# Patient Record
Sex: MALE | Race: WHITE | NOT HISPANIC OR LATINO | Employment: OTHER
[De-identification: names, ages, dates, MRNs, and addresses within clinical notes are randomized per-mention and may not be internally consistent; named-entity substitution may affect disease eponyms.]

---

## 2018-03-06 ENCOUNTER — TRANSCRIBE ORDERS (OUTPATIENT)
Dept: SCHEDULING | Age: 63
End: 2018-03-06

## 2018-03-06 DIAGNOSIS — I10 ESSENTIAL HYPERTENSION, MALIGNANT: Primary | ICD-10-CM

## 2018-03-12 ENCOUNTER — APPOINTMENT (OUTPATIENT)
Dept: RADIOLOGY | Facility: CLINIC | Age: 63
End: 2018-03-12
Attending: INTERNAL MEDICINE
Payer: MEDICARE

## 2018-03-12 DIAGNOSIS — I10 ESSENTIAL HYPERTENSION, MALIGNANT: ICD-10-CM

## 2018-03-12 PROCEDURE — 75571 CT HRT W/O DYE W/CA TEST: CPT

## 2018-10-04 ENCOUNTER — HOSPITAL ENCOUNTER (EMERGENCY)
Facility: HOSPITAL | Age: 63
Discharge: HOME | End: 2018-10-04
Attending: EMERGENCY MEDICINE
Payer: MEDICARE

## 2018-10-04 VITALS
WEIGHT: 198 LBS | SYSTOLIC BLOOD PRESSURE: 97 MMHG | OXYGEN SATURATION: 97 % | HEIGHT: 73 IN | BODY MASS INDEX: 26.24 KG/M2 | HEART RATE: 69 BPM | RESPIRATION RATE: 18 BRPM | TEMPERATURE: 97.3 F | DIASTOLIC BLOOD PRESSURE: 69 MMHG

## 2018-10-04 DIAGNOSIS — R20.2 PARESTHESIAS: ICD-10-CM

## 2018-10-04 DIAGNOSIS — F41.9 ANXIETY: Primary | ICD-10-CM

## 2018-10-04 DIAGNOSIS — R29.0 CARPOPEDAL SPASM: ICD-10-CM

## 2018-10-04 LAB
ANION GAP SERPL CALC-SCNC: 8 MEQ/L (ref 3–15)
BASOPHILS # BLD: 0.05 K/UL (ref 0.01–0.1)
BASOPHILS NFR BLD: 0.9 %
BUN SERPL-MCNC: 18 MG/DL (ref 8–20)
CALCIUM SERPL-MCNC: 9.6 MG/DL (ref 8.9–10.3)
CHLORIDE SERPL-SCNC: 100 MEQ/L (ref 98–109)
CO2 SERPL-SCNC: 32 MEQ/L (ref 22–32)
CREAT SERPL-MCNC: 0.9 MG/DL (ref 0.8–1.3)
DIFFERENTIAL METHOD BLD: ABNORMAL
EOSINOPHIL # BLD: 0.14 K/UL (ref 0.04–0.54)
EOSINOPHIL NFR BLD: 2.5 %
ERYTHROCYTE [DISTWIDTH] IN BLOOD BY AUTOMATED COUNT: 12.7 % (ref 11.6–14.4)
GFR SERPL CREATININE-BSD FRML MDRD: >60 ML/MIN/1.73M*2
GLUCOSE SERPL-MCNC: 90 MG/DL (ref 70–99)
HCT VFR BLDCO AUTO: 46.8 % (ref 40.1–51)
HGB BLD-MCNC: 15.6 G/DL (ref 13.7–17.5)
IMM GRANULOCYTES # BLD AUTO: 0.01 K/UL (ref 0–0.08)
IMM GRANULOCYTES NFR BLD AUTO: 0.2 %
LYMPHOCYTES # BLD: 1.98 K/UL (ref 1.2–3.5)
LYMPHOCYTES NFR BLD: 35.5 %
MCH RBC QN AUTO: 31.1 PG (ref 28–33.2)
MCHC RBC AUTO-ENTMCNC: 33.3 G/DL (ref 32.2–36.5)
MCV RBC AUTO: 93.2 FL (ref 83–98)
MONOCYTES # BLD: 0.65 K/UL (ref 0.3–1)
MONOCYTES NFR BLD: 11.6 %
NEUTROPHILS # BLD: 2.75 K/UL (ref 1.7–7)
NEUTS SEG NFR BLD: 49.3 %
NRBC BLD-RTO: 0.2 %
PDW BLD AUTO: 12.6 FL (ref 9.4–12.4)
PLATELET # BLD AUTO: 162 K/UL (ref 150–350)
POTASSIUM SERPL-SCNC: 3.9 MEQ/L (ref 3.6–5.1)
RBC # BLD AUTO: 5.02 M/UL (ref 4.5–5.8)
SODIUM SERPL-SCNC: 140 MEQ/L (ref 136–144)
WBC # BLD AUTO: 5.58 K/UL (ref 3.8–10.5)

## 2018-10-04 PROCEDURE — 85025 COMPLETE CBC W/AUTO DIFF WBC: CPT | Performed by: PHYSICIAN ASSISTANT

## 2018-10-04 PROCEDURE — 63700000 HC SELF-ADMINISTRABLE DRUG: Performed by: EMERGENCY MEDICINE

## 2018-10-04 PROCEDURE — 99283 EMERGENCY DEPT VISIT LOW MDM: CPT | Mod: 25

## 2018-10-04 PROCEDURE — 36415 COLL VENOUS BLD VENIPUNCTURE: CPT | Performed by: PHYSICIAN ASSISTANT

## 2018-10-04 PROCEDURE — 93005 ELECTROCARDIOGRAM TRACING: CPT | Performed by: PHYSICIAN ASSISTANT

## 2018-10-04 PROCEDURE — 80048 BASIC METABOLIC PNL TOTAL CA: CPT | Performed by: PHYSICIAN ASSISTANT

## 2018-10-04 RX ORDER — LORAZEPAM 0.5 MG/1
0.5 TABLET ORAL EVERY 6 HOURS PRN
Qty: 12 TABLET | Refills: 0 | Status: SHIPPED | OUTPATIENT
Start: 2018-10-04 | End: 2022-05-10 | Stop reason: ENTERED-IN-ERROR

## 2018-10-04 RX ORDER — TELMISARTAN 20 MG/1
10 TABLET ORAL 2 TIMES DAILY
COMMUNITY
Start: 2018-08-14 | End: 2022-05-11 | Stop reason: HOSPADM

## 2018-10-04 RX ORDER — ASPIRIN 81 MG/1
81 TABLET ORAL
COMMUNITY
Start: 2018-09-30 | End: 2020-11-09

## 2018-10-04 RX ORDER — LORAZEPAM 0.5 MG/1
0.5 TABLET ORAL ONCE
Status: COMPLETED | OUTPATIENT
Start: 2018-10-04 | End: 2018-10-04

## 2018-10-04 RX ORDER — LEVOTHYROXINE SODIUM 175 UG/1
175 TABLET ORAL
COMMUNITY
Start: 2018-09-27 | End: 2022-05-10 | Stop reason: ENTERED-IN-ERROR

## 2018-10-04 RX ADMIN — LORAZEPAM 0.5 MG: 0.5 TABLET ORAL at 10:29

## 2018-10-04 ASSESSMENT — ENCOUNTER SYMPTOMS
FEVER: 0
JOINT SWELLING: 0
NUMBNESS: 1
BACK PAIN: 0
DIZZINESS: 0
VOMITING: 0
COLOR CHANGE: 0
WOUND: 0
FATIGUE: 0
NECK PAIN: 0
PHOTOPHOBIA: 0
NAUSEA: 0
SHORTNESS OF BREATH: 0
ABDOMINAL PAIN: 0
TREMORS: 0
WEAKNESS: 0
CHILLS: 0
LIGHT-HEADEDNESS: 0

## 2018-10-04 NOTE — ED ATTESTATION NOTE
I have personally seen and examined the patient.  I reviewed and agree with physician assistant / nurse practitioner’s assessment and plan of care.     Exam: Patient is anxious tremulous and tearful on exam.  His neuro exam is intact there is no gross motor or sensory deficit in any of his extremities.  He describes a subjective numbness but nothing objective could be elicited at this time.    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  He is currently on a experimental drug prescribed to him by the oncologist at [facility].  He is imaged every 3 months and is due for new imaging shortly all his previous imaging studies have been unremarkable since the cancer was been in remission.  Although there are entities that could explain some of his symptoms I suspect the majority of this is due to anxiety in anticipation of his upcoming scan and his sister's recent diagnosis of lung cancer.  Will trial a small dose of Ativan for anxiolysis and communicate with his oncologist at [facility].           Phan Mayorga,   10/04/18 1011

## 2018-10-04 NOTE — DISCHARGE INSTRUCTIONS
Please return to the ED if you develop worsening symptoms, vomiting, confusion, change in mental status (not acting like self), visual changes (like blurry or double vision), or any other concerning symptoms.    Do not drink, drive, or operate heavy machinery while taking Ativan, as it will make you drowsy.

## 2018-10-04 NOTE — ED PROVIDER NOTES
"HPI     Chief Complaint   Patient presents with   • Paresthesia     Patient is a 62-year-old male with past medical history of salivary cancer, who is currently enrolled in a trial at [facility] and is on experimental therapy.  He reports that he gets full body imaging every 3 months.  He had last imaging performed 8 weeks ago at which time he was \"cancer free.\"  He presents to the ED today due to \"numbness\" in the tips of his fingers and toes.  He reports that this has been an ongoing issue for greater than 5 years, but over the last 1 week has been more persistent.  He spoke with the RN from clinical trial this morning and has decided to cease use of medication for the next 3 days, however he states \"but I do not think this is a reaction to the medication because it has been going on since before I started the trial.\"    When asking patient to further describe numbness he states that he has sensation and denies pins and needles, but that it just feels \"numb.\"  He states, \"I think it might be related to a vascular issue.\"  Patient has no history of peripheral vascular disease.  He denies hands or feet feeling cold with onset of symptoms.  He denies color change to fingers and toes with onset.     History provided by:  Patient  Paresthesia   Severity:  Moderate  Onset quality:  Sudden  Timing:  Intermittent  Progression:  Waxing and waning  Chronicity:  Chronic  Relieved by:  None tried  Worsened by:  Nothing  Associated symptoms: no abdominal pain, no chest pain, no fatigue, no fever, no nausea, no shortness of breath and no vomiting      Patient denies saddle anesthesia, bowel or bladder incontinence, neck or back pain.     Patient History     Past Medical History:   Diagnosis Date   • Cancer (CMS/HCC) (HCC)     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 drug thru [facility]    • Hx of head and neck radiation    • Hypertension    • Hypothyroidism        Past Surgical History:   Procedure Laterality Date   • " "CHOLECYSTECTOMY     • HERNIA REPAIR     • NECK DISSECTION     • NECK SURGERY     • THYROID SURGERY     • TUMOR REMOVAL      L neck 20yrs ago        History reviewed. No pertinent family history.    Social History   Substance Use Topics   • Smoking status: Never Smoker   • Smokeless tobacco: Never Used   • Alcohol use No       Systems Reviewed from Nursing Triage:          Review of Systems     Review of Systems   Constitutional: Negative for chills, fatigue and fever.   Eyes: Negative for photophobia and visual disturbance.   Respiratory: Negative for shortness of breath.    Cardiovascular: Negative for chest pain.   Gastrointestinal: Negative for abdominal pain, nausea and vomiting.   Musculoskeletal: Negative for back pain, gait problem, joint swelling and neck pain.   Skin: Negative for color change, pallor and wound.   Neurological: Positive for numbness. Negative for dizziness, tremors, weakness and light-headedness.   All other systems reviewed and are negative.       Physical Exam     ED Triage Vitals   Temp Heart Rate Resp BP SpO2   10/04/18 0840 10/04/18 0840 10/04/18 0840 10/04/18 0840 10/04/18 0840   36.4 °C (97.5 °F) 86 16 132/81 98 %      Temp Source Heart Rate Source Patient Position BP Location FiO2 (%) (Set)   10/04/18 0840 10/04/18 1057 10/04/18 1057 10/04/18 1057 --   Tympanic Monitor Lying Right upper arm        Pulse Ox %: 98 % (10/04/18 0915)  Pulse Ox Interpretation: Normal (10/04/18 0915)  Heart Rate: 86 (10/04/18 0915)  Rhythm Strip Interpretation: Normal Sinus Rhythm (10/04/18 0915)    Patient Vitals for the past 24 hrs:   BP Temp Temp src Pulse Resp SpO2 Height Weight   10/04/18 1225 97/69 - - 69 18 97 % - -   10/04/18 1057 107/66 36.3 °C (97.3 °F) Tympanic 69 14 99 % - -   10/04/18 0840 132/81 36.4 °C (97.5 °F) Tympanic 86 16 98 % 1.854 m (6' 1\") 89.8 kg (198 lb)           Physical Exam   Constitutional: He is oriented to person, place, and time. He appears well-developed and " well-nourished. No distress.   anxious   Eyes: Conjunctivae are normal.   Neck: Normal range of motion. Neck supple. No JVD present.   Cardiovascular: Normal rate, regular rhythm, normal heart sounds and intact distal pulses.    No murmur heard.  Pulmonary/Chest: Effort normal and breath sounds normal. No respiratory distress.   Abdominal: Soft. Bowel sounds are normal. There is no tenderness.   Musculoskeletal: Normal range of motion. He exhibits no edema.   Lymphadenopathy:     He has no cervical adenopathy.   Neurological: He is alert and oriented to person, place, and time. He has normal strength. He displays normal reflexes. No sensory deficit. He exhibits normal muscle tone. Coordination normal.   5/5 strength of upper and lower extremities, equal bilaterally.  Sensation intact and equal bilaterally.   Skin: Skin is warm and dry. Capillary refill takes less than 2 seconds. No rash noted. No pallor.   Nursing note and vitals reviewed.           ECG 12 lead  Date/Time: 10/4/2018 8:55 AM  Performed by: IRIS WHYTE  Authorized by: DECLAN BRUCE     ECG reviewed by ED Physician in the absence of a cardiologist: yes    Rate:     ECG rate:  76    ECG rate assessment: normal    Rhythm:     Rhythm: sinus rhythm    QRS:     QRS axis:  Left    QRS intervals:  Normal  Conduction:     Conduction: normal    ST segments:     ST segments:  Normal  T waves:     T waves: normal          ED Course & MDM     Labs Reviewed   CBC - Abnormal        Result Value    WBC 5.58      RBC 5.02      Hemoglobin 15.6      Hematocrit 46.8      MCV 93.2      MCH 31.1      MCHC 33.3      RDW 12.7      Platelets 162      MPV 12.6 (*)    DIFF COUNT - Abnormal     Differential Type Auto      nRBC 0.2 (*)     Immature Granulocytes 0.2      Neutrophils 49.3      Lymphocytes 35.5      Monocytes 11.6      Eosinophils 2.5      Basophils 0.9      Immature Granulocytes, Absolute 0.01      Neutrophils, Absolute 2.75      Lymphocytes, Absolute 1.98       Monocytes, Absolute 0.65      Eosinophils, Absolute 0.14      Basophils, Absolute 0.05     BASIC METABOLIC PANEL - Normal    Sodium 140      Potassium 3.9      Chloride 100      CO2 32      BUN 18      Creatinine 0.9      Glucose 90      Calcium 9.6      eGFR >60.0      Anion Gap 8     CBC AND DIFFERENTIAL    Narrative:     The following orders were created for panel order CBC and differential.  Procedure                               Abnormality         Status                     ---------                               -----------         ------                     CBC[[id number]]                           Abnormal            Final result               Diff Count[[id number]]                    Abnormal            Final result                 Please view results for these tests on the individual orders.       ECG 12 lead    (Results Pending)           [facility]         ED Course as of Oct 04 1543   Thu Oct 04, 2018   1208 Attempted to contact Dr. Lopez, patient's physician in trial at [facility]. No response.     Will d/c home to f/u with Dr. Lopez in office as scheduled for this afternoon. Will provide prescription for Ativan.  [KL]      ED Course User Index  [KL] Malina Cazares PA         Clinical Impressions as of Oct 04 1543   Anxiety   Paresthesias   Carpopedal spasm        Disposition: Discharge       Malina Cazares PA  10/04/18 2262

## 2018-10-06 LAB
ATRIAL RATE: 76
P AXIS: 69
PR INTERVAL: 164
QRS DURATION: 96
QT INTERVAL: 400
QTC CALCULATION(BAZETT): 450
R AXIS: -31
T WAVE AXIS: 35
VENTRICULAR RATE: 76

## 2019-07-08 ENCOUNTER — TRANSCRIBE ORDERS (OUTPATIENT)
Dept: SCHEDULING | Age: 64
End: 2019-07-08

## 2019-07-08 DIAGNOSIS — M25.511 PAIN IN RIGHT SHOULDER: Primary | ICD-10-CM

## 2019-07-11 ENCOUNTER — HOSPITAL ENCOUNTER (OUTPATIENT)
Dept: RADIOLOGY | Facility: CLINIC | Age: 64
Discharge: HOME | End: 2019-07-11
Attending: FAMILY MEDICINE
Payer: MEDICARE

## 2019-07-11 DIAGNOSIS — M25.511 PAIN IN RIGHT SHOULDER: ICD-10-CM

## 2019-07-30 ENCOUNTER — TRANSCRIBE ORDERS (OUTPATIENT)
Dept: SCHEDULING | Age: 64
End: 2019-07-30

## 2019-07-30 DIAGNOSIS — R94.30 ABNORMAL RESULT OF CARDIOVASCULAR FUNCTION STUDY: Primary | ICD-10-CM

## 2019-07-30 DIAGNOSIS — I10 ESSENTIAL (PRIMARY) HYPERTENSION: ICD-10-CM

## 2019-07-30 DIAGNOSIS — E78.9 DISORDER OF LIPOPROTEIN METABOLISM: ICD-10-CM

## 2019-10-17 ENCOUNTER — HOSPITAL ENCOUNTER (EMERGENCY)
Facility: HOSPITAL | Age: 64
Discharge: HOME | End: 2019-10-17
Attending: EMERGENCY MEDICINE
Payer: MEDICARE

## 2019-10-17 ENCOUNTER — APPOINTMENT (EMERGENCY)
Dept: RADIOLOGY | Facility: HOSPITAL | Age: 64
End: 2019-10-17
Attending: EMERGENCY MEDICINE
Payer: MEDICARE

## 2019-10-17 VITALS
HEIGHT: 73 IN | OXYGEN SATURATION: 96 % | HEART RATE: 87 BPM | TEMPERATURE: 97.1 F | WEIGHT: 198 LBS | SYSTOLIC BLOOD PRESSURE: 95 MMHG | BODY MASS INDEX: 26.24 KG/M2 | RESPIRATION RATE: 18 BRPM | DIASTOLIC BLOOD PRESSURE: 64 MMHG

## 2019-10-17 DIAGNOSIS — K59.00 CONSTIPATION, UNSPECIFIED CONSTIPATION TYPE: Primary | ICD-10-CM

## 2019-10-17 PROCEDURE — 63700000 HC SELF-ADMINISTRABLE DRUG: Performed by: PHYSICIAN ASSISTANT

## 2019-10-17 PROCEDURE — 99283 EMERGENCY DEPT VISIT LOW MDM: CPT | Mod: 25

## 2019-10-17 PROCEDURE — 74022 RADEX COMPL AQT ABD SERIES: CPT

## 2019-10-17 RX ORDER — LACTULOSE 10 G/15ML
10 SOLUTION ORAL ONCE
Status: COMPLETED | OUTPATIENT
Start: 2019-10-17 | End: 2019-10-17

## 2019-10-17 RX ORDER — LACTULOSE 10 G/15ML
10 SOLUTION ORAL ONCE
Status: DISCONTINUED | OUTPATIENT
Start: 2019-10-17 | End: 2019-10-17

## 2019-10-17 RX ADMIN — LACTULOSE 10 G: 10 SOLUTION ORAL at 15:16

## 2019-10-17 ASSESSMENT — ENCOUNTER SYMPTOMS
DIFFICULTY URINATING: 0
BACK PAIN: 0
FEVER: 0
DIZZINESS: 0
FREQUENCY: 0
CONSTIPATION: 1
NAUSEA: 0
ABDOMINAL PAIN: 0
CHEST TIGHTNESS: 0
DIARRHEA: 0
HEMATURIA: 0
COUGH: 0
DYSURIA: 0
LIGHT-HEADEDNESS: 0
VOMITING: 0
HEADACHES: 0
APPETITE CHANGE: 1
SHORTNESS OF BREATH: 0
CHILLS: 0

## 2019-10-17 NOTE — ED PROVIDER NOTES
HPI     Chief Complaint   Patient presents with   • Constipation       Patient is a 63-year-old male with past medical history of salivary carcinoma (s/p chemo, radiation, clinical trial in remission x 4 years), hypertension, hypothyroidism presenting with chief complaint of constipation.  The patient reports about 10 days ago he went out to eat and ate beef which he normally does not eat.  Since then he has been having trouble having a regular bowel movement.  The patient reports he typically has 1-2 regular bowel movements every day and has not had a regular formed bowel movement in about 10 days.  He admits to decreased appetite because of being afraid but denies nausea/vomiting.  The patient admits he is passing gas and having small pebble-like stools.  He has tried multiple different medications including stool softeners, laxatives, Fleet enema without success.  Patient denies prior history of bowel obstructions in the past.  He had an outpatient CT scan to rule out recurrence of cancer or metastatic disease, last study of his abdomen was done last week on 10/11 which was unremarkable.      History provided by:  Patient       Patient History     Past Medical History:   Diagnosis Date   • Cancer (CMS/HCC)     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 drug thru [facility]    • Hx of head and neck radiation    • Hypertension    • Hypothyroidism        Past Surgical History:   Procedure Laterality Date   • CHOLECYSTECTOMY     • HERNIA REPAIR     • NECK DISSECTION     • NECK SURGERY     • THYROID SURGERY     • TUMOR REMOVAL      L neck 20yrs ago        History reviewed. No pertinent family history.    Social History     Tobacco Use   • Smoking status: Never Smoker   • Smokeless tobacco: Never Used   Substance Use Topics   • Alcohol use: No   • Drug use: No       Systems Reviewed from Nursing Triage:          Review of Systems     Review of Systems   Constitutional: Positive for appetite change. Negative for chills and fever.  "  HENT: Negative for congestion.    Respiratory: Negative for cough, chest tightness and shortness of breath.    Cardiovascular: Negative for chest pain.   Gastrointestinal: Positive for constipation. Negative for abdominal pain, diarrhea, nausea and vomiting.   Genitourinary: Negative for difficulty urinating, dysuria, frequency and hematuria.   Musculoskeletal: Negative for back pain.   Skin: Negative for rash.   Neurological: Negative for dizziness, light-headedness and headaches.        Physical Exam     ED Triage Vitals [10/17/19 1352]   Temp Heart Rate Resp BP SpO2   36.2 °C (97.1 °F) 91 18 113/67 96 %      Temp src Heart Rate Source Patient Position BP Location FiO2 (%) (Set)   -- -- -- -- --       Pulse Ox %: 96 % (10/17/19 1442)  Pulse Ox Interpretation: Normal (10/17/19 1442)          Patient Vitals for the past 24 hrs:   BP Temp Pulse Resp SpO2 Height Weight   10/17/19 1636 95/64 -- 87 18 -- -- --   10/17/19 1352 113/67 36.2 °C (97.1 °F) 91 18 96 % 1.854 m (6' 1\") 89.8 kg (198 lb)                                       Physical Exam   Constitutional: He is oriented to person, place, and time. He appears well-developed and well-nourished. No distress.   HENT:   Head: Normocephalic and atraumatic.   Eyes: Pupils are equal, round, and reactive to light. Conjunctivae and EOM are normal.   Neck: Normal range of motion.   Cardiovascular: Normal rate and regular rhythm.   Pulmonary/Chest: Effort normal and breath sounds normal. No stridor. No respiratory distress. He has no wheezes.   Abdominal: Soft. He exhibits no distension. There is no tenderness.   Genitourinary: Rectal exam shows external hemorrhoid (small). Rectal exam shows no mass and no tenderness.   Genitourinary Comments: RN Vianey at bedside as chaperone. (-) stool on manual rectal exam.    Musculoskeletal:   Moving all extremities without difficulty.   Neurological: He is alert and oriented to person, place, and time.   Skin: Skin is warm and dry. "   Nursing note and vitals reviewed.           Procedures    ED Course & [facility]     Labs Reviewed - No data to display    X-RAY OBSTRUCTION SERIES (ABDOMEN 2 VIEWS WITH CHEST 1 VIEW)   Final Result   IMPRESSION: Moderate retained stool.                  [facility]         ED Course as of Oct 17 1703   Thu Oct 17, 2019   1434 Rectal exam with RN Vianey at bedside. Plan for obs series, enema      [KM]   1441 Ct scan on 11th no cancer  Will get obstruction series  Has used miralax and colace and enemas  Will try lactulose     [PRINCESS]   1501 X-ray reviewed with Dr. Grewal, (+) constipation, (-) obstruction. Will trial lactulose & soap suds    [KM]   1613 Pt tolerated enema & had a small BM. Advised continued use of stool softener, miralax. Discussed reasons to return, otherwise PCP f/u. Pt also requesting GI as needed.    [KM]      ED Course User Index  [PRINCESS] Phan Grewal, DO  [KM] Tiffany Quinonez PA C         Clinical Impressions as of Oct 17 1703   Constipation, unspecified constipation type     Dispo:  Discharge     Tiffany Quinonez PA C  10/17/19 1703

## 2019-10-17 NOTE — ED ATTESTATION NOTE
"Physician Attestation:     I personally saw and evaluated the patient, participated in the management, and agree with the findings in the above note except as where stated.  The Physician Assistant and I discussed  the case, workup, and disposition.      Chief Complaint  Chief Complaint   Patient presents with   • Constipation     63 male presents for evaluation.  Patient states constipated for 10 days.  Last bowel movement 10 days ago set off by eating beef burgundy after he has not even been in quite some time.  States he had a CAT scan about a 1 week ago last Thursday which was normal.  States he has tried MiraLAX Colace enemas and 1 or 2 other products without relief he states that occasionally can get a couple lacy out and pass gas. Hx of cholecysectomy 20 yrs ago. No abd pain just constipated. No hx of sbo    abd soft nt nd np x 4  + bowel sounds    Plan 4 xray- pt just had ct  Will try lactulose as next step for constipation if no signs of sbo (physical exam wo signs of sbo)      Vital Signs:   Visit Vitals  /67   Pulse 91   Temp 36.2 °C (97.1 °F)   Resp 18   Ht 1.854 m (6' 1\")   Wt 89.8 kg (198 lb)   SpO2 96%   BMI 26.12 kg/m²     Vital Signs reviewed       Past Medical History:  Past Medical History:   Diagnosis Date   • Cancer (CMS/HCC)     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 drug thru [facility]    • Hx of head and neck radiation    • Hypertension    • Hypothyroidism        Past Surgical History:  Past Surgical History:   Procedure Laterality Date   • CHOLECYSTECTOMY     • HERNIA REPAIR     • NECK DISSECTION     • NECK SURGERY     • THYROID SURGERY     • TUMOR REMOVAL      L neck 20yrs ago        Current Medications:  No current facility-administered medications for this encounter.      Current Outpatient Medications   Medication Sig Dispense Refill   • aspirin 81 mg enteric coated tablet Take 81 mg by mouth.     • larotrectinib (VITRAKVI) 25 mg capsule Take 25 mg by mouth 4 (four) times a day.   "   • multivitamin-Ca-iron-minerals tablet Take 1 tablet by mouth.     • LEVOXYL 175 mcg tablet Take 175 mcg by mouth daily.       • LORazepam (ATIVAN) 0.5 mg tablet Take 1 tablet (0.5 mg total) by mouth every 6 (six) hours as needed for anxiety for up to 3 days. 12 tablet 0   • telmisartan (MICARDIS) 40 mg tablet 60 mg daily.           Allergies:  No Known Allergies    Family History  History reviewed. No pertinent family history.    Medical, surgical, and family history reviewed.    Social History   Social History     Socioeconomic History   • Marital status:      Spouse name: Not on file   • Number of children: Not on file   • Years of education: Not on file   • Highest education level: Not on file   Occupational History   • Not on file   Social Needs   • Financial resource strain: Not on file   • Food insecurity:     Worry: Not on file     Inability: Not on file   • Transportation needs:     Medical: Not on file     Non-medical: Not on file   Tobacco Use   • Smoking status: Never Smoker   • Smokeless tobacco: Never Used   Substance and Sexual Activity   • Alcohol use: No   • Drug use: No   • Sexual activity: Not on file   Lifestyle   • Physical activity:     Days per week: Not on file     Minutes per session: Not on file   • Stress: Not on file   Relationships   • Social connections:     Talks on phone: Not on file     Gets together: Not on file     Attends Baptism service: Not on file     Active member of club or organization: Not on file     Attends meetings of clubs or organizations: Not on file     Relationship status: Not on file   • Intimate partner violence:     Fear of current or ex partner: Not on file     Emotionally abused: Not on file     Physically abused: Not on file     Forced sexual activity: Not on file   Other Topics Concern   • Not on file   Social History Narrative   • Not on file         Records Review:  I have reviewed any available documentation of the medications, allergies, and  past history for this patient. Vital signs reviewed.              Phan Grewal, DO  10/17/19 7731

## 2019-10-17 NOTE — DISCHARGE INSTRUCTIONS
Drink plenty of fluids to stay well hydrated.  Continue stool softener & miralax daily.  Advance diet as tolerating, incorporating fiber & eating small meals.    Follow-up with a provider within 72 hours for re-evaluation.    Return to the ER if you experience worsening of symptoms, nausea/vomiting, abdominal pain, fevers/chills, or any other new concerns.

## 2020-09-20 ENCOUNTER — HOSPITAL ENCOUNTER (EMERGENCY)
Facility: HOSPITAL | Age: 65
Discharge: HOME | End: 2020-09-20
Attending: EMERGENCY MEDICINE
Payer: MEDICARE

## 2020-09-20 VITALS
TEMPERATURE: 97.7 F | WEIGHT: 200 LBS | HEIGHT: 73 IN | RESPIRATION RATE: 16 BRPM | DIASTOLIC BLOOD PRESSURE: 84 MMHG | OXYGEN SATURATION: 99 % | HEART RATE: 78 BPM | SYSTOLIC BLOOD PRESSURE: 130 MMHG | BODY MASS INDEX: 26.51 KG/M2

## 2020-09-20 DIAGNOSIS — L03.114 CELLULITIS OF LEFT UPPER EXTREMITY: Primary | ICD-10-CM

## 2020-09-20 PROCEDURE — 99281 EMR DPT VST MAYX REQ PHY/QHP: CPT

## 2020-09-20 RX ORDER — CEPHALEXIN 250 MG/5ML
500 POWDER, FOR SUSPENSION ORAL 4 TIMES DAILY
Qty: 280 ML | Refills: 0 | Status: SHIPPED | OUTPATIENT
Start: 2020-09-20 | End: 2020-09-20 | Stop reason: SDUPTHER

## 2020-09-20 RX ORDER — CEPHALEXIN 250 MG/5ML
500 POWDER, FOR SUSPENSION ORAL 4 TIMES DAILY
Qty: 280 ML | Refills: 0 | Status: SHIPPED | OUTPATIENT
Start: 2020-09-20 | End: 2020-09-27

## 2020-09-20 ASSESSMENT — ENCOUNTER SYMPTOMS
LIGHT-HEADEDNESS: 0
COUGH: 0
SHORTNESS OF BREATH: 0
VOMITING: 0
HEADACHES: 0
DIARRHEA: 0
APPETITE CHANGE: 0
ABDOMINAL PAIN: 0
CHILLS: 0
NAUSEA: 0
FEVER: 0

## 2020-09-20 NOTE — DISCHARGE INSTRUCTIONS
Take the antibiotics as prescribed until complete.    If you have itching, you may take Benadryl orally as needed.    Follow-up with your PCP within 72 hours to ensure improvement of symptoms.    Return to the ER if you experience worsening of symptoms, difficulty moving your left arm, area of redness and warmth extends especially if it involves your elbow and wrist, develop fever/chills, or develop any other new concerns.

## 2020-09-20 NOTE — ED ATTESTATION NOTE
"I have personally seen and examined the patient.  I reviewed and agree with physician assistant / nurse practitioner’s assessment and plan of care, with the following exceptions: None  My examination, assessment, and plan of care of Celso Gramajo is as follows:     Exam: Well-appearing, no acute distress, awake alert oriented x3, left upper extremity with small area of redness and warmth to the proximal forearm, 2 small \"puncture wounds\" centrally, no fluctuation or induration, normal pulses distally    Assessment and plan: Patient with probable insect bite and now early cellulitis, no systemic symptoms, antibiotics and close follow-up given strict return and follow-up instructions which she understands     Enrike Gutierrez,   09/20/20 0855    "

## 2020-09-20 NOTE — ED PROVIDER NOTES
HPI     Chief Complaint   Patient presents with   • Rash     L forearm rash       Patient is a 64-year-old male with past medical history of oral cancer in remission, hypertension, hypothyroidism presenting with chief complaint of rash.  Patient reports he noticed what looked like 2 pimples on his left arm about 8 to 9 days ago.  Patient reports since yesterday the area has become red and warm.  Patient attempted to spray Benadryl on the area without relief and became concerned that it may be infected.  Patient denies any prior history of cellulitis.  He denies any history of fever/chills and is otherwise feeling well.      History provided by:  Patient  Rash  Associated symptoms: no abdominal pain, no diarrhea, no fever, no headaches, no nausea, no shortness of breath and not vomiting         Patient History     Past Medical History:   Diagnosis Date   • Cancer (CMS/HCC)     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 drug thru [facility]    • Hx of head and neck radiation    • Hypertension    • Hypothyroidism        Past Surgical History:   Procedure Laterality Date   • CHOLECYSTECTOMY     • HERNIA REPAIR     • NECK DISSECTION     • NECK SURGERY     • THYROID SURGERY     • TUMOR REMOVAL      L neck 20yrs ago        No family history on file.    Social History     Tobacco Use   • Smoking status: Never Smoker   • Smokeless tobacco: Never Used   Substance Use Topics   • Alcohol use: No   • Drug use: No       Systems Reviewed from Nursing Triage:          Review of Systems     Review of Systems   Constitutional: Negative for appetite change, chills and fever.   Respiratory: Negative for cough and shortness of breath.    Cardiovascular: Negative for chest pain.   Gastrointestinal: Negative for abdominal pain, diarrhea, nausea and vomiting.   Skin: Positive for rash.   Neurological: Negative for light-headedness and headaches.        Physical Exam     ED Triage Vitals   Temp Heart Rate Resp BP SpO2   09/20/20 0834 09/20/20 0834  "09/20/20 0903 09/20/20 0834 09/20/20 0834   36.5 °C (97.7 °F) 78 16 130/84 99 %      Temp Source Heart Rate Source Patient Position BP Location FiO2 (%) (Set)   09/20/20 0834 09/20/20 0834 09/20/20 0834 09/20/20 0834 --   Tympanic Monitor Lying Right upper arm        Pulse Ox %: 99 % (09/20/20 0845)  Pulse Ox Interpretation: Normal (09/20/20 0845)          Patient Vitals for the past 24 hrs:   BP Temp Temp src Pulse Resp SpO2 Height Weight   09/20/20 0903 -- -- -- -- 16 -- -- --   09/20/20 0834 130/84 36.5 °C (97.7 °F) Tympanic 78 -- 99 % 1.854 m (6' 1\") 90.7 kg (200 lb)                                          Physical Exam  Constitutional:       General: He is not in acute distress.     Appearance: Normal appearance. He is not toxic-appearing.   HENT:      Head: Normocephalic and atraumatic.      Mouth/Throat:      Mouth: Mucous membranes are moist.   Eyes:      Extraocular Movements: Extraocular movements intact.      Pupils: Pupils are equal, round, and reactive to light.   Pulmonary:      Effort: Pulmonary effort is normal.   Abdominal:      General: Abdomen is flat. There is no distension.      Tenderness: There is no abdominal tenderness.   Musculoskeletal:      Left elbow: He exhibits normal range of motion and no swelling. No tenderness found.      Left wrist: He exhibits normal range of motion, no bony tenderness and no swelling.        Arms:       Left hand: He exhibits normal range of motion and no bony tenderness. Normal sensation noted.   Skin:     General: Skin is warm and dry.   Neurological:      General: No focal deficit present.      Mental Status: He is alert and oriented to person, place, and time.              Procedures    Labs Reviewed - No data to display    No orders to display               ED Course & MDM     [facility]         ED Course as of Sep 20 1552   Sun Sep 20, 2020   0854 Pt with signs of cellulitis secondary to what appears to be secondary to a bite (possibly spider bite). Area was " outlined. There is no sign of underlying induration. Pt cannot tolerate pills due to prior oral CA history therefore liquid keflex sent to the pharmacy. Pt reports mild itching, advised to take PO benadryl as needed rather than anything topically. Pt advised of close PCP f/u within 72 hours for re-evaluation & discussed return if worsening. Pt pleasant and agreeable with plan.     [KM]      ED Course User Index  [KM] Tiffany Quinonez PA C         Clinical Impressions as of Sep 20 1552   Cellulitis of left upper extremity     Dispo  discharge     Tiffany Quinonez PA C  09/20/20 6814

## 2020-11-06 ENCOUNTER — TRANSCRIBE ORDERS (OUTPATIENT)
Dept: SCHEDULING | Age: 65
End: 2020-11-06

## 2020-11-06 DIAGNOSIS — H90.3 SENSORINEURAL HEARING LOSS, BILATERAL: Primary | ICD-10-CM

## 2020-11-12 ENCOUNTER — HOSPITAL ENCOUNTER (OUTPATIENT)
Dept: RADIOLOGY | Facility: CLINIC | Age: 65
Discharge: HOME | End: 2020-11-12
Attending: OTOLARYNGOLOGY
Payer: MEDICARE

## 2020-11-12 DIAGNOSIS — H90.3 SENSORINEURAL HEARING LOSS, BILATERAL: ICD-10-CM

## 2020-11-12 RX ORDER — GADOBUTROL 604.72 MG/ML
0.1 INJECTION INTRAVENOUS ONCE
Status: COMPLETED | OUTPATIENT
Start: 2020-11-12 | End: 2020-11-12

## 2020-11-12 RX ADMIN — GADOBUTROL 8.7 MMOL: 604.72 INJECTION INTRAVENOUS at 04:42

## 2020-11-20 ENCOUNTER — ANESTHESIA EVENT (OUTPATIENT)
Dept: SURGERY | Facility: HOSPITAL | Age: 65
Setting detail: HOSPITAL OUTPATIENT SURGERY
End: 2020-11-20
Payer: MEDICARE

## 2020-11-20 NOTE — ANESTHESIA PREPROCEDURE EVALUATION
Relevant Problems   No relevant active problems       Anesthesia ROS/MED HX    Anesthesia History - neg  Pulmonary - neg  Neuro/Psych - neg  Cardiovascular   CAD   hypertension   Covid19 Test Reviewed  Comments: Normal mouth opening and head movement despite h/o head/neck radiation 16 years ago  Hematological - neg  GI/Hepatic- neg  Musculoskeletal- neg  Renal Disease- neg  Endo/Other   Hypothyroidism  History of cancer (salivary)  ROS/MED HX Comments:    Anesthesia History: H/o head and neck XRT   GI/Hepatic/Renal: Esophageal stenosis     10/26/20 MRI neck:    1. Total thyroidectomy. No evidence of local recurrence.    2. Modified radical left neck dissection. No pathologic cervical lymph nodes by imaging criteria.    3. Findings suggesting wall thickening of the upper cervical thoracic esophagus. Correlate for radiation effects versus tumor (including radiation induced).    4. Heterogeneous bone marrow of uncertain significance. Clinically correlate. Attention on follow-up imaging.      There is no problem list on file for this patient.       Past Medical History:   Diagnosis Date   • Cancer (CMS/HCC)     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 drug thru [facility]    • Hx of head and neck radiation    • Hypertension    • Hypothyroidism        Past Surgical History:   Procedure Laterality Date   • CHOLECYSTECTOMY     • HERNIA REPAIR     • NECK DISSECTION     • NECK SURGERY     • THYROID SURGERY     • TUMOR REMOVAL      L neck 20yrs ago        No Known Allergies    No current facility-administered medications for this encounter.        Prior to Admission medications    Medication Sig Start Date End Date Taking? Authorizing Provider   larotrectinib (VITRAKVI) 25 mg capsule Take 25 mg by mouth 4 (four) times a day.    Clifford Mukherjee MD   LEVOXYL 175 mcg tablet Take 175 mcg by mouth daily.   9/27/18   Clifford Mukherjee MD   LORazepam (ATIVAN) 0.5 mg tablet Take 1 tablet (0.5 mg total) by mouth every 6 (six) hours  as needed for anxiety for up to 3 days. 10/4/18 10/7/18  Phan Mayorga,    multivitamin-Ca-iron-minerals tablet Take 1 tablet by mouth.    Provider, MD Clifford   telmisartan (MICARDIS) 40 mg tablet 60 mg daily.   8/14/18   Provider, MD Clifford       There were no vitals filed for this visit.    BP Readings from Last 3 Encounters:   09/20/20 130/84   10/17/19 95/64   10/04/18 97/69       CBC Results       10/04/18 06/25/17 03/09/16                    0903 1259 1908         WBC 5.58 5.19 5.35         RBC 5.02 4.93 4.97         HGB 15.6 15.2 15.4         HCT 46.8 45.6 45.1         MCV 93.2 92.5 90.7         MCH 31.1 30.8 31.0         MCHC 33.3 33.3 34.1          146 155                       BMP Results       10/04/18 06/25/17 03/09/16                    0903 1259 1908          140 140         K 3.9 3.8 3.9         Cl 100 105 106         CO2 32 31 28         Glucose 90 105 204         BUN 18 13 17         Creatinine 0.9 0.9 0.9         Calcium 9.6 9.0 9.4         Anion Gap 8 4 6         EGFR >60.0 -- --         Comment for K at 0903 on 10/04/18:   Results obtained on plasma. Plasma Potassium values may be up to 0.4 mEQ/L less than serum values. The differences may be greater for patients with high platelet or white cell counts.    Comment for K at 1908 on 03/09/16: RESULTS OBTAINED ON PLASMA. PLASMA POTASSIUM VALUES MAY BE UP TO 0.4 MEQ/L LESS THAN SERUM VALUES. THE DIFFERENCES MAY BE GREATER FOR PATIENTS WITH HIGH PLATELET OR WHITE CELL COUNTS.                The patient does not have an active anticoagulation episode.    No results found for: HCGPREGUR, PREGSERUM, HCG, HCGQUANT    No orders to display         Physical Exam    Airway   Mallampati: II   TM distance: >3 FB   Neck ROM: full  Cardiovascular - normal   Rhythm: regular   Rate: normalPulmonary - normal  Other Findings   Normal mouth opening and head movement despite h/o head/neck radiation 16 years ago  Dental -  normal        Anesthesia Plan    Plan: general    Technique: general endotracheal and total IV anesthesia     Lines and Monitors: PIV     Airway: video laryngoscope   ASA 3  Blood Products:   Use of Blood Products Discussed: No   Anesthetic plan and risks discussed with: patient  Induction:    intravenous   Postop Plan:   Patient Disposition: phase II then home   Pain Management: IV analgesics  Comments:    Plan: Patient advised of risks, benefits, and alternatives.  Discussed associated risks including, but not limited to, sore throat, nausea/vomiting, oral injury, unexpected drug reactions, big swings in blood pressure, pulmonary complications, heart attack, stroke, and death. All questions/concerns answered.

## 2020-11-22 NOTE — H&P
Chief complaint: Dysphonia    HPI     Patient is a 65 y.o. male who presents with Dysphonia. Of note he has a hx of LPR, esophageal squamous cell cancer, The patient had a history of a left neck 8cm mass which was thought to be thyroid cancer and had a total thyroidectomy in 2002, followed by postoperative radioactive iodine. 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 He had local recurrence five years later and a left  neck dissection was performed followed by XRT. He is scheduled to begin chemo/XRT. On his scope exam in the office he was noted to have L vocal fold paralysis and bowing, bilateral prominent vessels and ectasia. He presents for planned outpatient procedure.     Medical History:   Past Medical History:   Diagnosis Date   • Cancer (CMS/HCC)     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 drug thru [facility]    • Hx of head and neck radiation    • Hypertension    • Hypothyroidism        Surgical History:   Past Surgical History:   Procedure Laterality Date   • CHOLECYSTECTOMY     • HERNIA REPAIR     • NECK DISSECTION     • NECK SURGERY     • THYROID SURGERY     • TUMOR REMOVAL      L neck 20yrs ago        Social History:   Social History     Socioeconomic History   • Marital status:      Spouse name: Not on file   • Number of children: Not on file   • Years of education: Not on file   • Highest education level: Not on file   Occupational History   • Not on file   Social Needs   • Financial resource strain: Not on file   • Food insecurity     Worry: Not on file     Inability: Not on file   • Transportation needs     Medical: Not on file     Non-medical: Not on file   Tobacco Use   • Smoking status: Never Smoker   • Smokeless tobacco: Never Used   Substance and Sexual Activity   • Alcohol use: No   • Drug use: No   • Sexual activity: Not on file   Lifestyle   • Physical activity     Days per week: Not on file     Minutes per session: Not on file   • Stress: Not on file   Relationships   • Social  connections     Talks on phone: Not on file     Gets together: Not on file     Attends Rastafarian service: Not on file     Active member of club or organization: Not on file     Attends meetings of clubs or organizations: Not on file     Relationship status: Not on file   • Intimate partner violence     Fear of current or ex partner: Not on file     Emotionally abused: Not on file     Physically abused: Not on file     Forced sexual activity: Not on file   Other Topics Concern   • Not on file   Social History Narrative   • Not on file       Family History:   Family History   Problem Relation Age of Onset   • No Known Problems Biological Father    • Lung cancer Biological Sister        Allergies: Patient has no known allergies.       Medication List      Notice    Cannot display discharge medications because the patient has not yet been admitted.       Review of Systems  Pertinent items are noted in HPI.    Objective     Vital Signs for the last 24 hours:       Physical Exam:  GEN: NAD, no Stridor  HEAD: NC/AT  EYES: EOMI, PERRLA, Pink Conjunctiva b/l  EARS: External anatomy intact b/l, no otorrhea, EAC patent b/l, TM intact b/l  NOSE: Atraumatic, +DNS, Pink Mucosa, No excoriation, No bleeding  OP/OC: MMM, Good Dentition, Tonsils Symmetric, No Mucosal Lesions, Tongue Midline, No erythema   NECK: Soft/Supple, No LAD, Full ROM, Trachea Midline  RESP: Good inspiratory effort b/l, Equal chest rise, No accessory muscle use  Neuro: A&O x 3, CNII-XII grossly intact      Labs  No new labs.    Imaging  Not applicable        Assessment/Plan   66 y/o M with dysphonia in setting of ongoing esophageal cancer treatment  - Plan for MDL, Abdominal fat harvest, blue laser ablation of bilateral ectasia, Possible VF mass excision, L VF fat injection  - Keep NPO  - Consented in office  - Call ENT with questions  - d/w attending    Tj Hawk DO ENT PGY-3

## 2020-11-23 ENCOUNTER — ANESTHESIA (OUTPATIENT)
Dept: SURGERY | Facility: HOSPITAL | Age: 65
Setting detail: HOSPITAL OUTPATIENT SURGERY
End: 2020-11-23
Payer: MEDICARE

## 2020-11-23 ENCOUNTER — HOSPITAL ENCOUNTER (OUTPATIENT)
Facility: HOSPITAL | Age: 65
Setting detail: HOSPITAL OUTPATIENT SURGERY
Discharge: HOME | End: 2020-11-23
Attending: OTOLARYNGOLOGY | Admitting: OTOLARYNGOLOGY
Payer: MEDICARE

## 2020-11-23 VITALS
OXYGEN SATURATION: 96 % | WEIGHT: 193 LBS | TEMPERATURE: 98.2 F | DIASTOLIC BLOOD PRESSURE: 64 MMHG | RESPIRATION RATE: 16 BRPM | HEART RATE: 96 BPM | BODY MASS INDEX: 25.58 KG/M2 | HEIGHT: 73 IN | SYSTOLIC BLOOD PRESSURE: 134 MMHG

## 2020-11-23 PROCEDURE — 63600000 HC DRUGS/DETAIL CODE: Performed by: OTOLARYNGOLOGY

## 2020-11-23 PROCEDURE — 37000001 HC ANESTHESIA GENERAL: Performed by: OTOLARYNGOLOGY

## 2020-11-23 PROCEDURE — 71000012 HC PACU PHASE 2 EA ADDL MIN: Performed by: OTOLARYNGOLOGY

## 2020-11-23 PROCEDURE — 25800000 HC PHARMACY IV SOLUTIONS: Performed by: NURSE ANESTHETIST, CERTIFIED REGISTERED

## 2020-11-23 PROCEDURE — 71000011 HC PACU PHASE 1 EA ADDL MIN: Performed by: OTOLARYNGOLOGY

## 2020-11-23 PROCEDURE — 36000012 HC OR LEVEL 2 EA ADDL MIN: Performed by: OTOLARYNGOLOGY

## 2020-11-23 PROCEDURE — 63600000 HC DRUGS/DETAIL CODE: Performed by: STUDENT IN AN ORGANIZED HEALTH CARE EDUCATION/TRAINING PROGRAM

## 2020-11-23 PROCEDURE — 25000000 HC PHARMACY GENERAL: Performed by: NURSE ANESTHETIST, CERTIFIED REGISTERED

## 2020-11-23 PROCEDURE — 0CBT8ZZ EXCISION OF RIGHT VOCAL CORD, VIA NATURAL OR ARTIFICIAL OPENING ENDOSCOPIC: ICD-10-PCS | Performed by: OTOLARYNGOLOGY

## 2020-11-23 PROCEDURE — 25000000 HC PHARMACY GENERAL: Performed by: OTOLARYNGOLOGY

## 2020-11-23 PROCEDURE — 71000001 HC PACU PHASE 1 INITIAL 30MIN: Performed by: OTOLARYNGOLOGY

## 2020-11-23 PROCEDURE — 25800000 HC PHARMACY IV SOLUTIONS: Performed by: INTERNAL MEDICINE

## 2020-11-23 PROCEDURE — 0CBV8ZZ EXCISION OF LEFT VOCAL CORD, VIA NATURAL OR ARTIFICIAL OPENING ENDOSCOPIC: ICD-10-PCS | Performed by: OTOLARYNGOLOGY

## 2020-11-23 PROCEDURE — 0JD83ZZ EXTRACTION OF ABDOMEN SUBCUTANEOUS TISSUE AND FASCIA, PERCUTANEOUS APPROACH: ICD-10-PCS | Performed by: OTOLARYNGOLOGY

## 2020-11-23 PROCEDURE — 0CJS8ZZ INSPECTION OF LARYNX, VIA NATURAL OR ARTIFICIAL OPENING ENDOSCOPIC: ICD-10-PCS | Performed by: OTOLARYNGOLOGY

## 2020-11-23 PROCEDURE — 71000002 HC PACU PHASE 2 INITIAL 30MIN: Performed by: OTOLARYNGOLOGY

## 2020-11-23 PROCEDURE — 36000002 HC OR LEVEL 2 INITIAL 30MIN: Performed by: OTOLARYNGOLOGY

## 2020-11-23 PROCEDURE — 27200000 HC STERILE SUPPLY: Performed by: OTOLARYNGOLOGY

## 2020-11-23 PROCEDURE — 3E0F3GC INTRODUCTION OF OTHER THERAPEUTIC SUBSTANCE INTO RESPIRATORY TRACT, PERCUTANEOUS APPROACH: ICD-10-PCS | Performed by: OTOLARYNGOLOGY

## 2020-11-23 PROCEDURE — 63600000 HC DRUGS/DETAIL CODE: Performed by: NURSE ANESTHETIST, CERTIFIED REGISTERED

## 2020-11-23 RX ORDER — LIDOCAINE HYDROCHLORIDE 40 MG/ML
INJECTION, SOLUTION RETROBULBAR AS NEEDED
Status: DISCONTINUED | OUTPATIENT
Start: 2020-11-23 | End: 2020-11-23 | Stop reason: SURG

## 2020-11-23 RX ORDER — ONDANSETRON HYDROCHLORIDE 2 MG/ML
4 INJECTION, SOLUTION INTRAVENOUS
Status: DISCONTINUED | OUTPATIENT
Start: 2020-11-23 | End: 2020-11-23 | Stop reason: HOSPADM

## 2020-11-23 RX ORDER — MIDAZOLAM HYDROCHLORIDE 2 MG/2ML
INJECTION, SOLUTION INTRAMUSCULAR; INTRAVENOUS AS NEEDED
Status: DISCONTINUED | OUTPATIENT
Start: 2020-11-23 | End: 2020-11-23 | Stop reason: SURG

## 2020-11-23 RX ORDER — PROPOFOL 10 MG/ML
INJECTION, EMULSION INTRAVENOUS AS NEEDED
Status: DISCONTINUED | OUTPATIENT
Start: 2020-11-23 | End: 2020-11-23 | Stop reason: SURG

## 2020-11-23 RX ORDER — EPHEDRINE SULFATE 50 MG/ML
INJECTION, SOLUTION INTRAVENOUS AS NEEDED
Status: DISCONTINUED | OUTPATIENT
Start: 2020-11-23 | End: 2020-11-23 | Stop reason: SURG

## 2020-11-23 RX ORDER — ONDANSETRON HYDROCHLORIDE 2 MG/ML
INJECTION, SOLUTION INTRAVENOUS AS NEEDED
Status: DISCONTINUED | OUTPATIENT
Start: 2020-11-23 | End: 2020-11-23 | Stop reason: SURG

## 2020-11-23 RX ORDER — PHENYLEPHRINE HCL IN 0.9% NACL 1 MG/10 ML
SYRINGE (ML) INTRAVENOUS AS NEEDED
Status: DISCONTINUED | OUTPATIENT
Start: 2020-11-23 | End: 2020-11-23 | Stop reason: SURG

## 2020-11-23 RX ORDER — FENTANYL CITRATE 50 UG/ML
INJECTION, SOLUTION INTRAMUSCULAR; INTRAVENOUS AS NEEDED
Status: DISCONTINUED | OUTPATIENT
Start: 2020-11-23 | End: 2020-11-23 | Stop reason: SURG

## 2020-11-23 RX ORDER — DEXAMETHASONE SODIUM PHOSPHATE 4 MG/ML
INJECTION, SOLUTION INTRA-ARTICULAR; INTRALESIONAL; INTRAMUSCULAR; INTRAVENOUS; SOFT TISSUE AS NEEDED
Status: DISCONTINUED | OUTPATIENT
Start: 2020-11-23 | End: 2020-11-23 | Stop reason: SURG

## 2020-11-23 RX ORDER — FENTANYL CITRATE 50 UG/ML
50 INJECTION, SOLUTION INTRAMUSCULAR; INTRAVENOUS
Status: DISCONTINUED | OUTPATIENT
Start: 2020-11-23 | End: 2020-11-23 | Stop reason: HOSPADM

## 2020-11-23 RX ORDER — IBUPROFEN 200 MG
16-32 TABLET ORAL AS NEEDED
Status: DISCONTINUED | OUTPATIENT
Start: 2020-11-23 | End: 2020-11-23 | Stop reason: HOSPADM

## 2020-11-23 RX ORDER — ACETAMINOPHEN AND CODEINE PHOSPHATE 300; 30 MG/1; MG/1
1-2 TABLET ORAL EVERY 4 HOURS PRN
Status: DISCONTINUED | OUTPATIENT
Start: 2020-11-23 | End: 2020-11-23 | Stop reason: HOSPADM

## 2020-11-23 RX ORDER — GLYCOPYRROLATE 0.6MG/3ML
SYRINGE (ML) INTRAVENOUS AS NEEDED
Status: DISCONTINUED | OUTPATIENT
Start: 2020-11-23 | End: 2020-11-23 | Stop reason: SURG

## 2020-11-23 RX ORDER — CEFAZOLIN SODIUM 1 G/50ML
SOLUTION INTRAVENOUS AS NEEDED
Status: DISCONTINUED | OUTPATIENT
Start: 2020-11-23 | End: 2020-11-23 | Stop reason: SURG

## 2020-11-23 RX ORDER — DEXTROSE 50 % IN WATER (D50W) INTRAVENOUS SYRINGE
25 AS NEEDED
Status: DISCONTINUED | OUTPATIENT
Start: 2020-11-23 | End: 2020-11-23 | Stop reason: HOSPADM

## 2020-11-23 RX ORDER — ROSUVASTATIN CALCIUM 5 MG/1
5 TABLET, COATED ORAL NIGHTLY
COMMUNITY
Start: 2020-09-30 | End: 2022-11-13

## 2020-11-23 RX ORDER — MULTIVITAMIN
1 TABLET ORAL
Status: ON HOLD | COMMUNITY
End: 2020-11-23 | Stop reason: SDUPTHER

## 2020-11-23 RX ORDER — PROPOFOL 10 MG/ML
INJECTION, EMULSION INTRAVENOUS CONTINUOUS PRN
Status: DISCONTINUED | OUTPATIENT
Start: 2020-11-23 | End: 2020-11-23 | Stop reason: SURG

## 2020-11-23 RX ORDER — LIDOCAINE HYDROCHLORIDE AND EPINEPHRINE 10; 10 UG/ML; MG/ML
INJECTION, SOLUTION INFILTRATION; PERINEURAL AS NEEDED
Status: DISCONTINUED | OUTPATIENT
Start: 2020-11-23 | End: 2020-11-23 | Stop reason: HOSPADM

## 2020-11-23 RX ORDER — SODIUM CHLORIDE 9 MG/ML
INJECTION, SOLUTION INTRAVENOUS CONTINUOUS
Status: DISCONTINUED | OUTPATIENT
Start: 2020-11-23 | End: 2020-11-23 | Stop reason: HOSPADM

## 2020-11-23 RX ORDER — DEXTROSE 40 %
15-30 GEL (GRAM) ORAL AS NEEDED
Status: DISCONTINUED | OUTPATIENT
Start: 2020-11-23 | End: 2020-11-23 | Stop reason: HOSPADM

## 2020-11-23 RX ORDER — FAMOTIDINE 40 MG/1
40 TABLET, FILM COATED ORAL NIGHTLY
COMMUNITY
Start: 2020-10-29 | End: 2023-06-28 | Stop reason: ENTERED-IN-ERROR

## 2020-11-23 RX ORDER — LIDOCAINE HYDROCHLORIDE 10 MG/ML
INJECTION, SOLUTION EPIDURAL; INFILTRATION; INTRACAUDAL; PERINEURAL AS NEEDED
Status: DISCONTINUED | OUTPATIENT
Start: 2020-11-23 | End: 2020-11-23 | Stop reason: SURG

## 2020-11-23 RX ORDER — EPINEPHRINE 1 MG/ML
INJECTION, SOLUTION INTRAMUSCULAR; SUBCUTANEOUS AS NEEDED
Status: DISCONTINUED | OUTPATIENT
Start: 2020-11-23 | End: 2020-11-23 | Stop reason: HOSPADM

## 2020-11-23 RX ORDER — LANSOPRAZOLE 30 MG/1
60 CAPSULE, DELAYED RELEASE ORAL
COMMUNITY
Start: 2020-11-08 | End: 2023-06-28 | Stop reason: ENTERED-IN-ERROR

## 2020-11-23 RX ADMIN — Medication 100 MCG: at 15:24

## 2020-11-23 RX ADMIN — LIDOCAINE HYDROCHLORIDE 3 ML: 40 INJECTION, SOLUTION RETROBULBAR; TOPICAL at 14:54

## 2020-11-23 RX ADMIN — PROPOFOL INJECTABLE EMULSION 100 MG: 10 INJECTION, EMULSION INTRAVENOUS at 14:45

## 2020-11-23 RX ADMIN — SODIUM CHLORIDE: 9 INJECTION, SOLUTION INTRAVENOUS at 14:39

## 2020-11-23 RX ADMIN — Medication 100 MCG: at 14:56

## 2020-11-23 RX ADMIN — PROPOFOL INJECTABLE EMULSION 100 MG: 10 INJECTION, EMULSION INTRAVENOUS at 14:52

## 2020-11-23 RX ADMIN — DEXAMETHASONE SODIUM PHOSPHATE 10 MG: 4 INJECTION, SOLUTION INTRAMUSCULAR; INTRAVENOUS at 14:57

## 2020-11-23 RX ADMIN — MIDAZOLAM HYDROCHLORIDE 2 MG: 1 INJECTION, SOLUTION INTRAMUSCULAR; INTRAVENOUS at 14:42

## 2020-11-23 RX ADMIN — LIDOCAINE HYDROCHLORIDE 5 ML: 10 INJECTION, SOLUTION EPIDURAL; INFILTRATION; INTRACAUDAL; PERINEURAL at 14:45

## 2020-11-23 RX ADMIN — Medication 100 MCG: at 15:19

## 2020-11-23 RX ADMIN — EPHEDRINE SULFATE 5 MG: 50 INJECTION, SOLUTION INTRAVENOUS at 14:58

## 2020-11-23 RX ADMIN — FENTANYL CITRATE 50 MCG: 50 INJECTION, SOLUTION INTRAMUSCULAR; INTRAVENOUS at 14:45

## 2020-11-23 RX ADMIN — FENTANYL CITRATE 50 MCG: 50 INJECTION, SOLUTION INTRAMUSCULAR; INTRAVENOUS at 16:21

## 2020-11-23 RX ADMIN — PROPOFOL INJECTABLE EMULSION 125 MCG/KG/MIN: 10 INJECTION, EMULSION INTRAVENOUS at 14:52

## 2020-11-23 RX ADMIN — DEXAMETHASONE SODIUM PHOSPHATE 10 MG: 4 INJECTION, SOLUTION INTRAMUSCULAR; INTRAVENOUS at 14:55

## 2020-11-23 RX ADMIN — ONDANSETRON HYDROCHLORIDE 4 MG: 2 SOLUTION INTRAMUSCULAR; INTRAVENOUS at 14:57

## 2020-11-23 RX ADMIN — CEFAZOLIN SODIUM 2 G: 1 SOLUTION INTRAVENOUS at 14:52

## 2020-11-23 RX ADMIN — Medication 100 MCG: at 15:48

## 2020-11-23 RX ADMIN — EPHEDRINE SULFATE 10 MG: 50 INJECTION, SOLUTION INTRAVENOUS at 14:56

## 2020-11-23 RX ADMIN — EPHEDRINE SULFATE 10 MG: 50 INJECTION, SOLUTION INTRAVENOUS at 15:50

## 2020-11-23 RX ADMIN — Medication 0.2 MG: at 14:51

## 2020-11-23 RX ADMIN — Medication 100 MCG: at 14:57

## 2020-11-23 RX ADMIN — SODIUM CHLORIDE: 9 INJECTION, SOLUTION INTRAVENOUS at 12:04

## 2020-11-23 RX ADMIN — REMIFENTANIL HYDROCHLORIDE 2 MCG/KG/MIN: 1 INJECTION, POWDER, LYOPHILIZED, FOR SOLUTION INTRAVENOUS at 14:51

## 2020-11-23 RX ADMIN — Medication 100 MCG: at 14:55

## 2020-11-23 NOTE — OR SURGEON
Pre-Procedure patient identification:  I am the primary operating surgeon/proceduralist and I have identified the patient on 11/23/20 at 7:34 AM Jon German MD  Phone Number: [phone]

## 2020-11-23 NOTE — ANESTHESIA POSTPROCEDURE EVALUATION
Patient: Celso Gramajo    Procedure Summary     Date: 11/23/20 Room / Location: [facility]    Anesthesia Start: 1439 Anesthesia Stop: 1603    Procedure: Micro direct laryngoscopy with fat implantation, abdominal fat harvest,laser (N/A ) Diagnosis:       Vocal fold polyp      Unilateral partial vocal cord paralysis      (vocal fold mass j38.1)      (vocal fold paresis j38.01)    Surgeon: Jon German MD Responsible Provider: Brianna Horne MD    Anesthesia Type: general ASA Status: 3          Anesthesia Type: general  PACU Vitals  11/23/2020 1553 - 11/23/2020 1635      11/23/2020  1600 11/23/2020  1613          BP:  (!) 131/91  (!) 131/91      Temp:  --  37.1 °C (98.7 °F)      Pulse:  (!) 114  (!) 101      Resp:  (!) 22  17      SpO2:  94 %  100 %              Anesthesia Post Evaluation    Pain management: adequate  Patient location during evaluation: PACU  Patient participation: complete - patient participated  Level of consciousness: awake and alert  Cardiovascular status: acceptable  Airway Patency: adequate  Respiratory status: acceptable  Hydration status: acceptable  Anesthetic complications: no       Banner Transposition Flap Text: The defect edges were debeveled with a #15 scalpel blade.  Given the location of the defect and the proximity to free margins a Banner transposition flap was deemed most appropriate.  Using a sterile surgical marker, an appropriate flap drawn around the defect. The area thus outlined was incised deep to adipose tissue with a #15 scalpel blade.  The skin margins were undermined to an appropriate distance in all directions utilizing iris scissors.

## 2020-11-23 NOTE — ANESTHESIA PROCEDURE NOTES
Airway  Urgency: elective    Start Time: 11/23/2020 2:54 PM  Airway not difficult    General Information and Staff    Patient location during procedure: OR  Anesthesiologist: Brianna Horne MD  Resident/CRNA: Any Camarena CRNA  Performed: resident/CRNA     Indications and Patient Condition  Indications for airway management: anesthesia  Sedation level: general  Preoxygenated: yes  Patient position: sniffing  Mask difficulty assessment: 1 - vent by mask    Final Airway Details  Final airway type: endotracheal airway      Successful airway: laser tube (5.0)  Cuffed: yes   Successful intubation technique: video laryngoscopy  Facilitating devices/methods: intubating stylet  Endotracheal tube insertion site: oral  Blade size: #4  Cormack-Lehane Classification: grade IIa - partial view of glottis  Placement verified by: chest auscultation and capnometry   Number of attempts at approach: 1  Number of other approaches attempted: 0  Atraumatic airway insertion

## 2020-11-23 NOTE — PERIOPERATIVE NURSING NOTE
Discharge instructions given to pt's wife over phone. Pt dcd to home via wheelchair with wife without complaints. No evidence of bleeding or swelling noted. Abdominal binder and peg tube remain intact

## 2020-11-23 NOTE — ANESTHESIOLOGIST PRE-PROCEDURE ATTESTATION
Pre-Procedure Patient Identification:  I am the Primary Anesthesiologist and have identified the patient on 11/23/20 at 12:37 PM.   I have confirmed the following procedure(s) Micro direct laryngoscopy with fat implantation, abdominal fat harvest, possible excision of mass possible laser will be performed by the following surgeon/proceduralist Jon German MD.

## 2020-11-23 NOTE — DISCHARGE INSTRUCTIONS
Post-Operative Voice Rest (Phase 1)    1. Plain and simple: No talking, no mouthing, and no whispering. Pen and paper/texting/e-mail communication only. Do no mouth words or lip-synch. Be careful about sub-vocalizing (using your larynx) when you read. This is particularly likely to be a problem if you are a slow reader. Also, ask your doctor about whether it Is safe for you to practice musical instruments. Wind instruments commonly cause vocal fold contact.  2. No whistling.  3. Absolutely no throat clearing or coughing. (except for silent cough)  4. Keep well hydrated.  5. Do not take any aspirin products or N.S.A.I.D.’S (Advil, Aleve, Motrin, Anaprox, Naprosyn, Naproxen). Tylenol is OK.  6. Avoid catching a cold.  7. No heavy lifting or strenuous exercise such as running, swimming, aerobics, or spinning. No sex.  8. If you are being treated for reflux, please remember to take you prescription medication and antacids faithfully the week prior to and two weeks after surgery (at least!)  9. Relax! This voice rest period is only for a short time… try to enjoy it!  10. Get a noise-maker to carry with you at all times (a whistle, an electronic noise-maker, or a bell) to use to get someone’s attention.  11. Remember to see your speech-pathologist immediately following your pot-op visit with your surgeon for instruction on voice use after complete voice rest period is over.          Post-Operative Modified Voice Rest (Phrase 2)    1. After your period of voice rest has been completed, your surgeon will instruct you on how many minutes per hour you are allowed to speak. Generally, this will be 5-10minutes an hour of voice use. This means one minute of speaking followed by 10 minutes break. It is important that you use your voice during the minute of voice use, so if you don’t have anyone to talk to, use your therapy exercises. It is also important that you do not use the 5-10 minutes up all at once. One minute on, ten  minutes off. Your speech-language pathologist will increase your voice time with each visit if you are doing well.    2. You have been wee instructed in healthy voice production. Use this approach for voice use.    3. Continue to avoid speaking over noise or on the phone until instructed otherwise, as this may cause you to push or strain to be heard.    4. Go back to the voice therapy worksheets and CDs for exercise when you are not speaking with anyone during an hour so that you are using you voice a little bit every hour. Say “mm-hmm” as a guide to help you find an easy, comfortable pitch with good resonance (vibrations) in the front of your face and easy feeling in your throat.    5. Use plenty of airflow and voicing with voice use but do not whisper.    6. Remember to use an easy and relaxed manner of voice production. A tentative voice production can lead to an increase in tension in the tongue, jaw and throat and actually put more pressure on your vocal fold tissue, putting you at risk for surgery.    7. Never push or strain you voice.  If you feel vocally fatigued or feel that you are straining.    8. Keep your pad of paper and pen(or white board) available for communicating when you’ve used up your allotted time. Still do not mouth words or lip-synch, and still be very careful about sub-vocalizing when you read. Continue to monitor use of musical instruments.    9. Avoid throat clearing and coughing.    10. Keep well-hydrated, take your reflux medications and follow your reflux precautions.    11. Consult your surgeon before taking aspirin or ibuprofen products (N.S.A.I.D.s).    12. Consult your surgeon before resuming your exercise routine (running, swimming, etc) and sex. You should also consult your doctor regarding when you can resume practicing a musical instrument, if you are an instrumentalist.

## 2020-11-23 NOTE — OP NOTE
FAT INJECTION                                                             OPERATIVE REPORT        Patient Name:  Celso Gramajo  Primary Surgeon:  Jon German MD  Medical Record Number:  [id number]  Date of Operation:  11/23/2020       PROCEDURE DATE: 11/23/2020    PREOPERATIVE DIAGNOSES:  1.  Dysphonia.  2.  Glottic insufficiency.  3.  Vocal fold paralysis, left  4.  Bilateral varicosities and ectasias      POSTOPERATIVE DIAGNOSES:  1.  Dysphonia.  2.  Glottic insufficiency.  3.  Vocal fold Alysis, left  4.  Bilateral varicosities and ectasias    INDICATIONS FOR SURGERY:  1.  Bilateral varicosities and ectasias  2.  Dysphonia.  3.  Glottis insufficiency.  4.  Vocal fold paralysis, left      PROCEDURES:  1.  Micro-direct laryngoscopy.  2.  Left injection medialization laryngoplasty.  3.  Abdominal fat harvest.  4.  Bilateral vaporization of varicosities and ectasias    SURGEON: Jon German M.D.    ASSISTANTS:  Surgeon(s) and Role:     * Jon German MD - Primary    Sylwia Medina, MD Tj Brownlee, DO Surendra Nelson. DO      ANESTHESIA: General.            DESCRIPTION OF PROCEDURE:    The patient was taken to the operating room, prepared and draped in the usual fashion. The abdomen was prepared. Xylocaine with epinephrine was injected. Fat was harvested using the largest (8-10 mm) liposuction cannula. The fat was washed with saline and loaded into a Bruenings syringe. An 18-gauge needle was used. The abdominal incision was closed with 3-0 Vicryl and Dermabond. At the end of the operation, a dressing was placed, as well as an abdominal binder.  Micro-direct laryngoscopy was performed. A Maxi medium female laryngoscope was positioned and suspended.  Good visualization was obtained.  Before injection medialization, varicosities on both vocal folds were vaporizing using a blue laser.  A Bruenings syringe was  placed lateral to the left thyroarytenoid muscle. Autologous lipoinjection medialization laryngoplasty was performed with overcorrection of approximately 30%.  40% overcorrection could not be accomplished because of extrusion.  No fat was injected on the contralateral side. Topical anesthetic was applied. The patient was extubated atraumatically by the surgeon with the laryngoscope in position. The laryngoscope was then removed and the patient was awakened by the anesthesia department using mask ventilation.  Suspension time was 21 minutes.

## 2021-06-25 ENCOUNTER — TELEPHONE (OUTPATIENT)
Dept: THORACIC SURGERY | Facility: CLINIC | Age: 66
End: 2021-06-25

## 2021-06-25 NOTE — TELEPHONE ENCOUNTER
Please review pet scan at [place].  Patient is looking for a second opinion appt with dr. Mcmahon.  Is it okay to schedule?

## 2021-06-28 NOTE — TELEPHONE ENCOUNTER
I don't see a lot of information to go by.  There is a PET scan resulted last year in Care Everywhere that mentions esoph tumor.  We are certainly willing to try to help and see in consultation. Much more information would be needed.  thanks

## 2021-07-15 ENCOUNTER — OFFICE VISIT (OUTPATIENT)
Dept: THORACIC SURGERY | Facility: CLINIC | Age: 66
End: 2021-07-15
Payer: MEDICARE

## 2021-07-15 VITALS
HEIGHT: 73 IN | BODY MASS INDEX: 26.37 KG/M2 | HEART RATE: 105 BPM | DIASTOLIC BLOOD PRESSURE: 89 MMHG | RESPIRATION RATE: 20 BRPM | OXYGEN SATURATION: 97 % | SYSTOLIC BLOOD PRESSURE: 143 MMHG | WEIGHT: 199 LBS

## 2021-07-15 DIAGNOSIS — R13.14 PHARYNGOESOPHAGEAL DYSPHAGIA: ICD-10-CM

## 2021-07-15 DIAGNOSIS — C15.3 MALIGNANT NEOPLASM OF UPPER THIRD OF ESOPHAGUS (CMS/HCC): Primary | ICD-10-CM

## 2021-07-15 PROCEDURE — 99204 OFFICE O/P NEW MOD 45 MIN: CPT | Performed by: THORACIC SURGERY (CARDIOTHORACIC VASCULAR SURGERY)

## 2021-07-15 NOTE — LETTER
2021     Jon German MD  [address]  [place] [address]    Patient: Celso Gramajo  YOB: 1955  Date of Visit: 7/15/2021      Dear Dr. German:    Thank you for referring Celso Gramajo to me for evaluation. Below are my notes for this consultation.    If you have questions, please do not hesitate to call me. I look forward to following your patient along with you.         Sincerely,        Mark Mcmahon MD PhD        CC: MD Eva Saravia Patsy L, PA C  2021  5:49 PM  Sign when Signing Visit  Thoracic Surgery Consultation      Patient ID: Celso Gramajo                              : 1955  MRN: [id number]    Clinic:  [facility]                                            Visit Date: 7/15/2021  Encounter Provider: Mark Mcmahon  Referring Provider: Jon German MD    Subjective       Celso Gramajo is a very pleasant gentleman with an unfortunate and complex past medical history.  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  He underwent postoperative radioactive iodine treatment.  Unfortunately, he had recurrence in 2005 which was treated with left modified fied radical neck dissection with adjuvant photon [place] [facility]  In  he had another local recurrence and underwent prelaryngeal resection also at [facility].  There was a portion felt to be on resectable due to location by the carotid.  He was enrolled in trial at [facility] and treated with TRK inhibitor.  He had left vocal cord injury and underwent multiple injections/procedures.  He had chronic stricture with dysphagia and had been undergoing multiple dilatations.  On October endoscopy there was an area of irregularity.  Biopsy shows limited  squamous cell carcinoma.  This was treated with radiation therapy.  He has since had significant issues with worsening dysphagia and mucus production.  G-tube was placed and this is what he uses for almost 100% of his nutrition.    His ongoing dysphagia and hoarseness with multiple cancer diagnoses have led to anxiety and depression.  He is trying to manage things 1 day at a time.  He is  but does not want to rely too much on his wife or stress her out.  He is not suicidal.  His most recent concern is esophageal activity on PET scan.  He has throat discomfort and neck tightness/stiffness.  He has mild limitation in range of motion.  He reports irregular heartbeat with palpitations at times.  He also complains of poor circulation with swelling of the ankles at the end of the day.  His review of systems is otherwise negative         Past Medical History:  has a past medical history of Cancer (CMS/HCC), head and neck radiation, Hypertension, and Hypothyroidism.  Past Surgical History:  has a past surgical history that includes Thyroid surgery; Cholecystectomy; Hernia repair; Neck surgery; Tumor removal; and Neck dissection.  Social History:  reports that he has never smoked. He has never used smokeless tobacco. He reports that he does not drink alcohol and does not use drugs.  Family History: family history includes Lung cancer in his biological sister; No Known Problems in his biological father.  Medications:   Current Outpatient Medications:   •  famotidine (PEPCID) 40 mg tablet, Take 40 mg by mouth nightly., Disp: , Rfl:   •  lansoprazole (PREVACID) 30 mg capsule, OPEN 2 CAPSULES AND SPRINKLE ON APPLESAUCE OR YOGURT AND TAKE 30 MINUTES BEFORE BREAKFAST, Disp: , Rfl:   •  larotrectinib (VITRAKVI) 25 mg capsule, Take 25 mg by mouth 4 (four) times a day., Disp: , Rfl:   •  LEVOXYL 175 mcg tablet, Take 175 mcg by mouth daily.  , Disp: , Rfl:   •  multivitamin-Ca-iron-minerals tablet, Take 1 tablet by mouth.,  "Disp: , Rfl:   •  rosuvastatin (CRESTOR) 5 mg tablet, Take 5 mg by mouth 2 (two) times a day., Disp: , Rfl:   •  telmisartan (MICARDIS) 40 mg tablet, 60 mg daily.  , Disp: , Rfl:   •  LORazepam (ATIVAN) 0.5 mg tablet, Take 1 tablet (0.5 mg total) by mouth every 6 (six) hours as needed for anxiety for up to 3 days., Disp: 12 tablet, Rfl: 0  Allergies: No Known Allergies    E-cigarette/Vaping     Questions Responses    E-cigarette/Vaping Use Never User      E-cigarette/Vaping Devices     Questions Responses    Disposable No    Pre-filled or Refillable Cartridge No    Refillable Tank No    Pre-filled Pod No      E-cigarette/Vaping Substances     Questions Responses    Nicotine No    THC No    CBD No    Flavoring No            Objective   Physical Exam:  Visit Vitals  BP (!) 143/89 (BP Location: Right upper arm, Patient Position: Sitting)   Pulse (!) 105   Resp 20   Ht 1.854 m (6' 1\")   Wt 90.3 kg (199 lb)   SpO2 97%   BMI 26.25 kg/m²       Constitutional: No acute distress. Appears stated age.   Neurologic: Alert and Oriented with no carolyn deficit or asymmetry.  Cooperative affect  Head: NCAT  Eyes: Antiicteric, EOMI.  Neck: Trachea is midline.  He has a multiple well-healed incisions, post radiation changes including hyperpigmentation.  No adenopathy palpable  Respiratory: Clear to auscultation bilaterally without wheeze, rhonchi, or crackle noted.   Cardiovascular: Regular rate and rhythm. No murmur appreciated.    Abdomen: Thin, soft, Non-tender non-distended.  No organomegaly or mass palpable.  G-tube present in the mid left upper quadrant and within normal limits  Musculoskeletal: Ambulates without carolyn deficit. Normal and symmetric strength.  Extremities: No cyanosis, or lower extremity edema  Skin: Neck and upper chest with post radiation changes as noted above.  Otherwise, intact and without lesion, wound or rash of concern.     Performance Status:   ECO    We have had extensive review of his records " including multiple endoscopy report/images, pathology reports, PET scan images and reports, and outside clinical notes which have been scanned in via epic and available in care everywhere.    Assessment   We have had extensive discussion regarding our interpretation of his records as above.  Undergoing surgical resection does not appear to be indicated at this time and would carry an extremely high risk given his multiple interventions as noted above.    Although there has been some mild increase in activity on his PET scan, his most recent endoscopy on 7/12/2021 shows normal mucosa.  In fact, there was such a low suspicion no biopsies were taken.  The PET scan had been done to weeks after a dilation.  He has a talented multidisciplinary team who is following him very closely.  Follow-up endoscopy and PET scan are being arranged.  We will see him after these evaluations are completed.

## 2021-07-22 NOTE — PROGRESS NOTES
Thoracic Surgery Consultation      Patient ID: Celso Gramajo                              : 1955  MRN: [id number]    Clinic:  [facility]                                            Visit Date: 7/15/2021  Encounter Provider: Mark Mcmahon  Referring Provider: Jon German MD    Subjective       Celso Gramajo is a very pleasant gentleman with an unfortunate and complex past medical history.  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  He underwent postoperative radioactive iodine treatment.  Unfortunately, he had recurrence in 2005 which was treated with left modified fied radical neck dissection with adjuvant photon radiation therapy at [facility].  In  he had another local recurrence and underwent prelaryngeal resection also at [facility].  There was a portion felt to be on resectable due to location by the carotid.  He was enrolled in trial at [facility] and treated with TRK inhibitor.  He had left vocal cord injury and underwent multiple injections/procedures.  He had chronic stricture with dysphagia and had been undergoing multiple dilatations.  On October endoscopy there was an area of irregularity.  Biopsy shows limited squamous cell carcinoma.  This was treated with radiation therapy.  He has since had significant issues with worsening dysphagia and mucus production.  G-tube was placed and this is what he uses for almost 100% of his nutrition.    His ongoing dysphagia and hoarseness with multiple cancer diagnoses have led to anxiety and depression.  He is trying to manage things 1 day at a time.  He is  but does not want to rely too much on his wife or stress her out.  He is not suicidal.  His most recent concern is esophageal activity on PET scan.  He has throat  discomfort and neck tightness/stiffness.  He has mild limitation in range of motion.  He reports irregular heartbeat with palpitations at times.  He also complains of poor circulation with swelling of the ankles at the end of the day.  His review of systems is otherwise negative         Past Medical History:  has a past medical history of Cancer (CMS/HCC), head and neck radiation, Hypertension, and Hypothyroidism.  Past Surgical History:  has a past surgical history that includes Thyroid surgery; Cholecystectomy; Hernia repair; Neck surgery; Tumor removal; and Neck dissection.  Social History:  reports that he has never smoked. He has never used smokeless tobacco. He reports that he does not drink alcohol and does not use drugs.  Family History: family history includes Lung cancer in his biological sister; No Known Problems in his biological father.  Medications:   Current Outpatient Medications:   •  famotidine (PEPCID) 40 mg tablet, Take 40 mg by mouth nightly., Disp: , Rfl:   •  lansoprazole (PREVACID) 30 mg capsule, OPEN 2 CAPSULES AND SPRINKLE ON APPLESAUCE OR YOGURT AND TAKE 30 MINUTES BEFORE BREAKFAST, Disp: , Rfl:   •  larotrectinib (VITRAKVI) 25 mg capsule, Take 25 mg by mouth 4 (four) times a day., Disp: , Rfl:   •  LEVOXYL 175 mcg tablet, Take 175 mcg by mouth daily.  , Disp: , Rfl:   •  multivitamin-Ca-iron-minerals tablet, Take 1 tablet by mouth., Disp: , Rfl:   •  rosuvastatin (CRESTOR) 5 mg tablet, Take 5 mg by mouth 2 (two) times a day., Disp: , Rfl:   •  telmisartan (MICARDIS) 40 mg tablet, 60 mg daily.  , Disp: , Rfl:   •  LORazepam (ATIVAN) 0.5 mg tablet, Take 1 tablet (0.5 mg total) by mouth every 6 (six) hours as needed for anxiety for up to 3 days., Disp: 12 tablet, Rfl: 0  Allergies: No Known Allergies    E-cigarette/Vaping     Questions Responses    E-cigarette/Vaping Use Never User      E-cigarette/Vaping Devices     Questions Responses    Disposable No    Pre-filled or Refillable Cartridge  "No    Refillable Tank No    Pre-filled Pod No      E-cigarette/Vaping Substances     Questions Responses    Nicotine No    THC No    CBD No    Flavoring No            Objective   Physical Exam:  Visit Vitals  BP (!) 143/89 (BP Location: Right upper arm, Patient Position: Sitting)   Pulse (!) 105   Resp 20   Ht 1.854 m (6' 1\")   Wt 90.3 kg (199 lb)   SpO2 97%   BMI 26.25 kg/m²       Constitutional: No acute distress. Appears stated age.   Neurologic: Alert and Oriented with no carolyn deficit or asymmetry.  Cooperative affect  Head: NCAT  Eyes: Antiicteric, EOMI.  Neck: Trachea is midline.  He has a multiple well-healed incisions, post radiation changes including hyperpigmentation.  No adenopathy palpable  Respiratory: Clear to auscultation bilaterally without wheeze, rhonchi, or crackle noted.   Cardiovascular: Regular rate and rhythm. No murmur appreciated.    Abdomen: Thin, soft, Non-tender non-distended.  No organomegaly or mass palpable.  G-tube present in the mid left upper quadrant and within normal limits  Musculoskeletal: Ambulates without carolyn deficit. Normal and symmetric strength.  Extremities: No cyanosis, or lower extremity edema  Skin: Neck and upper chest with post radiation changes as noted above.  Otherwise, intact and without lesion, wound or rash of concern.     Performance Status:   ECO    We have had extensive review of his records including multiple endoscopy report/images, pathology reports, PET scan images and reports, and outside clinical notes which have been scanned in via epic and available in care everywhere.    Assessment   We have had extensive discussion regarding our interpretation of his records as above.  Undergoing surgical resection does not appear to be indicated at this time and would carry an extremely high risk given his multiple interventions as noted above.    Although there has been some mild increase in activity on his PET scan, his most recent endoscopy on 2021 " shows normal mucosa.  In fact, there was such a low suspicion no biopsies were taken.  The PET scan had been done to weeks after a dilation.  He has a talented multidisciplinary team who is following him very closely.  Follow-up endoscopy and PET scan are being arranged.  We will see him after these evaluations are completed.

## 2022-05-10 ENCOUNTER — APPOINTMENT (EMERGENCY)
Dept: RADIOLOGY | Facility: HOSPITAL | Age: 67
End: 2022-05-10
Attending: PHYSICIAN ASSISTANT
Payer: MEDICARE

## 2022-05-10 ENCOUNTER — APPOINTMENT (OUTPATIENT)
Dept: RADIOLOGY | Facility: HOSPITAL | Age: 67
Setting detail: OBSERVATION
End: 2022-05-10
Attending: FAMILY MEDICINE
Payer: MEDICARE

## 2022-05-10 ENCOUNTER — HOSPITAL ENCOUNTER (OUTPATIENT)
Facility: HOSPITAL | Age: 67
Setting detail: OBSERVATION
LOS: 1 days | Discharge: HOME | End: 2022-05-11
Attending: EMERGENCY MEDICINE | Admitting: STUDENT IN AN ORGANIZED HEALTH CARE EDUCATION/TRAINING PROGRAM
Payer: MEDICARE

## 2022-05-10 DIAGNOSIS — R42 VERTIGO: ICD-10-CM

## 2022-05-10 DIAGNOSIS — R42 DIZZINESS: Primary | ICD-10-CM

## 2022-05-10 DIAGNOSIS — I65.23 CAROTID ARTERY PLAQUE, BILATERAL: ICD-10-CM

## 2022-05-10 PROBLEM — I10 HTN (HYPERTENSION): Status: ACTIVE | Noted: 2022-05-10

## 2022-05-10 PROBLEM — K21.9 GERD (GASTROESOPHAGEAL REFLUX DISEASE): Status: ACTIVE | Noted: 2022-05-10

## 2022-05-10 PROBLEM — R09.3 INCREASED SPUTUM PRODUCTION: Status: ACTIVE | Noted: 2022-05-10

## 2022-05-10 PROBLEM — Z86.73 HISTORY OF CARDIOEMBOLIC CEREBROVASCULAR ACCIDENT (CVA): Status: ACTIVE | Noted: 2022-05-10

## 2022-05-10 PROBLEM — E78.5 HYPERLIPIDEMIA: Status: ACTIVE | Noted: 2022-05-10

## 2022-05-10 PROBLEM — Z86.73 HISTORY OF CARDIOEMBOLIC CEREBROVASCULAR ACCIDENT (CVA): Status: RESOLVED | Noted: 2022-05-10 | Resolved: 2022-05-10

## 2022-05-10 PROBLEM — E03.9 HYPOTHYROIDISM: Status: ACTIVE | Noted: 2022-05-10

## 2022-05-10 PROBLEM — E89.0 POSTOPERATIVE HYPOTHYROIDISM: Status: ACTIVE | Noted: 2022-05-10

## 2022-05-10 LAB
ANION GAP SERPL CALC-SCNC: 7 MEQ/L (ref 3–15)
BASOPHILS # BLD: 0.03 K/UL (ref 0.01–0.1)
BASOPHILS NFR BLD: 0.5 %
BNP SERPL-MCNC: 27 PG/ML
BUN SERPL-MCNC: 16 MG/DL (ref 8–20)
CALCIUM SERPL-MCNC: 9.7 MG/DL (ref 8.9–10.3)
CHLORIDE SERPL-SCNC: 99 MEQ/L (ref 98–109)
CO2 SERPL-SCNC: 31 MEQ/L (ref 22–32)
CREAT SERPL-MCNC: 0.8 MG/DL (ref 0.8–1.3)
DIFFERENTIAL METHOD BLD: ABNORMAL
EOSINOPHIL # BLD: 0.03 K/UL (ref 0.04–0.54)
EOSINOPHIL NFR BLD: 0.5 %
ERYTHROCYTE [DISTWIDTH] IN BLOOD BY AUTOMATED COUNT: 12.4 % (ref 11.6–14.4)
GFR SERPL CREATININE-BSD FRML MDRD: >60 ML/MIN/1.73M*2
GLUCOSE SERPL-MCNC: 103 MG/DL (ref 70–99)
HCT VFR BLDCO AUTO: 44.1 % (ref 40.1–51)
HGB BLD-MCNC: 14.7 G/DL (ref 13.7–17.5)
IMM GRANULOCYTES # BLD AUTO: 0.01 K/UL (ref 0–0.08)
IMM GRANULOCYTES NFR BLD AUTO: 0.2 %
LYMPHOCYTES # BLD: 1.2 K/UL (ref 1.2–3.5)
LYMPHOCYTES NFR BLD: 18.3 %
MCH RBC QN AUTO: 32 PG (ref 28–33.2)
MCHC RBC AUTO-ENTMCNC: 33.3 G/DL (ref 32.2–36.5)
MCV RBC AUTO: 96.1 FL (ref 83–98)
MONOCYTES # BLD: 0.81 K/UL (ref 0.3–1)
MONOCYTES NFR BLD: 12.3 %
NEUTROPHILS # BLD: 4.49 K/UL (ref 1.7–7)
NEUTS SEG NFR BLD: 68.2 %
NRBC BLD-RTO: 0 %
PDW BLD AUTO: 11.1 FL (ref 9.4–12.4)
PLATELET # BLD AUTO: 163 K/UL (ref 150–350)
POTASSIUM SERPL-SCNC: 4.2 MEQ/L (ref 3.6–5.1)
RBC # BLD AUTO: 4.59 M/UL (ref 4.5–5.8)
SARS-COV-2 RNA RESP QL NAA+PROBE: NEGATIVE
SODIUM SERPL-SCNC: 137 MEQ/L (ref 136–144)
TROPONIN I SERPL HS-MCNC: <2 PG/ML
WBC # BLD AUTO: 6.57 K/UL (ref 3.8–10.5)

## 2022-05-10 PROCEDURE — 83880 ASSAY OF NATRIURETIC PEPTIDE: CPT | Performed by: PHYSICIAN ASSISTANT

## 2022-05-10 PROCEDURE — 84484 ASSAY OF TROPONIN QUANT: CPT | Performed by: PHYSICIAN ASSISTANT

## 2022-05-10 PROCEDURE — 99220 PR INITIAL OBSERVATION CARE/DAY 70 MINUTES: CPT | Performed by: STUDENT IN AN ORGANIZED HEALTH CARE EDUCATION/TRAINING PROGRAM

## 2022-05-10 PROCEDURE — 70450 CT HEAD/BRAIN W/O DYE: CPT | Mod: ME

## 2022-05-10 PROCEDURE — 80048 BASIC METABOLIC PNL TOTAL CA: CPT | Performed by: PHYSICIAN ASSISTANT

## 2022-05-10 PROCEDURE — 63700000 HC SELF-ADMINISTRABLE DRUG: Performed by: PHYSICIAN ASSISTANT

## 2022-05-10 PROCEDURE — 87205 SMEAR GRAM STAIN: CPT | Performed by: FAMILY MEDICINE

## 2022-05-10 PROCEDURE — 71046 X-RAY EXAM CHEST 2 VIEWS: CPT

## 2022-05-10 PROCEDURE — 87186 SC STD MICRODIL/AGAR DIL: CPT | Performed by: FAMILY MEDICINE

## 2022-05-10 PROCEDURE — 63700000 HC SELF-ADMINISTRABLE DRUG: Performed by: FAMILY MEDICINE

## 2022-05-10 PROCEDURE — G1004 CDSM NDSC: HCPCS

## 2022-05-10 PROCEDURE — U0002 COVID-19 LAB TEST NON-CDC: HCPCS | Performed by: EMERGENCY MEDICINE

## 2022-05-10 PROCEDURE — 1123F ACP DISCUSS/DSCN MKR DOCD: CPT | Performed by: STUDENT IN AN ORGANIZED HEALTH CARE EDUCATION/TRAINING PROGRAM

## 2022-05-10 PROCEDURE — 25800000 HC PHARMACY IV SOLUTIONS: Performed by: PHYSICIAN ASSISTANT

## 2022-05-10 PROCEDURE — G0378 HOSPITAL OBSERVATION PER HR: HCPCS

## 2022-05-10 PROCEDURE — 70544 MR ANGIOGRAPHY HEAD W/O DYE: CPT | Mod: MG,59

## 2022-05-10 PROCEDURE — 36415 COLL VENOUS BLD VENIPUNCTURE: CPT | Performed by: PHYSICIAN ASSISTANT

## 2022-05-10 PROCEDURE — 99285 EMERGENCY DEPT VISIT HI MDM: CPT | Mod: 25

## 2022-05-10 PROCEDURE — 93005 ELECTROCARDIOGRAM TRACING: CPT | Performed by: PHYSICIAN ASSISTANT

## 2022-05-10 PROCEDURE — 85025 COMPLETE CBC W/AUTO DIFF WBC: CPT | Performed by: PHYSICIAN ASSISTANT

## 2022-05-10 RX ORDER — ACETAMINOPHEN 325 MG/1
650 TABLET ORAL EVERY 4 HOURS PRN
Status: DISCONTINUED | OUTPATIENT
Start: 2022-05-10 | End: 2022-05-11 | Stop reason: HOSPADM

## 2022-05-10 RX ORDER — DEXTROSE 50 % IN WATER (D50W) INTRAVENOUS SYRINGE
25 AS NEEDED
Status: DISCONTINUED | OUTPATIENT
Start: 2022-05-10 | End: 2022-05-11 | Stop reason: HOSPADM

## 2022-05-10 RX ORDER — GLYCOPYRROLATE 1 MG/1
1 TABLET ORAL 3 TIMES DAILY PRN
COMMUNITY
End: 2022-11-13

## 2022-05-10 RX ORDER — GLYCOPYRROLATE 1 MG/1
1 TABLET ORAL 3 TIMES DAILY PRN
Status: DISCONTINUED | OUTPATIENT
Start: 2022-05-10 | End: 2022-05-10

## 2022-05-10 RX ORDER — IBUPROFEN 200 MG
16-32 TABLET ORAL AS NEEDED
Status: DISCONTINUED | OUTPATIENT
Start: 2022-05-10 | End: 2022-05-11 | Stop reason: HOSPADM

## 2022-05-10 RX ORDER — ROSUVASTATIN CALCIUM 5 MG/1
5 TABLET, COATED ORAL NIGHTLY
Status: DISCONTINUED | OUTPATIENT
Start: 2022-05-10 | End: 2022-05-11 | Stop reason: HOSPADM

## 2022-05-10 RX ORDER — ASPIRIN 325 MG
325 TABLET ORAL ONCE
Status: DISCONTINUED | OUTPATIENT
Start: 2022-05-10 | End: 2022-05-10

## 2022-05-10 RX ORDER — NAPROXEN SODIUM 220 MG/1
324 TABLET, FILM COATED ORAL ONCE
Status: COMPLETED | OUTPATIENT
Start: 2022-05-10 | End: 2022-05-10

## 2022-05-10 RX ORDER — FLUTICASONE PROPIONATE 50 MCG
1 SPRAY, SUSPENSION (ML) NASAL 2 TIMES DAILY
Status: DISCONTINUED | OUTPATIENT
Start: 2022-05-10 | End: 2022-05-11 | Stop reason: HOSPADM

## 2022-05-10 RX ORDER — GLYCOPYRROLATE 1 MG/1
1 TABLET ORAL 3 TIMES DAILY
Status: DISCONTINUED | OUTPATIENT
Start: 2022-05-10 | End: 2022-05-11 | Stop reason: HOSPADM

## 2022-05-10 RX ORDER — DEXTROSE 40 %
15-30 GEL (GRAM) ORAL AS NEEDED
Status: DISCONTINUED | OUTPATIENT
Start: 2022-05-10 | End: 2022-05-11 | Stop reason: HOSPADM

## 2022-05-10 RX ORDER — FAMOTIDINE 20 MG/1
40 TABLET, FILM COATED ORAL NIGHTLY
Status: DISCONTINUED | OUTPATIENT
Start: 2022-05-10 | End: 2022-05-11 | Stop reason: HOSPADM

## 2022-05-10 RX ADMIN — ASPIRIN 81 MG CHEWABLE TABLET 324 MG: 81 TABLET CHEWABLE at 12:01

## 2022-05-10 RX ADMIN — SODIUM CHLORIDE 500 ML: 9 INJECTION, SOLUTION INTRAVENOUS at 09:46

## 2022-05-10 RX ADMIN — FAMOTIDINE 40 MG: 20 TABLET, FILM COATED ORAL at 20:24

## 2022-05-10 RX ADMIN — GLYCOPYRROLATE 1 MG: 1 TABLET ORAL at 20:24

## 2022-05-10 RX ADMIN — ROSUVASTATIN CALCIUM 5 MG: 5 TABLET, FILM COATED ORAL at 20:24

## 2022-05-10 RX ADMIN — FLUTICASONE PROPIONATE 1 SPRAY: 50 SPRAY, METERED NASAL at 20:24

## 2022-05-10 ASSESSMENT — ENCOUNTER SYMPTOMS
FEVER: 0
CHILLS: 0
NAUSEA: 0
DIARRHEA: 0
VOMITING: 0
DIZZINESS: 1
ABDOMINAL PAIN: 0
NECK PAIN: 0
SHORTNESS OF BREATH: 0
WEAKNESS: 0
COUGH: 1
HEADACHES: 0
NUMBNESS: 0

## 2022-05-10 ASSESSMENT — COGNITIVE AND FUNCTIONAL STATUS - GENERAL
WALKING IN HOSPITAL ROOM: 4 - NONE
DO YOU HAVE SERIOUS DIFFICULTY WALKING OR CLIMBING STAIRS: NO
CLIMB 3 TO 5 STEPS WITH RAILING: 4 - NONE
HELP NEEDED FOR PERSONAL GROOMING: 4 - NONE
EATING MEALS: 4 - NONE
STANDING UP FROM CHAIR USING ARMS: 4 - NONE
HELP NEEDED FOR BATHING: 4 - NONE
TOILETING: 4 - NONE
DRESSING REGULAR LOWER BODY CLOTHING: 4 - NONE
DRESSING REGULAR UPPER BODY CLOTHING: 4 - NONE
MOVING TO AND FROM BED TO CHAIR: 4 - NONE

## 2022-05-10 ASSESSMENT — PATIENT HEALTH QUESTIONNAIRE - PHQ9: SUM OF ALL RESPONSES TO PHQ9 QUESTIONS 1 & 2: 1

## 2022-05-10 NOTE — ASSESSMENT & PLAN NOTE
Stable off telmisartan 20mg. Patient discontinued medication one week ago due to low BP readings at home. Monitor BP.   Orthostatics Vitals -ve    Follow-up with PCP

## 2022-05-10 NOTE — ASSESSMENT & PLAN NOTE
Patient with hx of thyroid cancer s/p radiation and chemotherapy. Continue liquid levothyroxine 150mcg via peg tube.

## 2022-05-10 NOTE — H&P
"   Hospital Medicine Service -  History & Physical        CHIEF COMPLAINT   Dizziness     HISTORY OF PRESENT ILLNESS      Celso Gramajo is a 66 y.o. male with a past medical history of esophageal cancer and thyroid cancer s/p chemo & radiation resulting in esophageal stricture, GERD, HTN, HLD,  who presents with dizziness for the past 3 weeks. He describes it as feeling \"foggy\" and off balance. He has no difficulty ambulating. He denies focal neurologic deficits, able to move all extremities with no difficulty, no loss of sensation, no numbness anywhere, no double vision, no tinnitus, no new headaches.  He does report discontinuing HTN meds due to low BP readings. He has had normal BP readings since.    He does reports increased sputum over the past 4 weeks and has noted increased ear fullness on the right. He follows with ENT for this issues, he was told it is the result of radiation therapy. He is unable to swallow due to his esophageal stricture and currently gets nutrition thru a peg tube.     He denies recent illness, no fevers/chills, congestion, rhinorrhea, sore throat, CP, SOB, palpitations, abd pain, N/V/D, dysuria, hematuria, hematochezia.     Surgical history is remarkable for neck surgery, thyroid surgery.  Family history: hx of stroke in his fathers (70s), sister passed away from lung cancer.  Denies allergies to medications.    Social hx: denies use of tobacco, alcohol, recreational drugs. Prior to his cancer surgery he used to work at [employer].     In the ED, vitals were stable. BP elevated at 158/83. Labs of BMP and CBC were unremarkable. BNP negative. HS-troponins negative. EKG non-ischemic.  CT head was negative for acute abnormalities but did show atherosclerotic calcifications in bilateral ICAs. CXR negative for acute disease.    Discussed CODE STATUS with patient, he would like to remain a full code. Patient's emergency contact is Nathanael Camacho, phone is [phone].    PAST MEDICAL AND " SURGICAL HISTORY      Past Medical History:   Diagnosis Date   • Anemia    • Cancer (CMS/HCC)     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 drug thru [facility]    • Hx of head and neck radiation    • Hypertension    • Hypothyroidism    • Stroke (CMS/HCC)        Past Surgical History:   Procedure Laterality Date   • CHOLECYSTECTOMY     • HERNIA REPAIR     • NECK DISSECTION     • NECK SURGERY     • THYROID SURGERY     • TUMOR REMOVAL      L neck 20yrs ago        PCP: Yamila Brunson MD    MEDICATIONS      Prior to Admission medications    Medication Sig Start Date End Date Taking? Authorizing Provider   glycopyrrolate (ROBINUL) 1 mg tablet Take 1 mg by mouth 3 (three) times a day as needed (secretions).   Yes Clifford Mukherjee MD   levothyroxine 150 mcg/mL solution 150 mcg by peg tube route daily. Through peg tube   Yes Clifford Mukherjee MD   multivitamin powder in packet 1 packet by peg tube route daily. Empty and mix with filtered water through peg tube   Yes Clifford Mukherjee MD   famotidine (PEPCID) 40 mg tablet Take 40 mg by mouth nightly. 10/29/20   Clifford Mukherjee MD   lansoprazole (PREVACID) 30 mg capsule 60 mg by peg tube route daily before breakfast. OPEN 2 CAPSULES AND SPRINKLE ON APPLESAUCE OR YOGURT AND TAKE 30 MINUTES BEFORE BREAKFAST; Through a peg tube 11/8/20   Clifford Mukherjee MD   rosuvastatin (CRESTOR) 5 mg tablet Take 5 mg by mouth nightly. 9/30/20   Clifford Mukherjee MD   telmisartan (MICARDIS) 20 mg tablet Take 10 mg by mouth 2 (two) times a day. 8/14/18   Clifford Mukherjee MD   larotrectinib (VITRAKVI) 25 mg capsule Take 25 mg by mouth 4 (four) times a day.  5/10/22  Clifford Mukherjee MD   LEVOXYL 175 mcg tablet Take 175 mcg by mouth daily.   9/27/18 5/10/22  Clifford Mukherjee MD   LORazepam (ATIVAN) 0.5 mg tablet Take 1 tablet (0.5 mg total) by mouth every 6 (six) hours as needed for anxiety for up to 3 days. 10/4/18 5/10/22  Phan Mayorga, DO   multivit with  calcium,iron,min (MULTIVITAMIN-CA-IRON-MINERALS ORAL) Take 1 tablet by mouth.  5/10/22  Provider, Clifford, MD       ALLERGIES      Patient has no known allergies.    FAMILY HISTORY      Family History   Problem Relation Age of Onset   • No Known Problems Biological Father    • Lung cancer Biological Sister        SOCIAL HISTORY      Social History     Socioeconomic History   • Marital status:    Tobacco Use   • Smoking status: Never Smoker   • Smokeless tobacco: Never Used   Vaping Use   • Vaping Use: Never used   Substance and Sexual Activity   • Alcohol use: No   • Drug use: No     Social Determinants of Health     Food Insecurity: No Food Insecurity   • Worried About Running Out of Food in the Last Year: Never true   • Ran Out of Food in the Last Year: Never true     REVIEW OF SYSTEMS      All other systems reviewed and negative except as noted in HPI    PHYSICAL EXAMINATION      Temp:  [36.3 °C (97.3 °F)-36.7 °C (98 °F)] 36.7 °C (98 °F)  Heart Rate:  [69-88] 70  Resp:  [16-20] 18  BP: (117-158)/(65-83) 119/72  Body mass index is 25.36 kg/m².    Physical Exam  VS reviewed, nursing notes reviewed  General: no acute distress, appears comfortable in bed  HEENT: NC/AT, EOMI, right TM with effusion, left TM intact with no effusion  Respiratory: CTA b/l, no wheezes/rhonchi/rales, normal effort  Cardiovascular: regular rate and rhythm, no murmurs/gallops/rubs  Abdomen: soft, non-distended, non-tender, +bowel sounds  Extremities: warm, well perfused, no edema  Neuro: AAOx3, 5/5 strength in UE and LEs, sensation intact in UE and LEs bilaterally, CN II-XII intact, finger-to-nose testing normal on right, patient had difficulty on the left finger-to-nose testing, rapid alternating movements wnl, able to walk on heels/toes with no difficulty, negative romberg  Psych: tearful affect, anxious mood    LABS / IMAGING / EKG        Labs    Results from last 7 days   Lab Units 05/10/22  0922   SODIUM mEQ/L 137   POTASSIUM  mEQ/L 4.2   CHLORIDE mEQ/L 99   CO2 mEQ/L 31   BUN mg/dL 16   CREATININE mg/dL 0.8   GLUCOSE mg/dL 103*   CALCIUM mg/dL 9.7     Results from last 7 days   Lab Units 05/10/22  0922   BNP pg/mL 27     Results from last 7 days   Lab Units 05/10/22  0922   WBC K/uL 6.57   HEMOGLOBIN g/dL 14.7   HEMATOCRIT % 44.1   MCV fL 96.1   PLATELETS K/uL 163     Imaging  I have independently reviewed the pertinent imaging from the last 24 hrs.    X-RAY CHEST 2 VIEWS    Result Date: 5/10/2022  IMPRESSION: No active disease is seen in the chest.    CT HEAD WITHOUT IV CONTRAST    Result Date: 5/10/2022  IMPRESSION: 1. No acute hemorrhage. No acute infarction in a major vascular territory. No mass or mass effect. 2. Chronic changes as described in the discussion.    ECG/Telemetry  I have independently reviewed the ECG. No significant findings.    ASSESSMENT AND PLAN           * Dizziness  Assessment & Plan  Patient presenting with ongoing dizziness for 3 weeks. He denies prior hx of strokes/TIAs. He is being admitted to r/o TIA vs VBI. He received one full dose aspirin in the ED. No neurologic deficits noted on exam. CTH was negative but did show bilateral ICA atherosclerosis. There may be a component of eustachian ear tube dysfunction from increased mucus production. There may also be a component of orthostatic hypotension, patient recently discontinued home antihypertensive due to low BP.    -Brain MRI, head and neck MRA  -Bilateral carotid ultrasound  -Neuro checks  -Permissive hypertension  -NPO due to esophageal stricture  -Telemetric monitoring  -Check lipids  -Continue rosuvastatin  -SCDs for DVT ppx    Increased sputum production  Assessment & Plan  This is a chronic problem due to esophageal stricture, patient is unable to swallow his own secretions. This may be contributing to his dizziness due to eustachian ear dysfunction. Right middle ear effusion noted on exam.    -Recommend to start Flonase BID  -Continue  glycopyrrolate  -Continue to follow with ENT outpatient    HTN (hypertension)  Assessment & Plan  Stable off telmisartan 20mg. Patient discontinued medication one week ago due to low BP readings at home. Monitor BP. Check orthostatics.    GERD (gastroesophageal reflux disease)  Assessment & Plan  Continue lansoprazole 60mg and famotidine 40mg nightly via peg tube.    Hyperlipidemia  Assessment & Plan  Continue rosuvastatin 5mg. Fasting lipid panel ordered for tomorrow AM.    Acquired hypothyroidism  Assessment & Plan  Patient with hx of thyroid cancer s/p radiation and chemotherapy. Continue liquid levothyroxine 150mcg via peg tube.       VTE Assessment: Padua VTE Score: 3  VTE Prophylaxis Plan: SCDs  Code Status: Full Code  Estimated Discharge Date: 5/12/2022  Disposition Planning: admit to observation     Linda Oates DO, PGY2  #[id number]  5/10/2022

## 2022-05-10 NOTE — ASSESSMENT & PLAN NOTE
This is a chronic problem due to esophageal stricture, patient is unable to swallow his own secretions. This may be contributing to his dizziness due to eustachian ear dysfunction. Right middle ear effusion noted on exam.    -Recommend to start Flonase BID and nasal spray  -Continue glycopyrrolate  -Follow with ENT outpatient

## 2022-05-10 NOTE — ED ATTESTATION NOTE
I have personally seen, evaluated,and participated in the management of Celso Gramajo.  I reviewed and agree with the PA/NP's assessment and plan of care with the following exceptions: None    My examination, assessment, and plan of care for Celso Gramajo:  66-year-old male history of hypothyroidism hypertension CVA history of throat cancer status postradiation and chemo 18 months ago presented with 3 to 4 weeks of progressively worsening more intense and frequent dizziness and or lightheadedness especially when he turns his head and stands up.  Denies any melena bloody stools or hematuria.  Mild productive phlegm in the back of his throat  Exam:   Vitals:    05/10/22 0910   BP: (!) 158/83   Pulse: 88   Resp: 20   Temp: 36.5 °C (97.7 °F)   SpO2: 98%   Anicteric sclera with normal complexion conjunctiva.  No carotid bruit or thrill in the right.  Left soft tissue neck shows postradiation changes and cannot palpate nor hear heart sounds on the left carotid.  Regular rate and rhythm with no murmurs to cardiac to auscultation.  No respiratory distress.    Plan:  Monitorings IV fluids CT head chest x-ray EKG labs     Joe Coulter MD  05/10/22 [id number]

## 2022-05-10 NOTE — ASSESSMENT & PLAN NOTE
Patient presenting with ongoing dizziness for 3 weeks. He denies prior hx of strokes/TIAs. He is being admitted to r/o TIA vs VBI. He received one full dose aspirin in the ED. No neurologic deficits noted on exam. CTH was negative but did show bilateral ICA atherosclerosis. There may be a component of eustachian ear tube dysfunction from increased mucus production. There may also be a component of orthostatic hypotension, patient recently discontinued home antihypertensive due to low BP.    -Brain MRI, head and neck MRA  -Bilateral carotid ultrasound pending results  -Neuro checks  -Permissive hypertension  -NPO due to esophageal stricture  -Telemetric monitoring  -Check lipids  -Continue rosuvastatin  -SCDs for DVT ppx    Follow-up with PCP  Follow-up with cardiology  Patient signed medical release information form

## 2022-05-10 NOTE — ASSESSMENT & PLAN NOTE
Continue lansoprazole 60mg and famotidine 40mg nightly via peg tube.    Follow-up with GI   Statement Selected

## 2022-05-10 NOTE — ED PROVIDER NOTES
"Emergency Medicine Note  HPI   HISTORY OF PRESENT ILLNESS     65yo M w/ PMH thyroid CA 20 yrs ago in remission s/p radiation, esophageal CA in remission s/p chemo/radiation (in last 2 yrs), HTN, CVA who presents to ED with two complaints that have been going on for the last few weeks. States he has a chronic cough that is a result of throat radiation in the last year in which he is constantly coughing up \"phlegm\" but states in the last few weeks it's gotten \"more thick.\" States he also has felt like his \"equilibrium is off\" and feels \"out of it.\" States he feels off balance when walking and dizzy but denies spinning or near syncopal symptoms. Saw his ENT recently and was told everything looked good.             Patient History   PAST HISTORY     Reviewed from Nursing Triage:  [facility]         Past Medical History:   Diagnosis Date   • Anemia    • Cancer (CMS/HCC)     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 drug thru [facility]    • Hx of head and neck radiation    • Hypertension    • Hypothyroidism    • Stroke (CMS/HCC)        Past Surgical History:   Procedure Laterality Date   • CHOLECYSTECTOMY     • HERNIA REPAIR     • NECK DISSECTION     • NECK SURGERY     • THYROID SURGERY     • TUMOR REMOVAL      L neck 20yrs ago        Family History   Problem Relation Age of Onset   • No Known Problems Biological Father    • Lung cancer Biological Sister        Social History     Tobacco Use   • Smoking status: Never Smoker   • Smokeless tobacco: Never Used   Vaping Use   • Vaping Use: Never used   Substance Use Topics   • Alcohol use: No   • Drug use: No         Review of Systems   REVIEW OF SYSTEMS     Review of Systems   Constitutional: Negative for chills and fever.   Respiratory: Positive for cough. Negative for shortness of breath.    Cardiovascular: Negative for chest pain and leg swelling.   Gastrointestinal: Negative for abdominal pain, diarrhea, nausea and vomiting.   Musculoskeletal: Positive for gait problem. Negative for " neck pain.   Neurological: Positive for dizziness. Negative for weakness, numbness and headaches.         VITALS     ED Vitals    Date/Time Temp Pulse Resp BP SpO2 Who   05/10/22 1210 -- 80 16 117/73 96 % ERASTO   05/10/22 1026 -- 73 16 118/65 100 % EDWIN   05/10/22 0910 36.5 °C (97.7 °F) 88 20 158/83 98 % EDWIN        Pulse Ox %: 98 % (05/10/22 0916)  Pulse Ox Interpretation: Normal (05/10/22 0916)  Heart Rate: 79 (05/10/22 0916)  Rhythm Strip Interpretation: Normal Sinus Rhythm (05/10/22 0916)     Physical Exam   PHYSICAL EXAM     Physical Exam  Vitals and nursing note reviewed.   Constitutional:       General: He is not in acute distress.     Appearance: Normal appearance. He is normal weight. He is not ill-appearing or toxic-appearing.   HENT:      Head: Normocephalic.   Eyes:      Extraocular Movements: Extraocular movements intact.      Conjunctiva/sclera: Conjunctivae normal.   Cardiovascular:      Rate and Rhythm: Normal rate and regular rhythm.      Pulses: Normal pulses.      Heart sounds: Normal heart sounds.   Pulmonary:      Effort: Pulmonary effort is normal. No respiratory distress.      Breath sounds: Normal breath sounds.   Abdominal:      Palpations: Abdomen is soft.      Tenderness: There is no abdominal tenderness.   Musculoskeletal:         General: Normal range of motion.      Cervical back: Normal range of motion and neck supple.   Skin:     General: Skin is warm and dry.      Capillary Refill: Capillary refill takes less than 2 seconds.   Neurological:      General: No focal deficit present.      Mental Status: He is alert and oriented to person, place, and time. Mental status is at baseline.      Cranial Nerves: No cranial nerve deficit.   Psychiatric:         Mood and Affect: Mood normal.         Behavior: Behavior normal.         Thought Content: Thought content normal.         Judgment: Judgment normal.           PROCEDURES     Procedures     DATA     Results     Procedure Component Value Units  Date/Time    [place] Draw Panel [[id number]] Collected: 05/10/22 0925    Specimen: Blood, Venous Updated: 05/10/22 1803    Narrative:      The following orders were created for panel order [place] Draw Panel.  Procedure                               Abnormality         Status                     ---------                               -----------         ------                     RAINBOW LT BLUE[[id number]]                                  Final result                 Please view results for these tests on the individual orders.    RAINBOW LT BLUE [[id number]] Collected: 05/10/22 0925    Specimen: Blood, Venous Updated: 05/10/22 1803    SARS-CoV-2 (COVID-19), PCR Nasopharynx [[id number]]  (Normal) Collected: 05/10/22 1201    Specimen: Nasopharyngeal Swab from Nasopharynx Updated: 05/10/22 1238    Narrative:      The following orders were created for panel order SARS-CoV-2 (COVID-19), PCR Nasopharynx.  Procedure                               Abnormality         Status                     ---------                               -----------         ------                     SARS-CoV-2 (COVID-19), P...[[id number]]  Normal              Final result                 Please view results for these tests on the individual orders.    SARS-CoV-2 (COVID-19), PCR Nasopharynx [[id number]]  (Normal) Collected: 05/10/22 1201    Specimen: Nasopharyngeal Swab from Nasopharynx Updated: 05/10/22 1238     SARS-CoV-2 (COVID-19) Negative    Narrative:      Nursing instructions: Obtain nasopharyngeal swab ONLY.  Send swab in viral transport media. If RSV/FLU swab is also ordered, both Covid and RSV/FLU can be performed on single swab.    HS Troponin I (with 2 hour reflex) [[id number]]  (Normal) Collected: 05/10/22 0922    Specimen: Blood, Venous Updated: 05/10/22 1007     High Sens Troponin I <2.0 pg/mL     B-type natriuretic peptide [[id number]]  (Normal) Collected: 05/10/22 0922    Specimen: Blood, Venous Updated: 05/10/22 1007     BNP 27 pg/mL      Basic metabolic panel [[id number]]  (Abnormal) Collected: 05/10/22 0922    Specimen: Blood, Venous Updated: 05/10/22 1002     Sodium 137 mEQ/L      Potassium 4.2 mEQ/L      Comment: Results obtained on plasma. Plasma Potassium values may be up to 0.4 mEQ/L less than serum values. The differences may be greater for patients with high platelet or white cell counts.        Chloride 99 mEQ/L      CO2 31 mEQ/L      BUN 16 mg/dL      Creatinine 0.8 mg/dL      Glucose 103 mg/dL      Calcium 9.7 mg/dL      eGFR >60.0 mL/min/1.73m*2      Anion Gap 7 mEQ/L     CBC and differential [[id number]]  (Abnormal) Collected: 05/10/22 0922    Specimen: Blood, Venous Updated: 05/10/22 0940     WBC 6.57 K/uL      RBC 4.59 M/uL      Hemoglobin 14.7 g/dL      Hematocrit 44.1 %      MCV 96.1 fL      MCH 32.0 pg      MCHC 33.3 g/dL      RDW 12.4 %      Platelets 163 K/uL      MPV 11.1 fL      Differential Type Auto     nRBC 0.0 %      Immature Granulocytes 0.2 %      Neutrophils 68.2 %      Lymphocytes 18.3 %      Monocytes 12.3 %      Eosinophils 0.5 %      Basophils 0.5 %      Immature Granulocytes, Absolute 0.01 K/uL      Neutrophils, Absolute 4.49 K/uL      Lymphocytes, Absolute 1.20 K/uL      Monocytes, Absolute 0.81 K/uL      Eosinophils, Absolute 0.03 K/uL      Basophils, Absolute 0.03 K/uL           Imaging Results          X-RAY CHEST 2 VIEWS (Final result)  Result time 05/10/22 10:22:39    Final result                 Impression:    IMPRESSION:  No active disease is seen in the chest.             Narrative:    CLINICAL HISTORY: Chronic cough.  COMMENT: Two views of the chest are submitted for review and comparison is made  to previous exam from 10/17/2019  The heart is normal in size. The mediastinal silhouette is unremarkable. The  lung fields are clear bilaterally without evidence for infiltrates, effusion, or  pneumothorax. The osseous structures are within normal limits.                             CT HEAD WITHOUT IV CONTRAST  (Final result)  Result time 05/10/22 10:15:35    Final result                 Impression:    IMPRESSION:  1. No acute hemorrhage. No acute infarction in a major vascular territory. No  mass or mass effect.  2. Chronic changes as described in the discussion.             Narrative:    CLINICAL HISTORY: Central vertigo.  COMMENT: CT of the brain was performed using contiguous 2.5 mm transaxial  sections without intravenous contrast. Images were reconstructed in the coronal  and sagittal planes by the technologist.  CT DOSE:  One or more dose reduction techniques (e.g. automated exposure  control, adjustment of the mA and/or kV according to patient size, use of  iterative reconstruction technique) utilized for this examination.  Comparison studies: MRI brain 11/12/2020.  The ventricles and cortical sulci are normal in size and configuration.  Low  density of the periventricular and subcortical white matter represents mild  small vessel disease.  No acute hemorrhage. No acute infarction in a major  vascular territory. No mass or mass effect.  There are atherosclerotic calcifications of the cavernous segments of the  internal carotid arteries bilaterally. The calvarium and overlying soft tissues  are within normal limits. There is ethmoid mucosal thickening. The other  visualized paranasal sinuses are well-aerated. The mastoid air cells are  well-aerated.                              ECG 12 lead   ED Interpretation   79bpm. NSR. LAD.           Scoring tools                                 ED Course & MDM   MDM / ED COURSE / CLINICAL IMPRESSIONS / DISPO     [facility]    ED Course as of 05/10/22 2041   Tue May 10, 2022   1037 Due to risk factors including ho CVA, radiation to neck area and athersclerosis on CT in carotids, will observe pt to ro VBI. Reviewed results and plan with pt. Ordered ASA through pt's PEG. [facility]. [PRINCESS]   3789 Spoke to Richa GUILHERME res, who will see pt for adm. [PRINCESS]   110 CT HEAD WITHOUT IV  CONTRAST  IMPRESSION:  1. No acute hemorrhage. No acute infarction in a major vascular territory. No  mass or mass effect.  2. Chronic changes as described in the discussion.          Specimen Collected: 05/10/22 10:12         [HL]   1104 X-RAY CHEST 2 VIEWS  IMPRESSION:  No active disease is seen in the chest.          Specimen Collected: 05/10/22 10:22         [HL]      ED Course User Index  [HL] Joe Coulter MD  [PRINCESS] Kassandra Rivero PA C         Clinical Impressions as of 05/10/22 2041   Vertigo              Kassandra Rivero PA C  05/10/22 2041

## 2022-05-10 NOTE — PLAN OF CARE
Plan of Care Review  Plan of Care Reviewed With: patient  Progress: no change  Outcome Summary: Pt admitted frome home today with worsening dizziness. Neuro checks q4h. NIH q12h. MRI checklist completed.

## 2022-05-10 NOTE — H&P
Hospital Medicine Service -  History & Physical        CHIEF COMPLAINT   dizziness     HISTORY OF PRESENT ILLNESS      Celso Gramajo is a 66 y.o. male with a past medical history of thyroid CA 20 yrs ago in remission s/p radiation, esophageal CA (1yr) in remission s/p chemo/radiation (in last 2 yrs), chronic cough, PEG tube, HTN, CVA who presents with dizziness and worsening cough.      Worsening cough  X 2 weeks worse in last 3 days dizziness/off balance.     Denies fever/chills, chest pain, SOB, N/V/D, abdominal pain, sick contacts***    EtOH: ***  Smoking: ***  Recreational substances: ***    In ED:  VSS  Labs wnl  S/p ASA  CXR     Code status (confirmed with pt): ***  PAST MEDICAL AND SURGICAL HISTORY      Past Medical History:   Diagnosis Date   • Anemia    • Cancer (CMS/HCC)     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 drug thru [facility]    • Hx of head and neck radiation    • Hypertension    • Hypothyroidism    • Stroke (CMS/HCC)        Past Surgical History:   Procedure Laterality Date   • CHOLECYSTECTOMY     • HERNIA REPAIR     • NECK DISSECTION     • NECK SURGERY     • THYROID SURGERY     • TUMOR REMOVAL      L neck 20yrs ago        PCP: Yamila Brunson MD    MEDICATIONS      Prior to Admission medications    Medication Sig Start Date End Date Taking? Authorizing Provider   famotidine (PEPCID) 40 mg tablet Take 40 mg by mouth nightly. 10/29/20   Clifford Mukherjee MD   lansoprazole (PREVACID) 30 mg capsule OPEN 2 CAPSULES AND SPRINKLE ON APPLESAUCE OR YOGURT AND TAKE 30 MINUTES BEFORE BREAKFAST 11/8/20   Clifford Mukherjee MD   larotrectinib (VITRAKVI) 25 mg capsule Take 25 mg by mouth 4 (four) times a day.    Clifford Mukherjee MD   LEVOXYL 175 mcg tablet Take 175 mcg by mouth daily.   9/27/18   Clifford Mukherjee MD   LORazepam (ATIVAN) 0.5 mg tablet Take 1 tablet (0.5 mg total) by mouth every 6 (six) hours as needed for anxiety for up to 3 days. 10/4/18 10/7/18  Phan Mayorga DO    multivitamin-Ca-iron-minerals tablet Take 1 tablet by mouth.    Provider, MD Clifford   rosuvastatin (CRESTOR) 5 mg tablet Take 5 mg by mouth 2 (two) times a day. 9/30/20   Clifford Mukherjee MD   telmisartan (MICARDIS) 40 mg tablet 60 mg daily.   8/14/18   Clifford Mukherjee MD       ALLERGIES      Patient has no known allergies.    FAMILY HISTORY      Family History   Problem Relation Age of Onset   • No Known Problems Biological Father    • Lung cancer Biological Sister        SOCIAL HISTORY      Social History     Socioeconomic History   • Marital status:    Tobacco Use   • Smoking status: Never Smoker   • Smokeless tobacco: Never Used   Vaping Use   • Vaping Use: Never used   Substance and Sexual Activity   • Alcohol use: No   • Drug use: No     Social Determinants of Health     Food Insecurity: No Food Insecurity   • Worried About Running Out of Food in the Last Year: Never true   • Ran Out of Food in the Last Year: Never true       REVIEW OF SYSTEMS      All other systems reviewed and negative except as noted in HPI    PHYSICAL EXAMINATION      Temp:  [36.5 °C (97.7 °F)] 36.5 °C (97.7 °F)  Heart Rate:  [73-88] 73  Resp:  [16-20] 16  BP: (118-158)/(65-83) 118/65  Body mass index is 25.07 kg/m².    Physical Exam    LABS / IMAGING / EKG        Labs  I have reviewed the patient's pertinent labs. Pertinent labs are within normal limits.    Imaging  I have independently reviewed the pertinent imaging from the last 24 hrs.    No results found for: COVID19    ECG/Telemetry  {Findings; ecg normal:84036}    ASSESSMENT AND PLAN           Hypothyroidism  Assessment & Plan  Continue home meds    HTN (hypertension)  Assessment & Plan  Continue home medications or formulary alternative    History of cardioembolic cerebrovascular accident (CVA)  Assessment & Plan  Hx CVA  Continue ASA + statin    * Vertigo  Assessment & Plan  Worsening dizziness x 2 weeks  VSS in ED       VTE Assessment: [place]    VTE  Prophylaxis: Current anticoagulants:    •None      Code Status: Full Code      Estimated Discharge Date:   Disposition Planning: Admit to Ajit Warner DO  5/10/2022

## 2022-05-11 ENCOUNTER — APPOINTMENT (OUTPATIENT)
Dept: CARDIOLOGY | Facility: HOSPITAL | Age: 67
Setting detail: OBSERVATION
End: 2022-05-11
Attending: FAMILY MEDICINE
Payer: MEDICARE

## 2022-05-11 VITALS
OXYGEN SATURATION: 96 % | DIASTOLIC BLOOD PRESSURE: 74 MMHG | WEIGHT: 192.2 LBS | RESPIRATION RATE: 19 BRPM | HEART RATE: 83 BPM | HEIGHT: 73 IN | SYSTOLIC BLOOD PRESSURE: 139 MMHG | BODY MASS INDEX: 25.47 KG/M2 | TEMPERATURE: 98 F

## 2022-05-11 LAB
BSA FOR ECHO PROCEDURE: 2.12 M2
CHOLEST SERPL-MCNC: 119 MG/DL
GRAM STN SPEC: NORMAL
HDLC SERPL-MCNC: 33 MG/DL
HDLC SERPL: 3.6 {RATIO}
LDLC SERPL CALC-MCNC: 68 MG/DL
LEFT ICA MID DIAS: 38.6 CM/S
LEFT ICA MID SYS: 92.5 CM/S
LEFT ICA PROX DIAS: 30 CM/S
LEFT ICA PROX SYS: 77.4 CM/S
LT ECA PROX EDV: 11.6 CM/S
LT ECA PROX PSV: 96.4 CM/S
LT VERTEBRAL PROX EDV: 15.99 CM/S
LT VERTEBRAL PROX PSV: 35.37 CM/S
NONHDLC SERPL-MCNC: 86 MG/DL
RIGHT CCA DIST DIAS: 27.26 CM/S
RIGHT CCA DIST SYS: 75.52 CM/S
RIGHT CCA MID DIAS: 20.74 CM/S
RIGHT CCA MID SYS: 70.3 CM/S
RIGHT CCA PROX DIAS: 15.52 CM/S
RIGHT CCA PROX SYS: 57.26 CM/S
RIGHT ECA SYS: 78.06 CM/S
RIGHT ICA DIST DIAS: 47.85 CM/S
RIGHT ICA DIST SYS: 154.69 CM/S
RIGHT ICA MID DIAS: 33.91 CM/S
RIGHT ICA MID SYS: 101.27 CM/S
RIGHT ICA PROX DIAS: 16.44 CM/S
RIGHT ICA PROX SYS: 49.05 CM/S
RIGHT ICA/CCA SYS: 2.05
RIGHT VERTEBRAL SYS: 61.8 CM/S
RT ECA PROC EDV: 8.36 CM/S
RT VERTEBRAL PROX EDV: 17.66 CM/S
TRIGL SERPL-MCNC: 91 MG/DL (ref 30–149)

## 2022-05-11 PROCEDURE — 99217 PR OBSERVATION CARE DISCHARGE MANAGEMENT: CPT | Performed by: STUDENT IN AN ORGANIZED HEALTH CARE EDUCATION/TRAINING PROGRAM

## 2022-05-11 PROCEDURE — 63700000 HC SELF-ADMINISTRABLE DRUG: Performed by: FAMILY MEDICINE

## 2022-05-11 PROCEDURE — 36415 COLL VENOUS BLD VENIPUNCTURE: CPT | Performed by: FAMILY MEDICINE

## 2022-05-11 PROCEDURE — 80061 LIPID PANEL: CPT | Performed by: FAMILY MEDICINE

## 2022-05-11 PROCEDURE — 93880 EXTRACRANIAL BILAT STUDY: CPT

## 2022-05-11 PROCEDURE — 97162 PT EVAL MOD COMPLEX 30 MIN: CPT | Mod: GP

## 2022-05-11 PROCEDURE — G0378 HOSPITAL OBSERVATION PER HR: HCPCS

## 2022-05-11 PROCEDURE — 97166 OT EVAL MOD COMPLEX 45 MIN: CPT | Mod: GO

## 2022-05-11 RX ORDER — PROMETHAZINE HYDROCHLORIDE 6.25 MG/5ML
6.25 SYRUP ORAL 4 TIMES DAILY PRN
Qty: 120 ML | Refills: 0 | Status: SHIPPED | OUTPATIENT
Start: 2022-05-11 | End: 2022-05-18

## 2022-05-11 RX ADMIN — FLUTICASONE PROPIONATE 1 SPRAY: 50 SPRAY, METERED NASAL at 08:13

## 2022-05-11 RX ADMIN — GLYCOPYRROLATE 1 MG: 1 TABLET ORAL at 15:04

## 2022-05-11 RX ADMIN — LANSOPRAZOLE 60 MG: KIT at 08:12

## 2022-05-11 RX ADMIN — GLYCOPYRROLATE 1 MG: 1 TABLET ORAL at 08:13

## 2022-05-11 ASSESSMENT — COGNITIVE AND FUNCTIONAL STATUS - GENERAL
MOVING TO AND FROM BED TO CHAIR: 4 - NONE
STANDING UP FROM CHAIR USING ARMS: 4 - NONE
HELP NEEDED FOR BATHING: 3 - A LITTLE
EATING MEALS: 4 - NONE
AFFECT: WFL
TOILETING: 3 - A LITTLE
AFFECT: WFL
DRESSING REGULAR UPPER BODY CLOTHING: 4 - NONE
CLIMB 3 TO 5 STEPS WITH RAILING: 4 - NONE
DRESSING REGULAR LOWER BODY CLOTHING: 3 - A LITTLE
HELP NEEDED FOR PERSONAL GROOMING: 3 - A LITTLE
WALKING IN HOSPITAL ROOM: 4 - NONE

## 2022-05-11 NOTE — PLAN OF CARE
Plan of Care Review  Plan of Care Reviewed With: patient  Progress: no change  Outcome Summary: Pt AAO4, flat and can be irritable at times. NSR on the monitor. Neuro checks done and all of them are intact. Denies numbness, tingling, weakness. BP can be soft, denies headache, and lightheadedness. Clarfied with pharmacist regarding levothyroxine that pt's wife will bring his med because it's non formulary and we don't have stock that's why it's not yet verified. Labs done this am. Refused bed alarm. Callbell within reach. Kept comfortable and rested. Will continue to monitor..

## 2022-05-11 NOTE — PROGRESS NOTES
Hospital Medicine Service -  Daily Progress Note       SUBJECTIVE   Interval History:     Patient seen and examined at bedside this morning. Patient is doing well.  Tolerating tube feeding well. Plan to be discharged home today to follow-up outpatient. All questions and concerns addressed and answered.    Denies any complaints.    OBJECTIVE      Vital signs in last 24 hours:  Temp:  [36.3 °C (97.4 °F)-36.7 °C (98 °F)] 36.7 °C (98 °F)  Heart Rate:  [68-94] 83  Resp:  [18-19] 19  BP: ()/(53-97) 139/74  No intake or output data in the 24 hours ending 05/11/22 1802    PHYSICAL EXAMINATION      Physical Exam  Vitals and nursing note reviewed.   Constitutional:       General: He is not in acute distress.     Appearance: Normal appearance. He is not ill-appearing.   HENT:      Head: Normocephalic and atraumatic.      Nose: Congestion present.      Mouth/Throat:      Mouth: Mucous membranes are moist.   Eyes:      Extraocular Movements: Extraocular movements intact.   Cardiovascular:      Rate and Rhythm: Normal rate and regular rhythm.      Pulses: Normal pulses.      Heart sounds: Normal heart sounds.   Pulmonary:      Effort: Pulmonary effort is normal.      Breath sounds: Normal breath sounds.   Abdominal:      General: Abdomen is flat. Bowel sounds are normal.      Palpations: Abdomen is soft.   Skin:     General: Skin is warm and dry.   Neurological:      General: No focal deficit present.      Mental Status: He is alert and oriented to person, place, and time.      Cranial Nerves: No cranial nerve deficit.      Sensory: No sensory deficit.      Motor: No weakness.      Coordination: Coordination normal.      Deep Tendon Reflexes: Reflexes normal.   Psychiatric:         Mood and Affect: Mood normal.         Behavior: Behavior normal.         Judgment: Judgment normal.           LINES, CATHETERS, DRAINS, AIRWAYS, AND WOUNDS   Lines, Drains, Airways, Wounds:  Peripheral IV (Adult) 05/10/22  Anterior;Distal;Left;Upper Arm (Active)   Number of days: 1       Comments:   Peripheral IV in place, without evidence of surounding erythema or edema.   LABS / IMAGING / TELE      Labs  I have independently reviewed the patient's labs. Pertinent findings are as follows:      Results from last 7 days   Lab Units 05/10/22  0922   SODIUM mEQ/L 137   POTASSIUM mEQ/L 4.2   CHLORIDE mEQ/L 99   CO2 mEQ/L 31   BUN mg/dL 16   CREATININE mg/dL 0.8   GLUCOSE mg/dL 103*     05/11/22 at 6:02 PM: Estimated Creatinine Clearance: 105.4 mL/min (by C-G formula based on SCr of 0.8 mg/dL).        No lab exists for component:  LIPASE  Results from last 7 days   Lab Units 05/10/22  0922   WBC K/uL 6.57   HEMOGLOBIN g/dL 14.7   PLATELETS K/uL 163           Imaging  I have independently reviewed the patient's imaging from the last 24 hours. Pertinent findings are as follows:    Ultrasound carotid bilateral         MRI BRAIN WITHOUT CONTRAST   Final Result   IMPRESSION:   1. No acute/suspicious intracranial abnormality.   2. No intracranial aneurysm or evidence of hemodynamically significant stenosis   in the head MRA.   COMMENT:   MRI of the brain utilizing T1-weighted, T2-weighted, FLAIR, and diffusion   sequences are submitted for evaluation in axial, sagittal, and coronal planes.   There is age-appropriate configuration of the ventricles and sulci. Stable mild   chronic microangiopathy, including stable focus in the lateral right frontal   lobe. No mass-effect.  No extra-axial collection. 2 small inferior cerebellar   foci of susceptibility on the gradient echo sequence, stable on the right and   new on the left, nonspecific but likely sequela of old small hypertensive   microhemorrhages.  There is no diffusion restriction. Ventricles are midline   without evidence of hydrocephalus. The cerebellar tonsils are in normal   anatomical positioning. Partially empty sella again noted.  Flow voids are   preserved in the vasculature at the  base of the skull.  Visualized paranasal   sinuses and mastoid air cells are overall well aerated, noting trace inferior   left mastoid air cell fluid.  Bone marrow signal in the calvarium and visualized   upper cervical spine is within normal limits.   MRA of the head utilizing 3-D time-of-flight demonstrates normal antegrade flow   in the anterior and posterior circulations. There is no flow gap to suggest   hemodynamically significant stenosis. No intracranial aneurysm. No evidence of   arterial venous malformation. Fetal right PCA noted.      MRI ANGIOGRAM HEAD WITHOUT CONTRAST   Final Result   IMPRESSION:   1. No acute/suspicious intracranial abnormality.   2. No intracranial aneurysm or evidence of hemodynamically significant stenosis   in the head MRA.   COMMENT:   MRI of the brain utilizing T1-weighted, T2-weighted, FLAIR, and diffusion   sequences are submitted for evaluation in axial, sagittal, and coronal planes.   There is age-appropriate configuration of the ventricles and sulci. Stable mild   chronic microangiopathy, including stable focus in the lateral right frontal   lobe. No mass-effect.  No extra-axial collection. 2 small inferior cerebellar   foci of susceptibility on the gradient echo sequence, stable on the right and   new on the left, nonspecific but likely sequela of old small hypertensive   microhemorrhages.  There is no diffusion restriction. Ventricles are midline   without evidence of hydrocephalus. The cerebellar tonsils are in normal   anatomical positioning. Partially empty sella again noted.  Flow voids are   preserved in the vasculature at the base of the skull.  Visualized paranasal   sinuses and mastoid air cells are overall well aerated, noting trace inferior   left mastoid air cell fluid.  Bone marrow signal in the calvarium and visualized   upper cervical spine is within normal limits.   MRA of the head utilizing 3-D time-of-flight demonstrates normal antegrade flow   in the  anterior and posterior circulations. There is no flow gap to suggest   hemodynamically significant stenosis. No intracranial aneurysm. No evidence of   arterial venous malformation. Fetal right PCA noted.      X-RAY CHEST 2 VIEWS   Final Result   IMPRESSION:   No active disease is seen in the chest.      CT HEAD WITHOUT IV CONTRAST   Final Result   IMPRESSION:   1. No acute hemorrhage. No acute infarction in a major vascular territory. No   mass or mass effect.   2. Chronic changes as described in the discussion.      ECG 12 lead   ED Interpretation   79bpm. NSR. LAD.                 ECG/Telemetry  I have independently reviewed the telemetry. No events for the last 24 hours.    ASSESSMENT AND PLAN      Increased sputum production  Assessment & Plan  This is a chronic problem due to esophageal stricture, patient is unable to swallow his own secretions. This may be contributing to his dizziness due to eustachian ear dysfunction. Right middle ear effusion noted on exam.    -Recommend to start Flonase BID and nasal spray  -Continue glycopyrrolate  -Follow with ENT outpatient    GERD (gastroesophageal reflux disease)  Assessment & Plan  Continue lansoprazole 60mg and famotidine 40mg nightly via peg tube.    Follow-up with GI    Hyperlipidemia  Assessment & Plan  Continue rosuvastatin 5mg    Lipid panel    Acquired hypothyroidism  Assessment & Plan  Patient with hx of thyroid cancer s/p radiation and chemotherapy. Continue liquid levothyroxine 150mcg via peg tube.    HTN (hypertension)  Assessment & Plan  Stable off telmisartan 20mg. Patient discontinued medication one week ago due to low BP readings at home. Monitor BP.   Orthostatics Vitals -ve    * Dizziness  Assessment & Plan  Patient presenting with ongoing dizziness for 3 weeks. He denies prior hx of strokes/TIAs. He is being admitted to r/o TIA vs VBI. He received one full dose aspirin in the ED. No neurologic deficits noted on exam. CTH was negative but did show  bilateral ICA atherosclerosis. There may be a component of eustachian ear tube dysfunction from increased mucus production. There may also be a component of orthostatic hypotension, patient recently discontinued home antihypertensive due to low BP.    -Brain MRI, head and neck MRA  -Bilateral carotid ultrasound  -Neuro checks  -Permissive hypertension  -NPO due to esophageal stricture  -Telemetric monitoring  -Check lipids  -Continue rosuvastatin  -SCDs for DVT ppx    Follow-up with PCP         VTE Assessment: [place] VTE Score: 3  VTE Prophylaxis Plan:   Code Status: Full Code  Estimated Discharge Date: 5/11/2022  Disposition Planning: Pending medical management     Eitan Martínez DO PGY1, #[id number]  5/11/2022

## 2022-05-11 NOTE — PROGRESS NOTES
Patient: Celso Gramajo  Location:  [facility] 3B 0324  MRN:  [id number]  Today's date:  5/11/2022    Pt placed in recliner chair w/ LE's elevated.  Call bell and personal items w/in reach.  Mobility monitor activated.  Nsg notified as to pt's performance during therapy.     Celso HAZEL is a 66 y.o. male admitted on 5/10/2022 with Dizziness [R42]  Vertigo [R42]  Carotid artery plaque, bilateral [I65.23]. Principal problem is Dizziness.    Past Medical History  Celso HAZEL has a past medical history of Anemia, Cancer (CMS/HCC), head and neck radiation, Hypertension, Hypothyroidism, and Stroke (CMS/[facility]).    History of Present Illness   66M with esophageal cancer and thyroid cancer, s/p chemoradiation c/b esophageal stricture who is admitted with 3 week history of dizziness and difficulty with balance. Also is concerned about 4 week history of increased saliva and sputum production without cough or fevers. In ED, given ASA x 1 for c/f VBI and admitted for further work up. Currently denies any pain or SOB; mainly concerned about increased sputum production. He has not had the chance to bring this up to his ENT specialist.        PT Vitals    Date/Time Pulse BP Pt Position Who   05/11/22 1002 73 139/88 Lying SGB   05/11/22 1010 79 142/97 Sitting SGB   05/11/22 1015 85 149/96 Standing SGB      PT Pain    Date/Time Rating: Rest Rating: Activity [facility]   05/11/22 1002 0 - no pain 0 - no pain SGB          Prior Living Environment    Flowsheet Row Most Recent Value   People in Home spouse   Current Living Arrangements home   Living Environment Comment Lives w/ spouse  in 2SH w/ first floor TN & B & stall shower on 2nd floor        Prior Level of Function    Flowsheet Row Most Recent Value   Ambulation independent   Transferring independent   Toileting independent   Bathing independent   Dressing independent   Eating independent   Communication understands/communicates without difficulty   Prior Level of Function Comment PTA :  IND w/ adls /mobility w/o AD   Assistive Device Currently Used at Home none           PT Evaluation and Treatment - 05/11/22 1002        PT Time Calculation    Start Time 1002     Stop Time 1018     Time Calculation (min) 16 min        Session Details    Document Type initial evaluation     Mode of Treatment physical therapy        General Information    Patient Profile Reviewed yes     Onset of Illness/Injury or Date of Surgery 05/10/22     Referring Physician Cameron     Patient/Family/Caregiver Comments/Observations cleared by nsg for therapy     General Observations of Patient pt in bed - no distress     Existing Precautions/Restrictions fall        Cognition/Psychosocial    Affect/Mental Status (Cognition) WFL     Orientation Status (Cognition) oriented x 4     Follows Commands (Cognition) WFL     Cognitive Function WFL        Sensory    Hearing Status WFL        Vision Assessment/Intervention    Visual Impairment/Limitations corrective lenses for reading        Sensory Assessment (Somatosensory)    Left LE Sensory Assessment general sensation;intact     Right LE Sensory Assessment general sensation;intact        Range of Motion (ROM)    Left Lower Extremity (ROM) left LE ROM is WFL     Right Lower Extremity (ROM) right LE ROM is WFL        Strength (Manual Muscle Testing)    Left Lower Extremity Strength left LE strength is WFL     Right Lower Extremity Strength right LE strength is WFL        Bed Mobility    [place], Supine to Sit independent     [place], Sit to Supine independent     Comment (Bed Mobility) demo's no difficulty for  in/out of bed.  denied dizziness w/ change in position        Sit to Stand Transfer    [place], Sit to Stand Transfer modified independence     Assistive Device none     Comment stood w/o difficulty - denied dizziness.  standing balance good.        Stand to Sit Transfer    [place], Stand to Sit Transfer modified independence     Assistive Device none     Comment  demo's safe stand to sit.        Gait Training    [place], Gait independent     Assistive Device none     Distance in Feet 160 feet     Pattern (Gait) step-through     Comment (Gait/Stairs) pt demo's steady gait w/o gait dysfunction, ataxia or balance deficits.  denied dizziness.        Stairs Training    [place], Stairs modified independence     Assistive Device railing     Handrail Location (Stairs) right side (ascending)     Number of Stairs 8     Ascending Stairs Technique step-over-step     Descending Stairs Technique step-over-step        Balance    Static Sitting Balance WFL     Dynamic Sitting Balance WFL     Static Standing Balance WFL     Dynamic Standing Balance WFL        AM-PAC (TM) - Mobility (Current Function)    Turning from your back to your side while in a flat bed without using bedrails? 4 - None     Moving from lying on your back to sitting on the side of a flat bed without using bedrails? 4 - None     Moving to and from a bed to a chair? 4 - None     Standing up from a chair using your arms? 4 - None     To walk in a hospital room? 4 - None     Climbing 3-5 steps with a railing? 4 - None     AM-PAC (TM) Mobility Score 24        Assessment/Plan (PT)    Daily Outcome Statement pt currently indep w/ bed mob, txfrs, ambulation w/o dev and stairs.  OK for DC home when medically ready.     Therapy Frequency evaluation only               PT Assessment/Plan    Flowsheet Row Most Recent Value   PT Recommended Discharge Disposition home at 05/11/2022 1002   Anticipated Equipment Needs at Discharge (PT) none at 05/11/2022 1002   Patient/Family Therapy Goals Statement to go home. at 05/11/2022 1002                    Education Documentation  Treatment Plan, taught by Zac Sanderson, PT at 5/11/2022  1:23 PM.  Learner: Patient  Readiness: Acceptance  Method: Explanation  Response: Verbalizes Understanding  Comment: Explained role of therapy in pt's care, recovery and DC planning

## 2022-05-11 NOTE — PLAN OF CARE
Plan of Care Review  Plan of Care Reviewed With: patient  Progress: improving  Outcome Summary: Per resident, pt is stable for dc. Awaiting carotid ultrasound results before dc. VSS.     Discharge papers given to patient. Medications from safe returned to pt.

## 2022-05-11 NOTE — HOSPITAL COURSE
Celso HAZEL is a 66 y.o. male admitted on 5/10/2022 with Dizziness [R42]  Vertigo [R42]  Carotid artery plaque, bilateral [I65.23]. Principal problem is Dizziness.    Past Medical History  Celso HAZEL has a past medical history of Anemia, Cancer (CMS/HCC), head and neck radiation, Hypertension, Hypothyroidism, and Stroke (CMS/HCC).    History of Present Illness   66M with esophageal cancer and thyroid cancer, s/p chemoradiation c/b esophageal stricture who is admitted with 3 week history of dizziness and difficulty with balance. Also is concerned about 4 week history of increased saliva and sputum production without cough or fevers. In ED, given ASA x 1 for c/f VBI and admitted for further work up. Currently denies any pain or SOB; mainly concerned about increased sputum production. He has not had the chance to bring this up to his ENT specialist.

## 2022-05-11 NOTE — PATIENT CARE CONFERENCE
Care Progression Rounds Note  Date: 5/11/2022  Time: 10:39 AM     Patient Name: Celso Gramajo     Medical Record Number: [id number]   YOB: 1955  Sex: Male      Room/Bed: 0324    Admitting Diagnosis: Dizziness [R42]  Vertigo [R42]  Carotid artery plaque, bilateral [I65.23]   Admit Date/Time: 5/10/2022  9:02 AM    Primary Diagnosis: Dizziness  Principal Problem: Dizziness    GMLOS: pending  Anticipated Discharge Date: 5/11/2022    AM-PAC:  Mobility Score: 24    Discharge Planning:  Concerns to be Addressed: care coordination/care conferences, discharge planning  Anticipated Discharge Disposition: home with assistance, home without assistance or services    Barriers to Discharge:  None    Comments:  orthostatics negative, dc today    Participants:  advanced practice provider, physical therapy, nursing, social work/services

## 2022-05-11 NOTE — DISCHARGE SUMMARY
Hospital Medicine Service -  Inpatient Discharge Summary        BRIEF OVERVIEW   Admitting Provider: Adair Barnes MD  Attending Provider: Adair Barnes MD Attending phys phone: [phone]    PCP: Yamila Brunson [phone]    Admission Date: 5/10/2022  Discharge Date: 5/11/2022     DISCHARGE DIAGNOSES      Primary Discharge Diagnosis  Dizziness    Secondary Discharge Diagnoses  Active Hospital Problems    Diagnosis Date Noted   • Dizziness 05/10/2022     Priority: High   • HTN (hypertension) 05/10/2022   • Acquired hypothyroidism 05/10/2022   • Hyperlipidemia 05/10/2022   • GERD (gastroesophageal reflux disease) 05/10/2022   • Increased sputum production 05/10/2022      Resolved Hospital Problems   No resolved problems to display.       Problem List on Day of Discharge  * Dizziness  Assessment & Plan  Patient presenting with ongoing dizziness for 3 weeks. He denies prior hx of strokes/TIAs. He is being admitted to r/o TIA vs VBI. He received one full dose aspirin in the ED. No neurologic deficits noted on exam. CTH was negative but did show bilateral ICA atherosclerosis. There may be a component of eustachian ear tube dysfunction from increased mucus production. There may also be a component of orthostatic hypotension, patient recently discontinued home antihypertensive due to low BP.    -Brain MRI, head and neck MRA  -Bilateral carotid ultrasound pending results  -Neuro checks  -Permissive hypertension  -NPO due to esophageal stricture  -Telemetric monitoring  -Check lipids  -Continue rosuvastatin  -SCDs for DVT ppx    Follow-up with PCP  Follow-up with cardiology  Patient signed medical release information form    Increased sputum production  Assessment & Plan  This is a chronic problem due to esophageal stricture, patient is unable to swallow his own secretions. This may be contributing to his dizziness due to eustachian ear dysfunction. Right middle ear effusion noted on exam.    -Recommend to start  "Flonase BID and nasal spray  -Continue glycopyrrolate  -Follow with ENT outpatient    GERD (gastroesophageal reflux disease)  Assessment & Plan  Continue lansoprazole 60mg and famotidine 40mg nightly via peg tube.    Follow-up with GI    Hyperlipidemia  Assessment & Plan  Continue rosuvastatin 5mg    Lipid panel    Acquired hypothyroidism  Assessment & Plan  Patient with hx of thyroid cancer s/p radiation and chemotherapy. Continue liquid levothyroxine 150mcg via peg tube.    HTN (hypertension)  Assessment & Plan  Stable off telmisartan 20mg. Patient discontinued medication one week ago due to low BP readings at home. Monitor BP.   Orthostatics Vitals -ve    SUMMARY OF HOSPITALIZATION      Presenting Problem/History of Present Illness  Dizziness [R42]  Vertigo [R42]  Carotid artery plaque, bilateral [I65.23]    This is a 66 y.o. year-old male admitted on 5/10/2022 with Dizziness [R42]  Vertigo [R42]  Carotid artery plaque, bilateral [I65.23].    As per HPI:  \"Celso Gramajo is a 66 y.o. male with a past medical history of esophageal cancer and thyroid cancer s/p chemo & radiation resulting in esophageal stricture, GERD, HTN, HLD,  who presents with dizziness for the past 3 weeks. He describes it as feeling \"foggy\" and off balance. He has no difficulty ambulating. He denies focal neurologic deficits, able to move all extremities with no difficulty, no loss of sensation, no numbness anywhere, no double vision, no tinnitus, no new headaches.  He does report discontinuing HTN meds due to low BP readings. He has had normal BP readings since.     He does reports increased sputum over the past 4 weeks and has noted increased ear fullness on the right. He follows with ENT for this issues, he was told it is the result of radiation therapy. He is unable to swallow due to his esophageal stricture and currently gets nutrition thru a peg tube.      He denies recent illness, no fevers/chills, congestion, rhinorrhea, sore throat, " "CP, SOB, palpitations, abd pain, N/V/D, dysuria, hematuria, hematochezia.      Surgical history is remarkable for neck surgery, thyroid surgery.  Family history: hx of stroke in his fathers (70s), sister passed away from lung cancer.  Denies allergies to medications.     Social hx: denies use of tobacco, alcohol, recreational drugs. Prior to his cancer surgery he used to work at [employer].      In the ED, vitals were stable. BP elevated at 158/83. Labs of BMP and CBC were unremarkable. BNP negative. HS-troponins negative. EKG non-ischemic.  CT head was negative for acute abnormalities but did show atherosclerotic calcifications in bilateral ICAs. CXR negative for acute disease.     Discussed CODE STATUS with patient, he would like to remain a full code. Patient's emergency contact is Nathanael Camacho, phone is [phone]\"     Hospital Course  Patient presented to emergency room on 5/10/2022 for dizziness.  On arrival, patient was stable  Following initial evaluation and management, patient was admitted for further management.  Patient started on the TIA pathway. CT head and MRI showed no acute/suspicious intracranial abnormality. PT/OT consulted and orthostatic vitals were negative. Carotid ultrasound pending and patient to follow-up with cardiology outpatient regarding results.  Patient signed release of information to get the results to his physician. Patient was cleared to be discharged to follow-up with ENT and GI.  Dietitian consulted regarding tube feeding and adjusted accordingly. Chronic conditions were managed appropriately.   On day of discharge, patient was afebrile and hemodynamically stable. Patient was saturating well on RA without evidence of respiratory distress. Discharge plan, including outpatient follow up and medication changes updated accordingly. Plan of care were reviewed with the patient. Reasons to seek medical attention and/or to proceed to the ER were reviewed with patient. He " verbalized understanding and was aggreable to the plan.   Please refer to remainder of discharge summary for further detail.      Exam on Day of Discharge  Physical Exam  Vitals and nursing note reviewed.   Constitutional:       General: He is not in acute distress.     Appearance: Normal appearance.   HENT:      Head: Normocephalic and atraumatic.      Mouth/Throat:      Mouth: Mucous membranes are moist.   Eyes:      Extraocular Movements: Extraocular movements intact.   Cardiovascular:      Rate and Rhythm: Normal rate and regular rhythm.      Pulses: Normal pulses.      Heart sounds: Normal heart sounds.   Pulmonary:      Effort: Pulmonary effort is normal. No respiratory distress.      Breath sounds: Normal breath sounds.   Abdominal:      General: Abdomen is flat. Bowel sounds are normal.      Palpations: Abdomen is soft.      Tenderness: There is no abdominal tenderness.   Skin:     General: Skin is warm and dry.   Neurological:      General: No focal deficit present.      Mental Status: He is alert and oriented to person, place, and time.      Sensory: Sensation is intact. No sensory deficit.      Motor: No weakness or abnormal muscle tone.      Coordination: Coordination normal. Finger-Nose-Finger Test and Heel to Shin Test normal.      Gait: Gait normal.      Deep Tendon Reflexes: Reflexes normal.   Psychiatric:         Mood and Affect: Mood normal.         Behavior: Behavior normal.         Judgment: Judgment normal.        Consults During Admission  IP CONSULT TO NUTRITION SERVICES    DISCHARGE MEDICATIONS        Medication List      START taking these medications    promethazine 6.25 mg/5 mL syrup  Commonly known as: PHENERGAN  Take 5 mL (6.25 mg total) by mouth 4 (four) times a day as needed for nausea or vomiting (dizziness) for up to 7 days.  Dose: 6.25 mg     sodium chloride 0.65 % nasal spray  Commonly known as: OCEAN  Administer 1 spray into each nostril as needed for congestion.  Dose: 1 spray         CONTINUE taking these medications    famotidine 40 mg tablet  Commonly known as: PEPCID  Take 40 mg by mouth nightly.  Dose: 40 mg     glycopyrrolate 1 mg tablet  Commonly known as: ROBINUL  Take 1 mg by mouth 3 (three) times a day as needed (secretions).  Dose: 1 mg     lansoprazole 30 mg capsule  Commonly known as: PREVACID  60 mg by peg tube route daily before breakfast. OPEN 2 CAPSULES AND SPRINKLE ON APPLESAUCE OR YOGURT AND TAKE 30 MINUTES BEFORE BREAKFAST; Through a peg tube  Dose: 60 mg     levothyroxine 150 mcg/mL solution  150 mcg by peg tube route daily. Through peg tube  Dose: 150 mcg     multivitamin powder in packet  1 packet by peg tube route daily. Empty and mix with filtered water through peg tube  Dose: 1 packet     rosuvastatin 5 mg tablet  Commonly known as: CRESTOR  Take 5 mg by mouth nightly.  Dose: 5 mg        STOP taking these medications    telmisartan 20 mg tablet  Commonly known as: MICARDIS            Instructions for after discharge     Follow-up with primary physician (PCP)      Other Follow-up      Follow-up with ENT  Follow-up with GI    Tube feeding      Home regimen of iiMonde Peptide 1.5 - 325 ml bolus 6x daily with water flushes 250 ml after each bolus (provides 2925 kcal, 144 g protein, 1365 ml free water)    Tube Feeding Formula (BMR): iiMonde 1.4 standard    Administration Type: Bolus    Tube Feeding Bolus (mL): 325    Tube Feeding Bolus (Times/Day): 6    Flush type: Water    Flush frequency: With each bolus    Flush amount (mL): 250             PROCEDURES / LABS / IMAGING        Pertinent Labs    Results from last 7 days   Lab Units 05/10/22  0922   SODIUM mEQ/L 137   POTASSIUM mEQ/L 4.2   CHLORIDE mEQ/L 99   CO2 mEQ/L 31   BUN mg/dL 16   CREATININE mg/dL 0.8   GLUCOSE mg/dL 103*     05/11/22 at 6:32 PM: Estimated Creatinine Clearance: 105.4 mL/min (by C-G formula based on SCr of 0.8 mg/dL).        No lab exists for component:  LIPASE  Results from last 7 days   Lab  Units 05/10/22  0922   WBC K/uL 6.57   HEMOGLOBIN g/dL 14.7   PLATELETS K/uL 163           Microbiology Results     Procedure Component Value Units Date/Time    Sputum Gram Stain (Lab Only) Expectorated Sputum [[id number]] Collected: 05/10/22 1606    Specimen: Expectorated Sputum Updated: 05/11/22 1431     Gram Stain Result 1+ WBC      2+ Epithelial cells      4+ Gram positive cocci in pairs, chains and clusters      2+ Gram positive bacilli      --    Sputum Culture (Lab Only) Expectorated Sputum [[id number]]  (Abnormal) Collected: 05/10/22 1606    Specimen: Expectorated Sputum Updated: 05/11/22 1735     Culture **Positive Culture**      3+ Serratia marcescens    SARS-CoV-2 (COVID-19), PCR Nasopharynx [[id number]]  (Normal) Collected: 05/10/22 1201    Specimen: Nasopharyngeal Swab from Nasopharynx Updated: 05/10/22 1238    Narrative:      The following orders were created for panel order SARS-CoV-2 (COVID-19), PCR Nasopharynx.  Procedure                               Abnormality         Status                     ---------                               -----------         ------                     SARS-CoV-2 (COVID-19), P...[[id number]]  Normal              Final result                 Please view results for these tests on the individual orders.    SARS-CoV-2 (COVID-19), PCR Nasopharynx [[id number]]  (Normal) Collected: 05/10/22 1201    Specimen: Nasopharyngeal Swab from Nasopharynx Updated: 05/10/22 1238     SARS-CoV-2 (COVID-19) Negative    Narrative:      Nursing instructions: Obtain nasopharyngeal swab ONLY.  Send swab in viral transport media. If RSV/FLU swab is also ordered, both Covid and RSV/FLU can be performed on single swab.          Pertinent Imaging  X-RAY CHEST 2 VIEWS    Result Date: 5/10/2022  IMPRESSION: No active disease is seen in the chest.    CT HEAD WITHOUT IV CONTRAST    Result Date: 5/10/2022  IMPRESSION: 1. No acute hemorrhage. No acute infarction in a major vascular territory. No mass or mass  effect. 2. Chronic changes as described in the discussion.    MRI ANGIOGRAM HEAD WITHOUT CONTRAST    Result Date: 5/11/2022  IMPRESSION: 1. No acute/suspicious intracranial abnormality. 2. No intracranial aneurysm or evidence of hemodynamically significant stenosis in the head MRA. COMMENT: MRI of the brain utilizing T1-weighted, T2-weighted, FLAIR, and diffusion sequences are submitted for evaluation in axial, sagittal, and coronal planes. There is age-appropriate configuration of the ventricles and sulci. Stable mild chronic microangiopathy, including stable focus in the lateral right frontal lobe. No mass-effect.  No extra-axial collection. 2 small inferior cerebellar foci of susceptibility on the gradient echo sequence, stable on the right and new on the left, nonspecific but likely sequela of old small hypertensive microhemorrhages.  There is no diffusion restriction. Ventricles are midline without evidence of hydrocephalus. The cerebellar tonsils are in normal anatomical positioning. Partially empty sella again noted.  Flow voids are preserved in the vasculature at the base of the skull.  Visualized paranasal sinuses and mastoid air cells are overall well aerated, noting trace inferior left mastoid air cell fluid.  Bone marrow signal in the calvarium and visualized upper cervical spine is within normal limits. MRA of the head utilizing 3-D time-of-flight demonstrates normal antegrade flow in the anterior and posterior circulations. There is no flow gap to suggest hemodynamically significant stenosis. No intracranial aneurysm. No evidence of arterial venous malformation. Fetal right PCA noted.    MRI BRAIN WITHOUT CONTRAST    Result Date: 5/11/2022  IMPRESSION: 1. No acute/suspicious intracranial abnormality. 2. No intracranial aneurysm or evidence of hemodynamically significant stenosis in the head MRA. COMMENT: MRI of the brain utilizing T1-weighted, T2-weighted, FLAIR, and diffusion sequences are submitted  for evaluation in axial, sagittal, and coronal planes. There is age-appropriate configuration of the ventricles and sulci. Stable mild chronic microangiopathy, including stable focus in the lateral right frontal lobe. No mass-effect.  No extra-axial collection. 2 small inferior cerebellar foci of susceptibility on the gradient echo sequence, stable on the right and new on the left, nonspecific but likely sequela of old small hypertensive microhemorrhages.  There is no diffusion restriction. Ventricles are midline without evidence of hydrocephalus. The cerebellar tonsils are in normal anatomical positioning. Partially empty sella again noted.  Flow voids are preserved in the vasculature at the base of the skull.  Visualized paranasal sinuses and mastoid air cells are overall well aerated, noting trace inferior left mastoid air cell fluid.  Bone marrow signal in the calvarium and visualized upper cervical spine is within normal limits. MRA of the head utilizing 3-D time-of-flight demonstrates normal antegrade flow in the anterior and posterior circulations. There is no flow gap to suggest hemodynamically significant stenosis. No intracranial aneurysm. No evidence of arterial venous malformation. Fetal right PCA noted.      OUTPATIENT  FOLLOW-UP / REFERRALS / PENDING TESTS        Outpatient Follow-Up Appointments  Encounter Information    This patient does not currently have any appointments scheduled.       Referrals  No orders of the defined types were placed in this encounter.      Test Results Pending at Discharge  Unresulted Labs (From admission, onward)            None        Important Issues to Address in Follow-Up  - PCP follow up in 1-2 weeks  - Follow-up with ENT  - Follow-up with GI  - Follow-up with cardiology    DISCHARGE DISPOSITION      Disposition: Home     Code Status At Discharge: Full Code    Physician Order for Life-Sustaining Treatment Document Status      No documents found

## 2022-05-11 NOTE — PLAN OF CARE
"  Problem: Adult Inpatient Plan of Care  Goal: Plan of Care Review  Outcome: Progressing  Flowsheets (Taken 5/11/2022 0956)  Plan of Care Reviewed With: patient  Goal: Readiness for Transition of Care  Intervention: Mutually Develop Transition Plan  Flowsheets (Taken 5/11/2022 0956)  Anticipated Discharge Disposition:  • home with assistance  • home without assistance or services  Equipment Needed After Discharge: (pending care team recommendations) --  Assistive Device/Animal Currently Used at Home: (has a shower chair, doesn't use) none  Anticipated Changes Related to Illness: none  Outpatient/Agency/Support Group Needs: (pending care team recommendations) --  Readmission Within the Last 30 Days: no previous admission in last 30 days  Patient/Family Anticipated Services at Transition: none  Patient/Family Anticipates Transition to: home with family  Transportation Anticipated: (wife) family or friend will provide  Concerns to be Addressed:  • care coordination/care conferences  • discharge planning     Chart reviewed. Pt admitted for dizziness. PMH esophageal and thyroid ca s/p chemo/rad. Pt downgraded from IP to obs. SW met with pt at bedside to introduce self/care coordination role. Pt AO x4, reported he is feeling \"better\" and noted he did not feel dizzy this morning. Pt [place] wife [place], [place]. Pt independent at baseline with ambulation/ADLs. Denied AD/DME/respiratory equipment. Confirmed PCP and pharmacy. Pt making appt with PCP today for post-hospitalization because he hasn't been in several years. Denied h/o SNF/[facility]/HHC. Confirmed h/o outpatient [facility]. Wife to transport home. Pt doesn't anticipate any DC needs. Delivered MOON Letter, pt signed. Care Coordination will continue to follow.    11:05 Addendum: Plan of care discussed during care progression rounds - orthostatics negative. Anticipated DC today. No DC needs identified.     Anticipated Disposition: Home with wife, independent at " baseline  [facility]K Letter signed by pt  Wife to transport home

## 2022-05-11 NOTE — PLAN OF CARE
Problem: Adult Inpatient Plan of Care  Goal: Plan of Care Review  Outcome: Progressing  Flowsheets (Taken 5/11/2022 1003)  Progress: improving  Plan of Care Reviewed With: patient  Outcome Summary: D/c OT . Patient just about at baseline . OT recommendations provided during eval. Supportive family available. No additional acute care OT at this time.

## 2022-05-11 NOTE — PLAN OF CARE
Problem: Adult Inpatient Plan of Care  Goal: Plan of Care Review  Flowsheets (Taken 5/11/2022 1121)  Progress: no change  Plan of Care Reviewed With: patient  Outcome Summary: Consult received for TF recommends. Pt was OOB to chair pt with home formula of [employer] Peptide 1.5 and bedside. Pt just completed his bolus from gravity via pump -peg tube. Pt has been tolerating TF at home, wt has been stable 192# after he lost 12# > 1 year ago. Pt has been trying to gain wt. Current TF regimen provides 33-34 kcal/kg BW and 1.65 g/kg BW- TF is meet pt's needs. Pt his hoping to go home today, pt will continue with home regimen.    Goal:  Pt will meet 100% of needs via tube feedings    Recommendation:  1. Agree with home regimen of [employer] Peptide 1.5 - 325 ml bolus 6x daily with water flushes 250 ml after each bolus (provides 2925 kcal, 144 g protein, 1365 ml free water)  2. Will follow up with TF tolerance, wts - if pt is not d/c'ed today

## 2022-05-11 NOTE — PROGRESS NOTES
Patient:  Celso Gramajo  Location:  [facility] 3B 0324  MRN:  [id number]  Today's date:  5/11/2022     Patient returned to supine in flat then head elevated bed w/ alarm activated & call bell nearby     Celso HAZEL is a 66 y.o. male admitted on 5/10/2022 with Dizziness [R42]  Vertigo [R42]  Carotid artery plaque, bilateral [I65.23]. Principal problem is Dizziness.    Past Medical History  Celso HAZEL has a past medical history of Anemia, Cancer (CMS/HCC), head and neck radiation, Hypertension, Hypothyroidism, and Stroke (CMS/HCC).    History of Present Illness   66M with esophageal cancer and thyroid cancer, s/p chemoradiation c/b esophageal stricture who is admitted with 3 week history of dizziness and difficulty with balance. Also is concerned about 4 week history of increased saliva and sputum production without cough or fevers. In ED, given ASA x 1 for c/f VBI and admitted for further work up. Currently denies any pain or SOB; mainly concerned about increased sputum production. He has not had the chance to bring this up to his ENT specialist.        OT Vitals    Date/Time Pulse BP Pt Position [facility]   05/11/22 1003 73 139/88 Lying CP   05/11/22 1010 79 142/97 Sitting SGB   05/11/22 1011 79 142/97 Sitting CP   05/11/22 1015 85 149/96 Standing SGB   05/11/22 1016 85 149/95 Standing CP      OT Pain    Date/Time Pain Type Rating: Rest Rating: Activity [facility]   05/11/22 1003 Pain Assessment 0 - no pain 0 - no pain CP          Prior Living Environment    Flowsheet Row Most Recent Value   People in Home spouse   Current Living Arrangements home   Living Environment Comment Lives w/ spouse  in 2SH w/ first floor TX & B & stall shower on 2nd floor        Prior Level of Function    Flowsheet Row Most Recent Value   Ambulation independent   Transferring independent   Toileting independent   Bathing independent   Dressing independent   Eating independent   Communication understands/communicates without difficulty   Prior Level  of Function Comment PTA : IND w/ adls /mobility w/o AD   Assistive Device Currently Used at Home none        Occupational Profile    Flowsheet Row Most Recent Value   Reason for Services/Referral ADL /ambulation dysfx   Patient Goals to go home.           OT Evaluation and Treatment - 05/11/22 1003        OT Time Calculation    Start Time 1003     Stop Time 1021     Time Calculation (min) 18 min        Session Details    Document Type initial evaluation     Mode of Treatment occupational therapy        General Information    Patient Profile Reviewed yes     Patient/Family/Caregiver Comments/Observations RN cleared for therapy     General Observations of Patient Patient received supine in head elevated bed  in RA & TF     Existing Precautions/Restrictions fall        Living Environment    Primary Care Provided by self        Cognition/Psychosocial    Affect/Mental Status (Cognition) WFL     Orientation Status (Cognition) oriented x 4     Follows Commands (Cognition) WFL     Cognitive Function WFL        Hearing Assessment    Hearing Status WFL        Vision Assessment/Intervention    Visual Impairment/Limitations corrective lenses for reading        Range of Motion (ROM)    Range of Motion ROM is WFL;bilateral upper extremities        Strength (Manual Muscle Testing)    Strength (Manual Muscle Testing) strength is WFL;bilateral upper extremities        Bed Mobility    [place], Roll Left distant supervision     [place], Supine to Sit distant supervision     [place], Sit to Supine distant supervision     Comment (Bed Mobility) head elevated bed        Transfers    Maintains Weight-Bearing Status (Transfers) verbal cues to maintain     Comment DS w/o AD        Bed to Chair Transfer    [place], Bed to Chair distant supervision     Comment w/o AD        Chair to Bed Transfer    [place], Chair to Bed distant supervision     Comment w/o AD        Sit to Stand Transfer    [place], Sit to Stand  Transfer distant supervision     Assistive Device none        Stand to Sit Transfer    [place], Stand to Sit Transfer distant supervision     Assistive Device none        Balance    Static Sitting Balance WFL     Dynamic Sitting Balance WFL     Static Standing Balance WFL     Dynamic Standing Balance WFL        Motor Skills    Functional Endurance Fair        Lower Body Dressing    Self-Performance dons/doffs left sock;dons/doffs right sock     [place] distant supervision     Position supported sitting        AM-PAC (TM) - ADL (Current Function)    Putting on and taking off regular lower body clothing? 3 - A Little     Bathing? 3 - A Little     Toileting? 3 - A Little     Putting on/taking off regular upper body clothing? 4 - None     How much help for taking care of personal grooming? 3 - A Little     Eating meals? 4 - None     AM-PAC (TM) ADL Score 20        Assessment/Plan (OT)    Daily Outcome Statement [facility] 20 ; D/C OT .Patient just about at baseline. OT recommendations provided during eval. Supportive family available. No additional acute care OT goals at this time.     Rehab Potential --   Cleared for d/c home.    Therapy Frequency evaluation only     Planned Therapy Interventions --   Cleared for d/c home.              OT Assessment/Plan    Flowsheet Row Most Recent Value   OT Recommended Discharge Disposition home, home with assistance at 05/11/2022 1003   Anticipated Equipment Needs At Discharge (OT) --  [Cleared for d/c home.] at 05/11/2022 1003   Patient/Family Therapy Goal Statement d/c home w/ family at 05/11/2022 1003                    Education Documentation  Relaxation, taught by Francia Sears, OT at 5/11/2022  3:08 PM.  Learner: Patient  Readiness: Acceptance  Method: Explanation  Response: Verbalizes Understanding  Comment: Instructed in econ techniques to maximize IND & endurance w/ adls, bed mobility plus mobility w/o AD activities    Proper Positioning, taught by Francia Sears, OT  at 5/11/2022  3:08 PM.  Learner: Patient  Readiness: Acceptance  Method: Explanation  Response: Verbalizes Understanding  Comment: Instructed in econ techniques to maximize IND & endurance w/ adls, bed mobility plus mobility w/o AD activities    Prioritize Tasks, taught by Francia Sears OT at 5/11/2022  3:08 PM.  Learner: Patient  Readiness: Acceptance  Method: Explanation  Response: Verbalizes Understanding  Comment: Instructed in econ techniques to maximize IND & endurance w/ adls, bed mobility plus mobility w/o AD activities    Pace Activity, taught by Francia Sears OT at 5/11/2022  3:08 PM.  Learner: Patient  Readiness: Acceptance  Method: Explanation  Response: Verbalizes Understanding  Comment: Instructed in econ techniques to maximize IND & endurance w/ adls, bed mobility plus mobility w/o AD activities    Breathing, taught by Francia Sears OT at 5/11/2022  3:08 PM.  Learner: Patient  Readiness: Acceptance  Method: Explanation  Response: Verbalizes Understanding  Comment: Instructed in econ techniques to maximize IND & endurance w/ adls, bed mobility plus mobility w/o AD activities    Purpose, taught by Francia Sears OT at 5/11/2022  3:08 PM.  Learner: Patient  Readiness: Acceptance  Method: Explanation  Response: Verbalizes Understanding  Comment: Instructed in econ techniques to maximize IND & endurance w/ adls, bed mobility plus mobility w/o AD activities          OT Goals    Flowsheet Row Most Recent Value   Bed Mobility Goal 1    Activity/Assistive Device sit to supine, supine to sit at 05/11/2022 1003   [place] supervision required at 05/11/2022 1003   Time Frame by discharge at 05/11/2022 1003   Progress/Outcome goal met at 05/11/2022 1003   Transfer Goal 1    Activity/Assistive Device bed-to-chair/chair-to-bed, sit-to-stand/stand-to-sit  [w/o AD] at 05/11/2022 1003   [place] supervision required at 05/11/2022 1003   Time Frame by discharge at 05/11/2022 1003   Progress/Outcome goal met  at 05/11/2022 1003   Dressing Goal 1    Activity/Adaptive Equipment lower body dressing at 05/11/2022 1003   [place] supervision required at 05/11/2022 1003   Time Frame by discharge at 05/11/2022 1003   Progress/Outcome goal met at 05/11/2022 1003

## 2022-05-11 NOTE — DISCHARGE INSTRUCTIONS
Celso Gramajo presented to [facility] on 5/10/2022 was admitted for Dizziness [R42]  Vertigo [R42]  Carotid artery plaque, bilateral [I65.23]     You were seen by the Resident Family Medicine Team along with specialists from   Orders Placed This Encounter   Procedures    Inpatient consult to Nutrition Services        Your hospital course progressed without incident.       Please refer to your After Visit Summary (AVS) for all medication information and instructions.     NEW MEDICATIONS ON DISCHARGE   Current Discharge Medication List          Current Discharge Medication List             DISCONTINED MEDICATIONS  Current Discharge Medication List         Diet: {SELECT DIET COMBINATION:213863}    Special Instructions: Use Humidifier at house to help with congestion and use the nasal spray on daily basis      If you have any questions regarding your home medications please contact your primary care physician immediately. If you experience any alarming symptoms, please do not hesitate to call your primary care doctor or present to the emergency department for evaluation.     It was a pleasure taking care of you!     Sincerely,  [facility]    Follow-ups:   Primary Care Physician (PCP) Yamila Brunson MD in 1 week. Any time you are in a hospital you should see your primary within one week of discharge. Address: [address] [place] [address] [address] [place] [address]. Please bring this discharge paperwork to all of your follow up appointments. Call for appointment [phone]  Follow-up with ENT  Follow-up with gastroenterology

## 2022-05-12 LAB
ATRIAL RATE: 79
P AXIS: 73
PR INTERVAL: 160
QRS DURATION: 90
QT INTERVAL: 378
QTC CALCULATION(BAZETT): 433
R AXIS: -40
T WAVE AXIS: 39
VENTRICULAR RATE: 79

## 2022-05-14 LAB
MICROORGANISM SPEC CULT: ABNORMAL
MICROORGANISM SPEC CULT: ABNORMAL

## 2022-07-22 ENCOUNTER — TELEPHONE (OUTPATIENT)
Dept: PRIMARY CARE | Facility: CLINIC | Age: 67
End: 2022-07-22
Payer: MEDICARE

## 2022-07-22 PROBLEM — K59.09 CONSTIPATION, CHRONIC: Status: ACTIVE | Noted: 2017-03-28

## 2022-07-22 PROBLEM — I10 HYPERTENSION: Status: ACTIVE | Noted: 2021-10-22

## 2022-07-22 PROBLEM — N48.89 PENILE CURVATURE, ACQUIRED: Status: ACTIVE | Noted: 2017-03-14

## 2022-07-22 PROBLEM — G93.32 CHRONIC FATIGUE SYNDROME: Status: ACTIVE | Noted: 2021-10-22

## 2022-07-22 PROBLEM — K21.9 GASTROESOPHAGEAL REFLUX DISEASE: Status: ACTIVE | Noted: 2020-05-05

## 2022-07-22 PROBLEM — G47.33 OSA (OBSTRUCTIVE SLEEP APNEA): Status: ACTIVE | Noted: 2018-09-30

## 2022-07-22 PROBLEM — J30.9 ALLERGIC RHINITIS: Status: ACTIVE | Noted: 2020-05-05

## 2022-07-22 PROBLEM — C15.3: Status: ACTIVE | Noted: 2020-11-10

## 2022-07-22 PROBLEM — E89.0 HYPOTHYROIDISM, POSTOP: Status: ACTIVE | Noted: 2021-10-22

## 2022-07-22 PROBLEM — R19.4 CHANGE IN BOWEL HABITS: Status: ACTIVE | Noted: 2017-03-28

## 2022-07-22 PROBLEM — J34.2 DEVIATED NASAL SEPTUM: Status: ACTIVE | Noted: 2020-05-05

## 2022-07-22 PROBLEM — N48.6 INDURATION PENIS PLASTICA: Status: ACTIVE | Noted: 2020-01-22

## 2022-07-22 PROBLEM — J38.01 PARALYSIS OF VOCAL CORDS AND LARYNX, UNILATERAL: Status: ACTIVE | Noted: 2020-05-05

## 2022-07-22 PROBLEM — E78.2 MIXED HYPERLIPIDEMIA: Status: ACTIVE | Noted: 2018-09-30

## 2022-07-22 PROBLEM — Z86.0100 HX OF COLONIC POLYPS: Status: ACTIVE | Noted: 2017-03-28

## 2022-07-22 PROBLEM — R93.1 ELEVATED CORONARY ARTERY CALCIUM SCORE: Status: ACTIVE | Noted: 2018-09-30

## 2022-07-22 PROBLEM — J34.3 HYPERTROPHY OF NASAL TURBINATES: Status: ACTIVE | Noted: 2020-05-05

## 2022-07-22 RX ORDER — ACETAMINOPHEN AND CODEINE PHOSPHATE 120; 12 MG/5ML; MG/5ML
SOLUTION ORAL
COMMUNITY
Start: 2021-08-23 | End: 2022-11-13

## 2022-07-22 RX ORDER — SODIUM CHLORIDE 9 MG/ML
1000 INJECTION, SOLUTION INTRAMUSCULAR; INTRAVENOUS; SUBCUTANEOUS
COMMUNITY
Start: 2022-06-02 | End: 2022-11-13

## 2022-07-22 RX ORDER — LIDOCAINE HYDROCHLORIDE 20 MG/ML
SOLUTION OROPHARYNGEAL
COMMUNITY
Start: 2021-10-18 | End: 2022-11-13

## 2022-07-22 RX ORDER — FLUOXETINE HYDROCHLORIDE 20 MG/1
CAPSULE ORAL
COMMUNITY
End: 2022-11-13

## 2022-07-22 RX ORDER — SODIUM FLUORIDE 5 MG/G
GEL, DENTIFRICE DENTAL
COMMUNITY
Start: 2021-10-21 | End: 2022-11-13

## 2022-07-22 RX ORDER — FLUORIDE (SODIUM) 1.1 %
PASTE (ML) DENTAL
COMMUNITY
Start: 2022-04-25 | End: 2022-11-13

## 2022-07-22 RX ORDER — METOPROLOL TARTRATE 25 MG/1
25 TABLET, FILM COATED ORAL
COMMUNITY
End: 2022-11-13

## 2022-07-22 RX ORDER — LIDOCAINE 50 MG/G
PATCH TOPICAL
COMMUNITY
End: 2022-11-13

## 2022-07-22 NOTE — TELEPHONE ENCOUNTER
Called patient to update his information and prepping his chart for telemedicine appointment with  on Wednesday, 7/27.

## 2022-07-27 ENCOUNTER — TELEMEDICINE (OUTPATIENT)
Dept: GASTROENTEROLOGY | Facility: CLINIC | Age: 67
End: 2022-07-27
Payer: MEDICARE

## 2022-07-27 ENCOUNTER — TELEPHONE (OUTPATIENT)
Dept: PRIMARY CARE | Facility: CLINIC | Age: 67
End: 2022-07-27
Payer: MEDICARE

## 2022-07-27 VITALS — WEIGHT: 200 LBS | BODY MASS INDEX: 26.51 KG/M2 | HEIGHT: 73 IN

## 2022-07-27 DIAGNOSIS — R13.19 OTHER DYSPHAGIA: Primary | ICD-10-CM

## 2022-07-27 PROCEDURE — 99443 PR PHYS/QHP TELEPHONE EVALUATION 21-30 MIN: CPT | Mod: 95 | Performed by: INTERNAL MEDICINE

## 2022-07-27 NOTE — PROGRESS NOTES
Interventional Gastroenterology  MD Alexa Leaivtt PA-C   [facility].[address] [place] [address]  [phone]                                              Telemedicine Follow Up    Date: 2022  Patient: Celso Gramajo  : 1955  Age: 66 y.o. male  PCP:Yamila Brunson MD    The patient affirms they are currently located in the following state: [place]      Request for Consent:  Audio Only Encounter   You and I are about to have a telemedicine check-in or visit. This is allowed because you have requested it. This telemedicine visit will be billed to your health insurance or you, if you are self-insured. You understand you will be responsible for any copayments or coinsurances that apply to your telemedicine visit. Before starting our telemedicine visit, I am required to get your consent for this virtual check-in or visit by telemedicine. Do you consent?   Patient Response to Request for Consent:   Yes    Chief Complaint:   HPI   Celso Gramajo is a 66 y.o. male with a complex past medical history of radiation to the neck twice for 2 separate malignancies who presents today for further evaluation and management of dysphagia.  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  The patient completed carbo/Taxol with concurrent radiation in 2021.  The patient ultimately has had stricturing related to this in the cervical esophagus region and has had 2 endoscopies the most recent one being 2022 that demonstrates what appears to be a benign intrinsic stenosis of the esophagus that  has been dilated to 8 mm with a savory dilator but does not provide him with significant swallowing capability.  He currently has a gastrostomy tube and is fed through his tube for the majority of his nutrition.    Patient states he has virtually no saliva production and his mouth is very dry.  He has tried multiple products and none of them have really provided him with significant relief.  The dry mouth is likely related to the radiation to his parotids.  He explains that he gets a thick material that he often has to bring up or collects in the back of his throat.  I suspect that this is dried mucus that is not being washed through or diluted by saliva.  It is uncomfortable for him.  He has expectation is not so much that he is able to swallow but he is hoping that none of this material is going to be exacerbating his current trouble.  He had a barium swallow performed a few years ago, it sounds as if it was done before the completion of his last set of radiation.  He states that there was some degree of aspiration associated with that study and that it was recommended that he not rely on eating by mouth.  My suspicion is that there is some degree of swallowing dysfunction related to the radiation that he is received.  He also has a left vocal cord paralysis per previous notes.    His symptoms have been fairly steady over the last several months.  He just wants second opinion as to whether or not there is anything more that can be done to improve his symptoms.   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  It is likely that there is some element of dysfunction in the swallowing mechanism as well as the loss of parotid gland function and a thickening of the mucus that the mouth generates creating a uncomfortable feeling  in the back of his throat and in his mouth because of the xerostomia.  There may be some component of esophageal narrowing but it is most likely that he is experiencing the issue in the posterior pharynx and back of the mouth.  I do not think there is obvious reflux per se.  I think this is more of an upper esophagus/posterior pharynx/mouth issue that is giving him his discomfort.  I do not have a copy of his barium swallow that was performed at [facility].  We need to obtain a copy of the actual video swallow.  In addition I would like to have the color images of the endoscopy that was performed at the [facility].  The studies will help us to further evaluate him.  He also wishes to proceed with an upper endoscopy in September after his PET scan.  We will schedule him for that upper endoscopy having the PET scan results and the studies that I have requested.      PAST MEDICAL AND SURGICAL HISTORY      Medical History:  Past Medical History:   Diagnosis Date   • Anemia    • Cancer (CMS/HCC)     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 drug thru [facility]    • Hx of head and neck radiation    • Hypertension    • Hypothyroidism    • Stroke (CMS/HCC)        Surgical History:  Past Surgical History:   Procedure Laterality Date   • CHOLECYSTECTOMY     • HERNIA REPAIR     • NECK DISSECTION     • NECK SURGERY     • THYROID SURGERY     • TUMOR REMOVAL      L neck 20yrs ago        CURRENT MEDICATIONS        Current Outpatient Medications:   •  0.9 % sodium chloride (sodium chloride 0.9 %) solution, 1,000 mL., Disp: , Rfl:   •  acetaminophen-codeine (TYLENOL w/ CODEINE) 120-12 mg/5 mL solution, acetaminophen 120 mg-codeine 12 mg/5 mL oral solution  TAKE 10 ML BY MOUTH EVERY 6 HOURS AS NEEDED (PAIN)., Disp: , Rfl:   •  famotidine (PEPCID) 40 mg tablet, Take 40 mg by mouth nightly., Disp: , Rfl:   •  lansoprazole (PREVACID) 30 mg capsule, 60 mg by peg tube route daily before breakfast. OPEN 2 CAPSULES AND  SPRINKLE ON APPLESAUCE OR YOGURT AND TAKE 30 MINUTES BEFORE BREAKFAST; Through a peg tube, Disp: , Rfl:   •  levothyroxine 150 mcg/mL solution, 150 mcg by peg tube route daily. Through peg tube, Disp: , Rfl:   •  multivitamin powder in packet, 1 packet by peg tube route daily. Empty and mix with filtered water through peg tube, Disp: , Rfl:   •  PREVIDENT 5000 BOOSTER PLUS 1.1 % paste, BRUSH TWICE A DAY AFTER FLOSS. SPIT OUT EXCESS, DO NOT RINSE, Disp: , Rfl:   •  rosuvastatin (CRESTOR) 5 mg tablet, Take 5 mg by mouth nightly., Disp: , Rfl:   •  fluoride, sodium, 1.1 % gel, PreviDent 5000 Booster Plus 1.1 % dental paste, Disp: , Rfl:   •  FLUoxetine (PROzac) 20 mg capsule, fluoxetine 20 mg capsule  TAKE 1 CAPSULE BY MOUTH EVERY DAY, Disp: , Rfl:   •  glycopyrrolate (ROBINUL) 1 mg tablet, Take 1 mg by mouth 3 (three) times a day as needed (secretions)., Disp: , Rfl:   •  lidocaine (LIDODERM) 5 % patch, lidocaine 5 % topical patch  APPLY 1 PATCH EVERY DAY, Disp: , Rfl:   •  lidocaine HCL (XYLOCAINE) 2 % solution mucosal solution, Lidocaine Viscous 2 % mucosal solution, Disp: , Rfl:   •  metoprolol tartrate (LOPRESSOR) 25 mg tablet, Take 25 mg by mouth., Disp: , Rfl:   •  sodium chloride (OCEAN) 0.65 % nasal spray, Administer 1 spray into each nostril as needed for congestion., Disp: 15 mL, Rfl: 12    ALLERGIES      Patient has no known allergies.    FAMILY HISTORY      Family History   Problem Relation Age of Onset   • No Known Problems Biological Father    • Lung cancer Biological Sister        SOCIAL HISTORY      Social History     Socioeconomic History   • Marital status:      Spouse name: None   • Number of children: None   • Years of education: None   • Highest education level: None   Tobacco Use   • Smoking status: Never Smoker   • Smokeless tobacco: Never Used   Vaping Use   • Vaping Use: Never used   Substance and Sexual Activity   • Alcohol use: No   • Drug use: No     Social Determinants of Health      Food Insecurity: No Food Insecurity   • Worried About Running Out of Food in the Last Year: Never true   • Ran Out of Food in the Last Year: Never true       REVIEW OF SYSTEMS      Review of Systems   Constitutional: Negative for activity change, appetite change, fatigue, fever and unexpected weight change.   HENT: Negative for trouble swallowing and voice change.    Respiratory: Negative for cough and shortness of breath.    Cardiovascular: Negative for chest pain.   Gastrointestinal: Negative for abdominal distention, abdominal pain, anal bleeding, blood in stool, constipation, diarrhea, nausea and vomiting.   Neurological: Negative for dizziness, weakness and light-headedness.     PHYSICAL EXAMINATION      Because of the Covid-19 pandemic, in order to limit patient contact, I did not see and examine the patient directly.     LABS / IMAGING       Labs:   CBC Results       05/10/22 10/04/18 06/25/17     0922 0903 1259    WBC 6.57 5.58 5.19    RBC 4.59 5.02 4.93    HGB 14.7 15.6 15.2    HCT 44.1 46.8 45.6    MCV 96.1 93.2 92.5    MCH 32.0 31.1 30.8    MCHC 33.3 33.3 33.3     162 146        CMP Results       05/10/22 10/04/18 06/25/17     0922 0903 1259     140 140    K 4.2 3.9 3.8    Cl 99 100 105    CO2 31 32 31    Glucose 103 90 105    BUN 16 18 13    Creatinine 0.8 0.9 0.9    Calcium 9.7 9.6 9.0    Anion Gap 7 8 4    EGFR >60.0 >60.0 --         Comment for K at 0922 on 05/10/22: Results obtained on plasma. Plasma Potassium values may be up to 0.4 mEQ/L less than serum values. The differences may be greater for patients with high platelet or white cell counts.    Comment for K at 0903 on 10/04/18:   Results obtained on plasma. Plasma Potassium values may be up to 0.4 mEQ/L less than serum values. The differences may be greater for patients with high platelet or white cell counts.        Lab Results   Component Value Date    INR 0.9 06/25/2017      I spent 23 minutes on this date of service  performing the following activities: obtaining history, documenting, preparing for visit, obtaining / reviewing records, independently reviewing study/studies and coordinating care.    Ricardo Cabral MD  7/27/2022  9:41 AM

## 2022-07-27 NOTE — TELEPHONE ENCOUNTER
I called [facility] and they stated that I should send a fax cover sheet to the location and request for the patient's endoscopy report in color to be sent over through the mail.    Fax number [phone]    Back up fax number [phone].     [facility]

## 2022-07-27 NOTE — ASSESSMENT & PLAN NOTE
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  It is likely that there is some element of dysfunction in the swallowing mechanism as well as the loss of parotid gland function and a thickening of the mucus that the mouth generates creating a uncomfortable feeling in the back of his throat and in his mouth because of the xerostomia.  There may be some component of esophageal narrowing but it is most likely that he is experiencing the issue in the posterior pharynx and back of the mouth.  I do not think there is obvious reflux per se.  I think this is more of an upper esophagus/posterior pharynx/mouth issue that is giving him his discomfort.  I do not have a copy of his barium swallow that was performed at [facility].  We need to obtain a copy of the actual video swallow.  In addition I would like to have the color images of the endoscopy that was performed at the [facility].  The studies will help us to further evaluate him.  He also wishes to proceed with an upper endoscopy in September after his PET scan.  We will schedule him for that upper endoscopy having the PET scan results and the studies that I have requested.

## 2022-08-10 ENCOUNTER — ANESTHESIA EVENT (OUTPATIENT)
Dept: ANESTHESIOLOGY | Facility: HOSPITAL | Age: 67
End: 2022-08-10

## 2022-08-10 NOTE — ANESTHESIA PREPROCEDURE EVALUATION
Relevant Problems   CARDIOVASCULAR   (+) Hypertension      GASTROINTESTINAL   (+) Gastroesophageal reflux disease   (+) Other dysphagia      NEUROLOGY   (+) Hx of colonic polyps   (+) Vertigo      RESPIRATORY SYSTEM   (+) ENMANUEL (obstructive sleep apnea)      Other   (+) Hypothyroidism, postop       ROS/Med Hx     Past Surgical History:   Procedure Laterality Date    CHOLECYSTECTOMY      HERNIA REPAIR      NECK DISSECTION      NECK SURGERY      THYROID SURGERY      TUMOR REMOVAL      L neck 20yrs ago        Physical Exam    Anesthesia Plan    Comments:    Plan: Patient requested call from anesthesia. He states that he would like to request no intubation for his upcoming endoscopy with Dr. Cabral (October 17th, not yet booked). He was intubated in 11/2021 for an ENT procedure and has had problems since then (chronic productive cough). He has had multiple EGDs at [facility] where he did not require intubation and he would like this type of anesthesia for his upcoming procedure. We discussed that GA with ETT is always a backup but that generally EGDs can be done with sedation. The final decision will be made by the anesthesiologist on the day of the procedure.

## 2022-09-25 ENCOUNTER — HOSPITAL ENCOUNTER (EMERGENCY)
Facility: HOSPITAL | Age: 67
Discharge: HOME | End: 2022-09-25
Attending: STUDENT IN AN ORGANIZED HEALTH CARE EDUCATION/TRAINING PROGRAM
Payer: MEDICARE

## 2022-09-25 ENCOUNTER — APPOINTMENT (EMERGENCY)
Dept: RADIOLOGY | Facility: HOSPITAL | Age: 67
End: 2022-09-25
Attending: STUDENT IN AN ORGANIZED HEALTH CARE EDUCATION/TRAINING PROGRAM
Payer: MEDICARE

## 2022-09-25 VITALS
DIASTOLIC BLOOD PRESSURE: 84 MMHG | HEIGHT: 74 IN | OXYGEN SATURATION: 96 % | TEMPERATURE: 98.7 F | RESPIRATION RATE: 20 BRPM | WEIGHT: 200 LBS | HEART RATE: 100 BPM | SYSTOLIC BLOOD PRESSURE: 156 MMHG | BODY MASS INDEX: 25.67 KG/M2

## 2022-09-25 DIAGNOSIS — J18.9 PNEUMONIA OF LEFT LOWER LOBE DUE TO INFECTIOUS ORGANISM: Primary | ICD-10-CM

## 2022-09-25 LAB
ANION GAP SERPL CALC-SCNC: 10 MEQ/L (ref 3–15)
BASOPHILS # BLD: 0.04 K/UL (ref 0.01–0.1)
BASOPHILS NFR BLD: 0.3 %
BILIRUB UR QL STRIP.AUTO: NEGATIVE MG/DL
BUN SERPL-MCNC: 18 MG/DL (ref 8–20)
CALCIUM SERPL-MCNC: 8.9 MG/DL (ref 8.9–10.3)
CHLORIDE SERPL-SCNC: 98 MEQ/L (ref 98–109)
CLARITY UR REFRACT.AUTO: CLEAR
CO2 SERPL-SCNC: 27 MEQ/L (ref 22–32)
COLOR UR AUTO: YELLOW
CREAT SERPL-MCNC: 0.7 MG/DL (ref 0.8–1.3)
DIFFERENTIAL METHOD BLD: ABNORMAL
EOSINOPHIL # BLD: 0.05 K/UL (ref 0.04–0.54)
EOSINOPHIL NFR BLD: 0.4 %
ERYTHROCYTE [DISTWIDTH] IN BLOOD BY AUTOMATED COUNT: 12.9 % (ref 11.6–14.4)
FLUAV RNA SPEC QL NAA+PROBE: NEGATIVE
FLUBV RNA SPEC QL NAA+PROBE: NEGATIVE
GFR SERPL CREATININE-BSD FRML MDRD: >60 ML/MIN/1.73M*2
GLUCOSE SERPL-MCNC: 121 MG/DL (ref 70–99)
GLUCOSE UR STRIP.AUTO-MCNC: NEGATIVE MG/DL
HCT VFR BLDCO AUTO: 43.9 % (ref 40.1–51)
HGB BLD-MCNC: 14.3 G/DL (ref 13.7–17.5)
HGB UR QL STRIP.AUTO: NEGATIVE
IMM GRANULOCYTES # BLD AUTO: 0.04 K/UL (ref 0–0.08)
IMM GRANULOCYTES NFR BLD AUTO: 0.3 %
KETONES UR STRIP.AUTO-MCNC: NEGATIVE MG/DL
LEUKOCYTE ESTERASE UR QL STRIP.AUTO: NEGATIVE
LYMPHOCYTES # BLD: 0.96 K/UL (ref 1.2–3.5)
LYMPHOCYTES NFR BLD: 8.3 %
MCH RBC QN AUTO: 31.5 PG (ref 28–33.2)
MCHC RBC AUTO-ENTMCNC: 32.6 G/DL (ref 32.2–36.5)
MCV RBC AUTO: 96.7 FL (ref 83–98)
MONOCYTES # BLD: 1.35 K/UL (ref 0.3–1)
MONOCYTES NFR BLD: 11.7 %
NEUTROPHILS # BLD: 9.09 K/UL (ref 1.7–7)
NEUTS SEG NFR BLD: 79 %
NITRITE UR QL STRIP.AUTO: NEGATIVE
NRBC BLD-RTO: 0 %
PDW BLD AUTO: 11.4 FL (ref 9.4–12.4)
PH UR STRIP.AUTO: 7.5 [PH]
PLATELET # BLD AUTO: 153 K/UL (ref 150–350)
POTASSIUM SERPL-SCNC: 4.1 MEQ/L (ref 3.6–5.1)
PROT UR QL STRIP.AUTO: NEGATIVE
RBC # BLD AUTO: 4.54 M/UL (ref 4.5–5.8)
RSV RNA SPEC QL NAA+PROBE: NEGATIVE
SARS-COV-2 RNA RESP QL NAA+PROBE: NEGATIVE
SODIUM SERPL-SCNC: 135 MEQ/L (ref 136–144)
SP GR UR REFRACT.AUTO: 1.01
TROPONIN I SERPL HS-MCNC: <2 PG/ML
UROBILINOGEN UR STRIP-ACNC: 0.2 EU/DL
WBC # BLD AUTO: 11.53 K/UL (ref 3.8–10.5)

## 2022-09-25 PROCEDURE — 71046 X-RAY EXAM CHEST 2 VIEWS: CPT

## 2022-09-25 PROCEDURE — 25800000 HC PHARMACY IV SOLUTIONS: Performed by: PHYSICIAN ASSISTANT

## 2022-09-25 PROCEDURE — 81003 URINALYSIS AUTO W/O SCOPE: CPT | Performed by: STUDENT IN AN ORGANIZED HEALTH CARE EDUCATION/TRAINING PROGRAM

## 2022-09-25 PROCEDURE — 85025 COMPLETE CBC W/AUTO DIFF WBC: CPT | Performed by: STUDENT IN AN ORGANIZED HEALTH CARE EDUCATION/TRAINING PROGRAM

## 2022-09-25 PROCEDURE — 63700000 HC SELF-ADMINISTRABLE DRUG: Performed by: PHYSICIAN ASSISTANT

## 2022-09-25 PROCEDURE — 25800000 HC PHARMACY IV SOLUTIONS: Performed by: STUDENT IN AN ORGANIZED HEALTH CARE EDUCATION/TRAINING PROGRAM

## 2022-09-25 PROCEDURE — 96360 HYDRATION IV INFUSION INIT: CPT

## 2022-09-25 PROCEDURE — 3E0337Z INTRODUCTION OF ELECTROLYTIC AND WATER BALANCE SUBSTANCE INTO PERIPHERAL VEIN, PERCUTANEOUS APPROACH: ICD-10-PCS | Performed by: STUDENT IN AN ORGANIZED HEALTH CARE EDUCATION/TRAINING PROGRAM

## 2022-09-25 PROCEDURE — 36415 COLL VENOUS BLD VENIPUNCTURE: CPT | Performed by: STUDENT IN AN ORGANIZED HEALTH CARE EDUCATION/TRAINING PROGRAM

## 2022-09-25 PROCEDURE — 99284 EMERGENCY DEPT VISIT MOD MDM: CPT | Mod: 25

## 2022-09-25 PROCEDURE — 84484 ASSAY OF TROPONIN QUANT: CPT | Performed by: STUDENT IN AN ORGANIZED HEALTH CARE EDUCATION/TRAINING PROGRAM

## 2022-09-25 PROCEDURE — 87040 BLOOD CULTURE FOR BACTERIA: CPT | Performed by: STUDENT IN AN ORGANIZED HEALTH CARE EDUCATION/TRAINING PROGRAM

## 2022-09-25 PROCEDURE — 87637 SARSCOV2&INF A&B&RSV AMP PRB: CPT | Performed by: STUDENT IN AN ORGANIZED HEALTH CARE EDUCATION/TRAINING PROGRAM

## 2022-09-25 PROCEDURE — 80048 BASIC METABOLIC PNL TOTAL CA: CPT | Performed by: STUDENT IN AN ORGANIZED HEALTH CARE EDUCATION/TRAINING PROGRAM

## 2022-09-25 PROCEDURE — 93005 ELECTROCARDIOGRAM TRACING: CPT | Performed by: STUDENT IN AN ORGANIZED HEALTH CARE EDUCATION/TRAINING PROGRAM

## 2022-09-25 PROCEDURE — 96361 HYDRATE IV INFUSION ADD-ON: CPT

## 2022-09-25 RX ORDER — CEFUROXIME AXETIL 250 MG/1
500 TABLET ORAL ONCE
Status: COMPLETED | OUTPATIENT
Start: 2022-09-25 | End: 2022-09-25

## 2022-09-25 RX ORDER — CEFUROXIME AXETIL 500 MG/1
500 TABLET ORAL 2 TIMES DAILY
Qty: 14 TABLET | Refills: 0 | Status: SHIPPED | OUTPATIENT
Start: 2022-09-25 | End: 2022-10-02

## 2022-09-25 RX ORDER — GUAIFENESIN 600 MG/1
600 TABLET, EXTENDED RELEASE ORAL ONCE
Status: COMPLETED | OUTPATIENT
Start: 2022-09-25 | End: 2022-09-25

## 2022-09-25 RX ORDER — AZITHROMYCIN 200 MG/5ML
250 POWDER, FOR SUSPENSION ORAL DAILY
Qty: 25 ML | Refills: 0 | Status: SHIPPED | OUTPATIENT
Start: 2022-09-25 | End: 2022-09-29

## 2022-09-25 RX ORDER — AZITHROMYCIN 200 MG/5ML
500 POWDER, FOR SUSPENSION ORAL ONCE
Status: COMPLETED | OUTPATIENT
Start: 2022-09-25 | End: 2022-09-25

## 2022-09-25 RX ADMIN — CEFUROXIME AXETIL 500 MG: 250 TABLET, FILM COATED ORAL at 16:43

## 2022-09-25 RX ADMIN — AZITHROMYCIN 500 MG: 1200 POWDER, FOR SUSPENSION ORAL at 16:46

## 2022-09-25 RX ADMIN — GUAIFENESIN 600 MG: 600 TABLET, EXTENDED RELEASE ORAL at 14:26

## 2022-09-25 RX ADMIN — SODIUM CHLORIDE 1000 ML: 9 INJECTION, SOLUTION INTRAVENOUS at 15:20

## 2022-09-25 RX ADMIN — SODIUM CHLORIDE 1000 ML: 9 INJECTION, SOLUTION INTRAVENOUS at 13:17

## 2022-09-25 ASSESSMENT — ENCOUNTER SYMPTOMS
CHEST TIGHTNESS: 0
DIARRHEA: 0
FLU SYMPTOMS: 1
RHINORRHEA: 0
NAUSEA: 0
COUGH: 1
FATIGUE: 1
ABDOMINAL PAIN: 0
MYALGIAS: 1
VOMITING: 0
SHORTNESS OF BREATH: 0
DIZZINESS: 1
HEADACHES: 1
CHILLS: 0
COLOR CHANGE: 0
FEVER: 0

## 2022-09-25 NOTE — DISCHARGE INSTRUCTIONS
Continue Robitussin as needed for your cough.    Return to the ED for worsening of symptoms or any problems or concerns.  It is very important to follow up with your healthcare provider for re-evaluation.

## 2022-09-25 NOTE — ED PROVIDER NOTES
Emergency Medicine Note  HPI   HISTORY OF PRESENT ILLNESS     66-year-old male with history of esophageal cancer s/p chemo/radiation, thyroid/salivary cancer s/p thyroidectomy, anemia, hypertension, CVA presents emergency room for evaluation of flulike symptoms.  Patient reports a gurgling in his throat last night which she has never previously experienced, as well as increased secretions and thick mucus.  He has been feeling dizzy, fatigued with diffuse myalgias since Friday.  He has a history of esophageal cancer with radiation of his neck 2 years ago, states he has had a chronic cough with phlegm production for the past 2 years.  Unremarkable PET scan on 9/7/2022.  He had IV fluids via home infusion on Thursday, reports feeling very dehydrated.      History provided by:  Patient        Patient History   PAST HISTORY     Reviewed from Nursing Triage:         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 thru [facility]     Hx of head and neck radiation     Hypertension     Hypothyroidism     Stroke (CMS/HCC)        Past Surgical History:   Procedure Laterality Date    CHOLECYSTECTOMY      HERNIA REPAIR      NECK DISSECTION      NECK SURGERY      THYROID SURGERY      TUMOR REMOVAL      L neck 20yrs ago        Family History   Problem Relation Age of Onset    No Known Problems Biological Father     Lung cancer Biological Sister        Social History     Tobacco Use    Smoking status: Never Smoker    Smokeless tobacco: Never Used   Vaping Use    Vaping Use: Never used   Substance Use Topics    Alcohol use: No    Drug use: No         Review of Systems   REVIEW OF SYSTEMS     Review of Systems   Constitutional: Positive for fatigue. Negative for chills and fever.   HENT: Positive for postnasal drip. Negative for congestion and rhinorrhea.    Respiratory: Positive for cough. Negative for chest tightness and shortness of breath.     Cardiovascular: Negative for chest pain.   Gastrointestinal: Negative for abdominal pain, diarrhea, nausea and vomiting.   Musculoskeletal: Positive for myalgias.   Skin: Negative for color change.   Allergic/Immunologic: Negative for immunocompromised state.   Neurological: Positive for dizziness and headaches. Negative for syncope.   All other systems reviewed and are negative.        VITALS     ED Vitals    Date/Time Temp Pulse Resp BP SpO2 [facility]   09/25/22 1646 -- 100 20 156/84 96 % LT   09/25/22 1309 -- 117 20 124/72 94 % LT   09/25/22 1244 37.1 °C (98.7 °F) 122 19 155/90 95 % AMT                       Physical Exam   PHYSICAL EXAM     Physical Exam  Vitals and nursing note reviewed.   Constitutional:       Appearance: Normal appearance.   HENT:      Head: Normocephalic and atraumatic.   Cardiovascular:      Rate and Rhythm: Regular rhythm. Tachycardia present.      Pulses: Normal pulses.      Heart sounds: Normal heart sounds.   Pulmonary:      Effort: Pulmonary effort is normal.      Comments: Mild bronchospastic cough with clear/yellow sputum production    Coarse bilateral lower lobe breath sounds  Abdominal:      General: Abdomen is flat. Bowel sounds are normal. There is no distension.      Palpations: Abdomen is soft.      Tenderness: There is no abdominal tenderness.      Comments: G-tube site c/d/i   Skin:     General: Skin is warm and dry.   Neurological:      General: No focal deficit present.      Mental Status: He is alert and oriented to person, place, and time.           PROCEDURES     Procedures     DATA     Results     Procedure Component Value Units Date/Time    UA with reflex culture [[id number]]  (Normal) Collected: 09/25/22 1300    Specimen: Urine, Clean Catch Updated: 09/25/22 1500    Narrative:      The following orders were created for panel order UA with reflex culture.  Procedure                               Abnormality         Status                     ---------                                -----------         ------                     UA Reflex to Culture (Ma...[[id number]]  Normal              Final result                 Please view results for these tests on the individual orders.    UA Reflex to Culture (Macroscopic) [[id number]]  (Normal) Collected: 09/25/22 1300    Specimen: Urine, Clean Catch Updated: 09/25/22 1500     Color, Urine Yellow     Clarity, Urine Clear     Specific Gravity, Urine 1.014     pH, Urine 7.5     Leukocyte Esterase Negative     Comment: Results can be falsely negative due to high specific gravity, some antibiotics, glucose >3 g/dl, or WBC other than neutrophils.        Nitrite, Urine Negative     Protein, Urine Negative     Glucose, Urine Negative mg/dL      Ketones, Urine Negative mg/dL      Urobilinogen, Urine 0.2 EU/dL      Bilirubin, Urine Negative mg/dL      Blood, Urine Negative     Comment: The sensitivity of the occult blood test is equivalent to approximately 4 intact RBC/HPF.       SARS-CoV-2 (COVID-19), PCR Nasopharynx [[id number]]  (Normal) Collected: 09/25/22 1300    Specimen: Nasopharyngeal Swab from Nasopharynx Updated: 09/25/22 1352    Narrative:      The following orders were created for panel order SARS-CoV-2 (COVID-19), PCR Nasopharynx.  Procedure                               Abnormality         Status                     ---------                               -----------         ------                     SARS-COV-2 (COVID-19)/ F...[[id number]]  Normal              Final result                 Please view results for these tests on the individual orders.    SARS-COV-2 (COVID-19)/ FLU A/B, AND RSV, PCR Nasopharynx [[id number]]  (Normal) Collected: 09/25/22 1300    Specimen: Nasopharyngeal Swab from Nasopharynx Updated: 09/25/22 1352     SARS-CoV-2 (COVID-19) Negative     Influenza A Negative     Influenza B Negative     Respiratory Syncytial Virus Negative    Narrative:      Testing performed using real-time PCR for detection of COVID-19. EUA  approved validation studies performed on site.     HS Troponin I (with 2 hour reflex) [[id number]]  (Normal) Collected: 09/25/22 1303    Specimen: Blood, Venous Updated: 09/25/22 1350     High Sens Troponin I <2.0 pg/mL     Basic metabolic panel [[id number]]  (Abnormal) Collected: 09/25/22 1303    Specimen: Blood, Venous Updated: 09/25/22 1339     Sodium 135 mEQ/L      Potassium 4.1 mEQ/L      Comment: Results obtained on plasma. Plasma Potassium values may be up to 0.4 mEQ/L less than serum values. The differences may be greater for patients with high platelet or white cell counts.        Chloride 98 mEQ/L      CO2 27 mEQ/L      BUN 18 mg/dL      Creatinine 0.7 mg/dL      Glucose 121 mg/dL      Calcium 8.9 mg/dL      eGFR >60.0 mL/min/1.73m*2      Anion Gap 10 mEQ/L     CBC and differential [[id number]]  (Abnormal) Collected: 09/25/22 1303    Specimen: Blood, Venous Updated: 09/25/22 1316     WBC 11.53 K/uL      RBC 4.54 M/uL      Hemoglobin 14.3 g/dL      Hematocrit 43.9 %      MCV 96.7 fL      MCH 31.5 pg      MCHC 32.6 g/dL      RDW 12.9 %      Platelets 153 K/uL      MPV 11.4 fL      Differential Type Auto     nRBC 0.0 %      Immature Granulocytes 0.3 %      Neutrophils 79.0 %      Lymphocytes 8.3 %      Monocytes 11.7 %      Eosinophils 0.4 %      Basophils 0.3 %      Immature Granulocytes, Absolute 0.04 K/uL      Neutrophils, Absolute 9.09 K/uL      Lymphocytes, Absolute 0.96 K/uL      Monocytes, Absolute 1.35 K/uL      Eosinophils, Absolute 0.05 K/uL      Basophils, Absolute 0.04 K/uL     Blood Culture Blood, Venous [[id number]] Collected: 09/25/22 1303    Specimen: Blood, Venous Updated: 09/25/22 1311    Blood Culture Blood, Venous [[id number]] Collected: 09/25/22 1303    Specimen: Blood, Venous Updated: 09/25/22 1310          Imaging Results          X-RAY CHEST 2 VIEWS (Final result)  Result time 09/25/22 15:43:37    Final result                 Impression:    IMPRESSION: Patchy consolidation left  base    COMMENT: PA and lateral radiographs of the chest demonstrate patchy  consolidation/atelectasis left base. There is no large effusion or  pneumothorax.. Comparison is made to previous study dated 5/10/2022.             Narrative:    CLINICAL HISTORY: Shortness of breath and cough.                                ECG 12 lead   ED Interpretation   EKG 13:28 - st 106 bpm, LAD, poor rwp, no st/t wave changes, qt/qtc 332/441 ms.             Scoring tools                                  ED Course & MDM   MDM / ED COURSE / CLINICAL IMPRESSION / DISPO     MDM    ED Course as of 09/26/22 0009   Sun Sep 25, 2022   1328 EKG 13:28 - st 106 bpm, LAD, poor rwp, no st/t wave changes, qt/qtc 332/441 ms.  [NS]   1447 f [NS]   1506 Pt seen and evaluated. States feels unwell; reports frequently becomes dehydrated. S/P esophageal cancer; NPO with chronic tube feedings. Pt states increase in phlegm recently. No recorded fever. Exam with mild left lung field rhonchi but otherwise no acute infectious findings. No overt resp distress. Await CXR and provide additional IVF. Will re-evaluate. Q/C addressed. Agree with plan.  [NS]   1608 X-RAY CHEST 2 VIEWS  IMPRESSION: Patchy consolidation left base [EK]   1700 Patient feels significantly better after 2L IV fluids, he is no longer tachycardic.  He is not having any shortness of breath at this time and is not hypoxic.  His chest x-ray is concerning for left lower lobe pneumonia, will treat with azithromycin and cefuroxime and referred to patient's PCP for reassessment in 2 days.  Strict return precautions were provided. [EK]      ED Course User Index  [EK] Nabila Chapa PA C  [NS] Seth Lyle, DO     Clinical Impression      Pneumonia of left lower lobe due to infectious organism     Disposition  Discharge         Nabila Chapa PA C  09/25/22 2943       Nabila Chapa PA C  09/29/22 1223

## 2022-09-26 NOTE — ED ATTESTATION NOTE
"I saw and evaluated the patient, participated in the management, and agree with the findings in the above note. We discussed the case and the treatment plan.  Exam:  Patient Vitals for the past 72 hrs:   BP Temp Temp src Pulse Resp SpO2 Height Weight   09/25/22 1646 (!) 156/84 -- -- 100 20 96 % -- --   09/25/22 1309 124/72 -- -- (!) 117 20 94 % -- --   09/25/22 1244 (!) 155/90 37.1 °C (98.7 °F) Tympanic (!) 122 19 95 % 1.88 m (6' 2\") 90.7 kg (200 lb)   vs reviewed, nad, nontoxic, head at/nc, normal/baseline speech, no resp distress, no trismus or drooling, mmm, pharynx symmetric without erythema exudate or swelling, chronic changes to neck without swelling or acute changes, lungs with mild rhonchi to left lower lung field without wheeze, cardiac tachycardic without m/r/g, abd soft nt/nd with PEG in place receiving tube feeding, extremity exam unremarkable.        Seth Lyle,   09/26/22 0007    "

## 2022-09-27 LAB
ATRIAL RATE: 106
P AXIS: 57
PR INTERVAL: 144
QRS DURATION: 82
QT INTERVAL: 332
QTC CALCULATION(BAZETT): 441
R AXIS: -34
T WAVE AXIS: 35
VENTRICULAR RATE: 106

## 2022-09-28 ENCOUNTER — HOSPITAL ENCOUNTER (EMERGENCY)
Facility: HOSPITAL | Age: 67
Discharge: HOME | End: 2022-09-28
Attending: EMERGENCY MEDICINE
Payer: MEDICARE

## 2022-09-28 ENCOUNTER — APPOINTMENT (EMERGENCY)
Dept: RADIOLOGY | Facility: HOSPITAL | Age: 67
End: 2022-09-28
Attending: EMERGENCY MEDICINE
Payer: MEDICARE

## 2022-09-28 VITALS
OXYGEN SATURATION: 96 % | TEMPERATURE: 97.3 F | RESPIRATION RATE: 18 BRPM | DIASTOLIC BLOOD PRESSURE: 76 MMHG | HEIGHT: 74 IN | BODY MASS INDEX: 25.67 KG/M2 | SYSTOLIC BLOOD PRESSURE: 131 MMHG | WEIGHT: 200 LBS | HEART RATE: 97 BPM

## 2022-09-28 DIAGNOSIS — J18.9 PNEUMONIA DUE TO INFECTIOUS ORGANISM, UNSPECIFIED LATERALITY, UNSPECIFIED PART OF LUNG: Primary | ICD-10-CM

## 2022-09-28 LAB
ANION GAP SERPL CALC-SCNC: 9 MEQ/L (ref 3–15)
BASOPHILS # BLD: 0.04 K/UL (ref 0.01–0.1)
BASOPHILS NFR BLD: 0.6 %
BUN SERPL-MCNC: 15 MG/DL (ref 8–20)
CALCIUM SERPL-MCNC: 9.5 MG/DL (ref 8.9–10.3)
CHLORIDE SERPL-SCNC: 96 MEQ/L (ref 98–109)
CO2 SERPL-SCNC: 32 MEQ/L (ref 22–32)
CREAT SERPL-MCNC: 0.6 MG/DL (ref 0.8–1.3)
DIFFERENTIAL METHOD BLD: ABNORMAL
EOSINOPHIL # BLD: 0.1 K/UL (ref 0.04–0.54)
EOSINOPHIL NFR BLD: 1.4 %
ERYTHROCYTE [DISTWIDTH] IN BLOOD BY AUTOMATED COUNT: 12.9 % (ref 11.6–14.4)
GFR SERPL CREATININE-BSD FRML MDRD: >60 ML/MIN/1.73M*2
GLUCOSE SERPL-MCNC: 117 MG/DL (ref 70–99)
HCT VFR BLDCO AUTO: 45.2 % (ref 40.1–51)
HGB BLD-MCNC: 14.7 G/DL (ref 13.7–17.5)
IMM GRANULOCYTES # BLD AUTO: 0.03 K/UL (ref 0–0.08)
IMM GRANULOCYTES NFR BLD AUTO: 0.4 %
LYMPHOCYTES # BLD: 0.92 K/UL (ref 1.2–3.5)
LYMPHOCYTES NFR BLD: 12.9 %
MCH RBC QN AUTO: 31.5 PG (ref 28–33.2)
MCHC RBC AUTO-ENTMCNC: 32.5 G/DL (ref 32.2–36.5)
MCV RBC AUTO: 97 FL (ref 83–98)
MONOCYTES # BLD: 0.81 K/UL (ref 0.3–1)
MONOCYTES NFR BLD: 11.3 %
NEUTROPHILS # BLD: 5.24 K/UL (ref 1.7–7)
NEUTS SEG NFR BLD: 73.4 %
NRBC BLD-RTO: 0 %
PDW BLD AUTO: 11.3 FL (ref 9.4–12.4)
PLATELET # BLD AUTO: 189 K/UL (ref 150–350)
POTASSIUM SERPL-SCNC: 4 MEQ/L (ref 3.6–5.1)
RBC # BLD AUTO: 4.66 M/UL (ref 4.5–5.8)
SODIUM SERPL-SCNC: 137 MEQ/L (ref 136–144)
WBC # BLD AUTO: 7.14 K/UL (ref 3.8–10.5)

## 2022-09-28 PROCEDURE — 3E03329 INTRODUCTION OF OTHER ANTI-INFECTIVE INTO PERIPHERAL VEIN, PERCUTANEOUS APPROACH: ICD-10-PCS | Performed by: EMERGENCY MEDICINE

## 2022-09-28 PROCEDURE — 99284 EMERGENCY DEPT VISIT MOD MDM: CPT | Mod: 25

## 2022-09-28 PROCEDURE — 63600000 HC DRUGS/DETAIL CODE: Performed by: PHYSICIAN ASSISTANT

## 2022-09-28 PROCEDURE — 25800000 HC PHARMACY IV SOLUTIONS: Performed by: PHYSICIAN ASSISTANT

## 2022-09-28 PROCEDURE — 71046 X-RAY EXAM CHEST 2 VIEWS: CPT

## 2022-09-28 PROCEDURE — 96367 TX/PROPH/DG ADDL SEQ IV INF: CPT

## 2022-09-28 PROCEDURE — 36415 COLL VENOUS BLD VENIPUNCTURE: CPT | Performed by: PHYSICIAN ASSISTANT

## 2022-09-28 PROCEDURE — 63600000 HC DRUGS/DETAIL CODE

## 2022-09-28 PROCEDURE — 25800000 HC PHARMACY IV SOLUTIONS

## 2022-09-28 PROCEDURE — 96365 THER/PROPH/DIAG IV INF INIT: CPT

## 2022-09-28 PROCEDURE — 85025 COMPLETE CBC W/AUTO DIFF WBC: CPT | Performed by: PHYSICIAN ASSISTANT

## 2022-09-28 PROCEDURE — 80048 BASIC METABOLIC PNL TOTAL CA: CPT | Performed by: PHYSICIAN ASSISTANT

## 2022-09-28 RX ADMIN — AZITHROMYCIN MONOHYDRATE 500 MG: 500 INJECTION, POWDER, LYOPHILIZED, FOR SOLUTION INTRAVENOUS at 10:01

## 2022-09-28 RX ADMIN — SODIUM CHLORIDE 500 ML: 9 INJECTION, SOLUTION INTRAVENOUS at 08:00

## 2022-09-28 RX ADMIN — SODIUM CHLORIDE 500 ML: 9 INJECTION, SOLUTION INTRAVENOUS at 08:52

## 2022-09-28 RX ADMIN — CEFTRIAXONE SODIUM 1 G: 1 INJECTION, POWDER, FOR SOLUTION INTRAMUSCULAR; INTRAVENOUS at 09:02

## 2022-09-28 ASSESSMENT — ENCOUNTER SYMPTOMS
WEAKNESS: 1
ABDOMINAL PAIN: 0
DIARRHEA: 0
CHILLS: 1
FEVER: 0
SHORTNESS OF BREATH: 0
VOMITING: 0
COUGH: 1
DYSURIA: 0
ACTIVITY CHANGE: 1
FATIGUE: 1

## 2022-09-28 NOTE — ED PROVIDER NOTES
Emergency Medicine Note  HPI   HISTORY OF PRESENT ILLNESS     65 y/o M with PMH hypertension, hyperlipidemia, CVA, salivary gland ca (s/p chemo/radiation 2 years ago, currently has PEG tube 2/2 dysphagia) presents today for evaluation. Pt was in ED 3 days ago and diagnosed with pneumonia. Pt discharged on Ceftin and Azithromycin. Pt states since discharge has had alternating chills and hot flashes. Pt also admits to feeling generally weak. No reported fevers. No chest pain or difficulty breathing. Cough productive mostly of clear sputum.       History provided by:  Patient        Patient History   PAST HISTORY     Reviewed from Nursing Triage:         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 thru [facility]     Hx of head and neck radiation     Hypertension     Hypothyroidism     Stroke (CMS/HCC)        Past Surgical History:   Procedure Laterality Date    CHOLECYSTECTOMY      HERNIA REPAIR      NECK DISSECTION      NECK SURGERY      THYROID SURGERY      TUMOR REMOVAL      L neck 20yrs ago        Family History   Problem Relation Age of Onset    No Known Problems Biological Father     Lung cancer Biological Sister        Social History     Tobacco Use    Smoking status: Never Smoker    Smokeless tobacco: Never Used   Vaping Use    Vaping Use: Never used   Substance Use Topics    Alcohol use: No    Drug use: No         Review of Systems   REVIEW OF SYSTEMS     Review of Systems   Constitutional: Positive for activity change, chills and fatigue. Negative for fever.   Respiratory: Positive for cough. Negative for shortness of breath.    Cardiovascular: Negative for chest pain.   Gastrointestinal: Negative for abdominal pain, diarrhea and vomiting.   Genitourinary: Negative for dysuria.   Neurological: Positive for weakness. Negative for syncope.         VITALS     ED Vitals    Date/Time Temp Pulse Resp BP SpO2 [facility]   09/28/22 1126  -- 97 18 131/76 96 %    09/28/22 0854 -- 86 16 104/64 97 %    09/28/22 0735 36.3 °C (97.3 °F) 86 20 138/80 96 % SDN        Pulse Ox %: 96 % (09/28/22 0746)  Pulse Ox Interpretation: Normal (09/28/22 0746)  Heart Rate: 86 (09/28/22 0746)  Rhythm Strip Interpretation: Normal Sinus Rhythm (09/28/22 0746)     Physical Exam   PHYSICAL EXAM     Physical Exam  Vitals and nursing note reviewed.   Constitutional:       General: He is not in acute distress.     Appearance: Normal appearance.   Cardiovascular:      Rate and Rhythm: Normal rate and regular rhythm.   Pulmonary:      Effort: Pulmonary effort is normal. No respiratory distress.      Breath sounds: Rales (mostly at L base) present. No wheezing.   Abdominal:      Palpations: Abdomen is soft.      Tenderness: There is no abdominal tenderness. There is no guarding or rebound.      Comments: PEG tube in LUQ. No signs of infection   Musculoskeletal:      Cervical back: Normal range of motion and neck supple.      Right lower leg: No edema.      Left lower leg: No edema.   Skin:     General: Skin is warm and dry.   Neurological:      General: No focal deficit present.      Mental Status: He is alert and oriented to person, place, and time.           PROCEDURES     Procedures     DATA     Results     Procedure Component Value Units Date/Time    Basic metabolic panel [[id number]]  (Abnormal) Collected: 09/28/22 0753    Specimen: Blood, Venous Updated: 09/28/22 0842     Sodium 137 mEQ/L      Potassium 4.0 mEQ/L      Comment: Results obtained on plasma. Plasma Potassium values may be up to 0.4 mEQ/L less than serum values. The differences may be greater for patients with high platelet or white cell counts.        Chloride 96 mEQ/L      CO2 32 mEQ/L      BUN 15 mg/dL      Creatinine 0.6 mg/dL      Glucose 117 mg/dL      Calcium 9.5 mg/dL      eGFR >60.0 mL/min/1.73m*2      Anion Gap 9 mEQ/L     CBC and differential [[id number]]  (Abnormal) Collected: 09/28/22 0753     Specimen: Blood, Venous Updated: 09/28/22 0812     WBC 7.14 K/uL      RBC 4.66 M/uL      Hemoglobin 14.7 g/dL      Hematocrit 45.2 %      MCV 97.0 fL      MCH 31.5 pg      MCHC 32.5 g/dL      RDW 12.9 %      Platelets 189 K/uL      MPV 11.3 fL      Differential Type Auto     nRBC 0.0 %      Immature Granulocytes 0.4 %      Neutrophils 73.4 %      Lymphocytes 12.9 %      Monocytes 11.3 %      Eosinophils 1.4 %      Basophils 0.6 %      Immature Granulocytes, Absolute 0.03 K/uL      Neutrophils, Absolute 5.24 K/uL      Lymphocytes, Absolute 0.92 K/uL      Monocytes, Absolute 0.81 K/uL      Eosinophils, Absolute 0.10 K/uL      Basophils, Absolute 0.04 K/uL           Imaging Results          X-RAY CHEST 2 VIEWS (Final result)  Result time 09/28/22 08:17:54    Final result                 Impression:    IMPRESSION:  Stable chest.             Narrative:    CLINICAL HISTORY: Chest pain.    COMMENT: Frontal and lateral views of the chest were obtained and are compared  with a similar study from 9/25/2022.    Patchy consolidation is again seen at both lung bases medially, left greater  than right. The lungs are otherwise clear. There is no pleural effusion or  pneumothorax. The heart, mediastinal and skeletal structures are stable, again  noting mild multilevel thoracic spine degenerative changes.                                No orders to display       Scoring tools                                  ED Course & MDM   MDM / ED COURSE / CLINICAL IMPRESSION / DISPO     MDM    ED Course as of 09/28/22 1527   Wed Sep 28, 2022   0747 Pt in ED 3 days ago and diagnosed with pneumonia and started on ceftin/azithromycin. Pr presents today for chills and generalized weakness. Pt afebrile and well appearing. Pt ambulated back from triage without dyspnea  Plan for repeat labs, CXR [NH]   0831 WBC: 7.14 [NH]   0832 Chest x-ray is improved from prior.  White blood cell count is normal [PRINCESS]   0832 WBC improved compared to 3 days ago. CXR  no worse  Pt without any increased WOB or oxygen requirement. Plan for additional 500 cc fluid bolus as patient states weakness improving after first bolus with discharge. Pt has PCP appt tomorrow.  [NH]   0856 Spoke with wife via phone. She is requesting dose of IV antibiotics prior to discharge. Pt did not yet take his antibiotics this morning. IV Rocephin//azithromycin ordered  [NH]      ED Course User Index  [PRINCESS] Phan Mayorga DO  [NH] Era Mathis PA C     Clinical Impression      Pneumonia due to infectious organism, unspecified laterality, unspecified part of lung     Disposition  Discharge         Era Mathis PA C  09/28/22 5476

## 2022-09-28 NOTE — DISCHARGE INSTRUCTIONS
Continue antibiotics as prescribed  Follow up with your doctor as scheduled  Return for fever, difficulty breathing or any other concerning symptoms

## 2022-09-28 NOTE — ED ATTESTATION NOTE
I have personally seen and examined the patient.  I reviewed and agree with physician assistant / nurse practitioners assessment and plan of care.     Exam: Patient is uncomfortable but very stable in no acute distress.  His vital signs are normal and he is afebrile patient is not hypoxic and has a room air saturation of 96%.  Heart is regular without murmur.  Lungs are clear to auscultation bilaterally.  I did not appreciate any abnormal lung sounds in the left base where his previous diagnosis of pneumonia was found.    Plan: We will recheck labs in repeat chest x-ray for comparison to previous chest x-ray from 3 days ago           Phan Mayorga DO  09/28/22 0752

## 2022-09-30 LAB
BACTERIA BLD CULT: NORMAL
BACTERIA BLD CULT: NORMAL

## 2022-10-07 ENCOUNTER — TELEPHONE (OUTPATIENT)
Dept: GASTROENTEROLOGY | Facility: CLINIC | Age: 67
End: 2022-10-07
Payer: MEDICARE

## 2022-10-07 NOTE — TELEPHONE ENCOUNTER
Patient is calling to find out if he can still get his procedure completed w/ Dr. Cabral on 10/17 due to patient having mild pneumonia on 9/28/2022. He is wondering if that will interfere w/ his procedure. His phone number is verified into his chart.

## 2022-10-07 NOTE — TELEPHONE ENCOUNTER
Spoke with pt. He has recovered from PNA and reports that he is completely asymptomatic. Okay to proceed w procedure on 10/17.

## 2022-10-14 ENCOUNTER — APPOINTMENT (OUTPATIENT)
Dept: LAB | Age: 67
End: 2022-10-14
Attending: PHYSICIAN ASSISTANT
Payer: MEDICARE

## 2022-10-14 DIAGNOSIS — Z11.52 ENCOUNTER FOR PREPROCEDURE SCREENING LABORATORY TESTING FOR COVID-19: ICD-10-CM

## 2022-10-14 DIAGNOSIS — Z01.812 ENCOUNTER FOR PREPROCEDURE SCREENING LABORATORY TESTING FOR COVID-19: ICD-10-CM

## 2022-10-14 LAB — SARS-COV-2 RNA RESP QL NAA+PROBE: NEGATIVE

## 2022-10-14 PROCEDURE — U0003 INFECTIOUS AGENT DETECTION BY NUCLEIC ACID (DNA OR RNA); SEVERE ACUTE RESPIRATORY SYNDROME CORONAVIRUS 2 (SARS-COV-2) (CORONAVIRUS DISEASE [COVID-19]), AMPLIFIED PROBE TECHNIQUE, MAKING USE OF HIGH THROUGHPUT TECHNOLOGIES AS DESCRIBED BY CMS-2020-01-R: HCPCS

## 2022-10-14 PROCEDURE — C9803 HOPD COVID-19 SPEC COLLECT: HCPCS

## 2022-10-16 ENCOUNTER — ANESTHESIA EVENT (OUTPATIENT)
Dept: ENDOSCOPY | Facility: HOSPITAL | Age: 67
End: 2022-10-16
Payer: MEDICARE

## 2022-10-17 ENCOUNTER — HOSPITAL ENCOUNTER (OUTPATIENT)
Facility: HOSPITAL | Age: 67
Discharge: HOME | End: 2022-10-17
Attending: INTERNAL MEDICINE | Admitting: INTERNAL MEDICINE
Payer: MEDICARE

## 2022-10-17 ENCOUNTER — TELEPHONE (OUTPATIENT)
Dept: GASTROENTEROLOGY | Facility: CLINIC | Age: 67
End: 2022-10-17
Payer: MEDICARE

## 2022-10-17 ENCOUNTER — ANESTHESIA (OUTPATIENT)
Dept: ENDOSCOPY | Facility: HOSPITAL | Age: 67
End: 2022-10-17
Payer: MEDICARE

## 2022-10-17 VITALS
DIASTOLIC BLOOD PRESSURE: 82 MMHG | HEART RATE: 77 BPM | HEIGHT: 74 IN | WEIGHT: 205 LBS | OXYGEN SATURATION: 98 % | BODY MASS INDEX: 26.31 KG/M2 | SYSTOLIC BLOOD PRESSURE: 143 MMHG | RESPIRATION RATE: 23 BRPM | TEMPERATURE: 98.3 F

## 2022-10-17 PROCEDURE — 71000001 HC PACU PHASE 1 INITIAL 30MIN: Performed by: INTERNAL MEDICINE

## 2022-10-17 PROCEDURE — 63600000 HC DRUGS/DETAIL CODE

## 2022-10-17 PROCEDURE — 43248 EGD GUIDE WIRE INSERTION: CPT | Performed by: INTERNAL MEDICINE

## 2022-10-17 PROCEDURE — 0D758ZZ DILATION OF ESOPHAGUS, VIA NATURAL OR ARTIFICIAL OPENING ENDOSCOPIC: ICD-10-PCS | Performed by: INTERNAL MEDICINE

## 2022-10-17 PROCEDURE — C1769 GUIDE WIRE: HCPCS | Performed by: INTERNAL MEDICINE

## 2022-10-17 PROCEDURE — 25000000 HC PHARMACY GENERAL

## 2022-10-17 PROCEDURE — 75000048 HC EG FLEX GUIDEWIRE DILATE: Performed by: INTERNAL MEDICINE

## 2022-10-17 PROCEDURE — 200200 PR NO CHARGE: Performed by: INTERNAL MEDICINE

## 2022-10-17 PROCEDURE — 37000002 HC ANESTHESIA MAC: Performed by: INTERNAL MEDICINE

## 2022-10-17 PROCEDURE — 71000011 HC PACU PHASE 1 EA ADDL MIN: Performed by: INTERNAL MEDICINE

## 2022-10-17 PROCEDURE — 25800000 HC PHARMACY IV SOLUTIONS

## 2022-10-17 RX ORDER — SODIUM CHLORIDE 9 MG/ML
INJECTION, SOLUTION INTRAVENOUS CONTINUOUS
Status: DISCONTINUED | OUTPATIENT
Start: 2022-10-17 | End: 2022-10-17 | Stop reason: HOSPADM

## 2022-10-17 RX ORDER — LIDOCAINE HYDROCHLORIDE 10 MG/ML
INJECTION, SOLUTION EPIDURAL; INFILTRATION; INTRACAUDAL; PERINEURAL AS NEEDED
Status: DISCONTINUED | OUTPATIENT
Start: 2022-10-17 | End: 2022-10-17 | Stop reason: SURG

## 2022-10-17 RX ORDER — PHENYLEPHRINE HCL IN 0.9% NACL 1 MG/10 ML
SYRINGE (ML) INTRAVENOUS AS NEEDED
Status: DISCONTINUED | OUTPATIENT
Start: 2022-10-17 | End: 2022-10-17 | Stop reason: SURG

## 2022-10-17 RX ORDER — AMOXICILLIN AND CLAVULANATE POTASSIUM 500; 125 MG/1; MG/1
1 TABLET, FILM COATED ORAL 2 TIMES DAILY
Qty: 28 TABLET | Refills: 0 | Status: SHIPPED | OUTPATIENT
Start: 2022-10-17 | End: 2022-10-31

## 2022-10-17 RX ORDER — PROPOFOL 10 MG/ML
INJECTION, EMULSION INTRAVENOUS CONTINUOUS PRN
Status: DISCONTINUED | OUTPATIENT
Start: 2022-10-17 | End: 2022-10-17 | Stop reason: SURG

## 2022-10-17 RX ORDER — GLYCOPYRROLATE 0.6MG/3ML
SYRINGE (ML) INTRAVENOUS AS NEEDED
Status: DISCONTINUED | OUTPATIENT
Start: 2022-10-17 | End: 2022-10-17 | Stop reason: SURG

## 2022-10-17 RX ORDER — SODIUM CHLORIDE 9 MG/ML
INJECTION, SOLUTION INTRAVENOUS CONTINUOUS PRN
Status: DISCONTINUED | OUTPATIENT
Start: 2022-10-17 | End: 2022-10-17 | Stop reason: SURG

## 2022-10-17 RX ORDER — FENTANYL CITRATE 50 UG/ML
INJECTION, SOLUTION INTRAMUSCULAR; INTRAVENOUS AS NEEDED
Status: DISCONTINUED | OUTPATIENT
Start: 2022-10-17 | End: 2022-10-17 | Stop reason: SURG

## 2022-10-17 RX ORDER — LEVOTHYROXINE SODIUM 13 UG/1
150 CAPSULE ORAL DAILY
COMMUNITY
End: 2024-10-10

## 2022-10-17 RX ORDER — PROPOFOL 200MG/20ML
SYRINGE (ML) INTRAVENOUS AS NEEDED
Status: DISCONTINUED | OUTPATIENT
Start: 2022-10-17 | End: 2022-10-17 | Stop reason: SURG

## 2022-10-17 RX ADMIN — SODIUM CHLORIDE: 9 INJECTION, SOLUTION INTRAVENOUS at 10:06

## 2022-10-17 RX ADMIN — PROPOFOL 50 MG: 10 INJECTION, EMULSION INTRAVENOUS at 10:12

## 2022-10-17 RX ADMIN — FENTANYL CITRATE 50 MCG: 50 INJECTION, SOLUTION INTRAMUSCULAR; INTRAVENOUS at 10:21

## 2022-10-17 RX ADMIN — Medication 100 MCG: at 10:31

## 2022-10-17 RX ADMIN — GLYCOPYRROLATE 0.1 MG: 0.2 INJECTION, SOLUTION INTRAMUSCULAR; INTRAVITREAL at 10:10

## 2022-10-17 RX ADMIN — Medication 100 MCG: at 10:27

## 2022-10-17 RX ADMIN — PROPOFOL 30 MG: 10 INJECTION, EMULSION INTRAVENOUS at 10:16

## 2022-10-17 RX ADMIN — LIDOCAINE HYDROCHLORIDE 5 ML: 10 INJECTION, SOLUTION EPIDURAL; INFILTRATION; INTRACAUDAL; PERINEURAL at 10:12

## 2022-10-17 RX ADMIN — PROPOFOL 150 MCG/KG/MIN: 10 INJECTION, EMULSION INTRAVENOUS at 10:12

## 2022-10-17 ASSESSMENT — PAIN SCALES - GENERAL: PAIN_LEVEL: 0

## 2022-10-17 NOTE — TELEPHONE ENCOUNTER
Pt is s/p EGD w dilation of esophogeal stricture today. The recommendation is that he be scheduled for a repeat EGD w dilation in two weeks. Not on AC. Not diabetic. Please schedule accordingly. Thanks!

## 2022-10-17 NOTE — ANESTHESIA POSTPROCEDURE EVALUATION
Patient: Celso Gramajo    Procedure Summary     Date: 10/17/22 Room / Location: [facility] (E) / [facility]    Anesthesia Start: 1006 Anesthesia Stop: 1102    Procedure: SC EGD INSERT GUIDE WIRE DILATOR PASSAGE ESOPHAGUS [27778 (CPT®)] (Esophagus) Diagnosis:       Other dysphagia      (Other dysphagia [R13.19])    Providers: Ricardo Cabral MD Responsible Provider: González Patterson MD    Anesthesia Type: MAC ASA Status: 3          Anesthesia Type: MAC  PACU Vitals  10/17/2022 1056 - 10/17/2022 1156      10/17/2022  1100 10/17/2022  1110 10/17/2022  1120 10/17/2022  1126    BP: 110/64 129/78 121/78 143/82    Temp: 36.8 °C (98.3 °F) -- -- --    Pulse: 76 79 79 77    Resp: 18 15 24 23    SpO2: 100 % 98 % 100 % 98 %            Anesthesia Post Evaluation    Pain score: 0  Pain management: satisfactory to patient  Mode of pain management: IV medication  Patient location during evaluation: PACU  Patient participation: complete - patient participated  Level of consciousness: awake and alert  Cardiovascular status: acceptable  Airway Patency: adequate  Respiratory status: acceptable  Hydration status: stable  Anesthetic complications: no

## 2022-10-17 NOTE — ANESTHESIA PREPROCEDURE EVALUATION
Relevant Problems   CARDIOVASCULAR   (+) Hypertension      GASTROINTESTINAL   (+) Gastroesophageal reflux disease   (+) Other dysphagia      NEUROLOGY   (+) Vertigo      RESPIRATORY SYSTEM   (+) ENMANUEL (obstructive sleep apnea)      Other   (+) Hypothyroidism, postop     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 thru [facility]     Hx of head and neck radiation     Hypertension     Hypothyroidism     Stroke (CMS/HCC)     Thyroid disease        Anesthesia ROS/MED HX      Pulmonary    Sleep apnea  Neuro/Psych    CVA  Cardiovascular   CAD   hypertension  GI/Hepatic   GERD  Endo/Other   Hypothyroidism  History of cancer (thyoid cancer, s/p neck resection , salivary gland mass removal , now esophageal cancer)       Past Surgical History:   Procedure Laterality Date    CHOLECYSTECTOMY      HERNIA REPAIR      NECK DISSECTION      NECK SURGERY      THYROID SURGERY      TUMOR REMOVAL      L neck 20yrs ago        Physical Exam    Airway   Mallampati: III   Neck ROM: full  Cardiovascular    Rhythm: regular   Rate: normalPulmonary    Decreased breath sounds        Anesthesia Plan     Technique: MAC     Airway: natural airway / supplemental oxygen   ASA 3  Anesthetic plan and risks discussed with: patient  Induction:    intravenous   Postop Plan:   Patient Disposition: phase II then home   Pain Management: IV analgesics  Comments:    Plan: MAC with GETA backup

## 2022-10-17 NOTE — H&P
Interventional Gastroenterology  MD Alexa Leavitt, PA-C   [facility], [facility].[address] [place] [address]  [phone]                                                       Pre Procedural H&P    Date: 2022  Patient: Celso Gramajo  : 1955    Procedure: EGD    Allergies: No Known Allergies     Current Medications:   No current facility-administered medications on file prior to encounter.     Current Outpatient Medications on File Prior to Encounter   Medication Sig Dispense Refill    famotidine (PEPCID) 40 mg tablet Take 40 mg by mouth nightly.      levothyroxine sodium (TIROSINT) 150 mcg capsule Take 150 mcg by mouth daily.      liothyronine sodium (LIOTHYRONINE ORAL) Take by mouth.      0.9 % sodium chloride (sodium chloride 0.9 %) solution 1,000 mL.      acetaminophen-codeine (TYLENOL w/ CODEINE) 120-12 mg/5 mL solution acetaminophen 120 mg-codeine 12 mg/5 mL oral solution   TAKE 10 ML BY MOUTH EVERY 6 HOURS AS NEEDED (PAIN).      fluoride, sodium, 1.1 % gel PreviDent 5000 Booster Plus 1.1 % dental paste      FLUoxetine (PROzac) 20 mg capsule fluoxetine 20 mg capsule   TAKE 1 CAPSULE BY MOUTH EVERY DAY      glycopyrrolate (ROBINUL) 1 mg tablet Take 1 mg by mouth 3 (three) times a day as needed (secretions).      lansoprazole (PREVACID) 30 mg capsule 60 mg by peg tube route daily before breakfast. OPEN 2 CAPSULES AND SPRINKLE ON APPLESAUCE OR YOGURT AND TAKE 30 MINUTES BEFORE BREAKFAST; Through a peg tube      levothyroxine 150 mcg/mL solution 150 mcg by peg tube route daily. Through peg tube      lidocaine (LIDODERM) 5 % patch lidocaine 5 % topical patch   APPLY 1 PATCH EVERY DAY      lidocaine HCL (XYLOCAINE) 2 % solution mucosal solution Lidocaine Viscous 2 % mucosal solution      metoprolol tartrate (LOPRESSOR) 25 mg tablet Take 25 mg by mouth.      multivitamin powder in packet 1 packet by peg tube route daily. Empty  and mix with filtered water through peg tube      PREVIDENT 5000 BOOSTER PLUS 1.1 % paste BRUSH TWICE A DAY AFTER FLOSS. SPIT OUT EXCESS, DO NOT RINSE      rosuvastatin (CRESTOR) 5 mg tablet Take 5 mg by mouth nightly.      sodium chloride (OCEAN) 0.65 % nasal spray Administer 1 spray into each nostril as needed for congestion. 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 thru [facility]     Hx of head and neck radiation     Hypertension     Hypothyroidism     Stroke (CMS/HCC)     Thyroid disease       Past Surgical History:   Procedure Laterality Date    CHOLECYSTECTOMY      HERNIA REPAIR      NECK DISSECTION      NECK SURGERY      THYROID SURGERY      TUMOR REMOVAL      L neck 20yrs ago       Family History   Problem Relation Age of Onset    No Known Problems Biological Father     Lung cancer Biological Sister       Social History     Socioeconomic History    Marital status:      Spouse name: Not on file    Number of children: Not on file    Years of education: Not on file    Highest education level: Not on file   Occupational History    Not on file   Tobacco Use    Smoking status: Never    Smokeless tobacco: Never   Vaping Use    Vaping Use: Never used   Substance and Sexual Activity    Alcohol use: No    Drug use: No    Sexual activity: Not on file   Other Topics Concern    Not on file   Social History Narrative    Not on file     Social Determinants of Health     Financial Resource Strain: Not on file   Food Insecurity: No Food Insecurity    Worried About Running Out of Food in the Last Year: Never true    Ran Out of Food in the Last Year: Never true   Transportation Needs: Not on file   Physical Activity: Not on file   Stress: Not on file   Social Connections: Not on file   Intimate Partner Violence: Not on file   Housing Stability: Not on file      Physical Exam:   VITAL SIGNS:  "  Visit Vitals  BP (!) 182/87   Pulse 84   Temp 36.8 °C (98.2 °F) (Temporal)   Resp 18   Ht 1.88 m (6' 2\")   Wt 93 kg (205 lb)   SpO2 99%   BMI 26.32 kg/m²      CONSTITUTIONAL: well developed, nourished, no distress   PSYCH: Alert and oriented x 3, appropriate mood and affect   SKIN: Warm and dry, good turgor, no rashes   HENT: Lips without lesions, good dentition, oropharynx clear   EYES: conjunctiva normal and sclera clear, without icterus   MUSCULOSKELETAL: moves all 4 extremities with normal ROM in bed   PULMONARY: effort normal   CARDIOVASCULAR:normal rate and regular rhythm   ABDOMINAL: soft, non-distended, non-tender, no hepatosplenomegaly, no masses   RECTAL: Deferred     Impression:   Patient is an appropriate candidate to undergo above stated procedure.    Plan:   1. The risks, benefits, complications and alternatives of above stated procedure were discussed with the patient, including but not limited to risks of bleeding, infection and/or damage to surrounding anatomy and I have answered all of the patient's questions. The patient wishes to proceed.   2. Consent has been obtained.     Ricardo Cabral MD  "

## 2022-10-17 NOTE — OP NOTE
_______________________________________________________________________________  Patient Name: Celso Gramajo           Procedure Date: 10/17/2022 9:33 AM  MRN: [id number]                     Account Number: [id number]  YOB: 1955             Age: 66  Gender: Male                          Note Status: Finalized  Attending MD: TAVON CASTELLANO MD  _______________________________________________________________________________  Procedure:             Upper GI endoscopy  Indications:           Dysphagia. Proximal esophageal stricture  Providers:             TAVON CASTELLANO MD (Doctor)  Referring MD:          GARRET THIBODEAUX MD  Requesting Provider:  Medicines:             See the Anesthesia note for documentation of the  administered medications  Complications:         No immediate complications.  _______________________________________________________________________________  Procedure:             After obtaining informed consent, the endoscope was  passed under direct vision. Throughout the procedure,  the patient's blood pressure, pulse, and oxygen  saturations were monitored continuously. The endoscope  was introduced through the mouth, and advanced to the  second part of duodenum.  Findings:  The posterior pharynx and vocal cords are surveyed. Grossly there is no  significant distortion despite the previous history of radiation. The  epiglottis appears relatively normal. There are no significant  telangiectasis, exudates, ulcers, or masses that are seen. Initially a  standard upper endoscope is used to make an attempt to cross the upper  esophageal sphincter. This seems to be tight and because this is the  first time I am performing an endoscopy I felt it would be best to back  off using this instrument and use a new needle instrument. The   scope was then passed across the upper esophageal sphincter. This is  located approximately 15 cm from the incisors. The most proximal  esophagus clearly  has a narrowed lumen as does the opening to the upper  esophagus. The first 5 cm of the esophagus has a slightly whitish  colorationwith scattered telangiectasias. The mucosa is not normal. The  luminal diameter is narrowed. This is clearly a section of esophagus  that findings would be consistent with radiation-induced change.  Downstream from this area of the vascular markings become more normal  until they are completely normal at approximately 25 cm. There are  minimal circumferential rings noted between 20 and 25 cm that are  nonspecific. The luminal diameter in the mid to distal esophagus beyond  this appeared normal. The EG junction was normal at 40 cm. On  retroflexion there is no mass of the cardia. The stomach is unremarkable  with a normal PEG bumper noted. The duodenum was unremarkable. Serial  dilations over a wire performed with a savory dilator with a 5, 6, 7, 8,  9, and 10 mm dilator surveying the esophagus each time to be sure that  there is not iatrogenic full-thickness injury during the dilation. With  the last dilation there is a small amount of blood with superficial  tears noted in the esophageal mucosa. There is no evidence for a  full-thickness tear.  Impression:            Suspect that the patient has esophageal stricture  related to previous radiation. There is no evidence of  distortion in the posterior pharynx however the  swallowing mechanism is best measured with a barium  cine swallow.  Recommendation:        - Augmentin 500mg bid x 2 weeks  - Repeat dilation in two weeks.  - Will consider stent placement and Kenalog in 2 weeks.  - Ice chips as tolerated  Procedure Code(s):     --- Professional ---  99167, Esophagogastroduodenoscopy, flexible,  transoral; with transendoscopic balloon dilation of  esophagus (less than 30 mm diameter)  Diagnosis Code(s):     --- Professional ---  K22.2, Esophageal obstruction  CPT copyright 2020 [employer]. All rights reserved.  The  codes documented in this report are preliminary and upon  review may  be revised to meet current compliance requirements.  Jareth Castellano MD  ________________  TAVON CASTELLANO MD  10/17/2022 1:54:50 PM  This report has been signed electronically.  Number of Addenda: 0  Note Initiated On: 10/17/2022 9:33 AM

## 2022-10-17 NOTE — ANESTHESIOLOGIST PRE-PROCEDURE ATTESTATION
Pre-Procedure Patient Identification:  I am the Primary Anesthesiologist and have identified the patient on 10/17/22 at 10:03 AM.   I have confirmed the procedure(s) will be performed by the following surgeon/proceduralist Ricardo Cabral MD.

## 2022-10-27 ENCOUNTER — TRANSCRIBE ORDERS (OUTPATIENT)
Dept: SCHEDULING | Age: 67
End: 2022-10-27

## 2022-10-27 ENCOUNTER — APPOINTMENT (OUTPATIENT)
Dept: LAB | Age: 67
End: 2022-10-27
Attending: INTERNAL MEDICINE
Payer: MEDICARE

## 2022-10-27 ENCOUNTER — HOSPITAL ENCOUNTER (OUTPATIENT)
Facility: HOSPITAL | Age: 67
End: 2022-10-27
Attending: INTERNAL MEDICINE | Admitting: INTERNAL MEDICINE
Payer: MEDICARE

## 2022-10-27 ENCOUNTER — TELEPHONE (OUTPATIENT)
Dept: GASTROENTEROLOGY | Facility: CLINIC | Age: 67
End: 2022-10-27
Payer: MEDICARE

## 2022-10-27 DIAGNOSIS — Z11.59 ENCOUNTER FOR SCREENING FOR OTHER VIRAL DISEASES: ICD-10-CM

## 2022-10-27 DIAGNOSIS — Z11.59 ENCOUNTER FOR SCREENING FOR OTHER VIRAL DISEASES: Primary | ICD-10-CM

## 2022-10-27 PROCEDURE — U0003 INFECTIOUS AGENT DETECTION BY NUCLEIC ACID (DNA OR RNA); SEVERE ACUTE RESPIRATORY SYNDROME CORONAVIRUS 2 (SARS-COV-2) (CORONAVIRUS DISEASE [COVID-19]), AMPLIFIED PROBE TECHNIQUE, MAKING USE OF HIGH THROUGHPUT TECHNOLOGIES AS DESCRIBED BY CMS-2020-01-R: HCPCS

## 2022-10-27 PROCEDURE — C9803 HOPD COVID-19 SPEC COLLECT: HCPCS

## 2022-10-27 NOTE — TELEPHONE ENCOUNTER
Pt does not want to have an esophageal stent placed during upcoming endoscopy. He is concerned with the risk associated with stent placement. He wants to try repeat dilation.

## 2022-10-28 LAB — SARS-COV-2 RNA RESP QL NAA+PROBE: NEGATIVE

## 2022-11-03 ENCOUNTER — TELEPHONE (OUTPATIENT)
Dept: GASTROENTEROLOGY | Facility: CLINIC | Age: 67
End: 2022-11-03
Payer: MEDICARE

## 2022-11-04 ENCOUNTER — OFFICE VISIT (OUTPATIENT)
Dept: ENDOCRINOLOGY | Facility: CLINIC | Age: 67
End: 2022-11-04
Payer: MEDICARE

## 2022-11-04 VITALS
HEART RATE: 85 BPM | HEIGHT: 73 IN | DIASTOLIC BLOOD PRESSURE: 100 MMHG | SYSTOLIC BLOOD PRESSURE: 142 MMHG | BODY MASS INDEX: 27.04 KG/M2 | RESPIRATION RATE: 18 BRPM | WEIGHT: 204 LBS | OXYGEN SATURATION: 99 %

## 2022-11-04 DIAGNOSIS — E89.0 POST-SURGICAL HYPOTHYROIDISM: Primary | ICD-10-CM

## 2022-11-04 DIAGNOSIS — C15.3 MALIGNANT NEOPLASM OF UPPER THIRD OF ESOPHAGUS (CMS/HCC): ICD-10-CM

## 2022-11-04 DIAGNOSIS — C08.9: ICD-10-CM

## 2022-11-04 PROCEDURE — G8755 DIAS BP > OR = 90: HCPCS | Performed by: INTERNAL MEDICINE

## 2022-11-04 PROCEDURE — G8753 SYS BP > OR = 140: HCPCS | Performed by: INTERNAL MEDICINE

## 2022-11-04 PROCEDURE — 99205 OFFICE O/P NEW HI 60 MIN: CPT | Performed by: INTERNAL MEDICINE

## 2022-11-04 RX ORDER — LEVOTHYROXINE SODIUM 50 UG/1
150 CAPSULE ORAL DAILY
Qty: 90 ML | Refills: 3 | Status: SHIPPED | OUTPATIENT
Start: 2022-11-04 | End: 2023-03-14 | Stop reason: SDUPTHER

## 2022-11-04 NOTE — PATIENT INSTRUCTIONS
I sent the tirosint to a specialty pharmacy    You are feeling better with the addition of T3 (5mcg)    Repeat labs in the next 1-2 weeks as planned    We can adjust your thyroid dosing based upon the lab results    Continue ongoing follow-up with Dr. Lopez, Dr. Cabral and your GI physician at [place]    Send a message through the portal about 1-2 days after the labs are drawn so we can track down the results.    Follow-up TBD

## 2022-11-04 NOTE — LETTER
November 13, 2022     Fide Lopez MD  [address] [facility] [address]    Patient: Celso Gramajo  YOB: 1955  Date of Visit: 11/4/2022      Dear Dr. Lopez:    Thank you for referring Celso Gramajo to me for evaluation. Below are my notes for this consultation.    If you have questions, please do not hesitate to call me. I look forward to following your patient along with you.         Sincerely,        Lexi Borrero MD        CC: Lexi Gray MD  11/13/2022  6:29 PM  Signed                        Endocrinology  Note       PATIENTS: Celso Gramajo  YOB: 1955  DATE: November 4, 2022  VISIT TYPE: new patient visit    History of Present Illness:   Celso Gramajo  Is a 66 year old man who presents today for management of post surgical hypothyroidism    He has a remote history of  thyroid CA (2001)  treated with surgery and MASCORRO (200 mCI).  He experienced significant LN involvement/recurrence.  He has received care at [facility] and [facility].  He has a more recent diagnosis of esophageal CA (dx within past 2 years - treated with surgery and XRT) and on this basis he is currently nourished through a PEG tube.      He is currently being followed by Dr. Fide Lopez at [place] for oncologic care.  He reports that his malignancy was found to have a gene rearrangement and is thought to be of salivary gland origin. He has been in a clinical trial for the past 5 years.     His oncology notes indicate that his target TSH is about 0.5-2.5.    He has been on liquid tirosint at a dose of 150 mcg daily through his PEG.  He is cautious to wait appropriate intervals before and after tube feeding. He also uses pepcid. His TF are given in a continuous strategy.    His TSH was 5.47 in late September 2023 .  At that time he started treatment with liothyronine 5 mcg daily in an effort to improve multiple non specific symptoms.  He feels subjectively better on treatment. He  "believes that his BP has improved with this change.  He desires to increase to 10 mcg daily.  He has not experienced palpitations or recent weight loss.  He described an earlier \"racing sensation\" that improved with T3.  He has not yet had f/u TFTs to reflect this medication change.  He describes rare chills.    I reviewed chart notes indicating that he has struggled with reflux and copious secretions.  He has required both PPI therapy and H2 blockade.         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 thru [facility]     Hx of head and neck radiation     Hypertension     Hypothyroidism     Stroke (CMS/HCC)     Thyroid disease        Surgical History:   Past Surgical History:   Procedure Laterality Date    CHOLECYSTECTOMY      HERNIA REPAIR      NECK DISSECTION      NECK SURGERY      THYROID SURGERY      TUMOR REMOVAL      L neck 20yrs ago        Family History:  Family History   Problem Relation Age of Onset    No Known Problems Biological Father     Lung cancer Biological Sister        Social history:  Social History     Socioeconomic History    Marital status:      Spouse name: Not on file    Number of children: Not on file    Years of education: Not on file    Highest education level: Not on file   Occupational History    Not on file   Tobacco Use    Smoking status: Never    Smokeless tobacco: Never   Vaping Use    Vaping Use: Never used   Substance and Sexual Activity    Alcohol use: No    Drug use: No    Sexual activity: Not on file   Other Topics Concern    Not on file   Social History Narrative    Not on file     Social Determinants of Health     Financial Resource Strain: Not on file   Food Insecurity: No Food Insecurity    Worried About Running Out of Food in the Last Year: Never true    Ran Out of Food in the Last Year: Never true   Transportation Needs: Not on file   Physical Activity: Not on file "   Stress: Not on file   Social Connections: Not on file   Intimate Partner Violence: Not on file   Housing Stability: Not on file       Medication(active prior to today):  Current Outpatient Medications   Medication Sig Dispense Refill    famotidine (PEPCID) 40 mg tablet Take 40 mg by mouth nightly.      lansoprazole (PREVACID) 30 mg capsule 60 mg by peg tube route daily before breakfast. OPEN 2 CAPSULES AND SPRINKLE ON APPLESAUCE OR YOGURT AND TAKE 30 MINUTES BEFORE BREAKFAST; Through a peg tube      levothyroxine (Tirosint-SoL) 150 mcg/mL solution 150 mcg by peg tube route daily. 90 mL 3    levothyroxine sodium (TIROSINT) 150 mcg capsule Take 150 mcg by mouth daily.      0.9 % sodium chloride (sodium chloride 0.9 %) solution 1,000 mL.      acetaminophen-codeine (TYLENOL w/ CODEINE) 120-12 mg/5 mL solution acetaminophen 120 mg-codeine 12 mg/5 mL oral solution   TAKE 10 ML BY MOUTH EVERY 6 HOURS AS NEEDED (PAIN).      fluoride, sodium, 1.1 % gel PreviDent 5000 Booster Plus 1.1 % dental paste      FLUoxetine (PROzac) 20 mg capsule fluoxetine 20 mg capsule   TAKE 1 CAPSULE BY MOUTH EVERY DAY      glycopyrrolate (ROBINUL) 1 mg tablet Take 1 mg by mouth 3 (three) times a day as needed (secretions).      levothyroxine 150 mcg/mL solution 150 mcg by peg tube route daily. Through peg tube      lidocaine (LIDODERM) 5 % patch lidocaine 5 % topical patch   APPLY 1 PATCH EVERY DAY      lidocaine HCL (XYLOCAINE) 2 % solution mucosal solution Lidocaine Viscous 2 % mucosal solution      liothyronine (CYTOMEL) 5 mcg tablet Take 1 tablet (5 mcg total) by mouth 2 (two) times a day. 60 tablet 3    metoprolol tartrate (LOPRESSOR) 25 mg tablet Take 25 mg by mouth.      multivitamin powder in packet 1 packet by peg tube route daily. Empty and mix with filtered water through peg tube      PREVIDENT 5000 BOOSTER PLUS 1.1 % paste BRUSH TWICE A DAY AFTER FLOSS. SPIT OUT EXCESS, DO NOT RINSE      rosuvastatin (CRESTOR) 5 mg  "tablet Take 5 mg by mouth nightly.      sodium chloride (OCEAN) 0.65 % nasal spray Administer 1 spray into each nostril as needed for congestion. (Patient not taking: No sig reported) 15 mL 12     No current facility-administered medications for this visit.       Allergies:  Patient has no known allergies.    Review of Systems:   As detailed in HPI    Vitals Signs:  Vitals:    11/04/22 1042   BP: (!) 142/100   BP Location: Left upper arm   Patient Position: Sitting   Pulse: 85   Resp: 18   SpO2: 99%   Weight: 92.5 kg (204 lb)   Height: 1.854 m (6' 1\")     Body mass index is 26.91 kg/m².      Physical Exam:  Constitutional: Patient is oriented to person, place, and time.  Appears thin   Eyes: EOM are normal. No scleral icterus  Neck: Normal range of motion.L neck with extensive post surgical changes  Cardiovascular: Normal rate, regular rhythm and normal heart sounds.    Pulmonary/Chest: Effort normal and breath sounds normal.   Abdominal: PEG site clean  Musculoskeletal: Normal range of motion.   Neurological:  Alert and oriented to person, place, and time.   Skin: Skin is warm and dry.   Psychiatric:  Normal mood and affect. Behavior is normal. Judgment and thought content normal.     Labs:  Lab Results   Component Value Date    WBC 7.14 09/28/2022    HGB 14.7 09/28/2022    HCT 45.2 09/28/2022     09/28/2022    CHOL 119 05/11/2022    TRIG 91 05/11/2022    HDL 33 (L) 05/11/2022    ALT 33 03/09/2016    AST 22 03/09/2016     09/28/2022    K 4.0 09/28/2022    CL 96 (L) 09/28/2022    CREATININE 0.6 (L) 09/28/2022    BUN 15 09/28/2022    CO2 32 09/28/2022    TSH 0.10 (L) 03/09/2016    PSA 0.69 07/14/2015    INR 0.9 06/25/2017     Oncologic history per Dr Lopez notes:   Celso Camacho is a 64 year-old man with a history of papillary thyroid cancer dating back to 2001. He underwent a total thyroidectomy and neck dissection on December 14,2001 with Dr. Valencia at [facility]. Postoperatively " he received 200 mCi of MASCORRO at [facility].  He reports that his post-therapy scans were negative. He was followed by Dr. Carlos from 2001 until 2005. He had a recurrence of his cancer in January of 2005. His thyroglobulin was undetectable at that time. He then underwent a left modified radical neck dissection with Dr. Gannon at [facility]. This was followed by 38 external beam radiation treatments. He had another recurrence in June 2014. On June 5, 2014 he underwent a fine needle aspiration of a left neck mass which was positive for papillary thyroid carcinoma. He subsequently underwent a prelaryngeal resection with Dr. Gannon on June 18,2014. The operative report noted soft tissue densities posterolateral to the cricoid on the left which  were not resected. 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  Mr. Camacho went for a second surgical opinion with Dr. Burns at [facility] who recommended surgery. Specimens from his thyroid surgery in 12/13/2001 and left neck dissection from 01/31/05 were obtained for review. 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 By report, the current tumor (slides not seen) from 2014 has ETV6/NTRK3 fusion.         He was treated with larotrectinib first as part of [facility] [address] A Phase 1a/1b Study of the Oral TRK Inhibitor LOXO-101 in Subjects with Adult Solid Tumors. Mr Gramajo is unaccompanied.  Mr. Gramajo was on the 100mg PO BID dose schedule, but was dose reduced to the 100mg daily. He underwent a esophageal dilatation attempt on 10/19/2020. He has since been diagnosed with Stage I esophageal cancer and was treated with curative intent by Dr. Martinez at [facility]. He started chemo (carbo/taxol) sensitized re-irrdiation with proton therapy on 11/30/2020. This completes treatment on Jan 17, 2021. He required a PEG tube which has since been  removed. He stopped his larotrectinib on 11/26/2020 as was instructed.      Assessment/Plan:    1) Post surgical hypothyroidism  Goal TSH thought to be 0.5-2.5 per oncology notes  He has limited absorption of thyroid hormone in the context of chronic PEG tube requirements, and acid suppression  His doses are expected to be higher on this basis.  He feels subjectively better with low dose T3.  His preference is for an increase  He will have fu TFTs to reflect nearly 6 weeks on the current regimen and will determine further changes accordingly      2) Salivary gland CA - Clinical trial per Dr. Lopez    3) Esophageal CA - sp surgery and XRT, using PEG tube for nourishment as above.    Patient Instructions   I sent the tirosint to a specialty pharmacy    You are feeling better with the addition of T3 (5mcg)    Repeat labs in the next 1-2 weeks as planned    We can adjust your thyroid dosing based upon the lab results    Continue ongoing follow-up with Dr. Lopez, Dr. Cabral and your GI physician at [place]    Send a message through the portal about 1-2 days after the labs are drawn so we can track down the results.    Follow-up TBD          Electronically signed by: Lexi Borrero MD on 11/13/2022 5:26 PM

## 2022-11-04 NOTE — PROGRESS NOTES
"                      Endocrinology  Note       PATIENTS: Celso Gramajo  YOB: 1955  DATE: November 4, 2022  VISIT TYPE: new patient visit    History of Present Illness:   Celso Gramajo  Is a 66 year old man who presents today for management of post surgical hypothyroidism    He has a remote history of  thyroid CA (2001)  treated with surgery and MASCORRO (200 mCI).  He experienced significant LN involvement/recurrence.  He has received care at [facility] and [facility].  He has a more recent diagnosis of esophageal CA (dx within past 2 years - treated with surgery and XRT) and on this basis he is currently nourished through a PEG tube.      He is currently being followed by Dr. Fide Lopez at [facility] for oncologic care.  He reports that his malignancy was found to have a gene rearrangement and is thought to be of salivary gland origin. He has been in a clinical trial for the past 5 years.     His oncology notes indicate that his target TSH is about 0.5-2.5.    He has been on liquid tirosint at a dose of 150 mcg daily through his PEG.  He is cautious to wait appropriate intervals before and after tube feeding. He also uses pepcid. His TF are given in a continuous strategy.    His TSH was 5.47 in late September 2023 .  At that time he started treatment with liothyronine 5 mcg daily in an effort to improve multiple non specific symptoms.  He feels subjectively better on treatment. He believes that his BP has improved with this change.  He desires to increase to 10 mcg daily.  He has not experienced palpitations or recent weight loss.  He described an earlier \"racing sensation\" that improved with T3.  He has not yet had f/u TFTs to reflect this medication change.  He describes rare chills.    I reviewed chart notes indicating that he has struggled with reflux and copious secretions.  He has required both PPI therapy and H2 blockade.         Past Medical History:   Diagnosis Date    Anemia     Cancer " (CMS/HCC)     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 drug thru [facility]     Hx of head and neck radiation     Hypertension     Hypothyroidism     Stroke (CMS/HCC)     Thyroid disease        Surgical History:   Past Surgical History:   Procedure Laterality Date    CHOLECYSTECTOMY      HERNIA REPAIR      NECK DISSECTION      NECK SURGERY      THYROID SURGERY      TUMOR REMOVAL      L neck 20yrs ago        Family History:  Family History   Problem Relation Age of Onset    No Known Problems Biological Father     Lung cancer Biological Sister        Social history:  Social History     Socioeconomic History    Marital status:      Spouse name: Not on file    Number of children: Not on file    Years of education: Not on file    Highest education level: Not on file   Occupational History    Not on file   Tobacco Use    Smoking status: Never    Smokeless tobacco: Never   Vaping Use    Vaping Use: Never used   Substance and Sexual Activity    Alcohol use: No    Drug use: No    Sexual activity: Not on file   Other Topics Concern    Not on file   Social History Narrative    Not on file     Social Determinants of Health     Financial Resource Strain: Not on file   Food Insecurity: No Food Insecurity    Worried About Running Out of Food in the Last Year: Never true    Ran Out of Food in the Last Year: Never true   Transportation Needs: Not on file   Physical Activity: Not on file   Stress: Not on file   Social Connections: Not on file   Intimate Partner Violence: Not on file   Housing Stability: Not on file       Medication(active prior to today):  Current Outpatient Medications   Medication Sig Dispense Refill    famotidine (PEPCID) 40 mg tablet Take 40 mg by mouth nightly.      lansoprazole (PREVACID) 30 mg capsule 60 mg by peg tube route daily before breakfast. OPEN 2 CAPSULES AND SPRINKLE ON APPLESAUCE OR YOGURT AND TAKE 30 MINUTES BEFORE BREAKFAST; Through a peg tube      levothyroxine  (Tirosint-SoL) 150 mcg/mL solution 150 mcg by peg tube route daily. 90 mL 3    levothyroxine sodium (TIROSINT) 150 mcg capsule Take 150 mcg by mouth daily.      0.9 % sodium chloride (sodium chloride 0.9 %) solution 1,000 mL.      acetaminophen-codeine (TYLENOL w/ CODEINE) 120-12 mg/5 mL solution acetaminophen 120 mg-codeine 12 mg/5 mL oral solution   TAKE 10 ML BY MOUTH EVERY 6 HOURS AS NEEDED (PAIN).      fluoride, sodium, 1.1 % gel PreviDent 5000 Booster Plus 1.1 % dental paste      FLUoxetine (PROzac) 20 mg capsule fluoxetine 20 mg capsule   TAKE 1 CAPSULE BY MOUTH EVERY DAY      glycopyrrolate (ROBINUL) 1 mg tablet Take 1 mg by mouth 3 (three) times a day as needed (secretions).      levothyroxine 150 mcg/mL solution 150 mcg by peg tube route daily. Through peg tube      lidocaine (LIDODERM) 5 % patch lidocaine 5 % topical patch   APPLY 1 PATCH EVERY DAY      lidocaine HCL (XYLOCAINE) 2 % solution mucosal solution Lidocaine Viscous 2 % mucosal solution      liothyronine (CYTOMEL) 5 mcg tablet Take 1 tablet (5 mcg total) by mouth 2 (two) times a day. 60 tablet 3    metoprolol tartrate (LOPRESSOR) 25 mg tablet Take 25 mg by mouth.      multivitamin powder in packet 1 packet by peg tube route daily. Empty and mix with filtered water through peg tube      PREVIDENT 5000 BOOSTER PLUS 1.1 % paste BRUSH TWICE A DAY AFTER FLOSS. SPIT OUT EXCESS, DO NOT RINSE      rosuvastatin (CRESTOR) 5 mg tablet Take 5 mg by mouth nightly.      sodium chloride (OCEAN) 0.65 % nasal spray Administer 1 spray into each nostril as needed for congestion. (Patient not taking: No sig reported) 15 mL 12     No current facility-administered medications for this visit.       Allergies:  Patient has no known allergies.    Review of Systems:   As detailed in HPI    Vitals Signs:  Vitals:    11/04/22 1042   BP: (!) 142/100   BP Location: Left upper arm   Patient Position: Sitting   Pulse: 85   Resp: 18   SpO2: 99%   Weight: 92.5 kg  "(204 lb)   Height: 1.854 m (6' 1\")     Body mass index is 26.91 kg/m².      Physical Exam:  Constitutional: Patient is oriented to person, place, and time.  Appears thin   Eyes: EOM are normal. No scleral icterus  Neck: Normal range of motion.L neck with extensive post surgical changes  Cardiovascular: Normal rate, regular rhythm and normal heart sounds.    Pulmonary/Chest: Effort normal and breath sounds normal.   Abdominal: PEG site clean  Musculoskeletal: Normal range of motion.   Neurological:  Alert and oriented to person, place, and time.   Skin: Skin is warm and dry.   Psychiatric:  Normal mood and affect. Behavior is normal. Judgment and thought content normal.     Labs:  Lab Results   Component Value Date    WBC 7.14 09/28/2022    HGB 14.7 09/28/2022    HCT 45.2 09/28/2022     09/28/2022    CHOL 119 05/11/2022    TRIG 91 05/11/2022    HDL 33 (L) 05/11/2022    ALT 33 03/09/2016    AST 22 03/09/2016     09/28/2022    K 4.0 09/28/2022    CL 96 (L) 09/28/2022    CREATININE 0.6 (L) 09/28/2022    BUN 15 09/28/2022    CO2 32 09/28/2022    TSH 0.10 (L) 03/09/2016    PSA 0.69 07/14/2015    INR 0.9 06/25/2017     Oncologic history per Dr Lopez notes:   Celso Camacho is a 64 year-old man with a history of papillary thyroid cancer dating back to 2001. He underwent a total thyroidectomy and neck dissection on December 14,2001 with Dr. Valencia at [facility]. Postoperatively he received 200 mCi of MASCORRO at [facility].  He reports that his post-therapy scans were negative. He was followed by Dr. Carlos from 2001 until 2005. He had a recurrence of his cancer in January of 2005. His thyroglobulin was undetectable at that time. He then underwent a left modified radical neck dissection with Dr. Gannon at [facility]. This was followed by 38 external beam radiation treatments. He had another recurrence in June 2014. On June 5, 2014 he underwent a fine needle " aspiration of a left neck mass which was positive for papillary thyroid carcinoma. He subsequently underwent a prelaryngeal resection with Dr. Gannon on June 18,2014. The operative report noted soft tissue densities posterolateral to the cricoid on the left which  were not resected. 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  Mr. Camacho went for a second surgical opinion with Dr. Burns at [facility]3] who recommended surgery. Specimens from his thyroid surgery in 12/13/2001 and left neck dissection from 01/31/05 were obtained for review. 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 By report, the current tumor (slides not seen) from 2014 has ETV6/NTRK3 fusion.         He was treated with larotrectinib first as part of [facility] [address] A Phase 1a/1b Study of the Oral TRK Inhibitor LOXO-101 in Subjects with Adult Solid Tumors. Mr Gramajo is unaccompanied.  Mr. Gramajo was on the 100mg PO BID dose schedule, but was dose reduced to the 100mg daily. He underwent a esophageal dilatation attempt on 10/19/2020. He has since been diagnosed with Stage I esophageal cancer and was treated with curative intent by Dr. Martinez at [facility]. He started chemo (carbo/taxol) sensitized re-irrdiation with proton therapy on 11/30/2020. This completes treatment on Jan 17, 2021. He required a PEG tube which has since been removed. He stopped his larotrectinib on 11/26/2020 as was instructed.      Assessment/Plan:    1) Post surgical hypothyroidism  Goal TSH thought to be 0.5-2.5 per oncology notes  He has limited absorption of thyroid hormone in the context of chronic PEG tube requirements, and acid suppression  His doses are expected to be higher on this basis.  He feels subjectively better with low dose T3.  His preference is for an increase  He will have fu TFTs to reflect nearly 6 weeks on the current regimen and will determine further  changes accordingly      2) Salivary gland CA - Clinical trial per Dr. Lopez    3) Esophageal CA - sp surgery and XRT, using PEG tube for nourishment as above.    Patient Instructions   I sent the tirosint to a specialty pharmacy    You are feeling better with the addition of T3 (5mcg)    Repeat labs in the next 1-2 weeks as planned    We can adjust your thyroid dosing based upon the lab results    Continue ongoing follow-up with Dr. Lopez, Dr. Cabral and your GI physician at [facility]    Send a message through the portal about 1-2 days after the labs are drawn so we can track down the results.    Follow-up TBD          Electronically signed by: Lexi Borrero MD on 11/13/2022 5:26 PM

## 2022-11-09 ENCOUNTER — TELEMEDICINE (OUTPATIENT)
Dept: GASTROENTEROLOGY | Facility: CLINIC | Age: 67
End: 2022-11-09
Payer: MEDICARE

## 2022-11-09 VITALS — HEIGHT: 73 IN | WEIGHT: 202 LBS | BODY MASS INDEX: 26.77 KG/M2

## 2022-11-09 DIAGNOSIS — R13.19 OTHER DYSPHAGIA: ICD-10-CM

## 2022-11-09 PROCEDURE — 99442 PR PHYS/QHP TELEPHONE EVALUATION 11-20 MIN: CPT | Mod: 95 | Performed by: INTERNAL MEDICINE

## 2022-11-09 NOTE — PROGRESS NOTES
Interventional Gastroenterology  MD Alexa Leavitt PA-C   [facility], [address]  [address] [place] [address]  [phone]                                              Telemedicine Follow Up    Date: 2023  Patient: Celso Gramajo  : 1955  Age: 67 y.o. male  PCP:Yamila Brunson MD  Referring physician:     The patient affirms they are currently located in the following state: [place]      Request for Consent:  Audio Only Encounter   You and I are about to have a telemedicine check-in or visit. This is allowed because you have requested it. This telemedicine visit will be billed to your health insurance or you, if you are self-insured. You understand you will be responsible for any copayments or coinsurances that apply to your telemedicine visit. Before starting our telemedicine visit, I am required to get your consent for this virtual check-in or visit by telemedicine. Do you consent?   Patient Response to Request for Consent:   Yes    Chief Complaint:   HPI   Celso Gramajo is a 67 y.o. male senting for further evaluation and management of an esophageal stricture.  The patient has a history of radiation for cervical esophageal cancer as well as posterior pharyngeal cancer.  He is status post radiation x2.  He states that he is a collection of mucus in the back of his throat that becomes thick and difficult for him to clear.  It is also difficult for him to swallow anything solid.  There are occasional episodes of regurgitation.  He is able to maintain his weight although he has been losing a few pounds lately.  He is followed at the [facility] where he has had previous upper endoscopy.  An upper endoscopy was performed in 2022.  At that time a stricture was found 18 cm from the incisors.  Dilation was performed up to 12 mm at the time.  He states that dilation gave him just a few days of relief followed by a  recurrence of all of his symptoms.       ASSESSMENT AND PLAN   Other dysphagia  Likely radiation-induced stricture.  We will schedule upper endoscopy to evaluate.  Further evaluation by barium study will follow.        Procedure Request  Procedure to be scheduled:  Timing:   Diagnosis, diagnosis code:   Cardiac clearance due to anticoagulation/antiplatlet therapy?:  Does the patient have a pacemaker?  Patient's current height/weight:     PAST MEDICAL AND SURGICAL HISTORY      Medical History:  Past Medical History:   Diagnosis Date   • Anemia    • Cancer (CMS/HCC)     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 drug thru [facility]    • Hx of head and neck radiation    • Hypertension    • Hypothyroidism    • Stroke (CMS/HCC)    • Thyroid disease        Surgical History:  Past Surgical History:   Procedure Laterality Date   • CHOLECYSTECTOMY     • HERNIA REPAIR     • NECK DISSECTION     • NECK SURGERY     • THYROID SURGERY     • TUMOR REMOVAL      L neck 20yrs ago        CURRENT MEDICATIONS        Current Outpatient Medications:   •  famotidine (PEPCID) 40 mg tablet, Take 40 mg by mouth nightly., Disp: , Rfl:   •  lansoprazole (PREVACID) 30 mg capsule, 60 mg by peg tube route daily before breakfast. OPEN 2 CAPSULES AND SPRINKLE ON APPLESAUCE OR YOGURT AND TAKE 30 MINUTES BEFORE BREAKFAST; Through a peg tube, Disp: , Rfl:   •  levothyroxine sodium (TIROSINT) 150 mcg capsule, Take 150 mcg by mouth daily., Disp: , Rfl:   •  liothyronine (CYTOMEL) 5 mcg tablet, TAKE 1 TABLET BY MOUTH 2 TIMES A DAY., Disp: 180 tablet, Rfl: 1    ALLERGIES      Patient has no known allergies.    FAMILY HISTORY      Family History   Problem Relation Age of Onset   • No Known Problems Biological Father    • Lung cancer Biological Sister        SOCIAL HISTORY      Social History     Socioeconomic History   • Marital status:      Spouse name: None   • Number of children: None   • Years of education: None   • Highest education level: None   Tobacco  Use   • Smoking status: Never   • Smokeless tobacco: Never   Vaping Use   • Vaping status: Never Used   Substance and Sexual Activity   • Alcohol use: No   • Drug use: No     Social Determinants of Health     Food Insecurity: No Food Insecurity (9/28/2022)    Hunger Vital Sign    • Worried About Running Out of Food in the Last Year: Never true    • Ran Out of Food in the Last Year: Never true       REVIEW OF SYSTEMS      Review of Systems   Constitutional: Negative for activity change, appetite change, fatigue, fever and unexpected weight change.   HENT: Negative for trouble swallowing and voice change.    Respiratory: Negative for cough and shortness of breath.    Cardiovascular: Negative for chest pain.   Gastrointestinal: Negative for abdominal distention, abdominal pain, anal bleeding, blood in stool, constipation, diarrhea, nausea and vomiting.   Neurological: Negative for dizziness, weakness and light-headedness.     PHYSICAL EXAMINATION      Because of the Covid-19 pandemic, in order to limit patient contact, I did not see and examine the patient directly.     LABS / IMAGING       Labs:   CBC Results       09/28/22 09/25/22 05/10/22     0753 1303 0922    WBC 7.14 11.53 6.57    RBC 4.66 4.54 4.59    HGB 14.7 14.3 14.7    HCT 45.2 43.9 44.1    MCV 97.0 96.7 96.1    MCH 31.5 31.5 32.0    MCHC 32.5 32.6 33.3     153 163        CMP Results       09/28/22 09/25/22 05/10/22     0753 1303 0922     135 137    K 4.0 4.1 4.2    Cl 96 98 99    CO2 32 27 31    Glucose 117 121 103    BUN 15 18 16    Creatinine 0.6 0.7 0.8    Calcium 9.5 8.9 9.7    Anion Gap 9 10 7    EGFR >60.0 >60.0 >60.0         Comment for K at 0753 on 09/28/22: Results obtained on plasma. Plasma Potassium values may be up to 0.4 mEQ/L less than serum values. The differences may be greater for patients with high platelet or white cell counts.    Comment for K at 1303 on 09/25/22: Results obtained on plasma. Plasma Potassium values may be up  to 0.4 mEQ/L less than serum values. The differences may be greater for patients with high platelet or white cell counts.    Comment for K at 0922 on 05/10/22: Results obtained on plasma. Plasma Potassium values may be up to 0.4 mEQ/L less than serum values. The differences may be greater for patients with high platelet or white cell counts.        Lab Results   Component Value Date    INR 0.9 06/25/2017      I spent 19 minutes on this date of service performing the following activities: obtaining history, entering orders, documenting, communicating results and coordinating care.    Ricardo Cabral MD  4/17/2023  8:59 PM

## 2022-12-27 ENCOUNTER — TRANSCRIBE ORDERS (OUTPATIENT)
Dept: RADIOLOGY | Age: 67
End: 2022-12-27

## 2022-12-27 ENCOUNTER — HOSPITAL ENCOUNTER (OUTPATIENT)
Dept: RADIOLOGY | Age: 67
Discharge: HOME | End: 2022-12-27
Attending: CHIROPRACTOR
Payer: MEDICARE

## 2022-12-27 DIAGNOSIS — M54.12 RADICULOPATHY, CERVICAL REGION: ICD-10-CM

## 2022-12-27 DIAGNOSIS — M48.02 SPINAL STENOSIS, CERVICAL REGION: ICD-10-CM

## 2022-12-27 DIAGNOSIS — M48.02 SPINAL STENOSIS, CERVICAL REGION: Primary | ICD-10-CM

## 2022-12-27 PROCEDURE — 72040 X-RAY EXAM NECK SPINE 2-3 VW: CPT

## 2023-02-21 DIAGNOSIS — E89.0 POST-SURGICAL HYPOTHYROIDISM: Primary | ICD-10-CM

## 2023-03-01 ENCOUNTER — TELEMEDICINE (OUTPATIENT)
Dept: GASTROENTEROLOGY | Facility: CLINIC | Age: 68
End: 2023-03-01
Payer: MEDICARE

## 2023-03-01 VITALS — HEIGHT: 74 IN | WEIGHT: 200 LBS | BODY MASS INDEX: 25.67 KG/M2

## 2023-03-01 DIAGNOSIS — R13.10 DYSPHAGIA, UNSPECIFIED TYPE: Primary | ICD-10-CM

## 2023-03-01 PROCEDURE — 99443 PR PHYS/QHP TELEPHONE EVALUATION 21-30 MIN: CPT | Mod: 95 | Performed by: INTERNAL MEDICINE

## 2023-03-01 NOTE — PROGRESS NOTES
" Interventional Gastroenterology  MD Alexa Leavitt PA-C   [facility] [address], [address]  [address] [place] [address]  [phone]                                              Telemedicine Follow Up    Date: 2023  Patient: Celso Gramajo  : 1955  Age: 67 y.o. male  PCP:Yamila Brunson MD  Referring physician:     The patient affirms they are currently located in the following state: [place]      Request for Consent:  Audio Only Encounter   You and I are about to have a telemedicine check-in or visit. This is allowed because you have requested it. This telemedicine visit will be billed to your health insurance or you, if you are self-insured. You understand you will be responsible for any copayments or coinsurances that apply to your telemedicine visit. Before starting our telemedicine visit, I am required to get your consent for this virtual check-in or visit by telemedicine. Do you consent?   Patient Response to Request for Consent:   Yes    Chief Complaint:   HPI   Celso Gramajo is a 67 y.o. male presenting today in follow-up.  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  Since that time he has chosen to see another interventional endoscopist at an outside institution where he has had an additional 6 dilations.  He describes having dilations to 10 mm, 12 mm, 14 mm, 14 mm, 15 mm, and most recently 15 mm yesterday.  Each time he has his dilation he states that he has some improvement in the symptoms but they seem to quickly return.  The main symptom is a collection of phlegm in the back of his throat that requires him to clear his throat frequently or expectorate a thick material.  It makes him very uncomfortable.  His ability to swallow anything more than liquids is also compromised.  He is very frustrated.  He is able to drink smoothies but states that he \"spits up phlegm all day " "long\".    He is able to maintain his weight.  He states that he has not had a barium swallow although it was recommended to him that he do so to further evaluate and correlate his symptoms with the structure of his esophagus.  Many questions were asked and answered.       ASSESSMENT AND PLAN   Other dysphagia  This patient has dysphagia from radiation for treatment of both thyroid cancer and esophageal cancer.  It appears that he an outside gastroenterologist was able to get to 15 mm with his dilation.  However it seems as if the benefits of the dilation are short-lived.  He is very anxious about considering a stent for a more long-term type dilation experience.  He is very frustrated that dilating to 15 mm leaves him with a large amount of thick phlegm in the back of his throat only a day or so after dilation.  I am not sure that I have anything more to offer him other than dilations at this point.  I would suspect they would need to be frequent and an evaluation to determine if there is any improvement over time would need to be done either by frequent dilations or with barium swallows over time to see if there is an improvement.  He has agreed to do a barium swallow for me to determine whether or not there is a problem with transfer or this is mainly a transit issue.  I am not sure whether he will follow-up with me or follow-up with his other gastroenterologist.  We will find out after the barium swallow.          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 OPEN 2 CAPSULES AND SPRINKLE ON APPLESAUCE OR YOGURT AND TAKE 30 MINUTES BEFORE BREAKFAST; Through a peg tube, Disp: , Rfl:   •  levothyroxine (Tirosint-SoL) 150 mcg/mL solution, 150 mcg by peg tube route daily., Disp: 90 mL, Rfl: 3  •  levothyroxine sodium (TIROSINT) 150 mcg capsule, Take 150 mcg by mouth daily., Disp: , Rfl:   •  liothyronine (CYTOMEL) 5 mcg tablet, Take 1 tablet (5 mcg total) by mouth 2 (two) times a day., Disp: 60 tablet, Rfl: 3    ALLERGIES      Patient has no known allergies.    FAMILY HISTORY      Family History   Problem Relation Age of Onset   • No Known Problems Biological Father    • Lung cancer Biological Sister        SOCIAL HISTORY      Social History     Socioeconomic History   • Marital status:      Spouse name: None   • Number of children: None   • Years of education: None   • Highest education level: None   Tobacco Use   • Smoking status: Never   • Smokeless tobacco: Never   Vaping Use   • Vaping status: Never Used   Substance and Sexual Activity   • Alcohol use: No   • Drug use: No     Social Determinants of Health     Food Insecurity: No Food Insecurity (9/28/2022)    Hunger Vital Sign    • Worried About Running Out of Food in the Last Year: Never true    • Ran Out of Food in the Last Year: Never true       REVIEW OF SYSTEMS      Review of Systems   Constitutional: Negative for activity change, appetite change, fatigue, fever and unexpected weight change.   HENT: Negative for trouble swallowing and voice change.    Respiratory: Negative for cough and shortness of breath.    Cardiovascular: Negative for chest pain.   Gastrointestinal: Negative for abdominal distention, abdominal pain, anal bleeding, blood in stool,  constipation, diarrhea, nausea and vomiting.   Neurological: Negative for dizziness, weakness and light-headedness.     PHYSICAL EXAMINATION      Because of the Covid-19 pandemic, in order to limit patient contact, I did not see and examine the patient directly.     LABS / IMAGING       Labs:   CBC Results       09/28/22 09/25/22 05/10/22     0753 1303 0922    WBC 7.14 11.53 6.57    RBC 4.66 4.54 4.59    HGB 14.7 14.3 14.7    HCT 45.2 43.9 44.1    MCV 97.0 96.7 96.1    MCH 31.5 31.5 32.0    MCHC 32.5 32.6 33.3     153 163        CMP Results       09/28/22 09/25/22 05/10/22     0753 1303 0922     135 137    K 4.0 4.1 4.2    Cl 96 98 99    CO2 32 27 31    Glucose 117 121 103    BUN 15 18 16    Creatinine 0.6 0.7 0.8    Calcium 9.5 8.9 9.7    Anion Gap 9 10 7    EGFR >60.0 >60.0 >60.0         Comment for K at 0753 on 09/28/22: Results obtained on plasma. Plasma Potassium values may be up to 0.4 mEQ/L less than serum values. The differences may be greater for patients with high platelet or white cell counts.    Comment for K at 1303 on 09/25/22: Results obtained on plasma. Plasma Potassium values may be up to 0.4 mEQ/L less than serum values. The differences may be greater for patients with high platelet or white cell counts.    Comment for K at 0922 on 05/10/22: Results obtained on plasma. Plasma Potassium values may be up to 0.4 mEQ/L less than serum values. The differences may be greater for patients with high platelet or white cell counts.        Lab Results   Component Value Date    INR 0.9 06/25/2017      I spent 23 minutes on this date of service performing the following activities: obtaining history, documenting, preparing for visit, obtaining / reviewing records, independently reviewing study/studies and coordinating care.    Ricardo Cabral MD  3/5/2023  9:21 PM

## 2023-03-05 PROBLEM — R13.19 OTHER DYSPHAGIA: Status: ACTIVE | Noted: 2023-03-05

## 2023-03-06 NOTE — ASSESSMENT & PLAN NOTE
This patient has dysphagia from radiation for treatment of both thyroid cancer and esophageal cancer.  It appears that he an outside gastroenterologist was able to get to 15 mm with his dilation.  However it seems as if the benefits of the dilation are short-lived.  He is very anxious about considering a stent for a more long-term type dilation experience.  He is very frustrated that dilating to 15 mm leaves him with a large amount of thick phlegm in the back of his throat only a day or so after dilation.  I am not sure that I have anything more to offer him other than dilations at this point.  I would suspect they would need to be frequent and an evaluation to determine if there is any improvement over time would need to be done either by frequent dilations or with barium swallows over time to see if there is an improvement.  He has agreed to do a barium swallow for me to determine whether or not there is a problem with transfer or this is mainly a transit issue.  I am not sure whether he will follow-up with me or follow-up with his other gastroenterologist.  We will find out after the barium swallow.

## 2023-03-10 ENCOUNTER — TELEPHONE (OUTPATIENT)
Dept: GASTROENTEROLOGY | Facility: CLINIC | Age: 68
End: 2023-03-10
Payer: MEDICARE

## 2023-03-10 NOTE — TELEPHONE ENCOUNTER
After Visit Recommendation    Procedure/Lab/Imaging: Barrium Swallow  Diagnosis:  Dysphagia/Thyroid&Esophageal Cancer    Timeframe: n/a    Next Steps    Sending to: Yudith, to inform that pt will be getting a Barrium Swallow test done to determine if pt will follow-up with  and or the pt's primary GI provider.

## 2023-03-13 DIAGNOSIS — E89.0 POST-SURGICAL HYPOTHYROIDISM: Primary | ICD-10-CM

## 2023-03-14 ENCOUNTER — HOSPITAL ENCOUNTER (OUTPATIENT)
Dept: RADIOLOGY | Facility: HOSPITAL | Age: 68
Discharge: HOME | End: 2023-03-14
Attending: INTERNAL MEDICINE
Payer: MEDICARE

## 2023-03-14 DIAGNOSIS — R13.10 DYSPHAGIA, UNSPECIFIED TYPE: ICD-10-CM

## 2023-03-14 PROCEDURE — 74220 X-RAY XM ESOPHAGUS 1CNTRST: CPT

## 2023-03-14 RX ORDER — LIOTHYRONINE SODIUM 5 UG/1
TABLET ORAL
Qty: 180 TABLET | Refills: 1 | Status: SHIPPED | OUTPATIENT
Start: 2023-03-14 | End: 2023-08-30

## 2023-04-18 NOTE — ASSESSMENT & PLAN NOTE
Likely radiation-induced stricture.  We will schedule upper endoscopy to evaluate.  Further evaluation by barium study will follow.

## 2023-06-28 ENCOUNTER — APPOINTMENT (EMERGENCY)
Dept: RADIOLOGY | Facility: HOSPITAL | Age: 68
End: 2023-06-28
Attending: EMERGENCY MEDICINE
Payer: MEDICARE

## 2023-06-28 ENCOUNTER — HOSPITAL ENCOUNTER (OUTPATIENT)
Facility: HOSPITAL | Age: 68
Setting detail: OBSERVATION
Discharge: HOME | End: 2023-06-29
Attending: EMERGENCY MEDICINE | Admitting: HOSPITALIST
Payer: MEDICARE

## 2023-06-28 DIAGNOSIS — R53.1 GENERALIZED WEAKNESS: ICD-10-CM

## 2023-06-28 DIAGNOSIS — U07.1 COVID-19: Primary | ICD-10-CM

## 2023-06-28 LAB
ALBUMIN SERPL-MCNC: 4.3 G/DL (ref 3.5–5.7)
ALP SERPL-CCNC: 44 IU/L (ref 34–104)
ALT SERPL-CCNC: 15 IU/L (ref 7–52)
ANION GAP SERPL CALC-SCNC: 6 MEQ/L (ref 3–15)
AST SERPL-CCNC: 17 IU/L (ref 13–39)
BACTERIA URNS QL MICRO: ABNORMAL /HPF
BASOPHILS # BLD: 0.03 K/UL (ref 0.01–0.1)
BASOPHILS NFR BLD: 0.6 %
BILIRUB SERPL-MCNC: 0.6 MG/DL (ref 0.3–1)
BILIRUB UR QL STRIP.AUTO: NEGATIVE MG/DL
BUN SERPL-MCNC: 16 MG/DL (ref 7–25)
CALCIUM SERPL-MCNC: 9.3 MG/DL (ref 8.6–10.3)
CHLORIDE SERPL-SCNC: 103 MEQ/L (ref 98–107)
CLARITY UR REFRACT.AUTO: CLEAR
CO2 SERPL-SCNC: 29 MEQ/L (ref 21–31)
COLOR UR AUTO: YELLOW
CREAT SERPL-MCNC: 0.8 MG/DL (ref 0.7–1.3)
DIFFERENTIAL METHOD BLD: ABNORMAL
EOSINOPHIL # BLD: 0.01 K/UL (ref 0.04–0.54)
EOSINOPHIL NFR BLD: 0.2 %
ERYTHROCYTE [DISTWIDTH] IN BLOOD BY AUTOMATED COUNT: 12.7 % (ref 11.6–14.4)
FLUAV RNA SPEC QL NAA+PROBE: NEGATIVE
FLUBV RNA SPEC QL NAA+PROBE: NEGATIVE
GFR SERPL CREATININE-BSD FRML MDRD: >60 ML/MIN/1.73M*2
GLUCOSE BLD-MCNC: 129 MG/DL (ref 70–99)
GLUCOSE SERPL-MCNC: 112 MG/DL (ref 70–99)
GLUCOSE UR STRIP.AUTO-MCNC: NEGATIVE MG/DL
HCT VFR BLDCO AUTO: 44.3 % (ref 40.1–51)
HGB BLD-MCNC: 14.5 G/DL (ref 13.7–17.5)
HGB UR QL STRIP.AUTO: NEGATIVE
HYALINE CASTS #/AREA URNS LPF: ABNORMAL /LPF
IMM GRANULOCYTES # BLD AUTO: 0.03 K/UL (ref 0–0.08)
IMM GRANULOCYTES NFR BLD AUTO: 0.6 %
KETONES UR STRIP.AUTO-MCNC: NEGATIVE MG/DL
LACTATE SERPL-SCNC: 1.1 MMOL/L (ref 0.4–2)
LEUKOCYTE ESTERASE UR QL STRIP.AUTO: NEGATIVE
LYMPHOCYTES # BLD: 0.28 K/UL (ref 1.2–3.5)
LYMPHOCYTES NFR BLD: 5.2 %
MCH RBC QN AUTO: 31 PG (ref 28–33.2)
MCHC RBC AUTO-ENTMCNC: 32.7 G/DL (ref 32.2–36.5)
MCV RBC AUTO: 94.9 FL (ref 83–98)
MONOCYTES # BLD: 0.75 K/UL (ref 0.3–1)
MONOCYTES NFR BLD: 13.9 %
MUCOUS THREADS URNS QL MICRO: ABNORMAL /LPF
NEUTROPHILS # BLD: 4.3 K/UL (ref 1.7–7)
NEUTS SEG NFR BLD: 79.5 %
NITRITE UR QL STRIP.AUTO: NEGATIVE
NRBC BLD-RTO: 0 %
PDW BLD AUTO: 11.6 FL (ref 9.4–12.4)
PH UR STRIP.AUTO: 8 [PH]
PLATELET # BLD AUTO: 152 K/UL (ref 150–350)
POCT TEST: ABNORMAL
POTASSIUM SERPL-SCNC: 3.9 MEQ/L (ref 3.5–5.1)
PROT SERPL-MCNC: 7 G/DL (ref 6.4–8.9)
PROT UR QL STRIP.AUTO: ABNORMAL
RBC # BLD AUTO: 4.67 M/UL (ref 4.5–5.8)
RBC #/AREA URNS HPF: ABNORMAL /HPF
RSV RNA SPEC QL NAA+PROBE: NEGATIVE
SARS-COV-2 RNA RESP QL NAA+PROBE: POSITIVE
SODIUM SERPL-SCNC: 138 MEQ/L (ref 136–145)
SP GR UR REFRACT.AUTO: 1.02
SQUAMOUS URNS QL MICRO: ABNORMAL /HPF
TROPONIN I SERPL HS-MCNC: 6.9 PG/ML
TROPONIN I SERPL HS-MCNC: 9.2 PG/ML
UROBILINOGEN UR STRIP-ACNC: 2 EU/DL
WBC # BLD AUTO: 5.4 K/UL (ref 3.8–10.5)
WBC #/AREA URNS HPF: ABNORMAL /HPF

## 2023-06-28 PROCEDURE — 84484 ASSAY OF TROPONIN QUANT: CPT | Performed by: EMERGENCY MEDICINE

## 2023-06-28 PROCEDURE — 85379 FIBRIN DEGRADATION QUANT: CPT | Performed by: HOSPITALIST

## 2023-06-28 PROCEDURE — 85025 COMPLETE CBC W/AUTO DIFF WBC: CPT | Performed by: EMERGENCY MEDICINE

## 2023-06-28 PROCEDURE — 71046 X-RAY EXAM CHEST 2 VIEWS: CPT

## 2023-06-28 PROCEDURE — 81001 URINALYSIS AUTO W/SCOPE: CPT | Performed by: EMERGENCY MEDICINE

## 2023-06-28 PROCEDURE — 87637 SARSCOV2&INF A&B&RSV AMP PRB: CPT | Performed by: EMERGENCY MEDICINE

## 2023-06-28 PROCEDURE — 36415 COLL VENOUS BLD VENIPUNCTURE: CPT | Performed by: EMERGENCY MEDICINE

## 2023-06-28 PROCEDURE — 83605 ASSAY OF LACTIC ACID: CPT | Performed by: EMERGENCY MEDICINE

## 2023-06-28 PROCEDURE — 25800000 HC PHARMACY IV SOLUTIONS: Performed by: EMERGENCY MEDICINE

## 2023-06-28 PROCEDURE — 85730 THROMBOPLASTIN TIME PARTIAL: CPT | Performed by: HOSPITALIST

## 2023-06-28 PROCEDURE — 86140 C-REACTIVE PROTEIN: CPT | Performed by: HOSPITALIST

## 2023-06-28 PROCEDURE — 85610 PROTHROMBIN TIME: CPT | Performed by: HOSPITALIST

## 2023-06-28 PROCEDURE — 99223 1ST HOSP IP/OBS HIGH 75: CPT | Performed by: HOSPITALIST

## 2023-06-28 PROCEDURE — 87040 BLOOD CULTURE FOR BACTERIA: CPT | Performed by: EMERGENCY MEDICINE

## 2023-06-28 PROCEDURE — 80053 COMPREHEN METABOLIC PANEL: CPT | Performed by: EMERGENCY MEDICINE

## 2023-06-28 PROCEDURE — 84484 ASSAY OF TROPONIN QUANT: CPT | Mod: 91 | Performed by: EMERGENCY MEDICINE

## 2023-06-28 PROCEDURE — 96360 HYDRATION IV INFUSION INIT: CPT

## 2023-06-28 PROCEDURE — 63700000 HC SELF-ADMINISTRABLE DRUG

## 2023-06-28 PROCEDURE — 83615 LACTATE (LD) (LDH) ENZYME: CPT | Performed by: HOSPITALIST

## 2023-06-28 PROCEDURE — 82728 ASSAY OF FERRITIN: CPT | Performed by: HOSPITALIST

## 2023-06-28 PROCEDURE — 99285 EMERGENCY DEPT VISIT HI MDM: CPT | Mod: 25

## 2023-06-28 PROCEDURE — 93005 ELECTROCARDIOGRAM TRACING: CPT | Performed by: EMERGENCY MEDICINE

## 2023-06-28 RX ORDER — DEXTROSE 40 %
15-30 GEL (GRAM) ORAL AS NEEDED
Status: DISCONTINUED | OUTPATIENT
Start: 2023-06-28 | End: 2023-06-29 | Stop reason: HOSPADM

## 2023-06-28 RX ORDER — ACETAMINOPHEN 325 MG/1
975 TABLET ORAL ONCE
Status: COMPLETED | OUTPATIENT
Start: 2023-06-28 | End: 2023-06-28

## 2023-06-28 RX ORDER — ACETAMINOPHEN 650 MG/20.3ML
650 LIQUID ORAL EVERY 4 HOURS PRN
Status: DISCONTINUED | OUTPATIENT
Start: 2023-06-28 | End: 2023-06-29 | Stop reason: HOSPADM

## 2023-06-28 RX ORDER — IBUPROFEN 600 MG/1
600 TABLET ORAL ONCE
Status: DISCONTINUED | OUTPATIENT
Start: 2023-06-28 | End: 2023-06-28

## 2023-06-28 RX ORDER — IBUPROFEN 200 MG
16-32 TABLET ORAL AS NEEDED
Status: DISCONTINUED | OUTPATIENT
Start: 2023-06-28 | End: 2023-06-29 | Stop reason: HOSPADM

## 2023-06-28 RX ORDER — ALBUTEROL SULFATE 90 UG/1
2 INHALANT RESPIRATORY (INHALATION) EVERY 6 HOURS PRN
Status: DISCONTINUED | OUTPATIENT
Start: 2023-06-28 | End: 2023-06-29 | Stop reason: HOSPADM

## 2023-06-28 RX ORDER — LIOTHYRONINE SODIUM 5 UG/1
5 TABLET ORAL 2 TIMES DAILY
Status: DISCONTINUED | OUTPATIENT
Start: 2023-06-29 | End: 2023-06-29 | Stop reason: HOSPADM

## 2023-06-28 RX ORDER — ACETAMINOPHEN 325 MG/1
650 TABLET ORAL EVERY 4 HOURS PRN
Status: DISCONTINUED | OUTPATIENT
Start: 2023-06-28 | End: 2023-06-29 | Stop reason: HOSPADM

## 2023-06-28 RX ORDER — ENOXAPARIN SODIUM 100 MG/ML
40 INJECTION SUBCUTANEOUS
Status: DISCONTINUED | OUTPATIENT
Start: 2023-06-29 | End: 2023-06-29 | Stop reason: HOSPADM

## 2023-06-28 RX ORDER — DEXTROSE 50 % IN WATER (D50W) INTRAVENOUS SYRINGE
25 AS NEEDED
Status: DISCONTINUED | OUTPATIENT
Start: 2023-06-28 | End: 2023-06-29 | Stop reason: HOSPADM

## 2023-06-28 RX ORDER — ACETAMINOPHEN 650 MG/1
650 SUPPOSITORY RECTAL EVERY 4 HOURS PRN
Status: DISCONTINUED | OUTPATIENT
Start: 2023-06-28 | End: 2023-06-29 | Stop reason: HOSPADM

## 2023-06-28 RX ADMIN — ACETAMINOPHEN 975 MG: 325 TABLET ORAL at 21:10

## 2023-06-28 RX ADMIN — SODIUM CHLORIDE 1000 ML: 9 INJECTION, SOLUTION INTRAVENOUS at 19:24

## 2023-06-28 ASSESSMENT — ENCOUNTER SYMPTOMS: FEVER: 1

## 2023-06-28 NOTE — ED ATTESTATION NOTE
I have personally seen and examined the patient. I personally performed the key components of the encounter and provided a substantive portion of the care and medical decision making. I reviewed and agree with the physician assistant’s assessment and plan of care, except as where stated below.    My examination, assessment, and plan of care of Celso Gramajo is as follows:     Patient is a 67-year-old male with a past medical history of esophageal cancer in remission who presents the emergency department for evaluation of a fever.  Patient had an esophageal dilation about 3 weeks ago which was uneventful.  States that he has had some mucus production for years that has not really been changed since the procedure.  However, rather abruptly 2 nights ago he started to feel unwell.  Describes flulike symptoms with fever, chills, myalgias associate with generalized weakness.  No difficulty breathing, cough, congestion.  No abdominal pain, headache.  No nausea, vomiting, diarrhea.  No rashes.    On exam, patient is awake, alert, overall well-appearing.  Heart rate is tachycardic, regular.  Respirations are nonlabored, lungs are clear.  Abdomen is soft, nontender.  No focal neurologic deficits.    On arrival, patient is febrile, tachycardic, hypertensive.  Complains of flulike symptoms.  No clear localizing source.  It sounds like he coughs mucus states that ever since he got radiation for his esophageal cancer.  He is never had aspiration previously.  We will check lab work including cultures, swab for COVID/flu/RSV, check chest x-ray give IV fluids, treat his fever and reassess     Mihaela Rodríguez MD  06/28/23 1893

## 2023-06-29 VITALS
OXYGEN SATURATION: 95 % | BODY MASS INDEX: 25.67 KG/M2 | HEIGHT: 74 IN | DIASTOLIC BLOOD PRESSURE: 81 MMHG | HEART RATE: 88 BPM | WEIGHT: 200 LBS | SYSTOLIC BLOOD PRESSURE: 150 MMHG | RESPIRATION RATE: 26 BRPM | TEMPERATURE: 98.4 F

## 2023-06-29 LAB
ALBUMIN SERPL-MCNC: 3.9 G/DL (ref 3.5–5.7)
ALP SERPL-CCNC: 37 IU/L (ref 34–104)
ALT SERPL-CCNC: 13 IU/L (ref 7–52)
ANION GAP SERPL CALC-SCNC: 4 MEQ/L (ref 3–15)
APTT PPP: 27 SEC (ref 23–35)
AST SERPL-CCNC: 18 IU/L (ref 13–39)
ATRIAL RATE: 110
BASOPHILS # BLD: 0.03 K/UL (ref 0.01–0.1)
BASOPHILS NFR BLD: 1 %
BILIRUB SERPL-MCNC: 0.5 MG/DL (ref 0.3–1)
BUN SERPL-MCNC: 14 MG/DL (ref 7–25)
CALCIUM SERPL-MCNC: 8.8 MG/DL (ref 8.6–10.3)
CHLORIDE SERPL-SCNC: 106 MEQ/L (ref 98–107)
CO2 SERPL-SCNC: 28 MEQ/L (ref 21–31)
CREAT SERPL-MCNC: 0.8 MG/DL (ref 0.7–1.3)
CRP SERPL-MCNC: 47 MG/L
D DIMER PPP IA.FEU-MCNC: 0.45 UG/ML FEU (ref 0–0.5)
DIFFERENTIAL METHOD BLD: ABNORMAL
EOSINOPHIL # BLD: 0.03 K/UL (ref 0.04–0.54)
EOSINOPHIL NFR BLD: 1 %
ERYTHROCYTE [DISTWIDTH] IN BLOOD BY AUTOMATED COUNT: 13 % (ref 11.6–14.4)
FERRITIN SERPL-MCNC: 74 NG/ML (ref 24–250)
GFR SERPL CREATININE-BSD FRML MDRD: >60 ML/MIN/1.73M*2
GLUCOSE SERPL-MCNC: 97 MG/DL (ref 70–99)
HCT VFR BLDCO AUTO: 42.7 % (ref 40.1–51)
HGB BLD-MCNC: 13.7 G/DL (ref 13.7–17.5)
INR PPP: 1.2
LDH SERPL L TO P-CCNC: 150 IU/L (ref 140–271)
LYMPHOCYTES # BLD: 0.36 K/UL (ref 1.2–3.5)
LYMPHOCYTES NFR BLD: 11 %
MCH RBC QN AUTO: 30.8 PG (ref 28–33.2)
MCHC RBC AUTO-ENTMCNC: 32.1 G/DL (ref 32.2–36.5)
MCV RBC AUTO: 96 FL (ref 83–98)
MONOCYTES # BLD: 0.49 K/UL (ref 0.3–1)
MONOCYTES NFR BLD: 15 %
NEUTS BAND # BLD: 2.33 K/UL (ref 1.7–7)
NEUTS SEG NFR BLD: 72 %
P AXIS: 70
PDW BLD AUTO: 11.6 FL (ref 9.4–12.4)
PLAT MORPH BLD: NORMAL
PLATELET # BLD AUTO: 128 K/UL (ref 150–350)
PLATELET # BLD EST: ABNORMAL 10*3/UL
POTASSIUM SERPL-SCNC: 4 MEQ/L (ref 3.5–5.1)
PR INTERVAL: 162
PROT SERPL-MCNC: 6.5 G/DL (ref 6.4–8.9)
PROTHROMBIN TIME: 14.7 SEC (ref 12.2–14.5)
QRS DURATION: 82
QT INTERVAL: 326
QTC CALCULATION(BAZETT): 441
R AXIS: -42
RBC # BLD AUTO: 4.45 M/UL (ref 4.5–5.8)
RBC MORPH BLD: NORMAL
SODIUM SERPL-SCNC: 138 MEQ/L (ref 136–145)
T WAVE AXIS: 57
VENTRICULAR RATE: 110
WBC # BLD AUTO: 3.24 K/UL (ref 3.8–10.5)

## 2023-06-29 PROCEDURE — 85025 COMPLETE CBC W/AUTO DIFF WBC: CPT | Performed by: HOSPITALIST

## 2023-06-29 PROCEDURE — 36415 COLL VENOUS BLD VENIPUNCTURE: CPT | Performed by: HOSPITALIST

## 2023-06-29 PROCEDURE — 80053 COMPREHEN METABOLIC PANEL: CPT | Performed by: HOSPITALIST

## 2023-06-29 PROCEDURE — 99239 HOSP IP/OBS DSCHRG MGMT >30: CPT | Performed by: STUDENT IN AN ORGANIZED HEALTH CARE EDUCATION/TRAINING PROGRAM

## 2023-06-29 PROCEDURE — G0378 HOSPITAL OBSERVATION PER HR: HCPCS

## 2023-06-29 ASSESSMENT — ENCOUNTER SYMPTOMS
PHOTOPHOBIA: 0
FREQUENCY: 0
DIZZINESS: 0
AGITATION: 0
DIARRHEA: 0
COUGH: 0
LIGHT-HEADEDNESS: 1
PALPITATIONS: 0
WEAKNESS: 1
VOMITING: 0
HEADACHES: 0
APPETITE CHANGE: 0
DIAPHORESIS: 1
NECK PAIN: 0
FEVER: 1
BLOOD IN STOOL: 0
NAUSEA: 0
MYALGIAS: 0
COUGH: 1
DYSURIA: 0
CHILLS: 1
BACK PAIN: 0
HEMATURIA: 0
ARTHRALGIAS: 0
FATIGUE: 1
SORE THROAT: 0
RHINORRHEA: 0
ABDOMINAL PAIN: 0
SHORTNESS OF BREATH: 0

## 2023-06-29 NOTE — ASSESSMENT & PLAN NOTE
X2 status post thyroidectomy and radiation  Continue with levothyroxine okay for patient to take oral medication since needs liquid form PEG  Continue with Cytomel okay for patient to take own medication  Status post PEG placement  Okay for patient to use own feeding regimen

## 2023-06-29 NOTE — H&P
Hospital Medicine Service -  History & Physical        CHIEF COMPLAINT   Flu like sx x1 day      HISTORY OF PRESENT ILLNESS      Celso Gramajo is a 67 y.o. male with a past medical history of thyroid cancer status post tumor removal and radiation, esophageal cancer status post radiation, dysphagia status post PEG placement 12 years ago, postoperative hypothyroidism who presents with flulike symptoms x1 day.  Patient says secondary to all radiation he has been left very weak and has visiting nurse who comes in.  He states that last night he suddenly started feeling unwell with chills fevers body aches.  During the day the visiting nurse was monitoring patient and noted his vital signs were off patient was hypertensive in the 170s.  He states that he does not recall what his oxygen saturation was.  After I told him what his normal sat was (92 to 100%) he stated his oxygen sat must of been 91%.  Visiting nurse encouraged him to come to the hospital.  He denies any shortness of breath.  He has a chronic cough due to his dysphagia and inability to swallow his saliva.  He at baseline uses a PEG feeding and has no nausea vomiting or diarrhea.    In ER patient was found to be satting 92 to 93% on room air.  He was tachycardic and febrile.  They did labs and viral panel on him.  Labs returned unremarkable.  Viral panel was positive for COVID-19 infection.  Patient has gone first two doses of COVID-19 vaccine but has never received boosters due to fact that his PCP thought it was too high risk for him to chance cardiac side effects.  ER and plan to discharge patient home with prescription for Paxlovid.  Patient's wife is a oral maxillofacial surgeon was uncomfortable with this due to the fact that patient has tracheal [place] after radiation and felt that if patient suddenly got worse he could have airway compromise.  Wife had spoken to patient's PCP who had also encouraged her not to have patient discharged home due to  his significant past medical history.  ER consulted [facility].  [facility] patient tachycardic and has risk factors agreed to obstipation.    Wife was uncomfortable with patient not receiving antivirals till a.m.  She insisted upon ID being contacted since COVID antiviral medication is ID restricted in the inpatient setting.  I spoke with Dr. Mane who stated none will be prescribed tonight and no need to start steroids at this time.  ID is consulted to see patient in a.m.    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 Authorizing Provider   nirmatrelvir-ritonavir (PAXLOVID) tablets,dose pack dose package Take 3 tablets by mouth 2 (two) times a day for 5 days. Take nirmatrelvir 300 mg (TWO 150mg tablets) PO PLUS ritonavir 100 mg (ONE 100mg tablet) by mouth, with all three tablets taken together twice daily 6/28/23 7/3/23 Yes Nabila Singh PA C   levothyroxine sodium (TIROSINT) 150 mcg capsule Take 150 mcg by mouth daily.    Provider, pradeep Horton MD   liothyronine (CYTOMEL) 5 mcg tablet TAKE 1 TABLET BY MOUTH 2 TIMES A DAY. 3/14/23   Lexi Borrero MD       ALLERGIES      Patient has no known allergies.    FAMILY HISTORY      Family History   Problem Relation Age of Onset   • No Known Problems Biological Father    • Lung cancer Biological Sister        SOCIAL HISTORY      Social History     Socioeconomic History   • Marital status:    Tobacco Use   • Smoking status: Never   •  "Smokeless tobacco: Never   Vaping Use   • Vaping Use: Never used   Substance and Sexual Activity   • Alcohol use: No   • Drug use: No     Social Determinants of Health     Food Insecurity: No Food Insecurity (9/28/2022)    Hunger Vital Sign    • Worried About Running Out of Food in the Last Year: Never true    • Ran Out of Food in the Last Year: Never true       REVIEW OF SYSTEMS      Review of Systems   Constitutional: Positive for chills, fatigue and fever.   HENT: Negative for congestion and sore throat.    Eyes: Negative for photophobia and visual disturbance.   Respiratory: Negative for cough and shortness of breath.    Cardiovascular: Negative for chest pain, palpitations and leg swelling.   Gastrointestinal: Negative for abdominal pain, diarrhea, nausea and vomiting.   Genitourinary: Negative for dysuria and frequency.   Musculoskeletal: Negative for back pain and neck pain.   Skin: Negative for rash.   Neurological: Positive for weakness. Negative for dizziness and headaches.   Psychiatric/Behavioral: Negative for agitation and behavioral problems.       PHYSICAL EXAMINATION      Temp:  [38.3 °C (100.9 °F)-39.1 °C (102.4 °F)] 39.1 °C (102.4 °F)  Heart Rate:  [108-115] 110  Resp:  [23-25] 23  BP: (139-165)/(59-74) 139/59  Body mass index is 25.68 kg/m².    Visit Vitals  BP (!) 139/59   Pulse (!) 110   Temp (!) 39.1 °C (102.4 °F) (Tympanic)   Resp (!) 23   Ht 1.88 m (6' 2\")   Wt 90.7 kg (200 lb)   SpO2 93%   BMI 25.68 kg/m²       General Appearance:    Alert,  no distress, appears stated age, ill appearing   Head:    Normocephalic, without obvious abnormality, atraumatic   Eyes:    PERRL, conjunctiva/corneas clear, EOM's intact             Throat:   Lips, mucosa, and tongue normal; teeth and gums normal   Neck:   Supple, symmetrical, trachea midline, neck is equal due to patient's thyroidectomy for increased dissection of the left side   Back:     Symmetric, no curvature, ROM normal, no CVA tenderness   Lungs: "     Clear to auscultation bilaterally, respirations unlabored, of note patient does have nasal cannula on but it appears it is only in 1 nare and he keeps adjusting it.  He satting 96 to 97%.   Chest wall:    No tenderness or deformity   Heart:   Tachycardic and regular  Rhythm, S1 and S2 normal,   Abdomen:     Soft, non-tender, bowel sounds active all four quadrants,     no masses, no organomegaly, positive PEG           Extremities:   Extremities normal, atraumatic, no cyanosis or edema   Pulses:   2+ and symmetric all extremities   Skin:   Skin color, texture, turgor normal, no rashes or lesions       Neurologic:   CNII-XII intact. Normal strength, alert and oriented to person place time and situation     LABS / IMAGING / EKG        Labs  CBC Results       06/28/23 09/28/22 09/25/22 1818 0753 1303    WBC 5.40 7.14 11.53    RBC 4.67 4.66 4.54    HGB 14.5 14.7 14.3    HCT 44.3 45.2 43.9    MCV 94.9 97.0 96.7    MCH 31.0 31.5 31.5    MCHC 32.7 32.5 32.6     189 153         Comment for PLT at 1818 on 06/28/23: ALL RESULTS HAVE BEEN RECHECKED. SPECIMEN QUALITY CHECKED        CMP Results       06/28/23 09/28/22 09/25/22 1818 0753 1303     137 135    K 3.9 4.0 4.1    Cl 103 96 98    CO2 29 32 27    Glucose 112 117 121    BUN 16 15 18    Creatinine 0.8 0.6 0.7    Calcium 9.3 9.5 8.9    Anion Gap 6 9 10    AST 17 -- --    ALT 15 -- --    Albumin 4.3 -- --    EGFR >60.0 >60.0 >60.0         Comment for K at 1818 on 06/28/23: Results obtained on plasma. Plasma Potassium values may be up to 0.4 mEQ/L less than serum values. The differences may be greater for patients with high platelet or white cell counts.    Comment for K at 0753 on 09/28/22: Results obtained on plasma. Plasma Potassium values may be up to 0.4 mEQ/L less than serum values. The differences may be greater for patients with high platelet or white cell counts.    Comment for K at 1303 on 09/25/22: Results obtained on plasma. Plasma  Potassium values may be up to 0.4 mEQ/L less than serum values. The differences may be greater for patients with high platelet or white cell counts.        Troponin I Results       06/28/23 06/28/23 09/25/22 2053 1818 1303    HS Troponin I 9.2 6.9 <2.0        Microbiology Results     Procedure Component Value Units Date/Time    SARS-CoV-2 (COVID-19), PCR Nasopharynx [[id number]]  (Abnormal) Collected: 06/28/23 1822    Specimen: Nasopharyngeal Swab from Nasopharynx Updated: 06/28/23 1911    Narrative:      The following orders were created for panel order SARS-CoV-2 (COVID-19), PCR Nasopharynx.  Procedure                               Abnormality         Status                     ---------                               -----------         ------                     SARS-COV-2 (COVID-19)/ F...[[id number]]  Abnormal            Final result                 Please view results for these tests on the individual orders.    SARS-COV-2 (COVID-19)/ FLU A/B, AND RSV, PCR Nasopharynx [[id number]]  (Abnormal) Collected: 06/28/23 1822    Specimen: Nasopharyngeal Swab from Nasopharynx Updated: 06/28/23 1911     SARS-CoV-2 (COVID-19) Positive     Influenza A Negative     Influenza B Negative     Respiratory Syncytial Virus Negative    Narrative:      Testing performed using real-time PCR for detection of COVID-19. EUA approved validation studies performed on site.         UA Results       06/28/23 2055    Color Yellow    Clarity Clear    Glucose Negative    Bilirubin Negative    Ketones Negative    Sp Grav 1.020    Blood Negative    Ph 8.0    Protein Trace    Urobilinogen 2.0    Nitrite Negative    Leuk Est Negative    WBC 0 TO 3    RBC 5 TO 9    Bacteria None Seen         Comment for Blood at 2055 on 06/28/23: The sensitivity of the occult blood test is equivalent to approximately 4 intact RBC/HPF.    Comment for Protein at 2055 on 06/28/23: Trace False Positive Protein can be seen with alkaline or highly buffered urines or  urine with high specific gravity. Suggest clinical correlation.    Comment for Leuk Est at 2055 on 06/28/23: Results can be falsely negative due to high specific gravity, some antibiotics, glucose >3 g/dl, or WBC other than neutrophils.            Imaging  X-RAY CHEST 2 VIEWS    Result Date: 6/28/2023  IMPRESSION: No acute disease appreciated        ECG/Telemetry  I have independently reviewed the ECG. Significant findings include Sinus tach rate of 110 QTc of 441.    ASSESSMENT AND PLAN           COVID-19  Assessment & Plan  Pt has been saturating 92-93% RA but has been self placing NC  Wife insisting ID be called tonight for antiviral agents to be started because hx of significant neck radiation  Case d/w Dr. Mane who stated will not order antivirals tonight and no need to start decadron   Will consult ID to see in AM  obs tele   Monitor sats   Pt elevated risk if he decompensates  Only received first 2 doses covid vaccine and has not had booster due to PCP concern for cardiac side effects   Tylenol as needed for fevers     Thyroid cancer (CMS/HCC)  Assessment & Plan  X2 status post thyroidectomy and radiation  Continue with levothyroxine okay for patient to take oral medication since needs liquid form PEG  Continue with Cytomel okay for patient to take own medication  Status post PEG placement  Okay for patient to use own feeding regimen    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 Inadvertent speech recognition errors should be disregarded.  Verito Gonzalez, DO  6/29/2023

## 2023-06-29 NOTE — DISCHARGE SUMMARY
Hospital Medicine Service -  Inpatient Discharge Summary        BRIEF OVERVIEW   Admitting Provider: Verito Gonzalez DO  Attending Provider: Mihaela Rodríguez MD;Gowd* Attending phys phone: N/A    PCP: Yamila Brunson [phone]    Admission Date: 6/28/2023  Discharge Date: 6/29/2023     DISCHARGE DIAGNOSES      Primary Discharge Diagnosis  No Principal Problem: There is no principal problem currently on the Problem List. Please update the Problem List and refresh.    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       Problem List on Day of Discharge  COVID-19  Assessment & Plan  Pt has been saturating 92-93% RA but has been self placing NC  Wife insisting ID be called tonight for antiviral agents to be started because hx of significant neck radiation  Case d/w Dr. Mane who stated will not order antivirals tonight and no need to start decadron   Will consult ID to see in AM  obs tele   Monitor sats   Pt elevated risk if he decompensates  Only received first 2 doses covid vaccine and has not had booster due to PCP concern for cardiac side effects   Tylenol as needed for fevers     Thyroid cancer (CMS/HCC)  Assessment & Plan  X2 status post thyroidectomy and radiation  Continue with levothyroxine okay for patient to take oral medication since needs liquid form PEG  Continue with Cytomel okay for patient to take own medication  Status post PEG placement  Okay for patient to use own feeding regimen    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 year-old male admitted on 6/28/2023 with COVID-19 [U07.1].    Per admission H&P:  \"Celso Gramajo is a 67 y.o. male with a past medical history of thyroid cancer status post tumor removal and radiation, esophageal cancer status post radiation, dysphagia status post PEG placement 12 years ago, postoperative hypothyroidism who presents with flulike symptoms x1 day.  Patient says secondary to all radiation he has been left very weak and has visiting nurse who comes in.  He states that last night he suddenly started feeling unwell with chills fevers body aches.  During the day the visiting nurse was monitoring patient and noted his vital signs were off patient was hypertensive in the 170s.  He states that he does not recall what his oxygen saturation was.  After I told him what his normal sat was (92 to 100%) he stated his oxygen sat must of been 91%.  Visiting nurse encouraged him to come to the hospital.  He denies any shortness of breath.  He has a chronic cough due to his dysphagia and inability to swallow his saliva.  He at baseline uses a PEG feeding and has no nausea vomiting or diarrhea.     In ER patient was found to be satting 92 to 93% on room air.  He was tachycardic and febrile.  They did labs and viral panel on him.  Labs returned unremarkable.  Viral panel was positive for COVID-19 infection.  Patient has gone first two doses of COVID-19 vaccine but has never received boosters due to fact that his PCP thought it was too high risk for him to chance cardiac side effects.  ER and plan to discharge patient home with prescription for Paxlovid.  Patient's wife is a oral maxillofacial surgeon was uncomfortable with this due to the fact that patient has tracheal [place] after radiation and felt that if patient suddenly got worse he " "could have airway compromise.  Wife had spoken to patient's PCP who had also encouraged her not to have patient discharged home due to his significant past medical history.  ER consulted [facility].  Kale patient tachycardic and has risk factors agreed to obstipation.     Wife was uncomfortable with patient not receiving antivirals till a.m.  She insisted upon ID being contacted since COVID antiviral medication is ID restricted in the inpatient setting.  I spoke with Dr. Mane who stated none will be prescribed tonight and no need to start steroids at this time.  ID is consulted to see patient in a.m.\"    Hospital Course  Patient was admitted to observation overnight and remained stable, afebrile with Tylenol and continuing oxygen saturation at 94 to 95% on room air (Difficult to assess ambulatory oxygenation in setting of general deconditioning in setting of his other comorbidities.)  Given these which did not meet criteria for IV steroids or home oxygen and as COVID antiviral medication was restricted in the inpatient setting to infectious disease doctors, patient's spouse requested patient be discharged early in the morning on 6/29/2023 so that she can  the Paxlovid from the pharmacy to be administered at home. Strict precautions to return to the emergency room were provided by the ED staff as well as by [facility].  Blood cultures obtained in the emergency room will need to be followed up to negative upon discharge.  As patient's PCP is not through [facility], patient's wife, who has access to patient's MyChart, was instructed to check for blood culture results twice daily and to seek medical care if they are positive in case ordering providers did not get to results on time.  Patient and patient's wife report understanding of the plan.    Exam on Day of Discharge  Physical Exam   General: NAD, calm, chronically ill appearing  HEENT: Supple, symmetrical, trachea midline, neck is equal due to patient's thyroidectomy for " increased dissection of the left side  Cardiovascular: RRR, no murmurs; S1/S2+  Pulmonary: CTAB; no rales, rhonchi or wheezing, satting 96% on RA  Gi: Soft, nontender, nondistended, BS present, +PEG  Ext: no significant peripheral edema  Neuro: Alert and oriented x3; no sensory or motor deficits  Psych: Calm, cooperative, appropriate answers    Consults During Admission  IP CONSULT TO INFECTIOUS DISEASE    DISCHARGE MEDICATIONS        Medication List      START taking these medications    nirmatrelvir-ritonavir tablets,dose pack dose package  Commonly known as: PAXLOVID  Take 3 tablets by mouth 2 (two) times a day for 5 days. Take nirmatrelvir 300 mg (TWO 150mg tablets) PO PLUS ritonavir 100 mg (ONE 100mg tablet) by mouth, with all three tablets taken together twice daily  Dose: 3 tablet        CONTINUE taking these medications    levothyroxine sodium 150 mcg capsule  Commonly known as: TIROSINT  Take 150 mcg by mouth daily.  Dose: 150 mcg     liothyronine 5 mcg tablet  Commonly known as: CYTOMEL  TAKE 1 TABLET BY MOUTH 2 TIMES A DAY.             Instructions for after discharge     Call provider for:  difficulty breathing, headache or visual disturbances      Call provider for:  extreme fatigue      Call provider for:  persistent dizziness or light-headedness      Call provider for:  persistent nausea or vomiting      Call provider for:  redness, tenderness, or signs of infection (pain, swelling, redness, odor or green/yellow discharge around incision site)      Call provider for:  severe uncontrolled pain      Call provider for: blood sugar change      Call provider for blood sugar less than 70 or greater than 350    Follow-up with primary physician (PCP)      Follow up with your PCP and/or oncologist within 1 week to discuss hospital course and medication changes    Post-Discharge Activity: Normal activity as tolerated.      Normal activity as tolerated.             PROCEDURES / LABS / IMAGING      Operative  Procedures  None     Other Procedures  none    Pertinent Labs  Results from last 7 days   Lab Units 06/29/23  0600 06/28/23  1818   SODIUM mEQ/L 138 138   POTASSIUM mEQ/L 4.0 3.9   CHLORIDE mEQ/L 106 103   CO2 mEQ/L 28 29   BUN mg/dL 14 16   CREATININE mg/dL 0.8 0.8   GLUCOSE mg/dL 97 112*     Results from last 7 days   Lab Units 06/29/23  0600 06/28/23  1818   ALBUMIN g/dL 3.9 4.3   BILIRUBIN TOTAL mg/dL 0.5 0.6   ALK PHOS IU/L 37 44   ALT IU/L 13 15   AST IU/L 18 17     Results from last 7 days   Lab Units 06/29/23  0600 06/28/23  1818   WBC K/uL 3.24* 5.40   HEMOGLOBIN g/dL 13.7 14.5   PLATELETS K/uL 128* 152     Results from last 7 days   Lab Units 06/28/23  2355   PTT sec 27   INR  1.2     Microbiology Results     Procedure Component Value Units Date/Time    SARS-CoV-2 (COVID-19), PCR Nasopharynx [[id number]]  (Abnormal) Collected: 06/28/23 1822    Specimen: Nasopharyngeal Swab from Nasopharynx Updated: 06/28/23 1911    Narrative:      The following orders were created for panel order SARS-CoV-2 (COVID-19), PCR Nasopharynx.  Procedure                               Abnormality         Status                     ---------                               -----------         ------                     SARS-COV-2 (COVID-19)/ F...[[id number]]  Abnormal            Final result                 Please view results for these tests on the individual orders.    SARS-COV-2 (COVID-19)/ FLU A/B, AND RSV, PCR Nasopharynx [[id number]]  (Abnormal) Collected: 06/28/23 1822    Specimen: Nasopharyngeal Swab from Nasopharynx Updated: 06/28/23 1911     SARS-CoV-2 (COVID-19) Positive     Influenza A Negative     Influenza B Negative     Respiratory Syncytial Virus Negative    Narrative:      Testing performed using real-time PCR for detection of COVID-19. EUA approved validation studies performed on site.          Pertinent Imaging  X-RAY CHEST 2 VIEWS    Result Date: 6/28/2023  IMPRESSION: No acute disease appreciated      OUTPATIENT   FOLLOW-UP / REFERRALS / PENDING TESTS        Outpatient Follow-Up Appointments  Encounter Information    This patient does not currently have any appointments scheduled.         Referrals  No orders of the defined types were placed in this encounter.      Test Results Pending at Discharge  Unresulted Labs (From admission, onward)     Start     Ordered    06/28/23 1810  Blood Culture Blood, Venous  (ED LAB BLOOD CULTURE X2)  STAT        Question:  Release to patient  Answer:  Immediate    06/28/23 1810              Important Issues to Address in Follow-Up  Follow-up with your primary care doctor and/or oncologist for further monitoring of symptoms. For fevers, continue to take tylenol for supportive care but no more than 4000mg per day. You are being started on Paxlovid for management.  Monitor your oxygen saturation at home to ensure this does not drop below 90%.  Return to the ER for evaluation of shortness of breath, chest pain or tightness, nausea, vomiting, lower extremity pain or swelling, fevers uncontrolled, or any other concerns. Please follow up with your outpatient providers within 1 week to discuss the hospital course and medication changes. Please check your Mychart at least twice per day for blood culture results drawn from the Emergency room on 6/28/23. If they are positive for anything, please call your PCP and/or oncologist for further direction. If you cannot get a hold of your doctors, please come back to the emergency room.     DISCHARGE DISPOSITION      Disposition: Home     Code Status At Discharge: Full Code    Physician Order for Life-Sustaining Treatment Document Status      No documents found

## 2023-06-29 NOTE — DISCHARGE INSTRUCTIONS
Follow-up with your primary care doctor and/or oncologist for further monitoring of symptoms. For fevers, continue to take tylenol for supportive care but no more than 4000mg per day. You are being started on Paxlovid for management.  Monitor your oxygen saturation at home to ensure this does not drop below 90%.  Return to the ER for evaluation of shortness of breath, chest pain or tightness, nausea, vomiting, lower extremity pain or swelling, fevers uncontrolled, or any other concerns. Please follow up with your outpatient providers within 1 week to discuss the hospital course and medication changes. Please check your Mychart at least twice per day for blood culture results drawn from the Emergency room on 6/28/23. If they are positive for anything, please call your PCP and/or oncologist for further direction. If you cannot get a hold of your doctors, please come back to the emergency room.

## 2023-06-29 NOTE — ASSESSMENT & PLAN NOTE
Pt has been saturating 92-93% RA but has been self placing NC  Wife insisting ID be called tonight for antiviral agents to be started because hx of significant neck radiation  Case d/w Dr. Mane who stated will not order antivirals tonight and no need to start decadron   Will consult ID to see in AM  obs tele   Monitor sats   Pt elevated risk if he decompensates  Only received first 2 doses covid vaccine and has not had booster due to PCP concern for cardiac side effects   Tylenol as needed for fevers

## 2023-07-03 LAB — BACTERIA BLD CULT: NORMAL

## 2023-07-04 LAB — BACTERIA BLD CULT: NORMAL

## 2023-07-19 ENCOUNTER — APPOINTMENT (EMERGENCY)
Dept: RADIOLOGY | Facility: HOSPITAL | Age: 68
End: 2023-07-19
Payer: MEDICARE

## 2023-07-19 ENCOUNTER — HOSPITAL ENCOUNTER (EMERGENCY)
Facility: HOSPITAL | Age: 68
Discharge: HOME | End: 2023-07-19
Attending: EMERGENCY MEDICINE
Payer: MEDICARE

## 2023-07-19 VITALS
TEMPERATURE: 97.5 F | DIASTOLIC BLOOD PRESSURE: 70 MMHG | WEIGHT: 200 LBS | RESPIRATION RATE: 16 BRPM | HEART RATE: 83 BPM | OXYGEN SATURATION: 98 % | SYSTOLIC BLOOD PRESSURE: 135 MMHG | HEIGHT: 73 IN | BODY MASS INDEX: 26.51 KG/M2

## 2023-07-19 DIAGNOSIS — K59.00 CONSTIPATION, UNSPECIFIED CONSTIPATION TYPE: Primary | ICD-10-CM

## 2023-07-19 PROCEDURE — 74022 RADEX COMPL AQT ABD SERIES: CPT

## 2023-07-19 PROCEDURE — 99283 EMERGENCY DEPT VISIT LOW MDM: CPT | Mod: 25

## 2023-07-19 ASSESSMENT — ENCOUNTER SYMPTOMS
DIARRHEA: 0
FEVER: 0
MYALGIAS: 0
CONSTIPATION: 1
ARTHRALGIAS: 0
SHORTNESS OF BREATH: 0
DIFFICULTY URINATING: 0
BLOOD IN STOOL: 0
ABDOMINAL PAIN: 0
CHILLS: 0
NAUSEA: 0
APPETITE CHANGE: 0
VOMITING: 0

## 2023-07-19 NOTE — DISCHARGE INSTRUCTIONS
Follow-up with your primary care doctor and gastroenterology for further monitoring of symptoms.  Take stool softeners (Colace, senna) daily.  Take MiraLAX each day until you have a bowel movement.  Return to the ER for evaluation of abdominal pain, inability to have a bowel movement, vomiting, fevers, or any other concerns.

## 2023-07-19 NOTE — ED ATTESTATION NOTE
I have personally seen and examined Celso Gramajo. I personally performed the key components of the encounter and provided a substantive portion of the care and medical decision making for this patient.    I reviewed and agree with physician assistant’s assessment and plan of care, with any exceptions as documented below.     My focused history, examination, assessment, and plan of care of Celso Gramajo is as follows:    Brief History:   67-year-old male with history of constipation that required emergency room enema 5 years ago presented with constipation for the past 10 days.  Patient stated that it was associated with the antibiotics started by his PCP 10 days ago for his respiratory infection.  Patient stated that his respiratory infection has since resolved and without any fever abdominal pain hematuria urinary symptoms or retention.  However, has had only small pellet-like amounts of bowel movements recently in the last few days along with sensation of a rectal mass when he tries to achieve a bowel movement.  Focused Physical Exam:   Vitals:    07/19/23 0738   BP: (!) 142/70   Pulse: 91   Resp: 18   Temp: 36.4 °C (97.5 °F)   SpO2: 98%   Awake and alert good eye contact with normal complexion conjunctiva.  No respiratory distress with normal gait.  Soft nontender nondistended abdomen.  Digital rectal exam showing no impacted stool at the distal rectal vault and light brown stools Hemoccult negative      Assessment / Plan / MDM:  Clinical history and exam concerning for but not limited to:  Constipation proximal impaction    X-ray enema       Joe Coulter MD  07/19/23 0756

## 2023-07-19 NOTE — ED PROVIDER NOTES
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  Patient states he has been unable to have a bowel movement for the last 2 weeks despite the use of over-the-counter stool softeners and laxatives.  Patient states he did order an enema online however did not receive this yet.  Denies abdominal pain, nausea, vomiting, fevers, black or bloody stools, difficulty urinating.  No history of chronic constipation.  Patient states he is followed by GI doctor at [place].      History provided by:  Patient   used: No          Patient History   PAST HISTORY     Reviewed from Nursing Triage:       Past Medical History:   Diagnosis Date   • Anemia    • Cancer (CMS/HCC)     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 drug thru [place]    • Hx of head and neck radiation    • Hypertension    • Hypothyroidism    • Stroke (CMS/HCC)    • Thyroid disease        Past Surgical History:   Procedure Laterality Date   • CHOLECYSTECTOMY     • HERNIA REPAIR     • NECK DISSECTION     • NECK SURGERY     • THYROID SURGERY     • TUMOR REMOVAL      L neck 20yrs ago        Family History   Problem Relation Age of Onset   • No Known Problems Biological Father    • Lung cancer Biological Sister        Social History     Tobacco Use   • Smoking status: Never   • Smokeless tobacco: Never   Vaping Use   • Vaping Use: Never used   Substance Use Topics   • Alcohol use: No   • Drug use: No         Review of Systems   REVIEW OF SYSTEMS     Review of Systems   Constitutional: Negative for appetite change, chills and fever.   Respiratory: Negative for shortness of breath.    Cardiovascular: Negative for chest pain.   Gastrointestinal: Positive for constipation. Negative for abdominal pain, blood in stool, diarrhea, nausea and vomiting.   Genitourinary: Negative for difficulty  urinating.   Musculoskeletal: Negative for arthralgias and myalgias.   Skin: Negative for rash.         VITALS     ED Vitals    Date/Time Temp Pulse Resp BP SpO2 [employer]   07/19/23 1033 -- 83 16 135/70 98 %    07/19/23 0738 36.4 °C (97.5 °F) 91 18 142/70 98 % JLG        Pulse Ox %: 98 % (07/19/23 0744)  Pulse Ox Interpretation: Normal (07/19/23 0744)           Physical Exam   PHYSICAL EXAM     Physical Exam  Vitals and nursing note reviewed. Exam conducted with a chaperone present.   Constitutional:       General: He is not in acute distress.     Appearance: Normal appearance. He is well-developed.   HENT:      Head: Normocephalic.   Eyes:      Conjunctiva/sclera: Conjunctivae normal.   Cardiovascular:      Rate and Rhythm: Normal rate.   Pulmonary:      Effort: Pulmonary effort is normal.   Abdominal:      Palpations: Abdomen is soft.      Tenderness: There is no abdominal tenderness. There is no right CVA tenderness, left CVA tenderness, guarding or rebound.      Comments: G tube in place - CDI   Genitourinary:     Rectum: No mass, tenderness, external hemorrhoid or internal hemorrhoid.      Comments: Minimal stool within rectal vault -no stool ball for disimpaction.  Musculoskeletal:         General: Normal range of motion.      Cervical back: Neck supple.   Skin:     General: Skin is warm and dry.   Neurological:      Mental Status: He is alert and oriented to person, place, and time. Mental status is at baseline.           PROCEDURES     Procedures     DATA     Results     None          Imaging Results          X-RAY OBSTRUCTION SERIES (ABDOMEN 2 VIEWS WITH CHEST 1 VIEW) (Final result)  Result time 07/19/23 08:12:47    Final result                 Impression:    IMPRESSION: No definite obstruction.                 Narrative:    CLINICAL HISTORY: OBSTRUCTION - INTESTINAL    PRIOR STUDY: Chest x-ray dated June 28, 2023    TECHNIQUE: Obstruction series    COMMENT:  The heart size is normal. The lungs are clear. The  aorta is mildly  tortuous. There is a G-tube overlying the mid abdomen. The bowel gas pattern is  unremarkable. Clips overlying the right upper quadrant.                                No orders to display       Scoring tools                                  ED Course & MDM   MDM / ED COURSE / CLINICAL IMPRESSION / DISPO     Medical Decision Making  Constipation, unspecified constipation type: acute illness or injury  Amount and/or Complexity of Data Reviewed  Radiology: ordered. Decision-making details documented in ED Course.          ED Course as of 07/19/23 2126 Wed Jul 19, 2023   0815 X-RAY OBSTRUCTION SERIES (ABDOMEN 2 VIEWS WITH CHEST 1 VIEW)  COMMENT:  The heart size is normal. The lungs are clear. The aorta is mildly  tortuous. There is a G-tube overlying the mid abdomen. The bowel gas pattern is  unremarkable. Clips overlying the right upper quadrant.     --  IMPRESSION: No definite obstruction.              Specimen Collected: 07/19/23 08:12         [HL]   0917 Patient stated that he has a small amount of soft liquid stools following initial soapsuds enema was symptomatically same    He is hungry and he wants feedings through his G-tube at this time    We will order milk of molasses enema [HL]   1137 Patient was able to pass 2 loose bowel movements.  Will DC home with GI follow-up.  Pt agreeable with plan.  Return precautions and bowel regimen discussed.   [EG]      ED Course User Index  [EG] Nabila Singh PA C  [HL] Joe Coulter MD     Clinical Impression      Constipation, unspecified constipation type     _________________     ED Disposition   Discharge                   Nabila Singh PA C  07/19/23 2126

## 2023-07-24 ENCOUNTER — APPOINTMENT (EMERGENCY)
Dept: RADIOLOGY | Facility: HOSPITAL | Age: 68
End: 2023-07-24
Payer: MEDICARE

## 2023-07-24 ENCOUNTER — HOSPITAL ENCOUNTER (EMERGENCY)
Facility: HOSPITAL | Age: 68
Discharge: HOME | End: 2023-07-24
Attending: EMERGENCY MEDICINE
Payer: MEDICARE

## 2023-07-24 VITALS
SYSTOLIC BLOOD PRESSURE: 131 MMHG | WEIGHT: 200 LBS | HEART RATE: 81 BPM | RESPIRATION RATE: 20 BRPM | OXYGEN SATURATION: 97 % | DIASTOLIC BLOOD PRESSURE: 77 MMHG | BODY MASS INDEX: 26.51 KG/M2 | TEMPERATURE: 97 F | HEIGHT: 73 IN

## 2023-07-24 DIAGNOSIS — K59.00 CONSTIPATION, UNSPECIFIED CONSTIPATION TYPE: ICD-10-CM

## 2023-07-24 DIAGNOSIS — R14.0 ABDOMINAL BLOATING: Primary | ICD-10-CM

## 2023-07-24 LAB
ALBUMIN SERPL-MCNC: 4.8 G/DL (ref 3.5–5.7)
ALP SERPL-CCNC: 38 IU/L (ref 34–104)
ALT SERPL-CCNC: 20 IU/L (ref 7–52)
ANION GAP SERPL CALC-SCNC: 6 MEQ/L (ref 3–15)
AST SERPL-CCNC: 21 IU/L (ref 13–39)
BASOPHILS # BLD: 0.03 K/UL (ref 0.01–0.1)
BASOPHILS NFR BLD: 0.6 %
BILIRUB SERPL-MCNC: 0.6 MG/DL (ref 0.3–1)
BUN SERPL-MCNC: 15 MG/DL (ref 7–25)
CALCIUM SERPL-MCNC: 9.8 MG/DL (ref 8.6–10.3)
CHLORIDE SERPL-SCNC: 98 MEQ/L (ref 98–107)
CO2 SERPL-SCNC: 34 MEQ/L (ref 21–31)
CREAT SERPL-MCNC: 0.8 MG/DL (ref 0.7–1.3)
DIFFERENTIAL METHOD BLD: ABNORMAL
EOSINOPHIL # BLD: 0.04 K/UL (ref 0.04–0.54)
EOSINOPHIL NFR BLD: 0.8 %
ERYTHROCYTE [DISTWIDTH] IN BLOOD BY AUTOMATED COUNT: 12.8 % (ref 11.6–14.4)
GFR SERPL CREATININE-BSD FRML MDRD: >60 ML/MIN/1.73M*2
GLUCOSE SERPL-MCNC: 111 MG/DL (ref 70–99)
HCT VFR BLDCO AUTO: 45.6 % (ref 40.1–51)
HGB BLD-MCNC: 14.9 G/DL (ref 13.7–17.5)
IMM GRANULOCYTES # BLD AUTO: 0.01 K/UL (ref 0–0.08)
IMM GRANULOCYTES NFR BLD AUTO: 0.2 %
LIPASE SERPL-CCNC: 28 U/L (ref 11–82)
LYMPHOCYTES # BLD: 1.03 K/UL (ref 1.2–3.5)
LYMPHOCYTES NFR BLD: 19.8 %
MCH RBC QN AUTO: 31 PG (ref 28–33.2)
MCHC RBC AUTO-ENTMCNC: 32.7 G/DL (ref 32.2–36.5)
MCV RBC AUTO: 94.8 FL (ref 83–98)
MONOCYTES # BLD: 0.66 K/UL (ref 0.3–1)
MONOCYTES NFR BLD: 12.7 %
NEUTROPHILS # BLD: 3.44 K/UL (ref 1.7–7)
NEUTS SEG NFR BLD: 65.9 %
NRBC BLD-RTO: 0 %
PDW BLD AUTO: 11.3 FL (ref 9.4–12.4)
PLATELET # BLD AUTO: 160 K/UL (ref 150–350)
POTASSIUM SERPL-SCNC: 3.9 MEQ/L (ref 3.5–5.1)
PROT SERPL-MCNC: 8 G/DL (ref 6.4–8.9)
RBC # BLD AUTO: 4.81 M/UL (ref 4.5–5.8)
SODIUM SERPL-SCNC: 138 MEQ/L (ref 136–145)
WBC # BLD AUTO: 5.21 K/UL (ref 3.8–10.5)

## 2023-07-24 PROCEDURE — 80053 COMPREHEN METABOLIC PANEL: CPT | Performed by: EMERGENCY MEDICINE

## 2023-07-24 PROCEDURE — 96374 THER/PROPH/DIAG INJ IV PUSH: CPT | Mod: 59

## 2023-07-24 PROCEDURE — 36415 COLL VENOUS BLD VENIPUNCTURE: CPT | Performed by: EMERGENCY MEDICINE

## 2023-07-24 PROCEDURE — 96361 HYDRATE IV INFUSION ADD-ON: CPT

## 2023-07-24 PROCEDURE — 63600105 HC IODINE BASED CONTRAST: Mod: JZ

## 2023-07-24 PROCEDURE — 99284 EMERGENCY DEPT VISIT MOD MDM: CPT | Mod: 25

## 2023-07-24 PROCEDURE — 3E033GC INTRODUCTION OF OTHER THERAPEUTIC SUBSTANCE INTO PERIPHERAL VEIN, PERCUTANEOUS APPROACH: ICD-10-PCS | Performed by: EMERGENCY MEDICINE

## 2023-07-24 PROCEDURE — 3E0337Z INTRODUCTION OF ELECTROLYTIC AND WATER BALANCE SUBSTANCE INTO PERIPHERAL VEIN, PERCUTANEOUS APPROACH: ICD-10-PCS | Performed by: EMERGENCY MEDICINE

## 2023-07-24 PROCEDURE — 25800000 HC PHARMACY IV SOLUTIONS

## 2023-07-24 PROCEDURE — 25000000 HC PHARMACY GENERAL

## 2023-07-24 PROCEDURE — 74177 CT ABD & PELVIS W/CONTRAST: CPT | Mod: ME

## 2023-07-24 PROCEDURE — 85025 COMPLETE CBC W/AUTO DIFF WBC: CPT | Performed by: EMERGENCY MEDICINE

## 2023-07-24 PROCEDURE — 83690 ASSAY OF LIPASE: CPT | Performed by: EMERGENCY MEDICINE

## 2023-07-24 RX ORDER — FAMOTIDINE 40 MG/1
40 TABLET, FILM COATED ORAL NIGHTLY
Qty: 30 TABLET | Refills: 0 | Status: SHIPPED | OUTPATIENT
Start: 2023-07-24

## 2023-07-24 RX ORDER — FAMOTIDINE 10 MG/ML
20 INJECTION INTRAVENOUS ONCE
Status: COMPLETED | OUTPATIENT
Start: 2023-07-24 | End: 2023-07-24

## 2023-07-24 RX ADMIN — SODIUM CHLORIDE 1000 ML: 9 INJECTION, SOLUTION INTRAVENOUS at 09:18

## 2023-07-24 RX ADMIN — IOHEXOL 100 ML: 350 INJECTION, SOLUTION INTRAVENOUS at 10:23

## 2023-07-24 RX ADMIN — FAMOTIDINE 20 MG: 10 INJECTION, SOLUTION INTRAVENOUS at 09:09

## 2023-07-24 ASSESSMENT — ENCOUNTER SYMPTOMS
HEMATURIA: 0
FREQUENCY: 0
FEVER: 0
NAUSEA: 0
SORE THROAT: 0
BLOOD IN STOOL: 0
WEAKNESS: 0
ABDOMINAL PAIN: 0
MYALGIAS: 0
ARTHRALGIAS: 0
APPETITE CHANGE: 0
ABDOMINAL DISTENTION: 1
CONSTIPATION: 1
LIGHT-HEADEDNESS: 0
CHILLS: 0
SHORTNESS OF BREATH: 0
BACK PAIN: 0
DIARRHEA: 1
RHINORRHEA: 0
VOMITING: 0
DYSURIA: 0

## 2023-07-24 NOTE — DISCHARGE INSTRUCTIONS
Follow-up with your primary care doctor and GI doctor as scheduled.  Take Pepcid nightly.  Return to the ER for evaluation of any new, worse or concerning symptoms.

## 2023-07-24 NOTE — ED ATTESTATION NOTE
I have personally seen and examined the patient.  I reviewed and agree with physician assistant / nurse practitioner’s assessment and plan of care, with the following exceptions: None    I personally performed the key components of the encounter and provided a substantive portion of the care and medical decision making    My examination, assessment, and plan of care of Celso Gramajo is as follows:     PT recently seen for constipation. Has been having multiple episodes of diarrhea after enema 3 days ago. Last night and today, co increased gurgling and bloating. Has had some liquid stool. No abd pain, no vomiting  Exam; awake, alert. NAD. RRR, CTA, abd soft, nt.       Quincy Carmona MD  07/24/23 0984

## 2023-07-24 NOTE — ED PROVIDER NOTES
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  Patient was evaluated at this facility 5 days ago for constipation in which he had obstruction series which was negative and was subsequently given milk of molasses with relief of a bowel movement.  Patient states he has been using laxatives, stool softeners and had relief of 13 episodes of diarrhea 3 days ago.  Patient states he has not been able to have a BM since then and then noticed \"gurgling and gas\" in abdomen last night with associated bloating which prompted him to come back to the ED.  Denies fever, chills, urinary symptoms, nausea, vomiting, black or bloody stools.  Previous abdominal surgeries include cholecystectomy, hernia repair in the past.      History provided by:  Patient   used: No          Patient History   PAST HISTORY     Reviewed from Nursing Triage:       Past Medical History:   Diagnosis Date   • Anemia    • Cancer (CMS/HCC)     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 drug thru [facility]    • Hx of head and neck radiation    • Hypertension    • Hypothyroidism    • Stroke (CMS/HCC)    • Thyroid disease        Past Surgical History:   Procedure Laterality Date   • CHOLECYSTECTOMY     • HERNIA REPAIR     • NECK DISSECTION     • NECK SURGERY     • THYROID SURGERY     • TUMOR REMOVAL      L neck 20yrs ago        Family History   Problem Relation Age of Onset   • No Known Problems Biological Father    • Lung cancer Biological Sister        Social History     Tobacco Use   • Smoking status: Never   • Smokeless tobacco: Never   Vaping Use   • Vaping Use: Never used   Substance Use Topics   • Alcohol use: No   • Drug use: No         Review of Systems   REVIEW OF SYSTEMS     Review of " Systems   Constitutional: Negative for appetite change, chills and fever.   HENT: Negative for congestion, rhinorrhea and sore throat.    Respiratory: Negative for shortness of breath.    Cardiovascular: Negative for chest pain.   Gastrointestinal: Positive for abdominal distention, constipation and diarrhea. Negative for abdominal pain, blood in stool, nausea and vomiting.   Genitourinary: Negative for dysuria, frequency and hematuria.   Musculoskeletal: Negative for arthralgias, back pain and myalgias.   Skin: Negative for rash.   Neurological: Negative for syncope, weakness and light-headedness.         VITALS     ED Vitals    Date/Time Temp Pulse Resp BP SpO2 [facility]   07/24/23 1127 -- 81 20 131/77 97 % SC   07/24/23 0904 -- 87 -- 135/78 97 % SC   07/24/23 0854 36.1 °C (97 °F) 99 20 139/77 95 % JLG        Pulse Ox %: 97 % (07/24/23 0906)  Pulse Ox Interpretation: Normal (07/24/23 0906)  Heart Rate: 80 (07/24/23 0906)  Rhythm Strip Interpretation: Normal Sinus Rhythm (07/24/23 0906)     Physical Exam   PHYSICAL EXAM     Physical Exam  Vitals and nursing note reviewed.   Constitutional:       General: He is not in acute distress.     Appearance: Normal appearance. He is well-developed.   HENT:      Head: Normocephalic.   Eyes:      Conjunctiva/sclera: Conjunctivae normal.   Cardiovascular:      Rate and Rhythm: Normal rate and regular rhythm.      Heart sounds: Normal heart sounds.   Pulmonary:      Effort: Pulmonary effort is normal.      Breath sounds: Normal breath sounds.   Abdominal:      Palpations: Abdomen is soft.      Tenderness: There is no abdominal tenderness. There is no right CVA tenderness, left CVA tenderness, guarding or rebound.      Comments: PEG tube in left upper abdomen clean, dry, intact   Musculoskeletal:         General: Normal range of motion.      Cervical back: Neck supple.      Right lower leg: No edema.      Left lower leg: No edema.   Skin:     General: Skin is warm and dry.       Findings: No rash.   Neurological:      Mental Status: He is alert and oriented to person, place, and time. Mental status is at baseline.           PROCEDURES     Procedures     DATA     Results     Procedure Component Value Units Date/Time    Comprehensive metabolic panel [[id number]]  (Abnormal) Collected: 07/24/23 0901    Specimen: Blood, Venous Updated: 07/24/23 0935     Sodium 138 mEQ/L      Potassium 3.9 mEQ/L      Comment: Results obtained on plasma. Plasma Potassium values may be up to 0.4 mEQ/L less than serum values. The differences may be greater for patients with high platelet or white cell counts.        Chloride 98 mEQ/L      CO2 34 mEQ/L      BUN 15 mg/dL      Creatinine 0.8 mg/dL      Glucose 111 mg/dL      Calcium 9.8 mg/dL      AST (SGOT) 21 IU/L      ALT (SGPT) 20 IU/L      Alkaline Phosphatase 38 IU/L      Total Protein 8.0 g/dL      Comment: Test performed on plasma which typically contains approximately 0.4 g/dL more protein than serum.        Albumin 4.8 g/dL      Bilirubin, Total 0.6 mg/dL      eGFR >60.0 mL/min/1.73m*2      Anion Gap 6 mEQ/L     Lipase [[id number]]  (Normal) Collected: 07/24/23 0901    Specimen: Blood, Venous Updated: 07/24/23 0935     Lipase 28 U/L     CBC and differential [[id number]]  (Abnormal) Collected: 07/24/23 0901    Specimen: Blood, Venous Updated: 07/24/23 0912     WBC 5.21 K/uL      RBC 4.81 M/uL      Hemoglobin 14.9 g/dL      Hematocrit 45.6 %      MCV 94.8 fL      MCH 31.0 pg      MCHC 32.7 g/dL      RDW 12.8 %      Platelets 160 K/uL      MPV 11.3 fL      Differential Type Auto     nRBC 0.0 %      Immature Granulocytes 0.2 %      Neutrophils 65.9 %      Lymphocytes 19.8 %      Monocytes 12.7 %      Eosinophils 0.8 %      Basophils 0.6 %      Immature Granulocytes, Absolute 0.01 K/uL      Neutrophils, Absolute 3.44 K/uL      Lymphocytes, Absolute 1.03 K/uL      Monocytes, Absolute 0.66 K/uL      Eosinophils, Absolute 0.04 K/uL      Basophils, Absolute 0.03 K/uL            Imaging Results          CT ABDOMEN PELVIS WITH IV CONTRAST (Final result)  Result time 07/24/23 10:24:07    Final result                 Impression:    IMPRESSION:  No acute abdominopelvic abnormality. No evidence of constipation or bowel  obstruction as clinically questioned.    COMMENT:  LOWER CHEST: Lingular as well as medial segment right middle lobe subsegmental  atelectasis and/or scarring. No consolidation or pleural effusion. Coronary  artery atherosclerotic calcification.    ABDOMEN:  LIVER: normal morphology without suspicious mass.  BILE DUCTS: normal caliber.  GALLBLADDER: Surgically absent.  PANCREAS: within normal limits.  SPLEEN: within normal limits.  ADRENALS: within normal limits.  KIDNEYS: A few bilateral cysts, right greater than left, largest measuring 4 cm  appearing exophytic from the right lower pole.    PELVIS:  REPRODUCTIVE ORGANS: Mildly enlarged heterogeneous prostate.  URETERS: within normal limits.  BLADDER: within normal limits.    BOWEL/PERITONEUM: Bowel demonstrates normal caliber without evidence of  obstruction. No evidence of constipation. Normal appendix. Gastrostomy tube in  standard satisfactory position with distal tip in the gastric body. No enlarged  mesenteric lymph nodes. No ascites or free air, no fluid collection.    VESSELS: atherosclerotic changes, without aneurysmal dilation.  RETROPERITONEUM: no enlarged retroperitoneal or pelvic nodes.  ABDOMINAL WALL: Small fat-containing right inguinal hernia.  BONES: degenerative changes, no suspicious lytic or blastic lesions.                 Narrative:      CLINICAL HISTORY: Constipation, bowel obstruction suspected.    COMPARED WITH: Abdominopelvic CT dated 04/11/12.    STUDY: CT of the Abdomen and Pelvis was performed following the administration  of 100 cc Isovue-370 intravenous contrast. Oral contrast was not administered  per ordering physician's request. One or more dose reduction techniques (e.g.,  Automated  exposure control, adjustment of the mA and/or kV according to the  patient size, use of iterative reconstruction technique) utilized for this  examination.                                No orders to display       Scoring tools                                  ED Course & MDM   MDM / ED COURSE / CLINICAL IMPRESSION / DISPO     Medical Decision Making  Abdominal bloating: acute illness or injury  Constipation, unspecified constipation type: acute illness or injury  Amount and/or Complexity of Data Reviewed  Labs: ordered.  Radiology: ordered. Decision-making details documented in ED Course.      Risk  Prescription drug management.          ED Course as of 07/24/23 2048 Mon Jul 24, 2023   0917 PT insistent on getting a CT. DW wife over the phone, who also wants a CT, concern for obstruction [WS]   1044 CT ABDOMEN PELVIS WITH IV CONTRAST  --  IMPRESSION:  No acute abdominopelvic abnormality. No evidence of constipation or bowel  obstruction as clinically questioned. [EG]   1104 Patient feels better after Pepcid.  Patient states he has scheduled GI follow-up this week.  Will give prescription for Pepcid.  Agreeable with plan for discharge home with close follow up.  Strict return precautions discussed.   [EG]      ED Course User Index  [EG] Nabila Singh PA C  [WS] Quincy Carmona MD     Clinical Impression      Abdominal bloating   Constipation, unspecified constipation type - intermittent     _________________     ED Disposition   Discharge                   Nabila Singh PA C  07/24/23 2048

## 2023-08-23 ENCOUNTER — TRANSCRIBE ORDERS (OUTPATIENT)
Dept: SCHEDULING | Age: 68
End: 2023-08-23

## 2023-08-23 DIAGNOSIS — R10.2 PELVIC AND PERINEAL PAIN: Primary | ICD-10-CM

## 2023-08-26 DIAGNOSIS — E89.0 POST-SURGICAL HYPOTHYROIDISM: ICD-10-CM

## 2023-08-30 RX ORDER — LIOTHYRONINE SODIUM 5 UG/1
5 TABLET ORAL 2 TIMES DAILY
Qty: 180 TABLET | Refills: 0 | Status: SHIPPED | OUTPATIENT
Start: 2023-08-30 | End: 2023-11-25

## 2023-11-03 RX ORDER — LEVOTHYROXINE SODIUM 50 UG/1
CAPSULE ORAL
Qty: 90 ML | Refills: 1 | Status: SHIPPED | OUTPATIENT
Start: 2023-11-03 | End: 2024-03-27

## 2023-11-24 DIAGNOSIS — E89.0 POST-SURGICAL HYPOTHYROIDISM: ICD-10-CM

## 2023-11-25 RX ORDER — LIOTHYRONINE SODIUM 5 UG/1
5 TABLET ORAL 2 TIMES DAILY
Qty: 180 TABLET | Refills: 0 | Status: SHIPPED | OUTPATIENT
Start: 2023-11-25 | End: 2024-02-06 | Stop reason: SDUPTHER

## 2024-04-18 ENCOUNTER — TELEPHONE (OUTPATIENT)
Dept: ENDOCRINOLOGY | Facility: CLINIC | Age: 69
End: 2024-04-18
Payer: MEDICARE

## 2024-04-18 NOTE — TELEPHONE ENCOUNTER
Pt is calling, something urgent came up, wants to know what he should do, would like an appt with you [phone], says you referred him to a doctor

## 2024-04-19 ENCOUNTER — TELEMEDICINE (OUTPATIENT)
Dept: ENDOCRINOLOGY | Facility: CLINIC | Age: 69
End: 2024-04-19
Payer: MEDICARE

## 2024-04-19 DIAGNOSIS — C08.9: ICD-10-CM

## 2024-04-19 DIAGNOSIS — C15.3 MALIGNANT NEOPLASM OF UPPER THIRD OF ESOPHAGUS (CMS/HCC): ICD-10-CM

## 2024-04-19 DIAGNOSIS — E89.0 POST-SURGICAL HYPOTHYROIDISM: Primary | ICD-10-CM

## 2024-04-19 PROCEDURE — 99443 PR PHYS/QHP TELEPHONE EVALUATION 21-30 MIN: CPT | Performed by: INTERNAL MEDICINE

## 2024-04-19 RX ORDER — GABAPENTIN 250 MG/5ML
100 SOLUTION ORAL 3 TIMES DAILY
COMMUNITY
Start: 2024-04-11 | End: 2024-10-10

## 2024-04-19 RX ORDER — LIOTHYRONINE SODIUM 5 UG/1
5 TABLET ORAL 2 TIMES DAILY
Qty: 180 TABLET | Refills: 2 | Status: SHIPPED | OUTPATIENT
Start: 2024-04-19 | End: 2025-01-09 | Stop reason: SDUPTHER

## 2024-04-19 NOTE — LETTER
April 25, 2024     Fide Lopez MD  [id number] [facility] [id number]    Patient: Celso Gramajo  YOB: 1955  Date of Visit: 4/19/2024      Dear Dr. Lopez:    Thank you for referring Celso Gramajo to me for evaluation. Below are my notes for this consultation.    If you have questions, please do not hesitate to call me. I look forward to following your patient along with you.         Sincerely,        Lexi Borrero MD        CC: No Recipients    Lexi Borrero MD  4/25/2024  9:26 AM  Sign when Signing Visit                        Endocrinology  Note       PATIENTS: Celso Gramajo  YOB: 1955  DATE: 04/19/2024   VISIT TYPE: telemedicine follow-up visit        Verification of Patient Location:  The patient affirms they are currently located in the following state: [place]    Request for Consent:    Audio Only Encounter   You and I are about to have a telemedicine check-in or visit. This is allowed because you have requested it. This telemedicine visit will be billed to your health insurance or you, if you are self-insured. You understand you will be responsible for any copayments or coinsurances that apply to your telemedicine visit. Before starting our telemedicine visit, I am required to get your consent for this virtual check-in or visit by telemedicine. Do you consent?    Patient Response to Request for Consent:  Yes      Time Spent:  I spent 30 minutes on this date of service performing the following activities: obtaining history, entering orders, preparing for visit, providing counseling and education, and communicating results.      History of Present Illness:   Celso Gramajo had a telemedicine visit today for management of post surgical hypothyroidism    He has a remote history of  thyroid CA (2001)  treated with surgery and MASCORRO (200 mCI).  He experienced significant LN involvement/recurrence.  He has received care at [facility] and [facility].  He has a more  recent diagnosis of esophageal CA (dx within the past ~3 years - treated with surgery and XRT) and on this basis he is nourished through a PEG tube.      He is currently being followed by Dr. Fide Lopez at [facility] for oncologic care.  He reports that his malignancy was found to have a gene rearrangement and is thought to be of salivary gland origin. He has been in a clinical trial for several years.     His oncology notes indicate that his target TSH is about 0.5-2.5.  His last TG was negative in 10/2023 (neg TG abs).    Most recently he has been on a stable regimen of liquid tirosint 150 mcg daily together with liothyronine 10 mcg/day.    He had recent labs showing a rise in TSH to 11.  His dose and way of taking thyroid hormone have not changed.  A prior TSH had been 2.22 in 10/2023.    He remains on PPI therapy with lansoprozole at noon with an unchanged dose.    He is not taking biotin.      His wt has trended up  - about 6 pounds.  Reportedly his TF quantity was found to be excessive and his volume was reduced from 6 to 5 containers per day.    He feels tired and cold.      He reports that he had recent Moh's surgery for a basal cell skin lesion.  He has been using advil for about 6 weeks.    He remains cautious to separate his tirosint from TF by appropriate intervals.     He reports that he had a recent negative PET.      Past Medical History:   Diagnosis Date   • Anemia    • Cancer (CMS/HCC)     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 drug thru [facility]    • Hx of head and neck radiation    • Hypertension    • Hypothyroidism    • Stroke (CMS/HCC)    • Thyroid disease        Surgical History:   Past Surgical History:   Procedure Laterality Date   • CHOLECYSTECTOMY     • HERNIA REPAIR     • NECK DISSECTION     • NECK SURGERY     • THYROID SURGERY     • TUMOR REMOVAL      L neck 20yrs ago        Family History:  Family History   Problem Relation Age of Onset   • No Known Problems Biological Father    • Lung  cancer Biological Sister        Social history:  Social History     Socioeconomic History   • Marital status:      Spouse name: Not on file   • Number of children: Not on file   • Years of education: Not on file   • Highest education level: Not on file   Occupational History   • Not on file   Tobacco Use   • Smoking status: Never   • Smokeless tobacco: Never   Vaping Use   • Vaping Use: Never used   Substance and Sexual Activity   • Alcohol use: No   • Drug use: No   • Sexual activity: Defer   Other Topics Concern   • Not on file   Social History Narrative   • Not on file     Social Determinants of Health     Financial Resource Strain: Not on file   Food Insecurity: No Food Insecurity (7/24/2023)    Hunger Vital Sign    • Worried About Running Out of Food in the Last Year: Never true    • Ran Out of Food in the Last Year: Never true   Transportation Needs: Not on file   Physical Activity: Not on file   Stress: Not on file   Social Connections: Not on file   Intimate Partner Violence: Not on file   Housing Stability: Not on file       Medication(active prior to today):  Current Outpatient Medications   Medication Sig Dispense Refill   • famotidine (PEPCID) 40 mg tablet 1 tablet (40 mg total) by peg tube route nightly. 30 tablet 0   • gabapentin (NEURONTIN) 250 mg/5 mL solution Take 100 mg by mouth 3 times daily.     • levothyroxine sodium (TIROSINT) 150 mcg capsule Take 150 mcg by mouth daily.     • liothyronine (CYTOMEL) 5 mcg tablet Take 1 tablet (5 mcg total) by mouth 2 (two) times a day. 180 tablet 2   • Tirosint-SoL 150 mcg/mL solution TAKE CONtENTS OF ONE AMPULE BY PEG TUBE ROUTE DAILY 90 mL 0     No current facility-administered medications for this visit.       Allergies:  Patient has no known allergies.    Review of Systems:   As detailed in HPI    Vitals Signs: N/A  There were no vitals filed for this visit.    There is no height or weight on file to calculate BMI.      Physical Exam:  N/A  Constitutional: Patient is oriented to person, place, and time.  Appears thin   Eyes: EOM are normal. No scleral icterus  Neck: Normal range of motion.L neck with extensive post surgical changes  Cardiovascular: Normal rate, regular rhythm and normal heart sounds.    Pulmonary/Chest: Effort normal and breath sounds normal.   Abdominal: PEG site clean  Musculoskeletal: Normal range of motion.   Neurological:  Alert and oriented to person, place, and time.   Skin: Skin is warm and dry.   Psychiatric:  Normal mood and affect. Behavior is normal. Judgment and thought content normal.     Labs:  Lab Results   Component Value Date    WBC 5.21 07/24/2023    HGB 14.9 07/24/2023    HCT 45.6 07/24/2023     07/24/2023    CHOL 119 05/11/2022    TRIG 91 05/11/2022    HDL 33 (L) 05/11/2022    ALT 20 07/24/2023    AST 21 07/24/2023     07/24/2023    K 3.9 07/24/2023    CL 98 07/24/2023    CREATININE 0.8 07/24/2023    BUN 15 07/24/2023    CO2 34 (H) 07/24/2023    TSH 0.10 (L) 03/09/2016    PSA 0.69 07/14/2015    INR 1.2 06/28/2023     Oncologic history per Dr Lopez notes:   Celso Camacho is a 64 year-old man with a history of papillary thyroid cancer dating back to 2001. He underwent a total thyroidectomy and neck dissection on December 14,2001 with Dr. Valencia at [facility]. Postoperatively he received 200 mCi of MASCORRO at [facility].  He reports that his post-therapy scans were negative. He was followed by Dr. Carlos from 2001 until 2005. He had a recurrence of his cancer in January of 2005. His thyroglobulin was undetectable at that time. He then underwent a left modified radical neck dissection with Dr. Gannon at [facility]. This was followed by 38 external beam radiation treatments. He had another recurrence in June 2014. On June 5, 2014 he underwent a fine needle aspiration of a left neck mass which was positive for papillary thyroid carcinoma. He  subsequently underwent a prelaryngeal resection with Dr. Gannon on June 18,2014. The operative report noted soft tissue densities posterolateral to the cricoid on the left which  were not resected. 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  Mr. Camacho went for a second surgical opinion with Dr. Burns at [facility] who recommended surgery. Specimens from his thyroid surgery in 12/13/2001 and left neck dissection from 01/31/05 were obtained for review. 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 By report, the current tumor (slides not seen) from 2014 has ETV6/NTRK3 fusion.         He was treated with larotrectinib first as part of [facility] [address] A Phase 1a/1b Study of the Oral TRK Inhibitor LOXO-101 in Subjects with Adult Solid Tumors. Mr Gramajo is unaccompanied.  Mr. Gramajo was on the 100mg PO BID dose schedule, but was dose reduced to the 100mg daily. He underwent a esophageal dilatation attempt on 10/19/2020. He has since been diagnosed with Stage I esophageal cancer and was treated with curative intent by Dr. Martinez at [facility]. He started chemo (carbo/taxol) sensitized re-irrdiation with proton therapy on 11/30/2020. This completes treatment on Jan 17, 2021. He required a PEG tube which has since been removed. He stopped his larotrectinib on 11/26/2020 as was instructed.      Assessment/Plan:    1) Post surgical hypothyroidism  Goal TSH thought to be 0.5-2.5 per oncology notes  He has limited absorption of thyroid hormone in the context of chronic PEG tube requirements, and acid suppression  His doses are expected to be higher on this basis.  His most recent rise in TSH likely reflects chronic variability in absorption  He started T3 therapy at an earlier visit and found that this helped his symptoms.  I have augmented his tirosint dose and reinforced proper timing of tirosint with respect to TF.   He  will also take tirosint and liothyronine separately  Will plan for fu TFT measurements to ensure restoration of euthyroidism  See pt plan    2) Salivary gland CA - Clinical trial per Dr. Lopez    3) Esophageal CA - sp surgery and XRT, using PEG tube for nourishment as above.    Patient Instructions   You had an endocrine telemedicine visit today.    We reviewed the followin) TSH has trended up and you have had variable symptoms.    2)  Increase the tirosint by taking 1 extra dose per week ie 2 doses on  and 1 dose the other 6 days of the week    3) Separate the tirosint and liothyronine by about 30 minutes-1 hour (or more)    4) Continue to separate the tirosint properly from tube feeds.    5) Repeat labs in about 6 weeks and reach out sooner if you don't feel right. Please send a portal message when you do your labs.          Electronically signed by: Lexi Borrero MD on 2024 9:23 AM

## 2024-04-19 NOTE — PROGRESS NOTES
Endocrinology  Note       PATIENTS: Celso Gramajo  YOB: 1955  DATE: 04/19/2024   VISIT TYPE: telemedicine follow-up visit        Verification of Patient Location:  The patient affirms they are currently located in the following state: [place]    Request for Consent:    Audio Only Encounter   You and I are about to have a telemedicine check-in or visit. This is allowed because you have requested it. This telemedicine visit will be billed to your health insurance or you, if you are self-insured. You understand you will be responsible for any copayments or coinsurances that apply to your telemedicine visit. Before starting our telemedicine visit, I am required to get your consent for this virtual check-in or visit by telemedicine. Do you consent?    Patient Response to Request for Consent:  Yes      Time Spent:  I spent 30 minutes on this date of service performing the following activities: obtaining history, entering orders, preparing for visit, providing counseling and education, and communicating results.      History of Present Illness:   Celso Gramajo had a telemedicine visit today for management of post surgical hypothyroidism    He has a remote history of  thyroid CA (2001)  treated with surgery and MASCORRO (200 mCI).  He experienced significant LN involvement/recurrence.  He has received care at [facility] and [facility].  He has a more recent diagnosis of esophageal CA (dx within the past ~3 years - treated with surgery and XRT) and on this basis he is nourished through a PEG tube.      He is currently being followed by Dr. Fide Lopez at [place] for oncologic care.  He reports that his malignancy was found to have a gene rearrangement and is thought to be of salivary gland origin. He has been in a clinical trial for several years.     His oncology notes indicate that his target TSH is about 0.5-2.5.  His last TG was negative in 10/2023 (neg TG abs).    Most  recently he has been on a stable regimen of liquid tirosint 150 mcg daily together with liothyronine 10 mcg/day.    He had recent labs showing a rise in TSH to 11.  His dose and way of taking thyroid hormone have not changed.  A prior TSH had been 2.22 in 10/2023.    He remains on PPI therapy with lansoprozole at noon with an unchanged dose.    He is not taking biotin.      His wt has trended up  - about 6 pounds.  Reportedly his TF quantity was found to be excessive and his volume was reduced from 6 to 5 containers per day.    He feels tired and cold.      He reports that he had recent Moh's surgery for a basal cell skin lesion.  He has been using advil for about 6 weeks.    He remains cautious to separate his tirosint from TF by appropriate intervals.     He reports that he had a recent negative PET.      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 drug thru [facility]     Hx of head and neck radiation     Hypertension     Hypothyroidism     Stroke (CMS/HCC)     Thyroid disease        Surgical History:   Past Surgical History:   Procedure Laterality Date    CHOLECYSTECTOMY      HERNIA REPAIR      NECK DISSECTION      NECK SURGERY      THYROID SURGERY      TUMOR REMOVAL      L neck 20yrs ago        Family History:  Family History   Problem Relation Age of Onset    No Known Problems Biological Father     Lung cancer Biological Sister        Social history:  Social History     Socioeconomic History    Marital status:      Spouse name: Not on file    Number of children: Not on file    Years of education: Not on file    Highest education level: Not on file   Occupational History    Not on file   Tobacco Use    Smoking status: Never    Smokeless tobacco: Never   Vaping Use    Vaping Use: Never used   Substance and Sexual Activity    Alcohol use: No    Drug use: No    Sexual activity: Defer   Other Topics Concern    Not on file   Social History  Narrative    Not on file     Social Determinants of Health     Financial Resource Strain: Not on file   Food Insecurity: No Food Insecurity (7/24/2023)    Hunger Vital Sign     Worried About Running Out of Food in the Last Year: Never true     Ran Out of Food in the Last Year: Never true   Transportation Needs: Not on file   Physical Activity: Not on file   Stress: Not on file   Social Connections: Not on file   Intimate Partner Violence: Not on file   Housing Stability: Not on file       Medication(active prior to today):  Current Outpatient Medications   Medication Sig Dispense Refill    famotidine (PEPCID) 40 mg tablet 1 tablet (40 mg total) by peg tube route nightly. 30 tablet 0    gabapentin (NEURONTIN) 250 mg/5 mL solution Take 100 mg by mouth 3 times daily.      levothyroxine sodium (TIROSINT) 150 mcg capsule Take 150 mcg by mouth daily.      liothyronine (CYTOMEL) 5 mcg tablet Take 1 tablet (5 mcg total) by mouth 2 (two) times a day. 180 tablet 2    Tirosint-SoL 150 mcg/mL solution TAKE CONtENTS OF ONE AMPULE BY PEG TUBE ROUTE DAILY 90 mL 0     No current facility-administered medications for this visit.       Allergies:  Patient has no known allergies.    Review of Systems:   As detailed in HPI    Vitals Signs: N/A  There were no vitals filed for this visit.    There is no height or weight on file to calculate BMI.      Physical Exam: N/A  Constitutional: Patient is oriented to person, place, and time.  Appears thin   Eyes: EOM are normal. No scleral icterus  Neck: Normal range of motion.L neck with extensive post surgical changes  Cardiovascular: Normal rate, regular rhythm and normal heart sounds.    Pulmonary/Chest: Effort normal and breath sounds normal.   Abdominal: PEG site clean  Musculoskeletal: Normal range of motion.   Neurological:  Alert and oriented to person, place, and time.   Skin: Skin is warm and dry.   Psychiatric:  Normal mood and affect. Behavior is normal. Judgment and thought content  normal.     Labs:  Lab Results   Component Value Date    WBC 5.21 07/24/2023    HGB 14.9 07/24/2023    HCT 45.6 07/24/2023     07/24/2023    CHOL 119 05/11/2022    TRIG 91 05/11/2022    HDL 33 (L) 05/11/2022    ALT 20 07/24/2023    AST 21 07/24/2023     07/24/2023    K 3.9 07/24/2023    CL 98 07/24/2023    CREATININE 0.8 07/24/2023    BUN 15 07/24/2023    CO2 34 (H) 07/24/2023    TSH 0.10 (L) 03/09/2016    PSA 0.69 07/14/2015    INR 1.2 06/28/2023     Oncologic history per Dr Lopez notes:   Celso Camacho is a 64 year-old man with a history of papillary thyroid cancer dating back to 2001. He underwent a total thyroidectomy and neck dissection on December 14,2001 with Dr. Valencia at [facility]. Postoperatively he received 200 mCi of MASCORRO at [facility].  He reports that his post-therapy scans were negative. He was followed by Dr. Carlos from 2001 until 2005. He had a recurrence of his cancer in January of 2005. His thyroglobulin was undetectable at that time. He then underwent a left modified radical neck dissection with Dr. Gannon at [facility]. This was followed by 38 external beam radiation treatments. He had another recurrence in June 2014. On June 5, 2014 he underwent a fine needle aspiration of a left neck mass which was positive for papillary thyroid carcinoma. He subsequently underwent a prelaryngeal resection with Dr. Gannon on June 18,2014. The operative report noted soft tissue densities posterolateral to the cricoid on the left which  were not resected. 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  Mr. Camacho went for a second surgical opinion with Dr. Burns at [facility] who recommended surgery. Specimens from his thyroid surgery in 12/13/2001 and left neck dissection from 01/31/05 were obtained for review. 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 By  report, the current tumor (slides not seen) from  has ETV6/NTRK3 fusion.         He was treated with larotrectinib first as part of [facility] [address] A Phase 1a/1b Study of the Oral TRK Inhibitor LOXO-101 in Subjects with Adult Solid Tumors. Mr Gramajo is unaccompanied.  Mr. Gramajo was on the 100mg PO BID dose schedule, but was dose reduced to the 100mg daily. He underwent a esophageal dilatation attempt on 10/19/2020. He has since been diagnosed with Stage I esophageal cancer and was treated with curative intent by Dr. Martinez at [facility]. He started chemo (carbo/taxol) sensitized re-irrdiation with proton therapy on 2020. This completes treatment on 2021. He required a PEG tube which has since been removed. He stopped his larotrectinib on 2020 as was instructed.      Assessment/Plan:    1) Post surgical hypothyroidism  Goal TSH thought to be 0.5-2.5 per oncology notes  He has limited absorption of thyroid hormone in the context of chronic PEG tube requirements, and acid suppression  His doses are expected to be higher on this basis.  His most recent rise in TSH likely reflects chronic variability in absorption  He started T3 therapy at an earlier visit and found that this helped his symptoms.  I have augmented his tirosint dose and reinforced proper timing of tirosint with respect to TF.   He will also take tirosint and liothyronine separately  Will plan for fu TFT measurements to ensure restoration of euthyroidism  See pt plan    2) Salivary gland CA - Clinical trial per Dr. Lopez    3) Esophageal CA - sp surgery and XRT, using PEG tube for nourishment as above.    Patient Instructions   You had an endocrine telemedicine visit today.    We reviewed the followin) TSH has trended up and you have had variable symptoms.    2)  Increase the tirosint by taking 1 extra dose per week ie 2 doses on  and 1 dose the other 6 days of the week    3) Separate the tirosint  and liothyronine by about 30 minutes-1 hour (or more)    4) Continue to separate the tirosint properly from tube feeds.    5) Repeat labs in about 6 weeks and reach out sooner if you don't feel right. Please send a portal message when you do your labs.          Electronically signed by: Lexi Borrero MD on 4/25/2024 9:23 AM

## 2024-04-19 NOTE — PATIENT INSTRUCTIONS
You had an endocrine telemedicine visit today.    We reviewed the followin) TSH has trended up and you have had variable symptoms.    2)  Increase the tirosint by taking 1 extra dose per week ie 2 doses on  and 1 dose the other 6 days of the week    3) Separate the tirosint and liothyronine by about 30 minutes-1 hour (or more)    4) Continue to separate the tirosint properly from tube feeds.    5) Repeat labs in about 6 weeks and reach out sooner if you don't feel right. Please send a portal message when you do your labs.

## 2024-04-30 NOTE — ED PROVIDER NOTES
Emergency Medicine Note  HPI   HISTORY OF PRESENT ILLNESS     67-year-old male with past medical history of esophageal cancer (status post head and neck radiation, in remission), anemia, cancer, hypertension, hypothyroidism, stroke presents to the ED for evaluation of fever.  Patient states he has had intermittent fevers over the last 2 days.  Tmax 101.5F.  Patient admits to increased mucus production over the last 2 weeks after a recent EGD on 6/5 at [place].  Sputum is white and occasionally yellow.  Patient midst to also notes generalized weakness and lightheadedness.  Denies congestion, runny nose, sore throat, chest pain, shortness of breath, nausea, vomiting, diarrhea, abdominal pain, dysuria, hematuria, urinary frequency, dizziness, focal weakness.  No active cancer treatments.      History provided by:  Patient and relative   used: No          Patient History   PAST HISTORY     Reviewed from Nursing Triage:  Meds  Problems       Past Medical History:   Diagnosis Date   • Anemia    • Cancer (CMS/HCC)     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 drug thru [facility]    • Hx of head and neck radiation    • Hypertension    • Hypothyroidism    • Stroke (CMS/HCC)    • Thyroid disease        Past Surgical History:   Procedure Laterality Date   • CHOLECYSTECTOMY     • HERNIA REPAIR     • NECK DISSECTION     • NECK SURGERY     • THYROID SURGERY     • TUMOR REMOVAL      L neck 20yrs ago        Family History   Problem Relation Age of Onset   • No Known Problems Biological Father    • Lung cancer Biological Sister        Social History     Tobacco Use   • Smoking status: Never   • Smokeless tobacco: Never   Vaping Use   • Vaping Use: Never used   Substance Use Topics   • Alcohol use: No   • Drug use: No         Review of Systems   REVIEW OF SYSTEMS     Review of Systems   Constitutional: Positive for chills, diaphoresis, fatigue and fever. Negative for appetite change.   HENT: Negative for congestion,  rhinorrhea and sore throat.    Respiratory: Positive for cough. Negative for shortness of breath.    Cardiovascular: Negative for chest pain.   Gastrointestinal: Negative for abdominal pain, blood in stool, diarrhea, nausea and vomiting.   Genitourinary: Negative for dysuria, frequency and hematuria.   Musculoskeletal: Negative for arthralgias, back pain and myalgias.   Skin: Negative for rash.   Neurological: Positive for weakness and light-headedness. Negative for syncope and headaches.         VITALS     ED Vitals    Date/Time Temp Pulse Resp BP SpO2 [facility]   06/28/23 2252 -- -- -- 139/59 -- AGO   06/28/23 2204 39.1 °C (102.4 °F) 110 -- -- 93 % AGO   06/28/23 2110 38.9 °C (102.1 °F) -- -- -- -- AGO   06/28/23 2055 -- -- -- -- 98 % AGO   06/28/23 2055 -- 108 23 -- 92 % AGO   06/28/23 1807 38.3 °C (100.9 °F) 115 25 165/74 97 % SDN        Pulse Ox %: 97 % (06/28/23 1824)  Pulse Ox Interpretation: Normal (06/28/23 1824)  Heart Rate: 115 (06/28/23 1824)  Rhythm Strip Interpretation: Sinus Tachycardia (06/28/23 1824)     Physical Exam   PHYSICAL EXAM     Physical Exam  Vitals and nursing note reviewed.   Constitutional:       General: He is not in acute distress.     Appearance: Normal appearance. He is well-developed.   HENT:      Head: Normocephalic.   Eyes:      Conjunctiva/sclera: Conjunctivae normal.   Cardiovascular:      Rate and Rhythm: Regular rhythm. Tachycardia present.      Heart sounds: Normal heart sounds.   Pulmonary:      Effort: Pulmonary effort is normal. No respiratory distress.      Breath sounds: Normal breath sounds. No rhonchi.   Abdominal:      Palpations: Abdomen is soft.      Tenderness: There is no abdominal tenderness. There is no right CVA tenderness, left CVA tenderness, guarding or rebound.      Comments: PEG tube in left upper abdomen with minimal drainage.  No foul smell, erythema, warmth or tenderness.   Musculoskeletal:         General: Normal range of motion.      Cervical back: Neck  supple.      Right lower leg: No edema.      Left lower leg: No edema.   Skin:     General: Skin is warm and dry.      Findings: No rash.   Neurological:      General: No focal deficit present.      Mental Status: He is alert and oriented to person, place, and time. Mental status is at baseline.           PROCEDURES     Procedures     DATA     Results     Procedure Component Value Units Date/Time    Blood Culture Blood, Venous [[id number]] Collected: 06/28/23 2355    Specimen: Blood, Venous Updated: 06/29/23 0012    UA with Reflex Culture [[id number]]  (Abnormal) Collected: 06/28/23 2055    Specimen: Urine, Clean Catch Updated: 06/28/23 2119    Narrative:      The following orders were created for panel order UA with Reflex Culture.  Procedure                               Abnormality         Status                     ---------                               -----------         ------                     UA Reflex to Culture (Ma...[[id number]]  Abnormal            Final result               UA Microscopic[[id number]]               Abnormal            Final result                 Please view results for these tests on the individual orders.    UA Microscopic [[id number]]  (Abnormal) Collected: 06/28/23 2055    Specimen: Urine, Clean Catch Updated: 06/28/23 2119     RBC, Urine 5 TO 9 /HPF      WBC, Urine 0 TO 3 /HPF      Squamous Epithelial None Seen /hpf      Hyaline Cast None Seen /lpf      Bacteria, Urine None Seen /HPF      Mucus Rare /LPF     UA Reflex to Culture (Macroscopic) [[id number]]  (Abnormal) Collected: 06/28/23 2055    Specimen: Urine, Clean Catch Updated: 06/28/23 2112     Color, Urine Yellow     Clarity, Urine Clear     Specific Gravity, Urine 1.020     pH, Urine 8.0     Leukocyte Esterase Negative     Comment: Results can be falsely negative due to high specific gravity, some antibiotics, glucose >3 g/dl, or WBC other than neutrophils.        Nitrite, Urine Negative     Protein, Urine Trace     Comment:  I was physically present and participated in this delivery. Trace False Positive Protein can be seen with alkaline or highly buffered urines or urine with high specific gravity. Suggest clinical correlation.        Glucose, Urine Negative mg/dL      Ketones, Urine Negative mg/dL      Urobilinogen, Urine 2.0 EU/dL      Bilirubin, Urine Negative mg/dL      Blood, Urine Negative     Comment: The sensitivity of the occult blood test is equivalent to approximately 4 intact RBC/HPF.       SARS-CoV-2 (COVID-19), PCR Nasopharynx [[id number]]  (Abnormal) Collected: 06/28/23 1822    Specimen: Nasopharyngeal Swab from Nasopharynx Updated: 06/28/23 1911    Narrative:      The following orders were created for panel order SARS-CoV-2 (COVID-19), PCR Nasopharynx.  Procedure                               Abnormality         Status                     ---------                               -----------         ------                     SARS-COV-2 (COVID-19)/ F...[[id number]]  Abnormal            Final result                 Please view results for these tests on the individual orders.    SARS-COV-2 (COVID-19)/ FLU A/B, AND RSV, PCR Nasopharynx [[id number]]  (Abnormal) Collected: 06/28/23 1822    Specimen: Nasopharyngeal Swab from Nasopharynx Updated: 06/28/23 1911     SARS-CoV-2 (COVID-19) Positive     Influenza A Negative     Influenza B Negative     Respiratory Syncytial Virus Negative    Narrative:      Testing performed using real-time PCR for detection of COVID-19. EUA approved validation studies performed on site.     HS Troponin I (with 2 hour reflex) [[id number]]  (Normal) Collected: 06/28/23 1818    Specimen: Blood, Venous Updated: 06/28/23 1902     High Sens Troponin I 6.9 pg/mL     Comprehensive metabolic panel [[id number]]  (Abnormal) Collected: 06/28/23 1818    Specimen: Blood, Venous Updated: 06/28/23 1859     Sodium 138 mEQ/L      Potassium 3.9 mEQ/L      Comment: Results obtained on plasma. Plasma Potassium values may be up to 0.4 mEQ/L less than serum values. The differences may  be greater for patients with high platelet or white cell counts.        Chloride 103 mEQ/L      CO2 29 mEQ/L      BUN 16 mg/dL      Creatinine 0.8 mg/dL      Glucose 112 mg/dL      Calcium 9.3 mg/dL      AST (SGOT) 17 IU/L      ALT (SGPT) 15 IU/L      Alkaline Phosphatase 44 IU/L      Total Protein 7.0 g/dL      Comment: Test performed on plasma which typically contains approximately 0.4 g/dL more protein than serum.        Albumin 4.3 g/dL      Bilirubin, Total 0.6 mg/dL      eGFR >60.0 mL/min/1.73m*2      Anion Gap 6 mEQ/L     CBC and differential [[id number]]  (Abnormal) Collected: 06/28/23 1818    Specimen: Blood, Venous Updated: 06/28/23 1846     WBC 5.40 K/uL      RBC 4.67 M/uL      Hemoglobin 14.5 g/dL      Hematocrit 44.3 %      MCV 94.9 fL      MCH 31.0 pg      MCHC 32.7 g/dL      RDW 12.7 %      Platelets 152 K/uL      Comment: ALL RESULTS HAVE BEEN RECHECKED. SPECIMEN QUALITY CHECKED        MPV 11.6 fL      Differential Type Auto     nRBC 0.0 %      Immature Granulocytes 0.6 %      Neutrophils 79.5 %      Lymphocytes 5.2 %      Monocytes 13.9 %      Eosinophils 0.2 %      Basophils 0.6 %      Immature Granulocytes, Absolute 0.03 K/uL      Neutrophils, Absolute 4.30 K/uL      Lymphocytes, Absolute 0.28 K/uL      Monocytes, Absolute 0.75 K/uL      Eosinophils, Absolute 0.01 K/uL      Basophils, Absolute 0.03 K/uL     Lactate, w/ reflex repeat if > 2.0 [[id number]]  (Normal) Collected: 06/28/23 1818    Specimen: Blood, Venous Updated: 06/28/23 1830     Lactate 1.1 mmol/L     Blood Culture Blood, Venous [[id number]] Collected: 06/28/23 1818    Specimen: Blood, Venous Updated: 06/28/23 1826          Imaging Results          X-RAY CHEST 2 VIEWS (Final result)  Result time 06/28/23 19:48:27    Final result                 Impression:    IMPRESSION: No acute disease appreciated             Narrative:    CLINICAL HISTORY: Fever    COMMENT: Frontal and lateral views of the chest are compared to chest x-ray  of  9/28/2022.    No significant interval change. Stable cardiomediastinal silhouette demonstrate  normal size heart. Said of the lung fields are free of focal solid lesion or  infiltrative opacity.                                ECG 12 lead          Scoring tools                                  ED Course & MDM   MDM / ED COURSE / CLINICAL IMPRESSION / DISPO     Medical Decision Making  COVID-19: acute illness or injury  Generalized weakness: acute illness or injury  Amount and/or Complexity of Data Reviewed  Labs: ordered. Decision-making details documented in ED Course.  Radiology: ordered. Decision-making details documented in ED Course.  ECG/medicine tests: ordered.      Risk  OTC drugs.  Prescription drug management.  Decision regarding hospitalization.          ED Course as of 06/29/23 0055 Wed Jun 28, 2023   1844 Last dose of Tylenol at 1500 [EG]   1917 SARS-CoV-2 (COVID-19)(!): Positive [EG]   2010 X-RAY CHEST 2 VIEWS  --  IMPRESSION: No acute disease appreciated [EG]   2112 Updated on results of work up thus far.  Pt refusing Motrin.  Will give tylenol.  Second trop, UA   Amb pulse ox [EG]   2140 Requesting paxlovid - no drug interactons found based on med list.  Will send rx to pharmacy [EG]   2240 Pt and wife very concerned about being d/c'ed.  RN attempted to trial patient off of oxygen however wife keeps putting patient back on.  Patient feels too weak to try and ambulate for ambulatory pulse ox.  Will have [facility] consult [EG]   2246 Discussed with [facility] to consult [EG]   Thu Jun 29, 2023   0055 [facility] to admit. [EG]      ED Course User Index  [EG] Nabila Singh PA C     Clinical Impression      COVID-19   Generalized weakness     _________________     ED Disposition   Admit / Observation                   Nabila Singh PA C  06/29/23 0055

## 2024-05-10 ENCOUNTER — TRANSCRIBE ORDERS (OUTPATIENT)
Dept: REGISTRATION | Facility: HOSPITAL | Age: 69
End: 2024-05-10

## 2024-05-10 ENCOUNTER — HOSPITAL ENCOUNTER (OUTPATIENT)
Dept: RADIOLOGY | Facility: HOSPITAL | Age: 69
Discharge: HOME | End: 2024-05-10
Attending: FAMILY MEDICINE
Payer: MEDICARE

## 2024-05-10 DIAGNOSIS — R19.01 RIGHT UPPER QUADRANT ABDOMINAL SWELLING, MASS AND LUMP: ICD-10-CM

## 2024-05-10 DIAGNOSIS — R19.01 RIGHT UPPER QUADRANT ABDOMINAL SWELLING, MASS AND LUMP: Primary | ICD-10-CM

## 2024-05-10 PROCEDURE — 76536 US EXAM OF HEAD AND NECK: CPT

## 2024-05-13 ENCOUNTER — APPOINTMENT (OUTPATIENT)
Age: 69
Setting detail: DERMATOLOGY
End: 2024-05-14

## 2024-05-13 DIAGNOSIS — L82.1 OTHER SEBORRHEIC KERATOSIS: ICD-10-CM

## 2024-05-13 PROBLEM — C44.319 BASAL CELL CARCINOMA OF SKIN OF OTHER PARTS OF FACE: Status: ACTIVE | Noted: 2024-05-13

## 2024-05-13 PROCEDURE — 99213 OFFICE O/P EST LOW 20 MIN: CPT

## 2024-05-13 PROCEDURE — OTHER CONSULTATION FOR MOHS SURGERY: OTHER

## 2024-05-13 PROCEDURE — OTHER COUNSELING: OTHER

## 2024-05-13 ASSESSMENT — LOCATION ZONE DERM: LOCATION ZONE: TRUNK

## 2024-05-13 ASSESSMENT — LOCATION DETAILED DESCRIPTION DERM: LOCATION DETAILED: LEFT LATERAL INFERIOR CHEST

## 2024-05-13 ASSESSMENT — LOCATION SIMPLE DESCRIPTION DERM: LOCATION SIMPLE: CHEST

## 2024-05-13 NOTE — HPI: SKIN LESION (BASAL CELL CARCINOMA)
Is This A New Presentation, Or A Follow-Up?: Basal Cell Carcinoma
Additional History: Pre op mohs consult for bcc of right eyebrow.\\nPatient has had 3 BCC’s in the past couple of months.  Recently had a large bcc on scalp treated with Dr. Don.  Patient had ed&c on right lower leg for bcc.

## 2024-05-20 ENCOUNTER — APPOINTMENT (OUTPATIENT)
Age: 69
Setting detail: DERMATOLOGY
End: 2024-05-20

## 2024-05-20 PROBLEM — C44.319 BASAL CELL CARCINOMA OF SKIN OF OTHER PARTS OF FACE: Status: ACTIVE | Noted: 2024-05-20

## 2024-05-20 PROCEDURE — 12051 INTMD RPR FACE/MM 2.5 CM/<: CPT

## 2024-05-20 PROCEDURE — 17311 MOHS 1 STAGE H/N/HF/G: CPT

## 2024-05-20 PROCEDURE — OTHER MOHS SURGERY: OTHER

## 2024-05-20 NOTE — PROCEDURE: MOHS SURGERY
Closure 2 Information: This tab is for additional flaps and grafts, including complex repair and grafts and complex repair and flaps. You can also specify a different location for the additional defect, if the location is the same you do not need to select a new one. We will insert the automated text for the repair you select below just as we do for solitary flaps and grafts. Please note that at this time if you select a location with a different insurance zone you will need to override the ICD10 and CPT if appropriate.
Scc Spindle Histology Text: There were aggregates of spindle-like squamous cells.
Scc Moderately Differentiated Histology Text: There were aggregates of moderately-differentiated squamous cells.
Quadrants Reporting?: 0
Mixed Nodular And Micronodular Bcc Histology Text: There were aggregates of basaloid cells in a nodular and micro nodular pattern.
Anesthesia Volume In Cc: 0.5
Helical Rim Advancement Flap Text: Because of the tightness of the surrounding skin, the full-thickness nature of the defect with exposed cartilage, and to avoid deformity, a helical rim advancement flap was planned.  After prep and anesthesia, any protuberant cartilage or cartilage in the way of tissue movement and approximation was excised.  Then, an incision was made in the anterior portion of the ear through the skin to the posterior ear at the level of the perichondrium both superiorly and inferiorly.  Inferiorly, this extended to the lobule where a Burow???s triangle was excised anteriorly.  The flaps were then  from the ear posteriorly at the level of the perichondrium to the postauricular sulcus.  After hemostasis obtained, the flaps were carried over and closed in a layered fashion
Star Wedge Flap Text: Because of the tightness of the surrounding skin, the full-thickness nature of the defect with exposed cartilage, and to avoid deformity, a star wedge advancement flap was planned.  After prep and anesthesia, a full-thickness triangular wedge of tissue was excised, as well as two Burow's triangles on either side of this to reduce cupping deformity. The chondrocutaneous flaps were then carried over and closed in a layered fashion.
Consent Type: Consent 1 (Standard)
Asc Procedure Text (B): After obtaining clear surgical margins the patient was sent to an ASC for surgical repair.  The patient understands they will receive post-surgical care and follow-up from the ASC physician.
Ck7 - Negative Histology Text: CK staining demonstrates a normal density and pattern.
O-L Flap Text: Because of the full-thickness nature of the defect, and to preserve nearby structures and landmarks, a Burow???s advancement flap (L-plasty) was planned. After prep and anesthesia, a Burow's triangle was excised adjacent to the defect.  Perpendicular to this, a line was incised and crescentic Burow???s triangle excised.  The flap was elevated by undermining in the subcutaneous plane. Hemostasis was obtained.  The flap was carried over into the defect with care to avoid distortion and was sutured into place in a layered fashion
Show Mohs Stages In Case Summary (If You Hide Here Stages Will Be Calculated By Your Documentation In The Stages Tab)?: Yes
Complex Repair Preamble Text (Leave Blank If You Do Not Want): Extensive wide undermining was performed.
Suturegard Body: The suture ends were repeatedly re-tightened and re-clamped to achieve the desired tissue expansion.
Include Hemigard In Note?: No
Oculoplastic Surgeon Procedure Text (C): After obtaining clear surgical margins the patient was sent to oculoplastics for surgical repair.  The patient understands they will receive post-surgical care and follow-up from the referring physician's office.
Nasal Turnover Hinge Flap Text: Due to the deep nature of the defect, and to restore structure and function, and to cover and ensure survival of underlying tissue, and to provide cutaneous coverage, a cutaneous hinge flap was performed.  Three sides of the full-thickness skin adjacent to the defect were incised and flipped over like a page of the book into the defect, and secured with Vicryl stitches.
Bi-Rhombic Flap Text: Because of orientation and full-thickness nature of the defect, a rhombic transposition flap was planned. After  prep and anesthesia, two rhombic flaps were carefully incised to straddle relaxed skin tension lines and the anticipated Burow's triangle were removed. The flap was raised and the wound edges were all undermined in the subcutaneous plane. After hemostasis, the flaps were transposed/carried over into the defect and sutured in a layered fashion.
Scc Ka Subtype Histology Text: There were aggregates of glassy squamous cells consistent with keratoacanthoma.
Eyelid Full Thickness Repair - 36821: The eyelid defect was full thickness which required a wedge repair of the eyelid. Special care was taken to ensure that the eyelid margin was realligned when placing sutures.
Bcc Micronodular Histology Text: There were aggregates of basaloid cells demonstrating a micro nodular pattern.
Trilobed Flap Text: Because of the size and full-thickness nature of the defect, and to maintain form and function of the nose, and to avoid distortion of the nearby nasal tip and ala, a trilobed transposition flap was planned. After prep and local anesthesia, the trilobe was carefully incised with a Burow's triangle oriented superiorly. The flap was raised and the wound edges were undermined in the submuscular plane to the nasofacial junction. After hemostasis, the flaps were transposed/carried over into the defect and sutured in a layered fashion
Scc Histology Text: There were aggregates of squamous cells.
Was The Patient On Physician Recommended Anticoagulation Therapy?: Please Select the Appropriate Response
Bcc  Morpheaform/Sclerosing Histology Text: There were aggregates of basaloid cells demonstrating a morpheaform/sclerosing pattern.
Undermining Type: Entire Wound
Suturegard Intro: Intraoperative tissue expansion was performed, utilizing the SUTUREGARD device, in order to reduce wound tension.
Abbe Flap (Lower To Upper Lip) Text: The defect of the upper lip was assessed and measured.  Given the location and size of the defect, an Abbe flap was deemed most appropriate.  Using a sterile surgical marker, an appropriate Abbe flap was measured and drawn on the lower lip. Local anesthesia was then infiltrated. A scalpel was then used to incise the upper lip through and through the skin, vermilion, muscle and mucosa, leaving the flap pedicled on the opposite side.  The flap was then rotated and transferred to the lower lip defect.  The flap was then sutured into place with a three layer technique, closing the orbicularis oris muscle layer with subcutaneous buried sutures, followed by a mucosal layer and an epidermal layer.
Consent (Nose)/Introductory Paragraph: The rationale for Mohs was explained to the patient and consent was obtained. The risks, benefits and alternatives to therapy were discussed in detail. Specifically, the risks of nasal deformity, changes in the flow of air through the nose, infection, scarring, bleeding, prolonged wound healing, incomplete removal, allergy to anesthesia, nerve injury and recurrence were addressed. Prior to the procedure, the treatment site was clearly identified and confirmed by the patient. All components of Universal Protocol/PAUSE Rule completed.
Special Stains Stage 14 - Results: Base On Clearance Noted Above
Consent (Marginal Mandibular)/Introductory Paragraph: The rationale for Mohs was explained to the patient and consent was obtained. The risks, benefits and alternatives to therapy were discussed in detail. Specifically, the risks of damage to the marginal mandibular branch of the facial nerve, infection, scarring, bleeding, prolonged wound healing, incomplete removal, allergy to anesthesia, and recurrence were addressed. Prior to the procedure, the treatment site was clearly identified and confirmed by the patient. All components of Universal Protocol/PAUSE Rule completed.
Rhombic Flap Text: Because of the size and full-thickness nature of the defect, and in order to maintain form and function while avoiding distortion of the nearby nasal tip and ala, a rhombic transposition flap was planned. After prep and anesthesia, a Burow's triangle was excised laterally, just superior to the alar groove. The rhombic flap was carefully incised superior to the defect.  The flap was raised and the wound edges were all undermined in the subcutaneous plane. After hemostasis, the flap was transposed/carried over into the defect and sutured in a layered fashion.
Consent (Lip)/Introductory Paragraph: The rationale for Mohs was explained to the patient and consent was obtained. The risks, benefits and alternatives to therapy were discussed in detail. Specifically, the risks of lip deformity, changes in the oral aperture, infection, scarring, bleeding, prolonged wound healing, incomplete removal, allergy to anesthesia, nerve injury and recurrence were addressed. Prior to the procedure, the treatment site was clearly identified and confirmed by the patient. All components of Universal Protocol/PAUSE Rule completed.
Partial Purse String (Simple) Text: In order to control the direction of wound closure, to prevent distortion from wound contraction, and to reduce wound size to facilitate wound care and healing, an intermediate repair was planned.  After prep and anesthesia, and circumferential undermining, subcutaneous and dermal stitches were carefully placed across the wound.
Stage 15: Additional Anesthesia Type: 1% lidocaine with epinephrine
Modified Advancement Flap Text: Because of the full-thickness nature of the wound and in order to displace lines around important structures, a Burow???s advancement flap was planned. After prep and local anesthesia, a Burow???s triangle was excised adjacent to the defect.  The other Burow's triangle was displaced to hide incisions within skin relaxation lines. Two flaps were created by undermining in the deep subcutaneous plane. After hemostasis, the flaps were carried over and closed in a layered fashion.
Plastic Surgeon Procedure Text (E): After obtaining clear surgical margins the patient was sent to plastics for surgical repair.  The patient understands they will receive post-surgical care and follow-up from the referring physician's office.
Ear Wedge Repair Text: Due to exposed cartilage and to restore structure and function of the ear, a wedge excision was completed by carrying down an excision through the full thickness of the ear and cartilage with an inward facing Burow's triangle. The wound was then closed in a layered fashion.
Metatypical Bcc Histology Text: There were aggregates of basaloid cells in a metatypical pattern.
Stage 8: Additional Anesthesia Type: 1% lidocaine with 1:100,000 epinephrine and 408mcg clindamycin/ml and a 1:10 solution of 8.4% sodium bicarbonate
Repair Anesthesia Method: local infiltration
Number Of Stages: 1
Debridement Text: The wound edges were debrided prior to proceeding with the closure to facilitate wound healing.
Interpolation Flap Text: In order to maintain the form and function of the airway, and to maintain the alar groove, a two-stage interpolation axial flap and staged reconstruction was planned for closure.  A telfa template was made of the defect.  This was then used to outline the cheek interpolation flap.  The donor area for the pedicle flap was then anesthetized.  The flap was incised through the skin and subcutaneous tissue down to the layer of the underlying musculature.  The flap was carefully incised within the deep plane to maintain its blood supply. The pedicle was then rotated into position and sutured.  \\n\\nTo close the donor-site defect on the cheek, an advancement flap was created by wide undermining laterally in the subcutaneous plane. After hemostasis was obtained, this flap was carried over medially and sutured in a layered fashion.  The portion of the advancement flap near the pedicle of the interpolation flap was closed with care, so as not to constrict the vasculature of the pedicle.   \\n\\nOnce the flaps were sutured into place, adequate blood supply was confirmed with blanching and refill.  The pedicle was wrapped with xeroform gauze and dressed with telfa and gauze bandage to ensure continued blood supply and protect the attached pedicle.
Estimated Blood Loss (Cc): minimal
Full Thickness Lip Wedge Repair (Flap) Text: In order to maintain form and function of the lip, and to avoid distortion of the free margin, a lip advancement flap was planned. After prep and local anesthesia, Burow???s triangles were excised superiorly to the nasal sill and inferiorly around the lip to the labial mucosa.  Two flaps were elevated by undermining the skin laterally in both directions,  the skin and mucosa from the orbicularis muscle.  Any redundant orbicularis oris muscle was removed and complimentary edges of remaining muscle reapposed with a horizontal mattress stitch.  After hemostasis was obtained, the flaps were carried over and closed in a layered fashion.
Tissue Cultured Epidermal Autograft Text: Because of the size and depth of the defect and to facilitate healing by granulation, a tissue-cultured epidermal autograft was planned.  After prep and local anesthesia, Xenograft material was trimmed to fi the defect and secured circumferentially.
Suturegard Retention Suture: 2-0 Nylon
Stage 2: Additional Anesthesia Type: 1% lidocaine with 1:100,000 epinephrine and a 1:10 solution of 8.4% sodium bicarbonate
Inflammation Suggestive Of Cancer Camouflage Histology Text: There was a dense lymphocytic infiltrate which prevented adequate histologic evaluation of adjacent structures.
Bcc Superficial Pigmented Histology Text: There were aggregates of basaloid cells budding from the epidermis.
Epidermal Autograft Text: Because of the location and depth of the defect and to facilitate healing, an epidermal autograft was deemed most appropriate.  The epidermal-dermal pinch grafts were then harvested.  The skin grafts were then placed in the primary defect and oriented appropriately. The grafts were secured with vaseline gauze and bandage.
Unique Flap 2 Text: Because of the full-thickness nature of the defect and to reduce risk for alar rim retraction, and after discussion of options and risk for alar retraction with this repair, a free cartilage graft was planned.  An incision through the anterior antihelix was made and the skin lifted in the periochondrial plane.  A square of cartilage sized to fit defect.
Graft Cartilage Fenestration Text: The exposed cartilage was fenestrated with a 3mm punch biopsy to help facilitate wound healing, and decrease infection and chondritis.
Acral Melanoma Histology Text: Proliferation of MART-1+ cells.
Advancement Flap (Single) Text: Because of the full-thickness nature of the wound and in order to displace lines around important structures, a Burow's advancement flap was planned. After prep and local anesthesia, a Burow's triangle was excised adjacent to the defect.  The other Burow's triangle was displaced to hide incisions within skin relaxation lines. Two flaps were created by undermining in the deep subcutaneous plane. After hemostasis, the flaps were carried over and closed in a layered fashion.
Unique Flap 3 Text: Because of the through-and-through defect of the lateral ala, in order to restore the nose and maintain an open airway, a island-pedicle Spear flap was planned with cheek advancement flap.  After prep and anesthesia, a template was made of the right side of the lip and transferred to the left side to outline the normal anatomic position of the triangular portion of the upper lip.  A template was then made of the lining and outer defect of the left ala and transferred to the medial cheek adjacent to the nasolabial fold.  An island-pedicle flap was incised and elevated and carefully dissected to a muscular subcutaneous pedicle based near the piriform aperture and ascending portion of the maxilla.  This was carefully turned over and sutured to line the ala, then turned up on itself where it was sutured in a layered fashion.  \\n\\nTo close the donor site defect, a cheek flap was elevated by undermining in the subcutaneous plane lateral to the lateral canthus  After hemostasis was obtained the flap was carried over medially, attached to the piriform aperture of the axilla and ascending portion of the maxilla, and then closed in a layered fashion.  The advancement flap measured 3 x 4 cm.
Z Plasty Text: In order to reduce appearance and discomfort related to the web, a Z-plasty was designed.  Aftert prep and anesthesia, a horizontal incision was made across the web.  A double transposition flap was incised in a Z-plasty fashion.  The wound margins were widely undermined to elevate two flaps of skin.  After hemostasis was obtained, the flaps were transposed/carried over into place, and sutured in a a layered fashion.
Unique Flap 1 Text: Because of the full-thickness nature of the defect, and to maintain form and function of the nose, and the proximity to the nasal tip and ala, a bilateral advancement flap was carefully planned. After anesthesia and prep, a Burow's triangle was excised above the defect up to the nasal root, and a Burow???s triangle displaced centrally and excised below the defect down to the columella.  Two laterally-based flaps were created by undermining at the level of the periosteum and perichondrium skeletonizing the nasal tip, dorsum and sidewalls.  After hemostasis, a tension-reduction stitch was placed at the level of the distal nasal bone, and the flaps were sutured together in a layered fashion.
Scc In Situ Histology Text: There was squamous cell atypia and proliferation in a SCIS pattern.
Split-Thickness Skin Graft Text: Because of the size and thickness of the defect, and to provide coverage and facilitate healing with minimal contraction, a split-thickness skin graft was planned.  The donor site was marked and the graft harvested by scalpel, Weck blade, or dermatome.  The graft was then trimmed to fit the defect.  It was sutured into place and a bolster dressing applied.
Xenograft Text: Because of the size and depth of the defect and to facilitate healing by granulation, a tissue-cultured epidermal autograft was planned.  After prep and local anesthesia, Xenograft material was trimmed to fi the defect and secured
Crescentic Intermediate Repair Preamble Text (Leave Blank If You Do Not Want): Undermining was performed with blunt dissection.
Hemostasis: Electrodesiccation
Alar Island Pedicle Flap Text: The defect edges were debeveled with a #15 scalpel blade.  Given the location of the defect, shape of the defect and the proximity to the alar rim an island pedicle advancement flap was deemed most appropriate.  Using a sterile surgical marker, an appropriate advancement flap was drawn incorporating the defect, outlining the appropriate donor tissue and placing the expected incisions within the nasal ala running parallel to the alar rim. The area thus outlined was incised with a #15 scalpel blade.  The skin margins were undermined minimally to an appropriate distance in all directions around the primary defect and laterally outward around the island pedicle utilizing iris scissors.  There was minimal undermining beneath the pedicle flap.
Epidermal Sutures: 5-0 Prolene
Scc In Situ With Follicular Extension Histology Text: There was squamous cell atypia and proliferation in a SCIS pattern, with follicular or adnexal extension.
Mohs Rapid Report Verbiage: The area of clinically evident tumor was marked with skin marking ink and appropriately hatched.  The initial incision was made following the Mohs approach through the skin.  The specimen was taken to the lab, divided into the necessary number of pieces, chromacoded and processed according to the Mohs protocol.  This was repeated in successive stages until a tumor free defect was achieved.
Trichoepithelioma Histology Text: There were aggregates of densely blue cells.
Complex Repair And Graft Additional Text (Will Appearing After The Standard Complex Repair Text): The complex repair was not sufficient to completely close the primary defect. The remaining additional defect was repaired with the graft mentioned below.
Intermediate Repair And Graft Additional Text (Will Appearing After The Standard Complex Repair Text): The intermediate repair was not sufficient to completely close the primary defect. The remaining additional defect was repaired with the graft mentioned below.
Same Histology In Subsequent Stages Text: The pattern and morphology of the tumor is as described in the first stage.
Advancement-Rotation Flap Text: In order to maintain form and function of the airway, a spiral rotation flap was planned.  After prep and local anesthesia, an incision was made from the inferior, tangentially parallel to the alar rim, and then extended superiorly across the alar crease, vertically on the nasal sidewall, and laterally toward the cheek, or onto the cheek with a backcut.  The flap was undermined and after hemostasis was obtained, the flap was rotated down into place.  All wound edges were sutured in a layered fashion.
Bcc Infundibulocystic Histology Text: There were aggregates of basaloid cells demonstrating an infundibulocystic pattern.
Missing Epidermis Histology Text: Missing tissue was noted on frozen sections and additional slides were cut until present.  If no additional tissue containing epidermis was available, then an additional stage was excised.
Donor Site Anesthesia Type: same as repair anesthesia
Referred To Otolaryngology For Closure Text (Leave Blank If You Do Not Want): After obtaining clear surgical margins the patient was sent to otolaryngology for surgical repair.  The patient understands they will receive post-surgical care and follow-up from the repairing physician's office.
Double Z Plasty Text: The lesion was extirpated to the level of the fat with a #15 scalpel blade. Given the location of the defect, shape of the defect and the proximity to free margins a double Z-plasty was deemed most appropriate for repair. Using a sterile surgical marker, the appropriate transposition arms of the double Z-plasty were drawn incorporating the defect and placing the expected incisions within the relaxed skin tension lines where possible. The area thus outlined was incised deep to adipose tissue with a #15 scalpel blade. The skin margins were undermined to an appropriate distance in all directions utilizing iris scissors. The opposing transposition arms were then transposed/carried over and carried over into place in opposite direction and anchored with interrupted buried subcutaneous sutures.
Surgeon/Pathologist Verbiage (Will Incorporate Name Of Surgeon From Intro If Not Blank): operated in two distinct and integrated capacities as the surgeon and pathologist.
Provider Procedure Text (E): After obtaining clear surgical margins the defect was repaired by another provider.
Mid-Level Procedure Text (D): After obtaining clear surgical margins the patient was sent to a mid-level provider for surgical repair.  The patient understands they will receive post-surgical care and follow-up from the mid-level provider.
Paramedian Forehead Flap Text: Because of the size and full-thickness nature of the defect, and to maintain form and function of the nose, a forehead flap with bilateral forehead advancement flap closure of the donor site was planned.  After prep and anesthesia, excisional preparation of the recipient site was performed by outlining the cosmetic unit of the nasal tip.  Hemostasis was obtained.  A template was then made of the defect and transferred with the appropriate length to the upper forehead.  The forehead flap was incised and elevated based on the supratrochlear neurovascular bundle.  This was carefully dissected over the orbital rim to the nasion.  It was distally thinned and transferred to the nasal tip.  This was sutured in a layered fashion.  To close the donor site defect on the forehead, a large Burow???s triangle was excised superiorly and posteriorly into the scalp, and two large flaps were elevated by undermining the entire anterior scalp and forehead to the temples bilaterally, and over the supraorbital rim inferiorly.  After hemostasis was obtained, these flaps were carried over and closed in a layered fashion.
Area H Indication Text: Tumors in this location are included in Area H (eyelids, eyebrows, nose, lips, chin, ear, pre-auricular, post-auricular, temple, genitalia, hands, feet, ankles and areola).  Tissue conservation is critical in these anatomic locations.
Mixed Superficial And Nodular Bcc Histology Text: There were aggregates of basaloid cells in a superficial and nodular pattern.
V-Y Flap Text: Because of the full-thickness nature of the wound and to preserve nearby structures, a V-toY Advancement flap was planned. After prep and local anesthesia, a Burow's triangle was excised adjacent to the defect.  Opposite from this, two smaller Burow's triangles were excised.  Three flaps were created by undermining in the deep subcutaneous plane. After hemostasis, the flaps were carried over and closed in a layered fashion.
Hemigard Intro: Due to skin fragility and wound tension, it was decided to use HEMIGARD adhesive retention suture devices to permit a linear closure. The skin was cleaned and dried for a 6cm distance away from the wound. Excessive hair, if present, was removed to allow for adhesion.
Tarsorrhaphy Text: A tarsorrhaphy was performed using Frost sutures.
Rotation Flap Text: Because of the full-thickness nature of the defect and proximity to vital structures, a rotation flap was planned. After prep and local anesthesia, the flap was carefully incised along an arc.  A Burow's triangle was removed adjacent to the defect and along the outside of the arc. The flap was raised and the wound edges were undermined in the subcutaneous plane. After hemostasis, the flap was rotated into the defect. The distal tip of the flap was trimmed to fit the defect. The entirety of the flap's arcuate border was sutured in a layered fashion
Dermal Autograft Text: The defect edges were debeveled with a #15 scalpel blade.  Given the location of the defect, shape of the defect and the proximity to free margins a dermal autograft was deemed most appropriate.  Using a sterile surgical marker, the primary defect shape was transferred to the donor site. The area thus outlined was incised deep to adipose tissue with a #15 scalpel blade.  The harvested graft was then trimmed of adipose and epidermal tissue until only dermis was left.  The skin graft was then placed in the primary defect and oriented appropriately.
Alternatives Discussed Intro (Do Not Add Period): I discussed alternative treatments to Mohs surgery and specifically discussed the risks and benefits of
Length To Time In Minutes Device Was In Place: 10
Follicular Atypia Histology Text: Follicular atypia was noted and reviewed with patient, patient was advised to monitor and report any skin changes.
Epidermal Closure: simple interrupted
Information: Selecting Yes will display possible errors in your note based on the variables you have selected. This validation is only offered as a suggestion for you. PLEASE NOTE THAT THE VALIDATION TEXT WILL BE REMOVED WHEN YOU FINALIZE YOUR NOTE. IF YOU WANT TO FAX A PRELIMINARY NOTE YOU WILL NEED TO TOGGLE THIS TO 'NO' IF YOU DO NOT WANT IT IN YOUR FAXED NOTE.
Dressing (No Sutures): no dressing
Eyelid Partial Thickness Repair - 99422: The eyelid defect was partial thickness which required a wedge repair of the eyelid. Special care was taken to ensure that the eyelid margin was realligned when placing sutures.
Bcc Adenoid Histology Text: There were aggregates of basaloid cells demonstrating an adenoid or cribiform pattern.
Wound Care (No Sutures): Petrolatum
Intermediate Repair Preamble Text (Leave Blank If You Do Not Want): Undermining was performed with sharp dissection.
Orbicularis Oris Muscle Flap Text: Due to the deep nature of the defect, location near free margin, and to restore oral sphincter function, an orbicularis oris muscle flap was planned.  Using a sterile surgical marker, an appropriate flap was drawn incorporating the defect. The area thus outlined was incised and carried over into place, re-establishing function and closing the wound, and secured with layered stitches.
Staging Info: By selecting yes to the question above you will include information on AJCC 8 tumor staging in your Mohs note. Information on tumor staging will be automatically added for SCCs on the head and neck. AJCC 8 includes tumor size, tumor depth, perineural involvement and bone invasion.
Pinch Graft Text: The defect edges were debeveled with a #15 scalpel blade. Given the location of the defect, shape of the defect and the proximity to free margins a pinch graft was deemed most appropriate. Using a sterile surgical marker, the primary defect shape was transferred to the donor site. The area thus outlined was incised deep to adipose tissue with a #15 scalpel blade.  The harvested graft was then trimmed of adipose tissue until only dermis and epidermis was left. The skin margins of the secondary defect were undermined to an appropriate distance in all directions utilizing iris scissors.  The secondary defect was closed with interrupted buried subcutaneous sutures.  The skin edges were then re-apposed with running  sutures.  The skin graft was then placed in the primary defect and oriented appropriately.
Vermilion Border Text: The closure involved the vermilion border.
Unique Flap 2 Name: Free Cartilage graft
Otolaryngologist Procedure Text (E): After obtaining clear surgical margins the patient was sent to otolaryngology for surgical repair.  The patient understands they will receive post-surgical care and follow-up from the referring physician's office.
Bilobed Flap Text: Because of the size and full-thickness nature of the defect, and to maintain form and function of the nose, and to avoid distortion of the nearby nasal tip and ala, a bilobed transposition flap was planned. After prep and local anesthesia, the bilobe was carefully incised with a Burow's triangle oriented superiorly. The flap was raised and the wound edges were undermined in the submuscular plane to the nasofacial junction. After hemostasis, the flaps were transposed/carried over into the defect and sutured in a layered fashion
Retention Suture Text: Retention sutures were placed to support the closure and prevent dehiscence.
Island Pedicle Flap With Canthal Suspension Text: In order to avoid distortion of nearby structures, an island pedicle flap was planned.  After prep and local anesthesia, a triangular incision was made.  The flap was dissected to a muscular subcutaneous pedicle.  The skin surrounding the flap was undermined to allow for closure of the donor-site defect. After hemostasis obtained, the flap was carried over into place and sutured in a layered fashion.   suspension suture was placed in the canthal tendon to prevent tension and prevent ectropion.
Presence Of Scar Tissue (For Histology): absent
O-Z Flap Text: Because of the full-thickness nature of the defect and location adjacent to important structure(s), a bilateral rotation flap (O to T) was planned. After prep and anesthesia, arcuate incisions were extended from two opposite side of the wound.  Two flaps were created by undermining in the subcutaneous plane. After hemostasis was obtained, the flaps were carried over and sutured in a layered fashion.
Medical Necessity Statement: Based on my medical judgement, Mohs surgery is the most appropriate treatment for this cancer compared to other treatments.
Desmoplastic Trichoepithelioma Histology Text: There were basaloid cell proliferation in an infiltrative sclerosing pattern.
Mauc Instructions: By selecting yes to the question below the MAUC number will be added into the note.  This will be calculated automatically based on the diagnosis chosen, the size entered, the body zone selected (H,M,L) and the specific indications you chose. You will also have the option to override the Mohs AUC if you disagree with the automatically calculated number and this option is found in the Case Summary tab.
Mart-1 - Positive Histology Text: MART-1 staining demonstrates areas of higher density and clustering of melanocytes with Pagetoid spread upwards within the epidermis. The surgical margins are positive for tumor cells.
Localized Dermabrasion With Wire Brush Text: First, a scalpel was used to shave remove protuberant scar/wound edges and sebaceous hyperplasia.  Next, sterilized sandpaper was used to physically abrade to papillary dermis. This spot dermabrasion was performed to complete skin cancer reconstruction. It also will eliminate the other sun damaged precancerous cells that are known to be part of the regional effect of a lifetime's worth of sun exposure. This localized dermabrasion is therapeutic and should not be considered cosmetic in any regard.
Composite Graft Text: In order to decrease risk for distortion of the alar rim, a full-thickness skin graft with cartilage graft was planned. After prep and local anesthesia, a template was made of the defect and the graft was harvested from the conchal bowl. After the graft was harvested, a strip of exposed cartilage was also elevated from the conchal bowl.  The cartilage was either inset into pockets on either side of the defect, or minced and placed at the base of the wound.  The skin graft was then defatted and trimmed to fit the defect. It was sutured into place circumferentially and a tie-over Bolster dressing was applied.  Hemostasis was obtained at the donor site which was then bandaged to heal by second intention.
O-Z Plasty Text: Because of the full-thickness nature of the defect and location adjacent to important structure(s), a bilateral rotation flap (O to Z) was planned. After prep and anesthesia, arcuate incisions were extended from two opposite side of the wound.  Two flaps were created by undermining in the subcutaneous plane. After hemostasis was obtained, the flaps were carried over and sutured in a layered fashion.
Rhomboid Transposition Flap Text: Because of orientation and full-thickness nature of the defect, a rhombic transposition flap was planned. After prep and anesthesia, the rhombic flap was carefully incised to straddle relaxed skin tension lines and the anticipated Burow's triangle removed. The flap was raised and the wound edges were all undermined in the subcutaneous plane. After hemostasis, the flap was transposed/carried over into the defect and sutured in a layered fashion.
Incidental Squamous Cell Carcinoma In Situ Histology Text: Incidental SCIS was noted at the periphery of the Mohs section and additional stages or destruction were performed until clear.
Bcc Infiltrative Histology Text: There were aggregates of basaloid cells demonstrating an infiltrative pattern.
Referred To Oculoplastics For Closure Text (Leave Blank If You Do Not Want): After obtaining clear surgical margins the patient was sent to oculoplastics for surgical repair.  The patient understands they will receive post-surgical care and follow-up from the repairing physician's office.
H Plasty Text: Given the location of the defect, shape of the defect and the proximity to free margins a H-plasty was deemed most appropriate for repair.  Using a sterile surgical marker, the appropriate advancement arms of the H-plasty were drawn incorporating the defect and placing the expected incisions within the relaxed skin tension lines where possible. The area thus outlined was incised deep to adipose tissue with a #15 scalpel blade. The skin margins were undermined to an appropriate distance in all directions utilizing iris scissors.  The opposing advancement arms were then carried over into place in opposite direction and anchored with interrupted buried subcutaneous sutures.
Where Do You Want The Question To Include Opioid Counseling Located?: Case Summary Tab
X Size Of Lesion In Cm (Optional): 0.4
Post-Care Instructions: I reviewed with the patient in detail post-care instructions. Patient is not to engage in any heavy lifting, exercise, or swimming for the next 14 days. Should the patient develop any fevers, chills, bleeding, severe pain patient will contact the office immediately.
Scc Well Differentiated Histology Text: There were aggregates of well-differentiated squamous cells.
Sox10 - Negative Histology Text: SOX10 staining demonstrates a normal density and pattern of melanocytes along the dermal-epidermal junction. The surgical margins are negative for tumor cells.
Mohs Case Number: [id number]
Previous Accession (Optional): [id number]
Scc Poorly Differentiated Histology Text: There were aggregates of poorly-differentiated squamous cells.
Keystone Flap Text: Given the location of the defect, the surrounding tension, to facilitate healing, and the shape of the defect, a keystone flap was planned.  Using a sterile surgical marker, an appropriate keystone flap was drawn incorporating the defect, outlining the appropriate donor tissue and placing the expected incisions within the relaxed skin tension lines where possible. The area thus outlined was incised deep to adipose tissue with a #15 scalpel blade.  The skin margins were undermined to an appropriate distance in all directions around the primary defect and laterally outward around the flap utilizing iris scissors.  The wound edges were sutured in a layered fashion.
No Residual Tumor Seen Histology Text: There were no malignant cells seen in the sections examined.
Area L Indication Text: Tumors in this location are included in Area L (trunk and extremities).  Mohs surgery is indicated for larger tumors, or tumors with aggressive histologic features, in these anatomic locations.
Deep Sutures: 5-0 PGCL
Fibroepithelioma Of Pinkus Histology Text: There were aggregates of basaloid cells consistent with fibroepithelioma of pinks.
Dfsp Histology Text: There were densely arranged spindle-shaped cells.
Mixed Nodular And Infiltrative Bcc Histology Text: There were aggregates of basaloid cells in a nodular and infiltrative pattern.
Melolabial Transposition Flap Text: In order to maintain form and function of the airway, and to avoid distortion, a nasolabial transposition flap with cheek advancement flap closure at the donor site was planned. After prep and local anesthesia, a Burow's triangle was excised superiorly toward the inner canthus.  An incision was made inferiorly in the nasolabial fold to the lateral commissure of the mouth, then extended superiorly on the medial cheek where a nasolabial flap was elevated by undermining the skin in the subcutaneous plane of the cheek.  The primary defect on the nose was also undermined. The flap was distally thinned, transposed/carried over into place, amputated at the tip to fit the defect, and sutured to the nose defect in a layered fashion.  See above for size of this flap.  \\n\\nTo close the donor-site defect on the cheek, a cheek advancement flap was elevated by undermining the skin of the cheek in the subcutaneous plane laterally beyond the lateral canthus and superiorly above the orbital rim. After hemostasis was obtained, the flap was carried over medially and attached with peristeal stitches to the pyriform aperture of the maxilla and to the ascending portion of the maxilla. Remaining wound edges were closed in a layered fashion.  This cheek advancement flap measured 3 x 4 cm.
Bilateral Rotation Flap Text: The defect edges were debeveled with a #15 scalpel blade. Given the location of the defect, shape of the defect and the proximity to free margins a bilateral rotation flap was deemed most appropriate. Using a sterile surgical marker, an appropriate rotation flap was drawn incorporating the defect and placing the expected incisions within the relaxed skin tension lines where possible. The area thus outlined was incised deep to adipose tissue with a #15 scalpel blade. The skin margins were undermined to an appropriate distance in all directions utilizing iris scissors. Following this, the designed flap was carried over into the primary defect and sutured into place.
Eye Protection Verbiage: Before proceeding with the stage, a plastic scleral shield was inserted. The globe was anesthetized with a few drops of 1% lidocaine with 1:100,000 epinephrine. Then, an appropriate sized scleral shield was chosen and coated with lacrilube ointment. The shield was gently inserted and left in place for the duration of each stage. After the stage was completed, the shield was gently removed.
Retention Suture Bite Size: 3 mm
Basaloid Carcinoma Histology Text: There were aggregates of basaloid cells.
Staged Advancement Flap Text: Because of the full-thickness nature of the wound and in order to displace lines around important structures, a Burow???s advancement flap was planned. After prep and local anesthesia, a Burow???s triangle was excised adjacent to the defect.  The other Burow's triangle was displaced to hide incisions within skin relaxation lines. Two flaps were created by undermining in the deep subcutaneous plane. After hemostasis, the flaps were carried over and closed in a layered fashion. This is a staged repair and the patient will return for additional surgery.
Tumor Depth: Less than 6mm from granular layer and no invasion beyond the subcutaneous fat
Incidental Superficial Basal Cell Carcinoma Histology Text: Incidental superficial BCC was noted at the periphery of the Mohs section and additional stages were performed until clear.
Island Pedicle Flap Text: In order to avoid distortion of nearby structures, an island pedicle flap was planned.  After prep and local anesthesia, a triangular incision was made.  The flap was dissected to a muscular subcutaneous pedicle.  The skin surrounding the flap was undermined to allow for closure of the donor-site defect. After hemostasis obtained, the flap was carried over into place and sutured in a layered fashion.
Unique Flap 3 Name: Spear Flap
Secondary Intention Text (Leave Blank If You Do Not Want): Due to the superficial nature of the defect and/or patient preference, the wound was allowed to heal by secondary intention.
Which Eyelid Repair Cpt Are You Using?: 85503
Consent (Temporal Branch)/Introductory Paragraph: The rationale for Mohs was explained to the patient and consent was obtained. The risks, benefits and alternatives to therapy were discussed in detail. Specifically, the risks of damage to the temporal branch of the facial nerve, infection, scarring, bleeding, prolonged wound healing, incomplete removal, allergy to anesthesia, and recurrence were addressed. Prior to the procedure, the treatment site was clearly identified and confirmed by the patient. All components of Universal Protocol/PAUSE Rule completed.
O-T Advancement Flap Text: Because of the full-thickness nature of the defect and location adjacent to important structure(s), a bilateral advancement flap (A to T) was planned. After prep and anesthesia, a Burow's triangle was excised adjacent to the defect.  Incisions were extended from the opposite side of the wound, and smaller Burow's triangles at the end of each incision.  Two flaps were created by undermining in the subcutaneous plane. After hemostasis was obtained, the flaps were carried over and sutured in a layered fashion.
Scc Sarcomatoid Histology Text: There were aggregates of squamous cells in a sarcomatous pattern.
Estlander Flap (Lower To Upper Lip) Text: The defect of the lower lip was assessed and measured.  Given the location and size of the defect, an Estlander flap was deemed most appropriate.  Using a sterile surgical marker, an appropriate Estlander flap was measured and drawn on the upper lip. Local anesthesia was then infiltrated. A scalpel was then used to incise the lateral aspect of the flap, through skin, muscle and mucosa, leaving the flap pedicled medially.  The flap was then rotated and positioned to fill the lower lip defect.  The flap was then sutured into place with a three layer technique, closing the orbicularis oris muscle layer with subcutaneous buried sutures, followed by a mucosal layer and an epidermal layer.
Ear Star Wedge Flap Text: Because of the tightness of the surrounding skin, the full-thickness nature of the defect with exposed cartilage, and to avoid deformity, a star wedge advancement flap was planed.  After prep and anesthesia, a full-thickness triangular wedge of tissue was excised, as well as two brows triangles on either side of this to reduce cupping deformity. The chondrocutaneous flaps were then carried over and closed in a layered fashion.
Consent 1/Introductory Paragraph: The rationale for Mohs was explained to the patient and consent was obtained. The risks, benefits and alternatives to therapy were discussed in detail. Specifically, the risks of infection, scarring, bleeding, prolonged wound healing, incomplete removal, allergy to anesthesia, nerve injury and recurrence were addressed. Prior to the procedure, the treatment site was clearly identified and confirmed by the patient. All components of Universal Protocol/PAUSE Rule completed.
Closure 4 Information: This tab is for additional flaps and grafts above and beyond our usual structured repairs.  Please note if you enter information here it will not currently bill and you will need to add the billing information manually.
Chonodrocutaneous Helical Advancement Flap Text: Because of the tightness of the surrounding skin, the full-thickness nature of the defect with exposed cartilage, and to avoid deformity, a helical rim advancement flap was planed.  After prep and anesthesia, an incision was made in the anterior portion of the ear through the skin and the cartilage to the posterior perichondrium both superiorly and inferiorly within the scapha of the ear.  Inferiorly, this extended to the lobule where a Burow???s triangle was excised anteriorly.  The chondrocutaneous flaps were then  from the ear posteriorly at the level of the perichondrium to the postauricular sulcus.  A small rim of cartilage was also excised around the contour of the ear and after hemostasis obtained, the chondrocutaneous flaps were carried over and closed in a layered fashion
Postop Diagnosis: same
Abbe Flap (Upper To Lower Lip) Text: The defect of the lower lip was assessed and measured.  Given the location and size of the defect, an Abbe flap was deemed most appropriate.  Using a sterile surgical marker, an appropriate Abbe flap was measured and drawn on the upper lip. Local anesthesia was then infiltrated.  A scalpel was then used to incise the upper lip through and through the skin, vermilion, muscle and mucosa, leaving the flap pedicled on the opposite side.  The flap was then rotated and transferred to the lower lip defect.  The flap was then sutured into place with a three layer technique, closing the orbicularis oris muscle layer with subcutaneous buried sutures, followed by a mucosal layer and an epidermal layer.
Consent 2/Introductory Paragraph: Mohs surgery was explained to the patient and consent was obtained. The risks, benefits and alternatives to therapy were discussed in detail. Specifically, the risks of infection, scarring, bleeding, prolonged wound healing, incomplete removal, allergy to anesthesia, nerve injury and recurrence were addressed. Prior to the procedure, the treatment site was clearly identified and confirmed by the patient. All components of Universal Protocol/PAUSE Rule completed.
Burow's Graft Text: Because of the full-thickness nature of the wound and surrounding skin tension, a complex linear repair with Burow's graft was planned. After prep and anesthesia, one or two Burow???s triangles were excised adjacent to the defect and placed into sterile saline.  Two flaps were raised bilaterally by undermining widely in the subcutaneous plane. Hemostasis was obtained and the flaps were carried over into the defect with care to avoid distortion, and the wound edges were closed in a layered fashion, leaving a smaller defect.  A Burow???s triangle was defatted and trimmed to fit this remaining defect, and sutured into place circumferentially.
Date Of Previous Biopsy (Optional): 4/29/24
Muscle Hinge Flap Text: Due to the deep nature of the defect, and to ensure survival of the overlying flap or graft repair for cutaneous coverage, the wound was first addressed with a muscle hinge flap.  Three sides of the muscle were incised and flipped over like a page of the book into the defect, and secured with Vicryl stitches.
Surgeon Performing Repair (Optional): Dr. Junior Boogie
O-T Plasty Text: Because of the full-thickness nature of the defect and location adjacent to important structure(s), a bilateral advancement flap (O to T) was planned. After prep and anesthesia, a Burow's triangle was excised adjacent to the defect.  Incisions were extended from the opposite side of the wound, and smaller Burow???s triangles at the end of each incision.  Two flaps were created by undermining in the subcutaneous plane. After hemostasis was obtained, the flaps were carried over and sutured in a layered fashion.
Incidental Actinic Keratosis Histology Text: Incidental AK was noted at the periphery of the Mohs section destruction was performed, pt was was advised to monitor, and/or patient was advised to considered topical 5FU once fully healed.
Consent (Ear)/Introductory Paragraph: The rationale for Mohs was explained to the patient and consent was obtained. The risks, benefits and alternatives to therapy were discussed in detail. Specifically, the risks of ear deformity, infection, scarring, bleeding, prolonged wound healing, incomplete removal, allergy to anesthesia, nerve injury and recurrence were addressed. Prior to the procedure, the treatment site was clearly identified and confirmed by the patient. All components of Universal Protocol/PAUSE Rule completed.
Banner Transposition Flap Text: Because of orientation and full-thickness nature of the defect, a long rhombic transposition flap (banner flap) was planned. After prep and anesthesia, the rhombic flap was carefully incised to straddle relaxed skin tension lines and the anticipated Burow's triangle removed. The flap was raised and the wound edges were all undermined in the subcutaneous plane. After hemostasis, the flap was transposed/carried over into the defect and sutured in a layered fashion.
Nasalis Myocutaneous Flap Text: Using a #15 blade, an incision was made around the donor flap to the level of the nasalis muscle. Wide lateral undermining was then performed in both the subcutaneous plane above the nasalis muscle, and in a submuscular plane just above periosteum. This allowed the formation of a free nasalis muscle axial pedicle which was still attached to the actual cutaneous flap, increasing its mobility and vascular viability. Hemostasis was obtained with pinpoint electrocoagulation. The flap was mobilized into position and the pivotal anchor points positioned and stabilized with buried interrupted sutures. Subcutaneous and dermal tissues were closed in a multilayered fashion with sutures. Tissue redundancies were excised, and the epidermal edges were apposed without significant tension and sutured with sutures.
Brow Lift Text: A midfrontal incision was made medially to the defect to allow access to the tissues just superior to the left eyebrow. Following careful dissection inferiorly in a supraperiosteal plane to the level of the left eyebrow, several 3-0 monocryl sutures were used to resuspend the eyebrow orbicularis oculi muscular unit to the superior frontal bone periosteum. This resulted in an appropriate reapproximation of static eyebrow symmetry and correction of the left brow ptosis.
Detail Level: Detailed
Referring Physician (Optional): Marco Antonio Neff
No Repair - Repaired With Adjacent Surgical Defect Text (Leave Blank If You Do Not Want): After obtaining clear surgical margins the defect was repaired concurrently with another surgical defect which was in close approximation.
Manual Repair Warning Statement: We plan on removing the manually selected variable below in favor of our much easier automatic structured text blocks found in the previous tab. We decided to do this to help make the flow better and give you the full power of structured data. Manual selection is never going to be ideal in our platform and I would encourage you to avoid using manual selection from this point on, especially since I will be sunsetting this feature. It is important that you do one of two things with the customized text below. First, you can save all of the text in a word file so you can have it for future reference. Second, transfer the text to the appropriate area in the Library tab. Lastly, if there is a flap or graft type which we do not have you need to let us know right away so I can add it in before the variable is hidden. No need to panic, we plan to give you roughly 6 months to make the change.
Initial Size Of Lesion: 0.6
Mohs Histo Method Verbiage: Each section was then chromacoded and processed in the Mohs lab using the Mohs protocol and submitted for frozen section.
Spiral Flap Text: In order to maintain form and function of the airway, a spiral rotation flap was planned.  After prep and local anesthesia, an incision was made from the inferior wound edge, tangentially parallel to the alar rim, and then extended superiorly across the alar crease, vertically on the nasal sidewall, and laterally toward the cheek, or onto the cheek with a backcut.  The flap was undermined and after hemostasis was obtained, the flap was rotated down into place.  All wound edges were sutured in a layered fashion.
Peng Advancement Flap Text: Because of the full-thickness nature of the defect and location adjacent to free margin of alar rims, and to maintain functional airway and alar rim position, a bilateral advancement flap was planned. After prep and anesthesia, incisions were extended from the opposite side of the wound along the alar grooves, and Burow???s triangles at the end of each incision were excised.  Two flaps were created by undermining in the subcutaneous plane skeletonizing the nasal cartilages. After hemostasis was obtained, the flaps were carried over and sutured in a layered fashion.
Hemigard Postcare Instructions: The HEMIGARD strips are to remain completely dry for at least 5-7 days.
Unique Flap 1 Name: Nasal Advancement Flap
Depth Of Tumor Invasion (For Histology): tumor not visualized (deep and peripheral margins are clear of tumor)
Double O-Z Plasty Text: Because of the full-thickness nature of the defect and location adjacent to important structure(s), a bilateral rotation flap (double O to Z) was planned. After prep and anesthesia, arcuate incisions were extended from two opposite side of the wound.  Two flaps were created by undermining in the subcutaneous plane. After hemostasis was obtained, the flaps were carried over and sutured in a layered fashion.
Helical Rim Text: The closure involved the helical rim.
W Plasty Text: The lesion was extirpated to the level of the fat with a #15 scalpel blade.  Given the location of the defect, shape of the defect and the proximity to free margins a W-plasty was deemed most appropriate for repair.  Using a sterile surgical marker, the appropriate transposition arms of the W-plasty were drawn incorporating the defect and placing the expected incisions within the relaxed skin tension lines where possible.    The area thus outlined was incised deep to adipose tissue with a #15 scalpel blade.  The skin margins were undermined to an appropriate distance in all directions utilizing iris scissors.  The opposing transposition arms were then transposed/carried over into place in opposite direction and anchored with interrupted buried subcutaneous sutures.
Bcc Keratotic Histology Text: There were aggregates of basaloid cells demonstrating a pink keratotic pattern.
Bilobed Transposition Flap Text: Because of the orientation and full-thickness nature of the defect, and in order to avoid distortion of the surrounding structures, a bilobed flap was planned.  After prep and local anesthesia, the flap was then outlined, incised and elevated.  The skin was widely undermined to allow for closure of the donor site defect.  After hemostasis obtained, the flaps were transposed/carried over into place and sutured in a layered fashion.
Double Island Pedicle Flap Text: The defect edges were debeveled with a #15 scalpel blade.  Given the location of the defect, shape of the defect and the proximity to free margins a double island pedicle advancement flap was deemed most appropriate.  Using a sterile surgical marker, an appropriate advancement flap was drawn incorporating the defect, outlining the appropriate donor tissue and placing the expected incisions within the relaxed skin tension lines where possible.    The area thus outlined was incised deep to adipose tissue with a #15 scalpel blade.  The skin margins were undermined to an appropriate distance in all directions around the primary defect and laterally outward around the island pedicle utilizing iris scissors.  There was minimal undermining beneath the pedicle flap. The wound edges were sutured in a layered fashion.
Posterior Auricular Interpolation Flap Text: Due to location on free margin and exposed cartilage and to restore structure and function, a decision was made to reconstruct the defect utilizing an interpolation axial flap and a staged reconstruction.  A telfa template was made of the defect.  This telfa template was then used to outline the posterior auricular interpolation flap.  The donor area for the pedicle flap was then injected with anesthesia.  The flap was excised through the skin and subcutaneous tissue down to the layer of the underlying musculature.  The pedicle flap was carefully excised within this deep plane to maintain its blood supply.  The edges of the donor site were undermined.   The donor site was closed in a primary fashion.  The pedicle was then rotated into position and sutured.  Once the tube was sutured into place, adequate blood supply was confirmed with blanching and refill.  The pedicle was then wrapped with xeroform gauze and dressed appropriately with a telfa and gauze bandage to ensure continued blood supply and protect the attached pedicle.
Primary Defect Length In Cm (Final Defect Size - Required For Flaps/Grafts): 0.8
Complex Repair And Flap Additional Text (Will Appearing After The Standard Complex Repair Text): The complex repair was not sufficient to completely close the primary defect. The remaining additional defect was repaired with the flap mentioned below.
Scc Desmoplastic Subtype Histology Text: There were aggregates of squamous cells in a desk-plastic pattern.
Crescentic Advancement Flap Text: In order to maintain form and function of the airway, a crescentic advancement flap was planned.  After prep and local anesthesia, a Burow's triangle was excised superior to the defect.  A tangential and curvilinear incision was made onto the medial cheek.  Here, a crescentic Burow's triangle was excised.  The laterally-based flap was then raised by dissection in the deep subcutaneous plane and the remainder of the wound edges were also undermined.  After hemostasis, the flap was carried over into the defect with a periosteal suture at the base of the flap and the lateral nasal bone.  All wound edges were closed in a layered fashion.
Mucosal Advancement Flap Text: Because of the full-thickness nature of the wound and in order to restore and maintain function, a mucosal advancement flap was planned. After prep and local anesthesia, Burow???s triangles were excised adjacent to the defect.  Two flaps were created by undermining in the deep subcutaneous plane over the orbicularis oris. After hemostasis, the flaps were carried over and closed in a layered fashion, with care to maintain vermillion border and oral commissures.
Mustarde Flap Text: Because of full-thickness of the defect and to avoid distortion of the lower eyelid and canthus, a Mustarde cheek rotation flap was planned. After prep and anesthesia, a large Burow???s triangle was excised inferiorly.  An incision was made from the superior portion of the wound over the orbital rim, curving upward onto the temple, then down toward the preauricular crease where another Burow???s triangle was excised.  The full cheek flap was elevated and the wound edges were undermined in the subcutaneous plane. After hemostasis, the flap was rotated into place with care to preserve lower lid position, trimmed at the tip, suspended with a periosteal stitch from the orbital rim, and sutured in a a layered fashion.
Home Suture Removal Text: Patient was provided instructions on removing sutures and will remove their sutures at home.  If they have any questions or difficulties they will call the office.
Pain Refusal Text: I offered to prescribe pain medication but the patient refused to take this medication.
Area M Indication Text: Tumors in this location are included in Area M (cheek, forehead, scalp, neck, jawline and pretibial skin).  Mohs surgery is indicated for tumors in these anatomic locations.
Scc Acantholytic Histology Text: There were aggregates of squamous cells in an acantholytic pattern.
Subsequent Stages Histo Method Verbiage: Using a similar technique to that described above, a thin layer of tissue was removed from all areas where tumor was visible on the previous stage.  The tissue was again oriented, mapped, dyed, and processed as above.
Hatchet Flap Text: Because of the full-thickness nature of the defect and to avoid deformity of free margin of the ala, and after full discussion of the spectrum of repair options, a dorsal nasal rotation flap was planned.  After prep and anesthesia, a Burow???s triangle was excised from the lateral portion superiorly on the nasal sidewall towards the inner canthus and an incision extended from the inferior margin of the wound across the nose and sidewall, then extended superiorly along the nasofacial sulcus and medial cheek through the inner canthus to the glabella where a back cut was made to the contralateral inner canthus.  The flap was elevated by skeletonizing the nasal root, dorsum, and sidewalls.  After hemostasis obtained, the flap was rotated into place, trimmed to fit the defect, and sutured in a layered fashion.
Zygomaticofacial Flap Text: Because of full-thickness of the defect and to avoid distortion of the lower eyelid, a full cheek rotation flap was planned. After prep and anesthesia, a large Burow???s triangle was excised inferiorly.  An incision was made from the superior portion of the wound over the orbital rim, curving upward onto the temple, then downward along the preauricular crease and below the ear lobule where another Burow???s triangle was excised.  The full cheek flap was elevated and the wound edges were undermined in the subcutaneous plane. After hemostasis, the flap was rotated into place, trimmed at the tip, suspended with a periosteal stitch from the orbital rim, and sutured in a a layered fashion.
V-Y Plasty Text: Because of the full-thickness nature of the wound and to preserve nearby structures, a V-toY Advancement flap was planned. After prep and local anesthesia, a Burow???s triangle was excised adjacent to the defect.  Opposite from this, two smaller Burow???s triangles were excised.  Three flaps were created by undermining in the deep subcutaneous plane. After hemostasis, the flaps were carried over and closed in a layered fashion.
Consent (Scalp)/Introductory Paragraph: The rationale for Mohs was explained to the patient and consent was obtained. The risks, benefits and alternatives to therapy were discussed in detail. Specifically, the risks of changes in hair growth pattern secondary to repair, infection, scarring, bleeding, prolonged wound healing, incomplete removal, allergy to anesthesia, nerve injury and recurrence were addressed. Prior to the procedure, the treatment site was clearly identified and confirmed by the patient. All components of Universal Protocol/PAUSE Rule completed.
Cheiloplasty (Less Than 50%) Text: In order to maintain form and function of the lip, and to avoid distortion of the free margin, a lip advancement flap was planned. After rep and local anesthesia, Burow???s triangles were excised superiorly to the nasal sill and inferiorly around the lip to the labial mucosa.  Two flaps were elevated by undermining the skin laterally in both directions,  the skin and mucosa from the orbicularis muscle.  Any redundant orbicularis oris muscle was removed and complimentary edges of remaining muscle reapposed with a horizontal mattress stitch.  After hemostasis was obtained, the flaps were carried over and closed in a layered fashion.
Mercedes Flap Text: Because of the full-thickness nature of the defect and tightness of surrounding skin, a triple-advancement flap was planned.  After prep and local anesthesia, three Burow's triangles were excised adjacent to the defect.  The wound edges were widely undermined to raise three flaps in the deep subcutaneous plane.  Hemostasis was achieved.  The flaps were then carried over into the defect and sutured into place.
Simple / Intermediate / Complex Repair - Final Wound Length In Cm: 1.2
Consent (Near Eyelid Margin)/Introductory Paragraph: The rationale for Mohs was explained to the patient and consent was obtained. The risks, benefits and alternatives to therapy were discussed in detail. Specifically, the risks of ectropion or eyelid deformity, infection, scarring, bleeding, prolonged wound healing, incomplete removal, allergy to anesthesia, nerve injury and recurrence were addressed. Prior to the procedure, the treatment site was clearly identified and confirmed by the patient. All components of Universal Protocol/PAUSE Rule completed.
Repair Type: Intermediate Layered Repair
Skin Substitute Text: Because of the size and depth of the defect and to facilitate healing by granulation, a skin substitute graft was planned.  After prep and local anesthesia, Xenograft material was trimmed to fi the defect and secured circumferentially.
Intermediate Repair And Flap Additional Text (Will Appearing After The Standard Complex Repair Text): The intermediate repair was not sufficient to completely close the primary defect. The remaining additional defect was repaired with the flap mentioned below.
Consent 3/Introductory Paragraph: I gave the patient a chance to ask questions they had about the procedure.  Following this I explained the Mohs procedure and consent was obtained. The risks, benefits and alternatives to therapy were discussed in detail. Specifically, the risks of infection, scarring, bleeding, prolonged wound healing, incomplete removal, allergy to anesthesia, nerve injury and recurrence were addressed. Prior to the procedure, the treatment site was clearly identified and confirmed by the patient. All components of Universal Protocol/PAUSE Rule completed.
Referred To Plastics For Closure Text (Leave Blank If You Do Not Want): After obtaining clear surgical margins the patient was sent to plastics for surgical repair.  The patient understands they will receive post-surgical care and follow-up from the repairing physician's office.
Island Pedicle Flap-Requiring Vessel Identification Text: Due to the location on free margin and to restore and maintain airway, an alar IPF was planned.  Given the location of the defect, shape of the defect and the proximity to free margins an island pedicle advancement flap was deemed most appropriate.  Using a sterile surgical marker, an appropriate advancement flap was drawn, based on the axial vessel mentioned above, incorporating the defect, outlining the appropriate donor tissue and placing the expected incisions within the relaxed skin tension lines where possible.    The area thus outlined was incised deep to adipose tissue with a #15 scalpel blade.  The skin margins were undermined to an appropriate distance in all directions around the primary defect and laterally outward around the island pedicle utilizing iris scissors.  There was minimal undermining beneath the pedicle flap.
Area Suspicious For Tumor Histology Text: An area suspicious for additional tumor was noted and excised with additional stage(s).
Rectangular Flap Text: The defect edges were debeveled with a #15 scalpel blade. Given the location of the defect and the proximity to free margins a rectangular flap was deemed most appropriate. Using a sterile surgical marker, an appropriate rectangular flap was drawn incorporating the defect. The area thus outlined was incised deep to adipose tissue with a #15 scalpel blade. The skin margins were undermined to an appropriate distance in all directions utilizing iris scissors. Following this, the designed flap was carried over into the primary defect and sutured into place.
Bilateral Helical Rim Advancement Flap Text: Because of the tightness of the surrounding skin, the full-thickness nature of the defect with exposed cartilage, and to avoid deformity, a helical rim advancement flap was planed.  After prep and anesthesia, an incision was made in the anterior portion of the ear through the skin and the cartilage to the posterior perichondrium both superiorly and inferiorly within the scapha of the ear.  Inferiorly, this extended to the lobule where a Burow???s triangle was excised anteriorly.  The chondrocutaneous flaps were then  from the ear posteriorly at the level of the perichondrium to the postauricular sulcus.  A small rim of cartilage was also excised around the contour of the ear both superiorly and inferiorly, and after hemostasis obtained, the chondrocutaneous flaps were carried over and closed in a layered fashion
Ftsg Text: Because of the full-thickness nature of the defect, to protect underlying structures, and to avoid distortion, a full-thickness skin graft was planned. After prep and local anesthesia, a template was made of the defect and the graft was harvested.  The graft was defatted and trimmed to fit the defect. It was sutured into place circumferentially and a tie-over Bolster dressing was applied.  The donor site was converted to a fusiform defect and was closed in a layered fashion or was closed via a purse string closure with subcutaneous sutures unless documentation states that the donor site healed by second intention. Hemostasis was obtained prior to any repair of the donor site.
Mart-1 - Negative Histology Text: MART-1 staining demonstrates a normal density and pattern of melanocytes along the dermal-epidermal junction. The surgical margins are negative for tumor cells.
Mohs Method Verbiage: An incision following the standard Mohs approach was done and the specimen was harvested as a microscopic controlled layer.
Consent (Spinal Accessory)/Introductory Paragraph: The rationale for Mohs was explained to the patient and consent was obtained. The risks, benefits and alternatives to therapy were discussed in detail. Specifically, the risks of damage to the spinal accessory nerve, infection, scarring, bleeding, prolonged wound healing, incomplete removal, allergy to anesthesia, and recurrence were addressed. Prior to the procedure, the treatment site was clearly identified and confirmed by the patient. All components of Universal Protocol/PAUSE Rule completed.
Epidermal Closure Graft Donor Site (Optional): running
Cartilage Graft Text: Because of the laxity of the alar rim resulting from loss of fibrofatty support, and to preserve form and function of the nose, a cartilage graft was planned.  An incision was made into the conchal bowl and a cutaneous flap was elevated. The conchal bowl cartilage was excised and after hemostasis was obtained, the cutaneous flap was replaced and sutured down.  The cartilage graft was then carved to fit the defect.  Two pockets were made medially and laterally in the alar rim, and the cartilage strut was sutured into place with Vicryl suture.
Mastoid Interpolation Flap Text: Due to the full-thickness nature of the wound and to restore structure/function, a decision was made to reconstruct the defect utilizing an interpolation axial flap and a staged reconstruction.  A telfa template was made of the defect.  This telfa template was then used to outline the mastoid interpolation flap.  The donor area for the pedicle flap was then injected with anesthesia.  The flap was excised through the skin and subcutaneous tissue down to the layer of the underlying musculature.  The pedicle flap was carefully excised within this deep plane to maintain its blood supply.  The edges of the donor site were undermined.   The donor site was closed in a primary fashion.  The pedicle was then rotated into position and sutured.  Once the tube was sutured into place, adequate blood supply was confirmed with blanching and refill.  The pedicle was then wrapped with xeroform gauze and dressed appropriately with a telfa and gauze bandage to ensure continued blood supply and protect the attached pedicle.
Nostril Rim Text: The closure involved the nostril rim.
Localized Dermabrasion With 15 Blade Text: The patient was draped in routine manner.  Localized dermabrasion using a 15 blade was performed in routine manner to papillary dermis. This spot dermabrasion is being performed to complete skin cancer reconstruction. It also will eliminate the other sun damaged precancerous cells that are known to be part of the regional effect of a lifetime's worth of sun exposure. This localized dermabrasion is therapeutic and should not be considered cosmetic in any regard.
Localized Dermabrasion With Sand Papertext: The patient was draped in routine manner.  Localized dermabrasion using sterile sand paper was performed in routine manner to papillary dermis. This spot dermabrasion is being performed to complete skin cancer reconstruction. It also will eliminate the other sun damaged precancerous cells that are known to be part of the regional effect of a lifetime's worth of sun exposure. This localized dermabrasion is therapeutic and should not be considered cosmetic in any regard.
Which Instrument Did You Use For Dermabrasion?: Wire Brush
Bill 59 Modifier?: No - Continue to Bill 79 Modifier

## 2024-06-03 ENCOUNTER — APPOINTMENT (OUTPATIENT)
Age: 69
Setting detail: DERMATOLOGY
End: 2024-06-11

## 2024-06-03 DIAGNOSIS — L50.8 OTHER URTICARIA: ICD-10-CM

## 2024-06-03 DIAGNOSIS — B07.8 OTHER VIRAL WARTS: ICD-10-CM

## 2024-06-03 DIAGNOSIS — L82.1 OTHER SEBORRHEIC KERATOSIS: ICD-10-CM

## 2024-06-03 PROCEDURE — 17110 DESTRUCT B9 LESION 1-14: CPT

## 2024-06-03 PROCEDURE — OTHER COUNSELING: OTHER

## 2024-06-03 PROCEDURE — 99213 OFFICE O/P EST LOW 20 MIN: CPT | Mod: 25

## 2024-06-03 PROCEDURE — OTHER LIQUID NITROGEN: OTHER

## 2024-06-03 PROCEDURE — OTHER PRESCRIPTION: OTHER

## 2024-06-03 RX ORDER — CLOBETASOL PROPIONATE 0.5 MG/G
OINTMENT TOPICAL
Qty: 30 | Refills: 1 | Status: ERX | COMMUNITY
Start: 2024-06-03

## 2024-06-03 ASSESSMENT — LOCATION SIMPLE DESCRIPTION DERM
LOCATION SIMPLE: RIGHT FOREHEAD
LOCATION SIMPLE: RIGHT FOREARM
LOCATION SIMPLE: RIGHT UPPER ARM

## 2024-06-03 ASSESSMENT — LOCATION DETAILED DESCRIPTION DERM
LOCATION DETAILED: RIGHT FOREHEAD
LOCATION DETAILED: RIGHT ANTERIOR DISTAL UPPER ARM
LOCATION DETAILED: RIGHT LATERAL FOREHEAD
LOCATION DETAILED: RIGHT VENTRAL DISTAL FOREARM

## 2024-06-03 ASSESSMENT — LOCATION ZONE DERM
LOCATION ZONE: ARM
LOCATION ZONE: FACE

## 2024-06-17 ENCOUNTER — TELEPHONE (OUTPATIENT)
Dept: ENDOCRINOLOGY | Facility: CLINIC | Age: 69
End: 2024-06-17
Payer: MEDICARE

## 2024-07-08 RX ORDER — LEVOTHYROXINE SODIUM 50 UG/1
150 CAPSULE ORAL DAILY
Qty: 96 ML | Refills: 0 | Status: SHIPPED | OUTPATIENT
Start: 2024-07-08 | End: 2024-10-10

## 2024-07-12 DIAGNOSIS — E89.0 POST-SURGICAL HYPOTHYROIDISM: Primary | ICD-10-CM

## 2024-08-20 ENCOUNTER — TRANSCRIBE ORDERS (OUTPATIENT)
Dept: REGISTRATION | Facility: HOSPITAL | Age: 69
End: 2024-08-20

## 2024-08-20 ENCOUNTER — HOSPITAL ENCOUNTER (OUTPATIENT)
Dept: RADIOLOGY | Facility: HOSPITAL | Age: 69
Discharge: HOME | End: 2024-08-20
Attending: FAMILY MEDICINE
Payer: MEDICARE

## 2024-08-20 DIAGNOSIS — R10.30 LOWER ABDOMINAL PAIN, UNSPECIFIED: Primary | ICD-10-CM

## 2024-08-20 DIAGNOSIS — R10.30 LOWER ABDOMINAL PAIN, UNSPECIFIED: ICD-10-CM

## 2024-08-20 PROCEDURE — 76705 ECHO EXAM OF ABDOMEN: CPT

## 2024-09-05 ENCOUNTER — HOSPITAL ENCOUNTER (EMERGENCY)
Facility: HOSPITAL | Age: 69
Discharge: HOME | End: 2024-09-06
Attending: EMERGENCY MEDICINE | Admitting: EMERGENCY MEDICINE
Payer: MEDICARE

## 2024-09-05 DIAGNOSIS — R42 DIZZINESS: Primary | ICD-10-CM

## 2024-09-05 DIAGNOSIS — R42 VERTIGO: ICD-10-CM

## 2024-09-05 LAB
ALBUMIN SERPL-MCNC: 4.2 G/DL (ref 3.5–5.7)
ALP SERPL-CCNC: 43 IU/L (ref 34–125)
ALT SERPL-CCNC: 22 IU/L (ref 7–52)
ANION GAP SERPL CALC-SCNC: 5 MEQ/L (ref 3–15)
AST SERPL-CCNC: 20 IU/L (ref 13–39)
BASOPHILS # BLD: 0.05 K/UL (ref 0.01–0.1)
BASOPHILS NFR BLD: 0.9 %
BILIRUB SERPL-MCNC: 0.5 MG/DL (ref 0.3–1.2)
BUN SERPL-MCNC: 19 MG/DL (ref 7–25)
CALCIUM SERPL-MCNC: 9.3 MG/DL (ref 8.6–10.3)
CHLORIDE SERPL-SCNC: 105 MEQ/L (ref 98–107)
CO2 SERPL-SCNC: 29 MEQ/L (ref 21–31)
CREAT SERPL-MCNC: 0.7 MG/DL (ref 0.7–1.3)
DIFFERENTIAL METHOD BLD: ABNORMAL
EGFRCR SERPLBLD CKD-EPI 2021: >60 ML/MIN/1.73M*2
EOSINOPHIL # BLD: 0.2 K/UL (ref 0.04–0.54)
EOSINOPHIL NFR BLD: 3.6 %
ERYTHROCYTE [DISTWIDTH] IN BLOOD BY AUTOMATED COUNT: 12.4 % (ref 11.6–14.4)
GLUCOSE SERPL-MCNC: 116 MG/DL (ref 70–99)
HCT VFR BLD AUTO: 42.5 % (ref 40.1–51)
HGB BLD-MCNC: 14.3 G/DL (ref 13.7–17.5)
IMM GRANULOCYTES # BLD AUTO: 0.02 K/UL (ref 0–0.08)
IMM GRANULOCYTES NFR BLD AUTO: 0.4 %
LYMPHOCYTES # BLD: 1.18 K/UL (ref 1.2–3.5)
LYMPHOCYTES NFR BLD: 21.1 %
MAGNESIUM SERPL-MCNC: 2.1 MG/DL (ref 1.8–2.5)
MCH RBC QN AUTO: 31.2 PG (ref 28–33.2)
MCHC RBC AUTO-ENTMCNC: 33.6 G/DL (ref 32.2–36.5)
MCV RBC AUTO: 92.8 FL (ref 83–98)
MONOCYTES # BLD: 0.82 K/UL (ref 0.3–1)
MONOCYTES NFR BLD: 14.7 %
NEUTROPHILS # BLD: 3.31 K/UL (ref 1.7–7)
NEUTS SEG NFR BLD: 59.3 %
NRBC BLD-RTO: 0 %
PDW BLD AUTO: 11.3 FL (ref 9.4–12.4)
PLATELET # BLD AUTO: 167 K/UL (ref 150–350)
POTASSIUM SERPL-SCNC: 4.2 MEQ/L (ref 3.5–5.1)
PROT SERPL-MCNC: 7.1 G/DL (ref 6–8.2)
RBC # BLD AUTO: 4.58 M/UL (ref 4.5–5.8)
SODIUM SERPL-SCNC: 139 MEQ/L (ref 136–145)
TSH SERPL DL<=0.05 MIU/L-ACNC: 0.33 MIU/L (ref 0.34–5.6)
WBC # BLD AUTO: 5.58 K/UL (ref 3.8–10.5)

## 2024-09-05 PROCEDURE — 3E0337Z INTRODUCTION OF ELECTROLYTIC AND WATER BALANCE SUBSTANCE INTO PERIPHERAL VEIN, PERCUTANEOUS APPROACH: ICD-10-PCS | Performed by: EMERGENCY MEDICINE

## 2024-09-05 PROCEDURE — 36415 COLL VENOUS BLD VENIPUNCTURE: CPT

## 2024-09-05 PROCEDURE — 85025 COMPLETE CBC W/AUTO DIFF WBC: CPT

## 2024-09-05 PROCEDURE — 80053 COMPREHEN METABOLIC PANEL: CPT

## 2024-09-05 PROCEDURE — 85025 COMPLETE CBC W/AUTO DIFF WBC: CPT | Performed by: EMERGENCY MEDICINE

## 2024-09-05 PROCEDURE — 84439 ASSAY OF FREE THYROXINE: CPT | Performed by: EMERGENCY MEDICINE

## 2024-09-05 PROCEDURE — 96360 HYDRATION IV INFUSION INIT: CPT

## 2024-09-05 PROCEDURE — 25800000 HC PHARMACY IV SOLUTIONS: Performed by: EMERGENCY MEDICINE

## 2024-09-05 PROCEDURE — 99284 EMERGENCY DEPT VISIT MOD MDM: CPT | Mod: 25

## 2024-09-05 PROCEDURE — 93005 ELECTROCARDIOGRAM TRACING: CPT | Performed by: EMERGENCY MEDICINE

## 2024-09-05 PROCEDURE — 83735 ASSAY OF MAGNESIUM: CPT | Performed by: EMERGENCY MEDICINE

## 2024-09-05 PROCEDURE — 80053 COMPREHEN METABOLIC PANEL: CPT | Performed by: EMERGENCY MEDICINE

## 2024-09-05 PROCEDURE — 84443 ASSAY THYROID STIM HORMONE: CPT | Performed by: EMERGENCY MEDICINE

## 2024-09-05 RX ADMIN — SODIUM CHLORIDE 500 ML: 9 INJECTION, SOLUTION INTRAVENOUS at 23:01

## 2024-09-06 ENCOUNTER — APPOINTMENT (EMERGENCY)
Dept: RADIOLOGY | Facility: HOSPITAL | Age: 69
End: 2024-09-06
Attending: EMERGENCY MEDICINE
Payer: MEDICARE

## 2024-09-06 VITALS
RESPIRATION RATE: 18 BRPM | DIASTOLIC BLOOD PRESSURE: 84 MMHG | HEART RATE: 74 BPM | TEMPERATURE: 98.1 F | OXYGEN SATURATION: 97 % | HEIGHT: 73 IN | SYSTOLIC BLOOD PRESSURE: 146 MMHG | WEIGHT: 201 LBS | BODY MASS INDEX: 26.64 KG/M2

## 2024-09-06 LAB
ATRIAL RATE: 79
P AXIS: 68
PR INTERVAL: 172
QRS DURATION: 84
QT INTERVAL: 406
QTC CALCULATION(BAZETT): 465
R AXIS: -32
T WAVE AXIS: 33
T4 FREE SERPL-MCNC: 0.99 NG/DL (ref 0.58–1.64)
VENTRICULAR RATE: 79

## 2024-09-06 PROCEDURE — 70498 CT ANGIOGRAPHY NECK: CPT

## 2024-09-06 PROCEDURE — 93010 ELECTROCARDIOGRAM REPORT: CPT | Performed by: INTERNAL MEDICINE

## 2024-09-06 PROCEDURE — 63600105 HC IODINE BASED CONTRAST: Mod: JZ | Performed by: EMERGENCY MEDICINE

## 2024-09-06 PROCEDURE — 70496 CT ANGIOGRAPHY HEAD: CPT

## 2024-09-06 RX ORDER — MECLIZINE HCL 25MG 25 MG/1
25 TABLET, CHEWABLE ORAL 3 TIMES DAILY PRN
Qty: 20 TABLET | Refills: 0 | Status: SHIPPED | OUTPATIENT
Start: 2024-09-06 | End: 2024-10-10

## 2024-09-06 RX ORDER — IOPAMIDOL 755 MG/ML
100 INJECTION, SOLUTION INTRAVASCULAR
Status: COMPLETED | OUTPATIENT
Start: 2024-09-06 | End: 2024-09-06

## 2024-09-06 RX ADMIN — IOPAMIDOL 100 ML: 755 INJECTION, SOLUTION INTRAVENOUS at 01:26

## 2024-09-06 ASSESSMENT — ENCOUNTER SYMPTOMS: DIZZINESS: 1

## 2024-09-06 NOTE — ED PROVIDER NOTES
Emergency Medicine Note  HPI   HISTORY OF PRESENT ILLNESS     68-year-old male with history of thyroid CA, surgical removal of a brain mass, thyroid disorder, dysphagia with esophageal stricture and gastrostomy tube presenting with multiple complaints including pressure to the head, dizziness and unsteadiness on feet, discomfort with pins-and-needles feeling to the soles of bilateral feet.  Patient swam 2 weeks ago and afterwards started to feel tightness along the top of the scalp where his prior incision is located.  Patient had of headache described as a pulling sensation.  Denies any vision changes, nausea or vomiting.  Denies any focal weakness.  Today patient felt he was unsteady on his feet.  Symptoms are worse especially with standing.  Denies any chest pain, shortness of breath.  Patient is not on any blood thinners.  Patient also endorses pain and pressure above the right eye where his prior Mohs surgery was performed earlier in the year.      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 thru [facility]    • Hx of head and neck radiation    • Hypertension    • Hypothyroidism    • Stroke (CMS/HCC)    • Thyroid disease        Past Surgical History:   Procedure Laterality Date   • CHOLECYSTECTOMY     • HERNIA REPAIR     • NECK DISSECTION     • NECK SURGERY     • THYROID SURGERY     • TUMOR REMOVAL      L neck 20yrs ago        Family History   Problem Relation Age of Onset   • No Known Problems Biological Father    • Lung cancer Biological Sister        Social History     Tobacco Use   • Smoking status: Never   • Smokeless tobacco: Never   Vaping Use   • Vaping Use: Never used   Substance Use Topics   • Alcohol use: No   • Drug use: No         Review of Systems   REVIEW OF SYSTEMS     Review of Systems   Neurological:  Positive for dizziness.          VITALS     ED Vitals      Date/Time Temp Pulse Resp BP SpO2 [facility]   09/06/24 0148 -- 74 18 146/84 97 % CAW   09/06/24 0037 -- 78 18 152/90 98 % CAW   09/05/24 2329 -- 82 18 147/87 97 % CAW   09/05/24 2205 -- 84 20 153/91 98 % CAW   09/05/24 1859 -- -- -- 144/77 -- MMP   09/05/24 1858 36.7 °C (98.1 °F) 101 17 -- 97 % [facility]          Pulse Ox %: 98 % (09/05/24 2319)  Pulse Ox Interpretation: Normal (09/05/24 2319)           Physical Exam   PHYSICAL EXAM     Physical Exam  Constitutional:       General: He is not in acute distress.  HENT:      Head: Normocephalic and atraumatic.      Mouth/Throat:      Mouth: Mucous membranes are dry.   Eyes:      Extraocular Movements: Extraocular movements intact.      Pupils: Pupils are equal, round, and reactive to light.   Cardiovascular:      Rate and Rhythm: Normal rate and regular rhythm.   Pulmonary:      Effort: Pulmonary effort is normal. No respiratory distress.   Abdominal:      General: Abdomen is flat.      Tenderness: There is no abdominal tenderness.      Comments: +PEG   Skin:     General: Skin is warm and dry.   Neurological:      General: No focal deficit present.      Mental Status: He is alert and oriented to person, place, and time.      GCS: GCS eye subscore is 4. GCS verbal subscore is 5. GCS motor subscore is 6.      Cranial Nerves: No dysarthria or facial asymmetry.      Motor: Motor function is intact. No weakness, tremor or abnormal muscle tone.      Coordination: Finger-Nose-Finger Test and Heel to Shin Test normal.   Psychiatric:         Mood and Affect: Mood is anxious.         Speech: Speech normal.         Behavior: Behavior normal.         Cognition and Memory: Cognition normal.           PROCEDURES     Procedures     DATA     Results       Procedure Component Value Units Date/Time    TSH w reflex FT4 [[id number]]  (Abnormal) Collected: 09/05/24 2035    Specimen: Blood, Venous Updated: 09/05/24 2351     TSH 0.33 mIU/L     Magnesium [[id number]]  (Normal)  Collected: 09/05/24 2035    Specimen: Blood, Venous Updated: 09/05/24 2336     Magnesium 2.1 mg/dL     Comprehensive metabolic panel [[id number]]  (Abnormal) Collected: 09/05/24 2035    Specimen: Blood, Venous Updated: 09/05/24 2105     Sodium 139 mEQ/L      Potassium 4.2 mEQ/L      Comment: Results obtained on plasma. Plasma Potassium values may be up to 0.4 mEQ/L less than serum values. The differences may be greater for patients with high platelet or white cell counts.        Chloride 105 mEQ/L      CO2 29 mEQ/L      BUN 19 mg/dL      Creatinine 0.7 mg/dL      Glucose 116 mg/dL      Calcium 9.3 mg/dL      AST (SGOT) 20 IU/L      ALT (SGPT) 22 IU/L      Alkaline Phosphatase 43 IU/L      Total Protein 7.1 g/dL      Comment: Test performed on plasma which typically contains approximately 0.4 g/dL more protein than serum.        Albumin 4.2 g/dL      Bilirubin, Total 0.5 mg/dL      eGFR >60.0 mL/min/1.73m*2      Comment: Calculation based on the Chronic Kidney Disease Epidemiology Collaboration (CKD-EPI) equation refit without adjustment for race.        Anion Gap 5 mEQ/L     CBC and differential [[id number]]  (Abnormal) Collected: 09/05/24 2035    Specimen: Blood, Venous Updated: 09/05/24 2053     WBC 5.58 K/uL      RBC 4.58 M/uL      Hemoglobin 14.3 g/dL      Hematocrit 42.5 %      MCV 92.8 fL      MCH 31.2 pg      MCHC 33.6 g/dL      RDW 12.4 %      Platelets 167 K/uL      MPV 11.3 fL      Differential Type Auto     nRBC 0.0 %      Immature Granulocytes 0.4 %      Neutrophils 59.3 %      Lymphocytes 21.1 %      Monocytes 14.7 %      Eosinophils 3.6 %      Basophils 0.9 %      Immature Granulocytes, Absolute 0.02 K/uL      Neutrophils, Absolute 3.31 K/uL      Lymphocytes, Absolute 1.18 K/uL      Monocytes, Absolute 0.82 K/uL      Eosinophils, Absolute 0.20 K/uL      Basophils, Absolute 0.05 K/uL             Imaging Results              CT ANGIOGRAPHY HEAD/NECK WITH AND WITHOUT IV CONTRAST (In process)                      ECG 12 lead          Scoring tools                                  ED Course & MDM   MDM / ED COURSE / CLINICAL IMPRESSION / DISPO     Medical Decision Making  Patient with multiple complaints.  Pressure in head, unsteadiness on feet.  Suspect possible vertigo, tension HA, lower suspicion for vascular or cardiac etiology.  No focal symptoms or deficits on exam.  Mild nystagmus.  Will obtain CT head/neck to r/o mass, bleed.    Neuropathy to b/l soles of feet - will check lytes, thyroid studies.        Amount and/or Complexity of Data Reviewed  Labs: ordered.  Radiology: ordered.  ECG/medicine tests: ordered.    Risk  Prescription drug management.        ED Course as of 09/06/24 0220   Fri Sep 06, 2024   0219 Change of shift - care transitioned from Dr Yin - pt pending CT at time of sign out.  Report reviewed. No emergent findings noted. Plan for d/c.   [RP]      ED Course User Index  [RP] Carmen Avendano, DO     Clinical Impression      None                 Rob Yin MD  09/06/24 0252

## 2024-09-17 ENCOUNTER — TELEPHONE (OUTPATIENT)
Dept: ENDOCRINOLOGY | Facility: CLINIC | Age: 69
End: 2024-09-17

## 2024-09-17 NOTE — TELEPHONE ENCOUNTER
[facility] Appointment Request   Provider: Dr. Gross or Dr. Galindo  Appointment Type: New Patient   Reason for Visit: Thyroid Cancer- former pt of Dr. Lexi Borrero  Available Day and Time: 1 week. (First avail I saw out of both providers was 11/4/24)  Best Contact Number: [phone]    The practice will reach out to schedule your appointment within the next 2 business days.

## 2024-09-20 ENCOUNTER — TELEPHONE (OUTPATIENT)
Dept: ENDOCRINOLOGY | Facility: CLINIC | Age: 69
End: 2024-09-20

## 2024-09-20 NOTE — TELEPHONE ENCOUNTER
Patient of 's left a voicemail with concerns of racing heart beat over the last 10 days. Patient reports normal vitals. Patient would like a call back for advice.

## 2024-09-20 NOTE — TELEPHONE ENCOUNTER
Spoke to the pt to instruct to lower Cytomel / Liothyronine to 5 mcg daily instead of BID. Pt stated he forgot to tell me he lowered the Cytomel to 5 mcg daily this past Monday and is still having palpitations today and doesn't feel well. Wants to lower his Tirosint SOL dose.

## 2024-09-20 NOTE — TELEPHONE ENCOUNTER
Call patient  - lower the cytomel 5mcg, to one daily  - same dose Tirosint  - labs with Dr. Gross in endocrinology at [place], he is seeing him on 10/15

## 2024-09-20 NOTE — TELEPHONE ENCOUNTER
Dr. Borrero pt - has been having palpitations for past 10 days in the morning which improve as the day goes on but resume every morning. Pt did not take Cytomel today due to increased palpitations - takes Liothyronine 5 mcg twice a day. Is on Tirosint  mcg daily now. TSH from 9/5/24 was 0.33. Pt used to take a low dose of Metoprolol for many years but no longer takes it. /80, weight stable at 200 lbs.

## 2024-09-26 NOTE — TELEPHONE ENCOUNTER
Pt called and stated he is still feeling same symptoms as described last week after speaking with Bg. He was advised to take Tirosint 6 days a week. He is feeling very shaking anxious and scared. Please advise.

## 2024-09-27 ENCOUNTER — TELEPHONE (OUTPATIENT)
Dept: ENDOCRINOLOGY | Facility: CLINIC | Age: 69
End: 2024-09-27
Payer: MEDICARE

## 2024-09-27 NOTE — PROGRESS NOTES
Received after hour call from the patient. He reports that he has been feeling “awful and miserable” for the past week. he has been feeling shaky along with body aches and intermittent palpitations and dizziness and attributed his symptoms to thyroid medicine.    Per chart review, he is a former patient of Dr. Borrero who was following her for history of thyroid cancer, and postoperative hypothyroidism. He called the clinic last week, reporting palpitations. His recent TFTs showed mildly low TSH. He takes tirosint and cytomel. At that time, he was initially advised to reduce the dose of cytomel, however given persistent symptoms, he was advised to reduce tirosint to 6x/wk with plan to get repeat TFTs at his upcoming endocrine visit with Dr Gross.    I explained to him that although symptoms could be related to thyroid hormone excess for which the dose of medicine has already been reduced, however levothyroxine is generally longer lasting medicine and he may not notice immediate relief of symptoms, IF his symptoms are solely related to thyroid excess.    However it is also possible that he might have other reasons for his symptoms, including cardiac issues, arrhythmia or infection, and therefore he should seek further evaluation by going to nearby ER. Patient expressed his dissatisfaction with the ERs and healthcare system, and did not appear satisfied with any recommendations and explanations and demanded an urgent and thorough evaluation ASAP. I explained that further diagnostic evaluation cannot be performed over an after-hours telephone call and he needs to go to ER or urgent care center for further evaluation.

## 2024-09-27 NOTE — TELEPHONE ENCOUNTER
Telephone encounter occurred at 8 PM 9/26    Received after hour call from the patient. He reports that he has been feeling “awful and miserable” for the past week. he has been feeling shaky along with body aches and intermittent palpitations and dizziness and attributed his symptoms to thyroid medicine.     Per chart review, he is a former patient of Dr. Borrero who was following her for history of thyroid cancer, and postoperative hypothyroidism. He called the clinic last week, reporting palpitations. His recent TFTs showed mildly low TSH. He takes tirosint and cytomel. At that time, he was initially advised to reduce the dose of cytomel, however given persistent symptoms, he was advised to reduce tirosint to 6x/wk with plan to get repeat TFTs at his upcoming endocrine visit with Dr Gross.     I explained to him that although symptoms could be related to thyroid hormone excess for which the dose of medicine has already been reduced, however levothyroxine is generally longer lasting medicine and he may not notice immediate relief of symptoms, IF his symptoms are solely related to thyroid excess.     However it is also possible that he might have other reasons for his symptoms, including cardiac issues, arrhythmia or infection, and therefore he should seek further evaluation by going to nearby ER. Patient expressed his dissatisfaction with the ERs and healthcare system, and did not appear satisfied with any recommendations and explanations and demanded an urgent and thorough evaluation ASAP. I explained that further diagnostic evaluation cannot be performed over an after-hours telephone call and he needs to go to ER or urgent care center for further evaluation.

## 2024-09-27 NOTE — TELEPHONE ENCOUNTER
Pt called office and says he reduced his tirosint 150 from 7 days per week to 6 days per week and still has symptoms of being jittery and shaking. He says he can hear his heart beating.  He says he saw cardiologist. He says he had blood work this morning.  He says when he stopped tirosint, feels fine.  He says ER wont do anything for him.

## 2024-09-27 NOTE — TELEPHONE ENCOUNTER
I have followed the message string related to this patient and his concerns.  If I can be of help, please let me know.

## 2024-10-04 ENCOUNTER — TELEPHONE (OUTPATIENT)
Dept: ENDOCRINOLOGY | Facility: CLINIC | Age: 69
End: 2024-10-04
Payer: MEDICARE

## 2024-10-04 NOTE — TELEPHONE ENCOUNTER
Patient has not established care with us.  Unfortunately, I cannot provide any recommendations at this point.  Follow up with Dr. Ivey office or go to emergency room.  Thank you.

## 2024-10-04 NOTE — TELEPHONE ENCOUNTER
Request for Medical Advice (NON-URGENT)   Patient PCP: Yamila Brunson MD  New or Existing Issue: existing   Question or Concern: Pt called asking to speak with nurse or Dr Gross. He continues to have issues with jittery/shakiness throughout his body which comes and goes. Yesterday he felt fine by noontime, but overnight last night woke up around 1am with the shakiness again but all vitals were normal. Pt has 1st appt w/ Dr Gross 10/15/24, transferring from Dr Borrero. He's been to the ER for this also but nothing was found to account for his symptoms.     [phone]    Preferred Pharmacy:   [id number] - [place], [place] - [address] AT [facility]  [address]  [place] [address]  Phone: [phone] Fax: [phone]    [facility] - [address] [address]  Phone: [phone] Fax: [phone]      The practice will reach out to discuss your Medical Question or Concern within 2 business days.

## 2024-10-10 ENCOUNTER — OFFICE VISIT (OUTPATIENT)
Dept: ENDOCRINOLOGY | Facility: CLINIC | Age: 69
End: 2024-10-10
Payer: MEDICARE

## 2024-10-10 VITALS
WEIGHT: 203.2 LBS | HEART RATE: 84 BPM | DIASTOLIC BLOOD PRESSURE: 78 MMHG | OXYGEN SATURATION: 98 % | HEIGHT: 73 IN | SYSTOLIC BLOOD PRESSURE: 128 MMHG | BODY MASS INDEX: 26.93 KG/M2 | RESPIRATION RATE: 18 BRPM

## 2024-10-10 DIAGNOSIS — Z08 ENCOUNTER FOR FOLLOW-UP SURVEILLANCE OF THYROID CANCER: Primary | ICD-10-CM

## 2024-10-10 DIAGNOSIS — E89.0 POSTSURGICAL HYPOTHYROIDISM: ICD-10-CM

## 2024-10-10 DIAGNOSIS — Z85.850 ENCOUNTER FOR FOLLOW-UP SURVEILLANCE OF THYROID CANCER: Primary | ICD-10-CM

## 2024-10-10 PROCEDURE — 99215 OFFICE O/P EST HI 40 MIN: CPT | Performed by: INTERNAL MEDICINE

## 2024-10-10 PROCEDURE — G8752 SYS BP LESS 140: HCPCS | Performed by: INTERNAL MEDICINE

## 2024-10-10 PROCEDURE — G8754 DIAS BP LESS 90: HCPCS | Performed by: INTERNAL MEDICINE

## 2024-10-10 RX ORDER — LEVOTHYROXINE SODIUM 150 UG/1
150 TABLET ORAL
COMMUNITY
End: 2024-12-11 | Stop reason: DRUGHIGH

## 2024-10-10 NOTE — PATIENT INSTRUCTIONS
Thyroid cancer and postsurgical hypothyroidism: Reviewed medical records. Reviewed Care every where. Medical records are incomplete. Patient was seen by Dr. Borrero since 2022. Now establishing care with me.     Patient has a history of thyroid cancer, S/p thyroid surgery and MASCORRO (200 mCI) in 2001. His last TG was negative in 10/2023 (neg TG abs).     He had cervical lymph node enlargement in 2016. 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 After esophageal surgery, he is placed on PEG tube.     Patient was diagnosed with Chronic fatigue syndrome in 1990s.    He is taking thyroid HRT since his thyroid surgery. He was taking Levoxyl which was switched to liquid Tirosint 150 mcg daily together with liothyronine 10 mcg/day after PEG tube. He was switched to Levoxyl 150 mcg per day in 2 weeks ago in 9/2024 by Oncology.     He is now taking Levoxyl 150 mcg per day and Cytomel 10 mcg per day.  Patient reports jitteriness and shakiness throughout his body which comes and goes.   On his recent lab work in 9/2024: TSH is 4.8, Free T4 is 0.93 and Free T3 is 3.0.    Continue Levoxyl 150 mcg per day.  Continue Cytomel 10 mcg per day.    Ordering TSH, Free T4, Free T3, total T4, total T3.  Test blood work in 4 weeks from now.    We will communicate the results and plan.  We will decide further management based on the results.    Discussed correct way of taking thyroid medication.  Follow up in 3 months. Answered all questions.   The patient acknowledged understanding.     Thyroid counseling: Take your thyroid hormone medication first thing in the morning on empty stomach. No food or drink except for plain water for at least 30 minutes. After 30 minutes, you can eat or drink but do not take any products that contain iron, calcium,  magnesium, antacids, vitamins, minerals within 4 hours of taking your thyroid medication.

## 2024-10-10 NOTE — PROGRESS NOTES
HPI/SUBJECTIVE     Patient ID: Celso Gramajo 1955  Referring provider: Yamila Brunson MD   Reason for visit: Thyroid    Thyroid cancer and postsurgical hypothyroidism:     Celso Gramajo is referred to us for management of thyroid cancer and postsurgical hypothyroidism.     Reviewed medical records. Reviewed Care every where. Medical records are incomplete. Patient was seen by Dr. Borrero since 2022. Now establishing care with me.     Patient has a history of thyroid cancer, S/p thyroid surgery and MASCORRO (200 mCI) in 2001. His last TG was negative in 10/2023 (neg TG abs).     He had cervical lymph node enlargement in 2016. 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 After esophageal surgery, he is placed on PEG tube.     Patient was diagnosed with Chronic fatigue syndrome in 1990s.    He is taking thyroid HRT since his thyroid surgery. He was taking Levoxyl which was switched to liquid Tirosint 150 mcg daily together with liothyronine 10 mcg/day after PEG tube. He was switched to Levoxyl 150 mcg per day in 2 weeks ago in 9/2024 by Oncology.     He is now taking Levoxyl 150 mcg per day and Cytomel 10 mcg per day.  On his recent lab work in 9/2024: TSH is 4.8, Free T4 is 0.93 and Free T3 is 3.0.    Patient reports jitteriness and shakiness throughout his body which comes and goes.   Patient reports fatigue. Patient denies heart palpations, hand tremors.   Reviewed lab work. Review of systems as per HPI.    History  Patient Active Problem List   Diagnosis    Vertigo    Hypertension    Postsurgical hypothyroidism    Mixed hyperlipidemia    Gastroesophageal reflux disease    Increased sputum production    Allergic rhinitis    Change in bowel habits    Chronic fatigue syndrome    Constipation, chronic    Deviated nasal  septum    Elevated coronary artery calcium score    Eustachian tube dysfunction, bilateral    Examination of participant in clinical trial    Induration penis plastica    Hypertrophy of nasal turbinates    Hx of colonic polyps    Malignant neoplasm of salivary gland (CMS/HCC)    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)    Paralysis of vocal cords and larynx, unilateral    ENMANUEL (obstructive sleep apnea)    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)    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)    Penile curvature, acquired    Rhinitis sicca    Thyroid cancer (CMS/HCC)    Sensorineural hearing loss (SNHL), bilateral    Other dysphagia    Abdominal pain    Other dysphagia    COVID-19    Encounter for follow-up surveillance of thyroid cancer      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 drug thru [facility]     Hx of head and neck radiation     Hypertension     Hypothyroidism     Stroke (CMS/HCC)     Thyroid disease       Family History   Problem Relation Name Age of Onset    No Known Problems Biological Father      Lung cancer Biological Sister        Past Surgical History   Procedure Laterality Date    Cholecystectomy      Hernia repair      Micro direct laryngoscopy with fat implantation, abdominal fat harvest,laser N/A 11/23/2020    Performed by Jon German MD at [facility]    Neck dissection      Neck surgery      MT EGD INSERT GUIDE WIRE DILATOR PASSAGE ESOPHAGUS [54497 (CPT®)] N/A 10/17/2022    Performed by Ricardo Cabral MD at [facility]    Thyroid surgery      Tumor removal      L neck 20yrs ago       No Known Allergies   Current Outpatient Medications   Medication Sig Dispense Refill    famotidine (PEPCID) 40 mg tablet 1 tablet (40 mg total) by peg tube route nightly. 30 tablet 0    levothyroxine (LevoxyL) 150 mcg tablet Take 150 mcg by mouth daily.      liothyronine (CYTOMEL) 5 mcg tablet Take  "1 tablet (5 mcg total) by mouth 2 (two) times a day. 180 tablet 2     No current facility-administered medications for this visit.      Allergies, current medications, history and problem list reviewed and updated.     PHYSICAL EXAM/ OBJECTIVE      Visit Vitals  /78 (BP Location: Right upper arm, Patient Position: Sitting)   Pulse 84   Resp 18   Ht 1.854 m (6' 1\")   Wt 92.2 kg (203 lb 3.2 oz) Comment: pt informed m of weight   SpO2 98%   BMI 26.81 kg/m²     General:  Not in acute distress  ENT/ thyroid: Thyroidectomy scar  Respiratory:  Normal effort  Abdomen:  PEG tube port  Neuro:  No hand tremors   Psych: Normal mood.     Labs:  Lab Results   Component Value Date    TSH 0.33 (L) 09/05/2024    TSH 0.10 (L) 03/09/2016     No results found for: \"THYROIDAB\"   No components found for: \"THYROGLB\"   Thyroid imaging: Reviewed.    ASSESSMENT      Diagnosis Plan   1. Encounter for follow-up surveillance of thyroid cancer  Thyroglobulin and Antibody      2. Postsurgical hypothyroidism  T4, free    T3, free    T4    T3    TSH          PLAN     Thyroid cancer and postsurgical hypothyroidism: Reviewed medical records. Reviewed Care every where. Medical records are incomplete. Patient was seen by Dr. Borrero since 2022. Now establishing care with me.     Patient has a history of thyroid cancer, S/p thyroid surgery and MASCORRO (200 mCI) in 2001. His last TG was negative in 10/2023 (neg TG abs).     He had cervical lymph node enlargement in 2016. 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 After esophageal surgery, he is placed on PEG tube.     Patient was diagnosed with Chronic fatigue syndrome in 1990s.    He is taking thyroid HRT since his thyroid surgery. He was taking Levoxyl which was switched to liquid Tirosint 150 mcg daily " together with liothyronine 10 mcg/day after PEG tube. He was switched to Levoxyl 150 mcg per day 2 weeks ago in 9/2024 by Oncology.     He is now taking Levoxyl 150 mcg per day and Cytomel 10 mcg per day.  Patient reports jitteriness and shakiness throughout his body which comes and goes.   On his recent lab work in 9/2024: TSH is 4.8, Free T4 is 0.93 and Free T3 is 3.0.    Continue Levoxyl 150 mcg per day.  Continue Cytomel 10 mcg per day.    Ordering TSH, Free T4, Free T3, total T4, total T3.  Test blood work in 4 weeks from now.    We will communicate the results and plan.  We will decide further management based on the results.    Discussed correct way of taking thyroid medication.  Follow up in 3 months. Answered all questions.   The patient acknowledged understanding.     Thyroid counseling: Take your thyroid hormone medication first thing in the morning on empty stomach. No food or drink except for plain water for at least 30 minutes. After 30 minutes, you can eat or drink but do not take any products that contain iron, calcium, magnesium, antacids, vitamins, minerals within 4 hours of taking your thyroid medication. I spent  40 minutes on this date of service performing the following activities: obtaining history, documenting, obtaining / reviewing records, providing counseling and education, and independently reviewing study/studies.      Orders Placed This Encounter   Procedures    T4, free    T3, free    T4    T3    TSH    Thyroglobulin and Antibody     Risks, benefits, and alternatives of the medications and treatment plan prescribed today were discussed, and patient expressed understanding and agreement with the plan. Pt indicated understanding that if condition worsening or not improving with plan above that they should report to an Urgent Care or Emergency Room immediately.    -------------------

## 2024-10-22 ENCOUNTER — TRANSCRIBE ORDERS (OUTPATIENT)
Dept: SCHEDULING | Age: 69
End: 2024-10-22

## 2024-10-22 DIAGNOSIS — I25.10 ATHEROSCLEROTIC HEART DISEASE OF NATIVE CORONARY ARTERY WITHOUT ANGINA PECTORIS: Primary | ICD-10-CM

## 2024-10-25 ENCOUNTER — HOSPITAL ENCOUNTER (OUTPATIENT)
Dept: RADIOLOGY | Facility: CLINIC | Age: 69
Discharge: HOME | End: 2024-10-25
Attending: INTERNAL MEDICINE
Payer: MEDICARE

## 2024-10-25 DIAGNOSIS — I25.10 ATHEROSCLEROTIC HEART DISEASE OF NATIVE CORONARY ARTERY WITHOUT ANGINA PECTORIS: ICD-10-CM

## 2024-10-25 PROCEDURE — 75571 CT HRT W/O DYE W/CA TEST: CPT

## 2024-12-11 ENCOUNTER — TELEPHONE (OUTPATIENT)
Dept: ENDOCRINOLOGY | Facility: CLINIC | Age: 69
End: 2024-12-11
Payer: MEDICARE

## 2024-12-11 DIAGNOSIS — E89.0 POSTSURGICAL HYPOTHYROIDISM: Primary | ICD-10-CM

## 2024-12-11 RX ORDER — LEVOTHYROXINE SODIUM 137 UG/1
137 TABLET ORAL
COMMUNITY
End: 2025-01-09 | Stop reason: SDUPTHER

## 2024-12-11 NOTE — TELEPHONE ENCOUNTER
Reviewed outside lab work performed on 12/9/2024.    Thyroid levels are normal.  Continue Levoxyl 137 mcg per day.  Continue Cytomel 10 mcg per day.    If symptoms are not getting better further, let us know.  We will decrease Levoxyl dose further.    Repeat lab work prior to office visit.  Lab work ordered.

## 2025-01-02 ENCOUNTER — TELEPHONE (OUTPATIENT)
Dept: ENDOCRINOLOGY | Facility: CLINIC | Age: 70
End: 2025-01-02
Payer: MEDICARE

## 2025-01-09 ENCOUNTER — OFFICE VISIT (OUTPATIENT)
Dept: ENDOCRINOLOGY | Facility: CLINIC | Age: 70
End: 2025-01-09
Payer: MEDICARE

## 2025-01-09 VITALS
RESPIRATION RATE: 18 BRPM | SYSTOLIC BLOOD PRESSURE: 128 MMHG | HEART RATE: 79 BPM | DIASTOLIC BLOOD PRESSURE: 80 MMHG | HEIGHT: 73 IN | BODY MASS INDEX: 27.75 KG/M2 | OXYGEN SATURATION: 98 % | WEIGHT: 209.4 LBS

## 2025-01-09 DIAGNOSIS — C73 THYROID CANCER (CMS/HCC): Primary | ICD-10-CM

## 2025-01-09 DIAGNOSIS — R73.9 HIGH BLOOD SUGAR: ICD-10-CM

## 2025-01-09 DIAGNOSIS — C15.3 MALIGNANT NEOPLASM OF UPPER THIRD OF ESOPHAGUS (CMS/HCC): ICD-10-CM

## 2025-01-09 DIAGNOSIS — C08.9: ICD-10-CM

## 2025-01-09 DIAGNOSIS — E89.0 POSTSURGICAL HYPOTHYROIDISM: ICD-10-CM

## 2025-01-09 PROCEDURE — 99214 OFFICE O/P EST MOD 30 MIN: CPT | Performed by: INTERNAL MEDICINE

## 2025-01-09 PROCEDURE — G8752 SYS BP LESS 140: HCPCS | Performed by: INTERNAL MEDICINE

## 2025-01-09 PROCEDURE — G8754 DIAS BP LESS 90: HCPCS | Performed by: INTERNAL MEDICINE

## 2025-01-09 RX ORDER — LIOTHYRONINE SODIUM 5 UG/1
5 TABLET ORAL 2 TIMES DAILY
Qty: 180 TABLET | Refills: 2 | Status: SHIPPED | OUTPATIENT
Start: 2025-01-09

## 2025-01-09 RX ORDER — LEVOTHYROXINE SODIUM 137 UG/1
137 TABLET ORAL
Qty: 90 TABLET | Refills: 0 | Status: SHIPPED | OUTPATIENT
Start: 2025-01-09

## 2025-01-09 NOTE — PROGRESS NOTES
HPI/SUBJECTIVE     Patient ID: Celso Gramajo 1955  Referring provider: No ref. provider found   Reason for visit: Thyroid    Thyroid cancer and postsurgical hypothyroidism:     Celso Gramajo is here for follow up for thyroid cancer and postsurgical hypothyroidism. Patient was seen by Dr. Borrero since 2022. He established  care with me in 10/2024. Patient has a history of thyroid cancer, S/p thyroid surgery and MASCORRO (200 mCI) in 2001. His last TG was negative (neg TG abs).  He had cervical lymph node enlargement in 2016. 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 After esophageal surgery, he is placed on PEG tube. Patient was diagnosed with Chronic fatigue syndrome in 1990s.    He is taking thyroid HRT since his thyroid surgery. He was taking Levoxyl which was switched to liquid Tirosint with liothyronine and then to Levoxyl and liothyronine. He was taking Levoxyl 150 mcg per day earlier. Dose was decreased to Levoxyl 137 mcg per day because of heart palpitations and then to Levoxyl 125 mcg per day. He did not feel well on Levoxyl 125 mcg per day so switched back to Levoxyl 137 mcg per day.     He is currently taking Levoxyl 137 mcg per day and Cytomel 10 mcg per day.  Patient reports fatigue.   Patient denies heart palpations, hand tremors.   Recent thyroid labs are normal.  Reviewed lab work. Review of systems as per HPI.    History  Patient Active Problem List   Diagnosis    Vertigo    Hypertension    Postsurgical hypothyroidism    Mixed hyperlipidemia    Gastroesophageal reflux disease    Increased sputum production    Allergic rhinitis    Change in bowel habits    Chronic fatigue syndrome    Constipation, chronic    Deviated nasal septum    Elevated coronary artery calcium score    Eustachian tube dysfunction,  bilateral    Examination of participant in clinical trial    Induration penis plastica    Hypertrophy of nasal turbinates    Hx of colonic polyps    Malignant neoplasm of salivary gland (CMS/HCC)    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)    Paralysis of vocal cords and larynx, unilateral    ENMANUEL (obstructive sleep apnea)    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)    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)    Penile curvature, acquired    Rhinitis sicca    Thyroid cancer (CMS/HCC)    Sensorineural hearing loss (SNHL), bilateral    Other dysphagia    Abdominal pain    Other dysphagia    COVID-19    Encounter for follow-up surveillance of thyroid cancer      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 drug thru [facility]     Hx of head and neck radiation     Hypertension     Hypothyroidism     Stroke (CMS/HCC)     Thyroid disease       Family History   Problem Relation Name Age of Onset    No Known Problems Biological Father      Lung cancer Biological Sister        Past Surgical History   Procedure Laterality Date    Cholecystectomy      Hernia repair      Micro direct laryngoscopy with fat implantation, abdominal fat harvest,laser N/A 11/23/2020    Performed by Jon German MD at [facility]    Neck dissection      Neck surgery      IA EGD INSERT GUIDE WIRE DILATOR PASSAGE ESOPHAGUS [50752 (CPT®)] N/A 10/17/2022    Performed by Ricardo Cabral MD at [facility]    Thyroid surgery      Tumor removal      L neck 20yrs ago       No Known Allergies   Current Outpatient Medications   Medication Sig Dispense Refill    famotidine (PEPCID) 40 mg tablet 1 tablet (40 mg total) by peg tube route nightly. 30 tablet 0    levothyroxine (LevoxyL) 137 mcg tablet Take 1 tablet (137 mcg total) by mouth daily. 90 tablet 0    liothyronine (CYTOMEL) 5 mcg tablet Take 1 tablet (5 mcg total) by mouth 2 (two) times a day.  "180 tablet 2     No current facility-administered medications for this visit.      Allergies, current medications, history and problem list reviewed and updated.     PHYSICAL EXAM/ OBJECTIVE      Visit Vitals  /80 (BP Location: Right upper arm, Patient Position: Sitting)   Pulse 79   Resp 18   Ht 1.854 m (6' 1\")   Wt 95 kg (209 lb 6.4 oz)   SpO2 98%   BMI 27.63 kg/m²     General:  Not in acute distress  ENT/ thyroid: Thyroidectomy scar  Respiratory:  Normal effort  Abdomen:  PEG tube port  Neuro:  No hand tremors   Psych: Normal mood.     Labs:  Lab Results   Component Value Date    TSH 0.33 (L) 09/05/2024    TSH 0.10 (L) 03/09/2016     No results found for: \"THYROIDAB\"   No components found for: \"THYROGLB\"   Thyroid imaging: Reviewed.    ASSESSMENT      Diagnosis Plan   1. Thyroid cancer (CMS/HCC)  TSH    T4, free      2. Postsurgical hypothyroidism  TSH    T4, free    liothyronine (CYTOMEL) 5 mcg tablet    levothyroxine (LevoxyL) 137 mcg tablet      3. High blood sugar  Hemoglobin A1c      4. Malignant neoplasm of salivary gland (CMS/HCC)        5. 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 His last TG was negative in 10/2023 (neg TG abs).   He had cervical lymph node enlargement in 2016. 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 After esophageal surgery, he is placed on PEG tube.   Patient was diagnosed with Chronic fatigue syndrome in 1990s.    He is taking thyroid HRT since his thyroid surgery. He was taking Levoxyl which was switched to liquid Tirosint 150 mcg daily together with liothyronine 10 mcg/day after PEG tube. He had heart " palpitation. Levoxyl dose was decreased to 150 mcg per day in 9/2024 by Oncology. Dose was further decreased to Levoxyl 137 mcg per day in 10/2024. His dose was further decreased to 125 mcg per day but patient did not feel well so he was put back on 137 mcg per day.     He is currently taking Levoxyl 137 mcg per day and Cytomel 10 mcg per day.  Patient reports improvement in his symptoms.   His recent thyroid labs are in the normal range.  Thyroglobulin levels are in the undetectable range.    Continue Levoxyl 137 mcg per day.  If you still have heart palpitations, decrease Levoxyl 137 mcg dose to 6.5 tablets per week.  Continue Cytomel 5 mcg twice daily.  Ordering thyroid labs.  Test blood work in 3 months.  We will communicate the results and plan.  We will decide further management based on the results.  Discussed correct way of taking thyroid medication.    Patient reports high blood sugars.  FBS was 140 today.  Ordering Hg A1c.  Patient will discuss with nutritionist for low sugar tube feeds.    Follow up in 3 months. Answered all questions.   The patient acknowledged understanding.     Thyroid counseling: Take your thyroid hormone medication first thing in the morning on empty stomach. No food or drink except for plain water for at least 30 minutes. After 30 minutes, you can eat or drink but do not take any products that contain iron, calcium, magnesium, antacids, vitamins, minerals within 4 hours of taking your thyroid medication.       Orders Placed This Encounter   Procedures    Hemoglobin A1c    TSH    T4, free     Risks, benefits, and alternatives of the medications and treatment plan prescribed today were discussed, and patient expressed understanding and agreement with the plan. Pt indicated understanding that if condition worsening or not improving with plan above that they should report to an Urgent Care or Emergency Room immediately.    -------------------

## 2025-01-09 NOTE — PATIENT INSTRUCTIONS
Thyroid cancer and postsurgical hypothyroidism:     Patient was seen by Dr. Borrero since 2022. He established  care with me in 10/2024. Patient has a history of thyroid cancer, S/p thyroid surgery and MASCORRO (200 mCI) in 2001. His last TG was negative (neg TG abs).  He had cervical lymph node enlargement in 2016. 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 After esophageal surgery, he is placed on PEG tube. Patient was diagnosed with Chronic fatigue syndrome in 1990s.    He is taking thyroid HRT since his thyroid surgery. He was taking Levoxyl which was switched to liquid Tirosint with liothyronine and then to Levoxyl and liothyronine. He was taking Levoxyl 150 mcg per day earlier. Dose was decreased to Levoxyl 137 mcg per day because of heart palpitations and then to Levoxyl 125 mcg per day. He did not feel well on Levoxyl 125 mcg per day so switched back to Levoxyl 137 mcg per day.     He is currently taking Levoxyl 137 mcg per day and Cytomel 10 mcg per day.  Patient reports improvement in his symptoms.   His recent thyroid labs are in the normal range.  Thyroglobulin levels are in the undetectable range.    Continue Levoxyl 137 mcg per day.  If you still have heart palpitations, decrease Levoxyl 137 mcg dose to 6.5 tablets per week.  Continue Cytomel 5 mcg twice daily.  Ordering thyroid labs.  Test blood work in 3 months.  We will communicate the results and plan.  We will decide further management based on the results.  Discussed correct way of taking thyroid medication.    Patient reports high blood sugars.  FBS was 140 today.  Ordering Hg A1c.  Patient will discuss with nutritionist for low sugar tube feeds.    Follow up in 3 months. Answered all questions.   The patient acknowledged understanding.     Thyroid counseling:  Take your thyroid hormone medication first thing in the morning on empty stomach. No food or drink except for plain water for at least 30 minutes. After 30 minutes, you can eat or drink but do not take any products that contain iron, calcium, magnesium, antacids, vitamins, minerals within 4 hours of taking your thyroid medication.

## 2025-01-10 NOTE — TELEPHONE ENCOUNTER
Hg A1c of 5.9 and 6.1 (5.7 to 6.4) falls into prediabetes range.   Prediabetes is managed by low carb diet and physical activity.  No antidiabetic medication is indicated for prediabetes.  Call our  to schedule office visit with our diabetic educator.  Thank you.

## 2025-01-31 ENCOUNTER — APPOINTMENT (OUTPATIENT)
Age: 70
Setting detail: DERMATOLOGY
End: 2025-01-31

## 2025-01-31 PROCEDURE — 17000 DESTRUCT PREMALG LESION: CPT

## 2025-01-31 PROCEDURE — OTHER SUNSCREEN RECOMMENDATIONS: OTHER

## 2025-01-31 PROCEDURE — OTHER COUNSELING: OTHER

## 2025-01-31 PROCEDURE — OTHER LIQUID NITROGEN: OTHER

## 2025-01-31 PROCEDURE — 99213 OFFICE O/P EST LOW 20 MIN: CPT | Mod: 25

## 2025-01-31 PROCEDURE — 17003 DESTRUCT PREMALG LES 2-14: CPT

## 2025-02-01 DIAGNOSIS — L57.0 ACTINIC KERATOSIS: ICD-10-CM

## 2025-02-01 DIAGNOSIS — D22 MELANOCYTIC NEVI: ICD-10-CM

## 2025-02-01 DIAGNOSIS — L81.4 OTHER MELANIN HYPERPIGMENTATION: ICD-10-CM

## 2025-02-01 DIAGNOSIS — L57.8 OTHER SKIN CHANGES DUE TO CHRONIC EXPOSURE TO NONIONIZING RADIATION: ICD-10-CM

## 2025-02-01 DIAGNOSIS — D18.0 HEMANGIOMA: ICD-10-CM

## 2025-02-01 DIAGNOSIS — L82.1 OTHER SEBORRHEIC KERATOSIS: ICD-10-CM

## 2025-02-01 DIAGNOSIS — Z85.828 PERSONAL HISTORY OF OTHER MALIGNANT NEOPLASM OF SKIN: ICD-10-CM

## 2025-02-01 PROBLEM — D22.5 MELANOCYTIC NEVI OF TRUNK: Status: ACTIVE | Noted: 2025-02-01

## 2025-02-01 PROBLEM — D18.01 HEMANGIOMA OF SKIN AND SUBCUTANEOUS TISSUE: Status: ACTIVE | Noted: 2025-02-01

## 2025-02-01 ASSESSMENT — LOCATION DETAILED DESCRIPTION DERM
LOCATION DETAILED: RIGHT MID-UPPER BACK
LOCATION DETAILED: LEFT MEDIAL TRAPEZIAL NECK
LOCATION DETAILED: RIGHT CENTRAL FRONTAL SCALP
LOCATION DETAILED: XIPHOID
LOCATION DETAILED: LOWER STERNUM
LOCATION DETAILED: MIDDLE STERNUM
LOCATION DETAILED: LEFT POSTERIOR SHOULDER
LOCATION DETAILED: RIGHT INFERIOR UPPER BACK

## 2025-02-01 ASSESSMENT — LOCATION ZONE DERM
LOCATION ZONE: SCALP
LOCATION ZONE: ARM
LOCATION ZONE: TRUNK
LOCATION ZONE: NECK

## 2025-02-01 ASSESSMENT — LOCATION SIMPLE DESCRIPTION DERM
LOCATION SIMPLE: RIGHT SCALP
LOCATION SIMPLE: LEFT SHOULDER
LOCATION SIMPLE: ABDOMEN
LOCATION SIMPLE: RIGHT UPPER BACK
LOCATION SIMPLE: CHEST
LOCATION SIMPLE: POSTERIOR NECK

## 2025-02-01 NOTE — PROCEDURE: LIQUID NITROGEN
Duration Of Freeze Thaw-Cycle (Seconds): 5
Render Post-Care Instructions In Note?: no
Show Applicator Variable?: Yes
Number Of Freeze-Thaw Cycles: 3 freeze-thaw cycles
Consent: The patient's consent was obtained including but not limited to risks of crusting, scabbing, blistering, scarring, darker or lighter pigmentary change, recurrence, incomplete removal and infection.
Post-Care Instructions: I reviewed with the patient in detail post-care instructions. Patient is to wear sunprotection, and avoid picking at any of the treated lesions. Pt may apply Vaseline to crusted or scabbing areas.
Application Tool (Optional): Liquid Nitrogen Sprayer
Detail Level: Detailed

## 2025-02-05 ENCOUNTER — APPOINTMENT (OUTPATIENT)
Age: 70
Setting detail: DERMATOLOGY
End: 2025-02-05

## 2025-02-05 DIAGNOSIS — B02.9 ZOSTER WITHOUT COMPLICATIONS: ICD-10-CM

## 2025-02-05 PROCEDURE — OTHER COUNSELING: OTHER

## 2025-02-05 PROCEDURE — OTHER PRESCRIPTION: OTHER

## 2025-02-05 PROCEDURE — 99213 OFFICE O/P EST LOW 20 MIN: CPT | Mod: 24

## 2025-02-05 RX ORDER — ACYCLOVIR 200 MG/5ML
SUSPENSION ORAL
Qty: 300 | Refills: 0 | Status: ERX | COMMUNITY
Start: 2025-02-05

## 2025-02-05 RX ORDER — CLOBETASOL PROPIONATE 0.5 MG/G
CREAM TOPICAL
Qty: 60 | Refills: 0 | Status: ERX | COMMUNITY
Start: 2025-02-05

## 2025-02-05 ASSESSMENT — LOCATION DETAILED DESCRIPTION DERM: LOCATION DETAILED: S2 RIGHT POSTERIOR DERMATOME

## 2025-02-05 ASSESSMENT — LOCATION SIMPLE DESCRIPTION DERM: LOCATION SIMPLE: S2 RIGHT POSTERIOR DERMATOME

## 2025-02-05 ASSESSMENT — LOCATION ZONE DERM: LOCATION ZONE: LEG

## 2025-02-06 ENCOUNTER — RX ONLY (RX ONLY)
Age: 70
End: 2025-02-06

## 2025-02-06 RX ORDER — ACYCLOVIR 50 MG/G
CREAM TOPICAL
Qty: 5 | Refills: 1 | Status: ERX | COMMUNITY
Start: 2025-02-06

## 2025-02-12 ENCOUNTER — APPOINTMENT (OUTPATIENT)
Age: 70
Setting detail: DERMATOLOGY
End: 2025-02-14

## 2025-02-12 DIAGNOSIS — L82.1 OTHER SEBORRHEIC KERATOSIS: ICD-10-CM

## 2025-02-12 DIAGNOSIS — B02.9 ZOSTER WITHOUT COMPLICATIONS: ICD-10-CM

## 2025-02-12 PROCEDURE — 99212 OFFICE O/P EST SF 10 MIN: CPT

## 2025-02-12 PROCEDURE — OTHER COUNSELING: OTHER

## 2025-02-12 ASSESSMENT — LOCATION DETAILED DESCRIPTION DERM
LOCATION DETAILED: LEFT CHIN
LOCATION DETAILED: S2 RIGHT POSTERIOR DERMATOME
LOCATION DETAILED: LEFT CHIN

## 2025-02-12 ASSESSMENT — LOCATION ZONE DERM
LOCATION ZONE: FACE
LOCATION ZONE: LEG
LOCATION ZONE: FACE

## 2025-02-12 ASSESSMENT — LOCATION SIMPLE DESCRIPTION DERM
LOCATION SIMPLE: S2 RIGHT POSTERIOR DERMATOME
LOCATION SIMPLE: CHIN
LOCATION SIMPLE: CHIN

## 2025-03-05 ENCOUNTER — APPOINTMENT (OUTPATIENT)
Age: 70
Setting detail: DERMATOLOGY
End: 2025-03-05

## 2025-03-05 DIAGNOSIS — L82.0 INFLAMED SEBORRHEIC KERATOSIS: ICD-10-CM

## 2025-03-05 PROCEDURE — 17110 DESTRUCT B9 LESION 1-14: CPT

## 2025-03-05 PROCEDURE — OTHER LIQUID NITROGEN: OTHER

## 2025-03-05 PROCEDURE — OTHER COUNSELING: OTHER

## 2025-03-05 ASSESSMENT — LOCATION SIMPLE DESCRIPTION DERM
LOCATION SIMPLE: SCALP
LOCATION SIMPLE: LEFT SCALP

## 2025-03-05 ASSESSMENT — LOCATION ZONE DERM: LOCATION ZONE: SCALP

## 2025-03-05 ASSESSMENT — LOCATION DETAILED DESCRIPTION DERM
LOCATION DETAILED: LEFT SUPERIOR PARIETAL SCALP
LOCATION DETAILED: LEFT CENTRAL FRONTAL SCALP
LOCATION DETAILED: RIGHT CENTRAL FRONTAL SCALP

## 2025-03-11 ENCOUNTER — HOSPITAL ENCOUNTER (EMERGENCY)
Facility: HOSPITAL | Age: 70
Discharge: HOME | End: 2025-03-11
Attending: EMERGENCY MEDICINE | Admitting: EMERGENCY MEDICINE
Payer: MEDICARE

## 2025-03-11 ENCOUNTER — APPOINTMENT (EMERGENCY)
Dept: RADIOLOGY | Facility: HOSPITAL | Age: 70
End: 2025-03-11
Payer: MEDICARE

## 2025-03-11 VITALS
HEIGHT: 73 IN | BODY MASS INDEX: 26.77 KG/M2 | SYSTOLIC BLOOD PRESSURE: 136 MMHG | WEIGHT: 202 LBS | TEMPERATURE: 98.3 F | RESPIRATION RATE: 18 BRPM | DIASTOLIC BLOOD PRESSURE: 76 MMHG | HEART RATE: 106 BPM | OXYGEN SATURATION: 95 %

## 2025-03-11 DIAGNOSIS — M25.511 ACUTE PAIN OF BOTH SHOULDERS: ICD-10-CM

## 2025-03-11 DIAGNOSIS — M25.512 ACUTE PAIN OF BOTH SHOULDERS: ICD-10-CM

## 2025-03-11 DIAGNOSIS — R50.9 FEVER, UNSPECIFIED FEVER CAUSE: Primary | ICD-10-CM

## 2025-03-11 LAB
ALBUMIN SERPL-MCNC: 4.2 G/DL (ref 3.5–5.7)
ALP SERPL-CCNC: 63 IU/L (ref 34–125)
ALT SERPL-CCNC: 43 IU/L (ref 7–52)
ANION GAP SERPL CALC-SCNC: 7 MEQ/L (ref 3–15)
AST SERPL-CCNC: 55 IU/L (ref 13–39)
BACTERIA URNS QL MICRO: ABNORMAL /HPF
BASOPHILS # BLD: 0.04 K/UL (ref 0.01–0.1)
BASOPHILS NFR BLD: 0.5 %
BILIRUB SERPL-MCNC: 0.6 MG/DL (ref 0.3–1.2)
BILIRUB UR QL STRIP.AUTO: NEGATIVE MG/DL
BUN SERPL-MCNC: 17 MG/DL (ref 7–25)
CALCIUM SERPL-MCNC: 9.2 MG/DL (ref 8.6–10.3)
CHLORIDE SERPL-SCNC: 98 MEQ/L (ref 98–107)
CLARITY UR REFRACT.AUTO: CLEAR
CO2 SERPL-SCNC: 28 MEQ/L (ref 21–31)
COLOR UR AUTO: YELLOW
CREAT SERPL-MCNC: 0.7 MG/DL (ref 0.7–1.3)
DIFFERENTIAL METHOD BLD: ABNORMAL
EGFRCR SERPLBLD CKD-EPI 2021: >60 ML/MIN/1.73M*2
EOSINOPHIL # BLD: 0 K/UL (ref 0.04–0.54)
EOSINOPHIL NFR BLD: 0 %
ERYTHROCYTE [DISTWIDTH] IN BLOOD BY AUTOMATED COUNT: 12.4 % (ref 11.6–14.4)
FLUAV RNA SPEC QL NAA+PROBE: NEGATIVE
FLUBV RNA SPEC QL NAA+PROBE: NEGATIVE
GLUCOSE SERPL-MCNC: 165 MG/DL (ref 70–99)
GLUCOSE UR STRIP.AUTO-MCNC: NEGATIVE MG/DL
HCT VFR BLD AUTO: 43.8 % (ref 40.1–51)
HGB BLD-MCNC: 14.5 G/DL (ref 13.7–17.5)
HGB UR QL STRIP.AUTO: NEGATIVE
HYALINE CASTS #/AREA URNS LPF: ABNORMAL /LPF
IMM GRANULOCYTES # BLD AUTO: 0.02 K/UL (ref 0–0.08)
IMM GRANULOCYTES NFR BLD AUTO: 0.2 %
KETONES UR STRIP.AUTO-MCNC: NEGATIVE MG/DL
LACTATE SERPL-SCNC: 0.8 MMOL/L (ref 0.4–2)
LEUKOCYTE ESTERASE UR QL STRIP.AUTO: NEGATIVE
LYMPHOCYTES # BLD: 0.64 K/UL (ref 1.2–3.5)
LYMPHOCYTES NFR BLD: 7.2 %
MCH RBC QN AUTO: 31.5 PG (ref 28–33.2)
MCHC RBC AUTO-ENTMCNC: 33.1 G/DL (ref 32.2–36.5)
MCV RBC AUTO: 95 FL (ref 83–98)
MONOCYTES # BLD: 0.95 K/UL (ref 0.3–1)
MONOCYTES NFR BLD: 10.7 %
MUCOUS THREADS URNS QL MICRO: 1 /LPF
NEUTROPHILS # BLD: 7.21 K/UL (ref 1.7–7)
NEUTS SEG NFR BLD: 81.4 %
NITRITE UR QL STRIP.AUTO: NEGATIVE
NRBC BLD-RTO: 0 %
PH UR STRIP.AUTO: 7 [PH]
PLATELET # BLD AUTO: 227 K/UL (ref 150–350)
PMV BLD AUTO: 11 FL (ref 9.4–12.4)
POTASSIUM SERPL-SCNC: 4.2 MEQ/L (ref 3.5–5.1)
PROT SERPL-MCNC: 7.6 G/DL (ref 6–8.2)
PROT UR QL STRIP.AUTO: 1
RBC # BLD AUTO: 4.61 M/UL (ref 4.5–5.8)
RBC #/AREA URNS HPF: ABNORMAL /HPF
RSV RNA SPEC QL NAA+PROBE: NEGATIVE
SARS-COV-2 RNA RESP QL NAA+PROBE: NEGATIVE
SODIUM SERPL-SCNC: 133 MEQ/L (ref 136–145)
SP GR UR REFRACT.AUTO: 1.03
SQUAMOUS URNS QL MICRO: ABNORMAL /HPF
TROPONIN I SERPL HS-MCNC: 6.4 PG/ML
TROPONIN I SERPL HS-MCNC: 6.7 PG/ML
UROBILINOGEN UR STRIP-ACNC: 0.2 EU/DL
WBC # BLD AUTO: 8.86 K/UL (ref 3.8–10.5)
WBC #/AREA URNS HPF: ABNORMAL /HPF

## 2025-03-11 PROCEDURE — 87637 SARSCOV2&INF A&B&RSV AMP PRB: CPT | Performed by: EMERGENCY MEDICINE

## 2025-03-11 PROCEDURE — 82040 ASSAY OF SERUM ALBUMIN: CPT | Performed by: EMERGENCY MEDICINE

## 2025-03-11 PROCEDURE — 83605 ASSAY OF LACTIC ACID: CPT

## 2025-03-11 PROCEDURE — 85025 COMPLETE CBC W/AUTO DIFF WBC: CPT | Performed by: EMERGENCY MEDICINE

## 2025-03-11 PROCEDURE — 96361 HYDRATE IV INFUSION ADD-ON: CPT

## 2025-03-11 PROCEDURE — 84484 ASSAY OF TROPONIN QUANT: CPT | Mod: 91 | Performed by: PHYSICIAN ASSISTANT

## 2025-03-11 PROCEDURE — 84484 ASSAY OF TROPONIN QUANT: CPT | Performed by: PHYSICIAN ASSISTANT

## 2025-03-11 PROCEDURE — 96360 HYDRATION IV INFUSION INIT: CPT

## 2025-03-11 PROCEDURE — 83605 ASSAY OF LACTIC ACID: CPT | Performed by: EMERGENCY MEDICINE

## 2025-03-11 PROCEDURE — 25800000 HC PHARMACY IV SOLUTIONS: Performed by: PHYSICIAN ASSISTANT

## 2025-03-11 PROCEDURE — 93005 ELECTROCARDIOGRAM TRACING: CPT | Performed by: PHYSICIAN ASSISTANT

## 2025-03-11 PROCEDURE — 71045 X-RAY EXAM CHEST 1 VIEW: CPT

## 2025-03-11 PROCEDURE — 81001 URINALYSIS AUTO W/SCOPE: CPT | Performed by: EMERGENCY MEDICINE

## 2025-03-11 PROCEDURE — 36415 COLL VENOUS BLD VENIPUNCTURE: CPT

## 2025-03-11 PROCEDURE — 3E0337Z INTRODUCTION OF ELECTROLYTIC AND WATER BALANCE SUBSTANCE INTO PERIPHERAL VEIN, PERCUTANEOUS APPROACH: ICD-10-PCS | Performed by: EMERGENCY MEDICINE

## 2025-03-11 PROCEDURE — 85025 COMPLETE CBC W/AUTO DIFF WBC: CPT

## 2025-03-11 PROCEDURE — 99284 EMERGENCY DEPT VISIT MOD MDM: CPT | Mod: 25

## 2025-03-11 PROCEDURE — 63700000 HC SELF-ADMINISTRABLE DRUG: Performed by: PHYSICIAN ASSISTANT

## 2025-03-11 RX ORDER — ACETAMINOPHEN 325 MG/1
650 TABLET ORAL ONCE
Status: DISCONTINUED | OUTPATIENT
Start: 2025-03-11 | End: 2025-03-11

## 2025-03-11 RX ORDER — ACETAMINOPHEN 325 MG/1
650 TABLET ORAL ONCE
Status: COMPLETED | OUTPATIENT
Start: 2025-03-11 | End: 2025-03-11

## 2025-03-11 RX ORDER — CYCLOBENZAPRINE HCL 10 MG
10 TABLET ORAL 2 TIMES DAILY PRN
Qty: 14 TABLET | Refills: 0 | Status: SHIPPED | OUTPATIENT
Start: 2025-03-11 | End: 2025-04-18 | Stop reason: HOSPADM

## 2025-03-11 RX ADMIN — ACETAMINOPHEN 650 MG: 325 TABLET, FILM COATED ORAL at 17:46

## 2025-03-11 RX ADMIN — SODIUM CHLORIDE 1000 ML: 9 INJECTION, SOLUTION INTRAVENOUS at 17:34

## 2025-03-11 ASSESSMENT — ENCOUNTER SYMPTOMS
DIZZINESS: 1
SHORTNESS OF BREATH: 0
MYALGIAS: 1
ARTHRALGIAS: 1
DIAPHORESIS: 1

## 2025-03-11 NOTE — ED PROVIDER NOTES
HPI   HISTORY OF PRESENT ILLNESS     Patient is a 69-year-old female past medical history as shown below presents to the emergency department stating that he has chronic shoulder pain; however, he reports that his pain has been worsening for the past month and he reports today he was having some spasms in his shoulder.  He states that he took oxycodone that is prescribed to him which he never really takes and he reports that he then became dizzy after taking it. He reports that the pain was worse with movement; however, improved after the oxycodone. Patient found to be febrile here.       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 drug thru [facility]     Hx of head and neck radiation     Hypertension     Hypothyroidism     Stroke (CMS/HCC)     Thyroid disease        Past Surgical History   Procedure Laterality Date    Cholecystectomy      Hernia repair      Micro direct laryngoscopy with fat implantation, abdominal fat harvest,laser N/A 11/23/2020    Performed by Jon German MD at [facility]    Neck dissection      Neck surgery      IA EGD INSERT GUIDE WIRE DILATOR PASSAGE ESOPHAGUS [51419 (CPT®)] N/A 10/17/2022    Performed by Ricardo Cabral MD at [facility]    Thyroid surgery      Tumor removal      L neck 20yrs ago        Family History   Problem Relation Name Age of Onset    No Known Problems Biological Father      Lung cancer Biological Sister         Social History     Tobacco Use    Smoking status: Never    Smokeless tobacco: Never   Vaping Use    Vaping status: Never Used   Substance Use Topics    Alcohol use: No    Drug use: No         Review of Systems   REVIEW OF SYSTEMS     Review of Systems   Constitutional:  Positive for diaphoresis.   Respiratory:  Negative for shortness of breath.     Cardiovascular:  Negative for chest pain.   Musculoskeletal:  Positive for arthralgias and myalgias.   Neurological:  Positive for dizziness.   All other systems reviewed and are negative.        VITALS     ED Vitals      Date/Time Temp Pulse Resp BP SpO2 [facility]   03/11/25 2031 36.8 °C (98.3 °F) 106 18 136/76 95 % KB   03/11/25 1907 37.7 °C (99.8 °F) 107 18 148/77 98 % G   03/11/25 1735 -- 113 20 143/72 93 % [facility]   03/11/25 1731 39.1 °C (102.3 °F) -- -- -- -- [facility]   03/11/25 1707  38 °C (100.4 °F) 110 18 146/89 94 % [facility]                         Physical Exam   PHYSICAL EXAM     Physical Exam  Vitals and nursing note reviewed.   Constitutional:       General: He is not in acute distress.     Appearance: Normal appearance. He is diaphoretic. He is not ill-appearing or toxic-appearing.   HENT:      Head: Normocephalic and atraumatic.   Eyes:      General:         Right eye: No discharge.         Left eye: No discharge.      Extraocular Movements: Extraocular movements intact.      Conjunctiva/sclera: Conjunctivae normal.      Pupils: Pupils are equal, round, and reactive to light.   Cardiovascular:      Rate and Rhythm: Regular rhythm. Tachycardia present.      Pulses: Normal pulses.      Heart sounds: Normal heart sounds. No murmur heard.  Pulmonary:      Effort: Pulmonary effort is normal. No respiratory distress.      Breath sounds: Normal breath sounds. No stridor. No wheezing, rhonchi or rales.   Musculoskeletal:         General: No swelling, tenderness, deformity or signs of injury. Normal range of motion.   Neurological:      General: No focal deficit present.      Mental Status: He is alert and oriented to person, place, and time.           PROCEDURES     Procedures     DATA     Results       Procedure Component Value Units Date/Time    Urinalysis with Reflex Culture [[id number]]  (Abnormal) Collected: 03/11/25 1804    Specimen: Urine, Clean Catch Updated: 03/11/25 1849    Narrative:      The following orders were  created for panel order Urinalysis with Reflex Culture.  Procedure                               Abnormality         Status                     ---------                               -----------         ------                     UA Reflex to Culture (Ma...[[id number]]  Abnormal            Final result               UA Microscopic[[id number]]               Abnormal            Final result                 Please view results for these tests on the individual orders.    UA Microscopic [[id number]]  (Abnormal) Collected: 03/11/25 1804    Specimen: Urine, Clean Catch Updated: 03/11/25 1849     RBC, Urine 0 TO 4 /HPF      WBC, Urine 0 TO 3 /HPF      Squamous Epithelial None Seen /hpf      Hyaline Cast None Seen /lpf      Bacteria, Urine Rare /HPF      Mucus +1 /LPF     UA Reflex to Culture (Macroscopic) [[id number]]  (Abnormal) Collected: 03/11/25 1804    Specimen: Urine, Clean Catch Updated: 03/11/25 1833     Color, Urine Yellow     Clarity, Urine Clear     Specific Gravity, Urine 1.026     pH, Urine 7.0     Leukocyte Esterase Negative     Comment: Results can be falsely negative due to high specific gravity, some antibiotics, glucose >3 g/dl, or WBC other than neutrophils.        Nitrite, Urine Negative     Protein, Urine +1     Glucose, Urine Negative mg/dL      Ketones, Urine Negative mg/dL      Urobilinogen, Urine 0.2 EU/dL      Bilirubin, Urine Negative mg/dL      Blood, Urine Negative     Comment: The sensitivity of the occult blood test is equivalent to approximately 4 intact RBC/HPF.       HS Troponin (with 2 hour reflex) [[id number]]  (Normal) Collected: 03/11/25 1731    Specimen: Blood, Venous Updated: 03/11/25 1815     High Sens Troponin I 6.4 pg/mL     SARS-COV-2 (COVID-19)/ FLU A/B, AND RSV, PCR Nasopharynx [[id number]]  (Normal) Collected: 03/11/25 1714    Specimen: Nasopharyngeal Swab from Nasopharynx Updated: 03/11/25 1802     SARS-CoV-2 (COVID-19) Negative     Influenza A Negative     Influenza B Negative      Respiratory Syncytial Virus Negative    Narrative:      Testing performed using real-time PCR for detection of COVID-19. EUA approved validation studies performed on site.     Comprehensive metabolic panel [[id number]]  (Abnormal) Collected: 03/11/25 1713    Specimen: Blood, Venous Updated: 03/11/25 1750     Sodium 133 mEQ/L      Potassium 4.2 mEQ/L      Comment: Results obtained on plasma. Plasma Potassium values may be up to 0.4 mEQ/L less than serum values. The differences may be greater for patients with high platelet or white cell counts.        Chloride 98 mEQ/L      CO2 28 mEQ/L      BUN 17 mg/dL      Creatinine 0.7 mg/dL      Glucose 165 mg/dL      Calcium 9.2 mg/dL      AST (SGOT) 55 IU/L      ALT (SGPT) 43 IU/L      Alkaline Phosphatase 63 IU/L      Total Protein 7.6 g/dL      Comment: Test performed on plasma which typically contains approximately 0.4 g/dL more protein than serum.        Albumin 4.2 g/dL      Bilirubin, Total 0.6 mg/dL      eGFR >60.0 mL/min/1.73m*2      Comment: Calculation based on the Chronic Kidney Disease Epidemiology Collaboration (CKD-EPI) equation refit without adjustment for race.        Anion Gap 7 mEQ/L     CBC and differential [[id number]]  (Abnormal) Collected: 03/11/25 1713    Specimen: Blood, Venous Updated: 03/11/25 1730     WBC 8.86 K/uL      RBC 4.61 M/uL      Hemoglobin 14.5 g/dL      Hematocrit 43.8 %      MCV 95.0 fL      MCH 31.5 pg      MCHC 33.1 g/dL      RDW 12.4 %      Platelets 227 K/uL      MPV 11.0 fL      Differential Type Auto     nRBC 0.0 %      Immature Granulocytes 0.2 %      Neutrophils 81.4 %      Lymphocytes 7.2 %      Monocytes 10.7 %      Eosinophils 0.0 %      Basophils 0.5 %      Immature Granulocytes, Absolute 0.02 K/uL      Neutrophils, Absolute 7.21 K/uL      Lymphocytes, Absolute 0.64 K/uL      Monocytes, Absolute 0.95 K/uL      Eosinophils, Absolute 0.00 K/uL      Basophils, Absolute 0.04 K/uL     Lactic acid, Venous [[id number]]  (Normal)  Collected: 03/11/25 1713    Specimen: Blood, Venous Updated: 03/11/25 1722     Lactate 0.8 mmol/L             Imaging Results              X-RAY CHEST 1 VIEW (Final result)  Result time 03/11/25 20:47:18      Final result                   Impression:    IMPRESSION: Slightly diminished lung volumes with increased bibasilar lung  markings which is likely on the basis of atelectasis, mild infiltrate not  excluded.    Results were called to MICHELA Coronado in the emergency room at 8:45 PM on  the date of examination.               Narrative:    CLINICAL HISTORY: Shoulder pain, fever.    COMMENT: Single PA erect chest radiograph was obtained was compared with  multiple prior studies dating back to 2019, most recent dated 7/19/2023.    Slightly low lung volumes with increased markings at the lung bases bilaterally  which may be on the basis of atelectasis. Infiltrate not excluded. Hilar and  mediastinal contours are stable allowing for differences in inspiratory effort.  Stable calcified nodule at the left lung apex. Pulmonary vasculature is within  normal limits. No evidence of pneumothorax or pleural effusion. Regional osseous  structures appear grossly intact.                        Preliminary Interpretation    NAD. MN RW                                    ECG 12 lead          Scoring tools                                  ED Course & MDM   MDM / ED COURSE / CLINICAL IMPRESSION / DISPO     Medical Decision Making  Patient is a 69 year old male presenting to the ED with worsening B/L shoulder pain and dizziness after taking oxycodone which he is prescribed. Patient found to be febrile here in the ED and is diaphoretic. He reports that the oxycodone completely resolved his pain. My review and interpretation of his labs show no acute concerning abnormalities.  Patient notes improvement after Tylenol and fluids.  Will discharge at this time with outpatient follow-up with primary care provider.  Discussed return  precautions.  Patient agreed to and verbalized understanding to treatment plan.    Problems Addressed:  Acute pain of both shoulders: acute illness or injury  Fever, unspecified fever cause: acute illness or injury    Amount and/or Complexity of Data Reviewed  Labs: ordered. Decision-making details documented in ED Course.  Radiology: ordered and independent interpretation performed. Decision-making details documented in ED Course.  ECG/medicine tests: ordered and independent interpretation performed.    Risk  OTC drugs.  Prescription drug management.        Vital Signs Review: Vital signs have been reviewed. The oxygen saturation is SpO2: 94 % which is normal.       Clinical Impression      Fever, unspecified fever cause   Acute pain of both shoulders     _________________       ED Disposition   Discharge                     Tj Thompson PA C  03/11/25 3546

## 2025-03-12 NOTE — ED ATTESTATION NOTE
Procedures  Physical Exam  Review of Systems    3/12/07414:22 AM  I have personally seen and examined the patient.  I reviewed and agree with the PA/NP/Resident's assessment and plan of care.    Vital Signs Review: Vital signs have been reviewed. The oxygen saturation is  SpO2: 94 % which is  Normal    My examination, assessment, and plan of care of Celso Gramajo is as follows:      Patient Presents with patient has been having intermittent spasms in his shoulder has been taking an oxycodone for that states when he took the oxycodone he felt extremely dizzy and fell backwards onto his seat denies any nausea or vomiting denies any fevers or chills currently    Exam: well appearing, alert, lungs ctab with no distress, belly soft NTND no rebound or guarding, and no obvious murmurs good distal pulses all 4 extremities      Impression/Plan: Patient is coming in for longstanding shoulder pain and states that he tried to take a toxic today which was a deep medication.  He says with taking it he felt extremely dizzy and lightheaded and fell backwards into his seat workup is otherwise benign patient was noted to have a fever and some tachycardia.  Infectious workup was otherwise negative here spoke to the patient about potential viral illness and return precautions if symptoms are worsening patient is agreeable to plan      Please refer to work-up tab for further management and follow-up on laboratory and imaging evaluations         I was physically present for the key/critical portions of the procedures documented by KASIE    This document was created using dragon dictation software.  There might be some typographical errors due to this technology.       Claudy Treadwell MD  03/12/25 6558

## 2025-03-13 LAB
ATRIAL RATE: 113
P AXIS: 4
PR INTERVAL: 124
QRS DURATION: 74
QT INTERVAL: 308
QTC CALCULATION(BAZETT): 422
R AXIS: -48
T WAVE AXIS: 48
VENTRICULAR RATE: 113

## 2025-03-13 PROCEDURE — 93010 ELECTROCARDIOGRAM REPORT: CPT | Performed by: INTERNAL MEDICINE

## 2025-03-30 LAB
BUN SERPL-MCNC: 23 MG/DL
CHLORIDE SERPL-SCNC: 95 MEQ/L
CO2 SERPL-SCNC: 28 MEQ/L
CREAT SERPL-MCNC: 0.55 MG/DL
EXTERNAL HEMATOCRIT: 26.5 %
EXTERNAL HEMOGLOBIN: 12 G/DL
EXTERNAL PLATELET COUNT: 281 K/ΜL
EXTERNAL WBC: 8.9 G/DL
POTASSIUM SERPL-SCNC: 4.2 MEQ/L
SODIUM SERPL-SCNC: 133 MEQ/L

## 2025-03-31 ENCOUNTER — BMR PREADMISSION ASSESSMENT (INPATIENT)
Dept: ADMISSIONS | Facility: REHABILITATION | Age: 70
DRG: 559 | End: 2025-03-31
Payer: MEDICARE

## 2025-03-31 LAB
BUN SERPL-MCNC: 18 MG/DL
CHLORIDE SERPL-SCNC: 97 MEQ/L
CO2 SERPL-SCNC: 25 MEQ/L
CREAT SERPL-MCNC: 0.46 MG/DL
EXTERNAL HEMATOCRIT: 35.4 %
EXTERNAL HEMOGLOBIN: 11.8 G/DL
EXTERNAL PLATELET COUNT: 271 K/ΜL
EXTERNAL WBC: 9.8 G/DL
POTASSIUM SERPL-SCNC: 4.4 MEQ/L
POTASSIUM SERPL-SCNC: 4.4 MEQ/L
SODIUM SERPL-SCNC: 132 MEQ/L
SODIUM SERPL-SCNC: 132 MEQ/L

## 2025-04-01 ENCOUNTER — HOSPITAL ENCOUNTER (INPATIENT)
Facility: REHABILITATION | Age: 70
LOS: 17 days | Discharge: HOME HEALTH | DRG: 559 | End: 2025-04-18
Attending: PHYSICAL MEDICINE & REHABILITATION | Admitting: PHYSICAL MEDICINE & REHABILITATION
Payer: MEDICARE

## 2025-04-01 VITALS
DIASTOLIC BLOOD PRESSURE: 84 MMHG | SYSTOLIC BLOOD PRESSURE: 126 MMHG | BODY MASS INDEX: 28.56 KG/M2 | TEMPERATURE: 97.3 F | HEART RATE: 83 BPM | OXYGEN SATURATION: 95 % | HEIGHT: 71 IN | WEIGHT: 204 LBS

## 2025-04-01 DIAGNOSIS — G89.18 ACUTE POST-OPERATIVE PAIN: Primary | ICD-10-CM

## 2025-04-01 DIAGNOSIS — R60.9 EDEMA, UNSPECIFIED TYPE: ICD-10-CM

## 2025-04-01 DIAGNOSIS — F43.23 ADJUSTMENT DISORDER WITH MIXED ANXIETY AND DEPRESSED MOOD: ICD-10-CM

## 2025-04-01 DIAGNOSIS — F43.22 ADJUSTMENT DISORDER WITH ANXIETY: ICD-10-CM

## 2025-04-01 PROBLEM — G06.1 SPINAL EPIDURAL ABSCESS: Status: ACTIVE | Noted: 2025-04-01

## 2025-04-01 LAB
BUN SERPL-MCNC: 15 MG/DL
CHLORIDE SERPL-SCNC: 98 MEQ/L
CO2 SERPL-SCNC: 24 MEQ/L
CREAT SERPL-MCNC: 0.51 MG/DL
EXTERNAL HEMATOCRIT: 38.5 %
EXTERNAL HEMOGLOBIN: 12.9 G/DL
EXTERNAL PLATELET COUNT: 255 K/ΜL
EXTERNAL WBC: 10.3 G/DL
GLUCOSE BLD-MCNC: 227 MG/DL (ref 70–99)
POCT TEST: ABNORMAL
POTASSIUM SERPL-SCNC: 4.5 MEQ/L
SODIUM SERPL-SCNC: 133 MEQ/L

## 2025-04-01 PROCEDURE — 12800001 HC ROOM AND CARE SEMIPRIVATE REHAB-BRAIN INJ

## 2025-04-01 PROCEDURE — 63700000 HC SELF-ADMINISTRABLE DRUG: Performed by: INTERNAL MEDICINE

## 2025-04-01 PROCEDURE — 63600000 HC DRUGS/DETAIL CODE: Mod: JZ | Performed by: INTERNAL MEDICINE

## 2025-04-01 PROCEDURE — 25800000 HC PHARMACY IV SOLUTIONS: Performed by: INTERNAL MEDICINE

## 2025-04-01 RX ORDER — IBUPROFEN 200 MG
16-32 TABLET ORAL AS NEEDED
Status: DISCONTINUED | OUTPATIENT
Start: 2025-04-01 | End: 2025-04-02

## 2025-04-01 RX ORDER — GLYCOPYRROLATE 1 MG/1
1 TABLET ORAL 2 TIMES DAILY PRN
Status: DISCONTINUED | OUTPATIENT
Start: 2025-04-01 | End: 2025-04-19 | Stop reason: HOSPADM

## 2025-04-01 RX ORDER — INSULIN LISPRO 100 [IU]/ML
1-3 INJECTION, SOLUTION INTRAVENOUS; SUBCUTANEOUS EVERY 6 HOURS
Status: DISCONTINUED | OUTPATIENT
Start: 2025-04-01 | End: 2025-04-02

## 2025-04-01 RX ORDER — LEVOTHYROXINE SODIUM 150 UG/1
150 TABLET ORAL
Status: DISCONTINUED | OUTPATIENT
Start: 2025-04-02 | End: 2025-04-01

## 2025-04-01 RX ORDER — ONDANSETRON 4 MG/1
4 TABLET, ORALLY DISINTEGRATING ORAL EVERY 6 HOURS PRN
Status: DISCONTINUED | OUTPATIENT
Start: 2025-04-01 | End: 2025-04-19 | Stop reason: HOSPADM

## 2025-04-01 RX ORDER — BISACODYL 10 MG/1
10 SUPPOSITORY RECTAL DAILY PRN
Status: DISCONTINUED | OUTPATIENT
Start: 2025-04-01 | End: 2025-04-19 | Stop reason: HOSPADM

## 2025-04-01 RX ORDER — SODIUM CHLORIDE 0.9 % (FLUSH) 0.9 %
10 SYRINGE (ML) INJECTION
Status: DISCONTINUED | OUTPATIENT
Start: 2025-04-01 | End: 2025-04-19 | Stop reason: HOSPADM

## 2025-04-01 RX ORDER — SENNOSIDES 8.6 MG/1
1 TABLET ORAL 2 TIMES DAILY PRN
Status: DISCONTINUED | OUTPATIENT
Start: 2025-04-01 | End: 2025-04-19 | Stop reason: HOSPADM

## 2025-04-01 RX ORDER — EZETIMIBE 10 MG/1
10 TABLET ORAL NIGHTLY
Status: DISCONTINUED | OUTPATIENT
Start: 2025-04-01 | End: 2025-04-19 | Stop reason: HOSPADM

## 2025-04-01 RX ORDER — LEVOTHYROXINE SODIUM 137 UG/1
137 TABLET ORAL
Status: DISCONTINUED | OUTPATIENT
Start: 2025-04-02 | End: 2025-04-02

## 2025-04-01 RX ORDER — LIOTHYRONINE SODIUM 5 UG/1
10 TABLET ORAL
Status: DISCONTINUED | OUTPATIENT
Start: 2025-04-02 | End: 2025-04-19 | Stop reason: HOSPADM

## 2025-04-01 RX ORDER — FLUOXETINE 20 MG/5ML
20 SOLUTION ORAL DAILY
Status: DISCONTINUED | OUTPATIENT
Start: 2025-04-02 | End: 2025-04-19 | Stop reason: HOSPADM

## 2025-04-01 RX ORDER — FAMOTIDINE 20 MG/1
20 TABLET, FILM COATED ORAL NIGHTLY
Status: DISCONTINUED | OUTPATIENT
Start: 2025-04-01 | End: 2025-04-19 | Stop reason: HOSPADM

## 2025-04-01 RX ORDER — OXYCODONE HYDROCHLORIDE 5 MG/1
10 TABLET ORAL EVERY 4 HOURS PRN
Refills: 0 | Status: DISCONTINUED | OUTPATIENT
Start: 2025-04-01 | End: 2025-04-03

## 2025-04-01 RX ORDER — LORAZEPAM 0.5 MG/1
0.5 TABLET ORAL EVERY 8 HOURS PRN
Status: DISPENSED | OUTPATIENT
Start: 2025-04-01 | End: 2025-04-08

## 2025-04-01 RX ORDER — SODIUM CHLORIDE 1000 MG
1 TABLET, SOLUBLE MISCELLANEOUS 2 TIMES DAILY
Status: DISCONTINUED | OUTPATIENT
Start: 2025-04-01 | End: 2025-04-12

## 2025-04-01 RX ORDER — ACETAMINOPHEN 650 MG/20.3ML
650 LIQUID ORAL EVERY 6 HOURS PRN
Status: DISCONTINUED | OUTPATIENT
Start: 2025-04-01 | End: 2025-04-19 | Stop reason: HOSPADM

## 2025-04-01 RX ORDER — HEPARIN SODIUM 5000 [USP'U]/ML
5000 INJECTION, SOLUTION INTRAVENOUS; SUBCUTANEOUS EVERY 8 HOURS
Status: DISCONTINUED | OUTPATIENT
Start: 2025-04-01 | End: 2025-04-14

## 2025-04-01 RX ORDER — DEXTROSE 40 %
15-30 GEL (GRAM) ORAL AS NEEDED
Status: DISCONTINUED | OUTPATIENT
Start: 2025-04-01 | End: 2025-04-02

## 2025-04-01 RX ORDER — SODIUM CHLORIDE 0.9 % (FLUSH) 0.9 %
10 SYRINGE (ML) INJECTION AS NEEDED
Status: DISCONTINUED | OUTPATIENT
Start: 2025-04-01 | End: 2025-04-19 | Stop reason: HOSPADM

## 2025-04-01 RX ORDER — DEXTROSE 50 % IN WATER (D50W) INTRAVENOUS SYRINGE
25 AS NEEDED
Status: DISCONTINUED | OUTPATIENT
Start: 2025-04-01 | End: 2025-04-02

## 2025-04-01 RX ORDER — SENNOSIDES 8.6 MG/1
1 TABLET ORAL 2 TIMES DAILY PRN
Status: DISCONTINUED | OUTPATIENT
Start: 2025-04-01 | End: 2025-04-01

## 2025-04-01 RX ORDER — OXYCODONE HYDROCHLORIDE 5 MG/1
5 TABLET ORAL EVERY 4 HOURS PRN
Refills: 0 | Status: DISCONTINUED | OUTPATIENT
Start: 2025-04-01 | End: 2025-04-03

## 2025-04-01 RX ADMIN — FAMOTIDINE 20 MG: 20 TABLET, FILM COATED ORAL at 22:07

## 2025-04-01 RX ADMIN — Medication 10 ML: at 22:10

## 2025-04-01 RX ADMIN — INSULIN LISPRO 1 UNITS: 100 INJECTION, SOLUTION INTRAVENOUS; SUBCUTANEOUS at 22:06

## 2025-04-01 RX ADMIN — OXYCODONE HYDROCHLORIDE 10 MG: 5 TABLET ORAL at 22:07

## 2025-04-01 RX ADMIN — SODIUM CHLORIDE 1 G: 1 TABLET ORAL at 22:07

## 2025-04-01 RX ADMIN — CEFTRIAXONE SODIUM 2 G: 2 INJECTION, POWDER, FOR SOLUTION INTRAMUSCULAR; INTRAVENOUS at 22:08

## 2025-04-01 RX ADMIN — EZETIMIBE 10 MG: 10 TABLET ORAL at 22:07

## 2025-04-01 RX ADMIN — HEPARIN SODIUM 5000 UNITS: 5000 INJECTION, SOLUTION INTRAVENOUS; SUBCUTANEOUS at 21:54

## 2025-04-01 ASSESSMENT — PATIENT HEALTH QUESTIONNAIRE - PHQ9
SUM OF ALL RESPONSES TO PHQ QUESTIONS 1-9: 15
SUM OF ALL RESPONSES TO PHQ9 QUESTIONS 1 & 2: 6

## 2025-04-01 ASSESSMENT — COGNITIVE AND FUNCTIONAL STATUS - GENERAL: AFFECT: WFL

## 2025-04-01 NOTE — HOSPITAL COURSE
History of Present Illness  Celso is a 69 y.o. 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  He had a PEG placed in 2021 for dysphagia.  See chart for full history. He initially presented to [facility] on 3/11/25 with fevers and R shoulder pain. Workup was unrevealing and he was discharged home.  He presented to the [facility] ED again on 3/17 with ongoing fevers. CT Neck with no abscess, old postsurgical changes. He d/c home but BCx subsequently returned positive on 3/19 and he returned for admission. Imaging with C6-7 OM / discitis with ventral epidural abscess. He was transferred to [facility] on 3/23 for surgical evaluation.  At that time, he was afebrile. BCx negative. MRI on 3/24 MRI c/f metastatic disease at T1-T4, L4.   He is now s/p posterior C4-T1 decompression and fusion on 3/27. Purulent fluid drained from anterior spine. OR Cx Strep intermedius.  ID rec IV dapto and cef.  PICC pending.    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  Primary hypertension  HLD (hyperlipidemia)  Dysphagia   History of parotid cancer  Bacteremia  Thyroid cancer  Anxiety  History of esophageal cancer  IFG (impaired fasting glucose)  Hyponatremia     Active Inpatient Medications  cefTRIAXone, 2,000 mg, intravenous, Q12H  DAPTOmycin, 6 mg/kg/dose, intravenous, Q24H  ezetimibe, 10 mg, G tube, Nightly  famotidine, 20 mg, PEG tube, Nightly  FLUoxetine, 20 mg, G tube, Daily  heparin (porcine), 5,000 Units, subcutaneous, Q8H  insulin lispro, 1-5 Units, subcutaneous, CDI at mealtimes  insulin lispro, 2-3 Units, subcutaneous, CDI evening  levothyroxine, 137 mcg, PEG tube,   liothyronine, 10 mcg, G tube, Daily     Diagnostics  Study Date Result   MRI, spine 3/27/25 Discitis and osteomyelitis at C6-C7 with ventral epidural abscess compressing the spinal cord. Increased inferior extent of cord edema compared  to prior.  Patchy T1 hypointensity with enhancement in the upper cervical and thoracic spine, can represent metastatic lesions and/or post radiation changes, similar as compared to prior.       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 POD 5 PCDF  - Drain mgmt per Ortho Spine. [place] at all times  - S/p completion of decadron x 3 per ENT for mild laryngeal swelling visualized on preop NPL  - OR cultures with strep intermedius. Follow sensitivities  - ID on board, rec for Ceftriaxone and Daptomycin x 6 weeks. If strep is sensitive to CTX can likely d/c Dapto.   - Rec for GI eval to assess for possible perf as being source by ID and gastrografin study recommended. Patient unable to take PO. D/w GI, and low concern for perf. Notified ID.   - PT/OT rec for acute rehab. Patient/family prefer [facility]  - Pain control with MMA  - PICC line placement pending   Bacteremia  - 1/2 sets +S viridans 3/17, 3/19 Bcx NGTD  - Repeat blood cx 3/23. NGTD  - ID consulted. Recs appreciated   - Patient with hx xerostomia but no dental pain  - Recent esophageal dilation procedure 3/3  - R shoulder TTP --> MRI at OSH +bicep tendinosis  - TTE at OSH neg for vegetations  - L shoulder XR with degenerative changes and bursitis  Thyroid cancer  - In remission  - Hx of thyroidectomy/RND/adjuvant XRT/immunotherapy  - Continue home levoxyl 137mcg daily, liothyronine 5mcg BID  - Patient reports recently taking additional 1/2 dose of levoxyl lately once weekly. Will check thyroid  studies  - TSH elevated at 6.68. Free T4 0.8. May need increase in dosing and repeat TFTs in 4-6 weeks.   Dysphagia  - From esophageal strictures, recent dilation 3/3 not successful per patient, cannot tolerate regular oral secretions  - Strict NPO  - Appreciate nutrition recs  - Continue TF [facility] Peptide 1.5 (425 mg 4 times a day per patient)  - Appreciate nutrition recs  - PEG tube adaptor not available, will allow patient to infuse his own bolus TF (regimen: 2 cans TID) as well as Prosource (1 packet daily)  - Continue pepcid 20mg q12h  - Glycopyrrolate as needed per patient/spouse wishes  - Ensure adequate electrolyte repletion   Anxiety  - Continue fluoxetine 20mg daily, ativan PRN  History of parotid cancer  - S/p XRT  - Patient with NF mouthwash, OK to use  History of esophageal cancer  - Dx 10/2020, S/P chemoradiation  - Recent 3/11 PET demonstrated remission  Primary hypertension  - Not on meds  HLD (hyperlipidemia)  - Continue zetia 10mg qhs  IFG (impaired fasting glucose)  - Recently started MFM, hold  - Continue CDI low  Hyponatremia  - Mild, 133 today  - Urine studies reflective of SIADH  - At baseline patient takes 500cc FWF in AM and 300cc FWF in PM for hydration (only source)  - Added salt tabs. Can get additional fluid up to 1200cc in a day if needed/feels dehydrated  - Continue to trend BMP and adjust/eliminate salt when appropriate        3/31: ID:   Impression:  Strep intermedius bacteremia with C6-7 OM / discitis and ventral epidural abscess  3/17 BCx + at OSH  TTE negative for veg at OSH  OR 3/27 for PCDF, purulent fluid drainage from anterior spine  Suspect source likely recent esophageal dilatations  Thyroid cancer s/p resection, XRT, esophageal stricture s/p dilations, peg dependent    Recommend:  Continue daptomycin  Continue ceftriaxone  Follow up OR strep intermedius sensitivities, hope to de-escalate to ceftriaxone alone  Anticipating 6 week course of IV Abx. Will clarify with  surgical team proximity of infection to newly placed hardware  Ok for PICC, plan for rehab placement  Appreciate GI recs, recommending gastrogaffin study to assess for esophageal perf, however patient declines anything by mouth  Follow OR cultures  Will continue to follow     Follow-Up Appointments  As of 04/01/25 the patient has no known scheduled follow-up appointments within the predicted rehab length of stay.  Patient sees Dr. Martinez for surveillance of his cancer.  Per chart, team contacted her office and there are no oncological concerns at this time.

## 2025-04-01 NOTE — TELEPHONE ENCOUNTER
Dr. Álvarez from [facility] contacted the office requesting to speak with Dr. Gross about patients care. She says the patient is currently admitted in the hospital there and would like to collaborate on his care she would like a call back at #[phone].

## 2025-04-01 NOTE — BMR PREADMISSION NOTE
[place]  Preadmission Assessment    Patient Name:   Celso Gramajo  YOB: 1955    Referral Date:   03/31/25  Evaluation Date:    04/01/25  Referring Facility Admission Date: 03/23/25  Referring Facility: [facility]   Referring Provider: Glenda Álvarez MD     ASSESSMENT AND PLAN   Celso Gramajo is a 69 y.o. male who is now below functional baseline and has medical complexity due to s/p posterior C4-T1 decompression and fusion with an anticipated impairment group of orthopedic condition.    He demonstrates impairments in balance, coordination, endurance/activity tolerance, mobility, motor dysfunction, pain, range of motion (ROM), safety awareness, self-care, strength, visual/perceptual affecting functional independence.    Celso is at risk for the following complications:     Acute change in mental status due to multiple medications    Constipation due to impaired mobility    DVT/PE due to impaired mobility, post-surgical state, and cancer diagnosis    Hemorrhage/bleed due to anti-coagulation medication      Sepsis due to treatment with antibiotics in the past 90 days and known infections   Skin breakdown/pressure ulcers due to impaired mobility   Uncontrolled pain due to cancer diagnosis    Wound infection due to surgical incision and prolonged surgery (at least 2 hours)         In addition to the above noted risks, Celso requires daily rehabilitation physician oversight to prevent, monitor and manage medication side effects, abnormal vital signs, acute changes in mental status, abnormal lab values, functional decline, complications of infection, recurrent infection, exacerbation of chronic medical comorbidities, bowel and bladder dysfunction, respiratory insufficiency, new onset seizures, organ failure, abnormal muscle tone, psychosocial dysfunction, sleep disorders, development of skin breakdown/pressure injury, worsening of wounds, and pain.    Celso  requires and will benefit from a course of comprehensive inpatient rehabilitation including medical consultative services, occupational therapy, physical therapy, clinical nutrition, and psychiatry/psychology services.    Anticipated frequency and intensity of services include  Occupational Therapy 1.5 hours per day at a frequency of 5-7 times per week  Physical Therapy 1.5 hours per day at a frequency of 5-7 times per week        Celso is willing to participate in the rehab program and requires, can tolerate and will benefit from 3 hours of therapy at least 5 days per week  . These needs would not be able to be met at a lower level of care.    By discharge, Celso is expected to demonstrate functional improvement with goals including Setup or clean-up assistance for self-care, Independent for transfers, Independent for locomotion, Independent for communication, and Independent for social cognition.     The projected length of stay in inpatient rehab is 10 days and he is expected to discharge to home with home health with the following:  Type of Home Care Services: home PT, home OT    Pertinent History of Current Functional Problem:  History of Present Illness  Celso is a 69 y.o. 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  He had a PEG placed in 2021 for dysphagia.  See chart for full history. He initially presented to [place] ED on 3/11/25 with fevers and R shoulder pain. Workup was unrevealing and he was discharged home.  He presented to the [place] ED again on 3/17 with ongoing fevers. CT Neck with no abscess, old postsurgical changes. He d/c home but BCx subsequently returned positive on 3/19 and he returned for admission. Imaging with C6-7 OM / discitis with ventral epidural abscess. He was transferred to [facility] on 3/23 for surgical evaluation.  At that time, he was afebrile. BCx negative. MRI on 3/24 MRI c/f metastatic  disease at T1-T4, L4.   He is now s/p posterior C4-T1 decompression and fusion on 3/27. Purulent fluid drained from anterior spine. OR Cx Strep intermedius.  ID rec IV dapto and cef.  PICC pending.    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  Primary hypertension  HLD (hyperlipidemia)  Dysphagia   History of parotid cancer  Bacteremia  Thyroid cancer  Anxiety  History of esophageal cancer  IFG (impaired fasting glucose)  Hyponatremia     Active Inpatient Medications  cefTRIAXone, 2,000 mg, intravenous, Q12H  DAPTOmycin, 6 mg/kg/dose, intravenous, Q24H  ezetimibe, 10 mg, G tube, Nightly  famotidine, 20 mg, PEG tube, Nightly  FLUoxetine, 20 mg, G tube, Daily  heparin (porcine), 5,000 Units, subcutaneous, Q8H  insulin lispro, 1-5 Units, subcutaneous, CDI at mealtimes  insulin lispro, 2-3 Units, subcutaneous, CDI evening  levothyroxine, 137 mcg, PEG tube,   liothyronine, 10 mcg, G tube, Daily     Diagnostics  Study Date Result   MRI, spine 3/27/25 Discitis and osteomyelitis at C6-C7 with ventral epidural abscess compressing the spinal cord. Increased inferior extent of cord edema compared to prior.  Patchy T1 hypointensity with enhancement in the upper cervical and thoracic spine, can represent metastatic lesions and/or post radiation changes, similar as compared to prior.       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 POD 5 PCDF  - Drain mgmt per Ortho Spine. [place] at all times  - S/p completion of decadron x 3  per ENT for mild laryngeal swelling visualized on preop NPL  - OR cultures with strep intermedius. Follow sensitivities  - ID on board, rec for Ceftriaxone and Daptomycin x 6 weeks. If strep is sensitive to CTX can likely d/c Dapto.   - Rec for GI eval to assess for possible perf as being source by ID and gastrografin study recommended. Patient unable to take PO. D/w GI, and low concern for perf. Notified ID.   - PT/OT rec for acute rehab. Patient/family prefer [facility]  - Pain control with MMA  - PICC line placement pending   Bacteremia  - 1/2 sets +S viridans 3/17, 3/19 Bcx NGTD  - Repeat blood cx 3/23. NGTD  - ID consulted. Recs appreciated   - Patient with hx xerostomia but no dental pain  - Recent esophageal dilation procedure 3/3  - R shoulder TTP --> MRI at OSH +bicep tendinosis  - TTE at OSH neg for vegetations  - L shoulder XR with degenerative changes and bursitis  Thyroid cancer  - In remission  - Hx of thyroidectomy/RND/adjuvant XRT/immunotherapy  - Continue home levoxyl 137mcg daily, liothyronine 5mcg BID  - Patient reports recently taking additional 1/2 dose of levoxyl lately once weekly. Will check thyroid studies  - TSH elevated at 6.68. Free T4 0.8. May need increase in dosing and repeat TFTs in 4-6 weeks.   Dysphagia  - From esophageal strictures, recent dilation 3/3 not successful per patient, cannot tolerate regular oral secretions  - Strict NPO  - Appreciate nutrition recs  - Continue TF [employer] Peptide 1.5 (425 mg 4 times a day per patient)  - Appreciate nutrition recs  - PEG tube adaptor not available, will allow patient to infuse his own bolus TF (regimen: 2 cans TID) as well as Prosource (1 packet daily)  - Continue pepcid 20mg q12h  - Glycopyrrolate as needed per patient/spouse wishes  - Ensure adequate electrolyte repletion   Anxiety  - Continue fluoxetine 20mg daily, ativan PRN  History of parotid cancer  - S/p XRT  - Patient with NF mouthwash, OK to use  History of esophageal  "cancer  - Dx 10/2020, S/P chemoradiation  - Recent 3/11 PET demonstrated remission  Primary hypertension  - Not on meds  HLD (hyperlipidemia)  - Continue zetia 10mg qhs  IFG (impaired fasting glucose)  - Recently started MFM, hold  - Continue CDI low  Hyponatremia  - Mild, 133 today  - Urine studies reflective of SIADH  - At baseline patient takes 500cc FWF in AM and 300cc FWF in PM for hydration (only source)  - Added salt tabs. Can get additional fluid up to 1200cc in a day if needed/feels dehydrated  - Continue to trend BMP and adjust/eliminate salt when appropriate        3/31: ID:   Impression:  Strep intermedius bacteremia with C6-7 OM / discitis and ventral epidural abscess  3/17 BCx + at OSH  TTE negative for veg at OSH  OR 3/27 for PCDF, purulent fluid drainage from anterior spine  Suspect source likely recent esophageal dilatations  Thyroid cancer s/p resection, XRT, esophageal stricture s/p dilations, peg dependent    Recommend:  Continue daptomycin  Continue ceftriaxone  Follow up OR strep intermedius sensitivities, hope to de-escalate to ceftriaxone alone  Anticipating 6 week course of IV Abx. Will clarify with surgical team proximity of infection to newly placed hardware  Ok for PICC, plan for rehab placement  Appreciate GI recs, recommending gastrogaffin study to assess for esophageal perf, however patient declines anything by mouth  Follow OR cultures  Will continue to follow     Follow-Up Appointments  As of 04/01/25 the patient has no known scheduled follow-up appointments within the predicted rehab length of stay.  Patient sees Dr. Martinez for surveillance of his cancer.  Per chart, team contacted her office and there are no oncological concerns at this time.      OBJECTIVE   Current DVT Prophylaxis  Chemoprophylaxis: heparin subcutaneous  Dose/Frequency/Route: 5,000 Units, subcutaneous, Q8H  Date/Time of Most Recent Dose: 04/01/2025 12:27 AM EDT    Vitals:  Height 1.803 m (5' 11\")  Weight 92.5 " kg (204 lb)  ,      Date/Time Temp Pulse BP SpO2 Oxygen Therapy Who   04/01/25 1131 36.3 °C (97.3 °F) 83 126/84 95 % None (Room air) HAD          Lab Results:  Chemistry  (Last 3 results in the past 7 days)        04/01/25  0000   03/31/25  0000   03/30/25  0000        Sodium 133   132   133          132         Potassium, Bld 4.5   4.4   4.2          4.4         BUN 15   18   23       Creatinine 0.51   0.46   0.55       Glucose           CO2 24   25   28       Chloride 98   97   95             Hematologic  (Last 3 results in the past 7 days)        04/01/25  0000   03/31/25  0000   03/30/25  0000        WBC 10.30   9.80   8.90       Neutrophils Relative           Hemoglobin 12.90   11.80   12.00       Hematocrit 38.50   35.40   26.50       Platelets 255   271   281       Protime           INR                   Prior Living Environment      Flowsheet Row Most Recent Value   People in Home spouse   Living Environment Comment Multi-story home with 12-14 steps to second floor, powder room on 1st floor, 3 [address], multiple supportive family members          Prior Level of Function      Flowsheet Row Most Recent Value   Ambulation independent   Transferring independent   Bathing independent   Dressing independent   Eating independent   Communication understands/communicates without difficulty   Past History of Dysphagia long hx of dysphagia in setting of thyroid CA, permanent PEG   Assistive Device Currently Used at Home none          Current Status:  Current Diet: NPO, gastrostomy tube (PEG)  Last Bowel Movement: 03/30/25   Current Function       Row Name 04/01/25 1226       Cognition    Orientation Status oriented x 4    Affect/Mental Status WFL    Follows Commands WFL    Cognitive Function WFL       Bed Mobility    Bed Mobility Activities supine to sit    [place] minimum assist (75% or more patient effort)    Safety/Cues verbal cues;maintaining precautions;sequencing;technique    Assistive Device bed rails        Sit/Stand Transfer    [place] minimum assist (75% or more patient effort)    Safety/Cues verbal cues;hand placement;maintaining center of gravity over base of support;maintaining precautions;preparatory posture;proper use of assistive device;sequencing;technique    Assistive Device walker, front-wheeled       Surface-to-Surface Transfers    Transfer Location bed to chair    Transfer Technique stand pivot    [place] minimum assist (75% or more patient effort)    Safety/Cues verbal cues;maintaining center of gravity over base of support;maintaining precautions;sequencing;proper use of assistive device    Assistive Device walker, front-wheeled       Gait Training    [place], Gait minimum assist (75% or more patient effort);moderate assist (50-74% patient effort);1 person assist    Safety/Cues verbal cues;hand placement;maintaining center of gravity over base of support;sequencing;proper use of assistive device;technique    Assistive Device walker, front-wheeled    Distance in Feet 16 feet    Pattern step-through    Comment increased flex posture, narrow YONATAN, decreased step-length, increased hip flex in stance phase requiring assist with WS and tactile cues for upright posture; increased assist with turning with RW and mild lateral LOB with mod A to recover;                    Special Needs:  Lines, Drains & Airways: Peripheral IV, Gastrostomy/Enterostomy, PICC, Drain  Drain Insertion Date: 03/27/25  Drain Comments: Posterior Neck Bulb 19 Fr  Gastrostomy/Enterostomy Insertion Date:  (permanent since 2021)  PICC Insertion Date:  (pending)    Existing Precautions/Restrictions: fall, spinal    Active Infections: Other (OR Cx Strep intermedius)  IV Antibiotics: daptomycin and ceftriaxone

## 2025-04-02 ENCOUNTER — APPOINTMENT (INPATIENT)
Dept: PHYSICAL THERAPY | Facility: REHABILITATION | Age: 70
DRG: 559 | End: 2025-04-02
Payer: MEDICARE

## 2025-04-02 ENCOUNTER — APPOINTMENT (INPATIENT)
Dept: OCCUPATIONAL THERAPY | Facility: REHABILITATION | Age: 70
DRG: 559 | End: 2025-04-02
Payer: MEDICARE

## 2025-04-02 PROBLEM — G06.1 ABSCESS IN EPIDURAL SPACE OF CERVICAL SPINE: Status: ACTIVE | Noted: 2025-04-02

## 2025-04-02 PROBLEM — G06.1 SPINAL EPIDURAL ABSCESS: Status: RESOLVED | Noted: 2025-04-01 | Resolved: 2025-04-02

## 2025-04-02 LAB
ANION GAP SERPL CALC-SCNC: 7 MEQ/L (ref 3–15)
BUN SERPL-MCNC: 18 MG/DL (ref 7–25)
CALCIUM SERPL-MCNC: 9 MG/DL (ref 8.6–10.3)
CHLORIDE SERPL-SCNC: 97 MEQ/L (ref 98–107)
CO2 SERPL-SCNC: 29 MEQ/L (ref 21–31)
CREAT SERPL-MCNC: 0.6 MG/DL (ref 0.7–1.3)
EGFRCR SERPLBLD CKD-EPI 2021: >60 ML/MIN/1.73M*2
ERYTHROCYTE [DISTWIDTH] IN BLOOD BY AUTOMATED COUNT: 13.3 % (ref 11.6–14.4)
GLUCOSE BLD-MCNC: 144 MG/DL (ref 70–99)
GLUCOSE BLD-MCNC: 154 MG/DL (ref 70–99)
GLUCOSE BLD-MCNC: 212 MG/DL (ref 70–99)
GLUCOSE BLD-MCNC: 239 MG/DL (ref 70–99)
GLUCOSE BLD-MCNC: 261 MG/DL (ref 70–99)
GLUCOSE SERPL-MCNC: 161 MG/DL (ref 70–99)
HCT VFR BLD AUTO: 36.8 % (ref 40.1–51)
HGB BLD-MCNC: 12.3 G/DL (ref 13.7–17.5)
MCH RBC QN AUTO: 31.1 PG (ref 28–33.2)
MCHC RBC AUTO-ENTMCNC: 33.4 G/DL (ref 32.2–36.5)
MCV RBC AUTO: 93.2 FL (ref 83–98)
PLATELET # BLD AUTO: 247 K/UL (ref 150–350)
PMV BLD AUTO: 11.3 FL (ref 9.4–12.4)
POCT TEST: ABNORMAL
POTASSIUM SERPL-SCNC: 4.9 MEQ/L (ref 3.5–5.1)
RBC # BLD AUTO: 3.95 M/UL (ref 4.5–5.8)
SODIUM SERPL-SCNC: 133 MEQ/L (ref 136–145)
WBC # BLD AUTO: 9.54 K/UL (ref 3.8–10.5)

## 2025-04-02 PROCEDURE — 97167 OT EVAL HIGH COMPLEX 60 MIN: CPT | Mod: GO

## 2025-04-02 PROCEDURE — 63700000 HC SELF-ADMINISTRABLE DRUG: Performed by: INTERNAL MEDICINE

## 2025-04-02 PROCEDURE — 85027 COMPLETE CBC AUTOMATED: CPT | Performed by: INTERNAL MEDICINE

## 2025-04-02 PROCEDURE — 12800001 HC ROOM AND CARE SEMIPRIVATE REHAB-BRAIN INJ

## 2025-04-02 PROCEDURE — 97163 PT EVAL HIGH COMPLEX 45 MIN: CPT | Mod: GP

## 2025-04-02 PROCEDURE — 80048 BASIC METABOLIC PNL TOTAL CA: CPT | Performed by: INTERNAL MEDICINE

## 2025-04-02 PROCEDURE — 36415 COLL VENOUS BLD VENIPUNCTURE: CPT | Performed by: INTERNAL MEDICINE

## 2025-04-02 PROCEDURE — 63600000 HC DRUGS/DETAIL CODE: Mod: JZ | Performed by: INTERNAL MEDICINE

## 2025-04-02 PROCEDURE — 25800000 HC PHARMACY IV SOLUTIONS: Performed by: INTERNAL MEDICINE

## 2025-04-02 RX ORDER — LEVOTHYROXINE SODIUM 150 UG/1
150 TABLET ORAL DAILY
Qty: 90 TABLET | Refills: 0 | OUTPATIENT
Start: 2025-04-02 | End: 2025-04-18 | Stop reason: HOSPADM

## 2025-04-02 RX ORDER — INSULIN LISPRO 100 [IU]/ML
1-12 INJECTION, SOLUTION INTRAVENOUS; SUBCUTANEOUS EVERY 6 HOURS
Status: DISCONTINUED | OUTPATIENT
Start: 2025-04-02 | End: 2025-04-02

## 2025-04-02 RX ORDER — INSULIN LISPRO 100 [IU]/ML
1-12 INJECTION, SOLUTION INTRAVENOUS; SUBCUTANEOUS EVERY 6 HOURS
Status: DISCONTINUED | OUTPATIENT
Start: 2025-04-02 | End: 2025-04-10

## 2025-04-02 RX ORDER — ADHESIVE BANDAGE
30 BANDAGE TOPICAL 2 TIMES DAILY PRN
Status: DISCONTINUED | OUTPATIENT
Start: 2025-04-02 | End: 2025-04-19 | Stop reason: HOSPADM

## 2025-04-02 RX ORDER — LEVOTHYROXINE SODIUM 150 UG/1
150 TABLET ORAL
Status: DISCONTINUED | OUTPATIENT
Start: 2025-04-03 | End: 2025-04-19 | Stop reason: HOSPADM

## 2025-04-02 RX ADMIN — INSULIN LISPRO 2 UNITS: 100 INJECTION, SOLUTION INTRAVENOUS; SUBCUTANEOUS at 18:02

## 2025-04-02 RX ADMIN — SODIUM CHLORIDE 1 G: 1 TABLET ORAL at 08:57

## 2025-04-02 RX ADMIN — HEPARIN SODIUM 5000 UNITS: 5000 INJECTION, SOLUTION INTRAVENOUS; SUBCUTANEOUS at 05:55

## 2025-04-02 RX ADMIN — MAGNESIUM HYDROXIDE 30 ML: 400 SUSPENSION ORAL at 11:38

## 2025-04-02 RX ADMIN — LEVOTHYROXINE SODIUM 137 MCG: 137 TABLET ORAL at 05:56

## 2025-04-02 RX ADMIN — Medication 10 ML: at 21:14

## 2025-04-02 RX ADMIN — INSULIN LISPRO 2 UNITS: 100 INJECTION, SOLUTION INTRAVENOUS; SUBCUTANEOUS at 11:51

## 2025-04-02 RX ADMIN — Medication 10 ML: at 06:21

## 2025-04-02 RX ADMIN — SODIUM CHLORIDE 1 G: 1 TABLET ORAL at 20:53

## 2025-04-02 RX ADMIN — FAMOTIDINE 20 MG: 20 TABLET, FILM COATED ORAL at 20:53

## 2025-04-02 RX ADMIN — OXYCODONE HYDROCHLORIDE 5 MG: 5 TABLET ORAL at 08:57

## 2025-04-02 RX ADMIN — HEPARIN SODIUM 5000 UNITS: 5000 INJECTION, SOLUTION INTRAVENOUS; SUBCUTANEOUS at 14:59

## 2025-04-02 RX ADMIN — HEPARIN SODIUM 5000 UNITS: 5000 INJECTION, SOLUTION INTRAVENOUS; SUBCUTANEOUS at 21:14

## 2025-04-02 RX ADMIN — CEFTRIAXONE SODIUM 2 G: 2 INJECTION, POWDER, FOR SOLUTION INTRAMUSCULAR; INTRAVENOUS at 09:06

## 2025-04-02 RX ADMIN — OXYCODONE HYDROCHLORIDE 10 MG: 5 TABLET ORAL at 14:58

## 2025-04-02 RX ADMIN — EZETIMIBE 10 MG: 10 TABLET ORAL at 20:53

## 2025-04-02 RX ADMIN — ACETAMINOPHEN 650 MG: 650 SOLUTION ORAL at 20:53

## 2025-04-02 RX ADMIN — OXYCODONE HYDROCHLORIDE 5 MG: 5 TABLET ORAL at 20:53

## 2025-04-02 RX ADMIN — CEFTRIAXONE SODIUM 2 G: 2 INJECTION, POWDER, FOR SOLUTION INTRAMUSCULAR; INTRAVENOUS at 18:54

## 2025-04-02 RX ADMIN — LIOTHYRONINE SODIUM 10 MCG: 5 TABLET ORAL at 05:55

## 2025-04-02 RX ADMIN — FLUOXETINE HYDROCHLORIDE 20 MG: 20 SOLUTION ORAL at 11:29

## 2025-04-02 RX ADMIN — Medication 10 ML: at 15:25

## 2025-04-02 NOTE — PROGRESS NOTES
Patient: Celso Gramajo  Location: [facility] [address]  MRN: [id number]  Today's date: 4/2/2025    History of Present Illness  Celso is a 69 y.o. male admitted on 4/1/2025 with Spinal epidural abscess [G06.1]. Principal problem is Spinal epidural abscess.    Celso Gramajo is a 69 year old male with PMHx thyroid CA s/p thyroidectomy, RND, adjuvant XRT/immunotherapy, esophageal SCC s/p chemoradiation c/b esophageal strictures, HTN, Elevated Coronary Calcium scoring in 2017, ENMANUEL, Hypothyroidism, Anemia, dysphagia on PEG (placed in 2021) TFs, and history of CVA who presented to an OSH with neck and R shoulder pain. Workup was unrevealing and he was discharged home.    He presented to the [place] ED again on 3/17 with ongoing fevers. CT Neck with no abscess, old postsurgical changes. He d/c home but BCx subsequently returned positive on 3/19 and he returned for admission. He was also bacteremic with strep viridans, and placed on IV antibiotics. Workup revealed a C5-C7 ventral epidural abscess with osteomyelitis, and he was transferred to [facility] for surgical evaluation.    MRI on 3/24 c/f metastatic disease at T1-T4, L4.      At [facility] ortho and ENT were consulted. Ultimately it was determined that an anterior surgical approach was not safe (due to history of neck surgeries), and patient was having progressive weakness. Repeat MRI C-spine showed Discitis and osteomyelitis at C6-C7 with ventral epidural abscess compressing the spinal cord and increased edema. Patient urgently underwent POSTERIOR C4-T1 DECOMPRESSION FUSION WITH [place] INSTRUMENTATION on 3/27. Postop he was briefly in the ICU for MAP goals. OR cultures grew strep intermedius and he was placed on the antibiotic plan below. He had a PICC placed 4/1. 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 He was started on salt tabs. In rehab please continue to check labs, and titrate the salt tabs as appropriate.  He worked with PT/OT who recommended him for acute rehab.     Past Medical History  Celso has a past medical history of Anemia, Cancer (CMS/HCC), head and neck radiation, Hypertension, Hypothyroidism, Stroke (CMS/HCC), and Thyroid disease.    PT Vitals      Date/Time Pulse HR Source BP BP Location BP Method Pt Position [facility]   04/02/25 0903 80 Monitor 123/65 Left upper arm Automatic Lying JR   04/02/25 0959 107 Monitor 110/66 Left upper arm Automatic Sitting JR          PT Pain      Date/Time Pain Type Location Rating: Rest Nonverbal Indicators [facility]   04/02/25 0903 Pain Assessment neck + shoulders 3 -- JR           04/02/25 0959 Pain Reassessment neck 0 body stiff    04/02/25 1029 Pain Reassessment neck 2 --                Prior Living Environment      Flowsheet Row Most Recent Value   People in Home spouse   Current Living Arrangements home   Living Environment Comment Multi-story home with 12-14 steps to second floor, powder room on 1st floor, 2-3 FELICITA via garage, multiple supportive family members   Number of Stairs, Main Entrance 3   Stair Railings, Main Entrance none   Location, Patient Bedroom second floor, must negotiate stairs to access   Location, Bathroom second floor, must negotiate stairs to access   Bathroom Access Comment powder room 1st floor standard height toilet, full bathroom 2nd flood WIS with glass door, standard height toilet   Stairs, Within Home, Primary 12   Stair Railings, Within Home, Primary railings on both sides of stairs            Prior Level of Function      Flowsheet Row Most Recent Value   Dominant Hand right   Ambulation independent   Transferring independent   Toileting independent   Bathing independent   Dressing independent   Eating independent   IADLs independent   Driving/Transportation    Past History of Dysphagia PTA pt was completely independent, no AD. (+) . NPO PTA, manages peg tube independently.   Assistive Device Currently Used at Home none              IRF PT Evaluation and Treatment - 04/02/25 0904          PT Time Calculation    Start Time 0900     Stop Time 1030     Time Calculation (min) 90 min        Session Details    Document Type Initial Evaluation        General Information    Patient Profile Reviewed yes     Existing Precautions/Restrictions brace worn at all times;fall;NPO;spinal        Mobility Belt    Mobility Belt Used During Session yes        Orthosis Neck    Orthosis Properties Location: Neck       Cognition/Psychosocial    Orientation Status oriented x 4        Sensory Assessment (Somatosensory)    Left LE Sensory Assessment light touch awareness;light touch localization;proprioception;intact   L2-S1 intact, proprioception @ gr toe 5/5    Right LE Sensory Assessment light touch awareness;light touch localization;proprioception;intact   L2-S1 intact, proprioception @ gr toe 5/5    Sensory Subjective Reports numbness   BL soles of feet and palms       Range of Motion (ROM)    Left Lower Extremity (ROM) left LE ROM is WFL   AROM    Right Lower Extremity (ROM) right LE ROM is WFL   AROM       Strength (Manual Muscle Testing)    Hip, Left (Strength) flex 4/5, IR/ER 3+/5, abd/add 4-/5, ext 3+/5     Knee, Left (Strength) WFL     Ankle, Left (Strength) WFL     Hip, Right (Strength) flex 3+/5, IR/ER 3/5, abd/add 4-/5, ext 3+/5     Knee, Right (Strength) 4-/5 flex/ext     Ankle, Right (Strength) 4/5 all planes        Bed Mobility    [place], Roll Left touching/steadying assist     [place], Roll Right touching/steadying assist     [place], Supine to Sit minimum assist (75% or more patient effort)     [place], Sit to Supine minimum assist (75% or more patient effort)     Assistive Device bed rails        Sit to Stand Transfer    [place], Sit to Stand Transfer moderate assist (50-74% patient effort)     Safety/Cues preparatory posture     Assistive Device none     Comment Pt anterior, min-mod A for balance on stand and min A  for hip extension        Stand to Sit Transfer    [place], Stand to Sit Transfer minimum assist (75% or more patient effort)     Safety/Cues preparatory posture     Assistive Device none     Comment for eccentric control w/ PT anterior        Stand Pivot Transfer    [place], Stand Pivot/Stand Step Transfer moderate assist (50-74% patient effort)     Safety/Cues technique     Assistive Device other (see comments)     Comment via amb approach w/ R arm over PT        Car Transfer    Transfer Technique stand pivot     [place] moderate assist (50-74% patient effort)     Safety/Cues technique;hand placement;maintaining precautions     Assistive Device none     Comment PT anterior, mod A for balance t/o and hip ext on stand        Gait Training    [place], Gait moderate assist (50-74% patient effort)     Safety/Cues technique     Assistive Device other (see comments)     Distance in Feet 75 feet     Pattern step-through     Deviations/Abnormal Patterns base of support, wide;jean decreased;gait speed decreased;step length decreased;weight shifting decreased;bilateral deviations     Bilateral Gait Deviations heel strike decreased     Comment R arm over PT ,mod A at pelvis for wt shift nad balance, slow paced, wide YON[place]AN     Advanced Gait Activity 10 Meter Walk Test (Self-Selected Velocity)     [place], Picking Up Object minimum assist (75% or more patient effort)   w/ reacher       Curb Negotiation    [place] not tested     Comment unsafe to assess on IE        Rough/Uneven Surface Gait Skills    [place] not tested     Comment unsafe to assess on IE        10 Meter Walk Test (Self-Selected Velocity)    Trial One: Ten Meter Walk Test (sec) 35.2 seconds     Trial Two: Ten Meter Walk Test (sec) 32.6 seconds     Assistive Device none     Trial One: Gait Speed (m/s) 0.17 m/s     Trial Two: Gait Speed (m/s) 0.18 m/s     Average Gait Speed (m/s): Two Trials 0.18 m/s        Stairs Training     [place], Stairs moderate assist (50-74% patient effort)     Safety/Cues technique     Assistive Device railing     Handrail Location (Stairs) both sides     Number of Stairs 4     Stair Height 6 inches     Ascending Stairs Technique step-over-step     Descending Stairs Technique step-over-step     Comment min-mod A for balance, VC for step to however pt uses reciprocal pattern, mild posterior lean descending        Wheelchair Mobility/Management    Method of Locomotion bipedal (lower extremity) propulsion     [place], Forward Propulsion moderate assist (50-74% patient effort)     [place], Steering Activities moderate assist (50-74% patient effort)     [place], Turning Activities moderate assist (50-74% patient effort)     Distance Propelled in Feet 150 feet        Balance    Static Sitting Balance WFL     Dynamic Sitting Balance WFL     Static Standing Balance moderate impairment;unsupported     Dynamic Standing Balance moderate impairment;unsupported        Motor Skills    Coordination gross motor deficit;lower extremity;minimal impairment;bilateral   alt toe tap mildly impaired, HTS WFL    Functional Endurance fair     Muscle Tone bilateral;lower extremity(s);clonus   2 beats clonus on L, sustained clonus post stairs on R but unabel to elicit w/ isolated testing       Postural Deviations    Comment, Postural Deviations no notable postural changes        Gait/Walking Locomotion Goal 1    Distance 150 feet        Gait/Walking Locomotion Goal 2    Distance 150 feet        Therapy Assessment/Plan (PT)    Rehab Potential/Prognosis (PT) good, to achieve stated therapy goals     Frequency of Treatment (PT) 5-7 times per week     Intensity of Treatment (PT) 60-90 minutes per day     Estimated Duration of Therapy (PT) 3 weeks     Planned Therapy Interventions balance training;bed mobility training;gait training;home exercise program;manual therapy techniques;motor coordination training;neuromuscular  re-education;patient/family education;orthotic fitting/training;postural re-education;stair training;strengthening;stretching;transfer training;wheelchair management/propulsion training     Comment, Therapy Assessment/Plan (PT) Pt is 69 year old male w/ C5-C7 ventral epidural abscess with osteomyelitis. MRI on 3/24 c/f metastatic disease at T1-T4, L4.      Pt underwent posterior C4-T1 decompression fusion on 3/27. Pt now presents to [facility] w/ impaired BLE strength, impaired trunk control, impaired axctivity tolerance, neck and shoulder pain, impaired standing balance and mildly decreased coordination and BLE clonus. Pt requires moderate asssitance for all functional mobility which is well below his baseline independence. His gait speed is reflective of increased risk of falls and well below age/gender norms. Pt requires skilled inpatient PT to address listed impairments, decr risk fo falls and maximize functional independence prior to  DC home w/ his wife.        Daily Progress Summary (PT)    Daily Outcome Statement Consider RW use next session.     Recommendations (PT) rough surface ambulation as tolerated                          Education Documentation  Treatment Plan, taught by Dianne Mercedes, PT at 4/2/2025 10:28 AM.  Learner: Patient  Readiness: Acceptance  Method: Explanation  Response: Verbalizes Understanding  Comment: PT POC + goals. Safety in facility.          IRF PT Goals      Flowsheet Row Most Recent Value   Bed Mobility Goal 1    Activity/Assistive Device sit to supine/supine to sit at 04/02/2025 0904   [place] supervision required at 04/02/2025 0904   Time Frame short-term goal (STG), 5 - 7 days at 04/02/2025 0904   Bed Mobility Goal 2    Activity/Assistive Device bed mobility activities, all at 04/02/2025 0904   [place] modified independence at 04/02/2025 0904   Time Frame long-term goal (LTG), 3 weeks at 04/02/2025 0904   Transfer Goal 1    Activity/Assistive Device  sit-to-stand/stand-to-sit, stand pivot at 04/02/2025 0904   [place] minimum assist (75% or more patient effort) at 04/02/2025 0904   Time Frame short-term goal (STG), 5 - 7 days at 04/02/2025 0904   Transfer Goal 2    Activity/Assistive Device sit-to-stand/stand-to-sit, stand pivot at 04/02/2025 0904   [place] modified independence at 04/02/2025 0904   Time Frame long-term goal (LTG), 3 weeks at 04/02/2025 0904   Gait/Walking Locomotion Goal 1    Activity/Assistive Device gait (walking locomotion), assistive device use at 04/02/2025 0904   Distance 150 feet at 04/02/2025 0904   [place] minimum assist (75% or more patient effort) at 04/02/2025 0904   Time Frame short-term goal (STG), 5 - 7 days at 04/02/2025 0904   Gait/Walking Locomotion Goal 2    Activity/Assistive Device gait (walking locomotion), assistive device use at 04/02/2025 0904   Distance 150 feet at 04/02/2025 0904   [place] modified independence at 04/02/2025 0904   Time Frame long-term goal (LTG), 3 weeks at 04/02/2025 0904   Stairs Goal 1    Activity/Assistive Device ascending stairs, descending stairs, using handrail, left, using handrail, right at 04/02/2025 0904   Number of Stairs 12 at 04/02/2025 0904   [place] minimum assist (75% or more patient effort) at 04/02/2025 0904   Time Frame short-term goal (STG), 5 - 7 days at 04/02/2025 0904   Stairs Goal 2    Activity/Assistive Device ascending stairs, descending stairs, using handrail, left, using handrail, right at 04/02/2025 0904   Number of Stairs 12 at 04/02/2025 0904   [place] supervision required at 04/02/2025 0904   Time Frame long-term goal (LTG), 3 weeks at 04/02/2025 0904

## 2025-04-02 NOTE — PROGRESS NOTES
Patient: Celso Gramajo  Location: [facility] [address]  MRN: [id number]  Today's date: 4/2/2025    History of Present Illness  Celso is a 69 y.o. male admitted on 4/1/2025 with Spinal epidural abscess [G06.1]. Principal problem is Spinal epidural abscess.    Celso Gramajo is a 69 year old male with PMHx thyroid CA s/p thyroidectomy, RND, adjuvant XRT/immunotherapy, esophageal SCC s/p chemoradiation c/b esophageal strictures, HTN, Elevated Coronary Calcium scoring in 2017, ENMANUEL, Hypothyroidism, Anemia, dysphagia on PEG (placed in 2021) TFs, and history of CVA who presented to an OSH with neck and R shoulder pain. Workup was unrevealing and he was discharged home.    He presented to the [facility] ED again on 3/17 with ongoing fevers. CT Neck with no abscess, old postsurgical changes. He d/c home but BCx subsequently returned positive on 3/19 and he returned for admission. He was also bacteremic with strep viridans, and placed on IV antibiotics. Workup revealed a C5-C7 ventral epidural abscess with osteomyelitis, and he was transferred to [facility] for surgical evaluation.    MRI on 3/24 c/f metastatic disease at T1-T4, L4.      At [facility] ortho and ENT were consulted. Ultimately it was determined that an anterior surgical approach was not safe (due to history of neck surgeries), and patient was having progressive weakness. Repeat MRI C-spine showed Discitis and osteomyelitis at C6-C7 with ventral epidural abscess compressing the spinal cord and increased edema. Patient urgently underwent POSTERIOR C4-T1 DECOMPRESSION FUSION WITH [place] INSTRUMENTATION on 3/27. Postop he was briefly in the ICU for MAP goals. OR cultures grew strep intermedius and he was placed on the antibiotic plan below. He had a PICC placed 4/1. 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 He was started on salt tabs. In rehab please continue to check labs, and titrate the salt tabs as appropriate.  He worked with PT/OT who recommended him for acute rehab.     Past Medical History  Celso has a past medical history of Anemia, Cancer (CMS/HCC), head and neck radiation, Hypertension, Hypothyroidism, Stroke (CMS/HCC), and Thyroid disease.    OT Vitals      Date/Time Pulse HR Source BP BP Location BP Method Pt Position [facility]   04/02/25 1034 91 Monitor 116/71 Left upper arm Automatic Lying TS   04/02/25 1154 103 Monitor 112/68 Left upper arm Automatic Lying TS          OT Pain      Date/Time Pain Type Side/Orientation Location Rating: Rest Rating: Activity Radiation to Interventions [facility]   04/02/25 1034 Pain Assessment neck generalized 2 10 shoulder, left;shoulder, right diversional activity provided;premedicated for activity TS   04/02/25 1154 Pain Reassessment neck generalized 5 -- shoulder, left;shoulder, right premedicated for activity TS               Prior Living Environment      Flowsheet Row Most Recent Value   People in Home spouse   Current Living Arrangements home   Home Accessibility stairs to enter home (Group), stairs within home (Group)   Living Environment Comment Multi-story home with 12-14 steps to second floor, powder room on 1st floor, 2-3 FELICITA via garage, multiple supportive family members   Number of Stairs, Main Entrance 3   Stair Railings, Main Entrance none   Location, Patient Bedroom second floor, must negotiate stairs to access   Location, Bathroom second floor, must negotiate stairs to access   Bathroom Access Comment powder room 1st floor standard height toilet, full bathroom 2nd floor WIS with glass door, standard height toilet   Stairs, Within Home, Primary 12   Stair Railings, Within Home, Primary railings on both sides of stairs            Prior Level of Function      Flowsheet Row Most Recent Value   Dominant Hand right   Ambulation independent   Transferring independent   Toileting independent   Bathing independent   Dressing independent   Eating independent   IADLs independent    Driving/Transportation    Communication understands/communicates without difficulty   Past History of Dysphagia PTA pt was completely independent, no AD. (+) . NPO PTA, manages peg tube independently.   Prior Level of Function Comment Independent without use of AE or DME.   Assistive Device Currently Used at Home none            Occupational Profile      Flowsheet Row Most Recent Value   Occupational History/Life Experiences retired involved in [employer]. + , -handicap driving placard.   Patient Goals PSFS: going to the bathroom 0/10, walking 1/10, getting in/out of bed 0/10 = 1/30 = 3.33%             IRF OT Evaluation and Treatment - 04/02/25 0723          OT Time Calculation    Start Time 1030     Stop Time 1200     Time Calculation (min) 90 min        Session Details    Document Type Initial Evaluation        General Information    Patient/Family/Caregiver Comments/Observations Pt's wife present for first half of evaluation.     General Observations of Patient Pt received supine in bed reporting he is very tired from PT eval but agreeable to OT session.     Existing Precautions/Restrictions fall;NPO;spinal;limb restrictions;other (see comments);aspiration;brace worn at all times;cardiac   RUE PICC       Mobility Belt    Mobility Belt Used During Session yes        Orthosis Neck    Orthosis Properties Location: Neck    Compliance/Wearing Issues patient;compliant with wearing schedule         Services    Do You Speak a Language Other Than English at Home? no        Prior Level of Function    IADLs independent     Driving/Transportation         Living Environment    Name(s) of People in Home Micaella     Primary Care Provided by self        Cognition/Psychosocial    Comment, Cognition Pt able to follow multi step commands t/o. Required encouragement to complete tasks.        Cognitive Pattern Assessment    Cognitive Pattern Assessment Used BIMS   "      Brief Interview for Mental Status (BIMS)    Repetition of Three Words (First Attempt) 3     Temporal Orientation: Year Correct     Temporal Orientation: Month Accurate within 5 days     Temporal Orientation: Day Correct     Recall: \"Sock\" Yes, no cue required     Recall: \"Blue\" Yes, no cue required     Recall: \"Bed\" Yes, no cue required     BIMS Summary Score 15        Confusion Assessment Method (CAM)    Is there evidence of an acute change in mental status from the patient's baseline? No     Inattention Behavior not present     Disorganized thinking Behavior not present     Altered level of consciousness Behavior not present        Vision Assessment/Intervention    Vision Assessment corrective lenses full-time        Sensory Assessment    Left UE Sensory Assessment general sensation;light touch awareness;light touch localization;proprioception;intact     Right UE Sensory Assessment general sensation;light touch awareness;light touch localization;proprioception;intact     Sensory Subjective Reports numbness;tingling;other (see comments)   in B hands and feet       Range of Motion (ROM)    Left Upper Extremity (ROM) other (see comments)   WFL while supine in bed with HOB elevated 30*    Right Upper Extremity (ROM) other (see comments)   WFL while supine in bed with HOB elevated 30*       Strength (Manual Muscle Testing)    Left Upper Extremity Strength other (see comments)   unable to assess during session d/t high pain levels    Right Upper Extremity Strength other (see comments)   unable to assess during session d/t high pain levels       Bed Mobility    Comment OT: supine to/from sitting EOB with min A via log roll technique, min A log rolling R        Toilet Transfer    Transfer Technique stand pivot     [place] moderate assist (50-74% patient effort)     Safety/Cues increased time to complete;hand placement;initiation;preparatory posture     Assistive Device commode, 3-in-1;walker, front-wheeled     " "Comment SPT EOB to [place] drop arm commode positioned at bedside with OT positioned anteriorly with mod A for initial pelvic lift, balance, lateral WS, and controlled descent. SPT commode to EOB with use of RW with min A for pelvic lift, balance, and controlled descent.        Shower Transfer    [employer] Mdl not tested   awaiting clearance to complete a full wet shower       Balance    Comment, Balance cl s sitting EOB doffing hospital gown with UE support on EOB d/t pain, min-mod A for balance standing unsupported while OT completed hospital brief management during toileting task.        Motor Skills    Coordination bilateral;upper extremity;fine motor deficit;other (see comments);9 Hole Peg Test of Fine Motor Coordination Results   B hand tremors pt reporting is new. decreased accuracy with serial opposition with vision occluded.    Comment, Motor Skills tremors noted when management toothpaste container.        Bathing    Shower Provided? No, see comment     [employer] M[employer] maximum assist (25-49% patient effort)     Safety/Cues increased time to complete;initiation     Position supine;edge of bed sitting     Setup Assistance obtain supplies     Comment awaiting clearance to complete a full wet shower. Pt completed sponge bath sitting up/supine in bed required assist for BLE, anterior/posterior pericare, pt able to wash UE and trunk.        Upper Body Dressing    [employer] Mdl not tested     Comment Pt reporting having shirts that were \"too big\" and declined to don. And declined to don shirt from facility. OT assist with doffing/donning hospital gown.        Lower Body Dressing    Tasks doff;pants/bottoms;don;socks     [employer] M[employer] maximum assist (25-49% patient effort)     Safety/Cues increased time to complete;initiation     Position supine     [employer] Mdl, Footwear dependent (less than 25% patient effort)     Comment pt able to slightly assist with lowering pants via log roll then OT removed from BLE, max A overall. " Pt declined donning pants. Pt required total assist to doff/don socks, declined donning sneakers.        Grooming    Tasks oral care (brushing teeth, cleaning dentures)     Position sitting up in bed     Setup Assistance obtain supplies     Adaptive Equipment suction brush     [place], Oral Hygiene close supervision     Comment sitting up in bed use of suction boothbrush w/ cl s d/t NPO, pt able to remove and replace mouth guards which he wears daily, pt able to place toothpaste onto toothbrush with increased difficulty and required 2 hands to squeeze toothpaste, then pt reporting he was too tired to brush mouthguard and requested assistance.        Toileting    Tasks adjust/manage clothing;perform bladder hygiene;perform bowel hygiene     [place] maximum assist (25-49% patient effort)     Position sitting up in bed;supported sitting;supported standing     Setup Assistance adaptive equipment setup;obtain supplies     Adaptive Equipment commode, drop-arm;urinal     Comment Sitting up in bed max A for clothing management then pt able to position non spill urinal + continent void. Pt attempted to have BM seated on commode total assist for clothing management while standing with RW, pt required total assist for pericare during sponge bathing.        Therapy Assessment/Plan (OT)    Rehab Potential/Prognosis (OT) good, to achieve stated therapy goals     Frequency of Treatment (OT) 5-7 times per week     Intensity of Treatment (OT) 60-90 minutes per day     Estimated Duration of Therapy (OT) 3 weeks     Problem List (OT) problems related to;balance;mobility;strength;pain;coordination     Activity Limitations Related to Problem List BADLs not performed adequately or safely;IADLs not performed adequately or safely;unable to transfer safely;unable to ambulate safely;home management activity not performed adequately or safely     Planned Therapy Interventions BADL retraining;activity tolerance training;functional  balance retraining;occupation/activity based interventions;patient/caregiver education/training;transfer/mobility retraining;strengthening exercise     Comment, Therapy Assessment/Plan (OT) Pt is 69 year old male w/ C5-C7 ventral epidural abscess with osteomyelitis. MRI on 3/24 c/f metastatic disease at T1-T4, L4. Pt underwent posterior C4-T1 decompression fusion on 3/27. Pt admitted to [facility] 4/1/25. PTA, pt was independent with BADLs, IADLs, +, +PEG tube. At OT IE, pt required mod A for toilet transfer, and varied cl s- total assist for BADLs. Pt limited by pain, spinal precautions, decreased activity tolerance, decreased strength, FMC, sitting and standing tolerance, standing balance. Session also limited d/t nursing setting up tube feed during session. Recommend skilled inpatient OT services to maximize safety and independence with BADLs, fxl transfers and fxl mobility prior to d/c home.        Daily Progress Summary (OT)    Symptoms Noted During/After Treatment fatigue;increased pain     Recommendations (OT) PSFS completed. Please complete UBD, ROM while seated, MMT, 9 Hole peg, and  strength.                          Education Documentation  Treatment Plan, taught by Manuela Oneill OT at 4/2/2025  3:40 PM.  Learner: Patient  Readiness: Acceptance  Method: Explanation  Response: Verbalizes Understanding  Comment: Role of OT, OT POC, safety and fall prevention with bed alarm, wc alarm, call bell use. spinal precautions.    Orientation to Care Setting, Routine, taught by Manuela Oneill OT at 4/2/2025  3:40 PM.  Learner: Patient  Readiness: Acceptance  Method: Explanation  Response: Verbalizes Understanding  Comment: Role of OT, OT POC, safety and fall prevention with bed alarm, wc alarm, call bell use. spinal precautions.          IRF OT Goals      Flowsheet Row Most Recent Value   Transfer Goal 1    Activity/Assistive Device toilet at 04/02/2025 0723   [place] minimum assist (75% or more  patient effort) at 04/02/2025 0723   Time Frame short-term goal (STG), 5 - 7 days at 04/02/2025 0723   Strategies/Barriers with use of RW at 04/02/2025 0723   Transfer Goal 2    Activity/Assistive Device toilet at 04/02/2025 0723   [place] modified independence at 04/02/2025 0723   Time Frame long-term goal (LTG), 21 days or less at 04/02/2025 0723   Transfer Goal 3    Activity/Assistive Device shower at 04/02/2025 0723   [place] other (see comments)  [TBA] at 04/02/2025 0723   Time Frame short-term goal (STG), 5 - 7 days at 04/02/2025 0723   Transfer Goal 4    Activity/Assistive Device shower at 04/02/2025 0723   [place] other (see comments)  [TBA] at 04/02/2025 0723   Time Frame long-term goal (LTG), 21 days or less at 04/02/2025 0723   Bathing Goal 1    [place] moderate assist (50-74% patient effort) at 04/02/2025 0723   Time Frame short-term goal (STG), 5 - 7 days at 04/02/2025 0723   Bathing Goal 2    [place] supervision required at 04/02/2025 0723   Time Frame long-term goal (LTG), 21 days or less at 04/02/2025 0723   UB Dressing Goal 1    [place] other (see comments)  [TBA] at 04/02/2025 0723   Time Frame short-term goal (STG), 5 - 7 days at 04/02/2025 0723   UB Dressing Goal 2    [place] other (see comments)  [TBA] at 04/02/2025 0723   Time Frame long-term goal (LTG), 21 days or less at 04/02/2025 0723   LB Dressing Goal 1    [place] moderate assist (50-74% patient effort) at 04/02/2025 0723   Time Frame short-term goal (STG), 5 - 7 days at 04/02/2025 0723   LB Dressing Goal 2    [place] modified independence at 04/02/2025 0723   Time Frame long-term goal (LTG), 21 days or less at 04/02/2025 0723   Grooming Goal 1    [place] set-up required at 04/02/2025 0723   Time Frame short-term goal (STG), 5 - 7 days at 04/02/2025 0723   Grooming Goal 2    [place] modified independence at 04/02/2025 0723   Time Frame long-term goal (LTG), 21 days or less at  04/02/2025 0723   Toileting Goal 1    [place] moderate assist (50-74% patient effort) at 04/02/2025 0723   Time Frame short-term goal (STG), 5 - 7 days at 04/02/2025 0723   Toileting Goal 2    [place] modified independence at 04/02/2025 0723   Time Frame long-term goal (LTG), 21 days or less at 04/02/2025 0723

## 2025-04-02 NOTE — PLAN OF CARE
"  Problem: Rehabilitation (IRF) Plan of Care  Goal: Plan of Care Review  Flowsheets (Taken 4/2/2025 6041)  Outcome Evaluation: OT IE completed, POC and goals established and discussed with pt who verbalized understanding.  Plan of Care Reviewed With: patient  Goal: Patient-Specific Goal (Individualized)  Flowsheets (Taken 4/2/2025 3117)  Patient/Family-Specific Goals (Include Timeframe): \"to walk\"     "

## 2025-04-02 NOTE — H&P
Patient Name: Celso Gramajo  MRN: [id number]  : 1955  Admission Date: 2025    Chief Complaint:    Dysfunction in ambulation, transfers and self-care status post posterior C4-T1 decompression and fusion, C6-C7 discitis and osteomyelitis with ventral epidural abscess compressing the spinal cord, history of esophageal squamous cell carcinoma status post chemoradiotherapy (CRT) complicated by esophageal strictures, left vocal cord paralysis, dysphagia status post PEG tube, history of thyroid cancer, multiple medical problems. (Patient was admitted to [facility] late in the night on 25. Records reviewed on 25. Patient evaluated on 25 at about 7:05 PM. Full H&P done on 25.  Plan of care discussed with patient and with his wife at bedside.  Discussed with nurse.)    History of Present Illness:    Mr. Celso Gramajo is a 69-year-old right-handed white male with chronic conditions significant for hypertension, hyperlipidemia, anxiety, thyroid cancer status post thyroidectomy and radical neck dissection, adjuvant radiation therapy/immunotherapy, esophageal squamous cell carcinoma status post chemoradiotherapy complicated by esophageal strictures, left vocal cord paralysis, dysphagia status post PEG tube placement in , initially presented to the ER at [facility] on 3/11/25 with fevers and right shoulder pain. Workup was unrevealing and he was discharged home. He presented to the [facility] ER again on 3/17/25 with ongoing fevers. CT neck with no abscess, old postsurgical changes. He discharged home but blood cultures subsequently, 1 of 2 sets of blood cultures drawn at [facility] on 3/17/25 showed Streptococcus viridans returned positive on 3/19/25 and he returned for admission to [facility] on 3/19/25. TTE at [facility] was negative. Blood cultures on 3/19/25 and again on 3/23/25 showed no growth per his records. Imaging with C6-7 osteomyelitis/discitis  with ventral epidural abscess. [facility] for surgical evaluation.  At that time, he was afebrile.  Blood cultures  were negative. At [facility] ortho and ENT were consulted. Ultimately it was determined that an anterior surgical approach was not safe (due to history of neck surgeries), and patient was having progressive weakness.  MRI of cervical spine on 3/27/25 revealed discitis and osteomyelitis at C6-C7 with ventral epidural abscess compressing the spinal cord and increased inferior extent of cord edema compared to prior. Patchy T1 hypointensity with enhancement in the upper cervical and thoracic spine, can represent metastatic lesions and/or post radiation changes, similar as compared to prior. He underwent posterior C4-T1 decompression and instrumented fusion on 3/27/25, performed by orthopedic surgeon Dr. Santiago Ca. Purulent fluid drained from anterior spine during the procedure and operating room cultures revealed Strep intermedius. Postop he was briefly in the ICU for MAP goals. He had a PICC placed 4/1/25. 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 He was started on salt tabs. Infectious diseases recommended IV Daptomycin which patient completed on 3/31/25.  He was recommended to continue Ceftriaxone 2g IV q12h x 14 day course (end date 4/8/25) then transition to Ceftriaxone 2g IV daily to complete total 6 week course (end date 5/8/25). He was recommend [place] J collar to neck at all times.  I discussed orthopedic spinal precautions with him.  He completed Decadron treatment for mild laryngeal edema visualized on preoperative NPL. Patient has dysphagia from esophageal strictures, recent dilation 3/3/25 was not successful per patient, cannot tolerate regular oral secretions and recommended NPO and continued on bolus tube feeds.  He was kept on Pepcid for GI prophylaxis. Glycopyrrolate as needed per patient/spouse wishes.  For anxiety he  was continued on Fluoxetine and Ativan PRN.  He is not on medications for hypertension.  He was continued on Zetia for hyperlipidemia.  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 He continues on a sliding-scale Humalog coverage.  He is on Levothyroxine and Liothyronine for thyroid supplementation after thyroidectomy for thyroid cancer. His Synthroid was increased to 150mcg on 4/2. He should follow up outpatient with his PCP for continued titration. He is on subcutaneous Heparin and SCDs for DVT prophylaxis.  I discussed his recent clinical course with patient and with his wife at bedside.  On 4/1/25, hemoglobin was 12.9, WBC count was 10.30, platelets were 255, BUN 15, creatinine was 0.51, sodium was 133 and potassium was 4.5.  He has been needing assistance for mobility, transfers, self-care. I have reviewed the preadmission screening and concur with that information and there are no significant changes from patient's preadmission screening. He is transferred to [place] on 4/1/25 for further rehabilitation care.       Past Medical History:    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 drug thru [facility]     Hx of head and neck radiation     Hypertension     Hypothyroidism     Stroke (CMS/HCC)     Thyroid disease        Past Surgical History:    Past Surgical History   Procedure Laterality Date    Cholecystectomy      Hernia repair      Micro direct laryngoscopy with fat implantation, abdominal fat harvest,laser N/A 11/23/2020    Performed by Jon German MD at [facility]    Neck dissection      Neck surgery      AL EGD INSERT GUIDE WIRE DILATOR PASSAGE ESOPHAGUS [02804 (CPT®)] N/A 10/17/2022    Performed by Ricardo Cabral MD at [facility]    Thyroid surgery      Tumor removal      L neck 20yrs ago        Medications:    Current Facility-Administered Medications   Medication Dose Route Frequency     acetaminophen  650 mg peg tube q6h PRN    bisacodyL  10 mg rectal Daily PRN    cefTRIAXone  2 g intravenous q12h DU    [START ON 4/9/2025] cefTRIAXone  2 g intravenous q24h INT    ezetimibe  10 mg peg tube Nightly    famotidine  20 mg peg tube Nightly    FLUoxetine  20 mg peg tube Daily    glycopyrrolate  1 mg peg tube 2x daily PRN    heparin (porcine)  5,000 Units subcutaneous q8h DU    insulin lispro U-100  1-12 Units subcutaneous q6h DU    [START ON 4/3/2025] lansoprazole  15 mg feeding tube Daily before breakfast    [START ON 4/3/2025] levothyroxine  150 mcg peg tube Daily (6:30a)    linaCLOtide  145 mcg g-tube Daily before lunch    liothyronine  10 mcg peg tube Daily (6:30a)    LORazepam  0.5 mg peg tube q8h PRN    magnesium hydroxide  30 mL oral 2x daily PRN    ondansetron ODT  4 mg peg tube q6h PRN    oxyCODONE  10 mg peg tube q4h PRN    oxyCODONE  5 mg peg tube q4h PRN    senna  1 tablet peg tube 2x daily PRN    sodium chloride PF  10 mL intravenous q8h INT    sodium chloride PF  10 mL intravenous PRN    sodium chloride  1 g peg tube BID       Allergies:    No Known Allergies    Family History:    Family History   Problem Relation Name Age of Onset    No Known Problems Biological Father      Lung cancer Biological Sister         Social History:    The patient is  and lives with his wife in a 2 level home. 1 step to enter, bedroom and bathroom on the second level, full flight of stairs to reach up there. He is retired from working in finance.  He denies smoking, or drinking alcohol and denies using recreational drugs.    Prior Living Arrangement:   People in Home: spouse  Name(s) of People in Home: Nathanael  Current Living Arrangements: home  Home Accessibility: stairs to enter home (Group); stairs within home (Group)  Living Environment Comment: Multi-story home with 12-14 steps to second floor, powder room on 1st floor, 2-3 FELICITA via garage, multiple supportive family members  Number of Stairs, Main  Entrance: three  Stair Railings, Main Entrance: none  Location, Patient Bedroom: second floor, must negotiate stairs to access  Location, Bathroom: second floor, must negotiate stairs to access  Bathroom Access Comment: powder room 1st floor standard height toilet, full bathroom 2nd floor WIS with glass door, standard height toilet  Stairs, Within Home, Primary: 12  Stair Railings, Within Home, Primary: railings on both sides of stairs      Premorbid Function:   Dominant Hand: right  Ambulation: independent  Transferring: independent  Toileting: independent  Bathing: independent  Dressing: independent  Eating: independent  IADLs: independent  Driving/Transportation:   Communication: understands/communicates without difficulty  Past History of Dysphagia: PTA pt was completely independent, no AD. (+) . NPO PTA, manages peg tube independently.  Assistive Device/Animal Currently Used at Home: none  Prior Level of Function Comment: Independent without use of AE or DME.      Functional History:    The patient is a right-hand dominant person. He was independent in ADLs and ambulation prior to his hospitalization.     Review Of Systems:    A complete and comprehensive review of systems was performed. The patient denies any chest pain, shortness of breath. He uses glasses. He indicates last bowel movement was 3 days back.  He reports he is able to void.  We will monitor post void residuals.  He does have some neck pain and bilateral shoulder pain.  Otherwise, a complete and comprehensive review of systems is negative      Physical Examination:    Vitals:    04/02/25 1632   BP: (!) 127/59   Pulse: 100   Resp: 18   Temp: 37.2 °C (98.9 °F)   SpO2: 94%     Body mass index is 26.78 kg/m².    General Appearance: Not in acute distress  Head/Ear/Nose/Throat: Normocephalic; Atraumatic.   Eye: EOMI; PERRL.   Neck: [place] J collar in place.  Respiratory: Decreased breath sounds at bases.   Cardiovascular: RRR;  Normal S1, S2.   Gastrointestinal: Soft; NT; +BS.   Extremities: Bilateral lower extremity edema noted.    Musculoskeletal: Functional active range of motion in both upper and lower extremities.   Neurological: AAO ×3. Speech is fluent. Cranial nerve examination does not reveal any gross facial asymmetry. Strength testing bilateral deltoids are 4/5, bilateral biceps are 4+/5, bilateral triceps are 4/5, bilateral wrist extensors are 4+/5, bilateral hand FDP are 4+/5, bilateral and interossei are 4/5.  Bilateral hip flexion is less than 4/5.  Bilateral quadriceps are 5/5.  Bilateral ankle dorsi and plantar flexion are 5/5.  He is grossly able to localize touch and position sense in great toes, except reports numbness in all 5 digits of both hands and also numbness on the plantar aspects of both feet.  Deep tendon reflexes are symmetric and slightly brisk bilaterally.  Bilateral ankle clonus is present.  Plantars are withdrawal.  Coordination is functional upper extremities.    Behavior/Emotional: Appropriate; Cooperative.   Skin: Healing incision on posterior cervical spine.  Sutures in place.  Erythema/rash noted on coccyx unclear if stage I decubitus.  Rash noted on bilateral groins.  Reviewed pictures in epic.      Assessment and Plan:    ASSESSMENT PLAN:  1. 69-year-old right-handed white male with chronic conditions significant for hypertension, hyperlipidemia, anxiety, thyroid cancer status post thyroidectomy and radical neck dissection, adjuvant radiation therapy/immunotherapy, esophageal squamous cell carcinoma status post chemoradiotherapy complicated by esophageal strictures, left vocal cord paralysis, dysphagia status post PEG tube placement in 2021, initially presented to the ER at [facility] on 3/11/25 with fevers and right shoulder pain. Workup was unrevealing and he was discharged home. He presented to the [facility] ER again on 3/17/25 with ongoing fevers. CT neck with no abscess, old  postsurgical changes. He discharged home but blood cultures subsequently, 1 of 2 sets of blood cultures drawn at [facility] on 3/17/25 showed Streptococcus viridans returned positive on 3/19/25 and he returned for admission to [facility] on 3/19/25. TTE at [facility] was negative. Blood cultures on 3/19/25 and again on 3/23/25 showed no growth per his records. Imaging with C6-7 osteomyelitis/discitis with ventral epidural abscess. He was transferred to [facility] on 3/23/25 for surgical evaluation.  At that time, he was afebrile.  Blood cultures  were negative. At [facility] ortho and ENT were consulted. Ultimately it was determined that an anterior surgical approach was not safe (due to history of neck surgeries), and patient was having progressive weakness.  MRI of cervical spine on 3/27/25 revealed discitis and osteomyelitis at C6-C7 with ventral epidural abscess compressing the spinal cord and increased inferior extent of cord edema compared to prior. Patchy T1 hypointensity with enhancement in the upper cervical and thoracic spine, can represent metastatic lesions and/or post radiation changes, similar as compared to prior. He underwent posterior C4-T1 decompression and instrumented fusion on 3/27/25, performed by orthopedic surgeon Dr. Santiago Ca. Purulent fluid drained from anterior spine during the procedure and operating room cultures revealed Strep intermedius. Postop he was briefly in the ICU for MAP goals. He had a PICC placed 4/1/25. 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 He was started on salt tabs. Infectious diseases recommended IV Daptomycin which patient completed on 3/31/25.  He was recommended to continue Ceftriaxone 2g IV q12h x 14 day course (end date 4/8/25) then transition to Ceftriaxone 2g IV daily to complete total 6 week course (end date 5/8/25). He was recommend [place] J collar to neck at all times.  I discussed orthopedic spinal  precautions with him.  He completed Decadron treatment for mild laryngeal edema visualized on preoperative NPL. Patient has dysphagia from esophageal strictures, recent dilation 3/3/25 was not successful per patient, cannot tolerate regular oral secretions and recommended NPO and continued on bolus tube feeds.  He was kept on Pepcid for GI prophylaxis. Glycopyrrolate as needed per patient/spouse wishes.  For anxiety he was continued on Fluoxetine and Ativan PRN.  He is not on medications for hypertension.  He was continued on Zetia for hyperlipidemia.  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 He continues on a sliding-scale Humalog coverage.  He is on Levothyroxine and Liothyronine for thyroid supplementation after thyroidectomy for thyroid cancer. He is on subcutaneous Heparin and SCDs for DVT prophylaxis.  I discussed his recent clinical course with patient and with his wife at bedside.  On 4/1/25, hemoglobin was 12.9, WBC count was 10.30, platelets were 255, BUN 15, creatinine was 0.51, sodium was 133 and potassium was 4.5.  He has been needing assistance for mobility, transfers, self-care. He is transferred to [facility] on 4/1/25 for further rehabilitation care.     2. DVT prophylaxis - He is on subcutaneous Heparin and SCDs for DVT prophylaxis.  Check doppler.    3. 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  Follow falls precautions, cardiac precautions, aspiration precautions.  Follow spinal precautions.  [place] J collar to neck at all times.  Use [place] collar in shower.    4. GI - On  Pepcid for GI prophylaxis.  On PRN Senokot.  On PRN Dulcolax suppository.  On PRN Zofran.    5.  -denies dysuria.  Monitor postvoid residuals.    6. CVS - monitor for orthostasis.    7. Pulmonary -encourage incentive spirometry.    8. Hematology - monitor hemoglobin, platelets.      9. Pain -on Tylenol.  On PRN Oxycodone.    10. Skin - Healing incision on posterior cervical spine.  Sutures in place.  Erythema/rash noted on coccyx unclear if stage I decubitus.  Rash noted on bilateral groins.  Reviewed pictures in epic.    11. F/E/N - nothing by mouth on bolus PEG feeds.  Nutrition consulted. 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 He was started on salt tabs.    12.  Dysphagia - H/O esophageal squamous cell carcinoma status post chemoradiotherapy complicated by esophageal strictures, left vocal cord paralysis, dysphagia status post PEG tube placement in 2021 - NPO on bolus PEG feeds.  Nutrition consulted.    13.  Psychiatry - on PRN Ativan.  Psychology consulted.    14.  Hypothyroidism -He is on Levothyroxine and Liothyronine for thyroid supplementation after thyroidectomy for thyroid cancer.    15. ENT -  H/O left vocal cord paralysis, treated with Decadron for edema around vocal cords recently.  On Glycopyrrolate as needed per patient/spouse wishes to manage secretions.      16.  Infectious diseases - He underwent posterior C4-T1 decompression and instrumented fusion on 3/27/25, performed by orthopedic surgeon Dr. Santiago Ca. Purulent fluid drained from anterior spine during the procedure and operating room cultures revealed Strep intermedius.. He had a PICC placed 4/1/25. Infectious diseases recommended IV Daptomycin which patient completed on 3/31/25.  He was recommended to continue Ceftriaxone 2g IV q12h x 14 day course (end date 4/8/25) then transition to Ceftriaxone 2g IV daily to complete total 6 week course (end date 5/8/25).    15.  Steroid-induced hyperglycemia -on sliding-scale Humalog  coverage.     16. Rehabilitation medicine - Continue comprehensive rehabilitation care. Continue PT, OT, psychology.  Follow falls precautions, cardiac precautions, aspiration precautions.  Follow spinal precautions.  [place] J collar to neck at all times.  Use [place] collar in shower.    17. Reviewed labs today.    18. Consultations - Dr. Marrero will be consulted from internal medicine standpoint.  Dr. Zeng of neurology was consulted.  Nutrition will be consulted.  Will consult psychology.  He was recommend outpatient follow-up with his orthopedic surgeon, infectious disease physician, oncologist, endocrinology and other appropriate physicians.    Estimated Length Of Stay:    Will be about 10 days, and will be adjusted in team meetings depending upon functional status and progress in therapy.     Goals Of This Stay:    Goals of this stay would be to increase his strength; improve his endurance; maximize his independence in transfers, ambulation, self care, keeping him weight-bearing as tolerated on both lower extremities; evaluate the need for assistive devices and adaptive equipment; do patient and family education; do caregiver training as appropriate; monitor him medically, monitor healing of his incision and skin teach him spinal precautions and try to control his pain.     Post Admission Physician Evaluation:    Celso Gramajo is admitted to [facility] for comprehensive inpatient rehabilitation for orthopedic condition status post posterior C4-T1 decompression and fusion, C6-C7 discitis and osteomyelitis with ventral epidural abscess compressing the spinal cord, history of esophageal squamous cell carcinoma status post chemoradiotherapy (CRT) complicated by esophageal strictures, left vocal cord paralysis, dysphagia status post PEG tube, history of thyroid cancer, multiple medical problems with functional deficits in balance; coordination; endurance/activity tolerance; mobility;  motor dysfunction; pain; range of motion (ROM); safety awareness; self-care; strength; visual/perceptual. Patient is receiving the following services: medical consultative services; occupational therapy; physical therapy; clinical nutrition; psychiatry/psychology services, rehabilitation nursing care, case management evaluation.    Active medical management is required for:  Patient Active Problem List   Diagnosis    Vertigo    Hypertension    Postsurgical hypothyroidism    Mixed hyperlipidemia    Gastroesophageal reflux disease    Increased sputum production    Allergic rhinitis    Change in bowel habits    Chronic fatigue syndrome    Constipation, chronic    Deviated nasal septum    Elevated coronary artery calcium score    Eustachian tube dysfunction, bilateral    Examination of participant in clinical trial    Induration penis plastica    Hypertrophy of nasal turbinates    Hx of colonic polyps    Malignant neoplasm of salivary gland (CMS/HCC)    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)    Paralysis of vocal cords and larynx, unilateral    ENMANUEL (obstructive sleep apnea)    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)    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)    Penile curvature, acquired    Rhinitis sicca    Thyroid cancer (CMS/HCC)    Sensorineural hearing loss (SNHL), bilateral    Other dysphagia    Abdominal pain    Other dysphagia    COVID-19    Encounter for follow-up surveillance of thyroid cancer    Spinal epidural abscess    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       Premorbid Function  Dominant Hand: right  Ambulation: independent  Transferring: independent  Toileting: independent  Bathing: independent  Dressing: independent  Eating: independent  IADLs: independent  Driving/Transportation:   Communication: understands/communicates without difficulty  Past History of Dysphagia: PTA pt was completely independent, no AD. (+) . NPO PTA,  manages peg tube independently.  Assistive Device/Animal Currently Used at Home: none  Prior Level of Function Comment: Independent without use of AE or DME.      Current Function  Gait  [place], Gait: moderate assist (50-74% patient effort)  Assistive Device: other (see comments)  Distance in Feet: 75 feet  Comment: R arm over PT ,mod A at pelvis for wt shift nad balance, slow paced, wide YONATAN    Stairs  Gillespie, Stairs: moderate assist (50-74% patient effort)  Assistive Device: railing  Handrail Location (Stairs): both sides  Number of Stairs: 4  Stair Height: 6 inches  Comment: min-mod A for balance, VC for step to however pt uses reciprocal pattern, mild posterior lean descending    Wheelchair  Method of Locomotion: bipedal (lower extremity) propulsion  Gillespie, Forward Propulsion: moderate assist (50-74% patient effort)  Gillespie, Steering Activities: moderate assist (50-74% patient effort)  Gillespie, Turning Activities: moderate assist (50-74% patient effort)  Distance Propelled in Feet: 150 feet    Transfers  Bed Mobility  Bed Mobility Activities: supine to sit  [place]pie, Roll Left: touching/steadying assist  G[place]pie, Roll Right: touching/steadying assist  [place]pie, Supine to Sit: minimum assist (75% or more patient effort)  Gillespie, Sit to Supine: minimum assist (75% or more patient effort)  Safety/Cues: verbal cues; maintaining precautions; sequencing; technique  Assistive Device: bed rails  Comment: OT: supine to/from sitting EOB with min A via log roll technique, min A log rolling R    Sit to Stand Transfer  [place]pie, Sit to Stand Transfer: moderate assist (50-74% patient effort)  Safety/Cues: preparatory posture  Assistive Device: none  Comment: Pt anterior, min-mod A for balance on stand and min A for hip extension    Stand to Sit Transfer  [place], Stand to Sit Transfer: minimum assist (75% or more patient effort)  Safety/Cues: preparatory  "posture  Assistive Device: none  Comment: for eccentric control w/ PT anterior    Stand Pivot Transfer  [place], Stand Pivot/Stand Step Transfer: moderate assist (50-74% patient effort)  Safety/Cues: technique  Assistive Device: other (see comments)  Comment: via amb approach w/ R arm over PT    Car Transfer  Transfer Technique: stand pivot  [place]: moderate assist (50-74% patient effort)  Safety/Cues: technique; hand placement; maintaining precautions  Assistive Device: none  Comment: PT anterior, mod A for balance t/o and hip ext on stand      Toilet Transfer  Transfer Technique: stand pivot  [place]: moderate assist (50-74% patient effort)  Safety/Cues: increased time to complete; hand placement; initiation; preparatory posture  Assistive Device: commode, 3-in-1; walker, front-wheeled  Comment: SPT EOB to [place] drop arm commode positioned at bedside with OT positioned anteriorly with mod A for initial pelvic lift, balance, lateral WS, and controlled descent. SPT commode to EOB with use of RW with min A for pelvic lift, balance, and controlled descent.    Shower Transfer  [place]: not tested (awaiting clearance to complete a full wet shower)      Self Care  Bathing  [place]: maximum assist (25-49% patient effort)  Safety/Cues: increased time to complete; initiation  Setup Assistance: obtain supplies  Comment: awaiting clearance to complete a full wet shower. Pt completed sponge bath sitting up/supine in bed required assist for BLE, anterior/posterior pericare, pt able to wash UE and trunk.    Upper Body Dressing  [place]: not tested  Comment: Pt reporting having shirts that were \"too big\" and declined to don. And declined to don shirt from facility. OT assist with doffing/donning [facility] gown.    Lower Body Dressing  Tasks: doff; pants/bottoms; don; socks  [place]: maximum assist (25-49% patient effort)  Safety/Cues: increased time to complete; initiation  Comment: pt able to " slightly assist with lowering pants via log roll then OT removed from BLE, max A overall. Pt declined donning pants. Pt required total assist to doff/don socks, declined donning sneakers.    Grooming  Tasks: oral care (brushing teeth, cleaning dentures)  Setup Assistance: obtain supplies  Adaptive Equipment: suction brush  Comment: sitting up in bed use of suction boothbrush w/ cl s d/t NPO, pt able to remove and replace mouth guards which he wears daily, pt able to place toothpaste onto toothbrush with increased difficulty and required 2 hands to squeeze toothpaste, then pt reporting he was too tired to brush mouthguard and requested assistance.    Toileting  Tasks: adjust/manage clothing; perform bladder hygiene; perform bowel hygiene  [place]: maximum assist (25-49% patient effort)  Setup Assistance: adaptive equipment setup; obtain supplies  Adaptive Equipment: commode, drop-arm; urinal  Comment: Sitting up in bed max A for clothing management then pt able to position non spill urinal + continent void. Pt attempted to have BM seated on commode total assist for clothing management while standing with RW, pt required total assist for pericare during sponge bathing.    Self-Feeding  No data recorded    Cognition  Cognition  Orientation Status: oriented x 4  Affect/Mental Status: WFL  Follows Commands: WFL  Cognitive Function: WFL  Comment, Cognition: Pt able to follow multi step commands t/o. Required encouragement to complete tasks.      Communication     Swallow       Risk for Complications  [place] is at risk for the following complications:     Acute change in mental status due to multiple medications    Constipation due to impaired mobility    DVT/PE due to impaired mobility, post-surgical state, and cancer diagnosis    Hemorrhage/bleed due to anti-coagulation medication      Sepsis due to treatment with antibiotics in the past 90 days and known infections   Skin breakdown/pressure ulcers due to impaired  mobility   Uncontrolled pain due to cancer diagnosis    Wound infection due to surgical incision and prolonged surgery (at least 2 hours)         Expected Level of Function  Self-Care: Setup or clean-up assistance  Transfers: Independent  Locomotion: Independent  Communication: Independent  Social Cognition: Independent      Anticipated Discharge Plan  home with home health;home with assistance    I have reviewed the pre-admission screening and there are no relevant changes.    Expected length of stay: 10 days        Spencer Kemp MD  4/2/2025       This encounter was completed utilizing the Direct Speech Voice Recognition Software. Grammatical errors, random word insertions, pronoun errors, and incomplete sentences are occasional consequences of the system due to software limitations, ambient noises, and hardware issues. Such errors may be missed prior to affixing electronic signature. Any questions or concerns about the content, text, or information contained within the body of this dictation should be directly addressed to the physician for clarification. If you have any questions or concerns please do not hesitate to call me directly via EPIC chat, page, or email.

## 2025-04-02 NOTE — PLAN OF CARE
Plan of Care Review  Plan of Care Reviewed With: patient  Progress: improving  Outcome Evaluation: AAOx4, makes needs known. Neck incision with sutures, gauze and tegaderm. Posterior to incision steris, old drain site with gauze/ tegaderm. Yina ALANIS. Spinal precautions maintained. Remains NPO, using own Medrio pump for tube feels. Own [employer] feeding in top  room. Using 425ml intermittent feedings. Per home use rate at 580 ml/hr for feeds. Accuchecks monitored. Flushed with 50 ml water with medications. MOM given per request, no results at this time - no BM since 3/30. Passing flatus, denies nausea. Refused Linzess when offered. Continent of urine. PVRs< 200. Meds reviewed. Speciality bed in place. Barrier applied to reddened/ white area on coccyx. PRN Oxycodone for pain management. Participated in therapies. HILARY PICC - Continues IV Rocephin. Site WDL.

## 2025-04-02 NOTE — TELEPHONE ENCOUNTER
Pts wife called back and stated pt is still in the hospital for about 6/8 weeks. Pts wife reports that [facility] does not have Levoxyl brand and only has generic and [facility] has been using the pts own supply of levoxyl.  Pts wife asks that dr ruby send levoxyl 150mcg to [facility] so she can pick it up and bring it to the hospital. Pended to dr ruby.

## 2025-04-02 NOTE — PLAN OF CARE
Plan of Care Review  Plan of Care Reviewed With: patient  Progress: improving  Outcome Evaluation: Patient admitted from [facility] this evening. AAOX3 with wife at bedside. Oriented to room and therapy routine. Turns side to side with minimal assistance. [place] collar on AAT. Pain managed with prn oxycodone. Right arm picc for iv antibiotics. NPO maintained. Gastrostomy tube button in upper abdomen for meds and tube feedings. Flushes without difficulty. Patient c/o feeling full after flush and feeding administration. HOB elevated >30 degrees. Continent of urine utilizing urinal. PVR 112ml. LBM 3/30. Productive cough utilizing yankower for thin clear/white sputum. Patient and his wife's questions and concerns addressed with supervisor and educator regarding nutritional needs and special bed. Call bell in reach

## 2025-04-02 NOTE — CONSULTS
Nutrition Note  101/101W    Clinical Course: Patient is a 69 y.o. male who was admitted on 4/1/2025 with a diagnosis of Spinal epidural abscess [G06.1].     Nutrition Interventions/ Recommendations:   Continue NPO with TF: Bolus 425ml [employer] Peptide 1.5 (Patient's own substituted for [employer] 1.4 Standard) QID (@7:30, 10:30, 14:30 and 17:30) with 500ml H2O flush in am after levothyroxine is administered and 300ml H2O flush at HS (provides 2610 kcals, 150g protein - this is his home regimen)  Nutrition department will attempt to order [employer] Peptide 1.5, but this is not on formulary at any of [facility] hospitals - if unable to obtain, family will need to provide it or we could substitute [employer] 1.4 Standard  Obtain weekly weight and update in Epic  At nutrition risk level 2      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 drug thru [facility]     Hx of head and neck radiation     Hypertension     Hypothyroidism     Stroke (CMS/HCC)     Thyroid disease      Past Surgical History   Procedure Laterality Date    Cholecystectomy      Hernia repair      Micro direct laryngoscopy with fat implantation, abdominal fat harvest,laser N/A 11/23/2020    Performed by Jon German MD at [facility]    Neck dissection      Neck surgery      OH EGD INSERT GUIDE WIRE DILATOR PASSAGE ESOPHAGUS [53970 (CPT®)] N/A 10/17/2022    Performed by Ricardo Cabral MD at [facility]    Thyroid surgery      Tumor removal      L neck 20yrs ago             Dietary Orders   (From admission, onward)                 Start     Ordered    04/02/25 1233  Tube Feed with NPO [employer] 1.4 standard; Bolus; 425; 4; Other (specify); 500 cc free water flush following levothyroxine administration; 300 cc free water flush at bed time; one; RD/LDN may adjust order; AM Snack  (Tube Feed with NPO Diet Orders with insertion and maintenance panels)  Diet effective now        Comments:  "bolus TF @ 0730, 1030, 1430, 1730    Substitute patient's own [employer] Peptide 1.5 TF formula    Hold TF boluses for one hour before and one hour after levothyroxine is administered   Question Answer Comment   Tube Feeding Formula (BMR): [employer] 1.4 standard    Administration Type Bolus    Tube Feeding Bolus (mL): 425    Tube Feeding Bolus (Times/Day): 4    Flush frequency: Other (specify)    Explanatory Comment: 500 cc free water flush following levothyroxine administration; 300 cc free water flush at bed time    Protein Supplement (packet): one    Delegation of Authority. Diet orders written by PA/Mckenzie may not be adjusted by RD/LDNs. RD/LDN may adjust order    Meal period (AM Snack required for CBORD, do not remove: Not clinically relevant) AM Snack       Placed in \"And\" Linked Group    04/02/25 1233                    Reason for Assessment  Reason For Assessment: physician consult  Identified At Risk by Screening Criteria: tube feeding or parenteral nutrition    [facility] Nutrition Screen Tool  Has patient lost weight without trying?: 0-->No  Has patient been eating poorly due to decreased appetite?: 0-->No (on tube feeding, NPO)  [facility] Nutrition Screen Score: 0    Nutrition/Diet History  Appetite Prior to Admission: No oral intake  Vitamin/Mineral/Herbal Supplements: None  Food Allergies: no known food allergies  Factors Affecting Nutritional Intake: difficulty/impaired swallowing  Tube feeding/TPN Prior to Admission?: Tube feeding-yes    Physical Findings  Overall Physical Appearance:  (appears well-nourished)  Gastrointestinal: feeding tube  Last Bowel Movement: 03/30/25  Tubes: PEG  Skin:  (neck incision x 2)    [id number] Physical Appearance  Overall Physical Appearance:  (appears well-nourished)  Gastrointestinal: feeding tube  Last Bowel Movement: 03/30/25  Tubes: PEG  Skin:  (neck incision x 2)    Nutrition Order  Nutrition Order: meets nutritional requirements  Nutrition Order Comments: NPO/TF  Current TF/TPN " "Regimen: [employer] Peptide 1.5 425ml QID with 800ml total flush q day    Anthropometrics  Height: 185.4 cm (6' 1\")    Wt Readings from Last 3 Encounters:   04/02/25 92.1 kg (203 lb)   04/01/25 92.5 kg (204 lb)   03/11/25 91.6 kg (202 lb)       Weights (last 7 days)       Date/Time Weight    04/02/25 1215 92.1 kg (203 lb)    04/01/25 2030 90.6 kg (199 lb 12.8 oz)          Current Weight  Weight Method: Bed scale  Weight: 92.1 kg (203 lb)    Ideal Body Weight (IBW)  Ideal Body Weight (IBW) (kg): 84.86  % Ideal Body Weight: 106.8    Usual Body Weight (UBW)  Usual Body Weight: 92.1 kg (203 lb)  % Usual Body Weight: 100  % of Usual Body Weight Assessment: not applicable  Weight Loss:  (4# (1.9%) x 2 weeks)    Body Mass Index (BMI)  BMI (Calculated): 26.8  BMI Assessment: BMI 25-29.9: overweight    Labs/Procedures/Meds  Lab Results Reviewed: reviewed, pertinent  Lab Results Comments: 4/2 Na 133L, glucose 161H, H/H 12.3L/36.8L, POC: 144-207    CMP Results         04/02/25 04/01/25 03/31/25     0627       133 132       132    K 4.9 4.5 4.4       4.4    Cl 97 98 97    CO2 29 24 25    Glucose 161 -- --    BUN 18 15 18    Creatinine 0.6 0.51 0.46    Calcium 9.0 -- --    Anion Gap 7 -- --    EGFR >60.0 -- --           Comment for EGFR at 0627 on 04/02/25: Calculation based on the Chronic Kidney Disease Epidemiology Collaboration (CKD-EPI) equation refit without adjustment for race.          Lab Results   Component Value Date    ALT 43 03/11/2025    AST 55 (H) 03/11/2025    ALKPHOS 63 03/11/2025    BILITOT 0.6 03/11/2025     No results found for: \"EGPPSRAT89\"  Lab Results   Component Value Date    CALCIUM 9.0 04/02/2025     Lab Results   Component Value Date    WBC 9.54 04/02/2025    HGB 12.3 (L) 04/02/2025    HCT 36.8 (L) 04/02/2025    MCV 93.2 04/02/2025     04/02/2025     Lab Results   Component Value Date    FERRITIN 74 06/28/2023     Lab Results   Component Value Date    CHOL 119 05/11/2022     Lab Results " "  Component Value Date    HDL 33 (L) 05/11/2022     Lab Results   Component Value Date    LDLCALC 68 05/11/2022     Lab Results   Component Value Date    TRIG 91 05/11/2022     No results found for: \"CHOLHDL\"  Glucose Results    No lab values to display.        Latest Reference Range & Units 04/01/25 21:52 04/02/25 05:55 04/02/25 07:09 04/02/25 11:29   POCT Bedside Glucose 70 - 99 mg/dL 227 (H) 154 (H) 144 (H) 212 (H)   (H): Data is abnormally high     cefTRIAXone  2 g intravenous q12h DU    [START ON 4/9/2025] cefTRIAXone  2 g intravenous q24h INT    ezetimibe  10 mg peg tube Nightly    famotidine  20 mg peg tube Nightly    FLUoxetine  20 mg peg tube Daily    heparin (porcine)  5,000 Units subcutaneous q8h DU    insulin lispro U-100  1-12 Units subcutaneous q6h DU    [START ON 4/3/2025] lansoprazole  15 mg feeding tube Daily before breakfast    [START ON 4/3/2025] levothyroxine  150 mcg peg tube Daily (6:30a)    linaCLOtide  145 mcg g-tube Daily before lunch    liothyronine  10 mcg peg tube Daily (6:30a)    sodium chloride PF  10 mL intravenous q8h INT    sodium chloride  1 g peg tube BID     Medications  Pertinent Medications Reviewed: reviewed, pertinent  Pertinent Medications Comments: IV Rocephin, zetia, prozac, levothyroxine, linzess, liothyronine, NaCl tablets    Estimated/Assessed Needs  Additional Documentation: Calorie Requirements (Group), Fluid Requirements (Group), Protein Requirements (Group)    Calorie Requirements  Estimated kCal Needs: Actual Body Weight  Estimated Calorie Need Method: kcal/kg  Calorie/kg Recommended: 22-25  Calorie Recommendations: 6437-1759    Protein Requirements  Recommended Dosing Weight (Estimated Protein Needs): Actual Body Weight  Est Protein Requirement Amount (gms/kg):  (1.2-1.3)  Protein Recommendations: 110-120    Fluid Requirements  Fluid Recommendation (mL):  (1ml/kcal)  Fluid Requirements (mL/day): 2024-2300  Jama-Alana Method (over 20 kg): 3341.6    Tube " Feeding Assessment  Active Tube Feed Order: Yes  Delivery Method: Bolus per gravity  TF Protein Grams: 150 grams (120g from TF + 30g from [employer])  TF Total kCals: 2610 kcals (2550 from TF + 60 from [employer])  Projected Tube Feed Volume: 1700  TF Free Water: 1990 (1190+270=9292)  Tolerance: tolerating    PES  Statement: PES Statement  Nutrition Diagnosis: Inadequate Oral Intake  Related To:: swallowing difficulty  As Evidenced By:: Requires NPO with enteral nutrition support  Nutritional Needs Met?: Yes                                 Clinical Comments:     Patient is currently on his home TF regimen ([employer] Peptide 1.5 from home is being substituted for [employer] standard 1.4).  He reports losing 4# over the past 2 weeks (1.9% - not significant for the timeframe).  Patient is followed by RD at home and is tolerating current TF (has been on it for 4.5 years).  Na 133 this am, glucose 161.  He gets SSI for elevated POCs.      Goals:  TF will meet 100% of nutritional needs    Monitor and Evaluation:   TF tolerance, weight, labs and POCS    Discussed with: Patient, RN                              Date: 04/02/25  Signature: Lindsey Sim RD

## 2025-04-02 NOTE — CONSULTS
Psychiatry Consult      Celso Gramajo is a 69 y.o. male  Patient was admitted for comprehensive rehabilitation including PT/OT/Speech tx/Recr tx/nutition management. Referred by Rehabilitation Medicine Service for evaluation and treatment of psychiatric condition.     History of Present Medical Illness  69 y.o. 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  He had a PEG placed in 2021 for dysphagia.  Cervical spinal stenosis with myeloradiculopathy h/o cervical spinal surgery. He initially presented to [facility] on 3/11/25 with fevers and R shoulder pain. Workup was unrevealing and he was discharged home.  He presented to the [facility] ED again on 3/17 with ongoing fevers. CT Neck with no abscess, old postsurgical changes, BCX was collected. He d/c home but BCX subsequently returned positive on 3/19 and he returned for admission. Imaging with C6-7 OM / discitis with ventral epidural abscess. He was transferred to [facility] on 3/23 for surgical evaluation.  At that time, he was afebrile. BCx negative. MRI with C5-C7 cervical epidural abscess/osteo with cord signal at C6-7, T1-4, L4 vertebral body lesions probably radiation-changes per [facility] radiology, although metastatic lesions are not ruled out, per wife PET-CT scan no evidence of metastatic malignancy light up at spine region. He is now s/p posterior C4-T1 decompression and fusion on 3/27. Purulent fluid drained from anterior spine. OR Cx Strep intermedius.  ID rec IV dapto and ceftriaxone x 6 weeks.  PICC placed. He was admitted to [facility] on 4/1/25 for post op inpt acute rehab. He is TF NPO status.  Denies nausea, vomiting, abdominal pain or discomfort, dysuria, cough/sputum, running nose, sore throat, chest pain, palpitation, SOB or orthopnea, dizziness or LH,  HA.     Outside records reviewed. Pertinent radiology results reviewed. Pertinent lab  results reviewed..  Patient has despondency and anxiety in the context of his medical illness. Reports that energy is low, sleep is inconsistent, has no auditory or visual hallucinations and no suicidal ideation.  Is not agitated or irritable.  Seems interested and motivated to want to do well in his therapies.  Has no gross cognitive deficits.  No reported sundowning, agitation, impulsivity or significant lack of insight into his condition. On prozac 20 tolerated well prn ativan  Psychiatric History:      No significant Depressions / anxiety         No psychosis / jasiel ocd or otherwise  Substance Use History:  Substance use:   No history of substance abuse or dependence  Alcohol use social    Family History:   Family History   Problem Relation Name Age of Onset    No Known Problems Biological Father      Lung cancer Biological Sister         Social History:   Social History     Socioeconomic History    Marital status:    Tobacco Use    Smoking status: Never    Smokeless tobacco: Never   Vaping Use    Vaping status: Never Used   Substance and Sexual Activity    Alcohol use: No    Drug use: No    Sexual activity: Defer     Social Drivers of Health     Food Insecurity: No Food Insecurity (4/2/2025)    Hunger Vital Sign     Worried About Running Out of Food in the Last Year: Never true     Ran Out of Food in the Last Year: Never true   Transportation Needs: No Transportation Needs (4/2/2025)    PRAPARE - Transportation     Lack of Transportation (Medical): No     Lack of Transportation (Non-Medical): No   Housing Stability: Low Risk  (4/2/2025)    Housing Stability Vital Sign     Unable to Pay for Housing in the Last Year: No     Number of Times Moved in the Last Year: 0     Homeless in the Last Year: No       Medical History:   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 drug thru [facility]     Hx of head and neck radiation     Hypertension      Hypothyroidism     Stroke (CMS/HCC)     Thyroid disease        Surgical History:   Past Surgical History   Procedure Laterality Date    Cholecystectomy      Hernia repair      Micro direct laryngoscopy with fat implantation, abdominal fat harvest,laser N/A 11/23/2020    Performed by Jon German MD at [facility]    Neck dissection      Neck surgery      NV EGD INSERT GUIDE WIRE DILATOR PASSAGE ESOPHAGUS [31466 (CPT®)] N/A 10/17/2022    Performed by Ricardo Cabral MD at [facility]    Thyroid surgery      Tumor removal      L neck 20yrs ago        Allergies: No Known Allergies    MENTAL STATUS EXAM  Appearance: well groomed  Gait and Motor: no abnormal movements  Speech: normal rate/rhythm/volume  Mood: depressed and anxious  Affect: constricted  Associations: coherent  Thought Process: goal-directed  Thought Content: no auditory or visual hallucinations.  Suicidality/Homicidality: denies  Judgement/Insight: acknowledges illness  Orientation: situation  Memory: knows current president  Attention: distracted  Knowledge: normal  Language: normal      Current Medications:  Current Facility-Administered Medications   Medication Dose Route Frequency Provider Last Rate Last Admin    acetaminophen (TYLENOL) 650 mg/20.3 mL solution 650 mg  650 mg peg tube q6h PRN Rohit Acevedo MD        bisacodyL (DULCOLAX) 10 mg suppository 10 mg  10 mg rectal Daily PRN Rohit Acevedo MD        cefTRIAXone (ROCEPHIN) 2 g in sodium chloride 0.9 % 100 mL IVPB  2 g intravenous q12h [facility] Rohit Acevedo MD   Stopped at 04/02/25 1026    [START ON 4/9/2025] cefTRIAXone (ROCEPHIN) 2 g in sodium chloride 0.9 % 100 mL IVPB  2 g intravenous q24h INT Rohit Acevedo MD        ezetimibe (ZETIA) tablet 10 mg  10 mg peg tube Nightly Rohit Acevedo MD   10 mg at 04/01/25 2207    famotidine (PEPCID) tablet 20 mg  20 mg peg tube Nightly Rohit Acevedo MD   20 mg at 04/01/25 2207    FLUoxetine (PROzac) 20  mg/5 mL (4 mg/mL) solution 20 mg  20 mg peg tube Daily Rohit Acevedo MD   20 mg at 04/02/25 1129    glycopyrrolate (ROBINUL) tablet 1 mg  1 mg peg tube 2x daily PRN Rohit Acevedo MD        heparin (porcine) 5,000 unit/mL injection 5,000 Units  5,000 Units subcutaneous q8h [facility] Rohit Acevedo MD   5,000 Units at 04/02/25 1459    insulin lispro U-100 (HumaLOG) pen 1-12 Units  1-12 Units subcutaneous q6h [facility] Lilian Marrero MD   2 Units at 04/02/25 1151    [START ON 4/3/2025] lansoprazole (PREVACID) 3 mg/mL oral suspension 15 mg  15 mg feeding tube Daily before breakfast Lilian Marrero MD        [START ON 4/3/2025] levothyroxine (SYNTHROID) tablet 150 mcg  150 mcg peg tube Daily (6:30a) Lilian Marrero MD        linaCLOtide (LINZESS) capsule 145 mcg  145 mcg g-tube Daily before lunch Lilian Marrero MD        liothyronine (CYTOMEL) tablet 10 mcg  10 mcg peg tube Daily (6:30a) Rohit Acevedo MD   10 mcg at 04/02/25 0555    LORazepam (ATIVAN) tablet 0.5 mg  0.5 mg peg tube q8h PRN Rohit Acevedo MD        magnesium hydroxide (M.O.M.) 400 mg/5 mL suspension 30 mL  30 mL oral 2x daily PRN Lilian Marrero MD   30 mL at 04/02/25 1138    ondansetron ODT (ZOFRAN-ODT) disintegrating tablet 4 mg  4 mg peg tube q6h PRN Rohit Acevedo MD        oxyCODONE (ROXICODONE) immediate release tablet 10 mg  10 mg peg tube q4h PRN Rohit Acevedo MD   10 mg at 04/02/25 1458    oxyCODONE (ROXICODONE) immediate release tablet 5 mg  5 mg peg tube q4h PRN Rohit Acevedo MD   5 mg at 04/02/25 0857    senna (SENOKOT) tablet 1 tablet  1 tablet peg tube 2x daily PRN Rohit Acevedo MD        sodium chloride PF flush 10 mL  10 mL intravenous q8h INT Rohit Acevedo MD   10 mL at 04/02/25 1525    sodium chloride PF flush 10 mL  10 mL intravenous PRN Rohit Acevedo MD        sodium chloride tablet 1 g  1 g peg tube BID Rohit Acevedo MD   1 g at 04/02/25 0857       Home  Medications:   acetaminophen (TYLENOL) 650 mg/20.3 mL solution 650 mg  650 mg peg tube q6h PRN    bisacodyL (DULCOLAX) 10 mg suppository 10 mg  10 mg rectal Daily PRN    cefTRIAXone (ROCEPHIN) 2 g in sodium chloride 0.9 % 100 mL IVPB  2 g intravenous q12h [facility]    [START ON 4/9/2025] cefTRIAXone (ROCEPHIN) 2 g in sodium chloride 0.9 % 100 mL IVPB  2 g intravenous q24h INT    ezetimibe (ZETIA) tablet 10 mg  10 mg peg tube Nightly    famotidine (PEPCID) tablet 20 mg  20 mg peg tube Nightly    FLUoxetine (PROzac) 20 mg/5 mL (4 mg/mL) solution 20 mg  20 mg peg tube Daily    glycopyrrolate (ROBINUL) tablet 1 mg  1 mg peg tube 2x daily PRN    heparin (porcine) 5,000 unit/mL injection 5,000 Units  5,000 Units subcutaneous q8h [facility]    insulin lispro U-100 (HumaLOG) pen 1-12 Units  1-12 Units subcutaneous q6h [facility]    [START ON 4/3/2025] lansoprazole (PREVACID) 3 mg/mL oral suspension 15 mg  15 mg feeding tube Daily before breakfast    [START ON 4/3/2025] levothyroxine (SYNTHROID) tablet 150 mcg  150 mcg peg tube Daily (6:30a)    linaCLOtide (LINZESS) capsule 145 mcg  145 mcg g-tube Daily before lunch    liothyronine (CYTOMEL) tablet 10 mcg  10 mcg peg tube Daily (6:30a)    LORazepam (ATIVAN) tablet 0.5 mg  0.5 mg peg tube q8h PRN    magnesium hydroxide (M.O.M.) 400 mg/5 mL suspension 30 mL  30 mL oral 2x daily PRN    ondansetron ODT (ZOFRAN-ODT) disintegrating tablet 4 mg  4 mg peg tube q6h PRN    oxyCODONE (ROXICODONE) immediate release tablet 10 mg  10 mg peg tube q4h PRN    oxyCODONE (ROXICODONE) immediate release tablet 5 mg  5 mg peg tube q4h PRN    senna (SENOKOT) tablet 1 tablet  1 tablet peg tube 2x daily PRN    sodium chloride PF flush 10 mL  10 mL intravenous q8h INT    sodium chloride PF flush 10 mL  10 mL intravenous PRN    sodium chloride tablet 1 g  1 g peg tube BID       Objective     Vital Signs for the last 24 hours:  Temp:  [37.1 °C (98.8 °F)-37.2 °C (98.9 °F)] 37.2 °C (98.9 °F)  Heart Rate:  []  100  Resp:  [18-20] 18  BP: (107-127)/(59-71) 127/59    Labs  Lab Results   Component Value Date    WBC 9.54 04/02/2025    HGB 12.3 (L) 04/02/2025    HCT 36.8 (L) 04/02/2025    MCV 93.2 04/02/2025     04/02/2025     Lab Results   Component Value Date    GLUCOSE 161 (H) 04/02/2025    CALCIUM 9.0 04/02/2025     (L) 04/02/2025    K 4.9 04/02/2025    CO2 29 04/02/2025    CL 97 (L) 04/02/2025    BUN 18 04/02/2025    CREATININE 0.6 (L) 04/02/2025       Imaging  [unfilled]      69 y.o. male being consulted for management recommendations and patient co-management       Plan     Patient Active Problem List   Diagnosis    Vertigo    Hypertension    Postsurgical hypothyroidism    Mixed hyperlipidemia    Gastroesophageal reflux disease    Increased sputum production    Allergic rhinitis    Change in bowel habits    Chronic fatigue syndrome    Constipation, chronic    Deviated nasal septum    Elevated coronary artery calcium score    Eustachian tube dysfunction, bilateral    Examination of participant in clinical trial    Induration penis plastica    Hypertrophy of nasal turbinates    Hx of colonic polyps    Malignant neoplasm of salivary gland (CMS/HCC)    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)    Paralysis of vocal cords and larynx, unilateral    ENMANUEL (obstructive sleep apnea)    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)    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)    Penile curvature, acquired    Rhinitis sicca    Thyroid cancer (CMS/HCC)    Sensorineural hearing loss (SNHL), bilateral    Other dysphagia    Abdominal pain    Other dysphagia    COVID-19    Encounter for follow-up surveillance of thyroid cancer    Spinal epidural abscess    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         Assessment/Plan    Depression: CBT automatic anxious and negative thoughts  Adjustment Disorder to Disability: supportive therapy  Sleep:monitor  Insight into illness: insight  therapy/psychoeducation  Psychotherapy: supportive  Monitor: Mood, Speech, Appetite, Energy, Cognition, Behavioral, Impulsivity, Agitation  Family Support  Medications:  prozac 20  Prn ativan  Cbt  Educate on meds  Follow sodium on prozac - 133    Trey Meyer MD  4/2/2025  6:01 PM

## 2025-04-02 NOTE — PLAN OF CARE
Care Coordination Admission Assessment Note    General Information:  Readmission Within the last 30 days: unable to assess  Does patient have a : No  Patient-Specific Goals (include timeframe): to be able to do steps and be independent    Living Arrangements:  Arrived From: hospital  Current Living Arrangements: home  People in Home: spouse  Home Accessibility: stairs to enter home (Group), stairs within home (Group)  Living Arrangement Comments: pt lives w spouse in 2SH w 2-3 FELICITA through garage, MS 1st floor and FF to 2nd floor where full bathroom and bedroom are. pts spouse works full time    Housing Stability and Utility Access (SDOH):  In the last 12 months, was there a time when you were not able to pay the mortgage or rent on time?: No  In the past 12 months, how many times have you moved?: 0  At any time in the past 12 months, were you homeless or living in a shelter (including now)?: No  In the past 12 months has the electric, gas, oil, or water company threatened to shut off services in your home?: No    Functional Status Prior to Admission:   Assistive Device/Animal Currently Used at Home: none  Functional Status Comments: independent  IADL Comments: independent     Supports and Services:  Current Outpatient/Agency/Support Group: none  Type of Current Home Care Services: other (comment) (n/a)  History of home care episode or rehab stay: no hx of HC Or SNF    Discharge Needs Assessment:   Concerns to be Addressed: caregiver training, care coordination/care conferences, discharge planning, adjustment to diagnosis/illness  Current Discharge Risk: physical impairment  Anticipated Changes Related to Illness: inability to care for self    Patient/Family Anticipated Discharge Plan:  Patient/Family Anticipates Transition To: home with family, home with help/services  Patient/Family Anticipated Services at Transition: home health care, rehabilitation services    Connection to Community  Not  applicable    Patient Choice:   Offered/Gave Vendor List: yes (TBD)  Patient and/or patient guardian/advocate was made aware of their right to choose a provider. A list of eligible providers was presented and reviewed with the patient and/or patient guardian/advocate in written and/or verbal form. The list delineates providers in the patients desired geographic area who are participating in the Medicare program and/or providers contracted with the patients primary insurance. The Medicare list and quality ratings were obtained from the Medicare.gov [medicare.gov] website.    Anticipated Discharge Plan:  Met with patient. Provided education and contact information for Care Coordination services.: yes  Anticipated Discharge Disposition: home with home health, home with assistance  Type of Home Care Services: home PT, home OT, nursing    Transportation Needs (SDOH):  Transportation Concerns: none  Transportation Anticipated: family or friend will provide  Is Out of Hospital DNR needed at discharge?: no    In the past 12 months, has lack of transportation kept you from medical appointments or from getting medications?: No  In the past 12 months, has lack of transportation kept you from meetings, work, or from getting things needed for daily living?: No    Concerns - comments: CM met w pt and shared contact info. CM called pts spouse Yue and left message with contact info. pts first team 4/3

## 2025-04-02 NOTE — PLAN OF CARE
Problem: Enteral Nutrition  Goal: Feeding Tolerance  Outcome: Progressing   Nutrition Interventions/ Recommendations:   Continue NPO with TF: Bolus 425ml [email or web address] Peptide 1.5 (Patient's own substituted for [employer] 1.4 Standard) QID (@7:30, 10:30, 14:30 and 17:30) with 500ml H2O flush in am after levothyroxine is administered and 300ml H2O flush at HS (provides 2610 kcals, 150g protein - this is his home regimen)  Nutrition department will attempt to order [employer] Peptide 1.5, but this is not on formulary at any of [facility] hospitals - if unable to obtain, family will need to provide it or we could substitute [employer] 1.4 Standard  Obtain weekly weight and update in Epic  At nutrition risk level 2

## 2025-04-02 NOTE — NURSING NOTE
Patient admitted about 2030 from [facility] to Rm 101W. AAOx 3. Patient's wife here at time of admission. Wife spoke to Supervisor Nona about her concerns about patient's nutrition and requesting a specialty bed. Supervisor left the room to make these arrangements. Dr Acevedo also in to speak with patient and his wife. When nurse returned to room, patient's wife reported she administered 300ml of water through the patient's gastrosotomy tube and was instilling his own tube feeding with the pump she brought from home. The supervisor was made aware and explained to patient and his wife that we needed to use our own equipment here and after the doctor writes admitting orders. Patient's wife has written instructions on times and amounts she would like her  to be given his nutrition and water flushes. Any, educator, was also in to speak with patient and his wife about the gastrostomy button with extender tubing and understand their routine at home. It was explained that our tube feeding pump can be used with his extender tubing and [employer] equipment is used here. Patient's wife and patient was agreeable to this. She will be in to visit tomorrow after 9am to followup with her concerns. Specialty bed has arrived to floor but patient is asleep at the present time.

## 2025-04-02 NOTE — PLAN OF CARE
"  Problem: Rehabilitation (IR[facility]) Plan of Care  Goal: Plan of Care Review  Flowsheets (Taken 4/2/2025 0958)  Progress: improving  Outcome Evaluation: PT IE completed. Discussed goals, pt agreeable.  Plan of Care Reviewed With: patient     Problem: Rehabilitation (IRF) Plan of Care  Goal: Patient-Specific Goal (Individualized)  Flowsheets (Taken 4/2/2025 0948)  Patient/Family-Specific Goals (Include Timeframe): \"To be able to walk and move my hands\"     "

## 2025-04-02 NOTE — TELEPHONE ENCOUNTER
Rn discussed with Dr Gross. Per Dr Gross:  Ask him to continue Levoxyl 150 mcg per day as prescribed at [facility]. Call us in 4 weeks for repeat thyroid lab work.     Rn called pt and left voicemail with this information.

## 2025-04-02 NOTE — CONSULTS
Consult Note    Reason For Referral: Medical comanagements  Referring Provider: Spencer Castillo MD  Outside records reviewed.    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  He had a PEG placed in 2021 for dysphagia.  Cervical spinal stenosis with myeloradiculopathy h/o cervical spinal surgery. He initially presented to [place] ED on 3/11/25 with fevers and R shoulder pain. Workup was unrevealing and he was discharged home.  He presented to the [place] ED again on 3/17 with ongoing fevers. CT Neck with no abscess, old postsurgical changes, BCX was collected. He d/c home but BCX subsequently returned positive on 3/19 and he returned for admission. Imaging with C6-7 OM / discitis with ventral epidural abscess. He was transferred to [facility] on 3/23 for surgical evaluation.  At that time, he was afebrile. BCx negative. MRI with C5-C7 cervical epidural abscess/osteo with cord signal at C6-7, T1-4, L4 vertebral body lesions probably radiation-changes per [facility] radiology, although metastatic lesions are not ruled out, per wife PET-CT scan no evidence of metastatic malignancy light up at spine region. He is now s/p posterior C4-T1 decompression and fusion on 3/27. Purulent fluid drained from anterior spine. OR Cx Strep intermedius.  ID rec IV dapto and ceftriaxone x 6 weeks.  PICC placed. He was admitted to [facility] on 4/1/25 for post op inpt acute rehab. He is TF NPO status.  Denies nausea, vomiting, abdominal pain or discomfort, dysuria, cough/sputum, running nose, sore throat, chest pain, palpitation, SOB or orthopnea, dizziness or LH,  HA.    Tolerating  Diet:NPO/TF      Allergies:    No Known Allergies    Current medication list:  Current Facility-Administered Medications   Medication Dose Route Frequency Provider Last Rate Last Admin    acetaminophen (TYLENOL) 650 mg/20.3 mL solution 650 mg  650 mg peg tube q6h PRN Rohit Acevedo MD        bisacodyL (DULCOLAX) 10 mg suppository 10 mg  10 mg rectal Daily PRN Rohit Acevedo MD        cefTRIAXone (ROCEPHIN) 2 g in sodium chloride 0.9 % 100 mL IVPB  2 g intravenous q12h [facility] Rohit Aceevdo MD   Stopped at 04/02/25 1026    [START ON 4/9/2025] cefTRIAXone (ROCEPHIN) 2 g in sodium chloride 0.9 % 100 mL IVPB  2 g intravenous q24h INT Rohit Acevedo MD        ezetimibe (ZETIA) tablet 10 mg  10 mg peg tube Nightly Rohit Acevedo MD   10 mg at 04/01/25 2207    famotidine (PEPCID) tablet 20 mg  20 mg peg tube Nightly Rohit Acevedo MD   20 mg at 04/01/25 2207    FLUoxetine (PROzac) 20 mg/5 mL (4 mg/mL) solution 20 mg  20 mg peg tube Daily Rohit Acevedo MD   20 mg at 04/02/25 1129    glycopyrrolate (ROBINUL) tablet 1 mg  1 mg peg tube 2x daily PRN Rohit Acevedo MD        heparin (porcine) 5,000 unit/mL injection 5,000 Units  5,000 Units subcutaneous q8h [facility] Rohit Acevedo MD   5,000 Units at 04/02/25 0555    insulin lispro U-100 (HumaLOG) pen 1-12 Units  1-12 Units subcutaneous q6h [facility] Lilian Marrero MD        [START ON 4/3/2025] lansoprazole (PREVACID) 3 mg/mL oral suspension 15 mg  15 mg feeding tube Daily before breakfast Lilian Marrero MD        [START ON 4/3/2025] levothyroxine (SYNTHROID) tablet 150 mcg  150 mcg peg tube Daily (6:30a) Lilian Marrero MD        linaCLOtide (LINZESS) capsule 145 mcg  145 mcg g-tube Daily before lunch Lilian Marrero MD        liothyronine (CYTOMEL) tablet 10 mcg  10 mcg peg tube Daily (6:30a) Rohit Acevedo MD   10 mcg at 04/02/25 0555    LORazepam (ATIVAN) tablet 0.5 mg  0.5 mg peg tube q8h PRN Rohit Acevedo MD         magnesium hydroxide (M.O.M.) 400 mg/5 mL suspension 30 mL  30 mL oral 2x daily PRN Lilian Marrero MD   30 mL at 04/02/25 1138    ondansetron ODT (ZOFRAN-ODT) disintegrating tablet 4 mg  4 mg peg tube q6h PRN Rohit Acevedo MD        oxyCODONE (ROXICODONE) immediate release tablet 10 mg  10 mg peg tube q4h PRN Rohit Acevedo MD   10 mg at 04/01/25 2207    oxyCODONE (ROXICODONE) immediate release tablet 5 mg  5 mg peg tube q4h PRN Rohit Acevedo MD   5 mg at 04/02/25 0857    senna (SENOKOT) tablet 1 tablet  1 tablet peg tube 2x daily PRN Rohit Acevedo MD        sodium chloride PF flush 10 mL  10 mL intravenous q8h INT Rohit Acevedo MD   10 mL at 04/02/25 0621    sodium chloride PF flush 10 mL  10 mL intravenous PRN Rohit Acevedo MD        sodium chloride tablet 1 g  1 g peg tube BID Rohit Acevedo MD   1 g at 04/02/25 0857       Past Medical History:   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 drug thru [facility]     Hx of head and neck radiation     Hypertension     Hypothyroidism     Stroke (CMS/HCC)     Thyroid disease        Past Surgical History:   Past Surgical History   Procedure Laterality Date    Cholecystectomy      Hernia repair      Micro direct laryngoscopy with fat implantation, abdominal fat harvest,laser N/A 11/23/2020    Performed by Jon German MD at [facility]    Neck dissection      Neck surgery      RI EGD INSERT GUIDE WIRE DILATOR PASSAGE ESOPHAGUS [67854 (CPT®)] N/A 10/17/2022    Performed by Ricardo Cabral MD at [facility]    Thyroid surgery      Tumor removal      L neck 20yrs ago        Social History:   Social History     Socioeconomic History    Marital status:      Spouse name: Not on file    Number of children: Not on file    Years of education: Not on file    Highest education level: Not on file   Occupational History    Not on file   Tobacco Use  "   Smoking status: Never    Smokeless tobacco: Never   Vaping Use    Vaping status: Never Used   Substance and Sexual Activity    Alcohol use: No    Drug use: No    Sexual activity: Defer   Other Topics Concern    Not on file   Social History Narrative    Not on file     Social Drivers of Health     Financial Resource Strain: Not on file   Food Insecurity: No Food Insecurity (4/2/2025)    Hunger Vital Sign     Worried About Running Out of Food in the Last Year: Never true     Ran Out of Food in the Last Year: Never true   Transportation Needs: Not on file   Physical Activity: Not on file   Stress: Not on file   Social Connections: Not on file   Intimate Partner Violence: Not on file   Housing Stability: Not on file       Family History:   Family History   Problem Relation Name Age of Onset    No Known Problems Biological Father      Lung cancer Biological Sister         ROS  Complete Review of Systems:  All other systems reviewed and not remarkable except as noted in the HPI.    Vital signs:  Visit Vitals  /71 (BP Location: Left upper arm, Patient Position: Lying)   Pulse 91   Temp 37.1 °C (98.8 °F) (Oral)   Resp 18   Ht 1.854 m (6' 1\")   Wt 90.6 kg (199 lb 12.8 oz)   SpO2 93%   BMI 26.36 kg/m²       Physical Examination:  Head/Ear/Nose/Throat: normocephalic; atraumatic; moisture mouth mm, no oropharyngeal thrush noted.   Eyes: anicteric sclera, EOMI; PERRL.   Neck : supple, no JVD, no carotid bruits appeciated.   Respiratory: no evidence of labored breathing, lung sounds CTA b/l, good aeration bibasilar area, no w/r/c.   Cardiovascular: RRR; normal S1, S2; no m/r/g; no S3 or S4.   Gastrointestinal: PEG in place, focal c/d/I; soft; NT; BS normal; mildly distended; no CVAT b/l.   Genitourinary: no song.   Extremities : no c/c/e .   Neurological: AO x 3, fluent speeches, following commands, CNS II-XII grossly intact; no focal neurologic deficits.   Behavior/Emotional: in NAD, appropriate; cooperative.   Skin: " clean, dry and intact.    Labs:  Lab Results   Component Value Date    WBC 9.54 04/02/2025    HGB 12.3 (L) 04/02/2025    HCT 36.8 (L) 04/02/2025    MCV 93.2 04/02/2025     04/02/2025     Lab Results   Component Value Date    GLUCOSE 161 (H) 04/02/2025    CALCIUM 9.0 04/02/2025     (L) 04/02/2025    K 4.9 04/02/2025    CO2 29 04/02/2025    CL 97 (L) 04/02/2025    BUN 18 04/02/2025    CREATININE 0.6 (L) 04/02/2025       Imaging  OSH imaging study reports reviewed      Assessment   CC:C6-7 OM / discitis with ventral epidural abscess, s/p posterior C4-T1 decompression and fusion; h/o thyroid CA s/p thyroidectomy; H/o esophageal SCC s/p CRT c/b esophageal strictures; dysphagia, NPO with chronic PEG TF    69 y.o. 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  He had a PEG placed in 2021 for dysphagia.  Cervical spinal stenosis with myeloradiculopathy h/o cervical spinal surgery. He initially presented to [place] ED on 3/11/25 with fevers and R shoulder pain. Workup was unrevealing and he was discharged home.  He presented to the [place] ED again on 3/17 with ongoing fevers. CT Neck with no abscess, old postsurgical changes, BCX was collected. He d/c home but BCX subsequently returned positive on 3/19 and he returned for admission. Imaging with C6-7 OM / discitis with ventral epidural abscess. He was transferred to [facility] on 3/23 for surgical evaluation.  At that time, he was afebrile. BCx negative. MRI with C5-C7 cervical epidural abscess/osteo with cord signal at C6-7, T1-4, L4 vertebral body lesions probably radiation-changes per [facility] radiology, although metastatic lesions are not ruled out, per wife PET-CT scan no evidence of metastatic malignancy light up at spine region. He is now s/p posterior C4-T1 decompression and fusion on 3/27. Purulent fluid drained from anterior spine. OR Cx  Strep intermedius.  ID rec IV dapto and ceftriaxone x 6 weeks.  PICC placed. He was admitted to [facility] on 4/1/25 for post op inpt acute rehab. He is TF NPO status.    A/P:  # Strep intermedius bacteremia with C6-7 OM / discitis and ventral epidural abscess, s/p PCDF 3/27/25, purulent fluid drainage from anterior spine  Suspect source likely recent esophageal dilatations ; 3/17 BCx + at OSH; TTE negative for veg at OSH  -complete planned 6-week iv Rocephin course, last dose of daptomycin on 3/31/25,  wound care dermal defense, f/u with [facility] ID as outpt, weekly labs.    # H/o thyroid CA s/p thyroidectomy and RND, adjuvant XRT/immunotherapy, esophageal SCC s/p CRT c/b esophageal strictures, L VC paralysis  -cancer surveillance f/u with [facility]  medical/ radiation oncology;  -palliative consultation.     # Dysphagia, NPO/TF, IBS-C, GI prophylaxis  -cont PEG TF, dietitian co management for nutrition supplements, Pepcid/PPI for GI prophylaxis      # Hypothyroidism   -per his endo, increase levothyroxine dose from 137-150 mcg daily.    # DVT prophylaxis  -SQH    # Anxiety  -on Prozac, psychology CBT, psychiatrist for co management     # Hyperglycemia, previous prediabetes  -diet modification per dietitian, SSI with accu checks      Billing code: [id number]  Diagnoses:  Patient Active Problem List   Diagnosis    Vertigo    Hypertension    Postsurgical hypothyroidism    Mixed hyperlipidemia    Gastroesophageal reflux disease    Increased sputum production    Allergic rhinitis    Change in bowel habits    Chronic fatigue syndrome    Constipation, chronic    Deviated nasal septum    Elevated coronary artery calcium score    Eustachian tube dysfunction, bilateral    Examination of participant in clinical trial    Induration penis plastica    Hypertrophy of nasal turbinates    Hx of colonic polyps    Malignant neoplasm of salivary gland (CMS/HCC)    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)    Paralysis of vocal cords and  larynx, unilateral    ENMANUEL (obstructive sleep apnea)    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)    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)    Penile curvature, acquired    Rhinitis sicca    Thyroid cancer (CMS/HCC)    Sensorineural hearing loss (SNHL), bilateral    Other dysphagia    Abdominal pain    Other dysphagia    COVID-19    Encounter for follow-up surveillance of thyroid cancer    Spinal epidural abscess           Lilian Marrero MD  4/2/2025

## 2025-04-02 NOTE — NURSING NOTE
During admission, the patient had answered yes to the question have you wished you were dead or go to sleep and not wake up. Patient verbalizes he would never do anything to hurt himself but feels depressed and frustrated with his health at this time. Dr Acevedo made aware of the concern according to the suicide assessment policy. No further orders at this time.

## 2025-04-02 NOTE — PLAN OF CARE
Plan of Care Review  Plan of Care Reviewed With: patient admitted from [facility] this evening. AAOX3 with wife at bedside. Oriented to room and therapy routine. Turns side to side with minimal assistance. Saxman collar on AAT. Pain managed with prn oxycodone. Right arm picc for iv antibiotics. NPO maintained. Gastrostomy tube button in upper abdomen for meds and tube feedings. Flushes without difficulty. Patient c/o feeling full after flush and feeding administration. HOB elevated >30 degrees. Continent of urine utilizing urinal. PVR 112ml. LBM 3/30. Productive cough utilizing yankower for thin clear/white sputum. Patient and his wife's questions and concerns addressed with supervisor and educator regarding nutritional needs and special bed. Call bell in reach.

## 2025-04-02 NOTE — PROGRESS NOTES
"   Internal Medicine -   [facility] Note       TRANSFER INTAKE NOTE   This patient is a 69 y.o. yo male presenting tonight from [facility] for acute rehabilitation.    The full H+P will be done by the primary team.     Per [facility] DC Summary    \"  Celso Gramajo is a 69 year old male with PMHx thyroid CA s/p thyroidectomy, RND, adjuvant XRT/immunotherapy, esophageal SCC s/p chemoradiation c/b esophageal strictures, HTN, Elevated Coronary Calcium scoring in 2017, ENMANUEL, Hypothyroidism, Anemia, dysphagia on PEG TFs, and history of CVA who presented to an OSH with neck and shoulder pain. He was also bacteremic with strep viridans, and placed on IV antibiotics. Workup revealed a C5-C7 epidural abscess with osteo, and he was transferred to [facility] for surgical evaluation.    At [facility] ortho and ENT were consulted. Ultimately it was determined that an anterior surgical approach was not safe (due to history of neck surgeries), and patient was having progressive weakness. Repeat MRI C-spine showed Discitis and osteomyelitis at C6-C7 with ventral epidural abscess compressing the spinal cord and increased edema. Patient urgently underwent POSTERIOR C4-T1 DECOMPRESSION FUSION WITH Y[place]ON INSTRUMENTATION on 3/27. Postop he was briefly in the ICU for MAP goals. OR cultures grew strep intermedius and he was placed on the antibiotic plan below. He had a PICC placed 4/1. 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 He was started on salt tabs. In rehab please continue to check labs, and titrate the salt tabs as appropriate. He worked with PT/OT who recommended him for acute rehab.     While hospitalized patient requested thyroid studies. His synthroid was increased to 150mcg on 4/2. He should follow up outpatient with his PCP for continued titration.       \"    The patient denies headache, chest pain or pressure, dyspnea, cough, abdominal pain, nausea, vomiting, or other symptoms.     VS as per EMR. " HEENT/NECK NCAT; C-collar on. CV RRR no m/r. RESP CTAB no r/r/w. ABD s/nd/nt NABS; PEG noted - c/d/i. EXT no c/c; tr edema b/l; RUE PICC noted c/d/i. Skin warm dry. NEURO stable from xfer records.     Medications reconciled. Diet ordered. BMR order set utilized. Case d/w patient and nursing.     Full plan in AM per primary team.   Patient is in STABLE condition tonight.     Rohit Acevedo MD

## 2025-04-02 NOTE — HOSPITAL COURSE
History of Present Illness  Celso is a 69 y.o. male admitted on 4/1/2025 with Spinal epidural abscess [G06.1]. Principal problem is Spinal epidural abscess.    Celso Gramajo is a 69 year old male with PMHx thyroid CA s/p thyroidectomy, RND, adjuvant XRT/immunotherapy, esophageal SCC s/p chemoradiation c/b esophageal strictures, HTN, Elevated Coronary Calcium scoring in 2017, ENMANUEL, Hypothyroidism, Anemia, dysphagia on PEG (placed in 2021) TFs, and history of CVA who presented to an OSH with neck and R shoulder pain. Workup was unrevealing and he was discharged home.    He presented to the [place] ED again on 3/17 with ongoing fevers. CT Neck with no abscess, old postsurgical changes. He d/c home but BCx subsequently returned positive on 3/19 and he returned for admission. He was also bacteremic with strep viridans, and placed on IV antibiotics. Workup revealed a C5-C7 ventral epidural abscess with osteomyelitis, and he was transferred to [facility] for surgical evaluation.    MRI on 3/24 c/f metastatic disease at T1-T4, L4.      At [facility] ortho and ENT were consulted. Ultimately it was determined that an anterior surgical approach was not safe (due to history of neck surgeries), and patient was having progressive weakness. Repeat MRI C-spine showed Discitis and osteomyelitis at C6-C7 with ventral epidural abscess compressing the spinal cord and increased edema. Patient urgently underwent POSTERIOR C4-T1 DECOMPRESSION FUSION WITH YUKON INSTRUMENTATION on 3/27. Postop he was briefly in the ICU for MAP goals. OR cultures grew strep intermedius and he was placed on the antibiotic plan below. He had a PICC placed 4/1. 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 He was started on salt tabs. In rehab please continue to check labs, and titrate the salt tabs as appropriate. He worked with PT/OT who recommended him for acute rehab.     Past Medical History  Celso has a past medical history of  Anemia, Cancer (CMS/HCC), head and neck radiation, Hypertension, Hypothyroidism, Stroke (CMS/HCC), and Thyroid disease.

## 2025-04-03 ENCOUNTER — APPOINTMENT (INPATIENT)
Dept: PHYSICAL THERAPY | Facility: REHABILITATION | Age: 70
DRG: 559 | End: 2025-04-03
Payer: MEDICARE

## 2025-04-03 ENCOUNTER — APPOINTMENT (INPATIENT)
Dept: RADIOLOGY | Facility: REHABILITATION | Age: 70
DRG: 559 | End: 2025-04-03
Attending: INTERNAL MEDICINE
Payer: MEDICARE

## 2025-04-03 ENCOUNTER — APPOINTMENT (INPATIENT)
Dept: CARDIOLOGY | Facility: REHABILITATION | Age: 70
DRG: 559 | End: 2025-04-03
Attending: PHYSICAL MEDICINE & REHABILITATION
Payer: MEDICARE

## 2025-04-03 ENCOUNTER — APPOINTMENT (INPATIENT)
Dept: OCCUPATIONAL THERAPY | Facility: REHABILITATION | Age: 70
DRG: 559 | End: 2025-04-03
Payer: MEDICARE

## 2025-04-03 ENCOUNTER — APPOINTMENT (OUTPATIENT)
Dept: PSYCHOLOGY | Facility: CLINIC | Age: 70
End: 2025-04-03
Attending: PHYSICAL MEDICINE & REHABILITATION
Payer: MEDICARE

## 2025-04-03 LAB
BACTERIA URNS QL MICRO: ABNORMAL /HPF
BILIRUB UR QL STRIP.AUTO: NEGATIVE MG/DL
CLARITY UR REFRACT.AUTO: CLEAR
COLOR UR AUTO: YELLOW
GLUCOSE BLD-MCNC: 153 MG/DL (ref 70–99)
GLUCOSE BLD-MCNC: 170 MG/DL (ref 70–99)
GLUCOSE BLD-MCNC: 188 MG/DL (ref 70–99)
GLUCOSE BLD-MCNC: 207 MG/DL (ref 70–99)
GLUCOSE BLD-MCNC: 236 MG/DL (ref 70–99)
GLUCOSE UR STRIP.AUTO-MCNC: ABNORMAL MG/DL
HGB UR QL STRIP.AUTO: NEGATIVE
HYALINE CASTS #/AREA URNS LPF: ABNORMAL /LPF
KETONES UR STRIP.AUTO-MCNC: NEGATIVE MG/DL
LEUKOCYTE ESTERASE UR QL STRIP.AUTO: NEGATIVE
MUCOUS THREADS URNS QL MICRO: ABNORMAL /LPF
NITRITE UR QL STRIP.AUTO: NEGATIVE
PH UR STRIP.AUTO: 6.5 [PH]
POCT TEST: ABNORMAL
PROT UR QL STRIP.AUTO: ABNORMAL
RBC #/AREA URNS HPF: ABNORMAL /HPF
SP GR UR REFRACT.AUTO: 1.02
SQUAMOUS URNS QL MICRO: ABNORMAL /HPF
UROBILINOGEN UR STRIP-ACNC: 0.2 EU/DL
WBC #/AREA URNS HPF: ABNORMAL /HPF

## 2025-04-03 PROCEDURE — 25800000 HC PHARMACY IV SOLUTIONS: Performed by: INTERNAL MEDICINE

## 2025-04-03 PROCEDURE — 97535 SELF CARE MNGMENT TRAINING: CPT | Mod: GO | Performed by: OCCUPATIONAL THERAPIST

## 2025-04-03 PROCEDURE — 81001 URINALYSIS AUTO W/SCOPE: CPT

## 2025-04-03 PROCEDURE — 74022 RADEX COMPL AQT ABD SERIES: CPT

## 2025-04-03 PROCEDURE — 97530 THERAPEUTIC ACTIVITIES: CPT | Mod: GP,CQ

## 2025-04-03 PROCEDURE — 25000000 HC PHARMACY GENERAL: Performed by: INTERNAL MEDICINE

## 2025-04-03 PROCEDURE — 63700000 HC SELF-ADMINISTRABLE DRUG: Performed by: INTERNAL MEDICINE

## 2025-04-03 PROCEDURE — 63700000 HC SELF-ADMINISTRABLE DRUG: Performed by: PHYSICAL MEDICINE & REHABILITATION

## 2025-04-03 PROCEDURE — 97110 THERAPEUTIC EXERCISES: CPT | Mod: GP,CQ

## 2025-04-03 PROCEDURE — 97116 GAIT TRAINING THERAPY: CPT | Mod: GP,CQ

## 2025-04-03 PROCEDURE — 63600000 HC DRUGS/DETAIL CODE: Performed by: INTERNAL MEDICINE

## 2025-04-03 PROCEDURE — 90791 PSYCH DIAGNOSTIC EVALUATION: CPT | Performed by: PSYCHOLOGIST

## 2025-04-03 PROCEDURE — 93970 EXTREMITY STUDY: CPT | Mod: 50

## 2025-04-03 PROCEDURE — 12800001 HC ROOM AND CARE SEMIPRIVATE REHAB-BRAIN INJ

## 2025-04-03 RX ORDER — HYDROMORPHONE HYDROCHLORIDE 2 MG/1
2 TABLET ORAL EVERY 6 HOURS PRN
Refills: 0 | Status: DISCONTINUED | OUTPATIENT
Start: 2025-04-03 | End: 2025-04-19 | Stop reason: HOSPADM

## 2025-04-03 RX ORDER — SODIUM CHLORIDE 9 MG/ML
INJECTION, SOLUTION INTRAVENOUS CONTINUOUS
Status: DISCONTINUED | OUTPATIENT
Start: 2025-04-03 | End: 2025-04-03

## 2025-04-03 RX ORDER — SIMETHICONE 80 MG
80 TABLET,CHEWABLE ORAL 4 TIMES DAILY
Status: DISCONTINUED | OUTPATIENT
Start: 2025-04-03 | End: 2025-04-03

## 2025-04-03 RX ORDER — SIMETHICONE 80 MG
160 TABLET,CHEWABLE ORAL 4 TIMES DAILY
Status: DISCONTINUED | OUTPATIENT
Start: 2025-04-03 | End: 2025-04-19 | Stop reason: HOSPADM

## 2025-04-03 RX ORDER — SENNOSIDES 8.6 MG/1
2 TABLET ORAL DAILY
Status: DISCONTINUED | OUTPATIENT
Start: 2025-04-03 | End: 2025-04-07

## 2025-04-03 RX ORDER — HYDROMORPHONE HYDROCHLORIDE 2 MG/1
2 TABLET ORAL EVERY 6 HOURS PRN
Refills: 0 | Status: DISCONTINUED | OUTPATIENT
Start: 2025-04-03 | End: 2025-04-03

## 2025-04-03 RX ORDER — DOCUSATE SODIUM 50 MG/5ML
100 LIQUID ORAL 2 TIMES DAILY
Status: DISCONTINUED | OUTPATIENT
Start: 2025-04-03 | End: 2025-04-19 | Stop reason: HOSPADM

## 2025-04-03 RX ADMIN — HEPARIN SODIUM 5000 UNITS: 5000 INJECTION, SOLUTION INTRAVENOUS; SUBCUTANEOUS at 05:38

## 2025-04-03 RX ADMIN — Medication 10 ML: at 21:19

## 2025-04-03 RX ADMIN — LANSOPRAZOLE 15 MG: KIT at 08:37

## 2025-04-03 RX ADMIN — EZETIMIBE 10 MG: 10 TABLET ORAL at 20:44

## 2025-04-03 RX ADMIN — FAMOTIDINE 20 MG: 20 TABLET, FILM COATED ORAL at 20:44

## 2025-04-03 RX ADMIN — FLUOXETINE HYDROCHLORIDE 20 MG: 20 SOLUTION ORAL at 08:37

## 2025-04-03 RX ADMIN — SENNOSIDES 2 TABLET: 8.6 TABLET, FILM COATED ORAL at 17:55

## 2025-04-03 RX ADMIN — HEPARIN SODIUM 5000 UNITS: 5000 INJECTION, SOLUTION INTRAVENOUS; SUBCUTANEOUS at 15:04

## 2025-04-03 RX ADMIN — SODIUM CHLORIDE 1000 ML: 9 INJECTION, SOLUTION INTRAVENOUS at 19:00

## 2025-04-03 RX ADMIN — CEFTRIAXONE SODIUM 2 G: 2 INJECTION, POWDER, FOR SOLUTION INTRAMUSCULAR; INTRAVENOUS at 18:16

## 2025-04-03 RX ADMIN — DOCUSATE SODIUM AND BENZOCAINE 5 ML: 283; 20 LIQUID RECTAL at 12:24

## 2025-04-03 RX ADMIN — CEFTRIAXONE SODIUM 2 G: 2 INJECTION, POWDER, FOR SOLUTION INTRAMUSCULAR; INTRAVENOUS at 05:48

## 2025-04-03 RX ADMIN — HEPARIN SODIUM 5000 UNITS: 5000 INJECTION, SOLUTION INTRAVENOUS; SUBCUTANEOUS at 21:19

## 2025-04-03 RX ADMIN — SIMETHICONE 160 MG: 80 TABLET, CHEWABLE ORAL at 17:55

## 2025-04-03 RX ADMIN — BISACODYL 10 MG: 10 SUPPOSITORY RECTAL at 15:54

## 2025-04-03 RX ADMIN — LINACLOTIDE 145 MCG: 145 CAPSULE, GELATIN COATED ORAL at 17:59

## 2025-04-03 RX ADMIN — SIMETHICONE 160 MG: 80 TABLET, CHEWABLE ORAL at 20:44

## 2025-04-03 RX ADMIN — DOCUSATE SODIUM 100 MG: 50 LIQUID ORAL at 17:55

## 2025-04-03 RX ADMIN — SENNOSIDES 1 TABLET: 8.6 TABLET, FILM COATED ORAL at 15:47

## 2025-04-03 RX ADMIN — HYDROMORPHONE HYDROCHLORIDE 2 MG: 2 TABLET ORAL at 12:29

## 2025-04-03 RX ADMIN — Medication 10 ML: at 05:38

## 2025-04-03 RX ADMIN — NALOXEGOL OXALATE 25 MG: 25 TABLET, FILM COATED ORAL at 12:25

## 2025-04-03 RX ADMIN — LEVOTHYROXINE SODIUM 150 MCG: 150 TABLET ORAL at 05:37

## 2025-04-03 RX ADMIN — SODIUM CHLORIDE 1 G: 1 TABLET ORAL at 08:37

## 2025-04-03 RX ADMIN — Medication 10 ML: at 14:59

## 2025-04-03 RX ADMIN — LIOTHYRONINE SODIUM 10 MCG: 5 TABLET ORAL at 05:35

## 2025-04-03 RX ADMIN — SODIUM CHLORIDE 1 G: 1 TABLET ORAL at 20:44

## 2025-04-03 RX ADMIN — LINACLOTIDE 145 MCG: 145 CAPSULE, GELATIN COATED ORAL at 08:37

## 2025-04-03 ASSESSMENT — COGNITIVE AND FUNCTIONAL STATUS - GENERAL
CONCENTRATION: WNL
MOOD: FRUSTRATED;MOTIVATED
DELUSIONS: NONE OR AGE APPROPRIATE
ATTENTION: WNL
THOUGHT_PROCESS: WNL
PSYCHOMOTOR FUNCTIONING: WNL
INSIGHT: INTACT
THOUGHT_CONTENT: APPROPRIATE
SLEEP_WAKE_CYCLE: DECREASED
APPEARANCE: WELL GROOMED
EST. PREMORBID INTELLIGENCE: ABOVE AVERAGE
REMOTE MEMORY: WNL
AROUSAL LEVEL: ATTENTIVE
EYE_CONTACT: WNL
APPETITE: NPO
ORIENTATION: FULLY ORIENTED
SPEECH: REGULAR
IMPULSE CONTROL: INTACT
RECENT MEMORY: WNL
AFFECT: FULL RANGE
PERCEPTUAL FUNCTION: PAIN

## 2025-04-03 NOTE — PROGRESS NOTES
Subjective     Patient seen and examined on rounds.  Chart reviewed.  Events overnight noted.  History reviewed briefly with patient.    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    HPI: Mr. Celso Gramajo is a 69-year-old right-handed white male with chronic conditions significant for hypertension, hyperlipidemia, anxiety, thyroid cancer status post thyroidectomy and radical neck dissection, adjuvant radiation therapy/immunotherapy, esophageal squamous cell carcinoma status post chemoradiotherapy complicated by esophageal strictures, left vocal cord paralysis, dysphagia status post PEG tube placement in 2021, initially presented to the ER at [facility] on 3/11/25 with fevers and right shoulder pain. Workup was unrevealing and he was discharged home. He presented to the [facility] ER again on 3/17/25 with ongoing fevers. CT neck with no abscess, old postsurgical changes. He discharged home but blood cultures subsequently, 1 of 2 sets of blood cultures drawn at [facility] on 3/17/25 showed Streptococcus viridans returned positive on 3/19/25 and he returned for admission to [facility] on 3/19/25. TTE at [facility] was negative. Blood cultures on 3/19/25 and again on 3/23/25 showed no growth per his records. Imaging with C6-7 osteomyelitis/discitis with ventral epidural abscess. He was transferred to [facility] on 3/23/25 for surgical evaluation.  At that time, he was afebrile.  Blood cultures  were negative. At [facility] ortho and ENT were consulted. Ultimately it was determined that an anterior surgical approach was not safe (due  to history of neck surgeries), and patient was having progressive weakness.  MRI of cervical spine on 3/27/25 revealed discitis and osteomyelitis at C6-C7 with ventral epidural abscess compressing the spinal cord and increased inferior extent of cord edema compared to prior. Patchy T1 hypointensity with enhancement in the upper cervical and thoracic spine, can represent metastatic lesions and/or post radiation changes, similar as compared to prior. He underwent posterior C4-T1 decompression and instrumented fusion on 3/27/25, performed by orthopedic surgeon Dr. Santiago Ca. Purulent fluid drained from anterior spine during the procedure and operating room cultures revealed Strep intermedius. Postop he was briefly in the ICU for MAP goals. He had a PICC placed 4/1/25. 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 He was started on salt tabs. Infectious diseases recommended IV Daptomycin which patient completed on 3/31/25.  He was recommended to continue Ceftriaxone 2g IV q12h x 14 day course (end date 4/8/25) then transition to Ceftriaxone 2g IV daily to complete total 6 week course (end date 5/8/25). He was recommend [place] J collar to neck at all times.  I discussed orthopedic spinal precautions with him.  He completed Decadron treatment for mild laryngeal edema visualized on preoperative NPL. Patient has dysphagia from esophageal strictures, recent dilation 3/3/25 was not successful per patient, cannot tolerate regular oral secretions and recommended NPO and continued on bolus tube feeds.  He was kept on Pepcid for GI prophylaxis. Glycopyrrolate as needed per patient/spouse wishes.  For anxiety he was continued on Fluoxetine and Ativan PRN.  He is not on medications for hypertension.  He was continued on Zetia for hyperlipidemia.  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 He continues on a sliding-scale Humalog coverage.  He is on Levothyroxine  and Liothyronine for thyroid supplementation after thyroidectomy for thyroid cancer. He is on subcutaneous Heparin and SCDs for DVT prophylaxis.  I discussed his recent clinical course with patient and with his wife at bedside.  On 4/1/25, hemoglobin was 12.9, WBC count was 10.30, platelets were 255, BUN 15, creatinine was 0.51, sodium was 133 and potassium was 4.5.  He has been needing assistance for mobility, transfers, self-care. He is transferred to [facility] on 4/1/25 for further rehabilitation care.     SUBJ: Discussed patients progress in therapies with therapists in team meeting today. Discussed in PCC. See PCC documentation.  He reports no bowel movement in the past 3 to 4 days.  Also wanted antireflux medication in the morning and at home he takes Omeprazole via PEG per patient.  He is on Prevacid in the a.m. and Pepcid in p.m via PEG tube.  Due to constipation internist ordered enema.  Also scheduled Colace and Senokot were ordered through his PEG tube.  Discussed with patient.    ROS: Denies chest pain or shortness of breath. Other ROS negative. Past, family, social history is unchanged.      Current Functional Status:   Bed mobility:   [place], Supine to Sit: minimum assist (75% or more patient effort)   [place], Sit to Supine: minimum assist (75% or more patient effort)   Transfers:    [place], Sit to Stand Transfer: moderate assist (50-74% patient effort)  [place], Stand to Sit Transfer: minimum assist (75% or more patient effort)   [place], Stand Pivot/Stand Step Transfer: moderate assist (50-74% patient effort)   Gait:   [place], Gait: moderate assist (50-74% patient effort)  Assistive Device: other (see comments)   Distance in Feet: 75 feet    Bathing:   [place]: moderate assist (50-74% patient effort)   Toileting:   [place]: moderate assist (50-74% patient effort)   Upper body dressing:   [place]: minimum assist (75% or more patient  "effort)   Lower body dressing:   [place]: moderate assist (50-74% patient effort)       Functional Progress:    Functional status reviewed. Overall, patient's functional status is improving.      Physical Exam      Blood pressure 127/69, pulse 81, temperature 36.8 °C (98.2 °F), temperature source Oral, resp. rate 18, height 1.854 m (6' 1\"), weight 92.1 kg (203 lb), SpO2 94%.    Body mass index is 26.78 kg/m².    General Appearance: Not in acute distress  Head/Ear/Nose/Throat: Normocephalic; Atraumatic.   Eye: EOMI; PERRL.   Neck: [place] J collar in place.  Respiratory: Decreased breath sounds at bases.   Cardiovascular: RRR; Normal S1, S2.   Gastrointestinal: Soft; NT; +BS.   Extremities: Bilateral lower extremity edema noted.    Musculoskeletal: Functional active range of motion in both upper and lower extremities.   Neurological: AAO ×3. Speech is fluent. Cranial nerve examination does not reveal any gross facial asymmetry. Strength testing bilateral deltoids are 4/5, bilateral biceps are 4+/5, bilateral triceps are 4/5, bilateral wrist extensors are 4+/5, bilateral hand FDP are 4+/5, bilateral and interossei are 4/5.  Bilateral hip flexion is less than 4/5.  Bilateral quadriceps are 5/5.  Bilateral ankle dorsi and plantar flexion are 5/5.  He is grossly able to localize touch and position sense in great toes, except reports numbness in all 5 digits of both hands and also numbness on the plantar aspects of both feet.  Deep tendon reflexes are symmetric and slightly brisk bilaterally.  Bilateral ankle clonus is present.  Plantars are withdrawal.  Coordination is functional upper extremities.    Behavior/Emotional: Appropriate; Cooperative.   Skin: Healing incision on posterior cervical spine.  Sutures in place.  Erythema/rash noted on coccyx unclear if stage I decubitus.  Rash noted on bilateral groins.  Reviewed pictures in epic.         Current Facility-Administered Medications:     acetaminophen (TYLENOL) " 650 mg/20.3 mL solution 650 mg, 650 mg, peg tube, q6h PRN, Rohit Acevedo MD, 650 mg at 04/02/25 2053    bisacodyL (DULCOLAX) 10 mg suppository 10 mg, 10 mg, rectal, Daily PRN, Rohit Acevedo MD    cefTRIAXone (ROCEPHIN) 2 g in sodium chloride 0.9 % 100 mL IVPB, 2 g, intravenous, q12h DU, Rohit Acevedo MD, Stopped at 04/03/25 0636    [START ON 4/9/2025] cefTRIAXone (ROCEPHIN) 2 g in sodium chloride 0.9 % 100 mL IVPB, 2 g, intravenous, q24h INT, Rohit Acevedo MD    docusate sodium-benzocaine 283-20 mg/5 mL enema 5 mL, 5 mL, rectal, Once, Lilian Marrero MD    ezetimibe (ZETIA) tablet 10 mg, 10 mg, peg tube, Nightly, Rohit Acevedo MD, 10 mg at 04/02/25 2053    famotidine (PEPCID) tablet 20 mg, 20 mg, peg tube, Nightly, Rohit Acevedo MD, 20 mg at 04/02/25 2053    FLUoxetine (PROzac) 20 mg/5 mL (4 mg/mL) solution 20 mg, 20 mg, peg tube, Daily, Rohit Acevedo MD, 20 mg at 04/03/25 0837    glycopyrrolate (ROBINUL) tablet 1 mg, 1 mg, peg tube, 2x daily PRN, Rohit Acevedo MD    heparin (porcine) 5,000 unit/mL injection 5,000 Units, 5,000 Units, subcutaneous, q8h [facility], Rohit Acevedo MD, 5,000 Units at 04/03/25 0538    HYDROmorphone (DILAUDID) tablet 2 mg, 2 mg, oral, q6h PRN, Lilian Marrero MD    insulin lispro U-100 (HumaLOG) pen 1-12 Units, 1-12 Units, subcutaneous, q6h [facility], Lilian Marrero MD, 2 Units at 04/02/25 1802    lansoprazole (PREVACID) 3 mg/mL oral suspension 15 mg, 15 mg, feeding tube, Daily before breakfast, Lilian Marrero MD, 15 mg at 04/03/25 0837    levothyroxine (SYNTHROID) tablet 150 mcg, 150 mcg, peg tube, Daily (6:30a), Lilian Marrero MD, 150 mcg at 04/03/25 0537    linaCLOtide (LINZESS) capsule 145 mcg, 145 mcg, g-tube, Daily before lunch, Lilian Marrero MD, 145 mcg at 04/03/25 0837    liothyronine (CYTOMEL) tablet 10 mcg, 10 mcg, peg tube, Daily (6:30a), Rohit Acevedo MD, 10 mcg at 04/03/25 0535    LORazepam (ATIVAN) tablet 0.5  mg, 0.5 mg, peg tube, q8h PRN, Rohit Acevedo MD    magnesium hydroxide (M.O.M.) 400 mg/5 mL suspension 30 mL, 30 mL, oral, 2x daily PRN, Lilian Marrero MD, 30 mL at 04/02/25 1138    naloxegoL (MOVANTIK) tablet 25 mg, 25 mg, oral, Once, Lilian Marrero MD    ondansetron ODT (ZOFRAN-ODT) disintegrating tablet 4 mg, 4 mg, peg tube, q6h PRN, Rohit Acevedo MD    senna (SENOKOT) tablet 1 tablet, 1 tablet, peg tube, 2x daily PRN, Rohit Acevedo MD    sodium chloride PF flush 10 mL, 10 mL, intravenous, q8h INT, Rohit Acevedo MD, 10 mL at 04/03/25 0538    sodium chloride PF flush 10 mL, 10 mL, intravenous, PRN, Rohit Acevedo MD    sodium chloride tablet 1 g, 1 g, peg tube, BID, Rohit Acevedo MD, 1 g at 04/03/25 0837       Labs / Radiology    Lab Results   Component Value Date    WBC 9.54 04/02/2025    HGB 12.3 (L) 04/02/2025    HCT 36.8 (L) 04/02/2025    MCV 93.2 04/02/2025     04/02/2025     Lab Results   Component Value Date    GLUCOSE 161 (H) 04/02/2025    CALCIUM 9.0 04/02/2025     (L) 04/02/2025    K 4.9 04/02/2025    CO2 29 04/02/2025    CL 97 (L) 04/02/2025    BUN 18 04/02/2025    CREATININE 0.6 (L) 04/02/2025       Assessment and Plan    ASSESSMENT PLAN:  1. 69-year-old right-handed white male with chronic conditions significant for hypertension, hyperlipidemia, anxiety, thyroid cancer status post thyroidectomy and radical neck dissection, adjuvant radiation therapy/immunotherapy, esophageal squamous cell carcinoma status post chemoradiotherapy complicated by esophageal strictures, left vocal cord paralysis, dysphagia status post PEG tube placement in 2021, initially presented to the ER at [facility] on 3/11/25 with fevers and right shoulder pain. Workup was unrevealing and he was discharged home. He presented to the [facility] ER again on 3/17/25 with ongoing fevers. CT neck with no abscess, old postsurgical changes. He discharged home but blood  cultures subsequently, 1 of 2 sets of blood cultures drawn at [facility] on 3/17/25 showed Streptococcus viridans returned positive on 3/19/25 and he returned for admission to [facility] on 3/19/25. TTE at [facility] was negative. Blood cultures on 3/19/25 and again on 3/23/25 showed no growth per his records. Imaging with C6-7 osteomyelitis/discitis with ventral epidural abscess. He was transferred to [facility] on 3/23/25 for surgical evaluation.  At that time, he was afebrile.  Blood cultures  were negative. At [facility] ortho and ENT were consulted. Ultimately it was determined that an anterior surgical approach was not safe (due to history of neck surgeries), and patient was having progressive weakness.  MRI of cervical spine on 3/27/25 revealed discitis and osteomyelitis at C6-C7 with ventral epidural abscess compressing the spinal cord and increased inferior extent of cord edema compared to prior. Patchy T1 hypointensity with enhancement in the upper cervical and thoracic spine, can represent metastatic lesions and/or post radiation changes, similar as compared to prior. He underwent posterior C4-T1 decompression and instrumented fusion on 3/27/25, performed by orthopedic surgeon Dr. Santiago Ca. Purulent fluid drained from anterior spine during the procedure and operating room cultures revealed Strep intermedius. Postop he was briefly in the ICU for MAP goals. He had a PICC placed 4/1/25. 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 He was started on salt tabs. Infectious diseases recommended IV Daptomycin which patient completed on 3/31/25.  He was recommended to continue Ceftriaxone 2g IV q12h x 14 day course (end date 4/8/25) then transition to Ceftriaxone 2g IV daily to complete total 6 week course (end date 5/8/25). He was recommend [place] J collar to neck at all times.  I discussed orthopedic spinal precautions with him.  He completed Decadron treatment  for mild laryngeal edema visualized on preoperative NPL. Patient has dysphagia from esophageal strictures, recent dilation 3/3/25 was not successful per patient, cannot tolerate regular oral secretions and recommended NPO and continued on bolus tube feeds.  He was kept on Pepcid for GI prophylaxis. Glycopyrrolate as needed per patient/spouse wishes.  For anxiety he was continued on Fluoxetine and Ativan PRN.  He is not on medications for hypertension.  He was continued on Zetia for hyperlipidemia.  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 He continues on a sliding-scale Humalog coverage.  He is on Levothyroxine and Liothyronine for thyroid supplementation after thyroidectomy for thyroid cancer. He is on subcutaneous Heparin and SCDs for DVT prophylaxis.  I discussed his recent clinical course with patient and with his wife at bedside.  On 4/1/25, hemoglobin was 12.9, WBC count was 10.30, platelets were 255, BUN 15, creatinine was 0.51, sodium was 133 and potassium was 4.5.  He has been needing assistance for mobility, transfers, self-care. He is transferred to [facility] on 4/1/25 for further rehabilitation care.      2. DVT prophylaxis - He is on subcutaneous Heparin and SCDs for DVT prophylaxis.  Check doppler.  Platelets 247 on 4/2/2025.  Doppler ultrasound on 4/3/2025 bilateral lower extremity was negative for DVT.     3. 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  Follow falls precautions, cardiac precautions, aspiration precautions.  Follow spinal precautions.  [place] J collar to neck at all times.   Use [place] collar in shower.     4. GI - On Pepcid for GI prophylaxis.  On PRN Senokot.  On PRN Dulcolax suppository.  On PRN Zofran.     5.  -denies dysuria.  Monitor postvoid residuals.     6. CVS - monitor for orthostasis.     7. Pulmonary -encourage incentive spirometry.     8. Hematology - monitor hemoglobin, platelets.  Hemoglobin 12.3, WBC 9.54 on 4/2/2025.     9. Pain -on Tylenol.  On PRN Oxycodone.     10. Skin - Healing incision on posterior cervical spine.  Sutures in place.  Erythema/rash noted on coccyx unclear if stage I decubitus.  Rash noted on bilateral groins.  Reviewed pictures in epic.     11. F/E/N - nothing by mouth on bolus PEG feeds.  Nutrition consulted. 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 He was started on salt tabs.     12.  Dysphagia - H/O esophageal squamous cell carcinoma status post chemoradiotherapy complicated by esophageal strictures, left vocal cord paralysis, dysphagia status post PEG tube placement in 2021 - NPO on bolus PEG feeds.  Nutrition consulted.     13.  Psychiatry - on PRN Ativan.  Psychology consulted.     14.  Hypothyroidism -He is on Levothyroxine and Liothyronine for thyroid supplementation after thyroidectomy for thyroid cancer.     15. ENT -  H/O left vocal cord paralysis, treated with Decadron for edema around vocal cords recently.  On Glycopyrrolate as needed per patient/spouse wishes to manage secretions.      16.  Infectious diseases - He underwent posterior C4-T1 decompression and instrumented fusion on 3/27/25, performed by orthopedic surgeon Dr. Santiago Ca. Purulent fluid drained from anterior spine during the procedure and operating room cultures revealed Strep intermedius.. He had a PICC placed 4/1/25. Infectious diseases recommended IV Daptomycin which patient completed on 3/31/25.  He was recommended to continue Ceftriaxone 2g IV q12h x 14 day course (end date 4/8/25) then transition to Ceftriaxone 2g IV daily to complete  total 6 week course (end date 5/8/25).     15.  Steroid-induced hyperglycemia -on sliding-scale Humalog coverage.      16. Rehabilitation medicine - Continue comprehensive rehabilitation care. Continue PT, OT, psychology.  Follow falls precautions, cardiac precautions, aspiration precautions.  Follow spinal precautions.  [place] J collar to neck at all times.  Use [place] collar in shower.Discussed patients progress in therapies with therapists in team meeting on 4/3/2025.  Discussed in PCC. See PCC documentation.  He reports no bowel movement in the past 3 to 4 days.  Also wanted antireflux medication in the morning and at home he takes Omeprazole via PEG per patient.  He is on Prevacid in the a.m. and Pepcid in p.m via PEG tube.  Due to constipation internist ordered enema.  Also scheduled Colace and Senokot were ordered through his PEG tube.  Discussed with patient on 4/3/2025.     17. Reviewed labs today. BUN 18, creatinine 0.6, sodium 133, potassium 4.9 on 4/2/2025.           Spencer Kemp MD  4/3/2025      This encounter was completed utilizing the Direct Speech Voice Recognition Software. Grammatical errors, random word insertions, pronoun errors, and incomplete sentences are occasional consequences of the system due to software limitations, ambient noises, and hardware issues. Such errors may be missed prior to affixing electronic signature. Any questions or concerns about the content, text, or information contained within the body of this dictation should be directly addressed to the physician for clarification. If you have any questions or concerns please do not hesitate to call me directly via EPIC chat, page, or email.

## 2025-04-03 NOTE — PLAN OF CARE
Problem: Rehabilitation ([facility]) Plan of Care  Goal: Plan of Care Review  Outcome: Progressing  Flowsheets (Taken 4/3/2025 4128)  Progress: no change  Plan of Care Reviewed With:   patient   spouse   CM met w pt at bedside to provide updates from treatment team and to discuss goals and ELOS 4/17. CM discussed home IV infusion needs. Pt in agreement w plan and defers HC agency choice to his spouse. CM attempted to call pts spouse Yue x2 and left message. CM will re attempt. CM will continue to follow for any needs.

## 2025-04-03 NOTE — PROGRESS NOTES
Psychiatry Progress Note    History of Present Illness   See initial consult.  History pertaining to psychosis, depression and anxiety and other psychiatric and psychologic conditions as well as general medical history detailed in initial consult.      Interval History: Very bowel focus, reports getting dehydrated regularly, has a lot of discomfort in his abdomen.  He has been on Prozac a few weeks now.  Sodium 133  Patient did okay overnight.  No nursing report of any impulsivity, agitation or behavioral disturbance.  Sleep pattern normalizing.  Adjusting to routine of the hospital.   Seems motivated to participate in therapies. No suicidal ideation, auditory or visual hallucinations or paranoid ideation. Reviewed progress and behavior with nursing     MENTAL STATUS EXAM  Appearance: well groomed  Gait and Motor: no abnormal movements  Speech: normal rate/rhythm/volume  Mood: depressed and anxious  Affect: constricted  Associations: coherent  Thought Process: goal-directed  Thought Content: no auditory or visual hallucinations.  Suicidality/Homicidality: denies  Judgement/Insight: acknowledges illness  Orientation: situation  Memory: knows current president  Attention: distracted  Knowledge: normal  Language: normal      Vital Signs for the last 24 hours:  Temp:  [36.8 °C (98.2 °F)-37.5 °C (99.5 °F)] 36.8 °C (98.2 °F)  Heart Rate:  [] 79  Resp:  [18-20] 18  BP: (110-127)/(59-73) 118/72    Labs:  Labs below reviewed for pertinence to psychiatric condition  Lab Results   Component Value Date    GLUCOSE 161 (H) 04/02/2025    CALCIUM 9.0 04/02/2025     (L) 04/02/2025    K 4.9 04/02/2025    CO2 29 04/02/2025    CL 97 (L) 04/02/2025    BUN 18 04/02/2025    CREATININE 0.6 (L) 04/02/2025     Lab Results   Component Value Date    WBC 9.54 04/02/2025    HGB 12.3 (L) 04/02/2025    HCT 36.8 (L) 04/02/2025    MCV 93.2 04/02/2025     04/02/2025     Pain Management Panel           No data to display                   Current Facility-Administered Medications   Medication Dose Route Frequency Provider Last Rate Last Admin    acetaminophen (TYLENOL) 650 mg/20.3 mL solution 650 mg  650 mg peg tube q6h PRN Rohit Acevedo MD   650 mg at 04/02/25 2053    bisacodyL (DULCOLAX) 10 mg suppository 10 mg  10 mg rectal Daily PRN Rohit Acevedo MD        cefTRIAXone (ROCEPHIN) 2 g in sodium chloride 0.9 % 100 mL IVPB  2 g intravenous q12h [facility] Rohit Acevedo MD   Stopped at 04/03/25 0636    [START ON 4/9/2025] cefTRIAXone (ROCEPHIN) 2 g in sodium chloride 0.9 % 100 mL IVPB  2 g intravenous q24h INT Rohit Acevedo MD        ezetimibe (ZETIA) tablet 10 mg  10 mg peg tube Nightly Rohit Acevedo MD   10 mg at 04/02/25 2053    famotidine (PEPCID) tablet 20 mg  20 mg peg tube Nightly Rohit Acevedo MD   20 mg at 04/02/25 2053    FLUoxetine (PROzac) 20 mg/5 mL (4 mg/mL) solution 20 mg  20 mg peg tube Daily Rohit Acevedo MD   20 mg at 04/02/25 1129    glycopyrrolate (ROBINUL) tablet 1 mg  1 mg peg tube 2x daily PRN Rohit Acevedo MD        heparin (porcine) 5,000 unit/mL injection 5,000 Units  5,000 Units subcutaneous q8h [facility] Rohit Acevedo MD   5,000 Units at 04/03/25 0538    insulin lispro U-100 (HumaLOG) pen 1-12 Units  1-12 Units subcutaneous q6h [facility] Lilian Marrero MD   2 Units at 04/02/25 1802    lansoprazole (PREVACID) 3 mg/mL oral suspension 15 mg  15 mg feeding tube Daily before breakfast Lilian Marrero MD        levothyroxine (SYNTHROID) tablet 150 mcg  150 mcg peg tube Daily (6:30a) Lilian Marrero MD   150 mcg at 04/03/25 0537    linaCLOtide (LINZESS) capsule 145 mcg  145 mcg g-tube Daily before lunch Lilian Marrero MD        liothyronine (CYTOMEL) tablet 10 mcg  10 mcg peg tube Daily (6:30a) Rohit Acevedo MD   10 mcg at 04/03/25 0535    LORazepam (ATIVAN) tablet 0.5 mg  0.5 mg peg tube q8h PRN Rohit Acevedo MD        magnesium hydroxide (M.O.M.) 400 mg/5  mL suspension 30 mL  30 mL oral 2x daily PRN Lilian Marrero MD   30 mL at 04/02/25 1138    ondansetron ODT (ZOFRAN-ODT) disintegrating tablet 4 mg  4 mg peg tube q6h PRN Rohit Acevedo MD        oxyCODONE (ROXICODONE) immediate release tablet 10 mg  10 mg peg tube q4h PRN Rohit Acevedo MD   10 mg at 04/02/25 1458    oxyCODONE (ROXICODONE) immediate release tablet 5 mg  5 mg peg tube q4h PRN Rohit Acevedo MD   5 mg at 04/02/25 2053    senna (SENOKOT) tablet 1 tablet  1 tablet peg tube 2x daily PRN Rohit Acevedo MD        sodium chloride PF flush 10 mL  10 mL intravenous q8h INT Rohit Acevedo MD   10 mL at 04/03/25 0538    sodium chloride PF flush 10 mL  10 mL intravenous PRN Rohit Acevedo MD        sodium chloride tablet 1 g  1 g peg tube BID Rohit Acevedo MD   1 g at 04/02/25 2053        Patient Active Problem List   Diagnosis    Vertigo    Hypertension    Postsurgical hypothyroidism    Mixed hyperlipidemia    Gastroesophageal reflux disease    Increased sputum production    Allergic rhinitis    Change in bowel habits    Chronic fatigue syndrome    Constipation, chronic    Deviated nasal septum    Elevated coronary artery calcium score    Eustachian tube dysfunction, bilateral    Examination of participant in clinical trial    Induration penis plastica    Hypertrophy of nasal turbinates    Hx of colonic polyps    Malignant neoplasm of salivary gland (CMS/HCC)    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)    Paralysis of vocal cords and larynx, unilateral    ENMANUEL (obstructive sleep apnea)    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)    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)    Penile curvature, acquired    Rhinitis sicca    Thyroid cancer (CMS/HCC)    Sensorineural hearing loss (SNHL), bilateral    Other dysphagia    Abdominal pain    Other dysphagia    COVID-19    Encounter for follow-up surveillance of thyroid cancer     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           Assessment/Plan    Depression: CBT automatic anxious and negative thoughts  Adjustment Disorder to Disability: supportive therapy  Sleep:  Insight into illness: insight therapy/psychoeducation  Psychotherapy: supportive  Monitor: Mood, Speech, Appetite, Energy, Cognition, Behavioral, Impulsivity, Agitation  Family Support  Medications: monitor for side effects  - presently no gi, akathesia , ha or sedation  Sodium 133  prozac 20  Prn ativan  Cbt  Educate on meds  If sodium continues to drop would consider discontinuing Prozac if there is not another source.  Trey Meyer MD  4/3/2025

## 2025-04-03 NOTE — PROGRESS NOTES
Patient: Celso Gramajo  Location: [facility] [address]  MRN: [id number]  Today's date: 4/3/2025    History of Present Illness  Celso is a 69 y.o. male admitted on 4/1/2025 with Spinal epidural abscess [G06.1]. Principal problem is Spinal epidural abscess.    Celso Gramajo is a 69 year old male with PMHx thyroid CA s/p thyroidectomy, RND, adjuvant XRT/immunotherapy, esophageal SCC s/p chemoradiation c/b esophageal strictures, HTN, Elevated Coronary Calcium scoring in 2017, ENMANUEL, Hypothyroidism, Anemia, dysphagia on PEG (placed in 2021) TFs, and history of CVA who presented to an OSH with neck and R shoulder pain. Workup was unrevealing and he was discharged home.    He presented to the [place] ED again on 3/17 with ongoing fevers. CT Neck with no abscess, old postsurgical changes. He d/c home but BCx subsequently returned positive on 3/19 and he returned for admission. He was also bacteremic with strep viridans, and placed on IV antibiotics. Workup revealed a C5-C7 ventral epidural abscess with osteomyelitis, and he was transferred to [facility] for surgical evaluation.    MRI on 3/24 c/f metastatic disease at T1-T4, L4.      At [facility] ortho and ENT were consulted. Ultimately it was determined that an anterior surgical approach was not safe (due to history of neck surgeries), and patient was having progressive weakness. Repeat MRI C-spine showed Discitis and osteomyelitis at C6-C7 with ventral epidural abscess compressing the spinal cord and increased edema. Patient urgently underwent POSTERIOR C4-T1 DECOMPRESSION FUSION WITH YUKON INSTRUMENTATION on 3/27. Postop he was briefly in the ICU for MAP goals. OR cultures grew strep intermedius and he was placed on the antibiotic plan below. He had a PICC placed 4/1. 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 He was started on salt tabs. In rehab please continue to check labs, and titrate the salt tabs as appropriate.  He worked with PT/OT who recommended him for acute rehab.     Past Medical History  Celso has a past medical history of Anemia, Cancer (CMS/[facility]), head and neck radiation, Hypertension, Hypothyroidism, Stroke (CMS/[facility]), and Thyroid disease.    PT Vitals      Date/Time Pulse HR Source BP BP Location BP Method Pt Position [facility]   04/03/25 1116 94 Monitor 126/67 Left upper arm Automatic Sitting [facility]          PT Pain      Date/Time Pain Type Side/Orientation Location Rating: Rest Description Interventions [facility]   04/03/25 1116 Pain Assessment generalized neck 3.5 aching diversional activity provided    04/03/25 1128 Pain Reassessment generalized neck 3.5 aching diversional activity provided [facility]               Prior Living Environment      Flowsheet Row Most Recent Value   People in Home spouse   Current Living Arrangements home   Home Accessibility stairs to enter home (Group), stairs within home (Group)   Living Environment Comment Multi-story home with 12-14 steps to second floor, powder room on 1st floor, 2-3 FELICITA via garage, multiple supportive family members   Number of Stairs, Main Entrance 3   Stair Railings, Main Entrance none   Location, Patient Bedroom second floor, must negotiate stairs to access   Location, Bathroom second floor, must negotiate stairs to access   Bathroom Access Comment powder room 1st floor standard height toilet, full bathroom 2nd floor WIS with glass door, standard height toilet   Stairs, Within Home, Primary 12   Stair Railings, Within Home, Primary railings on both sides of stairs            Prior Level of Function      Flowsheet Row Most Recent Value   Dominant Hand right   Ambulation independent   Transferring independent   Toileting independent   Bathing independent   Dressing independent   Eating independent   IADLs independent   Driving/Transportation    Communication understands/communicates without difficulty   Past History of Dysphagia PTA pt was completely independent,  no AD. (+) . NPO PTA, manages peg tube independently.   Prior Level of Function Comment Independent without use of AE or DME.   Assistive Device Currently Used at Home none             IRF PT Evaluation and Treatment - 04/03/25 1117          PT Time Calculation    Start Time 1103     Stop Time 1133     Time Calculation (min) 30 min        Session Details    Document Type Daily Treatment/Progress Note        General Information    General Observations of Patient Pt received in room sidelying in bed, agreeable to session.        Mobility Belt    Mobility Belt Used During Session yes        Orthosis Neck Cervical Collar    Orthosis Properties Date Obtained: 04/03/25 Time Obtained: 0825 Location: Neck Type: Cervical Collar Features: Hard Description: Hard collar AAT; can be removed seated to don [place] collar for bathing.       Bed Mobility    [place], Supine to Sit minimum assist (75% or more patient effort)     Assistive Device head of bed elevated;bed rails     Comment Arleen for trunk support        Sit to Stand Transfer    [place], Sit to Stand Transfer minimum assist (75% or more patient effort)     Safety/Cues preparatory posture;hand placement     Assistive Device none;walker, front-wheeled     Comment Arleen for balance in stance        Stand to Sit Transfer    [place], Stand to Sit Transfer minimum assist (75% or more patient effort)     Safety/Cues hand placement     Assistive Device none;walker, front-wheeled     Comment to WC, Arleen for eccentric conrol        Stand Pivot Transfer    [place]h, Stand Pivot/Stand Step Transfer moderate assist (50-74% patient effort)     Safety/Cues technique     Assistive Device none;walker, front-wheeled     Comment from EOB>WC with HHA and mod A at pelvis for balance/WS. with RW, min/mod A for balance and WS due to unsteadiness        Gait Training    [place]h, Gait moderate assist (50-74% patient effort)     Safety/Cues technique     Assistive  Device walker, front-wheeled     Distance in Feet 40 feet     Pattern step-through     Deviations/Abnormal Patterns base of support, wide;jean decreased;gait speed decreased;step length decreased;weight shifting decreased     Bilateral Gait Deviations heel strike decreased     Comment 40'x1 with RW, mod A at pelvis for balance, WS, and improved stability secondary to unsteadiness        Lower Extremity (Therapeutic Exercise)    Exercise Position/Type seated     Reps and Sets 3x30 sec     Comment gasrroc stretch        Daily Progress Summary (PT)    Daily Outcome Statement Trialed RW with transfers and amb this session, which did help to improve pt's stability, however pt continues to be unsteady. Plan to continue with standing interventions as tolerated in afternoon session.                               IRF PT Goals      Flowsheet Row Most Recent Value   Bed Mobility Goal 1    Activity/Assistive Device sit to supine/supine to sit at 04/02/2025 0904   [place] supervision required at 04/02/2025 0904   Time Frame short-term goal (STG), 5 - 7 days at 04/03/2025 0856   Strategies/Barriers evaluated 4/2, continue with PT POC at 04/03/2025 0856   Progress/Outcome goal ongoing, good progress toward goal at 04/03/2025 0856   Bed Mobility Goal 2    Activity/Assistive Device bed mobility activities, all at 04/02/2025 0904   [place] modified independence at 04/02/2025 0904   Time Frame long-term goal (LTG), 3 weeks at 04/02/2025 0904   Progress/Outcome goal ongoing at 04/03/2025 0856   Transfer Goal 1    Activity/Assistive Device sit-to-stand/stand-to-sit, stand pivot at 04/02/2025 0904   [place] minimum assist (75% or more patient effort) at 04/02/2025 0904   Time Frame short-term goal (STG), 5 - 7 days at 04/03/2025 0856   Strategies/Barriers evaluated 4/2, conitnue with PT POC at 04/03/2025 0856   Progress/Outcome goal ongoing, good progress toward goal at 04/03/2025 0856   Transfer Goal 2     Activity/Assistive Device sit-to-stand/stand-to-sit, stand pivot at 04/02/2025 0904   [place] modified independence at 04/02/2025 0904   Time Frame long-term goal (LTG), 3 weeks at 04/02/2025 0904   Progress/Outcome goal ongoing at 04/03/2025 0856   Gait/Walking Locomotion Goal 1    Activity/Assistive Device gait (walking locomotion), assistive device use at 04/02/2025 0904   Distance 150 feet at 04/02/2025 0904   [place] minimum assist (75% or more patient effort) at 04/02/2025 0904   Time Frame short-term goal (STG), 5 days at 04/03/2025 0856   Strategies/Barriers evaluated 4/2, contiune with PT POC at 04/03/2025 0856   Progress/Outcome good progress toward goal, goal ongoing at 04/03/2025 0856   Gait/Walking Locomotion Goal 2    Activity/Assistive Device gait (walking locomotion), assistive device use at 04/02/2025 0904   Distance 150 feet at 04/02/2025 0904   [place] modified independence at 04/02/2025 0904   Time Frame long-term goal (LTG), 3 weeks at 04/02/2025 0904   Progress/Outcome goal ongoing at 04/03/2025 0856   Stairs Goal 1    Activity/Assistive Device ascending stairs, descending stairs, using handrail, left, using handrail, right at 04/02/2025 0904   Number of Stairs 12 at 04/02/2025 0904   [place] minimum assist (75% or more patient effort) at 04/02/2025 0904   Time Frame short-term goal (STG), 5 - 7 days at 04/03/2025 0856   Strategies/Barriers evaluated 4/2, continue PT POC at 04/03/2025 0856   Progress/Outcome good progress toward goal, goal ongoing at 04/03/2025 0856   Stairs Goal 2    Activity/Assistive Device ascending stairs, descending stairs, using handrail, left, using handrail, right at 04/02/2025 0904   Number of Stairs 12 at 04/02/2025 0904   [place] supervision required at 04/02/2025 0904   Time Frame long-term goal (LTG), 3 weeks at 04/02/2025 0904   Progress/Outcome goal ongoing at 04/03/2025 0852

## 2025-04-03 NOTE — PLAN OF CARE
Plan of Care Review  Plan of Care Reviewed With: patient  Progress: improving  Outcome Evaluation: Pt is AAO X 3, able to make needs known. COntinent of bladder, uses urinal, spilled once. No BM during this shift. Infrequent productive cough, uses yankauer. Peg tube site clean. Water flush administered. Pt stated he does not feel good, VS checked 118/60, 98.7, 93% on RA, 84/min. . Pt later stated that he is upset because he was told that his stay will be only for 2 weeks. Reassurance provided , felt better. Patient had low grade temp of 99.5 earlier in the shift and urine was odorous, physician on call notified and order obtained for UA & CS. Patient received IV ceftriaxone via GABRIEL single lumen PICC. No adverse reactions. Slept intermittently. Safety precautions maintained.

## 2025-04-03 NOTE — PROGRESS NOTES
Patient: Celso Gramajo  Location: [facility] [address]  MRN: [id number]  Today's date: 4/3/2025    History of Present Illness  Celso is a 69 y.o. male admitted on 4/1/2025 with Spinal epidural abscess [G06.1]. Principal problem is Spinal epidural abscess.    Celso Gramajo is a 69 year old male with PMHx thyroid CA s/p thyroidectomy, RND, adjuvant XRT/immunotherapy, esophageal SCC s/p chemoradiation c/b esophageal strictures, HTN, Elevated Coronary Calcium scoring in 2017, ENMANUEL, Hypothyroidism, Anemia, dysphagia on PEG (placed in 2021) TFs, and history of CVA who presented to an OSH with neck and R shoulder pain. Workup was unrevealing and he was discharged home.    He presented to the [facility] ED again on 3/17 with ongoing fevers. CT Neck with no abscess, old postsurgical changes. He d/c home but BCx subsequently returned positive on 3/19 and he returned for admission. He was also bacteremic with strep viridans, and placed on IV antibiotics. Workup revealed a C5-C7 ventral epidural abscess with osteomyelitis, and he was transferred to [facility] for surgical evaluation.    MRI on 3/24 c/f metastatic disease at T1-T4, L4.      At [facility] ortho and ENT were consulted. Ultimately it was determined that an anterior surgical approach was not safe (due to history of neck surgeries), and patient was having progressive weakness. Repeat MRI C-spine showed Discitis and osteomyelitis at C6-C7 with ventral epidural abscess compressing the spinal cord and increased edema. Patient urgently underwent POSTERIOR C4-T1 DECOMPRESSION FUSION WITH [place] INSTRUMENTATION on 3/27. Postop he was briefly in the ICU for MAP goals. OR cultures grew strep intermedius and he was placed on the antibiotic plan below. He had a PICC placed 4/1. 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 He was started on salt tabs. In rehab please continue to check labs, and titrate the salt tabs as appropriate.  He worked with PT/OT who recommended him for acute rehab.     Past Medical History  Celso has a past medical history of Anemia, Cancer (CMS/[facility]), head and neck radiation, Hypertension, Hypothyroidism, Stroke (CMS/HCC), and Thyroid disease.    PT Vitals      Date/Time Pulse HR Source BP BP Location BP Method Pt Position [facility]   04/03/25 1305 101 Monitor 124/73 Left upper arm Automatic Sitting [facility]   04/03/25 1358 101 Monitor 129/76 Left upper arm Automatic Sitting [facility]          PT Pain      Date/Time Pain Type Side/Orientation Location Rating: Rest Rating: Activity Description Interventions [facility]   04/03/25 1305 Pain Assessment generalized back 3.5 -- aching diversional activity provided    04/03/25 1314 Pain Reassessment -- -- 5 -- -- -- TG   04/03/25 1358 Pain Assessment generalized neck 5 7 aching diversional activity provided                Prior Living Environment      Flowsheet Row Most Recent Value   People in Home spouse   Current Living Arrangements home   Home Accessibility stairs to enter home (Group), stairs within home (Group)   Living Environment Comment Multi-story home with 12-14 steps to second floor, powder room on 1st floor, 2-3 FELICITA via garage, multiple supportive family members   Number of Stairs, Main Entrance 3   Stair Railings, Main Entrance none   Location, Patient Bedroom second floor, must negotiate stairs to access   Location, Bathroom second floor, must negotiate stairs to access   Bathroom Access Comment powder room 1st floor standard height toilet, full bathroom 2nd floor WIS with glass door, standard height toilet   Stairs, Within Home, Primary 12   Stair Railings, Within Home, Primary railings on both sides of stairs            Prior Level of Function      Flowsheet Row Most Recent Value   Dominant Hand right   Ambulation independent   Transferring independent   Toileting independent   Bathing independent   Dressing independent   Eating independent   IADLs independent    Driving/Transportation    Communication understands/communicates without difficulty   Past History of Dysphagia PTA pt was completely independent, no AD. (+) . NPO PTA, manages peg tube independently.   Prior Level of Function Comment Independent without use of AE or DME.   Assistive Device Currently Used at Home none             IRF PT Evaluation and Treatment - 04/03/25 1305          PT Time Calculation    Start Time 1300     Stop Time 1400     Time Calculation (min) 60 min        Session Details    Document Type Daily Treatment/Progress Note        General Information    General Observations of Patient Pt received in room with PCT's post compelting toielting. Agreeable to session.        Mobility Belt    Mobility Belt Used During Session yes        Orthosis Neck Cervical Collar    Orthosis Properties Date Obtained: 04/03/25 Time Obtained: 0825 Location: Neck Type: Cervical Collar Features: Hard Description: Hard collar AAT; can be removed seated to don [place] collar for bathing.       Sit to Stand Transfer    [place], Sit to Stand Transfer minimum assist (75% or more patient effort)     Safety/Cues hand placement     Assistive Device walker, front-wheeled     Comment from KELSEY, Arleen for ant WS and balance in stance        Stand to Sit Transfer    [place], Stand to Sit Transfer minimum assist (75% or more patient effort)     Safety/Cues hand placement     Assistive Device walker, front-wheeled     Comment to WC, Arleen for eccentric control        Stand Pivot Transfer    [place], Stand Pivot/Stand Step Transfer minimum assist (75% or more patient effort)     Safety/Cues technique     Assistive Device walker, front-wheeled     Comment via amb approach, Arleen for balance and WS with turn        Gait Training    [place], Gait minimum assist (75% or more patient effort)     Safety/Cues technique     Assistive Device walker, front-wheeled     Distance in Feet 100 feet      Pattern step-through     Deviations/Abnormal Patterns base of support, wide;jean decreased;gait speed decreased;step length decreased     Comment 100'x1 with RW, Arleen for balance and WS due to intermitten unsteadiness        Rough/Uneven Surface Gait Skills    [place] minimum assist (75% or more patient effort)     Assistive Device walker, front-wheeled     Distance in Feet 50 feet     Comment 50'x2 in greenhouse ambualting over uneven cement surface, Arleen for balance and WS        Lower Extremity (Therapeutic Exercise)    Exercise Position/Type seated     Reps and Sets 2x10     Comment AP, LAQ, marches, hip ABD with green TB, hip ADD ball squeezes        Therapeutic Interventions    Comment, Therapeutic Intervention Pt became emotional while in green [place] about current level of function vs function PTA. Provided encouragement and supportive listening.        Daily Progress Summary (PT)    Daily Outcome Statement Pt enjoyed spending time in the greenhouse. Required decreased assistance with RW this session. Will continue to benefit from LE strengthening, standing tolerance, and dynamic balance interventions.                               IRF PT Goals      Flowsheet Row Most Recent Value   Bed Mobility Goal 1    Activity/Assistive Device sit to supine/supine to sit at 04/02/2025 0904   [place] supervision required at 04/02/2025 0904   Time Frame short-term goal (STG), 5 - 7 days at 04/03/2025 0856   Strategies/Barriers evaluated 4/2, continue with PT POC at 04/03/2025 0856   Progress/Outcome goal ongoing, good progress toward goal at 04/03/2025 0856   Bed Mobility Goal 2    Activity/Assistive Device bed mobility activities, all at 04/02/2025 0904   [place] modified independence at 04/02/2025 0904   Time Frame long-term goal (LTG), 3 weeks at 04/02/2025 0904   Progress/Outcome goal ongoing at 04/03/2025 0856   Transfer Goal 1    Activity/Assistive Device sit-to-stand/stand-to-sit, stand pivot at  04/02/2025 0904   [place] minimum assist (75% or more patient effort) at 04/02/2025 0904   Time Frame short-term goal (STG), 5 - 7 days at 04/03/2025 0856   Strategies/Barriers evaluated 4/2, conitnue with PT POC at 04/03/2025 0856   Progress/Outcome goal ongoing, good progress toward goal at 04/03/2025 0856   Transfer Goal 2    Activity/Assistive Device sit-to-stand/stand-to-sit, stand pivot at 04/02/2025 0904   [place] modified independence at 04/02/2025 0904   Time Frame long-term goal (LTG), 3 weeks at 04/02/2025 0904   Progress/Outcome goal ongoing at 04/03/2025 0856   Gait/Walking Locomotion Goal 1    Activity/Assistive Device gait (walking locomotion), assistive device use at 04/02/2025 0904   Distance 150 feet at 04/02/2025 0904   [place] minimum assist (75% or more patient effort) at 04/02/2025 0904   Time Frame short-term goal (STG), 5 days at 04/03/2025 0856   Strategies/Barriers evaluated 4/2, contiune with PT POC at 04/03/2025 0856   Progress/Outcome good progress toward goal, goal ongoing at 04/03/2025 0856   Gait/Walking Locomotion Goal 2    Activity/Assistive Device gait (walking locomotion), assistive device use at 04/02/2025 0904   Distance 150 feet at 04/02/2025 0904   [place] modified independence at 04/02/2025 0904   Time Frame long-term goal (LTG), 3 weeks at 04/02/2025 0904   Progress/Outcome goal ongoing at 04/03/2025 0856   Stairs Goal 1    Activity/Assistive Device ascending stairs, descending stairs, using handrail, left, using handrail, right at 04/02/2025 0904   Number of Stairs 12 at 04/02/2025 0904   [place] minimum assist (75% or more patient effort) at 04/02/2025 0904   Time Frame short-term goal (STG), 5 - 7 days at 04/03/2025 0856   Strategies/Barriers evaluated 4/2, continue PT POC at 04/03/2025 0856   Progress/Outcome good progress toward goal, goal ongoing at 04/03/2025 0856   Stairs Goal 2    Activity/Assistive Device ascending stairs, descending  stairs, using handrail, left, using handrail, right at 04/02/2025 0904   Number of Stairs 12 at 04/02/2025 0904   [place] supervision required at 04/02/2025 0904   Time Frame long-term goal (LTG), 3 weeks at 04/02/2025 0904   Progress/Outcome goal ongoing at 04/03/2025 0895

## 2025-04-03 NOTE — PLAN OF CARE
Individualized Plan of Care    Celso Gramajo is admitted to [facility] for comprehensive inpatient rehabilitation for orthopedic condition status post posterior C4-T1 decompression and fusion, C6-C7 discitis and osteomyelitis with ventral epidural abscess compressing the spinal cord, history of esophageal squamous cell carcinoma status post chemoradiotherapy (CRT) complicated by esophageal strictures, left vocal cord paralysis, dysphagia status post PEG tube, history of thyroid cancer, multiple medical problems with functional deficits in balance; coordination; endurance/activity tolerance; mobility; motor dysfunction; pain; range of motion (ROM); safety awareness; self-care; strength; visual/perceptual. Patient is receiving the following services: medical consultative services; occupational therapy; physical therapy; clinical nutrition; psychiatry/psychology services, rehabilitation nursing care, case management evaluation.     Frequency of Treatment (OT): 5-7 times per week  Intensity of Treatment (OT): 60-90 minutes per day  Frequency of Treatment (PT): 5-7 times per week  Intensity of Treatment (PT): 60-90 minutes per day       Active medical management is required for  Patient Active Problem List   Diagnosis    Vertigo    Hypertension    Postsurgical hypothyroidism    Mixed hyperlipidemia    Gastroesophageal reflux disease    Increased sputum production    Allergic rhinitis    Change in bowel habits    Chronic fatigue syndrome    Constipation, chronic    Deviated nasal septum    Elevated coronary artery calcium score    Eustachian tube dysfunction, bilateral    Examination of participant in clinical trial    Induration penis plastica    Hypertrophy of nasal turbinates    Hx of colonic polyps    Malignant neoplasm of salivary gland (CMS/HCC)    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)    Paralysis of vocal cords and larynx, unilateral    ENMANUEL (obstructive sleep apnea)     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)    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)    Penile curvature, acquired    Rhinitis sicca    Thyroid cancer (CMS/HCC)    Sensorineural hearing loss (SNHL), bilateral    Other dysphagia    Abdominal pain    Other dysphagia    COVID-19    Encounter for follow-up surveillance of thyroid cancer    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       Risk for Complications  Celso is at risk for the following complications:     Acute change in mental status due to multiple medications    Constipation due to impaired mobility    DVT/PE due to impaired mobility, post-surgical state, and cancer diagnosis    Hemorrhage/bleed due to anti-coagulation medication      Sepsis due to treatment with antibiotics in the past 90 days and known infections   Skin breakdown/pressure ulcers due to impaired mobility   Uncontrolled pain due to cancer diagnosis    Wound infection due to surgical incision and prolonged surgery (at least 2 hours)         Celso's medical prognosis is good to achieve the stated goals below.    Expected Level of Function  Self-Care: Setup or clean-up assistance  Transfers: Independent  Locomotion: Independent  Communication: Independent  Social Cognition: Independent      Goals  IRF PT Goals      Flowsheet Row Most Recent Value   Bed Mobility Goal 1    Activity/Assistive Device sit to supine/supine to sit at 04/02/2025 0904   [place] supervision required at 04/02/2025 0904   Time Frame short-term goal (STG), 5 - 7 days at 04/02/2025 0904   Bed Mobility Goal 2    Activity/Assistive Device bed mobility activities, all at 04/02/2025 0904   [place] modified independence at 04/02/2025 0904   Time Frame long-term goal (LTG), 3 weeks at 04/02/2025 0904   Transfer Goal 1    Activity/Assistive Device sit-to-stand/stand-to-sit, stand pivot at 04/02/2025 0904   [place] minimum assist (75% or more patient effort) at 04/02/2025 0904   Time Frame  short-term goal (STG), 5 - 7 days at 04/02/2025 0904   Transfer Goal 2    Activity/Assistive Device sit-to-stand/stand-to-sit, stand pivot at 04/02/2025 0904   [place] modified independence at 04/02/2025 0904   Time Frame long-term goal (LTG), 3 weeks at 04/02/2025 0904   Gait/Walking Locomotion Goal 1    Activity/Assistive Device gait (walking locomotion), assistive device use at 04/02/2025 0904   Distance 150 feet at 04/02/2025 0904   [place] minimum assist (75% or more patient effort) at 04/02/2025 0904   Time Frame short-term goal (STG), 5 - 7 days at 04/02/2025 0904   Gait/Walking Locomotion Goal 2    Activity/Assistive Device gait (walking locomotion), assistive device use at 04/02/2025 0904   Distance 150 feet at 04/02/2025 0904   [place] modified independence at 04/02/2025 0904   Time Frame long-term goal (LTG), 3 weeks at 04/02/2025 0904   Stairs Goal 1    Activity/Assistive Device ascending stairs, descending stairs, using handrail, left, using handrail, right at 04/02/2025 0904   Number of Stairs 12 at 04/02/2025 0904   [place] minimum assist (75% or more patient effort) at 04/02/2025 0904   Time Frame short-term goal (STG), 5 - 7 days at 04/02/2025 0904   Stairs Goal 2    Activity/Assistive Device ascending stairs, descending stairs, using handrail, left, using handrail, right at 04/02/2025 0904   Number of Stairs 12 at 04/02/2025 0904   [place] supervision required at 04/02/2025 0904   Time Frame long-term goal (LTG), 3 weeks at 04/02/2025 0904          IRF OT Goals      Flowsheet Row Most Recent Value   Transfer Goal 1    Activity/Assistive Device toilet at 04/02/2025 0723   [place] minimum assist (75% or more patient effort) at 04/02/2025 0723   Time Frame short-term goal (STG), 5 - 7 days at 04/02/2025 0723   Strategies/Barriers with use of RW at 04/02/2025 0723   Transfer Goal 2    Activity/Assistive Device toilet at 04/02/2025 0723   [place] modified independence  at 04/02/2025 0723   Time Frame long-term goal (LTG), 21 days or less at 04/02/2025 0723   Transfer Goal 3    Activity/Assistive Device shower at 04/02/2025 0723   [place] other (see comments)  [TBA] at 04/02/2025 0723   Time Frame short-term goal (STG), 5 - 7 days at 04/02/2025 0723   Transfer Goal 4    Activity/Assistive Device shower at 04/02/2025 0723   [place] other (see comments)  [TBA] at 04/02/2025 0723   Time Frame long-term goal (LTG), 21 days or less at 04/02/2025 0723   Bathing Goal 1    [place] moderate assist (50-74% patient effort) at 04/02/2025 0723   Time Frame short-term goal (STG), 5 - 7 days at 04/02/2025 0723   Bathing Goal 2    [place] supervision required at 04/02/2025 0723   Time Frame long-term goal (LTG), 21 days or less at 04/02/2025 0723   UB Dressing Goal 1    [place] other (see comments)  [TBA] at 04/02/2025 0723   Time Frame short-term goal (STG), 5 - 7 days at 04/02/2025 0723   UB Dressing Goal 2    [place] other (see comments)  [TBA] at 04/02/2025 0723   Time Frame long-term goal (LTG), 21 days or less at 04/02/2025 0723   LB Dressing Goal 1    [place] moderate assist (50-74% patient effort) at 04/02/2025 0723   Time Frame short-term goal (STG), 5 - 7 days at 04/02/2025 0723   LB Dressing Goal 2    [place] modified independence at 04/02/2025 0723   Time Frame long-term goal (LTG), 21 days or less at 04/02/2025 0723   Grooming Goal 1    [place] set-up required at 04/02/2025 0723   Time Frame short-term goal (STG), 5 - 7 days at 04/02/2025 0723   Grooming Goal 2    [place] modified independence at 04/02/2025 0723   Time Frame long-term goal (LTG), 21 days or less at 04/02/2025 0723   Toileting Goal 1    [place] moderate assist (50-74% patient effort) at 04/02/2025 0723   Time Frame short-term goal (STG), 5 - 7 days at 04/02/2025 0723   Toileting Goal 2    [place] modified independence at 04/02/2025 0723   Time Frame long-term  goal (LTG), 21 days or less at 04/02/2025 0723            Anticipated Discharge Plan  home with home health;home with assistance    Expected length of stay: 10 days

## 2025-04-03 NOTE — PROGRESS NOTES
Patient: Celso Gramajo  Location: [facility]  MRN: [id number]  Today's date: 4/3/2025    History of Present Illness  Celso is a 69 y.o. male admitted on 4/1/2025 with Spinal epidural abscess [G06.1]. Principal problem is Spinal epidural abscess.    Celso Gramajo is a 69 year old male with PMHx thyroid CA s/p thyroidectomy, RND, adjuvant XRT/immunotherapy, esophageal SCC s/p chemoradiation c/b esophageal strictures, HTN, Elevated Coronary Calcium scoring in 2017, ENMANUEL, Hypothyroidism, Anemia, dysphagia on PEG (placed in 2021) TFs, and history of CVA who presented to an OSH with neck and R shoulder pain. Workup was unrevealing and he was discharged home.    He presented to the [facility] ED again on 3/17 with ongoing fevers. CT Neck with no abscess, old postsurgical changes. He d/c home but BCx subsequently returned positive on 3/19 and he returned for admission. He was also bacteremic with strep viridans, and placed on IV antibiotics. Workup revealed a C5-C7 ventral epidural abscess with osteomyelitis, and he was transferred to [facility] for surgical evaluation.    MRI on 3/24 c/f metastatic disease at T1-T4, L4.      At [facility] ortho and ENT were consulted. Ultimately it was determined that an anterior surgical approach was not safe (due to history of neck surgeries), and patient was having progressive weakness. Repeat MRI C-spine showed Discitis and osteomyelitis at C6-C7 with ventral epidural abscess compressing the spinal cord and increased edema. Patient urgently underwent POSTERIOR C4-T1 DECOMPRESSION FUSION WITH [place] INSTRUMENTATION on 3/27. Postop he was briefly in the ICU for MAP goals. OR cultures grew strep intermedius and he was placed on the antibiotic plan below. He had a PICC placed 4/1. 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 He was started on salt tabs. In rehab please continue to check labs, and titrate the salt tabs as appropriate.  He worked with PT/OT who recommended him for acute rehab.     Past Medical History  Celso has a past medical history of Anemia, Cancer (CMS/HCC), head and neck radiation, Hypertension, Hypothyroidism, Stroke (CMS/HCC), and Thyroid disease.    OT Vitals      Date/Time Pulse HR Source BP BP Location BP Method Pt Position [facility]   04/03/25 0812 81 Monitor 127/69 Left upper arm Automatic Lying AA          OT Pain      Date/Time Pain Type Side/Orientation Rating: Rest Rating: Activity Description Interventions [facility]   04/03/25 0812 Pain Assessment knee 8 8 aching diversional activity provided AA   04/03/25 0928 Pain Assessment knee 8 8 aching diversional activity provided AA               Prior Living Environment      Flowsheet Row Most Recent Value   People in Home spouse   Current Living Arrangements home   Home Accessibility stairs to enter home (Group), stairs within home (Group)   Living Environment Comment Multi-story home with 12-14 steps to second floor, powder room on 1st floor, 2-3 FELICITA via garage, multiple supportive family members   Number of Stairs, Main Entrance 3   Stair Railings, Main Entrance none   Location, Patient Bedroom second floor, must negotiate stairs to access   Location, Bathroom second floor, must negotiate stairs to access   Bathroom Access Comment powder room 1st floor standard height toilet, full bathroom 2nd floor WIS with glass door, standard height toilet   Stairs, Within Home, Primary 12   Stair Railings, Within Home, Primary railings on both sides of stairs            Prior Level of Function      Flowsheet Row Most Recent Value   Dominant Hand right   Ambulation independent   Transferring independent   Toileting independent   Bathing independent   Dressing independent   Eating independent   IADLs independent   Driving/Transportation    Communication understands/communicates without difficulty   Past History of Dysphagia PTA pt was completely independent, no AD. (+) .  NPO PTA, manages peg tube independently.   Prior Level of Function Comment Independent without use of AE or DME.   Assistive Device Currently Used at Home none            Occupational Profile      Flowsheet Row Most Recent Value   Occupational History/Life Experiences retired involved in [employer]. + , -handicap driving placard.   Patient Goals PSFS: going to the bathroom 0/10, walking 1/10, getting in/out of bed 0/10 = 1/30 = 3.33%             IRF OT Evaluation and Treatment - 04/03/25 0813          OT Time Calculation    Start Time 0800     Stop Time 0930     Time Calculation (min) 90 min        Session Details    Document Type Daily Treatment/Progress Note        General Information    General Observations of Patient Pt recieved for therapy in bed, +tube feeding. Patient care also provided by KACY Benito, OTR/L        Mobility Belt    Mobility Belt Used During Session yes        Orthotics    Orthotics (Trigger Row) Add Orthosis LDA        Orthosis Neck Cervical Collar    Orthosis Properties Date Obtained: 04/03/25 Time Obtained: 0825 Location: Neck Type: Cervical Collar Features: Hard Description: Hard collar AAT; can be removed seated to don [place] collar for bathing.    Compliance/Wearing Issues compliant with wearing schedule        Bed Mobility    Comment OT: Mod A supine > sit on EOB.        Toilet Transfer    Transfer Technique stand pivot     [place] moderate assist (50-74% patient effort)     Safety/Cues technique;hand placement     Assistive Device commode, 3-in-1     Comment SPT from EOB > 3:1 commode. Min A SPT from 3:1 commode > w/c.        Shower Transfer    Transfer Technique stand pivot     [place] minimum assist (75% or more patient effort)     Safety/Cues technique;hand placement     Assistive Device tub bench;grab bars/tub rail     Comment SPT from w/c <> tub bench + grab bar in barrier free shower.        Balance    Comment, Balance Static sitting balance  "on EOB: good.        Motor Skills    Comment, Motor Skills Pt reports unable to remove \"button\" on feeding tube d/t dec visibily with cervical collar donned.        Bathing    Shower Provided? Yes     [place] moderate assist (50-74% patient effort)     Safety/Cues increased time to complete;maintaining precautions;problem-solving;sequencing     Position supported sitting     Setup Assistance open containers;adaptive equipment setup;adjust water temperature/flow     Comment Performed wet shower seated on tub bench. Min A standing for pt to wash buttocks/front sony. Assist to wash backside. D to to wash distal LE. Increased assist to dry d/t fatigue at end of shower.        Upper Body Dressing    Tasks don;front-opening garment     [place] minimum assist (75% or more patient effort)     Position supported sitting     Comment Pt able to don front open shirt, assit to button up shirt.        Lower Body Dressing    [place] moderate assist (50-74% patient effort)     Safety/Cues increased time to complete;maintaining precautions;problem-solving     Position supported sitting     [place], Footwear moderate assist (50-74% patient effort)     Comment Pt able to doff socks in figure 4 sit, D to don socks. Pt able to thread BLE into underwear in figure 4 sit, assist to thread pants d/t faituge. Min A for balance c stand/arrange.        Grooming    Tasks oral care (brushing teeth, cleaning dentures);brushes/best hair;washes, rinses and dries hands;washes, rinses and dries face     Position sink side;supported sitting     [place], Oral Hygiene set up     Comment Seated at w/c level, assist to open containers        Toileting    Tasks adjust/manage clothing;perform bladder hygiene     [place] moderate assist (50-74% patient effort)     Position supported sitting     Adaptive Equipment commode, 3-in-1     Comment Min-Mod A for assist c pant management. (+) voiding on 3:1 commode or urine.        " Daily Progress Summary (OT)    Daily Outcome Statement Pt seen for wet shower ADL on this date. Pt noted with improvement in transfers to a Min A level. Pt is Mod A for LB dressing and bathing, limited by spinal precautions and fatigue. May benefit from AE to conserve energy. To trial AE and complete UE FOM.                          Education Documentation  No documentation found.        IRF OT Goals      Flowsheet Row Most Recent Value   Transfer Goal 1    Activity/Assistive Device toilet at 04/02/2025 0723   [place] minimum assist (75% or more patient effort) at 04/02/2025 0723   Time Frame short-term goal (STG), 5 - 7 days at 04/03/2025 0754   Strategies/Barriers pt just evlauated at 04/03/2025 0754   Progress/Outcome progress slower than expected, goal ongoing, goal revised this date at 04/03/2025 0754   Transfer Goal 2    Activity/Assistive Device toilet at 04/02/2025 0723   [place] modified independence at 04/02/2025 0723   Time Frame long-term goal (LTG), 21 days or less at 04/02/2025 0723   Progress/Outcome goal ongoing at 04/03/2025 0754   Transfer Goal 3    Activity/Assistive Device shower at 04/02/2025 0723   [place] other (see comments)  [TBA] at 04/02/2025 0723   Time Frame short-term goal (STG), 5 - 7 days at 04/03/2025 0754   Strategies/Barriers pt just evlauated at 04/03/2025 0754   Progress/Outcome progress slower than expected, goal ongoing, goal revised this date at 04/03/2025 0754   Transfer Goal 4    Activity/Assistive Device shower at 04/02/2025 0723   [place] other (see comments)  [TBA] at 04/02/2025 0723   Time Frame long-term goal (LTG), 21 days or less at 04/02/2025 0723   Progress/Outcome goal ongoing at 04/03/2025 0754   Bathing Goal 1    [place] moderate assist (50-74% patient effort) at 04/02/2025 0723   Time Frame short-term goal (STG), 5 - 7 days at 04/03/2025 0754   Strategies/Barriers pt just evlauated at 04/03/2025 0754   Progress/Outcome progress slower  than expected, goal ongoing, goal revised this date at 04/03/2025 0754   Bathing Goal 2    [place] supervision required at 04/02/2025 0723   Time Frame long-term goal (LTG), 21 days or less at 04/02/2025 0723   Progress/Outcome goal ongoing at 04/03/2025 0754   UB Dressing Goal 1    [place] other (see comments)  [TBA] at 04/02/2025 0723   Time Frame short-term goal (STG), 5 - 7 days at 04/03/2025 0754   Strategies/Barriers pt just evlauated at 04/03/2025 0754   Progress/Outcome progress slower than expected, goal ongoing, goal revised this date at 04/03/2025 0754   UB Dressing Goal 2    [place] other (see comments)  [TBA] at 04/02/2025 0723   Time Frame long-term goal (LTG), 21 days or less at 04/02/2025 0723   Progress/Outcome goal ongoing at 04/03/2025 0754   LB Dressing Goal 1    [place] moderate assist (50-74% patient effort) at 04/02/2025 0723   Time Frame short-term goal (STG), 5 - 7 days at 04/03/2025 0754   Strategies/Barriers pt just evlauated at 04/03/2025 0754   Progress/Outcome progress slower than expected, goal ongoing, goal revised this date at 04/03/2025 0754   LB Dressing Goal 2    [place] modified independence at 04/02/2025 0723   Time Frame long-term goal (LTG), 21 days or less at 04/02/2025 0723   Progress/Outcome goal ongoing at 04/03/2025 0754   Grooming Goal 1    [place] set-up required at 04/02/2025 0723   Time Frame short-term goal (STG), 5 - 7 days at 04/03/2025 0754   Strategies/Barriers pt just evlauated at 04/03/2025 0754   Progress/Outcome progress slower than expected, goal ongoing, goal revised this date at 04/03/2025 0754   Grooming Goal 2    [place] modified independence at 04/02/2025 0723   Time Frame long-term goal (LTG), 21 days or less at 04/02/2025 0723   Progress/Outcome goal ongoing at 04/03/2025 0754   Toileting Goal 1    [place] moderate assist (50-74% patient effort) at 04/02/2025 0707   Time Frame short-term goal (STG), 5 - 7 days  at 04/03/2025 0754   Strategies/Barriers pt just evlauated at 04/03/2025 0754   Progress/Outcome progress slower than expected, goal ongoing, goal revised this date at 04/03/2025 0754   Toileting Goal 2    [place] modified independence at 04/02/2025 0723   Time Frame long-term goal (LTG), 21 days or less at 04/02/2025 0723   Progress/Outcome goal ongoing at 04/03/2025 0754

## 2025-04-03 NOTE — PROGRESS NOTES
[facility] Progress Note          Patient was seen and examined at bedside.    Subjective:   Reviewed night shift RN report:  Pt is AAO X 3, able to make needs known. Continent of bladder, uses urinal, spilled once. No BM during this shift. Infrequent productive cough, uses yankauer. Peg tube site clean. Water flush administered. Pt stated he does not feel good, VS checked 118/60, 98.7, 93% on RA, 84/min. . Pt later stated that he is upset because he was told that his stay will be only for 2 weeks. Reassurance provided , felt better. Patient had low grade temp of 99.5 earlier in the shift and urine was odorous, physician on call notified and order obtained for UA & CS. Patient received IV ceftriaxone via GABRIEL single lumen PICC. No adverse reactions. Slept intermittently.     Saw and evaluated him in am:  Abdominal distention significantly, no good BM for several days, he stated which is related to opioid use. He is passing gas, denies nausea or abdominal pain.   Suspect ileus:  Check obstruction series, limit opioid use,  bowel rest d/w dietitian, on linzess, add enemeez plus, moving around and bowel movement trial timely.          Objective   Vital signs in last 24 hours:  Temp:  [36.8 °C (98.2 °F)-37.5 °C (99.5 °F)] 36.8 °C (98.2 °F)  Heart Rate:  [] 81  Resp:  [18-20] 18  BP: (112-127)/(59-73) 127/69      Intake/Output Summary (Last 24 hours) at 4/3/2025 1111  Last data filed at 4/3/2025 0945  Gross per 24 hour   Intake 150 ml   Output 350 ml   Net -200 ml     Intake/Output this shift:  I/O this shift:  In: 50 [NG/GT:50]  Out: -    Review of Systems:  All other systems reviewed and negative except as noted in the HPI.   Objective      Labs  Reviewed his labs thoroughly   Lab Results   Component Value Date    WBC 9.54 04/02/2025    HGB 12.3 (L) 04/02/2025    HCT 36.8 (L) 04/02/2025    MCV 93.2 04/02/2025     04/02/2025     Lab Results   Component Value Date    GLUCOSE  161 (H) 04/02/2025    CALCIUM 9.0 04/02/2025     (L) 04/02/2025    K 4.9 04/02/2025    CO2 29 04/02/2025    CL 97 (L) 04/02/2025    BUN 18 04/02/2025    CREATININE 0.6 (L) 04/02/2025       Imaging  OSH imaging study reports reviewed    Full Code    Physical Exam:  Head/Ear/Nose/Throat: normocephalic; atraumatic; moisture mouth mm, no oropharyngeal thrush noted.   Eyes: anicteric sclera, EOMI; PERRL.   Neck : supple, no JVD, no carotid bruits appeciated.   Respiratory: no evidence of labored breathing, lung sounds CTA b/l, good aeration bibasilar area, no w/r/c.   Cardiovascular: RRR; normal S1, S2; no m/r/g; no S3 or S4.   Gastrointestinal: PEG in place, focal c/d/I; soft; NT; BS normal; mildly distended; no CVAT b/l.   Genitourinary: no song.   Extremities : no c/c/e .   Neurological: AO x 3, fluent speeches, following commands, CNS II-XII grossly intact; no focal neurologic deficits.   Behavior/Emotional: in NAD, appropriate; cooperative.   Skin: clean, dry and intact.  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  He had a PEG placed in 2021 for dysphagia.  Cervical spinal stenosis with myeloradiculopathy h/o cervical spinal surgery. He initially presented to [facility] ED on 3/11/25 with fevers and R shoulder pain. Workup was unrevealing and he was discharged home.  He presented to the [facility] ED again on 3/17 with ongoing fevers. CT Neck with no abscess, old postsurgical changes, BCX was collected. He d/c home but BCX subsequently returned positive on 3/19 and he  returned for admission. Imaging with C6-7 OM / discitis with ventral epidural abscess. He was transferred to [facility] on 3/23 for surgical evaluation.  At that time, he was afebrile. BCx negative. MRI with C5-C7 cervical epidural abscess/osteo with cord signal at C6-7, T1-4, L4 vertebral body lesions probably radiation-changes per [facility] radiology, although metastatic lesions are not ruled out, per wife PET-CT scan no evidence of metastatic malignancy light up at spine region. He is now s/p posterior C4-T1 decompression and fusion on 3/27. Purulent fluid drained from anterior spine. OR Cx Strep intermedius.  ID rec IV dapto and ceftriaxone x 6 weeks.  PICC placed. He was admitted to [facility] on 4/1/25 for post op inpt acute rehab. He is TF NPO status.     A/P:  # Strep intermedius bacteremia with C6-7 OM / discitis and ventral epidural abscess, s/p PCDF 3/27/25, purulent fluid drainage from anterior spine  Suspect source likely recent esophageal dilatations ; 3/17 BCx + at OSH; TTE negative for veg at OSH  -complete planned 6-week iv Rocephin course, last dose of daptomycin on 3/31/25,  wound care dermal defense, f/u with [facility] ID as outpt, weekly labs.     # H/o thyroid CA s/p thyroidectomy and RND, adjuvant XRT/immunotherapy, esophageal SCC s/p CRT c/b esophageal strictures, L VC paralysis  -cancer surveillance f/u with [facility]  medical/ radiation oncology;  -palliative consultation.      # Dysphagia, NPO/TF, IBS-C, GI prophylaxis  -cont PEG TF, dietitian co management for nutrition supplements, Pepcid/PPI for GI prophylaxis       # Hypothyroidism   -per his endo, increase levothyroxine dose from 137-150 mcg daily.     # DVT prophylaxis  -SQH     # Anxiety  -on Prozac, psychology CBT, psychiatrist for co management      # Hyperglycemia, previous prediabetes  -diet modification per dietitian, SSI with accu checks      Billing code: [id number]  Diagnoses:  Patient Active Problem List   Diagnosis    Vertigo    Hypertension     Postsurgical hypothyroidism    Mixed hyperlipidemia    Gastroesophageal reflux disease    Increased sputum production    Allergic rhinitis    Change in bowel habits    Chronic fatigue syndrome    Constipation, chronic    Deviated nasal septum    Elevated coronary artery calcium score    Eustachian tube dysfunction, bilateral    Examination of participant in clinical trial    Induration penis plastica    Hypertrophy of nasal turbinates    Hx of colonic polyps    Malignant neoplasm of salivary gland (CMS/HCC)    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)    Paralysis of vocal cords and larynx, unilateral    ENMANUEL (obstructive sleep apnea)    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)    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)    Penile curvature, acquired    Rhinitis sicca    Thyroid cancer (CMS/HCC)    Sensorineural hearing loss (SNHL), bilateral    Other dysphagia    Abdominal pain    Other dysphagia    COVID-19    Encounter for follow-up surveillance of thyroid cancer    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         Lilian Marrero MD  4/3/2025

## 2025-04-03 NOTE — PROGRESS NOTES
Inpatient Psychology Initial Intake    Duration: 60 minutes    Celso Gramajo, : 1955, a 69 y.o. male, for initial evaluation visit to discuss Adjustment to Disability, Pain Management, and Cognitive Dysfunction.    HPI: Mr. Celso Gramajo is a 69-year-old right-handed white male with chronic conditions significant for hypertension, hyperlipidemia, anxiety, thyroid cancer status post thyroidectomy and radical neck dissection, adjuvant radiation therapy/immunotherapy, esophageal squamous cell carcinoma status post chemoradiotherapy complicated by esophageal strictures, left vocal cord paralysis, dysphagia status post PEG tube placement in ,     He presented to the ER at [facility] on 3/11/25 with fevers and right shoulder pain. Workup was unrevealing and he was discharged home. He presented to the [facility] ER again on 3/17/25 with ongoing fevers. CT neck with no abscess, old postsurgical changes. He discharged home but blood cultures subsequently, 1 of 2 sets of blood cultures drawn at [facility] on 3/17/25 showed Streptococcus viridans returned positive on 3/19/25 and he returned for admission to [facility] on 3/19/25. TTE at [facility] was negative. Blood cultures on 3/19/25 and again on 3/23/25 showed no growth per his records. Imaging with C6-7 osteomyelitis/discitis with ventral epidural abscess. He was transferred to [facility] on 3/23/25 for surgical evaluation.  At that time, he was afebrile.  Blood cultures  were negative. At [facility] ortho and ENT were consulted. Ultimately it was determined that an anterior surgical approach was not safe (due to history of neck surgeries), and patient was having progressive weakness.  MRI of cervical spine on 3/27/25 revealed discitis and osteomyelitis at C6-C7 with ventral epidural abscess compressing the spinal cord and increased inferior extent of cord edema compared to prior. Patchy T1 hypointensity with enhancement in the upper cervical and  thoracic spine, can represent metastatic lesions and/or post radiation changes, similar as compared to prior. He underwent posterior C4-T1 decompression and instrumented fusion on 3/27/25, performed by orthopedic surgeon Dr. Santiago Ca. Purulent fluid drained from anterior spine during the procedure and operating room cultures revealed Strep intermedius. Postop he was briefly in the ICU for MAP goals. He had a PICC placed 4/1/25. 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 He was started on salt tabs. Infectious diseases recommended IV Daptomycin which patient completed on 3/31/25.  He was recommended to continue Ceftriaxone 2g IV q12h x 14 day course (end date 4/8/25) then transition to Ceftriaxone 2g IV daily to complete total 6 week course (end date 5/8/25). He was recommend [place] J collar to neck at all times.  I discussed orthopedic spinal precautions with him.  He completed Decadron treatment for mild laryngeal edema visualized on preoperative NPL. Patient has dysphagia from esophageal strictures, recent dilation 3/3/25 was not successful per patient, cannot tolerate regular oral secretions and recommended NPO and continued on bolus tube feeds.  He was kept on Pepcid for GI prophylaxis. Glycopyrrolate as needed per patient/spouse wishes.  For anxiety he was continued on Fluoxetine and Ativan PRN.  He is not on medications for hypertension.  He was continued on Zetia for hyperlipidemia.  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 He continues on a sliding-scale Humalog coverage.  He is on Levothyroxine and Liothyronine for thyroid supplementation after thyroidectomy for thyroid cancer. His Synthroid was increased to 150mcg on 4/2. He should follow up outpatient with his PCP for continued titration. He is on subcutaneous Heparin and SCDs for DVT prophylaxis.  I discussed his recent clinical course with patient and with his wife at bedside.   On 4/1/25, hemoglobin was 12.9, WBC count was 10.30, platelets were 255, BUN 15, creatinine was 0.51, sodium was 133 and potassium was 4.5.  He has been needing assistance for mobility, transfers, self-care. I have reviewed the preadmission screening and concur with that information and there are no significant changes from patient's preadmission screening. He is transferred to [facility] on 4/1/25 for further rehabilitation care.          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 drug thru [facility]     Hx of head and neck radiation     Hypertension     Hypothyroidism     Stroke (CMS/HCC)     Thyroid disease      Past Surgical History   Procedure Laterality Date    Cholecystectomy      Hernia repair      Micro direct laryngoscopy with fat implantation, abdominal fat harvest,laser N/A 11/23/2020    Performed by Jon German MD at [facility]    Neck dissection      Neck surgery      WI EGD INSERT GUIDE WIRE DILATOR PASSAGE ESOPHAGUS [54985 (CPT®)] N/A 10/17/2022    Performed by Ricardo Cabral MD at [facility]    Thyroid surgery      Tumor removal      L neck 20yrs ago      Family History   Problem Relation Name Age of Onset    No Known Problems Biological Father      Lung cancer Biological Sister       Social History     Socioeconomic History    Marital status:    Tobacco Use    Smoking status: Never    Smokeless tobacco: Never   Vaping Use    Vaping status: Never Used   Substance and Sexual Activity    Alcohol use: No    Drug use: No    Sexual activity: Defer     Social Drivers of Health     Food Insecurity: No Food Insecurity (4/2/2025)    Hunger Vital Sign     Worried About Running Out of Food in the Last Year: Never true     Ran Out of Food in the Last Year: Never true   Transportation Needs: No Transportation Needs (4/2/2025)    PRAPARE - Transportation     Lack of Transportation (Medical): No     Lack of  Transportation (Non-Medical): No   Housing Stability: Low Risk  (4/2/2025)    Housing Stability Vital Sign     Unable to Pay for Housing in the Last Year: No     Number of Times Moved in the Last Year: 0     Homeless in the Last Year: No         Previous Mental Health History:   Previous Mental Health Treatment:  Psychotropic Medications history of anxiety - prozac and ativan - started for pain via PCP.  Feeling stressed. Tried psychotherapy felt it had opposite effect.  Previous Suicidal Behavior:  Ideation  Previous Self-Injurious Behavior:  N/A  Previous Homicidal Behavior:  N/A  Previous Substance Abuse Treatment: N/A    Current Facility-Administered Medications   Medication Dose Route Frequency Provider Last Rate Last Admin    acetaminophen (TYLENOL) 650 mg/20.3 mL solution 650 mg  650 mg peg tube q6h PRN Rohit Acevedo MD   650 mg at 04/04/25 1129    bisacodyL (DULCOLAX) 10 mg suppository 10 mg  10 mg rectal Daily PRN Rohit Acevedo MD   10 mg at 04/03/25 1554    cefTRIAXone (ROCEPHIN) 2 g in sodium chloride 0.9 % 100 mL IVPB  2 g intravenous q12h [facility] Rohit Acevedo MD   Stopped at 04/04/25 0644    [START ON 4/9/2025] cefTRIAXone (ROCEPHIN) 2 g in sodium chloride 0.9 % 100 mL IVPB  2 g intravenous q24h INT Rohit Acevedo MD        docusate sodium (COLACE) 50 mg/5 mL liquid 100 mg  100 mg peg tube BID Spencer Kemp MD   100 mg at 04/04/25 0748    ezetimibe (ZETIA) tablet 10 mg  10 mg peg tube Nightly Rohit Acevedo MD   10 mg at 04/03/25 2044    famotidine (PEPCID) tablet 20 mg  20 mg peg tube Nightly Rohit Acevedo MD   20 mg at 04/03/25 2044    FLUoxetine (PROzac) 20 mg/5 mL (4 mg/mL) solution 20 mg  20 mg peg tube Daily Rohit Acevedo MD   20 mg at 04/04/25 0749    glycopyrrolate (ROBINUL) tablet 1 mg  1 mg peg tube 2x daily PRN Rohit Acevedo MD        heparin (porcine) 5,000 unit/mL injection 5,000 Units  5,000 Units subcutaneous q8h DU  Rohit Acevedo MD   5,000 Units at 04/04/25 1459    HYDROmorphone (DILAUDID) tablet 2 mg  2 mg peg tube q6h PRN Lilian Marrero MD   2 mg at 04/04/25 1558    insulin lispro U-100 (HumaLOG) pen 1-12 Units  1-12 Units subcutaneous q6h DU Lilian Marrero MD   1 Units at 04/04/25 1459    lansoprazole (PREVACID) 3 mg/mL oral suspension 15 mg  15 mg feeding tube Daily before breakfast Lilian Marrero MD   15 mg at 04/04/25 0748    levothyroxine (SYNTHROID) tablet 150 mcg  150 mcg peg tube Daily (6:30a) Lilian Marrero MD   150 mcg at 04/04/25 0541    linaCLOtide (LINZESS) capsule 145 mcg  145 mcg g-tube Daily before lunch Lilian Marrero MD   145 mcg at 04/03/25 0837    liothyronine (CYTOMEL) tablet 10 mcg  10 mcg peg tube Daily (6:30a) Rohit Acevedo MD   10 mcg at 04/04/25 0541    LORazepam (ATIVAN) tablet 0.5 mg  0.5 mg peg tube q8h PRN Rohit Acevedo MD   0.5 mg at 04/04/25 0753    magnesium hydroxide (M.O.M.) 400 mg/5 mL suspension 30 mL  30 mL oral 2x daily PRN Lilian Marrero MD   30 mL at 04/02/25 1138    nystatin (MYCOSTATIN) 100,000 unit/gram topical powder 1 Application  1 Application Topical BID Spencer Kemp MD        ondansetron ODT (ZOFRAN-ODT) disintegrating tablet 4 mg  4 mg peg tube q6h PRN Rohit Acevedo MD        senna (SENOKOT) tablet 1 tablet  1 tablet peg tube 2x daily PRN Rohit Acevedo MD   1 tablet at 04/03/25 1547    senna (SENOKOT) tablet 2 tablet  2 tablet peg tube Daily Spencer Kemp MD   2 tablet at 04/04/25 1559    simethicone (MYLICON) chewable tablet 160 mg  160 mg peg tube QID Lilian Marrero MD   160 mg at 04/04/25 1558    sodium chloride PF flush 10 mL  10 mL intravenous q8h INT Rohit Acevedo MD   10 mL at 04/04/25 1502    sodium chloride PF flush 10 mL  10 mL intravenous PRN Rohit Acevedo MD        sodium chloride tablet 1 g  1 g peg tube BID Rohit Acevedo MD   1 g at 04/04/25 7181       Current Evaluation:   Mental  Status Exam:  Arousal Level: Attentive  Appearance: Well Groomed  Speech: Regular  Psychomotor Functioning: WNL  Eye Contact: WNL  Est. Premorbid Intelligence: Above average  Orientation: Fully oriented  Attention: WNL  Concentration: WNL  Recent Memory: WNL  Remote Memory: WNL  Thought Content: Appropriate  Thought Process: WNL  Insight: Intact  Perceptual Function: Pain  Delusions: None or age appropriate  Sleeping: Decreased (2-3 hours. pain and strange dreams)  Appetite: NPO  Affect: Full Range  Mood: Frustrated, Motivated    Assessments done this visit:     [place] Suicide Severity Rating Scale:  Done today      [place] Suicide Severity Rating Scale  1. Within the past month, have you wished you were dead or wished you could go to sleep and not wake up?: No  2. Within the past month, have you actually had any thoughts of killing yourself?: No  6. Have you ever done anything, started to do anything, or prepared to do anything to end your life?: No    Safe-T Assessment:  Not indicated        Comments:     Risk Assessment:   Suicidal Ideation: Based on [place] Suicide Screen and current clinical assessment, patient is determined Low Risk.  Self Injurious Behavior:  Not Present  Irritability:  Not Present  Homicidal Behavior: Not Present  Estimate of Risk:  None  If risk identified have suicide precautions been implemented? No     Goals Addressed                   This Visit's Progress     Improve coping skills        Improve Mood        Increase adjustment to disposition/discharge        Increase adjustment to rehabilitation facility.               Interventions  Acceptance & Adjustment  Goal Setting  Insight Development  Monitoring of Symptoms  Psychoeducation  Safety Management  Other:  Intake Assessment    Psychoeducation provided on:  Spinal Cord Injury and Recovery and Other: Rehab orientation     Recommendations:      Individual Therapy, Group Therapy, and Psychiatric Evaluation  30  minutesweekly      Visit Diagnosis:     1. Edema, unspecified type    2. Adjustment disorder with anxiety [F43.22]        Diagnostic Impression:     Celso Gramajo, a 69 y.o.  year old male, was seen for initial evaluation for psychology services on 04/03/25  post admission to [facility]. He was attentive when psychologist entered. He unfortunately was in bed and getting tube feeds and accucheck during part of interview. He was pleasant and willing to answer questions throughout the evaluation. He was OX4. He was able to recall the details of his injury and hospitalization.  Celso was oriented by psychologist on how to find information about his care team on the wall but was already aware of the daily schedule. He was able to state safety concerns regarding his ambulation, use of safety belt, and correctly reported on how to call for assistance when in the room. He noted that he was in a neutral mood overall, feeling  a mix of frustration at his medical condition and needing to be in rehab, but motivation to work hard with his therapies to get back to his prior functioning. .  Pain in shoulders and neck 8/10, and also pain in the leg. Sleep is down due to dreams and pain. He is NPO, long standing since 2021, and has a PEG tube.  Doesn't feel like talking to people. He also is facing stressor of 6 weeks ago having to place his  mom into nursing home.  Can't eat or drink in years. Used to thank god I could walk. Then this infedtion and pain came and brought his mood down.     Socially, he reported having a very small but supportive family connections, is  (Yue ([employer])), mother-in-law. He has a Master's Degree in business from [employer] . He reported he did do well in school. He denied specific learning disabilities and denied symptoms or diagnosis of ADHD - any subtype. He is retired on [employer]. Celso  reported he has a history of anxiety, and having a lot of stress but denied it being depressive.  He denied history of depression, SI, HI, or anger. He reported  a history of  psychiatry services. He denied psychotherapy history. He is agreeable to psychology following during his rehab stay. He is not interested in support groups at this time.           SHELBY Nelson.D @ 4:41 PM

## 2025-04-03 NOTE — PLAN OF CARE
Plan of Care Review  Plan of Care Reviewed With: patient  Progress: improving  Outcome Evaluation: Patient AAOx3, intermittent anxiety, cooperative. NPO, intermittent bolus feeds via pump QID, accuchecks prior to feed. Pt refused 1030 feed due to abdominal discomfort, constipation, insulin not administered at the time. Enemeez plus administered as ordered, linzess, movantik administered as ordered. Moderate creamy, loose BM after enemeez. Cont of bladder throughout shift. Mod SP. Pain managed with prn dilaudid. Yina ALANIS, specialty bed in use. Patient able to make needs known, call bell within reach. No nursing concerns at the moment.     1600: Patient continues to complain of abdominal pain, abdomen round. 1730 feed not administered per physician, Linzess, senokot, colace, simethicone administered. L side lying with abdominal massage x2, kpad. Soap rui enema ordered overnight if bloating and pain are not decreased. IV abx administered via RUE PICC.  1 dose bolus NSS to run after abx - 1L over 2 hours. Patient's wife at bedside. No nursing concerns at the moment.

## 2025-04-03 NOTE — PATIENT CARE CONFERENCE
Inpatient Rehabilitation Team Conference Note   Date: 4/3/2025  Time: 10:38 AM       Patient Name: Celso Gramajo     Medical Record Number: [id number]   YOB: 1955  Sex: Male      Room/Bed: 101/101W  Payor Info: Payor: MEDICARE / Plan: MEDICARE PART A & B / Product Type: Medicare /     Admitting Diagnosis: Spinal epidural abscess [G06.1]  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 [G06.1]   Admit Date/Time: 4/1/2025  8:28 PM    Principal Problem: 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    Patient Active Problem List    Diagnosis Date Noted    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 04/02/2025    Encounter for follow-up surveillance of thyroid cancer 10/10/2024    COVID-19 06/28/2023    Other dysphagia 03/05/2023    Other dysphagia 07/27/2022    Increased sputum production 05/10/2022    Hypertension 10/22/2021    Postsurgical hypothyroidism 10/22/2021    Chronic fatigue syndrome 10/22/2021    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) 11/10/2020    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) 11/10/2020    Gastroesophageal reflux disease 05/05/2020    Allergic rhinitis 05/05/2020    Deviated nasal septum 05/05/2020    Hypertrophy of nasal turbinates 05/05/2020    Paralysis of vocal cords and larynx, unilateral 05/05/2020    Induration penis plastica 01/22/2020    Mixed hyperlipidemia 09/30/2018    Elevated coronary artery calcium score 09/30/2018    ENMANUEL (obstructive sleep apnea) 09/30/2018    Change in bowel habits 03/28/2017    Constipation, chronic 03/28/2017    Hx of colonic polyps 03/28/2017    Penile curvature, acquired 03/14/2017    Vertigo 03/07/2016    Eustachian tube dysfunction, bilateral 03/07/2016    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) 03/07/2016    Rhinitis sicca 03/07/2016    Sensorineural hearing loss (SNHL), bilateral 03/07/2016    Malignant neoplasm of salivary gland (CMS/HCC) 01/27/2016    Examination of participant in clinical  trial 01/25/2016    Abdominal pain 10/16/2014    Thyroid cancer (CMS/HCC) 01/01/2001       Premorbid Status:  Dominant Hand: right  Ambulation: independent  Transferring: independent  Toileting: independent  Bathing: independent  Dressing: independent  Eating: independent  IADLs: independent  Driving/Transportation:   Communication: understands/communicates without difficulty  Past History of Dysphagia: PTA pt was completely independent, no AD. (+) . NPO PTA, manages peg tube independently.  Assistive Device/Animal Currently Used at Home: none  Prior Level of Function Comment: Independent without use of AE or DME.      Current Functional Status:  Mobility  Gait  [place], Gait: moderate assist (50-74% patient effort)  Assistive Device: other (see comments)  Distance in Feet: 75 feet  Comment: R arm over PT ,mod A at pelvis for wt shift nad balance, slow paced, wide YONATAN    Stairs  [place], Stairs: moderate assist (50-74% patient effort)  Assistive Device: railing  Handrail Location (Stairs): both sides  Number of Stairs: 4  Stair Height: 6 inches  Comment: min-mod A for balance, VC for step to however pt uses reciprocal pattern, mild posterior lean descending    Wheelchair  Method of Locomotion: bipedal (lower extremity) propulsion  [place], Forward Propulsion: moderate assist (50-74% patient effort)  [place], Steering Activities: moderate assist (50-74% patient effort)  [place], Turning Activities: moderate assist (50-74% patient effort)  Distance Propelled in Feet: 150 feet    Transfers  Bed Mobility  Bed Mobility Activities: supine to sit  [place], Roll Left: touching/steadying assist  [place], Roll Right: touching/steadying assist  [place], Supine to Sit: minimum assist (75% or more patient effort)  [place], Sit to Supine: minimum assist (75% or more patient effort)  Safety/Cues: verbal cues; maintaining precautions; sequencing;  technique  Assistive Device: bed rails  Comment: OT: Mod A supine > sit on EOB.    Sit to Stand Transfer  [place], Sit to Stand Transfer: moderate assist (50-74% patient effort)  Safety/Cues: preparatory posture  Assistive Device: none  Comment: Pt anterior, min-mod A for balance on stand and min A for hip extension    Stand to Sit Transfer  [place], Stand to Sit Transfer: minimum assist (75% or more patient effort)  Safety/Cues: preparatory posture  Assistive Device: none  Comment: for eccentric control w/ PT anterior    Stand Pivot Transfer  [place], Stand Pivot/Stand Step Transfer: moderate assist (50-74% patient effort)  Safety/Cues: technique  Assistive Device: other (see comments)  Comment: via amb approach w/ R arm over PT    Car Transfer  Transfer Technique: stand pivot  [place]: moderate assist (50-74% patient effort)  Safety/Cues: technique; hand placement; maintaining precautions  Assistive Device: none  Comment: PT anterior, mod A for balance t/o and hip ext on stand      Toilet Transfer  Transfer Technique: stand pivot  [place]: moderate assist (50-74% patient effort)  Safety/Cues: technique; hand placement  Assistive Device: commode, 3-in-1  Comment: SPT from EOB > 3:1 commode. Min A SPT from 3:1 commode > w/c.    Shower Transfer  Transfer Technique: stand pivot  [place]: minimum assist (75% or more patient effort)  Safety/Cues: technique; hand placement  Assistive Device: tub bench; grab bars/tub rail  Comment: SPT from w/c <> tub bench + grab bar in barrier free shower.      Self Care  Bathing  [place]: moderate assist (50-74% patient effort)  Safety/Cues: increased time to complete; maintaining precautions; problem-solving; sequencing  Setup Assistance: open containers; adaptive equipment setup; adjust water temperature/flow  Comment: Performed wet shower seated on tub bench. Min A standing for pt to wash buttocks/front sony. Assist to wash backside. D to to wash  distal LE. Increased assist to dry d/t fatigue at end of shower.    Upper Body Dressing  Tasks: don; front-opening garment  [place]: minimum assist (75% or more patient effort)  Comment: Pt able to don front open shirt, assit to button up shirt.    Lower Body Dressing  Tasks: doff; pants/bottoms; don; socks  [place]: moderate assist (50-74% patient effort)  Safety/Cues: increased time to complete; maintaining precautions; problem-solving  Comment: Pt able to doff socks in figure 4 sit, D to don socks. Pt able to thread BLE into underwear in figure 4 sit, assist to thread pants d/t faituge. Min A for balance c stand/arrange.    Grooming  Tasks: oral care (brushing teeth, cleaning dentures); brushes/best hair; washes, rinses and dries hands; washes, rinses and dries face  Setup Assistance: obtain supplies  Adaptive Equipment: suction brush  Comment: Seated at w/c level, assist to open containers    Toileting  Tasks: adjust/manage clothing; perform bladder hygiene  [place]: moderate assist (50-74% patient effort)  Setup Assistance: adaptive equipment setup; obtain supplies  Adaptive Equipment: commode, 3-in-1  Comment: Min-Mod A for assist c pant management. (+) voiding on 3:1 commode or urine.    Self-Feeding  No data recorded    Cognition  Cognition  Orientation Status: oriented x 4  Affect/Mental Status: WFL  Follows Commands: WFL  Cognitive Function: WFL  Comment, Cognition: Pt able to follow multi step commands t/o. Required encouragement to complete tasks.      Communication       Frequency of Treatment (OT): 5-7 times per week  Intensity of Treatment (OT): 60-90 minutes per day  Frequency of Treatment (PT): 5-7 times per week  Intensity of Treatment (PT): 60-90 minutes per day     Celso requires an altered therapy schedule due to medically complex.      Weekly Outcome Summaries:  Weekly Progress Summary (PT)  Progress Toward Functional Goals (PT): progressing toward functional goals as  expected  Weekly Outcome Statement: Pt now presents to [facility] w/ impaired BLE strength, impaired trunk control, impaired axctivity tolerance, neck and shoulder pain, impaired standing balance and mildly decreased coordination and BLE clonus. Pt requires moderate asssitance for all functional mobility which is well below his baseline independence. His gait speed is reflective of increased risk of falls and well below age/gender norms.  Barriers to Overall Progress (PT): impaired activity tolerance, impaired balance, increased anxiety with new tasks  Impairments Continuing to Limit Function: pain; impaired balance; decreased strength  Recommendations: Continue with PT POC    Weekly Progress Summary (OT)  Progress Toward Functional Goals (OT): progressing toward functional goals slower than expected  Weekly Outcome Statement: Pt is 69 year old male w/ C5-C7 ventral epidural abscess with osteomyelitis. MRI on 3/24 c/f metastatic disease at T1-T4, L4. Pt underwent posterior C4-T1 decompression fusion on 3/27. Pt admitted to [facility] 4/1/25. PTA, pt was independent with BADLs, IADLs, +, +PEG tube. At OT IE, pt required mod A for toilet transfer, and varied cl s- total assist for BADLs. Pt limited by pain, spinal precautions, decreased activity tolerance, decreased strength, FMC, sitting and standing tolerance, standing balance. Session also limited d/t nursing setting up tube feed during session. Recommend skilled inpatient OT services to maximize safety and independence with BADLs, fxl transfers and fxl mobility prior to d/c home.        Problem Resolution:  Team Weekly Outcome Statement: hx of esophageal cx  Comment, Barriers to Discharge: dsyphagia, NPO, constipated, klonus in ankles, IV antibxs 6 weeks, pain, impaired balance, anxious, fatigue, endurance  Comment, Facilitators for Discharge: tube feeds peg tube and feeds every 3 hours, ATS, picc line, specialty bed, [facility] [facility]         Expected Level of  Function  Self-Care: Independent  Sphincter Control: Independent  Transfers: Independent  Locomotion: Independent  Communication: Independent  Social Cognition: Independent      Goals/Support System:       IRF PT Goals      Flowsheet Row Most Recent Value   Bed Mobility Goal 1    Activity/Assistive Device sit to supine/supine to sit at 04/02/2025 0904   [place] supervision required at 04/02/2025 0904   Time Frame short-term goal (STG), 5 - 7 days at 04/03/2025 0856   Strategies/Barriers evaluated 4/2, continue with PT POC at 04/03/2025 0856   Progress/Outcome goal ongoing, good progress toward goal at 04/03/2025 0856   Bed Mobility Goal 2    Activity/Assistive Device bed mobility activities, all at 04/02/2025 0904   [place] modified independence at 04/02/2025 0904   Time Frame long-term goal (LTG), 3 weeks at 04/02/2025 0904   Progress/Outcome goal ongoing at 04/03/2025 0856   Transfer Goal 1    Activity/Assistive Device sit-to-stand/stand-to-sit, stand pivot at 04/02/2025 0904   [place] minimum assist (75% or more patient effort) at 04/02/2025 0904   Time Frame short-term goal (STG), 5 - 7 days at 04/03/2025 0856   Strategies/Barriers evaluated 4/2, conitnue with PT POC at 04/03/2025 0856   Progress/Outcome goal ongoing, good progress toward goal at 04/03/2025 0856   Transfer Goal 2    Activity/Assistive Device sit-to-stand/stand-to-sit, stand pivot at 04/02/2025 0904   [place] modified independence at 04/02/2025 0904   Time Frame long-term goal (LTG), 3 weeks at 04/02/2025 0904   Progress/Outcome goal ongoing at 04/03/2025 0856   Gait/Walking Locomotion Goal 1    Activity/Assistive Device gait (walking locomotion), assistive device use at 04/02/2025 0904   Distance 150 feet at 04/02/2025 0904   [place] minimum assist (75% or more patient effort) at 04/02/2025 0904   Time Frame short-term goal (STG), 5 days at 04/03/2025 0856   Strategies/Barriers evaluated 4/2, contiune with PT POC at  04/03/2025 0856   Progress/Outcome good progress toward goal, goal ongoing at 04/03/2025 0856   Gait/Walking Locomotion Goal 2    Activity/Assistive Device gait (walking locomotion), assistive device use at 04/02/2025 0904   Distance 150 feet at 04/02/2025 0904   [place] modified independence at 04/02/2025 0904   Time Frame long-term goal (LTG), 3 weeks at 04/02/2025 0904   Progress/Outcome goal ongoing at 04/03/2025 0856   Stairs Goal 1    Activity/Assistive Device ascending stairs, descending stairs, using handrail, left, using handrail, right at 04/02/2025 0904   Number of Stairs 12 at 04/02/2025 0904   [place] minimum assist (75% or more patient effort) at 04/02/2025 0904   Time Frame short-term goal (STG), 5 - 7 days at 04/03/2025 0856   Strategies/Barriers evaluated 4/2, continue PT POC at 04/03/2025 0856   Progress/Outcome good progress toward goal, goal ongoing at 04/03/2025 0856   Stairs Goal 2    Activity/Assistive Device ascending stairs, descending stairs, using handrail, left, using handrail, right at 04/02/2025 0904   Number of Stairs 12 at 04/02/2025 0904   [place] supervision required at 04/02/2025 0904   Time Frame long-term goal (LTG), 3 weeks at 04/02/2025 0904   Progress/Outcome goal ongoing at 04/03/2025 0856          IRF OT Goals      Flowsheet Row Most Recent Value   Transfer Goal 1    Activity/Assistive Device toilet at 04/02/2025 0723   [place] minimum assist (75% or more patient effort) at 04/02/2025 0723   Time Frame short-term goal (STG), 5 - 7 days at 04/03/2025 0754   Strategies/Barriers pt just evlauated at 04/03/2025 0754   Progress/Outcome progress slower than expected, goal ongoing, goal revised this date at 04/03/2025 0754   Transfer Goal 2    Activity/Assistive Device toilet at 04/02/2025 0723   [place] modified independence at 04/02/2025 0723   Time Frame long-term goal (LTG), 21 days or less at 04/02/2025 0723   Progress/Outcome goal ongoing at  04/03/2025 0754   Transfer Goal 3    Activity/Assistive Device shower at 04/02/2025 0723   [place] other (see comments)  [TBA] at 04/02/2025 0723   Time Frame short-term goal (STG), 5 - 7 days at 04/03/2025 0754   Strategies/Barriers pt just evlauated at 04/03/2025 0754   Progress/Outcome progress slower than expected, goal ongoing, goal revised this date at 04/03/2025 0754   Transfer Goal 4    Activity/Assistive Device shower at 04/02/2025 0723   [place] other (see comments)  [TBA] at 04/02/2025 0723   Time Frame long-term goal (LTG), 21 days or less at 04/02/2025 0723   Progress/Outcome goal ongoing at 04/03/2025 0754   Bathing Goal 1    [place] moderate assist (50-74% patient effort) at 04/02/2025 0723   Time Frame short-term goal (STG), 5 - 7 days at 04/03/2025 0754   Strategies/Barriers pt just evlauated at 04/03/2025 0754   Progress/Outcome progress slower than expected, goal ongoing, goal revised this date at 04/03/2025 0754   Bathing Goal 2    [place] supervision required at 04/02/2025 0723   Time Frame long-term goal (LTG), 21 days or less at 04/02/2025 0723   Progress/Outcome goal ongoing at 04/03/2025 0754   UB Dressing Goal 1    [place] other (see comments)  [TBA] at 04/02/2025 0723   Time Frame short-term goal (STG), 5 - 7 days at 04/03/2025 0754   Strategies/Barriers pt just evlauated at 04/03/2025 0754   Progress/Outcome progress slower than expected, goal ongoing, goal revised this date at 04/03/2025 0754   UB Dressing Goal 2    [place] other (see comments)  [TBA] at 04/02/2025 0723   Time Frame long-term goal (LTG), 21 days or less at 04/02/2025 0723   Progress/Outcome goal ongoing at 04/03/2025 0754   LB Dressing Goal 1    [place] moderate assist (50-74% patient effort) at 04/02/2025 0723   Time Frame short-term goal (STG), 5 - 7 days at 04/03/2025 0754   Strategies/Barriers pt just evlauated at 04/03/2025 0754   Progress/Outcome progress slower than expected,  goal ongoing, goal revised this date at 04/03/2025 0754   LB Dressing Goal 2    [place] modified independence at 04/02/2025 0723   Time Frame long-term goal (LTG), 21 days or less at 04/02/2025 0723   Progress/Outcome goal ongoing at 04/03/2025 0754   Grooming Goal 1    [place] set-up required at 04/02/2025 0723   Time Frame short-term goal (STG), 5 - 7 days at 04/03/2025 0754   Strategies/Barriers pt just evlauated at 04/03/2025 0754   Progress/Outcome progress slower than expected, goal ongoing, goal revised this date at 04/03/2025 0754   Grooming Goal 2    [place] modified independence at 04/02/2025 0723   Time Frame long-term goal (LTG), 21 days or less at 04/02/2025 0723   Progress/Outcome goal ongoing at 04/03/2025 0754   Toileting Goal 1    [place] moderate assist (50-74% patient effort) at 04/02/2025 0723   Time Frame short-term goal (STG), 5 - 7 days at 04/03/2025 0754   Strategies/Barriers pt just evlauated at 04/03/2025 0754   Progress/Outcome progress slower than expected, goal ongoing, goal revised this date at 04/03/2025 0754   Toileting Goal 2    [place] modified independence at 04/02/2025 0723   Time Frame long-term goal (LTG), 21 days or less at 04/02/2025 0723   Progress/Outcome goal ongoing at 04/03/2025 0754            Discharge Planning:  Anticipated Discharge Disposition: home with home health, home with assistance  Type of Home Care Services: home PT;home OT;nursing  Equipment/Device Needs at Discharge  Assistive Device/Animal Currently Used at Home: none  Discharge Planning  Type of Current Home Care Services: other (comment) (n/a)    Anticipated Discharge Date: 4/17/2025    Needs Identified:       Team Members Present:     Rehab Attending Present:  Spencer Kemp MD    Care Coordinator Present:  Ana Mc LSW    Nurse Present:  Annabelle Littlejohn RN    OT Present:  Lorena Stinson OT    PT Present:  Cierra Gibson PT        Next Team Conference  Date: 04/10/25

## 2025-04-03 NOTE — PLAN OF CARE
OT POC reviewed and goals updated      Problem: Rehabilitation (IRF) Plan of Care  Goal: Plan of Care Review  Outcome: Progressing     Problem: Rehabilitation (IRF) Plan of Care  Goal: Patient-Specific Goal (Individualized)  Outcome: Progressing     Problem: BADL (Basic Activities of Daily Living) Impairment  Goal: Optimal Safe BADL Performance  Outcome: Progressing

## 2025-04-04 ENCOUNTER — APPOINTMENT (INPATIENT)
Dept: PHYSICAL THERAPY | Facility: REHABILITATION | Age: 70
DRG: 559 | End: 2025-04-04
Payer: MEDICARE

## 2025-04-04 ENCOUNTER — APPOINTMENT (INPATIENT)
Dept: OCCUPATIONAL THERAPY | Facility: REHABILITATION | Age: 70
DRG: 559 | End: 2025-04-04
Payer: MEDICARE

## 2025-04-04 ENCOUNTER — APPOINTMENT (OUTPATIENT)
Dept: OTHER | Facility: REHABILITATION | Age: 70
End: 2025-04-04
Payer: MEDICARE

## 2025-04-04 LAB
BSA FOR ECHO PROCEDURE: 2.16 M2
GLUCOSE BLD-MCNC: 169 MG/DL (ref 70–99)
GLUCOSE BLD-MCNC: 180 MG/DL (ref 70–99)
GLUCOSE BLD-MCNC: 227 MG/DL (ref 70–99)
GLUCOSE BLD-MCNC: 231 MG/DL (ref 70–99)
POCT TEST: ABNORMAL

## 2025-04-04 PROCEDURE — 97112 NEUROMUSCULAR REEDUCATION: CPT | Mod: GO | Performed by: OCCUPATIONAL THERAPIST

## 2025-04-04 PROCEDURE — 63700000 HC SELF-ADMINISTRABLE DRUG: Performed by: PHYSICAL MEDICINE & REHABILITATION

## 2025-04-04 PROCEDURE — 97530 THERAPEUTIC ACTIVITIES: CPT | Mod: GO | Performed by: OCCUPATIONAL THERAPIST

## 2025-04-04 PROCEDURE — 12800001 HC ROOM AND CARE SEMIPRIVATE REHAB-BRAIN INJ

## 2025-04-04 PROCEDURE — 63700000 HC SELF-ADMINISTRABLE DRUG: Performed by: INTERNAL MEDICINE

## 2025-04-04 PROCEDURE — 63600000 HC DRUGS/DETAIL CODE: Performed by: INTERNAL MEDICINE

## 2025-04-04 PROCEDURE — 25800000 HC PHARMACY IV SOLUTIONS: Performed by: INTERNAL MEDICINE

## 2025-04-04 PROCEDURE — 97116 GAIT TRAINING THERAPY: CPT | Mod: GP

## 2025-04-04 PROCEDURE — 97110 THERAPEUTIC EXERCISES: CPT | Mod: GO | Performed by: OCCUPATIONAL THERAPIST

## 2025-04-04 PROCEDURE — 97530 THERAPEUTIC ACTIVITIES: CPT | Mod: GP,CQ

## 2025-04-04 RX ORDER — NYSTATIN 100000 [USP'U]/G
1 POWDER TOPICAL 2 TIMES DAILY
Status: COMPLETED | OUTPATIENT
Start: 2025-04-04 | End: 2025-04-14

## 2025-04-04 RX ADMIN — SIMETHICONE 160 MG: 80 TABLET, CHEWABLE ORAL at 07:48

## 2025-04-04 RX ADMIN — CEFTRIAXONE SODIUM 2 G: 2 INJECTION, POWDER, FOR SOLUTION INTRAMUSCULAR; INTRAVENOUS at 06:03

## 2025-04-04 RX ADMIN — CEFTRIAXONE SODIUM 2 G: 2 INJECTION, POWDER, FOR SOLUTION INTRAMUSCULAR; INTRAVENOUS at 17:57

## 2025-04-04 RX ADMIN — SODIUM CHLORIDE 1 G: 1 TABLET ORAL at 07:51

## 2025-04-04 RX ADMIN — Medication 10 ML: at 21:15

## 2025-04-04 RX ADMIN — DOCUSATE SODIUM 100 MG: 50 LIQUID ORAL at 07:48

## 2025-04-04 RX ADMIN — FLUOXETINE HYDROCHLORIDE 20 MG: 20 SOLUTION ORAL at 07:49

## 2025-04-04 RX ADMIN — INSULIN LISPRO 1 UNITS: 100 INJECTION, SOLUTION INTRAVENOUS; SUBCUTANEOUS at 14:59

## 2025-04-04 RX ADMIN — SENNOSIDES 2 TABLET: 8.6 TABLET, FILM COATED ORAL at 15:59

## 2025-04-04 RX ADMIN — LANSOPRAZOLE 15 MG: KIT at 07:48

## 2025-04-04 RX ADMIN — HEPARIN SODIUM 5000 UNITS: 5000 INJECTION, SOLUTION INTRAVENOUS; SUBCUTANEOUS at 21:09

## 2025-04-04 RX ADMIN — LEVOTHYROXINE SODIUM 150 MCG: 150 TABLET ORAL at 05:41

## 2025-04-04 RX ADMIN — SIMETHICONE 160 MG: 80 TABLET, CHEWABLE ORAL at 15:58

## 2025-04-04 RX ADMIN — FAMOTIDINE 20 MG: 20 TABLET, FILM COATED ORAL at 21:10

## 2025-04-04 RX ADMIN — INSULIN LISPRO 2 UNITS: 100 INJECTION, SOLUTION INTRAVENOUS; SUBCUTANEOUS at 10:35

## 2025-04-04 RX ADMIN — NYSTATIN 1 APPLICATION: 100000 POWDER TOPICAL at 21:13

## 2025-04-04 RX ADMIN — HEPARIN SODIUM 5000 UNITS: 5000 INJECTION, SOLUTION INTRAVENOUS; SUBCUTANEOUS at 14:59

## 2025-04-04 RX ADMIN — LIOTHYRONINE SODIUM 10 MCG: 5 TABLET ORAL at 05:41

## 2025-04-04 RX ADMIN — SIMETHICONE 160 MG: 80 TABLET, CHEWABLE ORAL at 11:29

## 2025-04-04 RX ADMIN — HYDROMORPHONE HYDROCHLORIDE 2 MG: 2 TABLET ORAL at 15:58

## 2025-04-04 RX ADMIN — INSULIN LISPRO 2 UNITS: 100 INJECTION, SOLUTION INTRAVENOUS; SUBCUTANEOUS at 17:55

## 2025-04-04 RX ADMIN — HEPARIN SODIUM 5000 UNITS: 5000 INJECTION, SOLUTION INTRAVENOUS; SUBCUTANEOUS at 05:39

## 2025-04-04 RX ADMIN — SIMETHICONE 160 MG: 80 TABLET, CHEWABLE ORAL at 21:10

## 2025-04-04 RX ADMIN — Medication 10 ML: at 15:02

## 2025-04-04 RX ADMIN — SODIUM CHLORIDE 1 G: 1 TABLET ORAL at 21:09

## 2025-04-04 RX ADMIN — LORAZEPAM 0.5 MG: 0.5 TABLET ORAL at 07:53

## 2025-04-04 RX ADMIN — NALOXEGOL OXALATE 25 MG: 25 TABLET, FILM COATED ORAL at 07:53

## 2025-04-04 RX ADMIN — ACETAMINOPHEN 650 MG: 650 SOLUTION ORAL at 17:45

## 2025-04-04 RX ADMIN — ACETAMINOPHEN 650 MG: 650 SOLUTION ORAL at 11:29

## 2025-04-04 RX ADMIN — HYDROMORPHONE HYDROCHLORIDE 2 MG: 2 TABLET ORAL at 09:11

## 2025-04-04 RX ADMIN — EZETIMIBE 10 MG: 10 TABLET ORAL at 21:09

## 2025-04-04 RX ADMIN — DOCUSATE SODIUM 100 MG: 50 LIQUID ORAL at 21:09

## 2025-04-04 RX ADMIN — Medication 10 ML: at 06:06

## 2025-04-04 NOTE — PROGRESS NOTES
Subjective     Patient seen and examined on rounds.  Chart reviewed.  Events overnight noted.  History reviewed briefly with patient.    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    HPI: Mr. Celso Gramajo is a 69-year-old right-handed white male with chronic conditions significant for hypertension, hyperlipidemia, anxiety, thyroid cancer status post thyroidectomy and radical neck dissection, adjuvant radiation therapy/immunotherapy, esophageal squamous cell carcinoma status post chemoradiotherapy complicated by esophageal strictures, left vocal cord paralysis, dysphagia status post PEG tube placement in 2021, initially presented to the ER at [facility] on 3/11/25 with fevers and right shoulder pain. Workup was unrevealing and he was discharged home. He presented to the [facility] ER again on 3/17/25 with ongoing fevers. CT neck with no abscess, old postsurgical changes. He discharged home but blood cultures subsequently, 1 of 2 sets of blood cultures drawn at [facility] on 3/17/25 showed Streptococcus viridans returned positive on 3/19/25 and he returned for admission to [facility] on 3/19/25. TTE at [facility] was negative. Blood cultures on 3/19/25 and again on 3/23/25 showed no growth per his records. Imaging with C6-7 osteomyelitis/discitis with ventral epidural abscess. He was transferred to [facility] on 3/23/25 for surgical evaluation.  At that time, he was afebrile.  Blood cultures  were negative. At [facility] ortho and ENT were consulted. Ultimately it was determined that an anterior surgical approach was not safe (due  to history of neck surgeries), and patient was having progressive weakness.  MRI of cervical spine on 3/27/25 revealed discitis and osteomyelitis at C6-C7 with ventral epidural abscess compressing the spinal cord and increased inferior extent of cord edema compared to prior. Patchy T1 hypointensity with enhancement in the upper cervical and thoracic spine, can represent metastatic lesions and/or post radiation changes, similar as compared to prior. He underwent posterior C4-T1 decompression and instrumented fusion on 3/27/25, performed by orthopedic surgeon Dr. Santiago Ca. Purulent fluid drained from anterior spine during the procedure and operating room cultures revealed Strep intermedius. Postop he was briefly in the ICU for MAP goals. He had a PICC placed 4/1/25. 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 He was started on salt tabs. Infectious diseases recommended IV Daptomycin which patient completed on 3/31/25.  He was recommended to continue Ceftriaxone 2g IV q12h x 14 day course (end date 4/8/25) then transition to Ceftriaxone 2g IV daily to complete total 6 week course (end date 5/8/25). He was recommend [place] J collar to neck at all times.  I discussed orthopedic spinal precautions with him.  He completed Decadron treatment for mild laryngeal edema visualized on preoperative NPL. Patient has dysphagia from esophageal strictures, recent dilation 3/3/25 was not successful per patient, cannot tolerate regular oral secretions and recommended NPO and continued on bolus tube feeds.  He was kept on Pepcid for GI prophylaxis. Glycopyrrolate as needed per patient/spouse wishes.  For anxiety he was continued on Fluoxetine and Ativan PRN.  He is not on medications for hypertension.  He was continued on Zetia for hyperlipidemia.  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 He continues on a sliding-scale Humalog coverage.  He is on Levothyroxine  and Liothyronine for thyroid supplementation after thyroidectomy for thyroid cancer. He is on subcutaneous Heparin and SCDs for DVT prophylaxis.  I discussed his recent clinical course with patient and with his wife at bedside.  On 4/1/25, hemoglobin was 12.9, WBC count was 10.30, platelets were 255, BUN 15, creatinine was 0.51, sodium was 133 and potassium was 4.5.  He has been needing assistance for mobility, transfers, self-care. He is transferred to [facility] on 4/1/25 for further rehabilitation care.     SUBJ: Discussed recommendations of PCC with patient.  Posterior cervical incision healing well.  He had 2 bowel movements today.  Family training scheduled for next week on Monday per case management note.  Discussed with patient.    ROS: Denies chest pain or shortness of breath. Other ROS negative. Past, family, social history is unchanged.      Current Functional Status:   Bed mobility:   [place], Supine to Sit: minimum assist (75% or more patient effort)   [place], Sit to Supine: moderate assist (50-74% patient effort)   Transfers:    [place], Sit to Stand Transfer: minimum assist (75% or more patient effort)  [place], Stand to Sit Transfer: minimum assist (75% or more patient effort)   [place], Stand Pivot/Stand Step Transfer: moderate assist (50-74% patient effort)   Gait:   [place], Gait: minimum assist (75% or more patient effort)  Assistive Device: walker, front-wheeled   Distance in Feet: 250 feet    Bathing:   [place]: moderate assist (50-74% patient effort)   Toileting:   [place]: minimum assist (75% or more patient effort)   Upper body dressing:   [place]: minimum assist (75% or more patient effort)   Lower body dressing:   [place]: moderate assist (50-74% patient effort)       Functional Progress:    Functional status reviewed. Overall, patient's functional status is improving.      Physical Exam      Blood pressure 105/72, pulse 100,  "temperature 36.7 °C (98.1 °F), temperature source Oral, resp. rate 16, height 1.854 m (6' 1\"), weight 92.1 kg (203 lb), SpO2 94%.    Body mass index is 26.78 kg/m².    General Appearance: Not in acute distress  Head/Ear/Nose/Throat: Normocephalic; Atraumatic.   Eye: EOMI; PERRL.   Neck: [place] J collar in place.  Respiratory: Decreased breath sounds at bases.   Cardiovascular: RRR; Normal S1, S2.   Gastrointestinal: Soft; NT; +BS.   Extremities: Bilateral lower extremity edema noted.    Musculoskeletal: Functional active range of motion in both upper and lower extremities.   Neurological: AAO ×3. Speech is fluent. Cranial nerve examination does not reveal any gross facial asymmetry. Strength testing bilateral deltoids are 4/5, bilateral biceps are 4+/5, bilateral triceps are 4/5, bilateral wrist extensors are 4+/5, bilateral hand FDP are 4+/5, bilateral and interossei are 4/5.  Bilateral hip flexion is less than 4/5.  Bilateral quadriceps are 5/5.  Bilateral ankle dorsi and plantar flexion are 5/5.  He is grossly able to localize touch and position sense in great toes, except reports numbness in all 5 digits of both hands and also numbness on the plantar aspects of both feet.  Deep tendon reflexes are symmetric and slightly brisk bilaterally.  Bilateral ankle clonus is present.  Plantars are withdrawal.  Coordination is functional upper extremities.    Behavior/Emotional: Appropriate; Cooperative.   Skin: Healing incision on posterior cervical spine.  Sutures in place.  Erythema/rash noted on coccyx unclear if stage I decubitus.  Rash noted on bilateral groins.  Reviewed pictures in epic.         Current Facility-Administered Medications:     acetaminophen (TYLENOL) 650 mg/20.3 mL solution 650 mg, 650 mg, peg tube, q6h PRN, Rohit Acevedo MD, 650 mg at 04/04/25 5915    bisacodyL (DULCOLAX) 10 mg suppository 10 mg, 10 mg, rectal, Daily PRN, Rohit Acevedo MD, 10 mg at 04/03/25 7144    cefTRIAXone " (ROCEPHIN) 2 g in sodium chloride 0.9 % 100 mL IVPB, 2 g, intravenous, q12h Critical access hospitalCurtis Vincent M, MD, Stopped at 04/04/25 1850    [START ON 4/9/2025] cefTRIAXone (ROCEPHIN) 2 g in sodium chloride 0.9 % 100 mL IVPB, 2 g, intravenous, q24h INT, Rohit Acevedo MD    docusate sodium (COLACE) 50 mg/5 mL liquid 100 mg, 100 mg, peg tube, BID, Spencer Kemp MD, 100 mg at 04/04/25 0748    ezetimibe (ZETIA) tablet 10 mg, 10 mg, peg tube, Nightly, Rohit Acevedo MD, 10 mg at 04/03/25 2044    famotidine (PEPCID) tablet 20 mg, 20 mg, peg tube, Nightly, Rohit Acevedo MD, 20 mg at 04/03/25 2044    FLUoxetine (PROzac) 20 mg/5 mL (4 mg/mL) solution 20 mg, 20 mg, peg tube, Daily, Rohit Acevedo MD, 20 mg at 04/04/25 0749    glycopyrrolate (ROBINUL) tablet 1 mg, 1 mg, peg tube, 2x daily PRN, Rohit Acevedo MD    heparin (porcine) 5,000 unit/mL injection 5,000 Units, 5,000 Units, subcutaneous, q8h Critical access hospital, Rohit Acevedo MD, 5,000 Units at 04/04/25 1459    HYDROmorphone (DILAUDID) tablet 2 mg, 2 mg, peg tube, q6h PRN, Lilian Marrero MD, 2 mg at 04/04/25 1558    insulin lispro U-100 (HumaLOG) pen 1-12 Units, 1-12 Units, subcutaneous, q6h Critical access hospital, Lilian Marrero MD, 2 Units at 04/04/25 1755    lansoprazole (PREVACID) 3 mg/mL oral suspension 15 mg, 15 mg, feeding tube, Daily before breakfast, Lilian Marrero MD, 15 mg at 04/04/25 0748    levothyroxine (SYNTHROID) tablet 150 mcg, 150 mcg, peg tube, Daily (6:30a), Lilian Marrero MD, 150 mcg at 04/04/25 0541    linaCLOtide (LINZESS) capsule 145 mcg, 145 mcg, g-tube, Daily before lunch, Lilian Marrero MD, 145 mcg at 04/03/25 0837    liothyronine (CYTOMEL) tablet 10 mcg, 10 mcg, peg tube, Daily (6:30a), Rohit Acevedo MD, 10 mcg at 04/04/25 0541    LORazepam (ATIVAN) tablet 0.5 mg, 0.5 mg, peg tube, q8h PRN, Rohit Acevedo MD, 0.5 mg at 04/04/25 0753    magnesium hydroxide (M.O.M.) 400 mg/5 mL suspension 30 mL, 30 mL, oral, 2x daily  PRN, Lilian Marrero MD, 30 mL at 04/02/25 1138    nystatin (MYCOSTATIN) 100,000 unit/gram topical powder 1 Application, 1 Application, Topical, BID, Spencer Kemp MD    ondansetron ODT (ZOFRAN-ODT) disintegrating tablet 4 mg, 4 mg, peg tube, q6h PRN, Rohit Acevedo MD    senna (SENOKOT) tablet 1 tablet, 1 tablet, peg tube, 2x daily PRN, Rohit Acevedo MD, 1 tablet at 04/03/25 1547    senna (SENOKOT) tablet 2 tablet, 2 tablet, peg tube, Daily, Spencer Kemp MD, 2 tablet at 04/04/25 1559    simethicone (MYLICON) chewable tablet 160 mg, 160 mg, peg tube, QID, Lilian Marrero MD, 160 mg at 04/04/25 1558    sodium chloride PF flush 10 mL, 10 mL, intravenous, q8h INT, Rohit Acevedo MD, 10 mL at 04/04/25 1502    sodium chloride PF flush 10 mL, 10 mL, intravenous, PRN, Rohit Acevedo MD    sodium chloride tablet 1 g, 1 g, peg tube, BID, Rohit Acevedo MD, 1 g at 04/04/25 0751       Labs / Radiology    Lab Results   Component Value Date    WBC 9.54 04/02/2025    HGB 12.3 (L) 04/02/2025    HCT 36.8 (L) 04/02/2025    MCV 93.2 04/02/2025     04/02/2025     Lab Results   Component Value Date    GLUCOSE 161 (H) 04/02/2025    CALCIUM 9.0 04/02/2025     (L) 04/02/2025    K 4.9 04/02/2025    CO2 29 04/02/2025    CL 97 (L) 04/02/2025    BUN 18 04/02/2025    CREATININE 0.6 (L) 04/02/2025       Assessment and Plan    ASSESSMENT PLAN:  1. 69-year-old right-handed white male with chronic conditions significant for hypertension, hyperlipidemia, anxiety, thyroid cancer status post thyroidectomy and radical neck dissection, adjuvant radiation therapy/immunotherapy, esophageal squamous cell carcinoma status post chemoradiotherapy complicated by esophageal strictures, left vocal cord paralysis, dysphagia status post PEG tube placement in 2021, initially presented to the ER at [facility] on 3/11/25 with fevers and right shoulder pain. Workup was unrevealing and he was discharged  home. He presented to the [facility] ER again on 3/17/25 with ongoing fevers. CT neck with no abscess, old postsurgical changes. He discharged home but blood cultures subsequently, 1 of 2 sets of blood cultures drawn at [facility] on 3/17/25 showed Streptococcus viridans returned positive on 3/19/25 and he returned for admission to [facility] on 3/19/25. TTE at [facility] was negative. Blood cultures on 3/19/25 and again on 3/23/25 showed no growth per his records. Imaging with C6-7 osteomyelitis/discitis with ventral epidural abscess. He was transferred to [facility] on 3/23/25 for surgical evaluation.  At that time, he was afebrile.  Blood cultures  were negative. At [facility] ortho and ENT were consulted. Ultimately it was determined that an anterior surgical approach was not safe (due to history of neck surgeries), and patient was having progressive weakness.  MRI of cervical spine on 3/27/25 revealed discitis and osteomyelitis at C6-C7 with ventral epidural abscess compressing the spinal cord and increased inferior extent of cord edema compared to prior. Patchy T1 hypointensity with enhancement in the upper cervical and thoracic spine, can represent metastatic lesions and/or post radiation changes, similar as compared to prior. He underwent posterior C4-T1 decompression and instrumented fusion on 3/27/25, performed by orthopedic surgeon Dr. Santiago Ca. Purulent fluid drained from anterior spine during the procedure and operating room cultures revealed Strep intermedius. Postop he was briefly in the ICU for MAP goals. He had a PICC placed 4/1/25. 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 He was started on salt tabs. Infectious diseases recommended IV Daptomycin which patient completed on 3/31/25.  He was recommended to continue Ceftriaxone 2g IV q12h x 14 day course (end date 4/8/25) then transition to Ceftriaxone 2g IV daily to complete total 6 week course  (end date 5/8/25). He was recommend [place] J collar to neck at all times.  I discussed orthopedic spinal precautions with him.  He completed Decadron treatment for mild laryngeal edema visualized on preoperative NPL. Patient has dysphagia from esophageal strictures, recent dilation 3/3/25 was not successful per patient, cannot tolerate regular oral secretions and recommended NPO and continued on bolus tube feeds.  He was kept on Pepcid for GI prophylaxis. Glycopyrrolate as needed per patient/spouse wishes.  For anxiety he was continued on Fluoxetine and Ativan PRN.  He is not on medications for hypertension.  He was continued on Zetia for hyperlipidemia.  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 He continues on a sliding-scale Humalog coverage.  He is on Levothyroxine and Liothyronine for thyroid supplementation after thyroidectomy for thyroid cancer. He is on subcutaneous Heparin and SCDs for DVT prophylaxis.  I discussed his recent clinical course with patient and with his wife at bedside.  On 4/1/25, hemoglobin was 12.9, WBC count was 10.30, platelets were 255, BUN 15, creatinine was 0.51, sodium was 133 and potassium was 4.5.  He has been needing assistance for mobility, transfers, self-care. He is transferred to [facility] on 4/1/25 for further rehabilitation care.      2. DVT prophylaxis - He is on subcutaneous Heparin and SCDs for DVT prophylaxis.  Check doppler.  Platelets 247 on 4/2/2025.  Doppler ultrasound on 4/3/2025 bilateral lower extremity was negative for DVT.     3. Orthopedics - status post posterior C4-T1 decompression and fusion, C6-C7 discitis and osteomyelitis with ventral epidural abscess compressing the spinal cord, history of esophageal squamous cell carcinoma status post chemoradiotherapy (CRT) complicated by esophageal strictures, left vocal cord paralysis, dysphagia status post PEG tube, history of thyroid cancer, multiple medical problems - continue  PT, OT, psychology.  Follow falls precautions, cardiac precautions, aspiration precautions.  Follow spinal precautions.  [place] J collar to neck at all times.  Use [place] collar in shower.     4. GI - On Pepcid for GI prophylaxis.  On PRN Senokot.  On PRN Dulcolax suppository.  On PRN Zofran.     5.  -denies dysuria.  Monitor postvoid residuals.     6. CVS - monitor for orthostasis.     7. Pulmonary -encourage incentive spirometry.     8. Hematology - monitor hemoglobin, platelets.  Hemoglobin 12.3, WBC 9.54 on 4/2/2025.     9. Pain -on Tylenol.  On PRN Oxycodone.     10. Skin - Healing incision on posterior cervical spine.  Sutures in place.  Erythema/rash noted on coccyx unclear if stage I decubitus.  Rash noted on bilateral groins.  Reviewed pictures in epic.     11. F/E/N - nothing by mouth on bolus PEG feeds.  Nutrition consulted. 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 He was started on salt tabs.     12.  Dysphagia - H/O esophageal squamous cell carcinoma status post chemoradiotherapy complicated by esophageal strictures, left vocal cord paralysis, dysphagia status post PEG tube placement in 2021 - NPO on bolus PEG feeds.  Nutrition consulted.     13.  Psychiatry - on PRN Ativan.  Psychology consulted.     14.  Hypothyroidism -He is on Levothyroxine and Liothyronine for thyroid supplementation after thyroidectomy for thyroid cancer.     15. ENT -  H/O left vocal cord paralysis, treated with Decadron for edema around vocal cords recently.  On Glycopyrrolate as needed per patient/spouse wishes to manage secretions.      16.  Infectious diseases - He underwent posterior C4-T1 decompression and instrumented fusion on 3/27/25, performed by orthopedic surgeon Dr. Santiago Ca. Purulent fluid drained from anterior spine during the procedure and operating room cultures revealed Strep intermedius.. He had a PICC placed 4/1/25. Infectious diseases recommended IV Daptomycin which patient completed  on 3/31/25.  He was recommended to continue Ceftriaxone 2g IV q12h x 14 day course (end date 4/8/25) then transition to Ceftriaxone 2g IV daily to complete total 6 week course (end date 5/8/25).     15.  Steroid-induced hyperglycemia -on sliding-scale Humalog coverage.      16. Rehabilitation medicine - Continue comprehensive rehabilitation care. Continue PT, OT, psychology.  Follow falls precautions, cardiac precautions, aspiration precautions.  Follow spinal precautions.  [place] J collar to neck at all times.  Use [place] collar in shower.Discussed patients progress in therapies with therapists in team meeting on 4/3/2025.  Discussed in PCC. See PCC documentation.  He reports no bowel movement in the past 3 to 4 days.  Also wanted antireflux medication in the morning and at home he takes Omeprazole via PEG per patient.  He is on Prevacid in the a.m. and Pepcid in p.m via PEG tube.  Due to constipation internist ordered enema.  Also scheduled Colace and Senokot were ordered through his PEG tube.  Discussed with patient on 4/3/2025.Discussed recommendations of PCC with patient on 4/4/2025.  Posterior cervical incision healing well.  He had 2 bowel movements today.  Family training scheduled for next week on Monday per case management note.  Discussed with patient on 4/4/2025.     17. Reviewed labs today. BUN 18, creatinine 0.6, sodium 133, potassium 4.9 on 4/2/2025.           Spencer Kemp MD  4/4/2025      This encounter was completed utilizing the Direct Speech Voice Recognition Software. Grammatical errors, random word insertions, pronoun errors, and incomplete sentences are occasional consequences of the system due to software limitations, ambient noises, and hardware issues. Such errors may be missed prior to affixing electronic signature. Any questions or concerns about the content, text, or information contained within the body of this dictation should be directly addressed to the physician for  clarification. If you have any questions or concerns please do not hesitate to call me directly via EPIC chat, page, or email.

## 2025-04-04 NOTE — PROGRESS NOTES
Patient: Celso Gramajo  Location: [facility] [address]  MRN: [id number]  Today's date: 4/4/2025    History of Present Illness  Celso is a 69 y.o. male admitted on 4/1/2025 with Spinal epidural abscess [G06.1]. Principal problem is Spinal epidural abscess.    Celso Gramajo is a 69 year old male with PMHx thyroid CA s/p thyroidectomy, RND, adjuvant XRT/immunotherapy, esophageal SCC s/p chemoradiation c/b esophageal strictures, HTN, Elevated Coronary Calcium scoring in 2017, ENMANUEL, Hypothyroidism, Anemia, dysphagia on PEG (placed in 2021) TFs, and history of CVA who presented to an OSH with neck and R shoulder pain. Workup was unrevealing and he was discharged home.    He presented to the [facility] ED again on 3/17 with ongoing fevers. CT Neck with no abscess, old postsurgical changes. He d/c home but BCx subsequently returned positive on 3/19 and he returned for admission. He was also bacteremic with strep viridans, and placed on IV antibiotics. Workup revealed a C5-C7 ventral epidural abscess with osteomyelitis, and he was transferred to [facility] for surgical evaluation.    MRI on 3/24 c/f metastatic disease at T1-T4, L4.      At [facility] ortho and ENT were consulted. Ultimately it was determined that an anterior surgical approach was not safe (due to history of neck surgeries), and patient was having progressive weakness. Repeat MRI C-spine showed Discitis and osteomyelitis at C6-C7 with ventral epidural abscess compressing the spinal cord and increased edema. Patient urgently underwent POSTERIOR C4-T1 DECOMPRESSION FUSION WITH [place] INSTRUMENTATION on 3/27. Postop he was briefly in the ICU for MAP goals. OR cultures grew strep intermedius and he was placed on the antibiotic plan below. He had a PICC placed 4/1. 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 He was started on salt tabs. In rehab please continue to check labs, and titrate the salt tabs as appropriate.  He worked with PT/OT who recommended him for acute rehab.     Past Medical History  Celso has a past medical history of Anemia, Cancer (CMS/[facility]), head and neck radiation, Hypertension, Hypothyroidism, Stroke (CMS/HCC), and Thyroid disease.    OT Vitals      Date/Time BP BP Location BP Method Pt Position [facility]   04/04/25 1101 140/70 Left upper arm Automatic Lying AA          OT Pain      Date/Time Pain Type Side/Orientation Location Rating: Rest Rating: Activity Description Radiation to Interventions [facility]   04/04/25 1101 Pain Assessment shoulder right 5 6 aching -- diversional activity provided AA   04/04/25 1128 Pain Assessment shoulder right 5 6 -- -- diversional activity provided AA                           Prior Living Environment      Flowsheet Row Most Recent Value   People in Home spouse   Current Living Arrangements home   Home Accessibility stairs to enter home (Group), stairs within home (Group)   Living Environment Comment Multi-story home with 12-14 steps to second floor, powder room on 1st floor, 2-3 FELICITA via garage, multiple supportive family members   Number of Stairs, Main Entrance 3   Stair Railings, Main Entrance none   Location, Patient Bedroom second floor, must negotiate stairs to access   Location, Bathroom second floor, must negotiate stairs to access   Bathroom Access Comment powder room 1st floor standard height toilet, full bathroom 2nd floor WIS with glass door, standard height toilet   Stairs, Within Home, Primary 12   Stair Railings, Within Home, Primary railings on both sides of stairs            Prior Level of Function      Flowsheet Row Most Recent Value   Dominant Hand right   Ambulation independent   Transferring independent   Toileting independent   Bathing independent   Dressing independent   Eating independent   IADLs independent   Driving/Transportation    Communication understands/communicates without difficulty   Past History of Dysphagia PTA pt was completely  independent, no AD. (+) . NPO PTA, manages peg tube independently.   Prior Level of Function Comment Independent without use of AE or DME.   Assistive Device Currently Used at Home none            Occupational Profile      Flowsheet Row Most Recent Value   Occupational History/Life Experiences retired involved in [employer] on [place]. + , -handicap driving placard.   Patient Goals PSFS: going to the bathroom 0/10, walking 1/10, getting in/out of bed 0/10 = 1/30 = 3.33%             IRF OT Evaluation and Treatment - 04/04/25 1101          OT Time Calculation    Start Time 1100     Stop Time 1130     Time Calculation (min) 30 min        Session Details    Document Type Daily Treatment/Progress Note        General Information    General Observations of Patient Pt recieved for therapy in bed        Mobility Belt    Mobility Belt Used During Session yes        Orthosis Neck Cervical Collar    Orthosis Properties Date Obtained: 04/03/25 Time Obtained: 0825 Location: Neck Type: Cervical Collar Features: Hard Description: Hard collar AAT; can be removed seated to don [place] collar for bathing.    Compliance/Wearing Issues patient;compliant with wearing schedule        Bed Mobility    Comment OT: Min A supine > sit on EOB c assist at trunk        Sit to Stand Transfer    [place], Sit to Stand Transfer minimum assist (75% or more patient effort)     Safety/Cues technique;hand placement     Assistive Device walker, front-wheeled     Comment from w/c and EOB, assist to rise, cues to push self to rise from trasnfer surface        Stand to Sit Transfer    [place], Stand to Sit Transfer minimum assist (75% or more patient effort)     Safety/Cues technique;hand placement     Assistive Device walker, front-wheeled     Comment to w/c and EOB, assist for controlled descent, cues for pt to reach back to lower self to transfer surface.        Toilet Transfer    Transfer Technique sit/stand     [place]  minimum assist (75% or more patient effort)     Safety/Cues technique;hand placement     Assistive Device grab bars/safety frame     Comment Amb approach c RW to accessible height toilet + grab bar.        Balance    Comment, Balance Steadying A-Min A for standing s UE suppport on RW while engaging in: shoulder flexion 2x10 reps, chest press x10 reps, and horizontal abduction/adduction x10 reps to facilitate change in weight shift. Performed in neutral [place]AN.        Therapeutic Interventions    Comment, [employer] time at start of session for nursing to clean up feed tube.        Toileting    Tasks adjust/manage clothing     [place] minimum assist (75% or more patient effort)     Position supported sitting     Adaptive Equipment accessible height toilet;grab bar/safety frame     Comment Min A for balance for pant management. Attempted to void, unsuccessful.        Daily Progress Summary (OT)    Daily Outcome Statement Pt seen for therapy session c focus on standing tolerance. Overall Steadying A-Min A for balance in neutral YONATAN. Requires cuse for hand placement during functional transfers to maximize safety. Continue c OT POC, focus on UE FOM assessment.                          Education Documentation  No documentation found.        IRF OT Goals      Flowsheet Row Most Recent Value   Transfer Goal 1    Activity/Assistive Device toilet at 04/02/2025 0723   [place] minimum assist (75% or more patient effort) at 04/02/2025 0723   Time Frame short-term goal (STG), 5 - 7 days at 04/03/2025 0754   Strategies/Barriers pt just evlauated at 04/03/2025 0754   Progress/Outcome progress slower than expected, goal ongoing, goal revised this date at 04/03/2025 0754   Transfer Goal 2    Activity/Assistive Device toilet at 04/02/2025 0723   [place] modified independence at 04/02/2025 0723   Time Frame long-term goal (LTG), 21 days or less at 04/02/2025 0723   Progress/Outcome goal ongoing at  04/03/2025 0754   Transfer Goal 3    Activity/Assistive Device shower at 04/02/2025 0723   [place] other (see comments)  [TBA] at 04/02/2025 0723   Time Frame short-term goal (STG), 5 - 7 days at 04/03/2025 0754   Strategies/Barriers pt just evlauated at 04/03/2025 0754   Progress/Outcome progress slower than expected, goal ongoing, goal revised this date at 04/03/2025 0754   Transfer Goal 4    Activity/Assistive Device shower at 04/02/2025 0723   [place] other (see comments)  [TBA] at 04/02/2025 0723   Time Frame long-term goal (LTG), 21 days or less at 04/02/2025 0723   Progress/Outcome goal ongoing at 04/03/2025 0754   Bathing Goal 1    [place] moderate assist (50-74% patient effort) at 04/02/2025 0723   Time Frame short-term goal (STG), 5 - 7 days at 04/03/2025 0754   Strategies/Barriers pt just evlauated at 04/03/2025 0754   Progress/Outcome progress slower than expected, goal ongoing, goal revised this date at 04/03/2025 0754   Bathing Goal 2    [place] supervision required at 04/02/2025 0723   Time Frame long-term goal (LTG), 21 days or less at 04/02/2025 0723   Progress/Outcome goal ongoing at 04/03/2025 0754   UB Dressing Goal 1    [place] other (see comments)  [TBA] at 04/02/2025 0723   Time Frame short-term goal (STG), 5 - 7 days at 04/03/2025 0754   Strategies/Barriers pt just evlauated at 04/03/2025 0754   Progress/Outcome progress slower than expected, goal ongoing, goal revised this date at 04/03/2025 0754   UB Dressing Goal 2    [place] other (see comments)  [TBA] at 04/02/2025 0723   Time Frame long-term goal (LTG), 21 days or less at 04/02/2025 0723   Progress/Outcome goal ongoing at 04/03/2025 0754   LB Dressing Goal 1    [place] moderate assist (50-74% patient effort) at 04/02/2025 0723   Time Frame short-term goal (STG), 5 - 7 days at 04/03/2025 0754   Strategies/Barriers pt just evlauated at 04/03/2025 0754   Progress/Outcome progress slower than expected,  goal ongoing, goal revised this date at 04/03/2025 0754   LB Dressing Goal 2    [place] modified independence at 04/02/2025 0723   Time Frame long-term goal (LTG), 21 days or less at 04/02/2025 0723   Progress/Outcome goal ongoing at 04/03/2025 0754   Grooming Goal 1    [place] set-up required at 04/02/2025 0723   Time Frame short-term goal (STG), 5 - 7 days at 04/03/2025 0754   Strategies/Barriers pt just evlauated at 04/03/2025 0754   Progress/Outcome progress slower than expected, goal ongoing, goal revised this date at 04/03/2025 0754   Grooming Goal 2    [place] modified independence at 04/02/2025 0723   Time Frame long-term goal (LTG), 21 days or less at 04/02/2025 0723   Progress/Outcome goal ongoing at 04/03/2025 0754   Toileting Goal 1    [place] moderate assist (50-74% patient effort) at 04/02/2025 0723   Time Frame short-term goal (STG), 5 - 7 days at 04/03/2025 0754   Strategies/Barriers pt just evlauated at 04/03/2025 0754   Progress/Outcome progress slower than expected, goal ongoing, goal revised this date at 04/03/2025 0754   Toileting Goal 2    [place] modified independence at 04/02/2025 0723   Time Frame long-term goal (LTG), 21 days or less at 04/02/2025 0723   Progress/Outcome goal ongoing at 04/03/2025 0754

## 2025-04-04 NOTE — PLAN OF CARE
Plan of Care Review  Plan of Care Reviewed With: patient  Progress: improving  Outcome Evaluation: AAOx4, anxious. Makes needs known. PRN Ativan given this morning per request. Pain mostly in neck and shoulder, managed with Tyl and Diluadid. Posterior neck incison with sutures, cleansed with NSS, gauze and tegaderm applied. Old drain site with steris NANI. Excoriation to groin, nystatin ordered. Barrier to sacrum. Speciality bed utilized. Turned/ repositioned while in bed, weight shifts in chair. Continent bowel moderatex2 today, loose. Continent of urine, some urgency at times with urinal. Yina ALANIS, skin under brace intact. CHG bath to RUE. PICC flushes without difficulty. Continues IV ABTx. Participated in therapies. NPO, oral care provided. Continues intermittent feeds, given as ordered with pt own infinity machine. Tolerated well. 50ml water flushes with meds. Prosource given this afternoon. Accuchecks monitored. Medications reviewed.

## 2025-04-04 NOTE — PROGRESS NOTES
Patient: Celso Gramajo  Location: [facility]  MRN: [id number]  Today's date: 4/4/2025    History of Present Illness  Celso is a 69 y.o. male admitted on 4/1/2025 with Spinal epidural abscess [G06.1]. Principal problem is Spinal epidural abscess.    Celso Gramajo is a 69 year old male with PMHx thyroid CA s/p thyroidectomy, RND, adjuvant XRT/immunotherapy, esophageal SCC s/p chemoradiation c/b esophageal strictures, HTN, Elevated Coronary Calcium scoring in 2017, ENMANUEL, Hypothyroidism, Anemia, dysphagia on PEG (placed in 2021) TFs, and history of CVA who presented to an OSH with neck and R shoulder pain. Workup was unrevealing and he was discharged home.    He presented to the [facility] ED again on 3/17 with ongoing fevers. CT Neck with no abscess, old postsurgical changes. He d/c home but BCx subsequently returned positive on 3/19 and he returned for admission. He was also bacteremic with strep viridans, and placed on IV antibiotics. Workup revealed a C5-C7 ventral epidural abscess with osteomyelitis, and he was transferred to [facility] for surgical evaluation.    MRI on 3/24 c/f metastatic disease at T1-T4, L4.      At [facility] ortho and ENT were consulted. Ultimately it was determined that an anterior surgical approach was not safe (due to history of neck surgeries), and patient was having progressive weakness. Repeat MRI C-spine showed Discitis and osteomyelitis at C6-C7 with ventral epidural abscess compressing the spinal cord and increased edema. Patient urgently underwent POSTERIOR C4-T1 DECOMPRESSION FUSION WITH Y[place]ON INSTRUMENTATION on 3/27. Postop he was briefly in the ICU for MAP goals. OR cultures grew strep intermedius and he was placed on the antibiotic plan below. He had a PICC placed 4/1. 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 He was started on salt tabs. In rehab please continue to check labs, and titrate the salt tabs as appropriate.  He worked with PT/OT who recommended him for acute rehab.     Past Medical History  Celso has a past medical history of Anemia, Cancer (CMS/Formerly [facility]), head and neck radiation, Hypertension, Hypothyroidism, Stroke (CMS/Formerly [facility]), and Thyroid disease.    PT Vitals      Date/Time Pulse HR Source SpO2 Pt Activity BP BP Location BP Method Pt Position [facility]   04/04/25 0957 78 Monitor 94 % At rest 130/70 Left upper arm Automatic Sitting LPM          PT Pain      Date/Time Pain Type Side/Orientation Location Rating: Rest Rating: Activity Description Interventions [facility]   04/04/25 0905 Pain Assessment -- neck 7 8 -- care clustered;pain management plan reviewed with patient/caregiver [facility]                         04/04/25 0957 Pain Reassessment right shoulder 3 -- -- supine in bed with R pillow provided care clustered [facility]   04/04/25 0958 Pain Reassessment;Post Activity -- neck 5 -- -- care clustered;premedicated for activity LP               Prior Living Environment      Flowsheet Row Most Recent Value   People in Home spouse   Current Living Arrangements home   Home Accessibility stairs to enter home (Group), stairs within home (Group)   Living Environment Comment Multi-story home with 12-14 steps to second floor, powder room on 1st floor, 2-3 FELICITA via garage, multiple supportive family members   Number of Stairs, Main Entrance 3   Stair Railings, Main Entrance none   Location, Patient Bedroom second floor, must negotiate stairs to access   Location, Bathroom second floor, must negotiate stairs to access   Bathroom Access Comment powder room 1st floor standard height toilet, full bathroom 2nd floor WIS with glass door, standard height toilet   Stairs, Within Home, Primary 12   Stair Railings, Within Home, Primary railings on both sides of stairs            Prior Level of Function      Flowsheet Row Most Recent Value   Dominant Hand right   Ambulation independent   Transferring independent   Toileting independent   Bathing independent    Dressing independent   Eating independent   IADLs independent   Driving/Transportation    Communication understands/communicates without difficulty   Past History of Dysphagia PTA pt was completely independent, no AD. (+) . NPO PTA, manages peg tube independently.   Prior Level of Function Comment Independent without use of AE or DME.   Assistive Device Currently Used at Home none             IRF PT Evaluation and Treatment - 04/04/25 0901          PT Time Calculation    Start Time 0901     Stop Time 1001     Time Calculation (min) 60 min        Session Details    Document Type Daily Treatment/Progress Note        General Information    Patient Profile Reviewed yes     General Observations of Patient received in room with nursing for toileting and medication needs        Mobility Belt    Mobility Belt Used During Session yes        Orthosis Neck Cervical Collar    Orthosis Properties Date Obtained: 04/03/25 Time Obtained: 0825 Location: Neck Type: Cervical Collar Features: Hard Description: Hard collar AAT; can be removed seated to don [place] collar for bathing.    Compliance/Wearing Issues patient;compliant with wearing schedule        Skin Interventions    Pressure Reduction Devices specialty bed utilized;positioning supports utilized     Pressure Reduction Techniques frequent weight shift encouraged     Skin Protection skin-to-skin areas padded;skin-to-device areas padded        Bed Mobility    Darke, Sit to Supine moderate assist (50-74% patient effort)     Safety/Cues increased time to complete;minimal;verbal cues;technique;sequencing;preparatory posture     Assistive Device head of bed elevated     Comment assist end of session return to bed from sit>supine with HOB elevated, R shld posteriorly supported with pillow        Transfers    Comment Sitting unsupported on commode, static and dynamic balance for bowel management, performed clothing management with assistance,  and SPT/ STS from commode to WC- Mod/Min A        Sit to Stand Transfer    [place]enson, Sit to Stand Transfer minimum assist (75% or more patient effort)     Safety/Cues technique;sequencing     Assistive Device walker, front-wheeled;wheelchair     Comment assist from WC, commode and EOM; Min A for balance on pelvis, cues for trunk ext and respiration upon standing        Stand to Sit Transfer    [place], Stand to Sit Transfer minimum assist (75% or more patient effort)     Safety/Cues technique;sequencing     Assistive Device wheelchair;walker, front-wheeled     Comment assist to WC, commode and EOM; Min A for balance on pelvis and cues for decreased UE shld flex for pain management        Stand Pivot Transfer    [place], Stand Pivot/Stand Step Transfer moderate assist (50-74% patient effort)     Safety/Cues technique;sequencing     Assistive Device walker, front-wheeled     Comment Mod/Min A for balance on pelvis, intermittent LE scissor, cues for trunk and forward eye gaze due to c/s collar        Gait Training    [place], Gait moderate assist (50-74% patient effort);minimum assist (75% or more patient effort)     Safety/Cues technique;sequencing;verbal cues;tactile cues     Assistive Device walker, front-wheeled     Distance in Feet 50 feet     Pattern step-through     Deviations/Abnormal Patterns base of support, narrow;gait speed decreased;scissoring;step length decreased;weight shifting decreased;antalgic     Bilateral Gait Deviations heel strike decreased     Comment intermittent narrow YONATAN and path deviation, mild scossoring intermittently with turning, Mod/Min A on pelvis for balance, cues for trunk/lumbar extension; multiple trials performed 50ft with two turns, 50+50ft x3, 75ft, 100ft; seated supported rest breaks provided        Stairs Training    [place], Stairs moderate assist (50-74% patient effort)     Safety/Cues technique;sequencing     Assistive Device railing     Handrail  "Location (Stairs) both sides     Number of Stairs 4     Stair Height 6 inches     Ascending Stairs Technique step-over-step     Descending Stairs Technique step-to-step     Comment Mod/Min A on pelvis for balance and limitations with c/s collar visual impairments; cues for step to ascending, although performed step over; descending step to with cues and educ for \"use of heel to ride down\".        Wheelchair Mobility/Management    Comment, Wheelchair Mobility WC education and discussion for positioning and OOB tolerance, and c/s support via recliner vs MWC vs        Impairments/Safety Issues    Impairments Affecting Function balance;coordination;endurance/activity tolerance;motor control;pain;postural/trunk control;range of motion (ROM);sensation/sensory awareness;strength        Upper Extremity (Therapeutic Exercise)    Comment seated scapular downward activation- manual cues 3 sets of 6 reps        Lower Extremity (Therapeutic Exercise)    Exercise Position/Type seated     General Exercise bilateral;ankle pumps;LAQ (long arc quad);marching while seated     Reps and Sets 3/10     Comment seated warmup therex, LAQ with 5s hold        Daily Progress Summary (PT)    Daily Outcome Statement Pt participating in transfers, ADLs and functional mobility Pt requires assistance for balance, LE impairments and limited due to c/s collar visual impairments and PMH. Pt engaged in goal setting for mobility, \"walking more\", and \"doing stairs\", POC reviewed and discussion of home setup and pts spouse for family educ and training to be performed.                          Education Documentation  Mobility Aids/Assistive Devices, taught by Brigida Nguyễn, PT at 4/4/2025  1:07 PM.  Learner: Patient  Readiness: Acceptance  Method: Explanation  Response: Verbalizes Understanding  Comment: use of RW AAT for safety and balance for transfers and mobility, c/s collar visual limitations with spinal precautions          IRF PT Goals  "     Flowsheet Row Most Recent Value   Bed Mobility Goal 1    Activity/Assistive Device sit to supine/supine to sit at 04/02/2025 0904   [place] supervision required at 04/02/2025 0904   Time Frame short-term goal (STG), 5 - 7 days at 04/03/2025 0856   Strategies/Barriers evaluated 4/2, continue with PT POC at 04/03/2025 0856   Progress/Outcome goal ongoing, good progress toward goal at 04/03/2025 0856   Bed Mobility Goal 2    Activity/Assistive Device bed mobility activities, all at 04/02/2025 0904   [place] modified independence at 04/02/2025 0904   Time Frame long-term goal (LTG), 3 weeks at 04/02/2025 0904   Progress/Outcome goal ongoing at 04/03/2025 0856   Transfer Goal 1    Activity/Assistive Device sit-to-stand/stand-to-sit, stand pivot at 04/02/2025 0904   [place] minimum assist (75% or more patient effort) at 04/02/2025 0904   Time Frame short-term goal (STG), 5 - 7 days at 04/03/2025 0856   Strategies/Barriers evaluated 4/2, conitnue with PT POC at 04/03/2025 0856   Progress/Outcome goal ongoing, good progress toward goal at 04/03/2025 0856   Transfer Goal 2    Activity/Assistive Device sit-to-stand/stand-to-sit, stand pivot at 04/02/2025 0904   [place] modified independence at 04/02/2025 0904   Time Frame long-term goal (LTG), 3 weeks at 04/02/2025 0904   Progress/Outcome goal ongoing at 04/03/2025 0856   Gait/Walking Locomotion Goal 1    Activity/Assistive Device gait (walking locomotion), assistive device use at 04/02/2025 0904   Distance 150 feet at 04/02/2025 0904   [place] minimum assist (75% or more patient effort) at 04/02/2025 0904   Time Frame short-term goal (STG), 5 days at 04/03/2025 0856   Strategies/Barriers evaluated 4/2, contiune with PT POC at 04/03/2025 0856   Progress/Outcome good progress toward goal, goal ongoing at 04/03/2025 0856   Gait/Walking Locomotion Goal 2    Activity/Assistive Device gait (walking locomotion), assistive device use at 04/02/2025 0956    Distance 150 feet at 04/02/2025 0904   [place] modified independence at 04/02/2025 0904   Time Frame long-term goal (LTG), 3 weeks at 04/02/2025 0904   Progress/Outcome goal ongoing at 04/03/2025 0856   Stairs Goal 1    Activity/Assistive Device ascending stairs, descending stairs, using handrail, left, using handrail, right at 04/02/2025 0904   Number of Stairs 12 at 04/02/2025 0904   [place] minimum assist (75% or more patient effort) at 04/02/2025 0904   Time Frame short-term goal (STG), 5 - 7 days at 04/03/2025 0856   Strategies/Barriers evaluated 4/2, continue PT POC at 04/03/2025 0856   Progress/Outcome good progress toward goal, goal ongoing at 04/03/2025 0856   Stairs Goal 2    Activity/Assistive Device ascending stairs, descending stairs, using handrail, left, using handrail, right at 04/02/2025 0904   Number of Stairs 12 at 04/02/2025 0904   [place] supervision required at 04/02/2025 0904   Time Frame long-term goal (LTG), 3 weeks at 04/02/2025 0904   Progress/Outcome goal ongoing at 04/03/2025 0856

## 2025-04-04 NOTE — PROGRESS NOTES
CM re attempted to call pts spouse Yue with updates and to schedule family training. CM left message and will re attempt    CM spoke w pts spouse Yue. Pts spouse to attend FTR 4/7 from 9am-11:30am. CM updated team and will continue to follow for any needs.

## 2025-04-04 NOTE — PROGRESS NOTES
Patient: Celso Gramajo  Location: [facility]  MRN: [id number]  Today's date: 4/4/2025    History of Present Illness  Celso is a 69 y.o. male admitted on 4/1/2025 with Spinal epidural abscess [G06.1]. Principal problem is Spinal epidural abscess.    Celso Gramajo is a 69 year old male with PMHx thyroid CA s/p thyroidectomy, RND, adjuvant XRT/immunotherapy, esophageal SCC s/p chemoradiation c/b esophageal strictures, HTN, Elevated Coronary Calcium scoring in 2017, ENMANUEL, Hypothyroidism, Anemia, dysphagia on PEG (placed in 2021) TFs, and history of CVA who presented to an OSH with neck and R shoulder pain. Workup was unrevealing and he was discharged home.    He presented to the [facility] ED again on 3/17 with ongoing fevers. CT Neck with no abscess, old postsurgical changes. He d/c home but BCx subsequently returned positive on 3/19 and he returned for admission. He was also bacteremic with strep viridans, and placed on IV antibiotics. Workup revealed a C5-C7 ventral epidural abscess with osteomyelitis, and he was transferred to [facility] for surgical evaluation.    MRI on 3/24 c/f metastatic disease at T1-T4, L4.      At [facility] ortho and ENT were consulted. Ultimately it was determined that an anterior surgical approach was not safe (due to history of neck surgeries), and patient was having progressive weakness. Repeat MRI C-spine showed Discitis and osteomyelitis at C6-C7 with ventral epidural abscess compressing the spinal cord and increased edema. Patient urgently underwent POSTERIOR C4-T1 DECOMPRESSION FUSION WITH YUKON INSTRUMENTATION on 3/27. Postop he was briefly in the ICU for MAP goals. OR cultures grew strep intermedius and he was placed on the antibiotic plan below. He had a PICC placed 4/1. 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 He was started on salt tabs. In rehab please continue to check labs, and titrate the salt tabs as appropriate.  He worked with PT/OT who recommended him for acute rehab.     Past Medical History  Celso has a past medical history of Anemia, Cancer (CMS/HCC), head and neck radiation, Hypertension, Hypothyroidism, Stroke (CMS/HCC), and Thyroid disease.    PT Vitals      Date/Time Pulse HR Source BP BP Location BP Method Pt Position [facility]   04/04/25 1405 99 Monitor 111/61 Left upper arm Automatic Sitting JSC          PT Pain      Date/Time Pain Type Location Rating: Rest Interventions [facility]   04/04/25 1405 Pain Assessment shoulder 6 diversional activity provided [facility]   04/04/25 1428 Pain Reassessment shoulder 6 diversional activity provided [facility]               Prior Living Environment      Flowsheet Row Most Recent Value   People in Home spouse   Current Living Arrangements home   Home Accessibility stairs to enter home (Group), stairs within home (Group)   Living Environment Comment Multi-story home with 12-14 steps to second floor, powder room on 1st floor, 2-3 FELICITA via garage, multiple supportive family members   Number of Stairs, Main Entrance 3   Stair Railings, Main Entrance none   Location, Patient Bedroom second floor, must negotiate stairs to access   Location, Bathroom second floor, must negotiate stairs to access   Bathroom Access Comment powder room 1st floor standard height toilet, full bathroom 2nd floor WIS with glass door, standard height toilet   Stairs, Within Home, Primary 12   Stair Railings, Within Home, Primary railings on both sides of stairs            Prior Level of Function      Flowsheet Row Most Recent Value   Dominant Hand right   Ambulation independent   Transferring independent   Toileting independent   Bathing independent   Dressing independent   Eating independent   IADLs independent   Driving/Transportation    Communication understands/communicates without difficulty   Past History of Dysphagia PTA pt was completely independent, no AD. (+) . NPO PTA, manages peg tube independently.  "  Prior Level of Function Comment Independent without use of AE or DME.   Assistive Device Currently Used at Home none             IRF PT Evaluation and Treatment - 04/04/25 1406          PT Time Calculation    Start Time 1400     Stop Time 1430     Time Calculation (min) 30 min        Session Details    Document Type Daily Treatment/Progress Note        General Information    General Observations of Patient agreeable to therapy        Mobility Belt    Mobility Belt Used During Session yes        Orthosis Neck Cervical Collar    Orthosis Properties Date Obtained: 04/03/25 Time Obtained: 0825 Location: Neck Type: Cervical Collar Features: Hard Description: Hard collar AAT; can be removed seated to don [place] collar for bathing.       Sit to Stand Transfer    [place], Sit to Stand Transfer minimum assist (75% or more patient effort)     Assistive Device walker, front-wheeled        Stand to Sit Transfer    [place], Stand to Sit Transfer minimum assist (75% or more patient effort)     Assistive Device walker, front-wheeled        Gait Training    [place], Gait minimum assist (75% or more patient effort)     Assistive Device walker, front-wheeled     Distance in Feet 250 feet     Pattern step-through     Bilateral Gait Deviations heel strike decreased     Advanced Gait Activity step over obstacle        Step Over Obstacle    [place] moderate assist (50-74% patient effort)     Assistive Device walker, front-wheeled     Comment stepping over 6\" hurdles x 4 reps leading with each LE        Stairs Training    [place], Stairs minimum assist (75% or more patient effort)     Assistive Device railing     Handrail Location (Stairs) both sides     Number of Stairs 16     Stair Height 6 inches     Ascending Stairs Technique step-over-step     Descending Stairs Technique step-over-step        Daily Progress Summary (PT)    Daily Outcome Statement Pt highly motivated to increase both gait distance and " number of steps.  Pt required decrease lifting assist on stairs and demonstrated ability to complete increased number of steps.  Pt may benefit from trial of rail and AD to mock home set up in future sessions.                               IRF PT Goals      Flowsheet Row Most Recent Value   Bed Mobility Goal 1    Activity/Assistive Device sit to supine/supine to sit at 04/02/2025 0904   [place] supervision required at 04/02/2025 0904   Time Frame short-term goal (STG), 5 - 7 days at 04/03/2025 0856   Strategies/Barriers evaluated 4/2, continue with PT POC at 04/03/2025 0856   Progress/Outcome goal ongoing, good progress toward goal at 04/03/2025 0856   Bed Mobility Goal 2    Activity/Assistive Device bed mobility activities, all at 04/02/2025 0904   [place] modified independence at 04/02/2025 0904   Time Frame long-term goal (LTG), 3 weeks at 04/02/2025 0904   Progress/Outcome goal ongoing at 04/03/2025 0856   Transfer Goal 1    Activity/Assistive Device sit-to-stand/stand-to-sit, stand pivot at 04/02/2025 0904   [place] minimum assist (75% or more patient effort) at 04/02/2025 0904   Time Frame short-term goal (STG), 5 - 7 days at 04/03/2025 0856   Strategies/Barriers evaluated 4/2, conitnue with PT POC at 04/03/2025 0856   Progress/Outcome goal ongoing, good progress toward goal at 04/03/2025 0856   Transfer Goal 2    Activity/Assistive Device sit-to-stand/stand-to-sit, stand pivot at 04/02/2025 0904   [place] modified independence at 04/02/2025 0904   Time Frame long-term goal (LTG), 3 weeks at 04/02/2025 0904   Progress/Outcome goal ongoing at 04/03/2025 0856   Gait/Walking Locomotion Goal 1    Activity/Assistive Device gait (walking locomotion), assistive device use at 04/02/2025 0904   Distance 150 feet at 04/02/2025 0904   [place] minimum assist (75% or more patient effort) at 04/02/2025 0904   Time Frame short-term goal (STG), 5 days at 04/03/2025 0856   Strategies/Barriers evaluated  4/2, contiune with PT POC at 04/03/2025 0856   Progress/Outcome good progress toward goal, goal ongoing at 04/03/2025 0856   Gait/Walking Locomotion Goal 2    Activity/Assistive Device gait (walking locomotion), assistive device use at 04/02/2025 0904   Distance 150 feet at 04/02/2025 0904   [place] modified independence at 04/02/2025 0904   Time Frame long-term goal (LTG), 3 weeks at 04/02/2025 0904   Progress/Outcome goal ongoing at 04/03/2025 0856   Stairs Goal 1    Activity/Assistive Device ascending stairs, descending stairs, using handrail, left, using handrail, right at 04/02/2025 0904   Number of Stairs 12 at 04/02/2025 0904   [place] minimum assist (75% or more patient effort) at 04/02/2025 0904   Time Frame short-term goal (STG), 5 - 7 days at 04/03/2025 0856   Strategies/Barriers evaluated 4/2, continue PT POC at 04/03/2025 0856   Progress/Outcome good progress toward goal, goal ongoing at 04/03/2025 0856   Stairs Goal 2    Activity/Assistive Device ascending stairs, descending stairs, using handrail, left, using handrail, right at 04/02/2025 0904   Number of Stairs 12 at 04/02/2025 0904   [place] supervision required at 04/02/2025 0904   Time Frame long-term goal (LTG), 3 weeks at 04/02/2025 0904   Progress/Outcome goal ongoing at 04/03/2025 0856

## 2025-04-04 NOTE — PLAN OF CARE
Plan of Care Review  Plan of Care Reviewed With: patient  Progress: improving  Outcome Evaluation: assumed care of patient at 2300. aaox4. pain managed with current regimen. posterior neck incision with sutures and dressing cdi. [place] J collar worn aat. abdomen soft and rounded, no reported abdominal discomfort or n/v. productive cough noted, utilizing yankauer to help clear secretions. continent of bladder, spills urinal at times. no bm this shift. Q12 recephin via RUE single lumen PICC. rings appropriately for assistance, call bell in reach.

## 2025-04-04 NOTE — PROGRESS NOTES
Patient: Celso Gramajo  Location: [facility] [address]  MRN: [id number]  Today's date: 4/4/2025    History of Present Illness  Celso is a 69 y.o. male admitted on 4/1/2025 with Spinal epidural abscess [G06.1]. Principal problem is Spinal epidural abscess.    Celso Gramajo is a 69 year old male with PMHx thyroid CA s/p thyroidectomy, RND, adjuvant XRT/immunotherapy, esophageal SCC s/p chemoradiation c/b esophageal strictures, HTN, Elevated Coronary Calcium scoring in 2017, ENMANUEL, Hypothyroidism, Anemia, dysphagia on PEG (placed in 2021) TFs, and history of CVA who presented to an OSH with neck and R shoulder pain. Workup was unrevealing and he was discharged home.    He presented to the [place] ED again on 3/17 with ongoing fevers. CT Neck with no abscess, old postsurgical changes. He d/c home but BCx subsequently returned positive on 3/19 and he returned for admission. He was also bacteremic with strep viridans, and placed on IV antibiotics. Workup revealed a C5-C7 ventral epidural abscess with osteomyelitis, and he was transferred to [facility] for surgical evaluation.    MRI on 3/24 c/f metastatic disease at T1-T4, L4.      At [facility] ortho and ENT were consulted. Ultimately it was determined that an anterior surgical approach was not safe (due to history of neck surgeries), and patient was having progressive weakness. Repeat MRI C-spine showed Discitis and osteomyelitis at C6-C7 with ventral epidural abscess compressing the spinal cord and increased edema. Patient urgently underwent POSTERIOR C4-T1 DECOMPRESSION FUSION WITH [place] INSTRUMENTATION on 3/27. Postop he was briefly in the ICU for MAP goals. OR cultures grew strep intermedius and he was placed on the antibiotic plan below. He had a PICC placed 4/1. 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 He was started on salt tabs. In rehab please continue to check labs, and titrate the salt tabs as appropriate.  He worked with PT/OT who recommended him for acute rehab.     Past Medical History  Celso has a past medical history of Anemia, Cancer (CMS/HCC), head and neck radiation, Hypertension, Hypothyroidism, Stroke (CMS/HCC), and Thyroid disease.    OT Vitals      Date/Time Pulse HR Source Pt Activity BP BP Location BP Method Pt Position [facility]   04/04/25 1309 95 Monitor At rest 122/68 Left upper arm Automatic Sitting AA          OT Pain      Date/Time Pain Type Side/Orientation Location Rating: Rest Rating: Activity Description Interventions [facility]   04/04/25 1309 Pain Assessment shoulder generalized 6 7 aching diversional activity provided AA   04/04/25 1358 Pain Assessment shoulder generalized 6 7 aching diversional activity provided AA               Prior Living Environment      Flowsheet Row Most Recent Value   People in Home spouse   Current Living Arrangements home   Home Accessibility stairs to enter home (Group), stairs within home (Group)   Living Environment Comment Multi-story home with 12-14 steps to second floor, powder room on 1st floor, 2-3 FELICITA via garage, multiple supportive family members   Number of Stairs, Main Entrance 3   Stair Railings, Main Entrance none   Location, Patient Bedroom second floor, must negotiate stairs to access   Location, Bathroom second floor, must negotiate stairs to access   Bathroom Access Comment powder room 1st floor standard height toilet, full bathroom 2nd floor WIS with glass door, standard height toilet   Stairs, Within Home, Primary 12   Stair Railings, Within Home, Primary railings on both sides of stairs            Prior Level of Function      Flowsheet Row Most Recent Value   Dominant Hand right   Ambulation independent   Transferring independent   Toileting independent   Bathing independent   Dressing independent   Eating independent   IADLs independent   Driving/Transportation    Communication understands/communicates without difficulty   Past History of  Dysphagia PTA pt was completely independent, no AD. (+) . NPO PTA, manages peg tube independently.   Prior Level of Function Comment Independent without use of AE or DME.   Assistive Device Currently Used at Home none            Occupational Profile      Flowsheet Row Most Recent Value   Occupational History/Life Experiences retired involved in [employer] on [employer]. + , -handicap driving placard.   Patient Goals PSFS: going to the bathroom 0/10, walking 1/10, getting in/out of bed 0/10 = 1/30 = 3.33%             IRF OT Evaluation and Treatment - 04/04/25 1308          OT Time Calculation    Start Time 1258     Stop Time 1358     Time Calculation (min) 60 min        Session Details    Document Type Daily Treatment/Progress Note        General Information    General Observations of Patient Pt recieved for therapy in bed        Mobility Belt    Mobility Belt Used During Session yes        Orthosis Neck Cervical Collar    Orthosis Properties Date Obtained: 04/03/25 Time Obtained: 0825 Location: Neck Type: Cervical Collar Features: Hard Description: Hard collar AAT; can be removed seated to don [place] collar for bathing.        Assessment    Right Hand Dynamometer Position 2     Left Hand Dynamometer Position 2     Right  - Trial 1 10     Right  - Trial 2 18     Right  - Trial 3 25     Right  - Average of 3 Trials 17.67     Left  - Trial 1 8     Left  - Trial 2 18     Left  - Trial 3 12     Left  - Average of 3 Trials 12.67     Additional  Assessments Key Pinch;Three Point Pinch;Tip to Tip Pinch     Right Key Pinch Test - Trial 1 12     Right Key Pinch Test - Trial 2 10     Right Key Pinch Test - Trial 3 10     Right Key Pinch Test - Average of 3 Trials 10.67     Left Key Pinch Test - Trial 1 8     Left Key Pinch Test - Trial 2 8     Left Key Pinch Test - Trial 3 8     Left Key Pinch Test - Average of 3 Trials 8     Right Three Point Pinch Test - Trial 1 10      "Right Three Point Pinch Test - Trial 2 10     Right Three Point Pinch Test - Trial 3 10     Right Three Point Pinch Test - Average of 3 Trials 10     Left Three Point Pinch Test - Trial 1 10     Left Three Point Pinch Test - Trial 2 8     Left Three Point Pinch Test - Trial 3 8     Left Three Point Pinch Test - Average of 3 Trials 8.67     Right Tip to Tip Pinch Test - Trial 1 8     Right Tip to Tip Pinch Test - Trial 2 6     Right Tip to Tip Pinch Test - Trial 3 7     Right Tip to Tip Pinch Test - Average of 3 Trials 7     Left Tip to Tip Pinch Test - Trial 1 6     Left Tip to Tip Pinch Test - Trial 2 8     Left Tip to Tip Pinch Test - Trial 3 6     Left Tip to Tip Pinch Test - Average of 3 Trials 6.67        Bed Mobility    Comment OT: Min A supine > sit c assist at trunk        Bed to Chair Transfer    [place], Bed to Chair minimum assist (75% or more patient effort)     Safety/Cues technique;hand placement     Assistive Device walker, front-wheeled     Comment SPT from EOB > w/c        Motor Skills    Results, 9 Hole Peg Test of Fine Motor Coordination LUE - 39.8 seconds; RUE -  30.27 s (age related norm average 21.23 s)   Box and Blocks: LUE 33; RUE 39 (age related norm average 68)       Upper Extremity (Therapeutic Exercise)    Exercise Position/Type seated     General Exercise bilateral     Reps and Sets x5 minutes     Comment use of pulley system for shoulder flexion at pt's self selected pace.        Hand (Therapeutic Exercise)    Exercise Position/Type seated     General Exercise bilateral     Weight/Resistance hand gripper;therapy putty   (pink putty)    Reps and Sets x10 reps (hand exerciser);     Comment Issued pt hand gripper exerciser set on 20#, performed x10 reps each UE, recommend performing 3x daily. Pt verbalizes understanding. Provided pt with \"breakfast\" HEP utilizing theraputty. (1) Gross grasp on putty to form an \"egg\"/ball.  (2) Digit extension to flatten putty into a \"pancake\". (3) " "Digit extension to roll out putty into a log or \"sausage\". (4) Pad to pad pinch along sausage to make \"sausage links\". (5) Digit abduction to open up \"donuts\".        Daily Progress Summary (OT)    Daily Outcome Statement Completed UE FOM assessment on this date. Pt score less than age related norms in strength and coordination tasks. Issued pt hand exerciser + theraputty to work on coordination/strength. Plan to follow up to ensure pt is performing excericse correctly and has good carry over.                          Education Documentation  Strengthening Exercise, taught by Lorena Stinson, OT at 4/4/2025  1:47 PM.  Learner: Patient  Readiness: Acceptance  Method: Explanation, Demonstration  Response: Verbalizes Understanding, Demonstrated Understanding  Comment: Theraputty HEP          IRF OT Goals      Flowsheet Row Most Recent Value   Transfer Goal 1    Activity/Assistive Device toilet at 04/02/2025 0723   [place] minimum assist (75% or more patient effort) at 04/02/2025 0723   Time Frame short-term goal (STG), 5 - 7 days at 04/03/2025 0754   Strategies/Barriers pt just evlauated at 04/03/2025 0754   Progress/Outcome progress slower than expected, goal ongoing, goal revised this date at 04/03/2025 0754   Transfer Goal 2    Activity/Assistive Device toilet at 04/02/2025 0723   [place] modified independence at 04/02/2025 0723   Time Frame long-term goal (LTG), 21 days or less at 04/02/2025 0723   Progress/Outcome goal ongoing at 04/03/2025 0754   Transfer Goal 3    Activity/Assistive Device shower at 04/02/2025 0723   [place] other (see comments)  [TBA] at 04/02/2025 0723   Time Frame short-term goal (STG), 5 - 7 days at 04/03/2025 0754   Strategies/Barriers pt just evlauated at 04/03/2025 0754   Progress/Outcome progress slower than expected, goal ongoing, goal revised this date at 04/03/2025 0754   Transfer Goal 4    Activity/Assistive Device shower at 04/02/2025 0723   [place] other (see " comments)  [TBA] at 04/02/2025 0723   Time Frame long-term goal (LTG), 21 days or less at 04/02/2025 0723   Progress/Outcome goal ongoing at 04/03/2025 0754   Bathing Goal 1    [place] moderate assist (50-74% patient effort) at 04/02/2025 0723   Time Frame short-term goal (STG), 5 - 7 days at 04/03/2025 0754   Strategies/Barriers pt just evlauated at 04/03/2025 0754   Progress/Outcome progress slower than expected, goal ongoing, goal revised this date at 04/03/2025 0754   Bathing Goal 2    [place] supervision required at 04/02/2025 0723   Time Frame long-term goal (LTG), 21 days or less at 04/02/2025 0723   Progress/Outcome goal ongoing at 04/03/2025 0754   UB Dressing Goal 1    [place] other (see comments)  [TBA] at 04/02/2025 0723   Time Frame short-term goal (STG), 5 - 7 days at 04/03/2025 0754   Strategies/Barriers pt just evlauated at 04/03/2025 0754   Progress/Outcome progress slower than expected, goal ongoing, goal revised this date at 04/03/2025 0754   UB Dressing Goal 2    [place] other (see comments)  [TBA] at 04/02/2025 0723   Time Frame long-term goal (LTG), 21 days or less at 04/02/2025 0723   Progress/Outcome goal ongoing at 04/03/2025 0754   LB Dressing Goal 1    [place] moderate assist (50-74% patient effort) at 04/02/2025 0723   Time Frame short-term goal (STG), 5 - 7 days at 04/03/2025 0754   Strategies/Barriers pt just evlauated at 04/03/2025 0754   Progress/Outcome progress slower than expected, goal ongoing, goal revised this date at 04/03/2025 0754   LB Dressing Goal 2    [place] modified independence at 04/02/2025 0723   Time Frame long-term goal (LTG), 21 days or less at 04/02/2025 0723   Progress/Outcome goal ongoing at 04/03/2025 0754   Grooming Goal 1    [place] set-up required at 04/02/2025 0723   Time Frame short-term goal (STG), 5 - 7 days at 04/03/2025 0754   Strategies/Barriers pt just evlauated at 04/03/2025 0754   Progress/Outcome progress slower  than expected, goal ongoing, goal revised this date at 04/03/2025 0754   Grooming Goal 2    [place] modified independence at 04/02/2025 0723   Time Frame long-term goal (LTG), 21 days or less at 04/02/2025 0723   Progress/Outcome goal ongoing at 04/03/2025 0754   Toileting Goal 1    [place] moderate assist (50-74% patient effort) at 04/02/2025 0723   Time Frame short-term goal (STG), 5 - 7 days at 04/03/2025 0754   Strategies/Barriers pt just evlauated at 04/03/2025 0754   Progress/Outcome progress slower than expected, goal ongoing, goal revised this date at 04/03/2025 0754   Toileting Goal 2    [place] modified independence at 04/02/2025 0723   Time Frame long-term goal (LTG), 21 days or less at 04/02/2025 0723   Progress/Outcome goal ongoing at 04/03/2025 0754

## 2025-04-04 NOTE — PLAN OF CARE
"  Problem: Rehabilitation (IRF) Plan of Care  Goal: Patient-Specific Goal (Individualized)  Flowsheets (Taken 4/4/2025 1693)  Patient/Family-Specific Goals (Include Timeframe): \"I want to walk more and do stairs when I get home to be able to get to my bedroom.\"     "

## 2025-04-05 ENCOUNTER — APPOINTMENT (INPATIENT)
Dept: OCCUPATIONAL THERAPY | Facility: REHABILITATION | Age: 70
DRG: 559 | End: 2025-04-05
Payer: MEDICARE

## 2025-04-05 ENCOUNTER — APPOINTMENT (OUTPATIENT)
Dept: OTHER | Facility: REHABILITATION | Age: 70
End: 2025-04-05
Payer: MEDICARE

## 2025-04-05 LAB
GLUCOSE BLD-MCNC: 146 MG/DL (ref 70–99)
GLUCOSE BLD-MCNC: 187 MG/DL (ref 70–99)
GLUCOSE BLD-MCNC: 205 MG/DL (ref 70–99)
GLUCOSE BLD-MCNC: 212 MG/DL (ref 70–99)
POCT TEST: ABNORMAL

## 2025-04-05 PROCEDURE — 63700000 HC SELF-ADMINISTRABLE DRUG: Performed by: PHYSICAL MEDICINE & REHABILITATION

## 2025-04-05 PROCEDURE — 25800000 HC PHARMACY IV SOLUTIONS: Performed by: INTERNAL MEDICINE

## 2025-04-05 PROCEDURE — 97530 THERAPEUTIC ACTIVITIES: CPT | Mod: GO

## 2025-04-05 PROCEDURE — 63700000 HC SELF-ADMINISTRABLE DRUG: Performed by: INTERNAL MEDICINE

## 2025-04-05 PROCEDURE — 12800001 HC ROOM AND CARE SEMIPRIVATE REHAB-BRAIN INJ

## 2025-04-05 PROCEDURE — 63600000 HC DRUGS/DETAIL CODE: Performed by: INTERNAL MEDICINE

## 2025-04-05 RX ADMIN — HYDROMORPHONE HYDROCHLORIDE 2 MG: 2 TABLET ORAL at 13:37

## 2025-04-05 RX ADMIN — SIMETHICONE 160 MG: 80 TABLET, CHEWABLE ORAL at 20:58

## 2025-04-05 RX ADMIN — SIMETHICONE 160 MG: 80 TABLET, CHEWABLE ORAL at 16:26

## 2025-04-05 RX ADMIN — FAMOTIDINE 20 MG: 20 TABLET, FILM COATED ORAL at 20:59

## 2025-04-05 RX ADMIN — FLUOXETINE HYDROCHLORIDE 20 MG: 20 SOLUTION ORAL at 07:56

## 2025-04-05 RX ADMIN — HEPARIN SODIUM 5000 UNITS: 5000 INJECTION, SOLUTION INTRAVENOUS; SUBCUTANEOUS at 21:24

## 2025-04-05 RX ADMIN — EZETIMIBE 10 MG: 10 TABLET ORAL at 20:58

## 2025-04-05 RX ADMIN — ACETAMINOPHEN 650 MG: 650 SOLUTION ORAL at 17:45

## 2025-04-05 RX ADMIN — LORAZEPAM 0.5 MG: 0.5 TABLET ORAL at 20:58

## 2025-04-05 RX ADMIN — NYSTATIN 1 APPLICATION: 100000 POWDER TOPICAL at 21:05

## 2025-04-05 RX ADMIN — SIMETHICONE 160 MG: 80 TABLET, CHEWABLE ORAL at 13:38

## 2025-04-05 RX ADMIN — LANSOPRAZOLE 15 MG: KIT at 07:50

## 2025-04-05 RX ADMIN — Medication 10 ML: at 05:56

## 2025-04-05 RX ADMIN — SODIUM CHLORIDE 1 G: 1 TABLET ORAL at 07:50

## 2025-04-05 RX ADMIN — HYDROMORPHONE HYDROCHLORIDE 2 MG: 2 TABLET ORAL at 20:59

## 2025-04-05 RX ADMIN — INSULIN LISPRO 2 UNITS: 100 INJECTION, SOLUTION INTRAVENOUS; SUBCUTANEOUS at 10:46

## 2025-04-05 RX ADMIN — ACETAMINOPHEN 650 MG: 650 SOLUTION ORAL at 07:49

## 2025-04-05 RX ADMIN — HYDROMORPHONE HYDROCHLORIDE 2 MG: 2 TABLET ORAL at 00:19

## 2025-04-05 RX ADMIN — DOCUSATE SODIUM 100 MG: 50 LIQUID ORAL at 07:49

## 2025-04-05 RX ADMIN — SIMETHICONE 160 MG: 80 TABLET, CHEWABLE ORAL at 07:50

## 2025-04-05 RX ADMIN — INSULIN LISPRO 2 UNITS: 100 INJECTION, SOLUTION INTRAVENOUS; SUBCUTANEOUS at 17:40

## 2025-04-05 RX ADMIN — LINACLOTIDE 145 MCG: 145 CAPSULE, GELATIN COATED ORAL at 14:56

## 2025-04-05 RX ADMIN — Medication 10 ML: at 21:35

## 2025-04-05 RX ADMIN — HEPARIN SODIUM 5000 UNITS: 5000 INJECTION, SOLUTION INTRAVENOUS; SUBCUTANEOUS at 05:45

## 2025-04-05 RX ADMIN — HEPARIN SODIUM 5000 UNITS: 5000 INJECTION, SOLUTION INTRAVENOUS; SUBCUTANEOUS at 13:38

## 2025-04-05 RX ADMIN — SENNOSIDES 2 TABLET: 8.6 TABLET, FILM COATED ORAL at 10:45

## 2025-04-05 RX ADMIN — Medication 10 ML: at 13:39

## 2025-04-05 RX ADMIN — NYSTATIN 1 APPLICATION: 100000 POWDER TOPICAL at 07:58

## 2025-04-05 RX ADMIN — ACETAMINOPHEN 650 MG: 650 SOLUTION ORAL at 00:26

## 2025-04-05 RX ADMIN — CEFTRIAXONE SODIUM 2 G: 2 INJECTION, POWDER, FOR SOLUTION INTRAMUSCULAR; INTRAVENOUS at 18:40

## 2025-04-05 RX ADMIN — LEVOTHYROXINE SODIUM 150 MCG: 150 TABLET ORAL at 05:45

## 2025-04-05 RX ADMIN — DOCUSATE SODIUM 100 MG: 50 LIQUID ORAL at 20:58

## 2025-04-05 RX ADMIN — LIOTHYRONINE SODIUM 10 MCG: 5 TABLET ORAL at 05:45

## 2025-04-05 RX ADMIN — INSULIN LISPRO 1 UNITS: 100 INJECTION, SOLUTION INTRAVENOUS; SUBCUTANEOUS at 15:00

## 2025-04-05 RX ADMIN — SODIUM CHLORIDE 1 G: 1 TABLET ORAL at 20:58

## 2025-04-05 RX ADMIN — CEFTRIAXONE SODIUM 2 G: 2 INJECTION, POWDER, FOR SOLUTION INTRAMUSCULAR; INTRAVENOUS at 05:45

## 2025-04-05 NOTE — NURSING NOTE
"Answered call light, patient given supplies for mouth care.  Patient verbalized frustration and feels his progress is not fast enough.  We did discuss that he can continue with home care therapies which are very helpful and then eventually outpatient therapy.  Patient states he plans to appeal his discharge date no matter what that date it.  He states \"the longer I am here, the better I will be when I get home.\"  Above communicated to Ana Mc, .  Patient is asking when he should appeal, how close to time of discharge.    "

## 2025-04-05 NOTE — PROGRESS NOTES
Subjective     Patient seen and examined on rounds.  Chart reviewed.  Events overnight noted.  History reviewed briefly with patient.    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    HPI: Mr. Celso Gramajo is a 69-year-old right-handed white male with chronic conditions significant for hypertension, hyperlipidemia, anxiety, thyroid cancer status post thyroidectomy and radical neck dissection, adjuvant radiation therapy/immunotherapy, esophageal squamous cell carcinoma status post chemoradiotherapy complicated by esophageal strictures, left vocal cord paralysis, dysphagia status post PEG tube placement in 2021, initially presented to the ER at [facility] on 3/11/25 with fevers and right shoulder pain. Workup was unrevealing and he was discharged home. He presented to the [facility] ER again on 3/17/25 with ongoing fevers. CT neck with no abscess, old postsurgical changes. He discharged home but blood cultures subsequently, 1 of 2 sets of blood cultures drawn at [facility] on 3/17/25 showed Streptococcus viridans returned positive on 3/19/25 and he returned for admission to [facility] on 3/19/25. TTE at [facility] was negative. Blood cultures on 3/19/25 and again on 3/23/25 showed no growth per his records. Imaging with C6-7 osteomyelitis/discitis with ventral epidural abscess. [facility] evaluation.  At that time, he was afebrile.  Blood cultures  were negative. [facility]o and ENT were consulted. Ultimately it was determined that an anterior surgical approach was not safe (due  to history of neck surgeries), and patient was having progressive weakness.  MRI of cervical spine on 3/27/25 revealed discitis and osteomyelitis at C6-C7 with ventral epidural abscess compressing the spinal cord and increased inferior extent of cord edema compared to prior. Patchy T1 hypointensity with enhancement in the upper cervical and thoracic spine, can represent metastatic lesions and/or post radiation changes, similar as compared to prior. He underwent posterior C4-T1 decompression and instrumented fusion on 3/27/25, performed by orthopedic surgeon Dr. Santiago Ca. Purulent fluid drained from anterior spine during the procedure and operating room cultures revealed Strep intermedius. Postop he was briefly in the ICU for MAP goals. He had a PICC placed 4/1/25. 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 He was started on salt tabs. Infectious diseases recommended IV Daptomycin which patient completed on 3/31/25.  He was recommended to continue Ceftriaxone 2g IV q12h x 14 day course (end date 4/8/25) then transition to Ceftriaxone 2g IV daily to complete total 6 week course (end date 5/8/25). He was recommend [place] J collar to neck at all times.  I discussed orthopedic spinal precautions with him.  He completed Decadron treatment for mild laryngeal edema visualized on preoperative NPL. Patient has dysphagia from esophageal strictures, recent dilation 3/3/25 was not successful per patient, cannot tolerate regular oral secretions and recommended NPO and continued on bolus tube feeds.  He was kept on Pepcid for GI prophylaxis. Glycopyrrolate as needed per patient/spouse wishes.  For anxiety he was continued on Fluoxetine and Ativan PRN.  He is not on medications for hypertension.  He was continued on Zetia for hyperlipidemia.  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 He continues on a sliding-scale Humalog coverage.  He is on Levothyroxine  and Liothyronine for thyroid supplementation after thyroidectomy for thyroid cancer. He is on subcutaneous Heparin and SCDs for DVT prophylaxis.  I discussed his recent clinical course with patient and with his wife at bedside.  On 4/1/25, hemoglobin was 12.9, WBC count was 10.30, platelets were 255, BUN 15, creatinine was 0.51, sodium was 133 and potassium was 4.5.  He has been needing assistance for mobility, transfers, self-care. He is transferred to [facility] on 4/1/25 for further rehabilitation care.     SUBJ: He was sitting on recliner, talking on phone with his wife.  Continent of bladder per nursing.  Tolerating PEG feeds without issues.  He reports he had a good bowel movement yesterday.  Discussed with him if he gets  loose bowel movements he can always refuse bowel medications.  He reports he wants to stay on some bowel medications at this time.  Posterior cervical incision has dressing in place.  Denies increased pain today.    ROS: Denies chest pain or shortness of breath. Other ROS negative. Past, family, social history is unchanged.      Current Functional Status:   Bed mobility:   [place], Supine to Sit: minimum assist (75% or more patient effort)   [place], Sit to Supine: moderate assist (50-74% patient effort)   Transfers:    [place], Sit to Stand Transfer: minimum assist (75% or more patient effort)  [place], Stand to Sit Transfer: minimum assist (75% or more patient effort)   [place], Stand Pivot/Stand Step Transfer: moderate assist (50-74% patient effort)   Gait:   [place], Gait: minimum assist (75% or more patient effort)  Assistive Device: walker, front-wheeled   Distance in Feet: 250 feet    Bathing:   [place]: moderate assist (50-74% patient effort)   Toileting:   [place]: minimum assist (75% or more patient effort)   Upper body dressing:   [place]: minimum assist (75% or more patient effort)   Lower body dressing:   [place]:  "moderate assist (50-74% patient effort)       Functional Progress:    Functional status reviewed. Overall, patient's functional status is improving.      Physical Exam      Blood pressure 103/64, pulse 94, temperature 36.7 °C (98.1 °F), temperature source Oral, resp. rate 18, height 1.854 m (6' 1\"), weight 92.1 kg (203 lb), SpO2 94%.    Body mass index is 26.78 kg/m².    General Appearance: Not in acute distress  Head/Ear/Nose/Throat: Normocephalic; Atraumatic.   Eye: EOMI; PERRL.   Neck: [place] J collar in place.  Respiratory: Decreased breath sounds at bases.   Cardiovascular: RRR; Normal S1, S2.   Gastrointestinal: Soft; NT; +BS.   Extremities: Bilateral lower extremity edema noted.    Musculoskeletal: Functional active range of motion in both upper and lower extremities.   Neurological: AAO ×3. Speech is fluent. Cranial nerve examination does not reveal any gross facial asymmetry. Strength testing bilateral deltoids are 4/5, bilateral biceps are 4+/5, bilateral triceps are 4/5, bilateral wrist extensors are 4+/5, bilateral hand FDP are 4+/5, bilateral and interossei are 4/5.  Bilateral hip flexion is less than 4/5.  Bilateral quadriceps are 5/5.  Bilateral ankle dorsi and plantar flexion are 5/5.  He is grossly able to localize touch and position sense in great toes, except reports numbness in all 5 digits of both hands and also numbness on the plantar aspects of both feet.  Deep tendon reflexes are symmetric and slightly brisk bilaterally.  Bilateral ankle clonus is present.  Plantars are withdrawal.  Coordination is functional upper extremities.    Behavior/Emotional: Appropriate; Cooperative.   Skin: Healing incision on posterior cervical spine.  Sutures in place.  Erythema/rash noted on coccyx unclear if stage I decubitus.  Rash noted on bilateral groins.  Reviewed pictures in epic.         Current Facility-Administered Medications:     acetaminophen (TYLENOL) 650 mg/20.3 mL solution 650 mg, 650 mg, peg tube, " q6h PRN, Rohit Acevedo MD, 650 mg at 04/05/25 0749    bisacodyL (DULCOLAX) 10 mg suppository 10 mg, 10 mg, rectal, Daily PRN, Rohit Acevedo MD, 10 mg at 04/03/25 1554    cefTRIAXone (ROCEPHIN) 2 g in sodium chloride 0.9 % 100 mL IVPB, 2 g, intravenous, q12h [facility], [facility], Stopped at 04/05/25 0758    [START ON 4/9/2025] cefTRIAXone (ROCEPHIN) 2 g in sodium chloride 0.9 % 100 mL IVPB, 2 g, intravenous, q24h INT, Rohit Acevedo MD    docusate sodium (COLACE) 50 mg/5 mL liquid 100 mg, 100 mg, peg tube, BID, Spencer Kemp MD, 100 mg at 04/05/25 0749    ezetimibe (ZETIA) tablet 10 mg, 10 mg, peg tube, Nightly, Rohit Acevedo MD, 10 mg at 04/04/25 2109    famotidine (PEPCID) tablet 20 mg, 20 mg, peg tube, Nightly, Rohit Acevedo MD, 20 mg at 04/04/25 2110    FLUoxetine (PROzac) 20 mg/5 mL (4 mg/mL) solution 20 mg, 20 mg, peg tube, Daily, Rohit Acevedo MD, 20 mg at 04/05/25 0756    glycopyrrolate (ROBINUL) tablet 1 mg, 1 mg, peg tube, 2x daily PRN, Rohit Acevedo MD    heparin (porcine) 5,000 unit/mL injection 5,000 Units, 5,000 Units, subcutaneous, q8h [facility], Rohit Acevedo MD, 5,000 Units at 04/05/25 0545    HYDROmorphone (DILAUDID) tablet 2 mg, 2 mg, peg tube, q6h PRN, Lilian Marrero MD, 2 mg at 04/05/25 0019    insulin lispro U-100 (HumaLOG) pen 1-12 Units, 1-12 Units, subcutaneous, q6h DU, Lilian Marrero MD, 2 Units at 04/05/25 1046    lansoprazole (PREVACID) 3 mg/mL oral suspension 15 mg, 15 mg, feeding tube, Daily before breakfast, Lilian Marrero MD, 15 mg at 04/05/25 0750    levothyroxine (SYNTHROID) tablet 150 mcg, 150 mcg, peg tube, Daily (6:30a), Lilian Marrero MD, 150 mcg at 04/05/25 0545    linaCLOtide (LINZESS) capsule 145 mcg, 145 mcg, g-tube, Daily before lunch, Lilian Marrero MD, 145 mcg at 04/03/25 0837    liothyronine (CYTOMEL) tablet 10 mcg, 10 mcg, peg tube, Daily (6:30a), Rohit Acevedo MD, 10 mcg at 04/05/25 0538     LORazepam (ATIVAN) tablet 0.5 mg, 0.5 mg, peg tube, q8h PRN, Rohit Acevedo MD, 0.5 mg at 04/04/25 0753    magnesium hydroxide (M.O.M.) 400 mg/5 mL suspension 30 mL, 30 mL, oral, 2x daily PRN, Lilian Marrero MD, 30 mL at 04/02/25 1138    nystatin (MYCOSTATIN) 100,000 unit/gram topical powder 1 Application, 1 Application, Topical, BID, Spencer Kemp MD, 1 Application at 04/05/25 0758    ondansetron ODT (ZOFRAN-ODT) disintegrating tablet 4 mg, 4 mg, peg tube, q6h PRN, Rohit Acevedo MD    senna (SENOKOT) tablet 1 tablet, 1 tablet, peg tube, 2x daily PRN, Rohit Acevedo MD, 1 tablet at 04/03/25 1547    senna (SENOKOT) tablet 2 tablet, 2 tablet, peg tube, Daily, Spencer Kemp MD, 2 tablet at 04/05/25 1045    simethicone (MYLICON) chewable tablet 160 mg, 160 mg, peg tube, QID, Lilian Marrero MD, 160 mg at 04/05/25 0750    sodium chloride PF flush 10 mL, 10 mL, intravenous, q8h INT, Rohit Acevedo MD, 10 mL at 04/05/25 0556    sodium chloride PF flush 10 mL, 10 mL, intravenous, PRN, Rohit Acevedo MD    sodium chloride tablet 1 g, 1 g, peg tube, BID, Rohit Acevedo MD, 1 g at 04/05/25 0750       Labs / Radiology    Lab Results   Component Value Date    WBC 9.54 04/02/2025    HGB 12.3 (L) 04/02/2025    HCT 36.8 (L) 04/02/2025    MCV 93.2 04/02/2025     04/02/2025     Lab Results   Component Value Date    GLUCOSE 161 (H) 04/02/2025    CALCIUM 9.0 04/02/2025     (L) 04/02/2025    K 4.9 04/02/2025    CO2 29 04/02/2025    CL 97 (L) 04/02/2025    BUN 18 04/02/2025    CREATININE 0.6 (L) 04/02/2025       Assessment and Plan    ASSESSMENT PLAN:  1. 69-year-old right-handed white male with chronic conditions significant for hypertension, hyperlipidemia, anxiety, thyroid cancer status post thyroidectomy and radical neck dissection, adjuvant radiation therapy/immunotherapy, esophageal squamous cell carcinoma status post chemoradiotherapy complicated by esophageal  strictures, left vocal cord paralysis, dysphagia status post PEG tube placement in 2021, initially presented to the ER at [facility] on 3/11/25 with fevers and right shoulder pain. Workup was unrevealing and he was discharged home. He presented to the [facility] ER again on 3/17/25 with ongoing fevers. CT neck with no abscess, old postsurgical changes. He discharged home but blood cultures subsequently, 1 of 2 sets of blood cultures drawn at [facility] on 3/17/25 showed Streptococcus viridans returned positive on 3/19/25 and he returned for admission to [facility] on 3/19/25. TTE at [facility] was negative. Blood cultures on 3/19/25 and again on 3/23/25 showed no growth per his records. Imaging with C6-7 osteomyelitis/discitis with ventral epidural abscess. He was transferred to [facility] on 3/23/25 for surgical evaluation.  At that time, he was afebrile.  Blood cultures  were negative. At [facility] ortho and ENT were consulted. Ultimately it was determined that an anterior surgical approach was not safe (due to history of neck surgeries), and patient was having progressive weakness.  MRI of cervical spine on 3/27/25 revealed discitis and osteomyelitis at C6-C7 with ventral epidural abscess compressing the spinal cord and increased inferior extent of cord edema compared to prior. Patchy T1 hypointensity with enhancement in the upper cervical and thoracic spine, can represent metastatic lesions and/or post radiation changes, similar as compared to prior. He underwent posterior C4-T1 decompression and instrumented fusion on 3/27/25, performed by orthopedic surgeon Dr. Santiago Ca. Purulent fluid drained from anterior spine during the procedure and operating room cultures revealed Strep intermedius. Postop he was briefly in the ICU for MAP goals. He had a PICC placed 4/1/25. 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 He was started on salt tabs. Infectious  diseases recommended IV Daptomycin which patient completed on 3/31/25.  He was recommended to continue Ceftriaxone 2g IV q12h x 14 day course (end date 4/8/25) then transition to Ceftriaxone 2g IV daily to complete total 6 week course (end date 5/8/25). He was recommend [place] J collar to neck at all times.  I discussed orthopedic spinal precautions with him.  He completed Decadron treatment for mild laryngeal edema visualized on preoperative NPL. Patient has dysphagia from esophageal strictures, recent dilation 3/3/25 was not successful per patient, cannot tolerate regular oral secretions and recommended NPO and continued on bolus tube feeds.  He was kept on Pepcid for GI prophylaxis. Glycopyrrolate as needed per patient/spouse wishes.  For anxiety he was continued on Fluoxetine and Ativan PRN.  He is not on medications for hypertension.  He was continued on Zetia for hyperlipidemia.  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 He continues on a sliding-scale Humalog coverage.  He is on Levothyroxine and Liothyronine for thyroid supplementation after thyroidectomy for thyroid cancer. He is on subcutaneous Heparin and SCDs for DVT prophylaxis.  I discussed his recent clinical course with patient and with his wife at bedside.  On 4/1/25, hemoglobin was 12.9, WBC count was 10.30, platelets were 255, BUN 15, creatinine was 0.51, sodium was 133 and potassium was 4.5.  He has been needing assistance for mobility, transfers, self-care. He is transferred to [facility] on 4/1/25 for further rehabilitation care.      2. DVT prophylaxis - He is on subcutaneous Heparin and SCDs for DVT prophylaxis.  Check doppler.  Platelets 247 on 4/2/2025.  Doppler ultrasound on 4/3/2025 bilateral lower extremity was negative for DVT.     3. Orthopedics - status post posterior C4-T1 decompression and fusion, C6-C7 discitis and osteomyelitis with ventral epidural abscess compressing the spinal cord, history  of esophageal squamous cell carcinoma status post chemoradiotherapy (CRT) complicated by esophageal strictures, left vocal cord paralysis, dysphagia status post PEG tube, history of thyroid cancer, multiple medical problems - continue PT, OT, psychology.  Follow falls precautions, cardiac precautions, aspiration precautions.  Follow spinal precautions.  [place] J collar to neck at all times.  Use [place] collar in shower.     4. GI - On Pepcid for GI prophylaxis.  On PRN Senokot.  On PRN Dulcolax suppository.  On PRN Zofran.     5.  -denies dysuria.  Monitor postvoid residuals.     6. CVS - monitor for orthostasis.     7. Pulmonary -encourage incentive spirometry.     8. Hematology - monitor hemoglobin, platelets.  Hemoglobin 12.3, WBC 9.54 on 4/2/2025.     9. Pain -on Tylenol.  On PRN Oxycodone.     10. Skin - Healing incision on posterior cervical spine.  Sutures in place.  Erythema/rash noted on coccyx unclear if stage I decubitus.  Rash noted on bilateral groins.  Reviewed pictures in epic.     11. F/E/N - nothing by mouth on bolus PEG feeds.  Nutrition consulted. 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 He was started on salt tabs.     12.  Dysphagia - H/O esophageal squamous cell carcinoma status post chemoradiotherapy complicated by esophageal strictures, left vocal cord paralysis, dysphagia status post PEG tube placement in 2021 - NPO on bolus PEG feeds.  Nutrition consulted.     13.  Psychiatry - on PRN Ativan.  Psychology consulted.     14.  Hypothyroidism -He is on Levothyroxine and Liothyronine for thyroid supplementation after thyroidectomy for thyroid cancer.     15. ENT -  H/O left vocal cord paralysis, treated with Decadron for edema around vocal cords recently.  On Glycopyrrolate as needed per patient/spouse wishes to manage secretions.      16.  Infectious diseases - He underwent posterior C4-T1 decompression and instrumented fusion on 3/27/25, performed by orthopedic surgeon   Santiago Ca. Purulent fluid drained from anterior spine during the procedure and operating room cultures revealed Strep intermedius.. He had a PICC placed 4/1/25. Infectious diseases recommended IV Daptomycin which patient completed on 3/31/25.  He was recommended to continue Ceftriaxone 2g IV q12h x 14 day course (end date 4/8/25) then transition to Ceftriaxone 2g IV daily to complete total 6 week course (end date 5/8/25).     15.  Steroid-induced hyperglycemia -on sliding-scale Humalog coverage.      16. Rehabilitation medicine - Continue comprehensive rehabilitation care. Continue PT, OT, psychology.  Follow falls precautions, cardiac precautions, aspiration precautions.  Follow spinal precautions.  [place] J collar to neck at all times.  Use [place] collar in shower.Discussed patients progress in therapies with therapists in team meeting on 4/3/2025.  Discussed in PCC. See PCC documentation.  He reports no bowel movement in the past 3 to 4 days.  Also wanted antireflux medication in the morning and at home he takes Omeprazole via PEG per patient.  He is on Prevacid in the a.m. and Pepcid in p.m via PEG tube.  Due to constipation internist ordered enema.  Also scheduled Colace and Senokot were ordered through his PEG tube.  Discussed with patient on 4/3/2025.Discussed recommendations of PCC with patient on 4/4/2025.  Posterior cervical incision healing well.  He had 2 bowel movements today.  Family training scheduled for next week on Monday per case management note.  Discussed with patient on 4/4/2025.He was sitting on recliner on 4/5/2025, talking on phone with his wife.  Continent of bladder per nursing on 4/5/2025.  Tolerating PEG feeds without issues.  He reports he had a good bowel movement yesterday.  Discussed with him if he gets  loose bowel movements he can always refuse bowel medications.  He reports he wants to stay on some bowel medications at this time.  Posterior cervical incision has  dressing in place.  Denies increased pain on 4/5/2025.     17. Reviewed labs today. BUN 18, creatinine 0.6, sodium 133, potassium 4.9 on 4/2/2025.           Spencer Kemp MD  4/5/2025      This encounter was completed utilizing the Direct Speech Voice Recognition Software. Grammatical errors, random word insertions, pronoun errors, and incomplete sentences are occasional consequences of the system due to software limitations, ambient noises, and hardware issues. Such errors may be missed prior to affixing electronic signature. Any questions or concerns about the content, text, or information contained within the body of this dictation should be directly addressed to the physician for clarification. If you have any questions or concerns please do not hesitate to call me directly via EPIC chat, page, or email.

## 2025-04-05 NOTE — PROGRESS NOTES
Patient: Celso Gramajo  Location: [facility] [address]  MRN: [id number]  Today's date: 4/5/2025    History of Present Illness  Celso is a 69 y.o. male admitted on 4/1/2025 with Spinal epidural abscess [G06.1]. Principal problem is Spinal epidural abscess.    Celso Gramajo is a 69 year old male with PMHx thyroid CA s/p thyroidectomy, RND, adjuvant XRT/immunotherapy, esophageal SCC s/p chemoradiation c/b esophageal strictures, HTN, Elevated Coronary Calcium scoring in 2017, ENMANUEL, Hypothyroidism, Anemia, dysphagia on PEG (placed in 2021) TFs, and history of CVA who presented to an OSH with neck and R shoulder pain. Workup was unrevealing and he was discharged home.    He presented to the [place] ED again on 3/17 with ongoing fevers. CT Neck with no abscess, old postsurgical changes. He d/c home but BCx subsequently returned positive on 3/19 and he returned for admission. He was also bacteremic with strep viridans, and placed on IV antibiotics. Workup revealed a C5-C7 ventral epidural abscess with osteomyelitis, and he was transferred to [facility] for surgical evaluation.    MRI on 3/24 c/f metastatic disease at T1-T4, L4.      At [facility] ortho and ENT were consulted. Ultimately it was determined that an anterior surgical approach was not safe (due to history of neck surgeries), and patient was having progressive weakness. Repeat MRI C-spine showed Discitis and osteomyelitis at C6-C7 with ventral epidural abscess compressing the spinal cord and increased edema. Patient urgently underwent POSTERIOR C4-T1 DECOMPRESSION FUSION WITH [place] INSTRUMENTATION on 3/27. Postop he was briefly in the ICU for MAP goals. OR cultures grew strep intermedius and he was placed on the antibiotic plan below. He had a PICC placed 4/1. 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 He was started on salt tabs. In rehab please continue to check labs, and titrate the salt tabs as appropriate.  He worked with PT/OT who recommended him for acute rehab.     Past Medical History  Celso has a past medical history of Anemia, Cancer (CMS/HCC), head and neck radiation, Hypertension, Hypothyroidism, Stroke (CMS/HCC), and Thyroid disease.    OT Vitals      Date/Time Pulse BP [facility]   04/05/25 1001 94 103/64 RTH          OT Pain      Date/Time Pain Type Rating: Rest Rating: Activity Description Radiation to Interventions [facility]   04/05/25 1001 Pain Assessment 4 7 aching shoulder, left;shoulder, right premedicated for activity RTH   04/05/25 1029 Pain Reassessment 4 7 aching shoulder, left;shoulder, right premedicated for activity RTH               Prior Living Environment      Flowsheet Row Most Recent Value   People in Home spouse   Current Living Arrangements home   Home Accessibility stairs to enter home (Group), stairs within home (Group)   Living Environment Comment Multi-story home with 12-14 steps to second floor, powder room on 1st floor, 2-3 FELICITA via garage, multiple supportive family members   Number of Stairs, Main Entrance 3   Stair Railings, Main Entrance none   Location, Patient Bedroom second floor, must negotiate stairs to access   Location, Bathroom second floor, must negotiate stairs to access   Bathroom Access Comment powder room 1st floor standard height toilet, full bathroom 2nd floor WIS with glass door, standard height toilet   Stairs, Within Home, Primary 12   Stair Railings, Within Home, Primary railings on both sides of stairs            Prior Level of Function      Flowsheet Row Most Recent Value   Dominant Hand right   Ambulation independent   Transferring independent   Toileting independent   Bathing independent   Dressing independent   Eating independent   IADLs independent   Driving/Transportation    Communication understands/communicates without difficulty   Past History of Dysphagia PTA pt was completely independent, no AD. (+) . NPO PTA, manages peg tube  independently.   Prior Level of Function Comment Independent without use of AE or DME.   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 + , -handicap driving placard.   Patient Goals PSFS: going to the bathroom 0/10, walking 1/10, getting in/out of bed 0/10 = 1/30 = 3.33%             IRF OT Evaluation and Treatment - 04/05/25 1001          OT Time Calculation    Start Time 1000     Stop Time 1030     Time Calculation (min) 30 min        Session Details    Document Type Daily Treatment/Progress Note        Mobility Belt    Mobility Belt Used During Session yes        Orthosis Neck Cervical Collar    Orthosis Properties Date Obtained: 04/03/25 Time Obtained: 0825 Location: Neck Type: Cervical Collar Features: Hard Description: Hard collar AAT; can be removed seated to don [place] collar for bathing.       Transfers    Transfers stand pivot transfer;toilet transfer;shower transfer;other (see comments)     Comment min A        Toilet Transfer    Transfer Technique stand pivot     [place] minimum assist (75% or more patient effort)     Assistive Device commode, 3-in-1;walker, front-wheeled;wheelchair        Shower Transfer    Transfer Technique stand pivot     [place] minimum assist (75% or more patient effort)     Assistive Device grab bars/tub rail;transfer board;walker, front-wheeled;wheelchair     Comment min A dry barrier free stall trial transfer. Amb approach RW short distance        Daily Progress Summary (OT)    Daily Outcome Statement Pt participated in session addressing functional transfers..Pt benefits from Min assistance and DME for functional transfers. Cont OT POC.                               IRF OT Goals      Flowsheet Row Most Recent Value   Transfer Goal 1    Activity/Assistive Device toilet at 04/02/2025 0723   [place] minimum  assist (75% or more patient effort) at 04/02/2025 0723   Time Frame short-term goal (STG), 5 - 7 days at 04/03/2025 0754   Strategies/Barriers pt just evlauated at 04/03/2025 0754   Progress/Outcome progress slower than expected, goal ongoing, goal revised this date at 04/03/2025 0754   Transfer Goal 2    Activity/Assistive Device toilet at 04/02/2025 0723   [place] modified independence at 04/02/2025 0723   Time Frame long-term goal (LTG), 21 days or less at 04/02/2025 0723   Progress/Outcome goal ongoing at 04/03/2025 0754   Transfer Goal 3    Activity/Assistive Device shower at 04/02/2025 0723   [place] other (see comments)  [TBA] at 04/02/2025 0723   Time Frame short-term goal (STG), 5 - 7 days at 04/03/2025 0754   Strategies/Barriers pt just evlauated at 04/03/2025 0754   Progress/Outcome progress slower than expected, goal ongoing, goal revised this date at 04/03/2025 0754   Transfer Goal 4    Activity/Assistive Device shower at 04/02/2025 0723   [place] other (see comments)  [TBA] at 04/02/2025 0723   Time Frame long-term goal (LTG), 21 days or less at 04/02/2025 0723   Progress/Outcome goal ongoing at 04/03/2025 0754   Bathing Goal 1    [place] moderate assist (50-74% patient effort) at 04/02/2025 0723   Time Frame short-term goal (STG), 5 - 7 days at 04/03/2025 0754   Strategies/Barriers pt just evlauated at 04/03/2025 0754   Progress/Outcome progress slower than expected, goal ongoing, goal revised this date at 04/03/2025 0754   Bathing Goal 2    [place] supervision required at 04/02/2025 0723   Time Frame long-term goal (LTG), 21 days or less at 04/02/2025 0723   Progress/Outcome goal ongoing at 04/03/2025 0754   UB Dressing Goal 1    [place] other (see comments)  [TBA] at 04/02/2025 0723   Time Frame short-term goal (STG), 5 - 7 days at 04/03/2025 0754   Strategies/Barriers pt just evlauated at 04/03/2025 0754   Progress/Outcome progress slower than expected, goal ongoing,  goal revised this date at 04/03/2025 0754   UB Dressing Goal 2    [place] other (see comments)  [TBA] at 04/02/2025 0723   Time Frame long-term goal (LTG), 21 days or less at 04/02/2025 0723   Progress/Outcome goal ongoing at 04/03/2025 0754   LB Dressing Goal 1    [place] moderate assist (50-74% patient effort) at 04/02/2025 0723   Time Frame short-term goal (STG), 5 - 7 days at 04/03/2025 0754   Strategies/Barriers pt just evlauated at 04/03/2025 0754   Progress/Outcome progress slower than expected, goal ongoing, goal revised this date at 04/03/2025 0754   LB Dressing Goal 2    [place] modified independence at 04/02/2025 0723   Time Frame long-term goal (LTG), 21 days or less at 04/02/2025 0723   Progress/Outcome goal ongoing at 04/03/2025 0754   Grooming Goal 1    [place] set-up required at 04/02/2025 0723   Time Frame short-term goal (STG), 5 - 7 days at 04/03/2025 0754   Strategies/Barriers pt just evlauated at 04/03/2025 0754   Progress/Outcome progress slower than expected, goal ongoing, goal revised this date at 04/03/2025 0754   Grooming Goal 2    [place] modified independence at 04/02/2025 0723   Time Frame long-term goal (LTG), 21 days or less at 04/02/2025 0723   Progress/Outcome goal ongoing at 04/03/2025 0754   Toileting Goal 1    [place] moderate assist (50-74% patient effort) at 04/02/2025 0723   Time Frame short-term goal (STG), 5 - 7 days at 04/03/2025 0754   Strategies/Barriers pt just evlauated at 04/03/2025 0754   Progress/Outcome progress slower than expected, goal ongoing, goal revised this date at 04/03/2025 0754   Toileting Goal 2    [place] modified independence at 04/02/2025 0723   Time Frame long-term goal (LTG), 21 days or less at 04/02/2025 0723   Progress/Outcome goal ongoing at 04/03/2025 0754

## 2025-04-05 NOTE — PLAN OF CARE
Plan of Care Review  Plan of Care Reviewed With: patient  Progress: improving  Outcome Evaluation: Pt ao4, calm/cooperative. C/o neck/shoulder pain recieving tylenol and dilaudid; c/o disomfort to buttocks, recliner supplied for pt comfort. Tolerating tube feed boluses, flushing without issue. BG monitored, insulin given per order. Continent of bowel and bladder; loose stool, urinary frequency. Picc line flushing without issue. Foam applied over pressure injury under cervical collar. Vss.

## 2025-04-05 NOTE — PLAN OF CARE
Plan of Care Review  Plan of Care Reviewed With: patient  Progress: improving  Outcome Evaluation: Pt is AAO X 4, anxious, PRN ativan offered, patient refused. Yina ALANIS. Flushes administered 300cc HS and 500cc am. Continent of urine, using urinal. Turned and repositioned in bed. Receiving IV Ancef via GABRIEL single lumen PICC. No adverse reaction, afebrile. Neck painis being managed using PRN tylenol and dilaudid. Slept well. Safety precautions maintained.

## 2025-04-06 ENCOUNTER — APPOINTMENT (INPATIENT)
Dept: OCCUPATIONAL THERAPY | Facility: REHABILITATION | Age: 70
DRG: 559 | End: 2025-04-06
Payer: MEDICARE

## 2025-04-06 ENCOUNTER — APPOINTMENT (OUTPATIENT)
Dept: OTHER | Facility: REHABILITATION | Age: 70
End: 2025-04-06
Payer: MEDICARE

## 2025-04-06 ENCOUNTER — APPOINTMENT (INPATIENT)
Dept: PHYSICAL THERAPY | Facility: REHABILITATION | Age: 70
DRG: 559 | End: 2025-04-06
Payer: MEDICARE

## 2025-04-06 LAB
GLUCOSE BLD-MCNC: 154 MG/DL (ref 70–99)
GLUCOSE BLD-MCNC: 160 MG/DL (ref 70–99)
GLUCOSE BLD-MCNC: 194 MG/DL (ref 70–99)
GLUCOSE BLD-MCNC: 262 MG/DL (ref 70–99)
GLUCOSE BLD-MCNC: 296 MG/DL (ref 70–99)
POCT TEST: ABNORMAL

## 2025-04-06 PROCEDURE — 97110 THERAPEUTIC EXERCISES: CPT | Mod: GP

## 2025-04-06 PROCEDURE — 63700000 HC SELF-ADMINISTRABLE DRUG: Performed by: PHYSICAL MEDICINE & REHABILITATION

## 2025-04-06 PROCEDURE — 97530 THERAPEUTIC ACTIVITIES: CPT | Mod: GP

## 2025-04-06 PROCEDURE — 63700000 HC SELF-ADMINISTRABLE DRUG: Performed by: INTERNAL MEDICINE

## 2025-04-06 PROCEDURE — 25800000 HC PHARMACY IV SOLUTIONS: Performed by: INTERNAL MEDICINE

## 2025-04-06 PROCEDURE — 97530 THERAPEUTIC ACTIVITIES: CPT | Mod: GO | Performed by: OCCUPATIONAL THERAPIST

## 2025-04-06 PROCEDURE — 97116 GAIT TRAINING THERAPY: CPT | Mod: GP

## 2025-04-06 PROCEDURE — 97110 THERAPEUTIC EXERCISES: CPT | Mod: GO | Performed by: OCCUPATIONAL THERAPIST

## 2025-04-06 PROCEDURE — 63600000 HC DRUGS/DETAIL CODE: Performed by: INTERNAL MEDICINE

## 2025-04-06 PROCEDURE — 12800001 HC ROOM AND CARE SEMIPRIVATE REHAB-BRAIN INJ

## 2025-04-06 RX ADMIN — SIMETHICONE 160 MG: 80 TABLET, CHEWABLE ORAL at 21:50

## 2025-04-06 RX ADMIN — Medication 10 ML: at 14:15

## 2025-04-06 RX ADMIN — SIMETHICONE 160 MG: 80 TABLET, CHEWABLE ORAL at 12:24

## 2025-04-06 RX ADMIN — FLUOXETINE HYDROCHLORIDE 20 MG: 20 SOLUTION ORAL at 08:41

## 2025-04-06 RX ADMIN — LANSOPRAZOLE 15 MG: KIT at 08:40

## 2025-04-06 RX ADMIN — HEPARIN SODIUM 5000 UNITS: 5000 INJECTION, SOLUTION INTRAVENOUS; SUBCUTANEOUS at 21:50

## 2025-04-06 RX ADMIN — SODIUM CHLORIDE 1 G: 1 TABLET ORAL at 08:40

## 2025-04-06 RX ADMIN — NYSTATIN 1 APPLICATION: 100000 POWDER TOPICAL at 22:00

## 2025-04-06 RX ADMIN — EZETIMIBE 10 MG: 10 TABLET ORAL at 21:51

## 2025-04-06 RX ADMIN — SENNOSIDES 2 TABLET: 8.6 TABLET, FILM COATED ORAL at 12:24

## 2025-04-06 RX ADMIN — CEFTRIAXONE SODIUM 2 G: 2 INJECTION, POWDER, FOR SOLUTION INTRAMUSCULAR; INTRAVENOUS at 17:32

## 2025-04-06 RX ADMIN — HYDROMORPHONE HYDROCHLORIDE 2 MG: 2 TABLET ORAL at 08:40

## 2025-04-06 RX ADMIN — ACETAMINOPHEN 650 MG: 650 SOLUTION ORAL at 17:43

## 2025-04-06 RX ADMIN — HYDROMORPHONE HYDROCHLORIDE 2 MG: 2 TABLET ORAL at 14:30

## 2025-04-06 RX ADMIN — DOCUSATE SODIUM 100 MG: 50 LIQUID ORAL at 21:51

## 2025-04-06 RX ADMIN — SIMETHICONE 160 MG: 80 TABLET, CHEWABLE ORAL at 17:30

## 2025-04-06 RX ADMIN — SIMETHICONE 160 MG: 80 TABLET, CHEWABLE ORAL at 08:40

## 2025-04-06 RX ADMIN — LEVOTHYROXINE SODIUM 150 MCG: 150 TABLET ORAL at 06:35

## 2025-04-06 RX ADMIN — Medication 10 ML: at 07:05

## 2025-04-06 RX ADMIN — HEPARIN SODIUM 5000 UNITS: 5000 INJECTION, SOLUTION INTRAVENOUS; SUBCUTANEOUS at 14:15

## 2025-04-06 RX ADMIN — CEFTRIAXONE SODIUM 2 G: 2 INJECTION, POWDER, FOR SOLUTION INTRAMUSCULAR; INTRAVENOUS at 06:34

## 2025-04-06 RX ADMIN — INSULIN LISPRO 4 UNITS: 100 INJECTION, SOLUTION INTRAVENOUS; SUBCUTANEOUS at 10:36

## 2025-04-06 RX ADMIN — DOCUSATE SODIUM 100 MG: 50 LIQUID ORAL at 08:40

## 2025-04-06 RX ADMIN — INSULIN LISPRO 1 UNITS: 100 INJECTION, SOLUTION INTRAVENOUS; SUBCUTANEOUS at 17:32

## 2025-04-06 RX ADMIN — Medication 10 ML: at 21:50

## 2025-04-06 RX ADMIN — LIOTHYRONINE SODIUM 10 MCG: 5 TABLET ORAL at 06:35

## 2025-04-06 RX ADMIN — NYSTATIN 1 APPLICATION: 100000 POWDER TOPICAL at 08:41

## 2025-04-06 RX ADMIN — FAMOTIDINE 20 MG: 20 TABLET, FILM COATED ORAL at 21:50

## 2025-04-06 RX ADMIN — LINACLOTIDE 145 MCG: 145 CAPSULE, GELATIN COATED ORAL at 12:24

## 2025-04-06 RX ADMIN — SODIUM CHLORIDE 1 G: 1 TABLET ORAL at 21:50

## 2025-04-06 RX ADMIN — HEPARIN SODIUM 5000 UNITS: 5000 INJECTION, SOLUTION INTRAVENOUS; SUBCUTANEOUS at 06:29

## 2025-04-06 NOTE — PROGRESS NOTES
Patient: Celso Gramajo  Location: [facility]  MRN: [id number]  Today's date: 4/6/2025    History of Present Illness  Celso is a 69 y.o. male admitted on 4/1/2025 with Spinal epidural abscess [G06.1]. Principal problem is Spinal epidural abscess.    Celso Gramajo is a 69 year old male with PMHx thyroid CA s/p thyroidectomy, RND, adjuvant XRT/immunotherapy, esophageal SCC s/p chemoradiation c/b esophageal strictures, HTN, Elevated Coronary Calcium scoring in 2017, ENMANUEL, Hypothyroidism, Anemia, dysphagia on PEG (placed in 2021) TFs, and history of CVA who presented to an OSH with neck and R shoulder pain. Workup was unrevealing and he was discharged home.    He presented to the [place] ED again on 3/17 with ongoing fevers. CT Neck with no abscess, old postsurgical changes. He d/c home but BCx subsequently returned positive on 3/19 and he returned for admission. He was also bacteremic with strep viridans, and placed on IV antibiotics. Workup revealed a C5-C7 ventral epidural abscess with osteomyelitis, and he was transferred to [facility] for surgical evaluation.    MRI on 3/24 c/f metastatic disease at T1-T4, L4.      At [facility] ortho and ENT were consulted. Ultimately it was determined that an anterior surgical approach was not safe (due to history of neck surgeries), and patient was having progressive weakness. Repeat MRI C-spine showed Discitis and osteomyelitis at C6-C7 with ventral epidural abscess compressing the spinal cord and increased edema. Patient urgently underwent POSTERIOR C4-T1 DECOMPRESSION FUSION WITH [place] INSTRUMENTATION on 3/27. Postop he was briefly in the ICU for MAP goals. OR cultures grew strep intermedius and he was placed on the antibiotic plan below. He had a PICC placed 4/1. 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 He was started on salt tabs. In rehab please continue to check labs, and titrate the salt tabs as appropriate.  He worked with PT/OT who recommended him for acute rehab.     Past Medical History  Celso has a past medical history of Anemia, Cancer (CMS/HCC), head and neck radiation, Hypertension, Hypothyroidism, Stroke (CMS/HCC), and Thyroid disease.    OT Vitals      Date/Time Pulse HR Source Pt Activity BP BP Location BP Method Pt Position [facility]   04/06/25 0931 107 Monitor At rest 110/69 Left upper arm Automatic Sitting AA          OT Pain      Date/Time Pain Type Side/Orientation Location Rating: Rest Rating: Activity Description Interventions [facility]   04/06/25 0931 Pain Assessment shoulder bilateral 0 7 aching diversional activity provided AA   04/06/25 0958 Pain Assessment shoulder bilateral 0 7 aching diversional activity provided AA               Prior Living Environment      Flowsheet Row Most Recent Value   People in Home spouse   Current Living Arrangements home   Home Accessibility stairs to enter home (Group), stairs within home (Group)   Living Environment Comment Multi-story home with 12-14 steps to second floor, powder room on 1st floor, 2-3 FELICITA via garage, multiple supportive family members   Number of Stairs, Main Entrance 3   Stair Railings, Main Entrance none   Location, Patient Bedroom second floor, must negotiate stairs to access   Location, Bathroom second floor, must negotiate stairs to access   Bathroom Access Comment powder room 1st floor standard height toilet, full bathroom 2nd floor WIS with glass door, standard height toilet   Stairs, Within Home, Primary 12   Stair Railings, Within Home, Primary railings on both sides of stairs            Prior Level of Function      Flowsheet Row Most Recent Value   Dominant Hand right   Ambulation independent   Transferring independent   Toileting independent   Bathing independent   Dressing independent   Eating independent   IADLs independent   Driving/Transportation    Communication understands/communicates without difficulty   Past History of  Dysphagia PTA pt was completely independent, no AD. (+) . NPO PTA, manages peg tube independently.   Prior Level of Function Comment Independent without use of AE or DME.   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 + , -handicap driving placard.   Patient Goals PSFS: going to the bathroom 0/10, walking 1/10, getting in/out of bed 0/10 = 1/30 = 3.33%             IRF OT Evaluation and Treatment - 04/06/25 0952          OT Time Calculation    Start Time 0930     Stop Time 1000     Time Calculation (min) 30 min        Session Details    Document Type Daily Treatment/Progress Note        General Information    General Observations of Patient Pt recieved for therapy in w/c, direct handoff from PT        Mobility Belt    Mobility Belt Used During Session yes        Orthosis Neck Cervical Collar    Orthosis Properties Date Obtained: 04/03/25 Time Obtained: 0825 Location: Neck Type: Cervical Collar Features: Hard Description: Hard collar AAT; can be removed seated to don [place] collar for bathing.    Compliance/Wearing Issues patient;compliant with wearing schedule        Sit to Stand Transfer    [place], Sit to Stand Transfer minimum assist (75% or more patient effort)     Safety/Cues technique;hand placement     Assistive Device walker, front-wheeled     Comment blocked practice sit > stand, cues for technique and glute activation in stance for full upright posture        Stand to Sit Transfer    [place], Stand to Sit Transfer minimum assist (75% or more patient effort)     Safety/Cues hand placement;sequencing     Assistive Device walker, front-wheeled     Comment A for controlled descnet, cues to reach back to chair to lower to seat.        Toilet Transfer    Transfer Technique stand pivot     [place] minimum assist (75% or more patient  effort)     Safety/Cues technique;hand placement     Assistive Device raised toilet seat;grab bars/safety frame     Comment Amb approach c RW, discussed use of TSF for home, to discuss with wife in FTR tomorrow        Shower Transfer    Transfer Technique step over, left entry     [place] minimum assist (75% or more patient effort)     Safety/Cues technique;hand placement     Assistive Device grab bars/tub rail;shower chair;walker, front-wheeled     Comment Side stepping over small shower ledge c UE support on wall and RW, pt naturally grabbing grab bar for greater stability. Discussed use of DME for use at home, to review rec with wife during family training tomorrow.        Therapeutic Interventions    Comment, Therapeutic Intervention Pt to have wife take pictures of bathroom set up in prep for family training.        Daily Progress Summary (OT)    Daily Outcome Statement Focus of therapy session on functional transfer training. Overall pt is Min A for transfers. At this time recommending shower chair and toilet safety frame. Wife to take picture of bathroom environment in prep for family training to ensure correct/approrpate DME recs based on home set up. Continue c OT POC c focus on UE stretching/ROM to reduce tension/discmofort in B/L shoulders.                          Education Documentation  Equipment/Methods, taught by Lorena Stinson, ELINOR at 4/6/2025 10:04 AM.  Learner: Patient  Readiness: Acceptance  Method: Explanation  Response: Verbalizes Understanding  Comment: DME rec          IRF OT Goals      Flowsheet Row Most Recent Value   Transfer Goal 1    Activity/Assistive Device toilet at 04/02/2025 0723   [place] minimum assist (75% or more patient effort) at 04/02/2025 0723   Time Frame short-term goal (STG), 5 - 7 days at 04/03/2025 0754   Strategies/Barriers pt just evlauated at 04/03/2025 0754   Progress/Outcome progress slower than expected, goal ongoing, goal revised this date at  04/03/2025 0754   Transfer Goal 2    Activity/Assistive Device toilet at 04/02/2025 0723   [place] modified independence at 04/02/2025 0723   Time Frame long-term goal (LTG), 21 days or less at 04/02/2025 0723   Progress/Outcome goal ongoing at 04/03/2025 0754   Transfer Goal 3    Activity/Assistive Device shower at 04/02/2025 0723   [place] other (see comments)  [TBA] at 04/02/2025 0723   Time Frame short-term goal (STG), 5 - 7 days at 04/03/2025 0754   Strategies/Barriers pt just evlauated at 04/03/2025 0754   Progress/Outcome progress slower than expected, goal ongoing, goal revised this date at 04/03/2025 0754   Transfer Goal 4    Activity/Assistive Device shower at 04/02/2025 0723   [place] other (see comments)  [TBA] at 04/02/2025 0723   Time Frame long-term goal (LTG), 21 days or less at 04/02/2025 0723   Progress/Outcome goal ongoing at 04/03/2025 0754   Bathing Goal 1    [place] moderate assist (50-74% patient effort) at 04/02/2025 0723   Time Frame short-term goal (STG), 5 - 7 days at 04/03/2025 0754   Strategies/Barriers pt just evlauated at 04/03/2025 0754   Progress/Outcome progress slower than expected, goal ongoing, goal revised this date at 04/03/2025 0754   Bathing Goal 2    [place] supervision required at 04/02/2025 0723   Time Frame long-term goal (LTG), 21 days or less at 04/02/2025 0723   Progress/Outcome goal ongoing at 04/03/2025 0754   UB Dressing Goal 1    [place] other (see comments)  [TBA] at 04/02/2025 0723   Time Frame short-term goal (STG), 5 - 7 days at 04/03/2025 0754   Strategies/Barriers pt just evlauated at 04/03/2025 0754   Progress/Outcome progress slower than expected, goal ongoing, goal revised this date at 04/03/2025 0754   UB Dressing Goal 2    [place] other (see comments)  [TBA] at 04/02/2025 0723   Time Frame long-term goal (LTG), 21 days or less at 04/02/2025 0723   Progress/Outcome goal ongoing at 04/03/2025 0754   LB Dressing Goal 1     [place] moderate assist (50-74% patient effort) at 04/02/2025 0723   Time Frame short-term goal (STG), 5 - 7 days at 04/03/2025 0754   Strategies/Barriers pt just evlauated at 04/03/2025 0754   Progress/Outcome progress slower than expected, goal ongoing, goal revised this date at 04/03/2025 0754   LB Dressing Goal 2    [place] modified independence at 04/02/2025 0723   Time Frame long-term goal (LTG), 21 days or less at 04/02/2025 0723   Progress/Outcome goal ongoing at 04/03/2025 0754   Grooming Goal 1    [place] set-up required at 04/02/2025 0723   Time Frame short-term goal (STG), 5 - 7 days at 04/03/2025 0754   Strategies/Barriers pt just evlauated at 04/03/2025 0754   Progress/Outcome progress slower than expected, goal ongoing, goal revised this date at 04/03/2025 0754   Grooming Goal 2    [place] modified independence at 04/02/2025 0723   Time Frame long-term goal (LTG), 21 days or less at 04/02/2025 0723   Progress/Outcome goal ongoing at 04/03/2025 0754   Toileting Goal 1    [place] moderate assist (50-74% patient effort) at 04/02/2025 0723   Time Frame short-term goal (STG), 5 - 7 days at 04/03/2025 0754   Strategies/Barriers pt just evlauated at 04/03/2025 0754   Progress/Outcome progress slower than expected, goal ongoing, goal revised this date at 04/03/2025 0754   Toileting Goal 2    [place] modified independence at 04/02/2025 0723   Time Frame long-term goal (LTG), 21 days or less at 04/02/2025 0723   Progress/Outcome goal ongoing at 04/03/2025 0754

## 2025-04-06 NOTE — PLAN OF CARE
Problem: Rehabilitation (IRF) Plan of Care  Goal: Plan of Care Review  Outcome: Progressing  Flowsheets (Taken 4/6/2025 1309)  Progress: improving  Outcome Evaluation: pt aaox4, dictates care needs. continent of b&b. participates in therapies. pain managed with PRN dilaudid and repositioning, PRN tylenol available. [place] J collar AAT, skin reddened under collar. L collar bone has open area under collar - dressed with hydrofiber AG, gauze and foam, WOCN consult placed yesterday. posterior neck incision sutures, NANI. specialty bed. recliner at bedside, pt tolerates poorly today due to pain. NPO - accuchek prior to PEG tube nutrition (0730, 1030, 1430, 1730) - insulin provided prior to 1030 nutrition per MAR. LITAE single lumen PICC for IV antibiotic therapy. mod SPT x1.  Plan of Care Reviewed With: patient

## 2025-04-06 NOTE — PLAN OF CARE
Plan of Care Review  Plan of Care Reviewed With: patient  Progress: improving  Outcome Evaluation: assumed Pt 1900 Pt Ox4, VSS, calm, cooperative. Pt stated neck/shoulder pain received tylenol and dilaudid. Continent of bowel and bladder, urinary frequency. Picc line flushing without issue. Foam applied over pressure injury under cervical collar. Safety precautions in place, call bell in reach

## 2025-04-06 NOTE — PROGRESS NOTES
Patient: Celso Gramjao  Location: [facility] [address]  MRN: [id number]  Today's date: 4/6/2025    History of Present Illness  Celso is a 69 y.o. male admitted on 4/1/2025 with Spinal epidural abscess [G06.1]. Principal problem is Spinal epidural abscess.    Celso Gramajo is a 69 year old male with PMHx thyroid CA s/p thyroidectomy, RND, adjuvant XRT/immunotherapy, esophageal SCC s/p chemoradiation c/b esophageal strictures, HTN, Elevated Coronary Calcium scoring in 2017, ENMANUEL, Hypothyroidism, Anemia, dysphagia on PEG (placed in 2021) TFs, and history of CVA who presented to an OSH with neck and R shoulder pain. Workup was unrevealing and he was discharged home.    He presented to the [place] ED again on 3/17 with ongoing fevers. CT Neck with no abscess, old postsurgical changes. He d/c home but BCx subsequently returned positive on 3/19 and he returned for admission. He was also bacteremic with strep viridans, and placed on IV antibiotics. Workup revealed a C5-C7 ventral epidural abscess with osteomyelitis, and he was transferred to [facility] for surgical evaluation.    MRI on 3/24 c/f metastatic disease at T1-T4, L4.      At [facility] ortho and ENT were consulted. Ultimately it was determined that an anterior surgical approach was not safe (due to history of neck surgeries), and patient was having progressive weakness. Repeat MRI C-spine showed Discitis and osteomyelitis at C6-C7 with ventral epidural abscess compressing the spinal cord and increased edema. Patient urgently underwent POSTERIOR C4-T1 DECOMPRESSION FUSION WITH Y[place]ON INSTRUMENTATION on 3/27. Postop he was briefly in the ICU for MAP goals. OR cultures grew strep intermedius and he was placed on the antibiotic plan below. He had a PICC placed 4/1. 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 He was started on salt tabs. In rehab please continue to check labs, and titrate the salt tabs as appropriate.  He worked with PT/OT who recommended him for acute rehab.     Past Medical History  Celso has a past medical history of Anemia, Cancer (CMS/HCC), head and neck radiation, Hypertension, Hypothyroidism, Stroke (CMS/HCC), and Thyroid disease.    OT Pain      Date/Time Pain Type Side/Orientation Location Rating: Rest Rating: Activity Description Interventions [facility]   04/06/25 1102 Pain Assessment shoulder bilateral 0 7 aching diversional activity provided AA   04/06/25 1158 Pain Assessment shoulder bilateral 0 7 aching diversional activity provided AA               Prior Living Environment      Flowsheet Row Most Recent Value   People in Home spouse   Current Living Arrangements home   Home Accessibility stairs to enter home (Group), stairs within home (Group)   Living Environment Comment Multi-story home with 12-14 steps to second floor, powder room on 1st floor, 2-3 FELICITA via garage, multiple supportive family members   Number of Stairs, Main Entrance 3   Stair Railings, Main Entrance none   Location, Patient Bedroom second floor, must negotiate stairs to access   Location, Bathroom second floor, must negotiate stairs to access   Bathroom Access Comment powder room 1st floor standard height toilet, full bathroom 2nd floor WIS with glass door, standard height toilet   Stairs, Within Home, Primary 12   Stair Railings, Within Home, Primary railings on both sides of stairs            Prior Level of Function      Flowsheet Row Most Recent Value   Dominant Hand right   Ambulation independent   Transferring independent   Toileting independent   Bathing independent   Dressing independent   Eating independent   IADLs independent   Driving/Transportation    Communication understands/communicates without difficulty   Past History of Dysphagia PTA pt was completely independent, no AD. (+) . NPO PTA, manages peg tube independently.   Prior Level of Function Comment Independent without use of AE or DME.    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 + , -handicap driving placard.   Patient Goals PSFS: going to the bathroom 0/10, walking 1/10, getting in/out of bed 0/10 = 1/30 = 3.33%             IRF OT Evaluation and Treatment - 04/06/25 1101          OT Time Calculation    Start Time 1100     Stop Time 1200     Time Calculation (min) 60 min        Session Details    Document Type Daily Treatment/Progress Note        General Information    General Observations of Patient Pt recieved for therapy in bed        Mobility Belt    Mobility Belt Used During Session yes        Orthosis Neck Cervical Collar    Orthosis Properties Date Obtained: 04/03/25 Time Obtained: 0825 Location: Neck Type: Cervical Collar Features: Hard Description: Hard collar AAT; can be removed seated to don [place] collar for bathing.       Sit to Stand Transfer    [place], Sit to Stand Transfer minimum assist (75% or more patient effort)     Safety/Cues technique;hand placement     Assistive Device walker, front-wheeled     Comment from low bed surface, assist to rise        Stand to Sit Transfer    [place], Stand to Sit Transfer minimum assist (75% or more patient effort)     Safety/Cues hand placement     Assistive Device walker, front-wheeled     Comment to low bed suface, assist for controlled descent.        Impairments/Safety Issues    Comment, Safety Issues/Impairments OT: Min A for HHM c RW, cues for upright posture, 1 standing rest break required.        Upper Extremity (Therapeutic Exercise)    Exercise Position/Type supine;seated     General Exercise bilateral     Reps and Sets x10 reps for AROM for stretching; 2x10 reps for resistance     Comment Sititng up in bed for AROM: shoulder flexion c dowel, horizontal abduction and adduction, shoulder abduction, PNF  diagonals, supination/pronation, wrist flexion/extension, digit opposition. Performed c focus on sustained stretch at end range. Mild resistance c use of dowel: green - lat pulld own, orange - horizontal abduciton, green - scap retraction; green - elbow flexion, green, downgraded to orange - elbow extension.        Daily Progress Summary (OT)    Daily Outcome Statement Completed UE ther ex in bed (pt receiving tube feed during time) c focus on UE ther ex, stretching, and ROM. Needed to modified shoulder exercises to address pt's concern re: spasms in shoulder. Plan for family training with pt's wife on 4/7.                          Education Documentation  Equipment/Methods, taught by Lorena Stinson, ELINOR at 4/6/2025 10:04 AM.  Learner: Patient  Readiness: Acceptance  Method: Explanation  Response: Verbalizes Understanding  Comment: DME rec          IRF OT Goals      Flowsheet Row Most Recent Value   Transfer Goal 1    Activity/Assistive Device toilet at 04/02/2025 0723   [place] minimum assist (75% or more patient effort) at 04/02/2025 0723   Time Frame short-term goal (STG), 5 - 7 days at 04/03/2025 0754   Strategies/Barriers pt just evlauated at 04/03/2025 0754   Progress/Outcome progress slower than expected, goal ongoing, goal revised this date at 04/03/2025 0754   Transfer Goal 2    Activity/Assistive Device toilet at 04/02/2025 0723   [place] modified independence at 04/02/2025 0723   Time Frame long-term goal (LTG), 21 days or less at 04/02/2025 0723   Progress/Outcome goal ongoing at 04/03/2025 0754   Transfer Goal 3    Activity/Assistive Device shower at 04/02/2025 0723   [place] other (see comments)  [TBA] at 04/02/2025 0723   Time Frame short-term goal (STG), 5 - 7 days at 04/03/2025 0754   Strategies/Barriers pt just evlauated at 04/03/2025 0754   Progress/Outcome progress slower than expected, goal ongoing, goal revised this date at 04/03/2025 0754   Transfer Goal 4    Activity/Assistive  Device shower at 04/02/2025 0723   [place] other (see comments)  [TBA] at 04/02/2025 0723   Time Frame long-term goal (LTG), 21 days or less at 04/02/2025 0723   Progress/Outcome goal ongoing at 04/03/2025 0754   Bathing Goal 1    [place] moderate assist (50-74% patient effort) at 04/02/2025 0723   Time Frame short-term goal (STG), 5 - 7 days at 04/03/2025 0754   Strategies/Barriers pt just evlauated at 04/03/2025 0754   Progress/Outcome progress slower than expected, goal ongoing, goal revised this date at 04/03/2025 0754   Bathing Goal 2    [place] supervision required at 04/02/2025 0723   Time Frame long-term goal (LTG), 21 days or less at 04/02/2025 0723   Progress/Outcome goal ongoing at 04/03/2025 0754   UB Dressing Goal 1    [place] other (see comments)  [TBA] at 04/02/2025 0723   Time Frame short-term goal (STG), 5 - 7 days at 04/03/2025 0754   Strategies/Barriers pt just evlauated at 04/03/2025 0754   Progress/Outcome progress slower than expected, goal ongoing, goal revised this date at 04/03/2025 0754   UB Dressing Goal 2    [place] other (see comments)  [TBA] at 04/02/2025 0723   Time Frame long-term goal (LTG), 21 days or less at 04/02/2025 0723   Progress/Outcome goal ongoing at 04/03/2025 0754   LB Dressing Goal 1    [place] moderate assist (50-74% patient effort) at 04/02/2025 0723   Time Frame short-term goal (STG), 5 - 7 days at 04/03/2025 0754   Strategies/Barriers pt just evlauated at 04/03/2025 0754   Progress/Outcome progress slower than expected, goal ongoing, goal revised this date at 04/03/2025 0754   LB Dressing Goal 2    [place] modified independence at 04/02/2025 0723   Time Frame long-term goal (LTG), 21 days or less at 04/02/2025 0723   Progress/Outcome goal ongoing at 04/03/2025 0754   Grooming Goal 1    [place] set-up required at 04/02/2025 0723   Time Frame short-term goal (STG), 5 - 7 days at 04/03/2025 0754   Strategies/Barriers pt just  evlauated at 04/03/2025 0754   Progress/Outcome progress slower than expected, goal ongoing, goal revised this date at 04/03/2025 0754   Grooming Goal 2    [place] modified independence at 04/02/2025 0723   Time Frame long-term goal (LTG), 21 days or less at 04/02/2025 0723   Progress/Outcome goal ongoing at 04/03/2025 0754   Toileting Goal 1    [place] moderate assist (50-74% patient effort) at 04/02/2025 0723   Time Frame short-term goal (STG), 5 - 7 days at 04/03/2025 0754   Strategies/Barriers pt just evlauated at 04/03/2025 0754   Progress/Outcome progress slower than expected, goal ongoing, goal revised this date at 04/03/2025 0754   Toileting Goal 2    [place] modified independence at 04/02/2025 0723   Time Frame long-term goal (LTG), 21 days or less at 04/02/2025 0723   Progress/Outcome goal ongoing at 04/03/2025 0754

## 2025-04-06 NOTE — PROGRESS NOTES
Patient: Celso Gramajo  Location: [facility] [address]  MRN: [id number]  Today's date: 4/6/2025    History of Present Illness  Celso is a 69 y.o. male admitted on 4/1/2025 with Spinal epidural abscess [G06.1]. Principal problem is Spinal epidural abscess.    Celso Gramajo is a 69 year old male with PMHx thyroid CA s/p thyroidectomy, RND, adjuvant XRT/immunotherapy, esophageal SCC s/p chemoradiation c/b esophageal strictures, HTN, Elevated Coronary Calcium scoring in 2017, ENMANUEL, Hypothyroidism, Anemia, dysphagia on PEG (placed in 2021) TFs, and history of CVA who presented to an OSH with neck and R shoulder pain. Workup was unrevealing and he was discharged home.    He presented to the [place] ED again on 3/17 with ongoing fevers. CT Neck with no abscess, old postsurgical changes. He d/c home but BCx subsequently returned positive on 3/19 and he returned for admission. He was also bacteremic with strep viridans, and placed on IV antibiotics. Workup revealed a C5-C7 ventral epidural abscess with osteomyelitis, and he was transferred to [facility] for surgical evaluation.    MRI on 3/24 c/f metastatic disease at T1-T4, L4.      At [facility] ortho and ENT were consulted. Ultimately it was determined that an anterior surgical approach was not safe (due to history of neck surgeries), and patient was having progressive weakness. Repeat MRI C-spine showed Discitis and osteomyelitis at C6-C7 with ventral epidural abscess compressing the spinal cord and increased edema. Patient urgently underwent POSTERIOR C4-T1 DECOMPRESSION FUSION WITH [place] INSTRUMENTATION on 3/27. Postop he was briefly in the ICU for MAP goals. OR cultures grew strep intermedius and he was placed on the antibiotic plan below. He had a PICC placed 4/1. 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 He was started on salt tabs. In rehab please continue to check labs, and titrate the salt tabs as appropriate.  He worked with PT/OT who recommended him for acute rehab.     Past Medical History  Celso has a past medical history of Anemia, Cancer (CMS/[facility]), head and neck radiation, Hypertension, Hypothyroidism, Stroke (CMS/HCC), and Thyroid disease.    PT Vitals      Date/Time Pulse HR Source BP BP Location BP Method Pt Position [facility]   04/06/25 1301 107 Monitor 141/74 Left upper arm Automatic Sitting PRINCESS          PT Pain      Date/Time Pain Type Side/Orientation Location Rating: Rest Rating: Activity Description Interventions [facility]   04/06/25 1301 Pain Assessment left shoulder 0 7 aching diversional activity provided PRINCESS   04/06/25 1358 Pain Assessment left shoulder 6 6 aching diversional activity provided PRINCESS               Prior Living Environment      Flowsheet Row Most Recent Value   People in Home spouse   Current Living Arrangements home   Home Accessibility stairs to enter home (Group), stairs within home (Group)   Living Environment Comment Multi-story home with 12-14 steps to second floor, powder room on 1st floor, 2-3 FELICITA via garage, multiple supportive family members   Number of Stairs, Main Entrance 3   Stair Railings, Main Entrance none   Location, Patient Bedroom second floor, must negotiate stairs to access   Location, Bathroom second floor, must negotiate stairs to access   Bathroom Access Comment powder room 1st floor standard height toilet, full bathroom 2nd floor WIS with glass door, standard height toilet   Stairs, Within Home, Primary 12   Stair Railings, Within Home, Primary railings on both sides of stairs            Prior Level of Function      Flowsheet Row Most Recent Value   Dominant Hand right   Ambulation independent   Transferring independent   Toileting independent   Bathing independent   Dressing independent   Eating independent   IADLs independent   Driving/Transportation    Communication understands/communicates without difficulty   Past History of Dysphagia PTA pt was completely  independent, no AD. (+) . NPO PTA, manages peg tube independently.   Prior Level of Function Comment Independent without use of AE or DME.   Assistive Device Currently Used at Home none             IRF PT Evaluation and Treatment - 04/06/25 1311          PT Time Calculation    Start Time 1300     Stop Time 1400     Time Calculation (min) 60 min        Session Details    Document Type Daily Treatment/Progress Note        General Information    Patient Profile Reviewed yes     General Observations of Patient Pt received sitting in [facility] and agreeable to PT session        Mobility Belt    Mobility Belt Used During Session yes        Orthosis Neck Cervical Collar    Orthosis Properties Date Obtained: 04/03/25 Time Obtained: 0825 Location: Neck Type: Cervical Collar Features: Hard Description: Hard collar AAT; can be removed seated to don [place] collar for bathing.    Skin Assessment intact     Compliance/Wearing Issues patient;compliant with wearing schedule        Bed Mobility    [place], Supine to Sit touching/steadying assist     Safety/Cues increased time to complete;maintaining precautions     Assistive Device head of bed elevated;bed rails     Comment Performed supine > sitting EOB in Sizewise bed with steadyingA at trunk for elevation and vc throughout for log roll technique to maintain spinal precautions        Transfers    Transfers stand pivot transfer        Sit to Stand Transfer    [place], Sit to Stand Transfer minimum assist (75% or more patient effort)     Safety/Cues increased time to complete;hand placement     Assistive Device walker, front-wheeled     Comment A for anterior weight shift, balance, pelvic rise into full hip extension        Stand to Sit Transfer    [place], Stand to Sit Transfer minimum assist (75% or more patient effort)     Safety/Cues hand placement;preparatory posture     Assistive Device walker, front-wheeled     Comment A for controlled decent with noted  "psoterior lean when approaching sitting surface        Stand Pivot Transfer    [place], Stand Pivot/Stand Step Transfer minimum assist (75% or more patient effort)     Safety/Cues proper use of assistive device;sequencing     Assistive Device walker, front-wheeled     Comment via amb approach and bed > MWC with Arleen for balance and lateral weight shifting and max v/c for maintaining safe distance to RW        Toilet Transfer    Comment PT: via amb approach with over the toilet commode and HDRW with [place]leen for balance and psotural control; pt continent of small loose stool and able to perform perianal hygiene with supervision for safety and v/c for maintaining spinal precautions, min-modA for balance during lower body dressing and hand hygiene; 2nd trials: via SPT with use of grab bar and standing to urinate with Arleen at trunk for balance and pt reporting significant sudden urgency that happens frequently since surgery        Gait Training    [place], Gait minimum assist (75% or more patient effort)     Safety/Cues increased time to complete;verbal cues;gait deviations;proper use of assistive device     Assistive Device walker, front-wheeled     Distance in Feet 200 feet     Pattern step-through     Deviations/Abnormal Patterns base of support, narrow;gait speed decreased;weight shifting decreased     Bilateral Gait Deviations heel strike decreased     Comment 200 ft x 1, 150 ftx 1, 30 feet x 2 with HDRW and Arleen for balance and postural control with noted variable IC bilateral LEs and fwd flexed psoture with fatigue        Stairs Training    [place], Stairs minimum assist (75% or more patient effort)     Safety/Cues technique;sequencing     Assistive Device railing     Handrail Location (Stairs) both sides     Number of Stairs 4     Stair Height 6 inches     Ascending Stairs Technique step-over-step     Descending Stairs Technique step-over-step     Comment 4, 6\" steps x 1 with Arleen at lateral pelvis " for balance and v/c for step-to pattern to improve stability with pt preferring step over step; number of stairs limited by fatigue        Wheelchair Mobility/Management    Comment, Wheelchair Mobility Pt inquiring about obtaining w/c license to promote BUE strengthening. Pt educated on spinal precautions limiting visual scanning and lifting restrictions, so pt would have to be trained for bipedal C mobility. After education provided on limitations of upper extremity propulsion, pt denies further request for w/c license.        Balance    Balance Interventions --        Lower Extremity (Therapeutic Exercise)    Exercise Position/Type standing;seated     General Exercise bilateral;marching while standing;partial squats     Comment STANDING: Performed with BUE support on HDRW: 1) squats, 10 x 2; 2) alternating standing marches, 20 x 2 and Arleen for balance and postural control; SEATED: 1) ankle DF/PF, 30 x 2; 2) LAQS, 20 x 2; 3) seated marches, 30 x 2; 4) hip adduction ball squeezes, 20 x 2        Aerobic Exercise    Comment therapist recommending use of motomed to promote endurance and strengthening due pt reporting increased faitgue, pt refuses to attempt due to premorbid knee pain from previous cycling PTA        Daily Progress Summary (PT)    Daily Outcome Statement Pt limited this session by increased fatigue per pt report at start of session and toileting needs, so increased time for rest breaks taken to improve activity tolerance with transition to seated therex cool down per pt preference. Pt would benefit from continued endurance training and blocked functional mobility to promote independence.                               IRF PT Goals      Flowsheet Row Most Recent Value   Bed Mobility Goal 1    Activity/Assistive Device sit to supine/supine to sit at 04/02/2025 0904   [place] supervision required at 04/02/2025 0904   Time Frame short-term goal (STG), 5 - 7 days at 04/03/2025 0856    Strategies/Barriers evaluated 4/2, continue with PT POC at 04/03/2025 0856   Progress/Outcome goal ongoing, good progress toward goal at 04/03/2025 0856   Bed Mobility Goal 2    Activity/Assistive Device bed mobility activities, all at 04/02/2025 0904   [place] modified independence at 04/02/2025 0904   Time Frame long-term goal (LTG), 3 weeks at 04/02/2025 0904   Progress/Outcome goal ongoing at 04/03/2025 0856   Transfer Goal 1    Activity/Assistive Device sit-to-stand/stand-to-sit, stand pivot at 04/02/2025 0904   [place] minimum assist (75% or more patient effort) at 04/02/2025 0904   Time Frame short-term goal (STG), 5 - 7 days at 04/03/2025 0856   Strategies/Barriers evaluated 4/2, conitnue with PT POC at 04/03/2025 0856   Progress/Outcome goal ongoing, good progress toward goal at 04/03/2025 0856   Transfer Goal 2    Activity/Assistive Device sit-to-stand/stand-to-sit, stand pivot at 04/02/2025 0904   [place] modified independence at 04/02/2025 0904   Time Frame long-term goal (LTG), 3 weeks at 04/02/2025 0904   Progress/Outcome goal ongoing at 04/03/2025 0856   Gait/Walking Locomotion Goal 1    Activity/Assistive Device gait (walking locomotion), assistive device use at 04/02/2025 0904   Distance 150 feet at 04/02/2025 0904   [place] minimum assist (75% or more patient effort) at 04/02/2025 0904   Time Frame short-term goal (STG), 5 days at 04/03/2025 0856   Strategies/Barriers evaluated 4/2, contiune with PT POC at 04/03/2025 0856   Progress/Outcome good progress toward goal, goal ongoing at 04/03/2025 0856   Gait/Walking Locomotion Goal 2    Activity/Assistive Device gait (walking locomotion), assistive device use at 04/02/2025 0904   Distance 150 feet at 04/02/2025 0904   [place] modified independence at 04/02/2025 0904   Time Frame long-term goal (LTG), 3 weeks at 04/02/2025 0904   Progress/Outcome goal ongoing at 04/03/2025 0856   Stairs Goal 1    Activity/Assistive Device  ascending stairs, descending stairs, using handrail, left, using handrail, right at 04/02/2025 0904   Number of Stairs 12 at 04/02/2025 0904   [place] minimum assist (75% or more patient effort) at 04/02/2025 0904   Time Frame short-term goal (STG), 5 - 7 days at 04/03/2025 0856   Strategies/Barriers evaluated 4/2, continue PT POC at 04/03/2025 0856   Progress/Outcome good progress toward goal, goal ongoing at 04/03/2025 0856   Stairs Goal 2    Activity/Assistive Device ascending stairs, descending stairs, using handrail, left, using handrail, right at 04/02/2025 0904   Number of Stairs 12 at 04/02/2025 0904   [place] supervision required at 04/02/2025 0904   Time Frame long-term goal (LTG), 3 weeks at 04/02/2025 0904   Progress/Outcome goal ongoing at 04/03/2025 0856

## 2025-04-06 NOTE — PROGRESS NOTES
Patient: Celso Gramajo  Location: [facility] [address]  MRN: [id number]  Today's date: 4/6/2025    History of Present Illness  Celso is a 69 y.o. male admitted on 4/1/2025 with Spinal epidural abscess [G06.1]. Principal problem is Spinal epidural abscess.    Celso Gramajo is a 69 year old male with PMHx thyroid CA s/p thyroidectomy, RND, adjuvant XRT/immunotherapy, esophageal SCC s/p chemoradiation c/b esophageal strictures, HTN, Elevated Coronary Calcium scoring in 2017, ENMANUEL, Hypothyroidism, Anemia, dysphagia on PEG (placed in 2021) TFs, and history of CVA who presented to an OSH with neck and R shoulder pain. Workup was unrevealing and he was discharged home.    He presented to the [place] ED again on 3/17 with ongoing fevers. CT Neck with no abscess, old postsurgical changes. He d/c home but BCx subsequently returned positive on 3/19 and he returned for admission. He was also bacteremic with strep viridans, and placed on IV antibiotics. Workup revealed a C5-C7 ventral epidural abscess with osteomyelitis, and [facility].    MRI on 3/24 c/f metastatic disease at T1-T4, L4.      At Critical access hospital ortho and ENT were consulted. Ultimately it was determined that an anterior surgical approach was not safe (due to history of neck surgeries), and patient was having progressive weakness. Repeat MRI C-spine showed Discitis and osteomyelitis at C6-C7 with ventral epidural abscess compressing the spinal cord and increased edema. Patient urgently underwent POSTERIOR C4-T1 DECOMPRESSION FUSION WITH [place] INSTRUMENTATION on 3/27. Postop he was briefly in the ICU for MAP goals. OR cultures grew strep intermedius and he was placed on the antibiotic plan below. He had a PICC placed 4/1. 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 He was started on salt tabs. In rehab please continue to check labs, and titrate the salt tabs as appropriate.  He worked with PT/OT who recommended him for acute rehab.     Past Medical History  Celso has a past medical history of Anemia, Cancer (CMS/HCC), head and neck radiation, Hypertension, Hypothyroidism, Stroke (CMS/HCC), and Thyroid disease.    PT Vitals      Date/Time Pulse HR Source SpO2 Pt Activity O2 Therapy BP BP Location BP Method Pt Position Observations [facility]   04/06/25 0901 100 Monitor -- -- -- 109/71 Left upper arm Automatic Sitting --    04/06/25 0920 114 Monitor 95 % Walking None (Room air) -- -- -- -- post amb trial PRINCESS          PT Pain      Date/Time Pain Type Side/Orientation Location Rating: Rest Rating: Activity Description Interventions [facility]   04/06/25 0901 Pain Assessment -- shoulder 7 -- -- premedicated for activity               04/06/25 0929 Pain Reassessment bilateral shoulder 0 7 aching diversional activity provided PRINCESS               Prior Living Environment      Flowsheet Row Most Recent Value   People in Home spouse   Current Living Arrangements home   Home Accessibility stairs to enter home (Group), stairs within home (Group)   Living Environment Comment Multi-story home with 12-14 steps to second floor, powder room on 1st floor, 2-3 FELICITA via garage, multiple supportive family members   Number of Stairs, Main Entrance 3   Stair Railings, Main Entrance none   Location, Patient Bedroom second floor, must negotiate stairs to access   Location, Bathroom second floor, must negotiate stairs to access   Bathroom Access Comment powder room 1st floor standard height toilet, full bathroom 2nd floor WIS with glass door, standard height toilet   Stairs, Within Home, Primary 12   Stair Railings, Within Home, Primary railings on both sides of stairs            Prior Level of Function      Flowsheet Row Most Recent Value   Dominant Hand right   Ambulation independent   Transferring independent   Toileting independent   Bathing independent   Dressing independent   Eating independent   IADLs independent    Driving/Transportation    Communication understands/communicates without difficulty   Past History of Dysphagia PTA pt was completely independent, no AD. (+) . NPO PTA, manages peg tube independently.   Prior Level of Function Comment Independent without use of AE or DME.   Assistive Device Currently Used at Home none             IRF PT Evaluation and Treatment - 04/06/25 0915          PT Time Calculation    Start Time 0900     Stop Time 0930     Time Calculation (min) 30 min        Session Details    Document Type Daily Treatment/Progress Note        General Information    Patient Profile Reviewed yes     General Observations of Patient Pt received sitting in [facility] and agreeable to PT session after finishing morning routine with handoff from PCT to PT and RN administering AM meds.        Mobility Belt    Mobility Belt Used During Session yes        Orthosis Neck Cervical Collar    Orthosis Properties Date Obtained: 04/03/25 Time Obtained: 0825 Location: Neck Type: Cervical Collar Features: Hard Description: Hard collar AAT; can be removed seated to don [place] collar for bathing.    Skin Assessment intact     Compliance/Wearing Issues patient;compliant with wearing schedule        Sit to Stand Transfer    [place], Sit to Stand Transfer minimum assist (75% or more patient effort)     Safety/Cues hand placement;preparatory posture     Assistive Device walker, front-wheeled     Comment A for balance and postural control        Stand to Sit Transfer    [place], Stand to Sit Transfer minimum assist (75% or more patient effort)     Safety/Cues hand placement;sequencing     Assistive Device walker, front-wheeled     Comment A for controlled descent        Gait Training    [place], Gait minimum assist (75% or more patient effort)     Safety/Cues increased time to complete;gait deviations;proper use of assistive device     Assistive Device walker, front-wheeled     Distance in  Feet 150 feet     Pattern step-through     Deviations/Abnormal Patterns base of support, narrow;gait speed decreased;step length decreased     Bilateral Gait Deviations heel strike decreased     Comment 100 ft x 1, 150 ft x 1 with HDRW and Arleen at lateral pelvis for balance and postural control with v/c for maintaining safe distance to RW        Balance    Balance Interventions sit to stand     Comment, Balance 1) blocked STS to/from [facility] with RW and Arleen for balance, 5 x 2 with 30s seated rest between        Lower Extremity (Therapeutic Exercise)    Exercise Position/Type seated     General Exercise bilateral;ankle pumps;heel raises;LAQ (long arc quad);marching while seated     Reps and Sets 20 x 1     Comment Performed as seated therex warm up in [facility]        Daily Progress Summary (PT)    Daily Outcome Statement Pt continues to demonstrate increased motivation to participate within therapy, but noted tachycardia and SOB and spO2 > 93% throughout on RA. RN made aware of elevated HR with mobility and rest breaks taken between bouts of activity to improve tolerance and VS monitoring.                               IRF PT Goals      Flowsheet Row Most Recent Value   Bed Mobility Goal 1    Activity/Assistive Device sit to supine/supine to sit at 04/02/2025 0904   [place] supervision required at 04/02/2025 0904   Time Frame short-term goal (STG), 5 - 7 days at 04/03/2025 0856   Strategies/Barriers evaluated 4/2, continue with PT POC at 04/03/2025 0856   Progress/Outcome goal ongoing, good progress toward goal at 04/03/2025 0856   Bed Mobility Goal 2    Activity/Assistive Device bed mobility activities, all at 04/02/2025 0904   [place] modified independence at 04/02/2025 0904   Time Frame long-term goal (LTG), 3 weeks at 04/02/2025 0904   Progress/Outcome goal ongoing at 04/03/2025 0856   Transfer Goal 1    Activity/Assistive Device sit-to-stand/stand-to-sit, stand pivot at 04/02/2025 0904   [place] minimum  assist (75% or more patient effort) at 04/02/2025 0904   Time Frame short-term goal (STG), 5 - 7 days at 04/03/2025 0856   Strategies/Barriers evaluated 4/2, conitnue with PT POC at 04/03/2025 0856   Progress/Outcome goal ongoing, good progress toward goal at 04/03/2025 0856   Transfer Goal 2    Activity/Assistive Device sit-to-stand/stand-to-sit, stand pivot at 04/02/2025 0904   [place] modified independence at 04/02/2025 0904   Time Frame long-term goal (LTG), 3 weeks at 04/02/2025 0904   Progress/Outcome goal ongoing at 04/03/2025 0856   Gait/Walking Locomotion Goal 1    Activity/Assistive Device gait (walking locomotion), assistive device use at 04/02/2025 0904   Distance 150 feet at 04/02/2025 0904   [place] minimum assist (75% or more patient effort) at 04/02/2025 0904   Time Frame short-term goal (STG), 5 days at 04/03/2025 0856   Strategies/Barriers evaluated 4/2, contiune with PT POC at 04/03/2025 0856   Progress/Outcome good progress toward goal, goal ongoing at 04/03/2025 0856   Gait/Walking Locomotion Goal 2    Activity/Assistive Device gait (walking locomotion), assistive device use at 04/02/2025 0904   Distance 150 feet at 04/02/2025 0904   [place] modified independence at 04/02/2025 0904   Time Frame long-term goal (LTG), 3 weeks at 04/02/2025 0904   Progress/Outcome goal ongoing at 04/03/2025 0856   Stairs Goal 1    Activity/Assistive Device ascending stairs, descending stairs, using handrail, left, using handrail, right at 04/02/2025 0904   Number of Stairs 12 at 04/02/2025 0904   [place] minimum assist (75% or more patient effort) at 04/02/2025 0904   Time Frame short-term goal (STG), 5 - 7 days at 04/03/2025 0856   Strategies/Barriers evaluated 4/2, continue PT POC at 04/03/2025 0856   Progress/Outcome good progress toward goal, goal ongoing at 04/03/2025 0856   Stairs Goal 2    Activity/Assistive Device ascending stairs, descending stairs, using handrail, left, using handrail,  right at 04/02/2025 0904   Number of Stairs 12 at 04/02/2025 0904   [place] supervision required at 04/02/2025 0904   Time Frame long-term goal (LTG), 3 weeks at 04/02/2025 0904   Progress/Outcome goal ongoing at 04/03/2025 0856

## 2025-04-07 ENCOUNTER — APPOINTMENT (INPATIENT)
Dept: PHYSICAL THERAPY | Facility: REHABILITATION | Age: 70
DRG: 559 | End: 2025-04-07
Payer: MEDICARE

## 2025-04-07 ENCOUNTER — APPOINTMENT (OUTPATIENT)
Dept: OTHER | Facility: REHABILITATION | Age: 70
End: 2025-04-07
Payer: MEDICARE

## 2025-04-07 ENCOUNTER — APPOINTMENT (INPATIENT)
Dept: OCCUPATIONAL THERAPY | Facility: REHABILITATION | Age: 70
DRG: 559 | End: 2025-04-07
Payer: MEDICARE

## 2025-04-07 ENCOUNTER — APPOINTMENT (INPATIENT)
Dept: WOUND CARE | Facility: REHABILITATION | Age: 70
DRG: 559 | End: 2025-04-07
Payer: MEDICARE

## 2025-04-07 LAB
ALBUMIN SERPL-MCNC: 3.7 G/DL (ref 3.5–5.7)
ALP SERPL-CCNC: 69 IU/L (ref 34–125)
ALT SERPL-CCNC: 32 IU/L (ref 7–52)
ANION GAP SERPL CALC-SCNC: 6 MEQ/L (ref 3–15)
AST SERPL-CCNC: 16 IU/L (ref 13–39)
BASOPHILS # BLD: 0.05 K/UL (ref 0.01–0.1)
BASOPHILS NFR BLD: 0.8 %
BILIRUB SERPL-MCNC: 0.4 MG/DL (ref 0.3–1.2)
BUN SERPL-MCNC: 16 MG/DL (ref 7–25)
CALCIUM SERPL-MCNC: 9.2 MG/DL (ref 8.6–10.3)
CHLORIDE SERPL-SCNC: 97 MEQ/L (ref 98–107)
CO2 SERPL-SCNC: 32 MEQ/L (ref 21–31)
CREAT SERPL-MCNC: 0.6 MG/DL (ref 0.7–1.3)
CRP SERPL-MCNC: 34.1 MG/L
DIFFERENTIAL METHOD BLD: ABNORMAL
EGFRCR SERPLBLD CKD-EPI 2021: >60 ML/MIN/1.73M*2
EOSINOPHIL # BLD: 0.31 K/UL (ref 0.04–0.54)
EOSINOPHIL NFR BLD: 5.3 %
ERYTHROCYTE [DISTWIDTH] IN BLOOD BY AUTOMATED COUNT: 13.2 % (ref 11.6–14.4)
ERYTHROCYTE [SEDIMENTATION RATE] IN BLOOD BY WESTERGREN METHOD: 72 MM/HR
GLUCOSE BLD-MCNC: 189 MG/DL (ref 70–99)
GLUCOSE BLD-MCNC: 203 MG/DL (ref 70–99)
GLUCOSE BLD-MCNC: 215 MG/DL (ref 70–99)
GLUCOSE BLD-MCNC: 223 MG/DL (ref 70–99)
GLUCOSE BLD-MCNC: 274 MG/DL (ref 70–99)
GLUCOSE SERPL-MCNC: 176 MG/DL (ref 70–99)
HCT VFR BLD AUTO: 37.8 % (ref 40.1–51)
HGB BLD-MCNC: 12.4 G/DL (ref 13.7–17.5)
IMM GRANULOCYTES # BLD AUTO: 0.1 K/UL (ref 0–0.08)
IMM GRANULOCYTES NFR BLD AUTO: 1.7 %
LYMPHOCYTES # BLD: 0.9 K/UL (ref 1.2–3.5)
LYMPHOCYTES NFR BLD: 15.3 %
MCH RBC QN AUTO: 30.9 PG (ref 28–33.2)
MCHC RBC AUTO-ENTMCNC: 32.8 G/DL (ref 32.2–36.5)
MCV RBC AUTO: 94.3 FL (ref 83–98)
MONOCYTES # BLD: 0.75 K/UL (ref 0.3–1)
MONOCYTES NFR BLD: 12.7 %
NEUTROPHILS # BLD: 3.78 K/UL (ref 1.7–7)
NEUTS SEG NFR BLD: 64.2 %
NRBC BLD-RTO: 0 %
PLATELET # BLD AUTO: 216 K/UL (ref 150–350)
PMV BLD AUTO: 10.8 FL (ref 9.4–12.4)
POCT TEST: ABNORMAL
POTASSIUM SERPL-SCNC: 4.5 MEQ/L (ref 3.5–5.1)
PROT SERPL-MCNC: 6.9 G/DL (ref 6–8.2)
RBC # BLD AUTO: 4.01 M/UL (ref 4.5–5.8)
SODIUM SERPL-SCNC: 135 MEQ/L (ref 136–145)
WBC # BLD AUTO: 5.89 K/UL (ref 3.8–10.5)

## 2025-04-07 PROCEDURE — 97116 GAIT TRAINING THERAPY: CPT | Mod: GP

## 2025-04-07 PROCEDURE — 80053 COMPREHEN METABOLIC PANEL: CPT | Performed by: PHYSICAL MEDICINE & REHABILITATION

## 2025-04-07 PROCEDURE — 25800000 HC PHARMACY IV SOLUTIONS: Performed by: PHYSICAL MEDICINE & REHABILITATION

## 2025-04-07 PROCEDURE — 86140 C-REACTIVE PROTEIN: CPT | Performed by: INTERNAL MEDICINE

## 2025-04-07 PROCEDURE — 85652 RBC SED RATE AUTOMATED: CPT | Performed by: INTERNAL MEDICINE

## 2025-04-07 PROCEDURE — 85025 COMPLETE CBC W/AUTO DIFF WBC: CPT | Performed by: INTERNAL MEDICINE

## 2025-04-07 PROCEDURE — 63700000 HC SELF-ADMINISTRABLE DRUG: Performed by: PHYSICAL MEDICINE & REHABILITATION

## 2025-04-07 PROCEDURE — 25800000 HC PHARMACY IV SOLUTIONS: Performed by: INTERNAL MEDICINE

## 2025-04-07 PROCEDURE — 63700000 HC SELF-ADMINISTRABLE DRUG: Performed by: INTERNAL MEDICINE

## 2025-04-07 PROCEDURE — 97530 THERAPEUTIC ACTIVITIES: CPT | Mod: GP

## 2025-04-07 PROCEDURE — 97110 THERAPEUTIC EXERCISES: CPT | Mod: GP

## 2025-04-07 PROCEDURE — 97110 THERAPEUTIC EXERCISES: CPT | Mod: GO | Performed by: OCCUPATIONAL THERAPIST

## 2025-04-07 PROCEDURE — 36415 COLL VENOUS BLD VENIPUNCTURE: CPT | Performed by: INTERNAL MEDICINE

## 2025-04-07 PROCEDURE — 63600000 HC DRUGS/DETAIL CODE: Mod: JZ | Performed by: INTERNAL MEDICINE

## 2025-04-07 PROCEDURE — 97530 THERAPEUTIC ACTIVITIES: CPT | Mod: GO | Performed by: OCCUPATIONAL THERAPIST

## 2025-04-07 PROCEDURE — 12800001 HC ROOM AND CARE SEMIPRIVATE REHAB-BRAIN INJ

## 2025-04-07 RX ORDER — SENNOSIDES 8.6 MG/1
2 TABLET ORAL 2 TIMES DAILY
Status: DISCONTINUED | OUTPATIENT
Start: 2025-04-07 | End: 2025-04-19 | Stop reason: HOSPADM

## 2025-04-07 RX ORDER — SODIUM CHLORIDE 9 MG/ML
1000 INJECTION, SOLUTION INTRAVENOUS ONCE
Status: COMPLETED | OUTPATIENT
Start: 2025-04-07 | End: 2025-04-07

## 2025-04-07 RX ORDER — SODIUM CHLORIDE 9 MG/ML
INJECTION, SOLUTION INTRAVENOUS CONTINUOUS
Status: ACTIVE | OUTPATIENT
Start: 2025-04-07 | End: 2025-04-08

## 2025-04-07 RX ADMIN — HYDROMORPHONE HYDROCHLORIDE 2 MG: 2 TABLET ORAL at 23:57

## 2025-04-07 RX ADMIN — HEPARIN SODIUM 5000 UNITS: 5000 INJECTION, SOLUTION INTRAVENOUS; SUBCUTANEOUS at 21:11

## 2025-04-07 RX ADMIN — MAGNESIUM HYDROXIDE 30 ML: 400 SUSPENSION ORAL at 21:33

## 2025-04-07 RX ADMIN — EZETIMIBE 10 MG: 10 TABLET ORAL at 21:11

## 2025-04-07 RX ADMIN — SODIUM CHLORIDE 1 G: 1 TABLET ORAL at 08:46

## 2025-04-07 RX ADMIN — SODIUM CHLORIDE: 9 INJECTION, SOLUTION INTRAVENOUS at 21:44

## 2025-04-07 RX ADMIN — Medication 10 ML: at 06:46

## 2025-04-07 RX ADMIN — CEFTRIAXONE SODIUM 2 G: 2 INJECTION, POWDER, FOR SOLUTION INTRAMUSCULAR; INTRAVENOUS at 05:56

## 2025-04-07 RX ADMIN — SENNOSIDES 2 TABLET: 8.6 TABLET, FILM COATED ORAL at 21:34

## 2025-04-07 RX ADMIN — INSULIN LISPRO 1 UNITS: 100 INJECTION, SOLUTION INTRAVENOUS; SUBCUTANEOUS at 08:45

## 2025-04-07 RX ADMIN — INSULIN LISPRO 2 UNITS: 100 INJECTION, SOLUTION INTRAVENOUS; SUBCUTANEOUS at 14:22

## 2025-04-07 RX ADMIN — DOCUSATE SODIUM 100 MG: 50 LIQUID ORAL at 08:46

## 2025-04-07 RX ADMIN — Medication: at 10:51

## 2025-04-07 RX ADMIN — SODIUM CHLORIDE 1 G: 1 TABLET ORAL at 21:11

## 2025-04-07 RX ADMIN — SIMETHICONE 160 MG: 80 TABLET, CHEWABLE ORAL at 12:38

## 2025-04-07 RX ADMIN — HEPARIN SODIUM 5000 UNITS: 5000 INJECTION, SOLUTION INTRAVENOUS; SUBCUTANEOUS at 05:49

## 2025-04-07 RX ADMIN — FLUOXETINE HYDROCHLORIDE 20 MG: 20 SOLUTION ORAL at 09:00

## 2025-04-07 RX ADMIN — Medication 10 ML: at 21:45

## 2025-04-07 RX ADMIN — FAMOTIDINE 20 MG: 20 TABLET, FILM COATED ORAL at 21:11

## 2025-04-07 RX ADMIN — LINACLOTIDE 145 MCG: 145 CAPSULE, GELATIN COATED ORAL at 14:58

## 2025-04-07 RX ADMIN — HEPARIN SODIUM 5000 UNITS: 5000 INJECTION, SOLUTION INTRAVENOUS; SUBCUTANEOUS at 14:16

## 2025-04-07 RX ADMIN — DOCUSATE SODIUM 100 MG: 50 LIQUID ORAL at 21:11

## 2025-04-07 RX ADMIN — LIOTHYRONINE SODIUM 10 MCG: 5 TABLET ORAL at 06:24

## 2025-04-07 RX ADMIN — INSULIN LISPRO 2 UNITS: 100 INJECTION, SOLUTION INTRAVENOUS; SUBCUTANEOUS at 17:12

## 2025-04-07 RX ADMIN — INSULIN LISPRO 4 UNITS: 100 INJECTION, SOLUTION INTRAVENOUS; SUBCUTANEOUS at 10:50

## 2025-04-07 RX ADMIN — SIMETHICONE 160 MG: 80 TABLET, CHEWABLE ORAL at 17:13

## 2025-04-07 RX ADMIN — NYSTATIN 1 APPLICATION: 100000 POWDER TOPICAL at 21:45

## 2025-04-07 RX ADMIN — ACETAMINOPHEN 650 MG: 650 SOLUTION ORAL at 12:38

## 2025-04-07 RX ADMIN — LANSOPRAZOLE 15 MG: KIT at 08:46

## 2025-04-07 RX ADMIN — SIMETHICONE 160 MG: 80 TABLET, CHEWABLE ORAL at 21:11

## 2025-04-07 RX ADMIN — SODIUM CHLORIDE 1000 ML: 9 INJECTION, SOLUTION INTRAVENOUS at 18:58

## 2025-04-07 RX ADMIN — CEFTRIAXONE SODIUM 2 G: 2 INJECTION, POWDER, FOR SOLUTION INTRAMUSCULAR; INTRAVENOUS at 18:04

## 2025-04-07 RX ADMIN — Medication 10 ML: at 14:16

## 2025-04-07 RX ADMIN — SIMETHICONE 160 MG: 80 TABLET, CHEWABLE ORAL at 08:46

## 2025-04-07 RX ADMIN — HYDROMORPHONE HYDROCHLORIDE 2 MG: 2 TABLET ORAL at 09:00

## 2025-04-07 RX ADMIN — LEVOTHYROXINE SODIUM 150 MCG: 150 TABLET ORAL at 06:53

## 2025-04-07 RX ADMIN — NYSTATIN 1 APPLICATION: 100000 POWDER TOPICAL at 08:49

## 2025-04-07 RX ADMIN — SENNOSIDES 2 TABLET: 8.6 TABLET, FILM COATED ORAL at 12:38

## 2025-04-07 NOTE — NURSING NOTE
Patient report voiding in urinal about ten times overnight.  At 0915 voided 225 cc in urinal with PVR of 106 cc.  Recommended that patient stand to void using urinal at night to ascertain if this helps decrease frequency.  Urine is pale clear yellow, no odor, patient has not burning or dysuria, chills fever.  Celso states he needs to be able to do three things when he goes home, get out of bed, go up and down 14 steps and manage his tube feeding.  His wife is aware they will need to also manage IV antibiotics.  They are questioning which home care agency will manage this.  Will communicate with PT, OT and case management regarding above.   Recommended obtaining a make up type mirror on a stand to assist patient in managing his peg tube flushes, meds and feeding.  He is confident he will be able to manage this as this is not a new task.

## 2025-04-07 NOTE — CONSULTS
Wound Ostomy Continence Note    Subjective    HPI Patient is a 69 y.o. male who was admitted on 4/1/2025 with a diagnosis of Spinal epidural abscess [G06.1]  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 [G06.1].    Problem list Principal Problem:    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     PMH/PSH 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 drug thru [facility]     Hx of head and neck radiation     Hypertension     Hypothyroidism     Stroke (CMS/HCC)     Thyroid disease      Past Surgical History   Procedure Laterality Date    Cholecystectomy      Hernia repair      Micro direct laryngoscopy with fat implantation, abdominal fat harvest,laser N/A 11/23/2020    Performed by Jon German MD at [facility]    Neck dissection      Neck surgery      MD EGD INSERT GUIDE WIRE DILATOR PASSAGE ESOPHAGUS [71636 (CPT®)] N/A 10/17/2022    Performed by Ricardo Cabral MD at [facility]    Thyroid surgery      Tumor removal      L neck 20yrs ago       Assessment and Recommendation                04/07/25 0830   Rash 04/01/25 2241 Right anterior greater trochanter   Date First Assessed/Time First Assessed: 04/01/25 2241   Side: Right  Orientation: anterior  Location: greater trochanter   Distribution localized   Characteristics dry   Color pink   Rash Care antifungal applied   Recommendation perifoam cleanser, bid, Antifungal.   Rash 04/01/25 2242 Left anterior greater trochanter   Date First Assessed/Time First Assessed: 04/01/25 2242   Side: Left  Orientation: anterior  Location: greater trochanter   Distribution localized   Characteristics dry   Color pink   Rash Care antifungal applied   Recommendation perifoam cleanser , antifungal   Rash 04/01/25 2244 coccyx   Date First Assessed/Time First Assessed: 04/01/25 2244   Location: coccyx   Distribution localized   Characteristics dry   Color pink   Recommendation perifoam cleanser, antifungal, barrier  cream   Surgical Incision Neck Posterior   Date First Assessed/Time First Assessed: 04/01/25 2250   Location: Neck  Wound Location Orientation: Posterior   Incision WDL WDL   Dressing Appearance   (open to air)   Appearance well approximated;sutures intact;no drainage;no redness   Drainage Characteristics/Odor no odor   Drainage Amount none   Dressing open to air   Recommendation monitor bid, open to air   Wound Other (comment) Left Throat   Date First Assessed/Time First Assessed: 04/06/25 1048   Primary Wound Type: Other (comment)  Wound Location Orientation: Left  Location: Throat  Wound Description (Comments): PI vs abrasion   Pressure Injury Stage U   Dressing Appearance intact;dried drainage   Wound Appearance other (see comments)  (dark tan)   Periwound Appearance Pink   Drainage Characteristics/Odor no odor;serous   Drainage Amount scant   Wound Care cleansed with;sterile normal saline   Dressing Dressing removed;Dressing applied;foam;gauze;petroleum-based dressing   Frequency of Dressing Change Daily   Base dry;other (see comments)  (dark tan)   Wound Length (cm) 1.2 cm   Wound Width (cm) 1.3 cm   Wound Depth (cm) 0.2 cm   Wound Surface Area (cm^2) 1.23 cm^2   Wound Volume (cm^3) 0.163 cm^3   Slough % 100   Recommendation NSS, Medihoney, gauze, foam   Wound Pressure Injury Anterior;Right Foot   Date First Assessed/Time First Assessed: 04/07/25 0824   Present on Original Admission: No  Primary Wound Type: Pressure Injury  Wound Location Orientation: Anterior;Right  Location: Foot   Pressure Injury Stage   (indeterminate for stage 1 PI versus erthema.)   Dressing Appearance   (open to air)   Wound Appearance dry;reddened   Periwound Appearance Intact   Drainage Characteristics/Odor no odor   Drainage Amount none   Dressing open to air   Base dry;reddened   Wound Length (cm) 0.7 cm   Wound Width (cm) 0.3 cm   Wound Surface Area (cm^2) 0.16 cm^2   Other Tissue   (red)   Recommendation remove patient's own  socks, wear hospital socks   Wound Pressure Injury Anterior;Left Foot   Date First Assessed/Time First Assessed: 04/07/25 0825   Present on Original Admission: No  Primary Wound Type: Pressure Injury  Wound Location Orientation: Anterior;Left  Location: Foot   Pressure Injury Stage   (indeterminate for stage 1 PI versus erythema)   Dressing Appearance   (open to air)   Wound Appearance dry;reddened   Periwound Appearance Intact   Drainage Characteristics/Odor no odor   Drainage Amount none   Dressing open to air   Base dry;reddened   Wound Length (cm) 0.7 cm   Wound Width (cm) 0.7 cm   Wound Surface Area (cm^2) 0.38 cm^2   Other Tissue   (red)   Recommendation monitor bid, open to air, remove patient's own socks, apply hospital socks     Seen for skin assessment. PI prevention education given. Discussed the option of offloading heel boots with patient.  [place] neck collar removed for skin assessment, while in bed. Patient in a Size Wise low air loss therapy surface.   Labs:4/7/25: WBC: 5.89.  Unstageable PI left clavicle. Tissue, tan, dry. NSS, hydrofiber with silver, gauze, foam applied.           Patient wearing tight socks, redness at dorsal foot, both feet, indeterminate for PI stage 1 versus erythema. Socks removed, patient agreed to wear hospital socks.            MASD  buttocks, measures 6cm x 3cm.  barrier cream on buttocks.        Peg tube button, in tact no redness.    Left groin, MASD, red.      Right groin, MASD, red.     Neck incision, WNL, open to air, sutures.       Suggest: Maintain the PREVENT bundle for PI prevention. The most recent Terrence score is a 17.This patient is at risk for the development of a PI. Offload heels from bed, turn q 2 hours in bed, weight shift in wheelchair, q 30 minutes.   Monitor labs.     Monitor neck incision bid.   Monitor feet for redness, BID.  Wash buttocks, bilateral groin BID, with perifoam cleanser, followed by antifungal.   Offloading heels boots, apply when in bed,  "and at , RN to please obtain supply.  NSS, Medihoney, hydrofiber with silver, gauze and foam .     Wound Prevention Bundle                                                            Positioning- If clinically able:  Reposition at a minimum of 2 hours.  Adjust to avoid lying on medical devices. Use positioning devices to offload bony prominences. When out of bed, use pressure redistribution cushions. Monitor time out of bed and encourage repositioning. Use transfer aids to reduce friction and shear. Use \"turn assist\" function on bed surface if equipped.                  Risk Assessment:   Utilize risk assessment scale per hospital guidelines.  Identify additional risk factors, for example, medications and comorbidities. Match interventions to subscales of hospital's Risk assessment scale.  Monitor laboratory values. Communicate risk to interdisciplinary healthcare team. Consider use of prophylactic silicone foam sacral dressing.                  Evaluate Surface/Pressure Redistribution:   Utilize \"less is best\" with linen usage.                Vigilant Skin Inspection:   Inspect skin from head to toe, including areas under removable medical devices and dressings. Communicate skin issues to healthcare team and consult resources as needed.                   Evaluate incontinence/moisture/temperature:   Offer toileting when appropriate. Keep skin clean and dry (microclimate). Use moisture barrier products for incontinence care. Diapers/briefs for out of bed or off unit. Use absorbent under pads that wick away and hold moisture. Intervene for fecal and/or urinary incontinence (consider external containment devices. Use skin emollients (dry skin).                  Nutrition:   Consult dietician for at risk patients. Assist with menu preferences and feeding. Encourage snacks, fluid and supplements with rounding. Accurately record all intake where indicated.           Teaching Patients and Families:   Encourage patients " and families to partner with staff in preventing pressure injuries. Give patients and families pressure injury education, assess their understanding of education given, and document education.        Sabrina Diaz RN  4/7/2025  12:31 PM                                                                                WOCN consult is completed. Will follow.   Date: 04/07/25  Signature: Sabrina Diaz RN

## 2025-04-07 NOTE — PLAN OF CARE
Plan of Care Review  Plan of Care Reviewed With: patient  Progress: improving  Outcome Evaluation: assumed Pt 1900 Pt Ox4, VSS, calm, cooperative. no Pain. Continent of bowel and bladder, urinary frequency. Picc line flushing without issue. Foam in place over pressure injury under cervical collar. Safety precautions in place, call bell in reach

## 2025-04-07 NOTE — PROGRESS NOTES
Patient: Celso Gramajo  Location: [facility] [address]  MRN: [id number]  Today's date: 4/7/2025    History of Present Illness  Celso is a 69 y.o. male admitted on 4/1/2025 with Spinal epidural abscess [G06.1]. Principal problem is Spinal epidural abscess.    Celso Gramajo is a 69 year old male with PMHx thyroid CA s/p thyroidectomy, RND, adjuvant XRT/immunotherapy, esophageal SCC s/p chemoradiation c/b esophageal strictures, HTN, Elevated Coronary Calcium scoring in 2017, ENMANUEL, Hypothyroidism, Anemia, dysphagia on PEG (placed in 2021) TFs, and history of CVA who presented to an OSH with neck and R shoulder pain. Workup was unrevealing and he was discharged home.    [facility] again on 3/17 with ongoing fevers. CT Neck with no abscess, old postsurgical changes. He d/c home but BCx subsequently returned positive on 3/19 and he returned for admission. He was also bacteremic with strep viridans, and placed on IV antibiotics. Workup revealed a C5-C7 ventral epidural abscess with osteomyelitis, and he was transferred to [facility] for surgical evaluation.    MRI on 3/24 c/f metastatic disease at T1-T4, L4.      At [facility] ortho and ENT were consulted. Ultimately it was determined that an anterior surgical approach was not safe (due to history of neck surgeries), and patient was having progressive weakness. Repeat MRI C-spine showed Discitis and osteomyelitis at C6-C7 with ventral epidural abscess compressing the spinal cord and increased edema. Patient urgently underwent POSTERIOR C4-T1 DECOMPRESSION FUSION WITH [place] INSTRUMENTATION on 3/27. Postop he was briefly in the ICU for MAP goals. OR cultures grew strep intermedius and he was placed on the antibiotic plan below. He had a PICC placed 4/1. 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 He was started on salt tabs. In rehab please continue to check labs, and titrate the salt tabs as appropriate.  He worked with PT/OT who recommended him for acute rehab.     Past Medical History  Celso has a past medical history of Anemia, Cancer (CMS/[facility]), head and neck radiation, Hypertension, Hypothyroidism, Stroke (CMS/HCC), and Thyroid disease.    OT Vitals      Date/Time Pulse HR Source BP BP Location BP Method Pt Position [facility]   04/07/25 1030 84 Monitor 125/70 Left upper arm Automatic Sitting AA          OT Pain      Date/Time Pain Type Side/Orientation Rating: Rest Rating: Activity Description Interventions [facility]   04/07/25 1030 Pain Assessment neck 3 3 aching diversional activity provided AA   04/07/25 1116 -- -- -- -- -- medication administered;care clustered [facility]   04/07/25 1158 Pain Assessment neck 3 3 aching diversional activity provided AA               Prior Living Environment      Flowsheet Row Most Recent Value   People in Home spouse   Current Living Arrangements home   Home Accessibility stairs to enter home (Group), stairs within home (Group)   Living Environment Comment Multi-story home with 12-14 steps to second floor, powder room on 1st floor, 2-3 FELICITA via garage, multiple supportive family members   Number of Stairs, Main Entrance 3   Stair Railings, Main Entrance none   Location, Patient Bedroom second floor, must negotiate stairs to access   Location, Bathroom second floor, must negotiate stairs to access   Bathroom Access Comment powder room 1st floor standard height toilet, full bathroom 2nd floor WIS with glass door, standard height toilet   Stairs, Within Home, Primary 12   Stair Railings, Within Home, Primary railings on both sides of stairs            Prior Level of Function      Flowsheet Row Most Recent Value   Dominant Hand right   Ambulation independent   Transferring independent   Toileting independent   Bathing independent   Dressing independent   Eating independent   IADLs independent   Driving/Transportation    Communication understands/communicates without difficulty   Past  History of Dysphagia PTA pt was completely independent, no AD. (+) . NPO PTA, manages peg tube independently.   Prior Level of Function Comment Independent without use of AE or DME.   Assistive Device Currently Used at Home none            Occupational Profile      Flowsheet Row Most Recent Value   Occupational History/Life Experiences retired involved in [employer]. + , -handicap driving placard.   Patient Goals PSFS: going to the bathroom 0/10, walking 1/10, getting in/out of bed 0/10 = 1/30 = 3.33%             IRF OT Evaluation and Treatment - 04/07/25 1030          OT Time Calculation    Start Time 1030     Stop Time 1200     Time Calculation (min) 90 min        Session Details    Document Type Daily Treatment/Progress Note        General Information    Patient/Family/Caregiver Comments/Observations Pt's wife present for family training (Nathanael).     General Observations of Patient Pt recieved therapy on couch, direct handoff from PT        Mobility Belt    Mobility Belt Used During Session yes        Orthosis Neck Cervical Collar    Orthosis Properties Date Obtained: 04/03/25 Time Obtained: 0825 Location: Neck Type: Cervical Collar Features: Hard Description: Hard collar AAT; can be removed seated to don [place] collar for bathing.    Compliance/Wearing Issues patient;compliant with wearing schedule        Upper Extremity (Therapeutic Exercise)    Exercise Position/Type seated     General Exercise bilateral     Reps and Sets 2x10 reps     Comment Use of dowel for symmetrical ROM c green theraband: scap retraciton, lat pull down, elbow flexion.        Daily Progress Summary (OT)    Daily Outcome Statement Pt seen for family training with pt's wife. Provided wife c recommendations on DME for use at home, wife reports she will obtain. Plan to have wife come in on 4/14 for hands on family training during ADLs to ensure able to provide appropriate care for patient.                       "    ADL Impairment Caregiver Training       Title: ADL Impairment Caregiver Training/Education (IRF) (Done)       Points For This Title       Point: Additional Training/Education (Done)       Learning Progress Summary            Patient Acceptance, Explanation, Verbalizes Understanding by AA at 4/7/2025 1220    Comment: OT POC, progression of rehab (inpatient > home health > OP), spinal precautions, short term goals   Significant Other Acceptance, Explanation, Verbalizes Understanding by AA at 4/7/2025 1220    Comment: OT POC, progression of rehab (inpatient > home health > OP), spinal precautions, short term goals                      Point: Toilet Transfer (Done)       Description:   Review pertinent information related to assistance level needed for transfers, including:       - DME and assistive device recommended      - Technique and cueing recommended at discharge      - Proper positioning of caregiver if assistance is needed      - Safety concerns for the home environment                 Learning Progress Summary            Patient Acceptance, Explanation, Handout, Verbalizes Understanding by AA at 4/7/2025 1217    Comment: Recommend toilet safety frame, wife taking picture for \"handout\"   Significant Other Acceptance, Explanation, Handout, Verbalizes Understanding by AA at 4/7/2025 1217    Comment: Recommend toilet safety frame, wife taking picture for \"handout\"                      Point: Shower Stall Transfer (Done)       Description:   Review pertinent information related to assistance level needed for transfers, including:       - DME and assistive device recommended      - Technique and cuing recommended at discharge      - Proper positioning of caregiver if assistance is needed      - Safety concerns for home environment                 Learning Progress Summary            Patient Acceptance, Explanation, Handout, Verbalizes Understanding by AA at 4/7/2025 1217    Comment: Recommend shower chair c back " "+ arms, wife taking picture for \"handout\". Trialed various types of shower chairs, benefits from wider seat per pt preference. Rec trialing dry shower set up first or waiting until home health OT present.   Significant Other Acceptance, Explanation, Handout, Verbalizes Understanding by AA at 4/7/2025 1217    Comment: Recommend shower chair c back + arms, wife taking picture for \"handout\". Trialed various types of shower chairs, benefits from wider seat per pt preference. Rec trialing dry shower set up first or waiting until home health OT present.                      Point: Upper Body Dressing (Done)       Description:   Instruct the patient and caregiver/family in methods of completing and assisting with upper body dressing including:       - Assistive devices and adaptive equipment specific to patient need       - Management of orthotics and prosthetics as applicable      - Safety techniques and procedures, body mechanics and lifting techniques      - Patient specific safety concerns                 Learning Progress Summary            Patient Acceptance, Explanation, Demonstration, Verbalizes Understanding by AA at 4/7/2025 1218    Comment: Edu on current functional status and expected status at d/c.   Significant Other Acceptance, Explanation, Demonstration, Verbalizes Understanding by AA at 4/7/2025 1218    Comment: Edu on current functional status and expected status at d/c.                      Point: Household Activities (Done)       Description:   Household activities include but are not limited to meal prep, pet care, laundry, item retrieval/transport, and cleaning.             Instruct the caregiver/family on the appropriate level of assistance and supervision required for safe HHA completion, including:      - Environmental set up or prep      - Assistive devices and adaptive equipment specific to patient need      - Patient specific safety concerns                 Learning Progress Summary          "   Patient Acceptance, Explanation, Verbalizes Understanding by AA at 4/7/2025 1221    Comment: Simple modiifcations for inc participation in home enviroment (ie. repositioning of items on elevated surfaces), use of tote bag on RW for transport, use of reacher for item retrieval   Significant Other Acceptance, Explanation, Verbalizes Understanding by AA at 4/7/2025 1221    Comment: Simple modiifcations for inc participation in home enviroment (ie. repositioning of items on elevated surfaces), use of tote bag on RW for transport, use of reacher for item retrieval                      Point: Driving (Done)       Description:   Educate on issues and options related to return to driving, including evaluation or training by a  rehabilitation specialist, as needed.       - Driving letter process, reporting, and medical clearance      - Factors, such as seizures, visual, cognitive and muscle control impairments, that may influence the ability to drive      - Procuring a handicap placard if appropriate                 Learning Progress Summary            Patient Acceptance, Explanation, Verbalizes Understanding by AA at 4/7/2025 1216    Comment: Discussed not driving until cleared by surgeon, Initated process for handicapped placard.   Significant Other Acceptance, Explanation, Verbalizes Understanding by AA at 4/7/2025 1216    Comment: Discussed not driving until cleared by surgeon, Initated process for handicapped placard.                      Point: Splinting/Orthotics/Prosthetics (Done)       Description:   Instruct caregiver/family on the use and care of any splints, orthotics, and/or prosthetics, including:       - Purpose      - Safe and effective use and positioning      - Donning/doffing      - Wearing schedule      - Cleaning and infection control      - Limb hygiene and skin inspection      - Maintenance and troubleshooting                 Learning Progress Summary            Patient Acceptance, Explanation,  Verbalizes Understanding by AA at 4/7/2025 1220    Comment: Cervical collar management; [place] J v Hunt collar, how to manage pads on [place] collar   Significant Other Acceptance, Explanation, Verbalizes Understanding by AA at 4/7/2025 1220    Comment: Cervical collar management; [place] J v Hunt collar, how to manage pads on [place] J collar                      Point: Home Exercise Program (Upper Extremity) (Done)       Description:   Instruct caregiver/family on the recommended home exercise program including:       - Which exercises to perform at home, purpose of exercise, how to correctly perform exercise      - Proper positioning and technique of caregiver, if assistance is needed                 Learning Progress Summary            Patient Acceptance, Explanation, Demonstration, Verbalizes Understanding by AA at 4/7/2025 1219    Comment: Reviewed UE FOM and implicatiosn on daily life. Rec daily use of hand exerciser and theraputty. Plan to issue UE HEP prior to d/c.                      Point: Lower Body Dressing (Done)       Description:   Instruct the patient and caregiver/family in methods of completing and assisting lower body dressing including:       - Assistive devices and adaptive equipment specific to patient need      - Management of orthotics and prosthetics as applicable      - Safety techniques and procedures, body mechanics and lifting techniques      - Patient specific safety concerns                 Learning Progress Summary            Patient Acceptance, Explanation, Demonstration, Verbalizes Understanding by AA at 4/7/2025 1218    Comment: Edu on current functional status and expected status at d/c.   Significant Other Acceptance, Explanation, Demonstration, Verbalizes Understanding by AA at 4/7/2025 1218    Comment: Edu on current functional status and expected status at d/c.                      Point: Toileting (Done)       Description:   Instruct the patient and  caregiver/family in methods of completing and assisting with toileting including:       - Assistive devices and adaptive equipment specific to patient need.       - Management of orthotics and prosthetics as applicable      - Safety techniques and procedures, body mechanics and lifting techniques.      - Patient specific safety concerns                 Learning Progress Summary            Patient Acceptance, Explanation, Demonstration, Verbalizes Understanding by AA at 4/7/2025 1218    Comment: Edu on current functional status and expected status at d/c.   Significant Other Acceptance, Explanation, Demonstration, Verbalizes Understanding by AA at 4/7/2025 1218    Comment: Edu on current functional status and expected status at d/c.                      Point: Grooming (Done)       Description:   Instruct the patient and caregiver/family in methods of completing and assisting with grooming including:       - Assistive devices and adaptive equipment specific to patient need.       - Management of orthotics and prosthetics as applicable      - Safety techniques and procedures, body mechanics and lifting techniques.      - Patient specific safety concerns                 Learning Progress Summary            Patient Acceptance, Explanation, Demonstration, Verbalizes Understanding by AA at 4/7/2025 1218    Comment: Edu on current functional status and expected status at d/c.   Significant Other Acceptance, Explanation, Demonstration, Verbalizes Understanding by AA at 4/7/2025 1218    Comment: Edu on current functional status and expected status at d/c.                      Point: Bathing (Done)       Description:   Instruct the patient and caregiver/family in methods of completing and assisting with bathing including:       - Assistive devices and adaptive equipment specific to patient need      - Management of orthotics and prosthetics as applicable      - Safety techniques and procedures, body mechanics and lifting  techniques      - Patient specific safety concern                 Learning Progress Summary            Patient Acceptance, Explanation, Demonstration, Verbalizes Understanding by AA at 4/7/2025 1218    Comment: Edu on current functional status and expected status at d/c.   Significant Other Acceptance, Explanation, Demonstration, Verbalizes Understanding by AA at 4/7/2025 1218    Comment: Edu on current functional status and expected status at d/c.                                            IRF OT Goals      Flowsheet Row Most Recent Value   Transfer Goal 1    Activity/Assistive Device toilet at 04/02/2025 0723   [place] minimum assist (75% or more patient effort) at 04/02/2025 0723   Time Frame short-term goal (STG), 5 - 7 days at 04/03/2025 0754   Strategies/Barriers pt just evlauated at 04/03/2025 0754   Progress/Outcome progress slower than expected, goal ongoing, goal revised this date at 04/03/2025 0754   Transfer Goal 2    Activity/Assistive Device toilet at 04/02/2025 0723   [place] modified independence at 04/02/2025 0723   Time Frame long-term goal (LTG), 21 days or less at 04/02/2025 0723   Progress/Outcome goal ongoing at 04/03/2025 0754   Transfer Goal 3    Activity/Assistive Device shower at 04/02/2025 0723   [place] other (see comments)  [TBA] at 04/02/2025 0723   Time Frame short-term goal (STG), 5 - 7 days at 04/03/2025 0754   Strategies/Barriers pt just evlauated at 04/03/2025 0754   Progress/Outcome progress slower than expected, goal ongoing, goal revised this date at 04/03/2025 0754   Transfer Goal 4    Activity/Assistive Device shower at 04/02/2025 0723   [place] other (see comments)  [TBA] at 04/02/2025 0723   Time Frame long-term goal (LTG), 21 days or less at 04/02/2025 0723   Progress/Outcome goal ongoing at 04/03/2025 0754   Bathing Goal 1    [place] moderate assist (50-74% patient effort) at 04/02/2025 0723   Time Frame short-term goal (STG), 5 - 7 days at  04/03/2025 0754   Strategies/Barriers pt just evlauated at 04/03/2025 0754   Progress/Outcome progress slower than expected, goal ongoing, goal revised this date at 04/03/2025 0754   Bathing Goal 2    [place] supervision required at 04/02/2025 0723   Time Frame long-term goal (LTG), 21 days or less at 04/02/2025 0723   Progress/Outcome goal ongoing at 04/03/2025 0754   UB Dressing Goal 1    [place] other (see comments)  [TBA] at 04/02/2025 0723   Time Frame short-term goal (STG), 5 - 7 days at 04/03/2025 0754   Strategies/Barriers pt just evlauated at 04/03/2025 0754   Progress/Outcome progress slower than expected, goal ongoing, goal revised this date at 04/03/2025 0754   UB Dressing Goal 2    [place] other (see comments)  [TBA] at 04/02/2025 0723   Time Frame long-term goal (LTG), 21 days or less at 04/02/2025 0723   Progress/Outcome goal ongoing at 04/03/2025 0754   LB Dressing Goal 1    [place] moderate assist (50-74% patient effort) at 04/02/2025 0723   Time Frame short-term goal (STG), 5 - 7 days at 04/03/2025 0754   Strategies/Barriers pt just evlauated at 04/03/2025 0754   Progress/Outcome progress slower than expected, goal ongoing, goal revised this date at 04/03/2025 0754   LB Dressing Goal 2    [place] modified independence at 04/02/2025 0723   Time Frame long-term goal (LTG), 21 days or less at 04/02/2025 0723   Progress/Outcome goal ongoing at 04/03/2025 0754   Grooming Goal 1    [place] set-up required at 04/02/2025 0723   Time Frame short-term goal (STG), 5 - 7 days at 04/03/2025 0754   Strategies/Barriers pt just evlauated at 04/03/2025 0754   Progress/Outcome progress slower than expected, goal ongoing, goal revised this date at 04/03/2025 0754   Grooming Goal 2    [place] modified independence at 04/02/2025 0723   Time Frame long-term goal (LTG), 21 days or less at 04/02/2025 0723   Progress/Outcome goal ongoing at 04/03/2025 0754   Toileting Goal 1     [place] moderate assist (50-74% patient effort) at 04/02/2025 0723   Time Frame short-term goal (STG), 5 - 7 days at 04/03/2025 0754   Strategies/Barriers pt just evlauated at 04/03/2025 0754   Progress/Outcome progress slower than expected, goal ongoing, goal revised this date at 04/03/2025 0754   Toileting Goal 2    [place] modified independence at 04/02/2025 0723   Time Frame long-term goal (LTG), 21 days or less at 04/02/2025 0723   Progress/Outcome goal ongoing at 04/03/2025 0754

## 2025-04-07 NOTE — PROGRESS NOTES
Psychiatry Progress Note    History of Present Illness   See initial consult.  History pertaining to psychosis, depression and anxiety and other psychiatric and psychologic conditions as well as general medical history detailed in initial consult.      Interval History:   Ok on prozac tolerated  Still frsutrated - addressed negative thoghts  Continues to sleep better.  Mood improving.  Anxiety and despondency in context of medical conditions manageable.   Sodium stable.  Vital signs stable.  Nursing reports no behavioral concerns.  No sundowning agitation.  Continues to make progress with respect to gaining insight into emotional state and medical conditions and the relationship between them.  Participatory in therapies     MENTAL STATUS EXAM  Appearance: well groomed  Gait and Motor: no abnormal movements  Speech: normal rate/rhythm/volume  Mood: depressed and anxious  Affect: constricted  Associations: coherent  Thought Process: goal-directed  Thought Content: no auditory or visual hallucinations.  Suicidality/Homicidality: denies  Judgement/Insight: acknowledges illness  Orientation: situation  Memory: knows current president  Attention: distracted  Knowledge: normal  Language: normal      Vital Signs for the last 24 hours:  Temp:  [36.6 °C (97.9 °F)-37.7 °C (99.8 °F)] 36.6 °C (97.9 °F)  Heart Rate:  [] 82  Resp:  [16-18] 18  BP: (109-141)/(62-77) 134/77    Labs:  Labs below reviewed for pertinence to psychiatric condition  Lab Results   Component Value Date    GLUCOSE 176 (H) 04/07/2025    CALCIUM 9.2 04/07/2025     (L) 04/07/2025    K 4.5 04/07/2025    CO2 32 (H) 04/07/2025    CL 97 (L) 04/07/2025    BUN 16 04/07/2025    CREATININE 0.6 (L) 04/07/2025     Lab Results   Component Value Date    WBC 5.89 04/07/2025    HGB 12.4 (L) 04/07/2025    HCT 37.8 (L) 04/07/2025    MCV 94.3 04/07/2025     04/07/2025     Pain Management Panel           No data to display                  Current  Facility-Administered Medications   Medication Dose Route Frequency Provider Last Rate Last Admin    acetaminophen (TYLENOL) 650 mg/20.3 mL solution 650 mg  650 mg peg tube q6h PRN Rohit Acevedo MD   650 mg at 04/06/25 1743    bisacodyL (DULCOLAX) 10 mg suppository 10 mg  10 mg rectal Daily PRN Rohit Acevedo MD   10 mg at 04/03/25 1554    cefTRIAXone (ROCEPHIN) 2 g in sodium chloride 0.9 % 100 mL IVPB  2 g intravenous q12h [facility] Rohit Acevedo MD   Stopped at 04/07/25 0646    [START ON 4/9/2025] cefTRIAXone (ROCEPHIN) 2 g in sodium chloride 0.9 % 100 mL IVPB  2 g intravenous q24h INT Rohit Acevedo MD        docusate sodium (COLACE) 50 mg/5 mL liquid 100 mg  100 mg peg tube BID Spencer Kemp MD   100 mg at 04/06/25 2151    ezetimibe (ZETIA) tablet 10 mg  10 mg peg tube Nightly Rohit Acevedo MD   10 mg at 04/06/25 2151    famotidine (PEPCID) tablet 20 mg  20 mg peg tube Nightly Rohit Acevedo MD   20 mg at 04/06/25 2150    FLUoxetine (PROzac) 20 mg/5 mL (4 mg/mL) solution 20 mg  20 mg peg tube Daily Rohit Acevedo MD   20 mg at 04/06/25 0841    glycopyrrolate (ROBINUL) tablet 1 mg  1 mg peg tube 2x daily PRN Rohit Acevedo MD        heparin (porcine) 5,000 unit/mL injection 5,000 Units  5,000 Units subcutaneous q8h [facility] Rohit Acevedo MD   5,000 Units at 04/07/25 0549    HYDROmorphone (DILAUDID) tablet 2 mg  2 mg peg tube q6h PRN Lilian Marrero MD   2 mg at 04/06/25 1430    insulin lispro U-100 (HumaLOG) pen 1-12 Units  1-12 Units subcutaneous q6h [facility] Lilian Marrero MD   1 Units at 04/06/25 1732    lansoprazole (PREVACID) 3 mg/mL oral suspension 15 mg  15 mg feeding tube Daily before breakfast Lilian Marrero MD   15 mg at 04/06/25 0840    levothyroxine (SYNTHROID) tablet 150 mcg  150 mcg peg tube Daily (6:30a) Lilian Marrero MD   150 mcg at 04/07/25 0653    linaCLOtide (LINZESS) capsule 145 mcg  145 mcg g-tube Daily before lunch Lilian Marrero MD    145 mcg at 04/06/25 1224    liothyronine (CYTOMEL) tablet 10 mcg  10 mcg peg tube Daily (6:30a) Rohit Acevedo MD   10 mcg at 04/07/25 0624    LORazepam (ATIVAN) tablet 0.5 mg  0.5 mg peg tube q8h PRN Rohit Acevedo MD   0.5 mg at 04/05/25 2058    magnesium hydroxide (M.O.M.) 400 mg/5 mL suspension 30 mL  30 mL oral 2x daily PRN Lilian Marrero MD   30 mL at 04/02/25 1138    nystatin (MYCOSTATIN) 100,000 unit/gram topical powder 1 Application  1 Application Topical BID Spencer Kemp MD   1 Application at 04/06/25 2200    ondansetron ODT (ZOFRAN-ODT) disintegrating tablet 4 mg  4 mg peg tube q6h PRN Rohit Acevedo MD        senna (SENOKOT) tablet 1 tablet  1 tablet peg tube 2x daily PRN Rohit Acevedo MD   1 tablet at 04/03/25 1547    senna (SENOKOT) tablet 2 tablet  2 tablet peg tube Daily Spencer Kemp MD   2 tablet at 04/06/25 1224    simethicone (MYLICON) chewable tablet 160 mg  160 mg peg tube QID Lilian Marrero MD   160 mg at 04/06/25 2150    sodium chloride PF flush 10 mL  10 mL intravenous q8h INT Rohit Acevedo MD   10 mL at 04/07/25 0646    sodium chloride PF flush 10 mL  10 mL intravenous PRN Rohit Acevedo MD        sodium chloride tablet 1 g  1 g peg tube BID Rohit Acevedo MD   1 g at 04/06/25 2150        Patient Active Problem List   Diagnosis    Vertigo    Hypertension    Postsurgical hypothyroidism    Mixed hyperlipidemia    Gastroesophageal reflux disease    Increased sputum production    Allergic rhinitis    Change in bowel habits    Chronic fatigue syndrome    Constipation, chronic    Deviated nasal septum    Elevated coronary artery calcium score    Eustachian tube dysfunction, bilateral    Examination of participant in clinical trial    Induration penis plastica    Hypertrophy of nasal turbinates    Hx of colonic polyps    Malignant neoplasm of salivary gland (CMS/HCC)    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)     Paralysis of vocal cords and larynx, unilateral    ENMANUEL (obstructive sleep apnea)    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)    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)    Penile curvature, acquired    Rhinitis sicca    Thyroid cancer (CMS/HCC)    Sensorineural hearing loss (SNHL), bilateral    Other dysphagia    Abdominal pain    Other dysphagia    COVID-19    Encounter for follow-up surveillance of thyroid cancer    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  Address automatic negtive thoguhts  Trey Meyer MD  4/7/2025

## 2025-04-07 NOTE — PROGRESS NOTES
Subjective     Patient seen and examined on rounds.  Chart reviewed.  Events overnight noted.  History reviewed briefly with patient.    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    HPI: Mr. Celso Gramajo is a 69-year-old right-handed white male with chronic conditions significant for hypertension, hyperlipidemia, anxiety, thyroid cancer status post thyroidectomy and radical neck dissection, adjuvant radiation therapy/immunotherapy, esophageal squamous cell carcinoma status post chemoradiotherapy complicated by esophageal strictures, left vocal cord paralysis, dysphagia status post PEG tube placement in 2021, initially presented to the ER at [facility] on 3/11/25 with fevers and right shoulder pain. Workup was unrevealing and he was discharged home. He presented to the [facility] ER again on 3/17/25 with ongoing fevers. CT neck with no abscess, old postsurgical changes. He discharged home but blood cultures subsequently, 1 of 2 sets of blood cultures drawn at [facility] on 3/17/25 showed Streptococcus viridans returned positive on 3/19/25 and he returned for admission to [facility] on 3/19/25. TTE at [facility] was negative. Blood cultures on 3/19/25 and again on 3/23/25 showed no growth per his records. Imaging with C6-7 osteomyelitis/discitis with ventral epidural abscess. He was transferred to [facility] on 3/23/25 for surgical evaluation.  At that time, he was afebrile.  Blood cultures  were negative. At [facility] ortho and ENT were consulted. Ultimately it was determined that an anterior surgical approach was not safe (due  to history of neck surgeries), and patient was having progressive weakness.  MRI of cervical spine on 3/27/25 revealed discitis and osteomyelitis at C6-C7 with ventral epidural abscess compressing the spinal cord and increased inferior extent of cord edema compared to prior. Patchy T1 hypointensity with enhancement in the upper cervical and thoracic spine, can represent metastatic lesions and/or post radiation changes, similar as compared to prior. He underwent posterior C4-T1 decompression and instrumented fusion on 3/27/25, performed by orthopedic surgeon Dr. Santiago Ca. Purulent fluid drained from anterior spine during the procedure and operating room cultures revealed Strep intermedius. Postop he was briefly in the ICU for MAP goals. He had a PICC placed 4/1/25. 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 He was started on salt tabs. Infectious diseases recommended IV Daptomycin which patient completed on 3/31/25.  He was recommended to continue Ceftriaxone 2g IV q12h x 14 day course (end date 4/8/25) then transition to Ceftriaxone 2g IV daily to complete total 6 week course (end date 5/8/25). He was recommend [place] J collar to neck at all times.  I discussed orthopedic spinal precautions with him.  He completed Decadron treatment for mild laryngeal edema visualized on preoperative NPL. Patient has dysphagia from esophageal strictures, recent dilation 3/3/25 was not successful per patient, cannot tolerate regular oral secretions and recommended NPO and continued on bolus tube feeds.  He was kept on Pepcid for GI prophylaxis. Glycopyrrolate as needed per patient/spouse wishes.  For anxiety he was continued on Fluoxetine and Ativan PRN.  He is not on medications for hypertension.  He was continued on Zetia for hyperlipidemia.  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 He continues on a sliding-scale Humalog coverage.  He is on Levothyroxine  and Liothyronine for thyroid supplementation after thyroidectomy for thyroid cancer. He is on subcutaneous Heparin and SCDs for DVT prophylaxis.  I discussed his recent clinical course with patient and with his wife at bedside.  On 4/1/25, hemoglobin was 12.9, WBC count was 10.30, platelets were 255, BUN 15, creatinine was 0.51, sodium was 133 and potassium was 4.5.  He has been needing assistance for mobility, transfers, self-care. He is transferred to [facility] on 4/1/25 for further rehabilitation care.     SUBJ: Saw patient earlier today morning and then again in the evening.  Discussed with patient and with his wife in the evening.  He requested IV saline infusion.  Ordered liter of saline overnight but patient and wife indicated that at home they usually have the infusion and 3 hours and do not want to eat or 12 hours.  Discussed with pharmacist.  Discussed with nurse.  Patient and wife insist that they usually get the infusion over 3 hours at home.  Also wanted increase bowel medications due to constipation.  Increase Senokot dose and ordered as needed treats enema.  Discussed with nurse.    ROS: Denies chest pain or shortness of breath. Other ROS negative. Past, family, social history is unchanged.      Current Functional Status:   Bed mobility:   [place], Supine to Sit: touching/steadying assist   [place], Sit to Supine: moderate assist (50-74% patient effort)   Transfers:    [place], Sit to Stand Transfer: minimum assist (75% or more patient effort)  [place], Stand to Sit Transfer: minimum assist (75% or more patient effort)   [place], Stand Pivot/Stand Step Transfer: minimum assist (75% or more patient effort)   Gait:   [place], Gait: minimum assist (75% or more patient effort)  Assistive Device: walker, front-wheeled   Distance in Feet: 200 feet    Bathing:   [place]: moderate assist (50-74% patient effort)   Toileting:   [place]: minimum assist (75%  "or more patient effort)   Upper body dressing:   [place]: minimum assist (75% or more patient effort)   Lower body dressing:   [place]: moderate assist (50-74% patient effort)       Functional Progress:    Functional status reviewed. Overall, patient's functional status is improving.      Physical Exam      Blood pressure 125/70, pulse 84, temperature 36.6 °C (97.9 °F), temperature source Oral, resp. rate 18, height 1.854 m (6' 1\"), weight 92.1 kg (203 lb), SpO2 93%.    Body mass index is 26.78 kg/m².    General Appearance: Not in acute distress  Head/Ear/Nose/Throat: Normocephalic; Atraumatic.   Eye: EOMI; PERRL.   Neck: [place] J collar in place.  Respiratory: Decreased breath sounds at bases.   Cardiovascular: RRR; Normal S1, S2.   Gastrointestinal: Soft; NT; +BS.   Extremities: Bilateral lower extremity edema noted.    Musculoskeletal: Functional active range of motion in both upper and lower extremities.   Neurological: AAO ×3. Speech is fluent. Cranial nerve examination does not reveal any gross facial asymmetry. Strength testing bilateral deltoids are 4/5, bilateral biceps are 4+/5, bilateral triceps are 4/5, bilateral wrist extensors are 4+/5, bilateral hand FDP are 4+/5, bilateral and interossei are 4/5.  Bilateral hip flexion is less than 4/5.  Bilateral quadriceps are 5/5.  Bilateral ankle dorsi and plantar flexion are 5/5.  He is grossly able to localize touch and position sense in great toes, except reports numbness in all 5 digits of both hands and also numbness on the plantar aspects of both feet.  Deep tendon reflexes are symmetric and slightly brisk bilaterally.  Bilateral ankle clonus is present.  Plantars are withdrawal.  Coordination is functional upper extremities.    Behavior/Emotional: Appropriate; Cooperative.   Skin: Healing incision on posterior cervical spine.  Sutures in place.  Erythema/rash noted on coccyx unclear if stage I decubitus.  Rash noted on bilateral groins.  Reviewed " pictures in epic.         Current Facility-Administered Medications:     acetaminophen (TYLENOL) 650 mg/20.3 mL solution 650 mg, 650 mg, peg tube, q6h PRN, Rohit Acevedo MD, 650 mg at 04/06/25 1743    bisacodyL (DULCOLAX) 10 mg suppository 10 mg, 10 mg, rectal, Daily PRN, Rohit Acevedo MD, 10 mg at 04/03/25 1554    cefTRIAXone (ROCEPHIN) 2 g in sodium chloride 0.9 % 100 mL IVPB, 2 g, intravenous, q12h DU, Rohit Acevedo MD, Stopped at 04/07/25 0646    [START ON 4/9/2025] cefTRIAXone (ROCEPHIN) 2 g in sodium chloride 0.9 % 100 mL IVPB, 2 g, intravenous, q24h INT, oRhit Acevedo MD    docusate sodium (COLACE) 50 mg/5 mL liquid 100 mg, 100 mg, peg tube, BID, Spencer Kemp MD, 100 mg at 04/07/25 0846    ezetimibe (ZETIA) tablet 10 mg, 10 mg, peg tube, Nightly, Rohit Acevedo MD, 10 mg at 04/06/25 2151    famotidine (PEPCID) tablet 20 mg, 20 mg, peg tube, Nightly, Rohit Acevedo MD, 20 mg at 04/06/25 2150    FLUoxetine (PROzac) 20 mg/5 mL (4 mg/mL) solution 20 mg, 20 mg, peg tube, Daily, Rohit Acevedo MD, 20 mg at 04/07/25 0900    glycopyrrolate (ROBINUL) tablet 1 mg, 1 mg, peg tube, 2x daily PRN, Rohit Acevedo MD    heparin (porcine) 5,000 unit/mL injection 5,000 Units, 5,000 Units, subcutaneous, q8h DU, Rohit Acevedo MD, 5,000 Units at 04/07/25 0549    honey (MEDIHONEY) 100 % topical paste, , Topical, Daily, Spencer Kemp MD, Given at 04/07/25 1051    HYDROmorphone (DILAUDID) tablet 2 mg, 2 mg, peg tube, q6h PRN, Lilian Marrero MD, 2 mg at 04/07/25 0900    insulin lispro U-100 (HumaLOG) pen 1-12 Units, 1-12 Units, subcutaneous, q6h DU, Lilian Marrero MD, 4 Units at 04/07/25 1050    lansoprazole (PREVACID) 3 mg/mL oral suspension 15 mg, 15 mg, feeding tube, Daily before breakfast, Lilian Marrero MD, 15 mg at 04/07/25 0846    levothyroxine (SYNTHROID) tablet 150 mcg, 150 mcg, peg tube, Daily (6:30a), Lilian Marrero MD, 150 mcg at 04/07/25  0653    linaCLOtide (LINZESS) capsule 145 mcg, 145 mcg, g-tube, Daily before lunch, Lilian Marrero MD, 145 mcg at 04/06/25 1224    liothyronine (CYTOMEL) tablet 10 mcg, 10 mcg, peg tube, Daily (6:30a), Rohit Acevedo MD, 10 mcg at 04/07/25 0624    LORazepam (ATIVAN) tablet 0.5 mg, 0.5 mg, peg tube, q8h PRN, Rohit Acevedo MD, 0.5 mg at 04/05/25 2058    magnesium hydroxide (M.O.M.) 400 mg/5 mL suspension 30 mL, 30 mL, oral, 2x daily PRN, Lilian Marrero MD, 30 mL at 04/02/25 1138    nystatin (MYCOSTATIN) 100,000 unit/gram topical powder 1 Application, 1 Application, Topical, BID, Spencer Kemp MD, 1 Application at 04/07/25 0849    ondansetron ODT (ZOFRAN-ODT) disintegrating tablet 4 mg, 4 mg, peg tube, q6h PRN, Rohit Acevedo MD    senna (SENOKOT) tablet 1 tablet, 1 tablet, peg tube, 2x daily PRN, Rohit Acevedo MD, 1 tablet at 04/03/25 1547    senna (SENOKOT) tablet 2 tablet, 2 tablet, peg tube, Daily, Spencer Kemp MD, 2 tablet at 04/06/25 1224    simethicone (MYLICON) chewable tablet 160 mg, 160 mg, peg tube, QID, Lilian Marrero MD, 160 mg at 04/07/25 0846    sodium chloride PF flush 10 mL, 10 mL, intravenous, q8h INT, Rohit Acevedo MD, 10 mL at 04/07/25 0646    sodium chloride PF flush 10 mL, 10 mL, intravenous, PRN, Rohit Acevedo MD    sodium chloride tablet 1 g, 1 g, peg tube, BID, Rohit Acevedo MD, 1 g at 04/07/25 0846       Labs / Radiology    Lab Results   Component Value Date    WBC 5.89 04/07/2025    HGB 12.4 (L) 04/07/2025    HCT 37.8 (L) 04/07/2025    MCV 94.3 04/07/2025     04/07/2025     Lab Results   Component Value Date    GLUCOSE 176 (H) 04/07/2025    CALCIUM 9.2 04/07/2025     (L) 04/07/2025    K 4.5 04/07/2025    CO2 32 (H) 04/07/2025    CL 97 (L) 04/07/2025    BUN 16 04/07/2025    CREATININE 0.6 (L) 04/07/2025       Assessment and Plan    ASSESSMENT PLAN:  1. 69-year-old right-handed white male with chronic conditions  significant for hypertension, hyperlipidemia, anxiety, thyroid cancer status post thyroidectomy and radical neck dissection, adjuvant radiation therapy/immunotherapy, esophageal squamous cell carcinoma status post chemoradiotherapy complicated by esophageal strictures, left vocal cord paralysis, dysphagia status post PEG tube placement in 2021, initially presented to the ER at [facility] on 3/11/25 with fevers and right shoulder pain. Workup was unrevealing and he was discharged home. He presented to the [facility] ER again on 3/17/25 with ongoing fevers. CT neck with no abscess, old postsurgical changes. He discharged home but blood cultures subsequently, 1 of 2 sets of blood cultures drawn at [facility] on 3/17/25 showed Streptococcus viridans returned positive on 3/19/25 and he returned for admission to [facility] on 3/19/25. TTE at [facility] was negative. Blood cultures on 3/19/25 and again on 3/23/25 showed no growth per his records. Imaging with C6-7 osteomyelitis/discitis with ventral epidural abscess. He was transferred to [facility] on 3/23/25 for surgical evaluation.  At that time, he was afebrile.  Blood cultures  were negative. At [facility] ortho and ENT were consulted. Ultimately it was determined that an anterior surgical approach was not safe (due to history of neck surgeries), and patient was having progressive weakness.  MRI of cervical spine on 3/27/25 revealed discitis and osteomyelitis at C6-C7 with ventral epidural abscess compressing the spinal cord and increased inferior extent of cord edema compared to prior. Patchy T1 hypointensity with enhancement in the upper cervical and thoracic spine, can represent metastatic lesions and/or post radiation changes, similar as compared to prior. He underwent posterior C4-T1 decompression and instrumented fusion on 3/27/25, performed by orthopedic surgeon Dr. Santiago Ca. Purulent fluid drained from anterior spine during the procedure and  operating room cultures revealed Strep intermedius. Postop he was briefly in the ICU for MAP goals. He had a PICC placed 4/1/25. 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 He was started on salt tabs. Infectious diseases recommended IV Daptomycin which patient completed on 3/31/25.  He was recommended to continue Ceftriaxone 2g IV q12h x 14 day course (end date 4/8/25) then transition to Ceftriaxone 2g IV daily to complete total 6 week course (end date 5/8/25). He was recommend [place] J collar to neck at all times.  I discussed orthopedic spinal precautions with him.  He completed Decadron treatment for mild laryngeal edema visualized on preoperative NPL. Patient has dysphagia from esophageal strictures, recent dilation 3/3/25 was not successful per patient, cannot tolerate regular oral secretions and recommended NPO and continued on bolus tube feeds.  He was kept on Pepcid for GI prophylaxis. Glycopyrrolate as needed per patient/spouse wishes.  For anxiety he was continued on Fluoxetine and Ativan PRN.  He is not on medications for hypertension.  He was continued on Zetia for hyperlipidemia.  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 He continues on a sliding-scale Humalog coverage.  He is on Levothyroxine and Liothyronine for thyroid supplementation after thyroidectomy for thyroid cancer. He is on subcutaneous Heparin and SCDs for DVT prophylaxis.  I discussed his recent clinical course with patient and with his wife at bedside.  On 4/1/25, hemoglobin was 12.9, WBC count was 10.30, platelets were 255, BUN 15, creatinine was 0.51, sodium was 133 and potassium was 4.5.  He has been needing assistance for mobility, transfers, self-care. He is transferred to [facility] on 4/1/25 for further rehabilitation care.      2. DVT prophylaxis - He is on subcutaneous Heparin and SCDs for DVT prophylaxis.  Check doppler.  Platelets 247 on 4/2/2025.   Doppler ultrasound on 4/3/2025 bilateral lower extremity was negative for DVT.  Platelets 216 on 4/7/2025.     3. 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  Follow falls precautions, cardiac precautions, aspiration precautions.  Follow spinal precautions.  [place] J collar to neck at all times.  Use [place] collar in shower.     4. GI - On Pepcid for GI prophylaxis.  On PRN Senokot.  On PRN Dulcolax suppository.  On PRN Zofran.     5.  -denies dysuria.  Monitor postvoid residuals.     6. CVS - monitor for orthostasis.     7. Pulmonary -encourage incentive spirometry.     8. Hematology - monitor hemoglobin, platelets.  Hemoglobin 12.3, WBC 9.54 on 4/2/2025.  Hemoglobin 12.4, WBC 5.89 on 4/7/2025.     9. Pain -on Tylenol.  On PRN Oxycodone.     10. Skin - Healing incision on posterior cervical spine.  Sutures in place.  Erythema/rash noted on coccyx unclear if stage I decubitus.  Rash noted on bilateral groins.  Reviewed pictures in epic.     11. F/E/N - nothing by mouth on bolus PEG feeds.  Nutrition consulted. 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 He was started on salt tabs.     12.  Dysphagia - H/O esophageal squamous cell carcinoma status post chemoradiotherapy complicated by esophageal strictures, left vocal cord paralysis, dysphagia status post PEG tube placement in 2021 - NPO on bolus PEG feeds.  Nutrition consulted.     13.  Psychiatry - on PRN Ativan.  Psychology consulted.     14.  Hypothyroidism -He is on Levothyroxine and Liothyronine for thyroid supplementation after thyroidectomy for thyroid cancer.     15. ENT -   H/O left vocal cord paralysis, treated with Decadron for edema around vocal cords recently.  On Glycopyrrolate as needed per patient/spouse wishes to manage secretions.      16.  Infectious diseases - He underwent posterior C4-T1 decompression and instrumented fusion on 3/27/25, performed by orthopedic surgeon Dr. Santiago Ca. Purulent fluid drained from anterior spine during the procedure and operating room cultures revealed Strep intermedius.. He had a PICC placed 4/1/25. Infectious diseases recommended IV Daptomycin which patient completed on 3/31/25.  He was recommended to continue Ceftriaxone 2g IV q12h x 14 day course (end date 4/8/25) then transition to Ceftriaxone 2g IV daily to complete total 6 week course (end date 5/8/25).     15.  Steroid-induced hyperglycemia -on sliding-scale Humalog coverage.      16. Rehabilitation medicine - Continue comprehensive rehabilitation care. Continue PT, OT, psychology.  Follow falls precautions, cardiac precautions, aspiration precautions.  Follow spinal precautions.  [place] J collar to neck at all times.  Use [place] collar in shower.Discussed patients progress in therapies with therapists in team meeting on 4/3/2025.  Discussed in PCC. See PCC documentation.  He reports no bowel movement in the past 3 to 4 days.  Also wanted antireflux medication in the morning and at home he takes Omeprazole via PEG per patient.  He is on Prevacid in the a.m. and Pepcid in p.m via PEG tube.  Due to constipation internist ordered enema.  Also scheduled Colace and Senokot were ordered through his PEG tube.  Discussed with patient on 4/3/2025.Discussed recommendations of PCC with patient on 4/4/2025.  Posterior cervical incision healing well.  He had 2 bowel movements today.  Family training scheduled for next week on Monday per case management note.  Discussed with patient on 4/4/2025.He was sitting on recliner on 4/5/2025, talking on phone with his wife.  Continent of bladder  per nursing on 4/5/2025.  Tolerating PEG feeds without issues.  He reports he had a good bowel movement yesterday.  Discussed with him if he gets  loose bowel movements he can always refuse bowel medications.  He reports he wants to stay on some bowel medications at this time.  Posterior cervical incision has dressing in place.  Denies increased pain on 4/5/2025.Saw patient earlier on 4/7/2025 morning and then again in the evening.  Discussed with patient and with his wife in the evening.  He requested IV saline infusion.  Ordered liter of saline overnight but patient and wife indicated that at home they usually have the infusion and 3 hours and do not want to eat or 12 hours.  Discussed with pharmacist.  Discussed with nurse.  Patient and wife insist that they usually get the infusion over 3 hours at home.  Also wanted increase bowel medications due to constipation.  Increase Senokot dose and ordered as needed treats enema.  Discussed with nurse on 4/7/2025.     17. Reviewed labs today. BUN 18, creatinine 0.6, sodium 133, potassium 4.9 on 4/2/2025.  BUN 16, creatinine 0.6, sodium 135, potassium 4.5 on 4/7/2025.           Spencer Kemp MD  4/7/2025      This encounter was completed utilizing the Direct Speech Voice Recognition Software. Grammatical errors, random word insertions, pronoun errors, and incomplete sentences are occasional consequences of the system due to software limitations, ambient noises, and hardware issues. Such errors may be missed prior to affixing electronic signature. Any questions or concerns about the content, text, or information contained within the body of this dictation should be directly addressed to the physician for clarification. If you have any questions or concerns please do not hesitate to call me directly via EPIC chat, page, or email.

## 2025-04-08 ENCOUNTER — APPOINTMENT (OUTPATIENT)
Dept: OTHER | Facility: REHABILITATION | Age: 70
End: 2025-04-08
Payer: MEDICARE

## 2025-04-08 ENCOUNTER — APPOINTMENT (INPATIENT)
Dept: OCCUPATIONAL THERAPY | Facility: REHABILITATION | Age: 70
DRG: 559 | End: 2025-04-08
Payer: MEDICARE

## 2025-04-08 ENCOUNTER — APPOINTMENT (OUTPATIENT)
Dept: PSYCHOLOGY | Facility: CLINIC | Age: 70
End: 2025-04-08
Payer: MEDICARE

## 2025-04-08 ENCOUNTER — APPOINTMENT (INPATIENT)
Dept: PHYSICAL THERAPY | Facility: REHABILITATION | Age: 70
DRG: 559 | End: 2025-04-08
Payer: MEDICARE

## 2025-04-08 LAB
GLUCOSE BLD-MCNC: 171 MG/DL (ref 70–99)
GLUCOSE BLD-MCNC: 179 MG/DL (ref 70–99)
GLUCOSE BLD-MCNC: 192 MG/DL (ref 70–99)
GLUCOSE BLD-MCNC: 202 MG/DL (ref 70–99)
POCT TEST: ABNORMAL

## 2025-04-08 PROCEDURE — 12800001 HC ROOM AND CARE SEMIPRIVATE REHAB-BRAIN INJ

## 2025-04-08 PROCEDURE — 63700000 HC SELF-ADMINISTRABLE DRUG: Performed by: INTERNAL MEDICINE

## 2025-04-08 PROCEDURE — 63600000 HC DRUGS/DETAIL CODE: Performed by: INTERNAL MEDICINE

## 2025-04-08 PROCEDURE — 97110 THERAPEUTIC EXERCISES: CPT | Mod: GO

## 2025-04-08 PROCEDURE — 97110 THERAPEUTIC EXERCISES: CPT | Mod: GP

## 2025-04-08 PROCEDURE — 97530 THERAPEUTIC ACTIVITIES: CPT | Mod: GO

## 2025-04-08 PROCEDURE — 97116 GAIT TRAINING THERAPY: CPT | Mod: GP

## 2025-04-08 PROCEDURE — 25800000 HC PHARMACY IV SOLUTIONS: Performed by: INTERNAL MEDICINE

## 2025-04-08 PROCEDURE — 63700000 HC SELF-ADMINISTRABLE DRUG: Performed by: PHYSICAL MEDICINE & REHABILITATION

## 2025-04-08 PROCEDURE — 97535 SELF CARE MNGMENT TRAINING: CPT | Mod: GO

## 2025-04-08 PROCEDURE — 97530 THERAPEUTIC ACTIVITIES: CPT | Mod: GP

## 2025-04-08 PROCEDURE — 90832 PSYTX W PT 30 MINUTES: CPT | Performed by: PSYCHOLOGIST

## 2025-04-08 RX ORDER — SODIUM CHLORIDE 9 MG/ML
INJECTION, SOLUTION INTRAVENOUS CONTINUOUS
Status: ACTIVE | OUTPATIENT
Start: 2025-04-10 | End: 2025-04-10

## 2025-04-08 RX ADMIN — SENNOSIDES 2 TABLET: 8.6 TABLET, FILM COATED ORAL at 07:59

## 2025-04-08 RX ADMIN — HYDROMORPHONE HYDROCHLORIDE 2 MG: 2 TABLET ORAL at 21:44

## 2025-04-08 RX ADMIN — MAGNESIUM HYDROXIDE 30 ML: 400 SUSPENSION ORAL at 17:40

## 2025-04-08 RX ADMIN — FLUOXETINE HYDROCHLORIDE 20 MG: 20 SOLUTION ORAL at 07:44

## 2025-04-08 RX ADMIN — Medication 10 ML: at 15:28

## 2025-04-08 RX ADMIN — Medication 10 ML: at 05:57

## 2025-04-08 RX ADMIN — EZETIMIBE 10 MG: 10 TABLET ORAL at 21:16

## 2025-04-08 RX ADMIN — NYSTATIN 1 APPLICATION: 100000 POWDER TOPICAL at 21:17

## 2025-04-08 RX ADMIN — SIMETHICONE 160 MG: 80 TABLET, CHEWABLE ORAL at 17:38

## 2025-04-08 RX ADMIN — Medication: at 10:52

## 2025-04-08 RX ADMIN — HYDROMORPHONE HYDROCHLORIDE 2 MG: 2 TABLET ORAL at 07:59

## 2025-04-08 RX ADMIN — Medication 10 ML: at 21:18

## 2025-04-08 RX ADMIN — INSULIN LISPRO 1 UNITS: 100 INJECTION, SOLUTION INTRAVENOUS; SUBCUTANEOUS at 17:53

## 2025-04-08 RX ADMIN — ACETAMINOPHEN 650 MG: 650 SOLUTION ORAL at 06:20

## 2025-04-08 RX ADMIN — SIMETHICONE 160 MG: 80 TABLET, CHEWABLE ORAL at 14:08

## 2025-04-08 RX ADMIN — SENNOSIDES 2 TABLET: 8.6 TABLET, FILM COATED ORAL at 14:08

## 2025-04-08 RX ADMIN — LIOTHYRONINE SODIUM 10 MCG: 5 TABLET ORAL at 05:56

## 2025-04-08 RX ADMIN — LINACLOTIDE 145 MCG: 145 CAPSULE, GELATIN COATED ORAL at 14:07

## 2025-04-08 RX ADMIN — DOCUSATE SODIUM 100 MG: 50 LIQUID ORAL at 07:42

## 2025-04-08 RX ADMIN — HEPARIN SODIUM 5000 UNITS: 5000 INJECTION, SOLUTION INTRAVENOUS; SUBCUTANEOUS at 21:17

## 2025-04-08 RX ADMIN — SODIUM CHLORIDE 1 G: 1 TABLET ORAL at 21:16

## 2025-04-08 RX ADMIN — HEPARIN SODIUM 5000 UNITS: 5000 INJECTION, SOLUTION INTRAVENOUS; SUBCUTANEOUS at 14:08

## 2025-04-08 RX ADMIN — SODIUM PHOSPHATE 1 ENEMA: 7; 19 ENEMA RECTAL at 15:27

## 2025-04-08 RX ADMIN — FAMOTIDINE 20 MG: 20 TABLET, FILM COATED ORAL at 21:16

## 2025-04-08 RX ADMIN — DOCUSATE SODIUM 100 MG: 50 LIQUID ORAL at 14:08

## 2025-04-08 RX ADMIN — NYSTATIN 1 APPLICATION: 100000 POWDER TOPICAL at 10:52

## 2025-04-08 RX ADMIN — ACETAMINOPHEN 650 MG: 650 SOLUTION ORAL at 12:21

## 2025-04-08 RX ADMIN — CEFTRIAXONE SODIUM 2 G: 2 INJECTION, POWDER, FOR SOLUTION INTRAMUSCULAR; INTRAVENOUS at 05:56

## 2025-04-08 RX ADMIN — SIMETHICONE 160 MG: 80 TABLET, CHEWABLE ORAL at 21:16

## 2025-04-08 RX ADMIN — SIMETHICONE 160 MG: 80 TABLET, CHEWABLE ORAL at 07:42

## 2025-04-08 RX ADMIN — CEFTRIAXONE SODIUM 2 G: 2 INJECTION, POWDER, FOR SOLUTION INTRAMUSCULAR; INTRAVENOUS at 17:55

## 2025-04-08 RX ADMIN — INSULIN LISPRO 2 UNITS: 100 INJECTION, SOLUTION INTRAVENOUS; SUBCUTANEOUS at 10:52

## 2025-04-08 RX ADMIN — HEPARIN SODIUM 5000 UNITS: 5000 INJECTION, SOLUTION INTRAVENOUS; SUBCUTANEOUS at 05:56

## 2025-04-08 RX ADMIN — LANSOPRAZOLE 15 MG: KIT at 07:42

## 2025-04-08 RX ADMIN — HYDROMORPHONE HYDROCHLORIDE 2 MG: 2 TABLET ORAL at 14:04

## 2025-04-08 RX ADMIN — SODIUM CHLORIDE 1 G: 1 TABLET ORAL at 07:42

## 2025-04-08 RX ADMIN — LEVOTHYROXINE SODIUM 150 MCG: 150 TABLET ORAL at 05:58

## 2025-04-08 ASSESSMENT — COGNITIVE AND FUNCTIONAL STATUS - GENERAL
AROUSAL LEVEL: ATTENTIVE
SPEECH: REGULAR
THOUGHT_PROCESS: WNL
THOUGHT_CONTENT: APPROPRIATE
IMPULSE CONTROL: INTACT
INSIGHT: INTACT
EST. PREMORBID INTELLIGENCE: ABOVE AVERAGE
AFFECT: FULL RANGE
MOOD: FRUSTRATED;MOTIVATED
APPEARANCE: WELL GROOMED
ORIENTATION: FULLY ORIENTED
SLEEP_WAKE_CYCLE: DECREASED
DELUSIONS: NONE OR AGE APPROPRIATE
PERCEPTUAL FUNCTION: PAIN
REMOTE MEMORY: WNL
RECENT MEMORY: WNL
EYE_CONTACT: WNL
APPETITE: NPO
PSYCHOMOTOR FUNCTIONING: WNL
ATTENTION: WNL
CONCENTRATION: WNL

## 2025-04-08 NOTE — PROGRESS NOTES
Subjective     Patient seen and examined on rounds.  Chart reviewed.  Events overnight noted.  History reviewed briefly with patient.    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    HPI: Mr. Celso Gramajo is a 69-year-old right-handed white male with chronic conditions significant for hypertension, hyperlipidemia, anxiety, thyroid cancer status post thyroidectomy and radical neck dissection, adjuvant radiation therapy/immunotherapy, esophageal squamous cell carcinoma status post chemoradiotherapy complicated by esophageal strictures, left vocal cord paralysis, dysphagia status post PEG tube placement in 2021, initially presented to the ER at [facility] on 3/11/25 with fevers and right shoulder pain. Workup was unrevealing and he was discharged home. He presented to the [facility] ER again on 3/17/25 with ongoing fevers. CT neck with no abscess, old postsurgical changes. He discharged home but blood cultures subsequently, 1 of 2 sets of blood cultures drawn at [facility] on 3/17/25 showed Streptococcus viridans returned positive on 3/19/25 and he returned for admission to [facility] on 3/19/25. TTE at [facility] was negative. Blood cultures on 3/19/25 and again on 3/23/25 showed no growth per his records. Imaging with C6-7 osteomyelitis/discitis with ventral epidural abscess. He was transferred to [facility] on 3/23/25 for surgical evaluation.  At that time, he was afebrile.  Blood cultures  were negative. At [facility] ortho and ENT were consulted. Ultimately it was determined that an anterior surgical approach was not safe (due  to history of neck surgeries), and patient was having progressive weakness.  MRI of cervical spine on 3/27/25 revealed discitis and osteomyelitis at C6-C7 with ventral epidural abscess compressing the spinal cord and increased inferior extent of cord edema compared to prior. Patchy T1 hypointensity with enhancement in the upper cervical and thoracic spine, can represent metastatic lesions and/or post radiation changes, similar as compared to prior. He underwent posterior C4-T1 decompression and instrumented fusion on 3/27/25, performed by orthopedic surgeon Dr. Santiago Ca. Purulent fluid drained from anterior spine during the procedure and operating room cultures revealed Strep intermedius. Postop he was briefly in the ICU for MAP goals. He had a PICC placed 4/1/25. 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 He was started on salt tabs. Infectious diseases recommended IV Daptomycin which patient completed on 3/31/25.  He was recommended to continue Ceftriaxone 2g IV q12h x 14 day course (end date 4/8/25) then transition to Ceftriaxone 2g IV daily to complete total 6 week course (end date 5/8/25). He was recommend [place] J collar to neck at all times.  I discussed orthopedic spinal precautions with him.  He completed Decadron treatment for mild laryngeal edema visualized on preoperative NPL. Patient has dysphagia from esophageal strictures, recent dilation 3/3/25 was not successful per patient, cannot tolerate regular oral secretions and recommended NPO and continued on bolus tube feeds.  He was kept on Pepcid for GI prophylaxis. Glycopyrrolate as needed per patient/spouse wishes.  For anxiety he was continued on Fluoxetine and Ativan PRN.  He is not on medications for hypertension.  He was continued on Zetia for hyperlipidemia.  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 He continues on a sliding-scale Humalog coverage.  He is on Levothyroxine  and Liothyronine for thyroid supplementation after thyroidectomy for thyroid cancer. He is on subcutaneous Heparin and SCDs for DVT prophylaxis.  I discussed his recent clinical course with patient and with his wife at bedside.  On 4/1/25, hemoglobin was 12.9, WBC count was 10.30, platelets were 255, BUN 15, creatinine was 0.51, sodium was 133 and potassium was 4.5.  He has been needing assistance for mobility, transfers, self-care. He is transferred to [facility] on 4/1/25 for further rehabilitation care.     SUBJ: Again discussed with patient in presence of nursing supervisor that IV fluid at high rate is not such a good idea and he can get the volume but at a lower rate.  He says he is used to getting IV fluids quickly at home.  Suggested that he also discuss with his cardiologist if this is a safe practice and he can be prone to getting fluid overload and may put him at risk for CHF/pulmonary edema in future if his cardiac function decompensates.  Discussed with internist Dr. Marrero who also agrees with this possibility.  I mentioned to patient to discuss with cardiologist regarding this and follow cardiology guidance regarding his desire to get IV fluids quickly at home at a high hourly volume in a short time.  Bowel medications were adjusted by internist.    ROS: Denies chest pain or shortness of breath. Other ROS negative. Past, family, social history is unchanged.      Current Functional Status:   Bed mobility:   [place], Supine to Sit: minimum assist (75% or more patient effort)   [place], Sit to Supine: moderate assist (50-74% patient effort)   Transfers:    [place], Sit to Stand Transfer: minimum assist (75% or more patient effort)  [place], Stand to Sit Transfer: minimum assist (75% or more patient effort)   [place], Stand Pivot/Stand Step Transfer: minimum assist (75% or more patient effort)   Gait:   [place], Gait: minimum assist (75% or more patient  "effort)  Assistive Device: walker, front-wheeled   Distance in Feet: 50 feet    Bathing:   [place]: moderate assist (50-74% patient effort)   Toileting:   [place]: minimum assist (75% or more patient effort)   Upper body dressing:   [place]: minimum assist (75% or more patient effort)   Lower body dressing:   [place]: moderate assist (50-74% patient effort)       Functional Progress:    Functional status reviewed. Overall, patient's functional status is improving.      Physical Exam      Blood pressure 104/60, pulse 80, temperature 36.7 °C (98.1 °F), temperature source Oral, resp. rate 18, height 1.854 m (6' 1\"), weight 92.1 kg (203 lb), SpO2 95%.    Body mass index is 26.78 kg/m².    General Appearance: Not in acute distress  Head/Ear/Nose/Throat: Normocephalic; Atraumatic.   Eye: EOMI; PERRL.   Neck: [place] J collar in place.  Respiratory: Decreased breath sounds at bases.   Cardiovascular: RRR; Normal S1, S2.   Gastrointestinal: Soft; NT; +BS.   Extremities: Bilateral lower extremity edema noted.    Musculoskeletal: Functional active range of motion in both upper and lower extremities.   Neurological: AAO ×3. Speech is fluent. Cranial nerve examination does not reveal any gross facial asymmetry. Strength testing bilateral deltoids are 4/5, bilateral biceps are 4+/5, bilateral triceps are 4/5, bilateral wrist extensors are 4+/5, bilateral hand FDP are 4+/5, bilateral and interossei are 4/5.  Bilateral hip flexion is less than 4/5.  Bilateral quadriceps are 5/5.  Bilateral ankle dorsi and plantar flexion are 5/5.  He is grossly able to localize touch and position sense in great toes, except reports numbness in all 5 digits of both hands and also numbness on the plantar aspects of both feet.  Deep tendon reflexes are symmetric and slightly brisk bilaterally.  Bilateral ankle clonus is present.  Plantars are withdrawal.  Coordination is functional upper extremities.    Behavior/Emotional: " Appropriate; Cooperative.   Skin: Healing incision on posterior cervical spine.  Sutures in place.  Erythema/rash noted on coccyx unclear if stage I decubitus.  Rash noted on bilateral groins.  Reviewed pictures in epic.         Current Facility-Administered Medications:     acetaminophen (TYLENOL) 650 mg/20.3 mL solution 650 mg, 650 mg, peg tube, q6h PRN, Rohit Acevedo MD, 650 mg at 04/08/25 0620    bisacodyL (DULCOLAX) 10 mg suppository 10 mg, 10 mg, rectal, Daily PRN, Rohit Acevedo MD, 10 mg at 04/03/25 1554    cefTRIAXone (ROCEPHIN) 2 g in sodium chloride 0.9 % 100 mL IVPB, 2 g, intravenous, q12h DU, Rohit Acevedo MD, Stopped at 04/08/25 0652    [START ON 4/9/2025] cefTRIAXone (ROCEPHIN) 2 g in sodium chloride 0.9 % 100 mL IVPB, 2 g, intravenous, q24h INT, Rohit Acevedo MD    docusate sodium (COLACE) 50 mg/5 mL liquid 100 mg, 100 mg, peg tube, BID, Spencer Kemp MD, 100 mg at 04/08/25 0742    ezetimibe (ZETIA) tablet 10 mg, 10 mg, peg tube, Nightly, Rohit Acevedo MD, 10 mg at 04/07/25 2111    famotidine (PEPCID) tablet 20 mg, 20 mg, peg tube, Nightly, Rohit Acevedo MD, 20 mg at 04/07/25 2111    FLUoxetine (PROzac) 20 mg/5 mL (4 mg/mL) solution 20 mg, 20 mg, peg tube, Daily, Rohit Acevedo MD, 20 mg at 04/08/25 0744    glycopyrrolate (ROBINUL) tablet 1 mg, 1 mg, peg tube, 2x daily PRN, Rohit Acevedo MD    heparin (porcine) 5,000 unit/mL injection 5,000 Units, 5,000 Units, subcutaneous, q8h DU, Rohit Acevedo MD, 5,000 Units at 04/08/25 0556    honey (MEDIHONEY) 100 % topical paste, , Topical, Daily, Spencer Kemp MD, Given at 04/07/25 1051    HYDROmorphone (DILAUDID) tablet 2 mg, 2 mg, peg tube, q6h PRN, Lilian Marrero MD, 2 mg at 04/08/25 0759    insulin lispro U-100 (HumaLOG) pen 1-12 Units, 1-12 Units, subcutaneous, q6h DU, Lilian Marrero MD, 2 Units at 04/07/25 1712    lansoprazole (PREVACID) 3 mg/mL oral suspension 15 mg, 15  mg, feeding tube, Daily before breakfast, Lilian Marrero MD, 15 mg at 04/08/25 0742    levothyroxine (SYNTHROID) tablet 150 mcg, 150 mcg, peg tube, Daily (6:30a), Lilian Marrero MD, 150 mcg at 04/08/25 0558    linaCLOtide (LINZESS) capsule 145 mcg, 145 mcg, g-tube, Daily before lunch, Lilian Marrero MD, 145 mcg at 04/07/25 1458    liothyronine (CYTOMEL) tablet 10 mcg, 10 mcg, peg tube, Daily (6:30a), Rohit Acevedo MD, 10 mcg at 04/08/25 0556    LORazepam (ATIVAN) tablet 0.5 mg, 0.5 mg, peg tube, q8h PRN, Rohit Acevedo MD, 0.5 mg at 04/05/25 2058    magnesium hydroxide (M.O.M.) 400 mg/5 mL suspension 30 mL, 30 mL, oral, 2x daily PRN, Lilian Marrero MD, 30 mL at 04/07/25 2133    nystatin (MYCOSTATIN) 100,000 unit/gram topical powder 1 Application, 1 Application, Topical, BID, Spencer Kemp MD, 1 Application at 04/07/25 2145    ondansetron ODT (ZOFRAN-ODT) disintegrating tablet 4 mg, 4 mg, peg tube, q6h PRN, Rohit Acevedo MD    senna (SENOKOT) tablet 1 tablet, 1 tablet, peg tube, 2x daily PRN, Rohit Acevedo MD, 1 tablet at 04/03/25 1547    senna (SENOKOT) tablet 2 tablet, 2 tablet, peg tube, BID, Spencer Kemp MD, 2 tablet at 04/08/25 0759    simethicone (MYLICON) chewable tablet 160 mg, 160 mg, peg tube, QID, Lilian Marrero MD, 160 mg at 04/08/25 0742    sodium chloride PF flush 10 mL, 10 mL, intravenous, q8h INT, Rohit Acevedo MD, 10 mL at 04/08/25 0557    sodium chloride PF flush 10 mL, 10 mL, intravenous, PRN, Rohit Acevedo MD    sodium chloride tablet 1 g, 1 g, peg tube, BID, Rohit Acevedo MD, 1 g at 04/08/25 0742    sodium phosphates (FLEET) enema adult 1 enema, 1 enema, rectal, Daily PRN, Spencer Kemp MD       Labs / Radiology    Lab Results   Component Value Date    WBC 5.89 04/07/2025    HGB 12.4 (L) 04/07/2025    HCT 37.8 (L) 04/07/2025    MCV 94.3 04/07/2025     04/07/2025     Lab Results   Component Value Date    GLUCOSE 176  (H) 04/07/2025    CALCIUM 9.2 04/07/2025     (L) 04/07/2025    K 4.5 04/07/2025    CO2 32 (H) 04/07/2025    CL 97 (L) 04/07/2025    BUN 16 04/07/2025    CREATININE 0.6 (L) 04/07/2025       Assessment and Plan    ASSESSMENT PLAN:  1. 69-year-old right-handed white male with chronic conditions significant for hypertension, hyperlipidemia, anxiety, thyroid cancer status post thyroidectomy and radical neck dissection, adjuvant radiation therapy/immunotherapy, esophageal squamous cell carcinoma status post chemoradiotherapy complicated by esophageal strictures, left vocal cord paralysis, dysphagia status post PEG tube placement in 2021, initially presented to the ER at [facility] on 3/11/25 with fevers and right shoulder pain. Workup was unrevealing and he was discharged home. He presented to the [facility] ER again on 3/17/25 with ongoing fevers. CT neck with no abscess, old postsurgical changes. He discharged home but blood cultures subsequently, 1 of 2 sets of blood cultures drawn at [facility] on 3/17/25 showed Streptococcus viridans returned positive on 3/19/25 and he returned for admission to [facility] on 3/19/25. TTE at [facility] was negative. Blood cultures on 3/19/25 and again on 3/23/25 showed no growth per his records. Imaging with C6-7 osteomyelitis/discitis with ventral epidural abscess. He was transferred to [facility] on 3/23/25 for surgical evaluation.  At that time, he was afebrile.  Blood cultures  were negative. At [facility] ortho and ENT were consulted. Ultimately it was determined that an anterior surgical approach was not safe (due to history of neck surgeries), and patient was having progressive weakness.  MRI of cervical spine on 3/27/25 revealed discitis and osteomyelitis at C6-C7 with ventral epidural abscess compressing the spinal cord and increased inferior extent of cord edema compared to prior. Patchy T1 hypointensity with enhancement in the upper cervical and thoracic  spine, can represent metastatic lesions and/or post radiation changes, similar as compared to prior. He underwent posterior C4-T1 decompression and instrumented fusion on 3/27/25, performed by orthopedic surgeon Dr. Santiago Ca. Purulent fluid drained from anterior spine during the procedure and operating room cultures revealed Strep intermedius. Postop he was briefly in the ICU for MAP goals. He had a PICC placed 4/1/25. 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 He was started on salt tabs. Infectious diseases recommended IV Daptomycin which patient completed on 3/31/25.  He was recommended to continue Ceftriaxone 2g IV q12h x 14 day course (end date 4/8/25) then transition to Ceftriaxone 2g IV daily to complete total 6 week course (end date 5/8/25). He was recommend [place] J collar to neck at all times.  I discussed orthopedic spinal precautions with him.  He completed Decadron treatment for mild laryngeal edema visualized on preoperative NPL. Patient has dysphagia from esophageal strictures, recent dilation 3/3/25 was not successful per patient, cannot tolerate regular oral secretions and recommended NPO and continued on bolus tube feeds.  He was kept on Pepcid for GI prophylaxis. Glycopyrrolate as needed per patient/spouse wishes.  For anxiety he was continued on Fluoxetine and Ativan PRN.  He is not on medications for hypertension.  He was continued on Zetia for hyperlipidemia.  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 He continues on a sliding-scale Humalog coverage.  He is on Levothyroxine and Liothyronine for thyroid supplementation after thyroidectomy for thyroid cancer. He is on subcutaneous Heparin and SCDs for DVT prophylaxis.  I discussed his recent clinical course with patient and with his wife at bedside.  On 4/1/25, hemoglobin was 12.9, WBC count was 10.30, platelets were 255, BUN 15, creatinine was 0.51, sodium was 133 and  potassium was 4.5.  He has been needing assistance for mobility, transfers, self-care. He is transferred to [place] [facility] on 4/1/25 for further rehabilitation care.      2. DVT prophylaxis - He is on subcutaneous Heparin and SCDs for DVT prophylaxis.  Check doppler.  Platelets 247 on 4/2/2025.  Doppler ultrasound on 4/3/2025 bilateral lower extremity was negative for DVT.  Platelets 216 on 4/7/2025.     3. 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  Follow falls precautions, cardiac precautions, aspiration precautions.  Follow spinal precautions.  [place] J collar to neck at all times.  Use [place] collar in shower.     4. GI - On Pepcid for GI prophylaxis.  On PRN Senokot.  On PRN Dulcolax suppository.  On PRN Zofran.     5.  -denies dysuria.  Monitor postvoid residuals.     6. CVS - monitor for orthostasis.     7. Pulmonary -encourage incentive spirometry.     8. Hematology - monitor hemoglobin, platelets.  Hemoglobin 12.3, WBC 9.54 on 4/2/2025.  Hemoglobin 12.4, WBC 5.89 on 4/7/2025.     9. Pain -on Tylenol.  On PRN Oxycodone.     10. Skin - Healing incision on posterior cervical spine.  Sutures in place.  Erythema/rash noted on coccyx unclear if stage I decubitus.  Rash noted on bilateral groins.  Reviewed pictures in epic.     11. F/E/N - nothing by mouth on bolus PEG feeds.  Nutrition consulted. 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 He was started on salt tabs.     12.  Dysphagia - H/O esophageal squamous cell carcinoma status post chemoradiotherapy complicated by esophageal strictures, left vocal cord  paralysis, dysphagia status post PEG tube placement in 2021 - NPO on bolus PEG feeds.  Nutrition consulted.     13.  Psychiatry - on PRN Ativan.  Psychology consulted.     14.  Hypothyroidism -He is on Levothyroxine and Liothyronine for thyroid supplementation after thyroidectomy for thyroid cancer.     15. ENT -  H/O left vocal cord paralysis, treated with Decadron for edema around vocal cords recently.  On Glycopyrrolate as needed per patient/spouse wishes to manage secretions.      16.  Infectious diseases - He underwent posterior C4-T1 decompression and instrumented fusion on 3/27/25, performed by orthopedic surgeon Dr. Santiago Ca. Purulent fluid drained from anterior spine during the procedure and operating room cultures revealed Strep intermedius.. He had a PICC placed 4/1/25. Infectious diseases recommended IV Daptomycin which patient completed on 3/31/25.  He was recommended to continue Ceftriaxone 2g IV q12h x 14 day course (end date 4/8/25) then transition to Ceftriaxone 2g IV daily to complete total 6 week course (end date 5/8/25).     15.  Steroid-induced hyperglycemia -on sliding-scale Humalog coverage.      16. Rehabilitation medicine - Continue comprehensive rehabilitation care. Continue PT, OT, psychology.  Follow falls precautions, cardiac precautions, aspiration precautions.  Follow spinal precautions.  [place] J collar to neck at all times.  Use [place] collar in shower.Discussed patients progress in therapies with therapists in team meeting on 4/3/2025.  Discussed in PCC. See PCC documentation.  He reports no bowel movement in the past 3 to 4 days.  Also wanted antireflux medication in the morning and at home he takes Omeprazole via PEG per patient.  He is on Prevacid in the a.m. and Pepcid in p.m via PEG tube.  Due to constipation internist ordered enema.  Also scheduled Colace and Senokot were ordered through his PEG tube.  Discussed with patient on 4/3/2025.Discussed recommendations  of PCC with patient on 4/4/2025.  Posterior cervical incision healing well.  He had 2 bowel movements today.  Family training scheduled for next week on Monday per case management note.  Discussed with patient on 4/4/2025.He was sitting on recliner on 4/5/2025, talking on phone with his wife.  Continent of bladder per nursing on 4/5/2025.  Tolerating PEG feeds without issues.  He reports he had a good bowel movement yesterday.  Discussed with him if he gets  loose bowel movements he can always refuse bowel medications.  He reports he wants to stay on some bowel medications at this time.  Posterior cervical incision has dressing in place.  Denies increased pain on 4/5/2025.Saw patient earlier on 4/7/2025 morning and then again in the evening.  Discussed with patient and with his wife in the evening.  He requested IV saline infusion.  Ordered liter of saline overnight but patient and wife indicated that at home they usually have the infusion and 3 hours and do not want to eat or 12 hours.  Discussed with pharmacist.  Discussed with nurse.  Patient and wife insist that they usually get the infusion over 3 hours at home.  Also wanted increase bowel medications due to constipation.  Increase Senokot dose and ordered as needed treats enema.  Discussed with nurse on 4/7/2025. Again discussed with patient on 4/8/2025 in presence of nursing supervisor that IV fluid at high rate is not such a good idea and he can get the volume but at a lower rate.  He says he is used to getting IV fluids quickly at home.  Suggested that he also discuss with his cardiologist if this is a safe practice and he can be prone to getting fluid overload and may put him at risk for CHF/pulmonary edema in future if his cardiac function decompensates.  Discussed with internist Dr. Marrero who also agrees with this possibility.  I mentioned to patient to discuss with his cardiologist regarding this and follow cardiology guidance regarding his desire to  get IV fluids quickly at home at a high hourly volume in a short time.  Bowel medications were adjusted by internist on 4/8/2025.     17. Reviewed labs today. BUN 18, creatinine 0.6, sodium 133, potassium 4.9 on 4/2/2025.  BUN 16, creatinine 0.6, sodium 135, potassium 4.5 on 4/7/2025.           Spencer Kemp MD  4/8/2025      This encounter was completed utilizing the Direct Speech Voice Recognition Software. Grammatical errors, random word insertions, pronoun errors, and incomplete sentences are occasional consequences of the system due to software limitations, ambient noises, and hardware issues. Such errors may be missed prior to affixing electronic signature. Any questions or concerns about the content, text, or information contained within the body of this dictation should be directly addressed to the physician for clarification. If you have any questions or concerns please do not hesitate to call me directly via EPIC chat, page, or email.

## 2025-04-08 NOTE — PROGRESS NOTES
Patient: Celso Gramajo  Location: [facility] [address]  MRN: [id number]  Today's date: 4/7/2025    History of Present Illness  Celso is a 69 y.o. male admitted on 4/1/2025 with Spinal epidural abscess [G06.1]. Principal problem is Spinal epidural abscess.    Celso Gramajo is a 69 year old male with PMHx thyroid CA s/p thyroidectomy, RND, adjuvant XRT/immunotherapy, esophageal SCC s/p chemoradiation c/b esophageal strictures, HTN, Elevated Coronary Calcium scoring in 2017, ENMANUEL, Hypothyroidism, Anemia, dysphagia on PEG (placed in 2021) TFs, and history of CVA who presented to an OSH with neck and R shoulder pain. Workup was unrevealing and he was discharged home.    He presented to the [facility] ED again on 3/17 with ongoing fevers. CT Neck with no abscess, old postsurgical changes. He d/c home but BCx subsequently returned positive on 3/19 and he returned for admission. He was also bacteremic with strep viridans, and placed on IV antibiotics. Workup revealed a C5-C7 ventral epidural abscess with osteomyelitis, and he was transferred to [facility] for surgical evaluation.    MRI on 3/24 c/f metastatic disease at T1-T4, L4.      At [facility] ortho and ENT were consulted. Ultimately it was determined that an anterior surgical approach was not safe (due to history of neck surgeries), and patient was having progressive weakness. Repeat MRI C-spine showed Discitis and osteomyelitis at C6-C7 with ventral epidural abscess compressing the spinal cord and increased edema. Patient urgently underwent POSTERIOR C4-T1 DECOMPRESSION FUSION WITH [place] INSTRUMENTATION on 3/27. Postop he was briefly in the ICU for MAP goals. OR cultures grew strep intermedius and he was placed on the antibiotic plan below. He had a PICC placed 4/1. 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 He was started on salt tabs. In rehab please continue to check labs, and titrate the salt tabs as appropriate.  He worked with PT/OT who recommended him for acute rehab.     Past Medical History  Celso has a past medical history of Anemia, Cancer (CMS/Formerly [facility]), head and neck radiation, Hypertension, Hypothyroidism, Stroke (CMS/Formerly [facility]), and Thyroid disease.    PT Vitals      Date/Time Pulse HR Source BP BP Location BP Method Pt Position [facility]   04/07/25 0940 84 Monitor 125/70 Left upper arm Automatic Lying LPM                              PT Pain      Date/Time Pain Type Location Rating: Rest Rating: Activity Description Interventions [facility]   04/07/25 0940 Pain Assessment neck 3 7 -- pain management plan reviewed with patient/caregiver;premedicated for activity;care clustered LPM                       04/07/25 1028 Post Activity neck 3 3 -- premedicated for activity;pain management plan reviewed with patient/caregiver LPM                         Prior Living Environment      Flowsheet Row Most Recent Value   People in Home spouse   Current Living Arrangements home   Home Accessibility stairs to enter home (Group), stairs within home (Group)   Living Environment Comment Multi-story home with 12-14 steps to second floor, powder room on 1st floor, 2-3 FELICITA via garage, multiple supportive family members   Number of Stairs, Main Entrance 3   Stair Railings, Main Entrance none   Location, Patient Bedroom second floor, must negotiate stairs to access   Location, Bathroom second floor, must negotiate stairs to access   Bathroom Access Comment powder room 1st floor standard height toilet, full bathroom 2nd floor WIS with glass door, standard height toilet   Stairs, Within Home, Primary 12   Stair Railings, Within Home, Primary railings on both sides of stairs            Prior Level of Function      Flowsheet Row Most Recent Value   Dominant Hand right   Ambulation independent   Transferring independent   Toileting independent   Bathing independent   Dressing independent   Eating independent   IADLs independent   Driving/Transportation independent     Communication understands/communicates without difficulty   Past History of Dysphagia PTA pt was completely independent, no AD. (+) . NPO PTA, manages peg tube independently.   Prior Level of Function Comment Independent without use of AE or DME.   Assistive Device Currently Used at Home none             IRF PT Evaluation and Treatment - 04/07/25 0935          PT Time Calculation    Start Time 0930     Stop Time 1030     Time Calculation (min) 60 min        Session Details    Document Type Daily Treatment/Progress Note        General Information    Patient Profile Reviewed yes     Patient/Family/Caregiver Comments/Observations pts wife Nathanael present     General Observations of Patient received in room in bed, assisted with dressing start of session        Mobility Belt    Mobility Belt Used During Session yes        Orthosis Neck Cervical Collar    Orthosis Properties Date Obtained: 04/03/25 Time Obtained: 0825 Location: Neck Type: Cervical Collar Features: Hard Description: Hard collar AAT; can be removed seated to don [place] collar for bathing.    Compliance/Wearing Issues patient;compliant with wearing schedule        Bed Mobility    [place], Supine to Sit minimum assist (75% or more patient effort)     Safety/Cues increased time to complete;minimal;verbal cues;hand placement;technique;sequencing     Assistive Device head of bed elevated     Comment performed supine to sitting EOB for clothing management and dressing, Min A for trunk and cues for positioning, lateral trunk ext and safety        Bed to Chair Transfer    [place], Bed to Chair minimum assist (75% or more patient effort)     Safety/Cues technique;sequencing     Assistive Device wheelchair;walker, front-wheeled     Comment Bed>WC Min A on pelvis for balance        Sit to Stand Transfer    [place], Sit to Stand Transfer minimum assist (75% or more patient effort)     Safety/Cues increased time to  "complete;technique     Assistive Device walker, front-wheeled;wheelchair     Comment performed from EOB, WC, couch multiple trials; assist for balance on pelvis and hip ext steadying        Stand to Sit Transfer    [place], Stand to Sit Transfer minimum assist (75% or more patient effort)     Safety/Cues technique     Assistive Device walker, front-wheeled;wheelchair     Comment Min A on pelvis for balance, use of UE to control descent, cues for UE positioning and alignment to surface        Stand Pivot Transfer    [place], Stand Pivot/Stand Step Transfer minimum assist (75% or more patient effort)     Safety/Cues technique;sequencing     Assistive Device walker, front-wheeled     Comment cues for turning radius and trunk ext for balance, forward eye gaze        Toilet Transfer    Comment SPT with RW, anterior x2 standing for bladder management, assist for safety and balance        Car Transfer    Comment technique reviewed for posterior trunk entry, and sit and swing technique; to be performed additional sessions        Gait Training    [place], Gait minimum assist (75% or more patient effort)     Safety/Cues increased time to complete;technique;sequencing     Assistive Device walker, front-wheeled     Distance in Feet 50 feet     Pattern step-through     Deviations/Abnormal Patterns ataxic;base of support, narrow;gait speed decreased;step length decreased;weight shifting decreased     Bilateral Gait Deviations heel strike decreased     Comment 50ft with two turns x2, 10ft x3, 20ft x2 with Min A for balance on pelvis, cues for LE stance stability ext and obstacle negoitation        Curb Negotiation    [place] minimum assist (75% or more patient effort)     Assistive Device walker, front-wheeled     Curb Height 8 inches     Comment 8\" curb x3 trials with Min A on pelvis; LLE ascending, RLE descending, x2 trial by pts wife        Rough/Uneven Surface Gait Skills    [place] minimum assist (75% " or more patient effort)     Assistive Device walker, front-wheeled     Distance in Feet 10 feet     Comment uneven turf terrain, Min A for balance on pelvis        Wheelchair Mobility/Management    Comment, Wheelchair Mobility discussed multiple WC options for use during length of stay including, MWC, recliner MWC, tilt in space WC for c/s support and pain management; pt declined to trial recliner vs tilt in spsace this session        Impairments/Safety Issues    Impairments Affecting Function balance;coordination;endurance/activity tolerance;grasp;motor control;motor planning;pain;postural/trunk control;range of motion (ROM);strength;sensation/sensory awareness        Motor Skills    Motor Skills postural control     Functional Endurance fair, multiple seated supported rest breaks throughout session        Postural Control Assessment    Comment, Therapeutic Techniques discussed recliner use, c/s positioning        Daily Progress Summary (PT)    Daily Outcome Statement Pts wife present for education and training. Verbal education provided for LOS, short and long term goals, DME, home setup and barriers, current functional status and barriers. Additional hands on training to be provided 4/13/25.                          Mobility Impairment Caregiver Training       Title: Mobility Impairment Caregiver Training/Education (IRF) (In Progress)       Points For This Title       Point: Resource Manual (Done)       Description:   Resource manual provided including diagnostic-specific resources for national caregiver organizations and local referrals, assistance in the home, transportation, respite care, sports and recreation, med-alert systems, smart home/technology, meals on wheels, and research and clinical trial information.                  Learning Progress Summary            Significant Other Acceptance, Handout, Explanation, Verbalizes Understanding by LM at 4/7/2025 1200    Comment: post discharge resources                       Point: Home Safety/Home Assessment (Done)       Description:   Review information regarding home accessibility, modifications, and equipment recommendations:       - Issue and review home questionnaire      - Discuss any home modifications recommended for discharge or future      - Verbalize pertinent adjustments to home for safe discharge whether permanent or temporary (e.g. first floor set up, rental ramp, etc.)                 Learning Progress Summary            Significant Other Acceptance, Explanation, Demonstration, Verbalizes Understanding by LM at 4/7/2025 1200    Comment: discussed home setup, viewed personal pictures of home, exterior and interior for DC planning and safe and accessible entry/exit; stairs barrier with fatigue; home setup with free of obstacles                      Point: Bed Mobility (Done)       Description:   Review pertinent information related to bed mobility, including:       - Precautions needing to be maintained      - Adaptive equipment recommendations (e.g. bedrail, HOB elevation, use of leg )      - Assistance level recommended at discharge      - Proper positioning of caregiver if assistance is needed      - Additions to home set up for improved safety (e.g. bedrails, wedge, etc.)                 Learning Progress Summary            Significant Other Acceptance, Explanation, Demonstration, Verbalizes Understanding, Needs Reinforcement by LM at 4/7/2025 1200    Comment: observed education, did not perform Min A assistance; discussed home setup for bed mobility inclduign 1st vs 2nd floor, setup of bed, vs hospital bed vs recliner                      Point: Transfer Training (Done)       Description:   Review pertinent information related to transfers, including:       - Precautions needing to be maintained      - Assistive devices and/or bracing recommended      - Assistance level recommended at discharge      - Proper positioning of caregiver if assistance is  "needed      - Safety concerns for the home environment with various surfaces                 Learning Progress Summary            Significant Other Acceptance, Explanation, Demonstration, Verbalizes Understanding, Demonstrated Understanding by LM at 4/7/2025 1200    Comment: with Min A for balance on pelvis and mobility belt performed hands on                      Point: Ambulation Training (Done)       Description:   Review pertinent information related to ambulation, including:       - Precautions needing to be maintained      - Assistive devices and/or bracing recommended      - Assistance level recommended at discharge      - Proper positioning of caregiver if assistance is needed      - Limitations to ambulation (i.e. distances)      - Changes in assistance based on environmental changes (i.e. outdoor vs. indoor ambulation)                 Learning Progress Summary            Significant Other Acceptance, Explanation, Demonstration, Verbalizes Understanding, Demonstrated Understanding by LM at 4/7/2025 1200    Comment: with Min A for balance on pelvis and mobility belt performed hands on                      Point: Elevation Training (Done)       Description:   Review pertinent information related to elevations, including:       - Precautions needing to be maintained      - Assistive devices and/or bracing recommended      - Assistance level recommended at discharge      - Proper positioning of caregiver if assistance is needed      - Barriers, if any, to safely completing elevations at discharge      - Home modifications for safe access (e.g. second rail, ramp, etc.)                 Learning Progress Summary            Significant Other Acceptance, Explanation, Demonstration, Verbalizes Understanding, Demonstrated Understanding by LM at 4/7/2025 1200    Comment: 8\" curb step, 1 single step, with Min A for balance on pelvis and mobility belt performed hands on                                            IRF PT " Goals      Flowsheet Row Most Recent Value   Bed Mobility Goal 1    Activity/Assistive Device sit to supine/supine to sit at 04/02/2025 0904   [place] supervision required at 04/02/2025 0904   Time Frame short-term goal (STG), 5 - 7 days at 04/03/2025 0856   Strategies/Barriers evaluated 4/2, continue with PT POC at 04/03/2025 0856   Progress/Outcome goal ongoing, good progress toward goal at 04/03/2025 0856   Bed Mobility Goal 2    Activity/Assistive Device bed mobility activities, all at 04/02/2025 0904   [place] modified independence at 04/02/2025 0904   Time Frame long-term goal (LTG), 3 weeks at 04/02/2025 0904   Progress/Outcome goal ongoing at 04/03/2025 0856   Transfer Goal 1    Activity/Assistive Device sit-to-stand/stand-to-sit, stand pivot at 04/02/2025 0904   [place] minimum assist (75% or more patient effort) at 04/02/2025 0904   Time Frame short-term goal (STG), 5 - 7 days at 04/03/2025 0856   Strategies/Barriers evaluated 4/2, conitnue with PT POC at 04/03/2025 0856   Progress/Outcome goal ongoing, good progress toward goal at 04/03/2025 0856   Transfer Goal 2    Activity/Assistive Device sit-to-stand/stand-to-sit, stand pivot at 04/02/2025 0904   [place] modified independence at 04/02/2025 0904   Time Frame long-term goal (LTG), 3 weeks at 04/02/2025 0904   Progress/Outcome goal ongoing at 04/03/2025 0856   Gait/Walking Locomotion Goal 1    Activity/Assistive Device gait (walking locomotion), assistive device use at 04/02/2025 0904   Distance 150 feet at 04/02/2025 0904   [place] minimum assist (75% or more patient effort) at 04/02/2025 0904   Time Frame short-term goal (STG), 5 days at 04/03/2025 0856   Strategies/Barriers evaluated 4/2, contiune with PT POC at 04/03/2025 0856   Progress/Outcome good progress toward goal, goal ongoing at 04/03/2025 0856   Gait/Walking Locomotion Goal 2    Activity/Assistive Device gait (walking locomotion), assistive device use at 04/02/2025  0904   Distance 150 feet at 04/02/2025 0904   [place] modified independence at 04/02/2025 0904   Time Frame long-term goal (LTG), 3 weeks at 04/02/2025 0904   Progress/Outcome goal ongoing at 04/03/2025 0856   Stairs Goal 1    Activity/Assistive Device ascending stairs, descending stairs, using handrail, left, using handrail, right at 04/02/2025 0904   Number of Stairs 12 at 04/02/2025 0904   [place] minimum assist (75% or more patient effort) at 04/02/2025 0904   Time Frame short-term goal (STG), 5 - 7 days at 04/03/2025 0856   Strategies/Barriers evaluated 4/2, continue PT POC at 04/03/2025 0856   Progress/Outcome good progress toward goal, goal ongoing at 04/03/2025 0856   Stairs Goal 2    Activity/Assistive Device ascending stairs, descending stairs, using handrail, left, using handrail, right at 04/02/2025 0904   Number of Stairs 12 at 04/02/2025 0904   [place] supervision required at 04/02/2025 0904   Time Frame long-term goal (LTG), 3 weeks at 04/02/2025 0904   Progress/Outcome goal ongoing at 04/03/2025 0856

## 2025-04-08 NOTE — PLAN OF CARE
Plan of Care Review  Plan of Care Reviewed With: patient  Progress: improving  Outcome Evaluation: AAOx3, anxious. Makes needs known, easily frustrated. Remains NPO with timed intermittent feedings via G tube as ordered. [place] x1 given. Accuchecks monitored prior to feedings. Bowel regimen adjusted to patient's request. Moderate BM, soft this afternoon. Patient still felt distended, after feeding, and enema given per request. Also requested MOM, reviewed to let bowels rest at this time. Had moderate BM today and yesterday. Patient agreeable. Shower given per request. Dressing under collar changed as ordered, Nystatin applied to groin. Neck incision with sutures NANI - no drainage noted. Yina ALANIS. Dressing changed to PICC as ordered. Continues IV Rocephin. Flushes without dificulty.  All meds reviewed. Incont of urine x1 prior to shower. Uses urinal, encourage standing for emptying. Diluadid/ Tylenol for pain management in back and neck.

## 2025-04-08 NOTE — PROGRESS NOTES
Patient: Celso Gramajo  Location: [facility] [address]  MRN: [id number]  Today's date: 4/8/2025    History of Present Illness  Celso is a 69 y.o. male admitted on 4/1/2025 with Spinal epidural abscess [G06.1]. Principal problem is Spinal epidural abscess.    Celso Gramajo is a 69 year old male with PMHx thyroid CA s/p thyroidectomy, RND, adjuvant XRT/immunotherapy, esophageal SCC s/p chemoradiation c/b esophageal strictures, HTN, Elevated Coronary Calcium scoring in 2017, ENMANUEL, Hypothyroidism, Anemia, dysphagia on PEG (placed in 2021) TFs, and history of CVA who presented to an OSH with neck and R shoulder pain. Workup was unrevealing and he was discharged home.    He presented to the [place] ED again on 3/17 with ongoing fevers. CT Neck with no abscess, old postsurgical changes. He d/c home but BCx subsequently returned positive on 3/19 and he returned for admission. He was also bacteremic with strep viridans, and placed on IV antibiotics. Workup revealed a C5-C7 ventral epidural abscess with osteomyelitis, and he was transferred to [facility] for surgical evaluation.    MRI on 3/24 c/f metastatic disease at T1-T4, L4.      At [facility] ortho and ENT were consulted. Ultimately it was determined that an anterior surgical approach was not safe (due to history of neck surgeries), and patient was having progressive weakness. Repeat MRI C-spine showed Discitis and osteomyelitis at C6-C7 with ventral epidural abscess compressing the spinal cord and increased edema. Patient urgently underwent POSTERIOR C4-T1 DECOMPRESSION FUSION WITH [place] INSTRUMENTATION on 3/27. Postop he was briefly in the ICU for MAP goals. OR cultures grew strep intermedius and he was placed on the antibiotic plan below. He had a PICC placed 4/1. 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 He was started on salt tabs. In rehab please continue to check labs, and titrate the salt tabs as appropriate.  He worked with PT/OT who recommended him for acute rehab.     Past Medical History  Celso has a past medical history of Anemia, Cancer (CMS/HCC), head and neck radiation, Hypertension, Hypothyroidism, Stroke (CMS/HCC), and Thyroid disease.    OT Vitals      Date/Time Pulse HR Source BP BP Location BP Method Pt Position [facility]   04/08/25 1135 95 Monitor 154/74 Left upper arm Automatic Sitting [facility]          OT Pain      Date/Time Pain Type Side/Orientation Rating: Rest Radiation to Interventions [facility]   04/08/25 1135 Pain Assessment neck 3 shoulder, left diversional activity provided    04/08/25 1201 Pain Reassessment back 8 -- pain management plan reviewed with patient/caregiver LC               Prior Living Environment      Flowsheet Row Most Recent Value   People in Home spouse   Current Living Arrangements home   Home Accessibility stairs to enter home (Group), stairs within home (Group)   Living Environment Comment Multi-story home with 12-14 steps to second floor, powder room on 1st floor, 2-3 FELICITA via garage, multiple supportive family members   Number of Stairs, Main Entrance 3   Stair Railings, Main Entrance none   Location, Patient Bedroom second floor, must negotiate stairs to access   Location, Bathroom second floor, must negotiate stairs to access   Bathroom Access Comment powder room 1st floor standard height toilet, full bathroom 2nd floor WIS with glass door, standard height toilet   Stairs, Within Home, Primary 12   Stair Railings, Within Home, Primary railings on both sides of stairs            Prior Level of Function      Flowsheet Row Most Recent Value   Dominant Hand right   Ambulation independent   Transferring independent   Toileting independent   Bathing independent   Dressing independent   Eating independent   IADLs independent   Driving/Transportation    Communication understands/communicates without difficulty   Past History of Dysphagia PTA pt was completely independent, no AD.  (+) . NPO PTA, manages peg tube independently.   Prior Level of Function Comment Independent without use of AE or DME.   Assistive Device Currently Used at Home none            Occupational Profile      Flowsheet Row Most Recent Value   Occupational History/Life Experiences retired involved in [employer] on [employer]. + , -handicap driving placard.   Patient Goals PSFS: going to the bathroom 0/10, walking 1/10, getting in/out of bed 0/10 = 1/30 = 3.33%             IRF OT Evaluation and Treatment - 04/08/25 1135          OT Time Calculation    Start Time 1132     Stop Time 1202     Time Calculation (min) 30 min        Session Details    Document Type Daily Treatment/Progress Note        General Information    General Observations of Patient pt rec'd sitting upright in w/c pleasant        Mobility Belt    Mobility Belt Used During Session yes        Orthosis Neck Cervical Collar    Orthosis Properties Date Obtained: 04/03/25 Time Obtained: 0825 Location: Neck Type: Cervical Collar Features: Hard Description: Hard collar AAT; can be removed seated to don [place] collar for bathing.       Sit to Stand Transfer    [place], Sit to Stand Transfer touching/steadying assist     Safety/Cues increased time to complete     Assistive Device walker, front-wheeled     Comment assist for stability upon transition        Stand to Sit Transfer    [place], Stand to Sit Transfer touching/steadying assist     Safety/Cues increased time to complete     Assistive Device walker, front-wheeled     Comment assist for controlled descend        Stand Pivot Transfer    [place], Stand Pivot/Stand Step Transfer minimum assist (75% or more patient effort)     Safety/Cues sequencing;increased time to complete     Assistive Device walker, front-wheeled     Comment via amb approach c vcs for forward visual gaze when pivoting        Toilet Transfer    Transfer Technique sit/stand     [place] touching/steadying assist      Safety/Cues hand placement     Assistive Device grab bars/safety frame     Comment sit <> stand from w/c to grab bar surrounding toilet frame c assist for pelvic stability        Impairments/Safety Issues    Comment, Safety Issues/Impairments OT: x3 short HHDs c use of RW and assist for pelvic stability/control        Grooming    Tasks washes, rinses and dries hands     [place] modified independence     Comment seated in w/c        Toileting    Tasks adjust/manage clothing;perform bladder hygiene     [place] minimum assist (75% or more patient effort)     Adaptive Equipment grab bar/safety frame     Comment assist for standing balance + void of bladder        Daily Progress Summary (OT)    Daily Outcome Statement session limited by pain and no pain medication able to be taken. discussed with pt + RN plan to incorporate use of ful length mirror to allow for pt to mamage personal tube feed PRN whne needed. plan to use this afternoon                          IRF OT Goals      Flowsheet Row Most Recent Value   Transfer Goal 1    Activity/Assistive Device toilet at 04/02/2025 0723   [place] minimum assist (75% or more patient effort) at 04/02/2025 0723   Time Frame short-term goal (STG), 5 - 7 days at 04/03/2025 0754   Strategies/Barriers pt just evlauated at 04/03/2025 0754   Progress/Outcome progress slower than expected, goal ongoing, goal revised this date at 04/03/2025 0754   Transfer Goal 2    Activity/Assistive Device toilet at 04/02/2025 0723   [place] modified independence at 04/02/2025 0723   Time Frame long-term goal (LTG), 21 days or less at 04/02/2025 0723   Progress/Outcome goal ongoing at 04/03/2025 0754   Transfer Goal 3    Activity/Assistive Device shower at 04/02/2025 0723   [place] other (see comments)  [TBA] at 04/02/2025 0723   Time Frame short-term goal (STG), 5 - 7 days at 04/03/2025 0754   Strategies/Barriers pt just evlauated at 04/03/2025 0754   Progress/Outcome  progress slower than expected, goal ongoing, goal revised this date at 04/03/2025 0754   Transfer Goal 4    Activity/Assistive Device shower at 04/02/2025 0723   [place] other (see comments)  [TBA] at 04/02/2025 0723   Time Frame long-term goal (LTG), 21 days or less at 04/02/2025 0723   Progress/Outcome goal ongoing at 04/03/2025 0754   Bathing Goal 1    [place] moderate assist (50-74% patient effort) at 04/02/2025 0723   Time Frame short-term goal (STG), 5 - 7 days at 04/03/2025 0754   Strategies/Barriers pt just evlauated at 04/03/2025 0754   Progress/Outcome progress slower than expected, goal ongoing, goal revised this date at 04/03/2025 0754   Bathing Goal 2    [place] supervision required at 04/02/2025 0723   Time Frame long-term goal (LTG), 21 days or less at 04/02/2025 0723   Progress/Outcome goal ongoing at 04/03/2025 0754   UB Dressing Goal 1    [place] other (see comments)  [TBA] at 04/02/2025 0723   Time Frame short-term goal (STG), 5 - 7 days at 04/03/2025 0754   Strategies/Barriers pt just evlauated at 04/03/2025 0754   Progress/Outcome progress slower than expected, goal ongoing, goal revised this date at 04/03/2025 0754   UB Dressing Goal 2    [place] other (see comments)  [TBA] at 04/02/2025 0723   Time Frame long-term goal (LTG), 21 days or less at 04/02/2025 0723   Progress/Outcome goal ongoing at 04/03/2025 0754   LB Dressing Goal 1    [place] moderate assist (50-74% patient effort) at 04/02/2025 0723   Time Frame short-term goal (STG), 5 - 7 days at 04/03/2025 0754   Strategies/Barriers pt just evlauated at 04/03/2025 0754   Progress/Outcome progress slower than expected, goal ongoing, goal revised this date at 04/03/2025 0754   LB Dressing Goal 2    [place] modified independence at 04/02/2025 0723   Time Frame long-term goal (LTG), 21 days or less at 04/02/2025 0723   Progress/Outcome goal ongoing at 04/03/2025 0754   Grooming Goal 1    [place] set-up  required at 04/02/2025 0723   Time Frame short-term goal (STG), 5 - 7 days at 04/03/2025 0754   Strategies/Barriers pt just evlauated at 04/03/2025 0754   Progress/Outcome progress slower than expected, goal ongoing, goal revised this date at 04/03/2025 0754   Grooming Goal 2    [place] modified independence at 04/02/2025 0723   Time Frame long-term goal (LTG), 21 days or less at 04/02/2025 0723   Progress/Outcome goal ongoing at 04/03/2025 0754   Toileting Goal 1    [place] moderate assist (50-74% patient effort) at 04/02/2025 0723   Time Frame short-term goal (STG), 5 - 7 days at 04/03/2025 0754   Strategies/Barriers pt just evlauated at 04/03/2025 0754   Progress/Outcome progress slower than expected, goal ongoing, goal revised this date at 04/03/2025 0754   Toileting Goal 2    [place] modified independence at 04/02/2025 0723   Time Frame long-term goal (LTG), 21 days or less at 04/02/2025 0723   Progress/Outcome goal ongoing at 04/03/2025 0754

## 2025-04-08 NOTE — PROGRESS NOTES
Patient: Celso Gramajo  Location: [facility] [address]  MRN: [id number]  Today's date: 4/8/2025    History of Present Illness  Celso is a 69 y.o. male admitted on 4/1/2025 with Spinal epidural abscess [G06.1]. Principal problem is Spinal epidural abscess.    Celso Gramajo is a 69 year old male with PMHx thyroid CA s/p thyroidectomy, RND, adjuvant XRT/immunotherapy, esophageal SCC s/p chemoradiation c/b esophageal strictures, HTN, Elevated Coronary Calcium scoring in 2017, ENMANUEL, Hypothyroidism, Anemia, dysphagia on PEG (placed in 2021) TFs, and history of CVA who presented to an OSH with neck and R shoulder pain. Workup was unrevealing and he was discharged home.    He presented to the [place] ED again on 3/17 with ongoing fevers. CT Neck with no abscess, old postsurgical changes. He d/c home but BCx subsequently returned positive on 3/19 and he returned for admission. He was also bacteremic with strep viridans, and placed on IV antibiotics. Workup revealed a C5-C7 ventral epidural abscess with osteomyelitis, and he was transferred to [facility] for surgical evaluation.    MRI on 3/24 c/f metastatic disease at T1-T4, L4.      At [facility] ortho and ENT were consulted. Ultimately it was determined that an anterior surgical approach was not safe (due to history of neck surgeries), and patient was having progressive weakness. Repeat MRI C-spine showed Discitis and osteomyelitis at C6-C7 with ventral epidural abscess compressing the spinal cord and increased edema. Patient urgently underwent POSTERIOR C4-T1 DECOMPRESSION FUSION WITH [place] INSTRUMENTATION on 3/27. Postop he was briefly in the ICU for MAP goals. OR cultures grew strep intermedius and he was placed on the antibiotic plan below. He had a PICC placed 4/1. 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 He was started on salt tabs. In rehab please continue to check labs, and titrate the salt tabs as appropriate.  "He worked with PT/OT who recommended him for acute rehab.     Past Medical History  Celso has a past medical history of Anemia, Cancer (CMS/HCC), head and neck radiation, Hypertension, Hypothyroidism, Stroke (CMS/HCC), and Thyroid disease.    OT Vitals      Date/Time Pulse BP BP Location BP Method Pt Position Observations [facility]   04/08/25 1315 87 122/65 Left upper arm Automatic Lying -- [facility]   04/08/25 1346 88 155/78 Left upper arm Automatic Sitting p amb [facility]          OT Pain      Date/Time Pain Type Side/Orientation Rating: Rest Rating: Rest Interventions [facility]   04/08/25 1315 Pain Assessment back 10 \"15\" -- premedicated for activity;diversional activity provided [facility]   04/08/25 1346 Post Activity back -- 6 - moderate-severe pain diversional activity provided;premedicated for activity [facility]   04/08/25 1351 Pain Reassessment back -- 6 - moderate-severe pain other (see comments) nursing providing pain Rx [facility]               Prior Living Environment      Flowsheet Row Most Recent Value   People in Home spouse   Current Living Arrangements home   Home Accessibility stairs to enter home (Group), stairs within home (Group)   Living Environment Comment Multi-story home with 12-14 steps to second floor, powder room on 1st floor, 2-3 FELIC[place] via garage, multiple supportive family members   Number of Stairs, Main Entrance 3   Stair Railings, Main Entrance none   Location, Patient Bedroom second floor, must negotiate stairs to access   Location, Bathroom second floor, must negotiate stairs to access   Bathroom Access Comment powder room 1st floor standard height toilet, full bathroom 2nd floor WIS with glass door, standard height toilet   Stairs, Within Home, Primary 12   Stair Railings, Within Home, Primary railings on both sides of stairs            Prior Level of Function      Flowsheet Row Most Recent Value   Dominant Hand right   Ambulation independent   Transferring independent   Toileting independent   Bathing independent   Dressing " "independent   Eating independent   IADLs independent   Driving/Transportation    Communication understands/communicates without difficulty   Past History of Dysphagia PTA pt was completely independent, no AD. (+) . NPO PTA, manages peg tube independently.   Prior Level of Function Comment Independent without use of AE or DME.   Assistive Device Currently Used at Home none            Occupational Profile      Flowsheet Row Most Recent Value   Occupational History/Life Experiences retired involved in [employer] on [employer]. + , -handicap driving placard.   Patient Goals PSFS: going to the bathroom 0/10, walking 1/10, getting in/out of bed 0/10 = 1/30 = 3.33%             IRF OT Evaluation and Treatment - 04/08/25 1257          OT Time Calculation    Start Time 1257     Stop Time 1357     Time Calculation (min) 60 min        Session Details    Document Type Daily Treatment/Progress Note        General Information    General Observations of Patient Pt received in bed, asleep, arousable but reporting significant pain stating \"my back gave out earlier, I can't get out of this bed\" - pt agreeable to supine AROM        Mobility Belt    Mobility Belt Used During Session no - patient did not mobilize out of bed or stand        Orthosis Neck Cervical Collar    Orthosis Properties Date Obtained: 04/03/25 Time Obtained: 0825 Location: Neck Type: Cervical Collar Features: Hard Description: Hard collar AAT; can be removed seated to don [place] collar for bathing.       Bed Mobility    [place], Supine to Sit minimum assist (75% or more patient effort)     Comment Assist at trunk to bring to upright position via modified log roll technique        Sit to Stand Transfer    [place], Sit to Stand Transfer touching/steadying assist     Safety/Cues increased time to complete     Assistive Device walker, front-wheeled     Comment from EOB        Stand to Sit Transfer    [place], Stand to Sit " Transfer touching/steadying assist     Safety/Cues increased time to complete     Assistive Device walker, front-wheeled     Comment to w/c        Stand Pivot Transfer    [place], Stand Pivot/Stand Step Transfer minimum assist (75% or more patient effort)     Safety/Cues increased time to complete;technique     Assistive Device walker, front-wheeled     Comment Amb approach, cues for completion of full pivot prior to sitting. Assist for balance d/t narrow [place] during pivot        Impairments/Safety Issues    Comment, Safety Issues/Impairments OT: Amb short HHD c RW for support and min A, significant reliance on UEs on RW        Motor Skills    Functional Endurance Pt initially reporting high levels of pain impacting participation, resulting in need for modified session plan, but improving as session progressed before reported emergence of profound fatigue        Therapeutic Interventions    Comment, Therapeutic Intervention Pt reporting perception of room spinning while supine in bed, despite VSS. P conversation c nursing re: last tube feed, HOB lowered c (+) improvement in symptoms        Upper Extremity (Therapeutic Exercise)    Exercise Position/Type seated     General Exercise bilateral     Range of Motion Exercises shoulder flexion/extension;shoulder abduction/adduction;shoulder horizontal abduction/adduction;shoulder external/internal rotation;elbow flexion/extension;forearm supination/pronation;wrist flexion/extension     Reps and Sets [id number] x2     Comment Completed sitting up in bed initially, progressing to performance sitting up in w/c as session progressed        Daily Progress Summary (OT)    Daily Outcome Statement Pt initially reporting significant levels of pain refusing OOB activity, no available scheduled or PRN pain Rx available. Pt agreeable to modified therapy session, completing UE ther ex supine in bed, before requesting trial OOB activity supporting amb and transition to w/c. Pt c  increased conversational participation despite endorsed profound fatigue. Continue per OT POC.                               IRF OT Goals      Flowsheet Row Most Recent Value   Transfer Goal 1    Activity/Assistive Device toilet at 04/02/2025 0723   [facility] minimum assist (75% or more patient effort) at 04/02/2025 0723   Time Frame short-term goal (STG), 5 - 7 days at 04/03/2025 0754   Strategies/Barriers pt just evlauated at 04/03/2025 0754   Progress/Outcome progress slower than expected, goal ongoing, goal revised this date at 04/03/2025 0754   Transfer Goal 2    Activity/Assistive Device toilet at 04/02/2025 0723   [facility] modified independence at 04/02/2025 0723   Time Frame long-term goal (LTG), 21 days or less at 04/02/2025 0723   Progress/Outcome goal ongoing at 04/03/2025 0754   Transfer Goal 3    Activity/Assistive Device shower at 04/02/2025 0723   [facility] other (see comments)  [TBA] at 04/02/2025 0723   Time Frame short-term goal (STG), 5 - 7 days at 04/03/2025 0754   Strategies/Barriers pt just evlauated at 04/03/2025 0754   Progress/Outcome progress slower than expected, goal ongoing, goal revised this date at 04/03/2025 0754   Transfer Goal 4    Activity/Assistive Device shower at 04/02/2025 0723   [facility] other (see comments)  [TBA] at 04/02/2025 0723   Time Frame long-term goal (LTG), 21 days or less at 04/02/2025 0723   Progress/Outcome goal ongoing at 04/03/2025 0754   Bathing Goal 1    [facility] moderate assist (50-74% patient effort) at 04/02/2025 0723   Time Frame short-term goal (STG), 5 - 7 days at 04/03/2025 0754   Strategies/Barriers pt just evlauated at 04/03/2025 0754   Progress/Outcome progress slower than expected, goal ongoing, goal revised this date at 04/03/2025 0754   Bathing Goal 2    [facility] supervision required at 04/02/2025 0723   Time Frame long-term goal (LTG), 21 days or less at 04/02/2025 0723   Progress/Outcome goal ongoing at 04/03/2025 4982    UB Dressing Goal 1    [place] other (see comments)  [TBA] at 04/02/2025 0723   Time Frame short-term goal (STG), 5 - 7 days at 04/03/2025 0754   Strategies/Barriers pt just evlauated at 04/03/2025 0754   Progress/Outcome progress slower than expected, goal ongoing, goal revised this date at 04/03/2025 0754   UB Dressing Goal 2    [place] other (see comments)  [TBA] at 04/02/2025 0723   Time Frame long-term goal (LTG), 21 days or less at 04/02/2025 0723   Progress/Outcome goal ongoing at 04/03/2025 0754   LB Dressing Goal 1    [place] moderate assist (50-74% patient effort) at 04/02/2025 0723   Time Frame short-term goal (STG), 5 - 7 days at 04/03/2025 0754   Strategies/Barriers pt just evlauated at 04/03/2025 0754   Progress/Outcome progress slower than expected, goal ongoing, goal revised this date at 04/03/2025 0754   LB Dressing Goal 2    [place] modified independence at 04/02/2025 0723   Time Frame long-term goal (LTG), 21 days or less at 04/02/2025 0723   Progress/Outcome goal ongoing at 04/03/2025 0754   Grooming Goal 1    [place] set-up required at 04/02/2025 0723   Time Frame short-term goal (STG), 5 - 7 days at 04/03/2025 0754   Strategies/Barriers pt just evlauated at 04/03/2025 0754   Progress/Outcome progress slower than expected, goal ongoing, goal revised this date at 04/03/2025 0754   Grooming Goal 2    [place] modified independence at 04/02/2025 0723   Time Frame long-term goal (LTG), 21 days or less at 04/02/2025 0723   Progress/Outcome goal ongoing at 04/03/2025 0754   Toileting Goal 1    [place] moderate assist (50-74% patient effort) at 04/02/2025 0723   Time Frame short-term goal (STG), 5 - 7 days at 04/03/2025 0754   Strategies/Barriers pt just evlauated at 04/03/2025 0754   Progress/Outcome progress slower than expected, goal ongoing, goal revised this date at 04/03/2025 0754   Toileting Goal 2    [place] modified independence at 04/02/2025 0723   Time  Frame long-term goal (LTG), 21 days or less at 04/02/2025 0723   Progress/Outcome goal ongoing at 04/03/2025 0758

## 2025-04-08 NOTE — PROGRESS NOTES
Patient: Celso Gramajo  Location: [facility] [address]  MRN:  [id number]  Today's date:  4/8/2025    Attempted to see patient for therapy. Unable due to patient refused.    Daily Outcome Statement: Pt politely refused 30 minutes of scheduled PT due to increased pain

## 2025-04-08 NOTE — PROGRESS NOTES
Inpatient Psychology Progress Note    Duration: 30 minutes  Celso : 1955, a 69 y.o. male, for follow up visit.    Reason:  He has been experiencing difficulty and frustrations with his injury and being in rehab.     HPI: See initial psych note; SCI abscess/infection in spine w/ decompression    Current Evaluation:     Mental Status Evaluation:  Arousal Level: Attentive  Appearance: Well Groomed  Speech: Regular  Psychomotor Functioning: WNL  Eye Contact: WNL  Est. Premorbid Intelligence: Above average  Orientation: Fully oriented  Attention: WNL  Concentration: WNL  Recent Memory: WNL  Remote Memory: WNL  Thought Content: Appropriate  Thought Process: WNL  Insight: Intact  Perceptual Function: Pain  Delusions: None or age appropriate  Sleeping: Decreased (2-3 hours. pain and strange dreams)  Appetite: NPO  Affect: Full Range  Mood: Frustrated, Motivated      Additional Assessments done this visit:     [place] Suicide Severity Rating Scale:  Not indicated             Safe-T Assessment:  Not indicated        Session Summary:     Psych met with Celso in his room. He was placed in bed by nursing post his morning PT. He was attentive during session, somewhat soft spoken, and affect was mildly down with congruent mod. He spoke w/ psych about his therapy progress, frustrations, and concerns.He spoke about feeling that some staff may be overwhelmed w/ his needs which impacts care, though he was generally supportive of staff.  He feels progress is slow and he wants more endurance but is trying to be more accepting that this comes with time. Frustrated can't dress self or meds. Frustrated with 2 weeks total for inpatient rehab. Psychologist provided supportive psychotherapy and discussed the importance of focusing on gains being made and that it is unknown what continued gains will be in the upcoming week. Celso also reported thinking about going through appeals process. Celso is receptive to ongoing psychology support  during his inpatient rehab.      Goals Addressed                   This Visit's Progress     Improve coping skills   Improving     Improve Mood   No change     Increase adjustment to disposition/discharge   Improving     Increase adjustment to rehabilitation facility.   Improving            Interventions  Acceptance & Adjustment  Monitoring of Symptoms  Psychoeducation  Self Regulation Strategies    Psychoeducation provided on:  Spinal Cord Injury and Recovery     Recommendations:      Individual Therapy  30 minutesweekly      Visit Diagnosis:     1. Edema, unspecified type    2. Adjustment disorder with anxiety [F43.22]        Diagnostic Impression:   Weekly Progress Summary: progressing toward goals slower than expected  Weekly Outcome Statement: Psych met with Celso in his room. He was placed in bed by nursing post his morning PT. He was attentive during session, somewhat soft spoken, and affect was mildly down with congruent mod. He spoke w/ psych about his therapy progress, frustrations, and concerns.He spoke about feeling that some staff may be overwhelmed w/ his needs which impacts care, though he was generally supportive of staff.  He feels progress is slow and he wants more endurance but is trying to be more accepting that this comes with time. Frustrated can't dress self or meds. Frustrated with 2 weeks total for inpatient rehab. Psychologist provided supportive psychotherapy and discussed the importance of focusing on gains being made and that it is unknown what continued gains will be in the upcoming week. Celso also reported thinking about going through appeals process. Celso is receptive to ongoing psychology support during his inpatient rehab.    Psychological condition is generally slowly improving.       Myles Gaviria, [email or web address] @ 12:54 PM

## 2025-04-08 NOTE — PROGRESS NOTES
Patient: Celso Gramajo  Location: [facility] [address]  MRN: [id number]  Today's date: 4/7/2025    History of Present Illness  Celso is a 69 y.o. male admitted on 4/1/2025 with Spinal epidural abscess [G06.1]. Principal problem is Spinal epidural abscess.    Celso Gramajo is a 69 year old male with PMHx thyroid CA s/p thyroidectomy, RND, adjuvant XRT/immunotherapy, esophageal SCC s/p chemoradiation c/b esophageal strictures, HTN, Elevated Coronary Calcium scoring in 2017, ENMANUEL, Hypothyroidism, Anemia, dysphagia on PEG (placed in 2021) TFs, and history of CVA who presented to an OSH with neck and R shoulder pain. Workup was unrevealing and he was discharged home.    He presented to the [facility] ED again on 3/17 with ongoing fevers. CT Neck with no abscess, old postsurgical changes. He d/c home but BCx subsequently returned positive on 3/19 and he returned for admission. He was also bacteremic with strep viridans, and placed on IV antibiotics. Workup revealed a C5-C7 ventral epidural abscess with osteomyelitis, and he was transferred to [facility] for surgical evaluation.    MRI on 3/24 c/f metastatic disease at T1-T4, L4.      At [facility] ortho and ENT were consulted. Ultimately it was determined that an anterior surgical approach was not safe (due to history of neck surgeries), and patient was having progressive weakness. Repeat MRI C-spine showed Discitis and osteomyelitis at C6-C7 with ventral epidural abscess compressing the spinal cord and increased edema. Patient urgently underwent POSTERIOR C4-T1 DECOMPRESSION FUSION WITH [place] INSTRUMENTATION on 3/27. Postop he was briefly in the ICU for MAP goals. OR cultures grew strep intermedius and he was placed on the antibiotic plan below. He had a PICC placed 4/1. 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 He was started on salt tabs. In rehab please continue to check labs, and titrate the salt tabs as appropriate.  He worked with PT/OT who recommended him for acute rehab.     Past Medical History  Celso has a past medical history of Anemia, Cancer (CMS/[facility]), head and neck radiation, Hypertension, Hypothyroidism, Stroke (CMS/[facility]), and Thyroid disease.    PT Vitals      Date/Time Pulse HR Source SpO2 Pt Activity BP BP Location BP Method Pt Position [facility]   04/07/25 1305 88 Monitor 96 % At rest 120/66 Left upper arm Automatic Sitting LPM          PT Pain      Date/Time Pain Type Location Rating: Rest Rating: Activity Radiation to Interventions [facility]   04/07/25 1305 Pain Assessment neck 8 8 shoulder, left;shoulder, right cold applied;care clustered;pain management plan reviewed with patient/caregiver;position adjusted;premedicated for activity LPM             04/07/25 1329 Post Activity neck 4 8 -- care clustered;relaxation techniques promoted;quiet environment facilitated LPM               Prior Living Environment      Flowsheet Row Most Recent Value   People in Home spouse   Current Living Arrangements home   Home Accessibility stairs to enter home (Group), stairs within home (Group)   Living Environment Comment Multi-story home with 12-14 steps to second floor, powder room on 1st floor, 2-3 FELICITA via garage, multiple supportive family members   Number of Stairs, Main Entrance 3   Stair Railings, Main Entrance none   Location, Patient Bedroom second floor, must negotiate stairs to access   Location, Bathroom second floor, must negotiate stairs to access   Bathroom Access Comment powder room 1st floor standard height toilet, full bathroom 2nd floor WIS with glass door, standard height toilet   Stairs, Within Home, Primary 12   Stair Railings, Within Home, Primary railings on both sides of stairs            Prior Level of Function      Flowsheet Row Most Recent Value   Dominant Hand right   Ambulation independent   Transferring independent   Toileting independent   Bathing independent   Dressing independent   Eating independent   IADLs  independent   Driving/Transportation    Communication understands/communicates without difficulty   Past History of Dysphagia PTA pt was completely independent, no AD. (+) . NPO PTA, manages peg tube independently.   Prior Level of Function Comment Independent without use of AE or DME.   Assistive Device Currently Used at Home none             IRF PT Evaluation and Treatment - 04/07/25 1301          PT Time Calculation    Start Time 1302     Stop Time 1332     Time Calculation (min) 30 min        Session Details    Document Type Daily Treatment/Progress Note        General Information    Patient Profile Reviewed yes     General Observations of Patient received in room, in recliner; increased pain reports in c/s, L>R upper trap, and posterior scapula; MD present in room during session        Mobility Belt    Mobility Belt Used During Session yes        Orthosis Neck Cervical Collar    Orthosis Properties Date Obtained: 04/03/25 Time Obtained: 0825 Location: Neck Type: Cervical Collar Features: Hard Description: Hard collar AAT; can be removed seated to don [place] collar for bathing.    Compliance/Wearing Issues patient;compliant with wearing schedule        Sit to Stand Transfer    [place], Sit to Stand Transfer minimum assist (75% or more patient effort)     Safety/Cues increased time to complete     Assistive Device walker, front-wheeled     Comment performed from recliner, assist for steadying balance, use of BUE push up        Stand to Sit Transfer    [place], Stand to Sit Transfer minimum assist (75% or more patient effort)     Safety/Cues increased time to complete     Assistive Device walker, front-wheeled     Comment performed with assist for steadying balance for controlled descent and alignment, use of UE        Stand Pivot Transfer    [place], Stand Pivot/Stand Step Transfer minimum assist (75% or more patient effort)     Safety/Cues technique;sequencing      Assistive Device walker, front-wheeled     Comment cues for turning radius and trunk ext for balance, forward eye gaze        Toilet Transfer    Comment Min A SPT with RW, anterior standing for bladder management, assist for safety and balance        Lower Extremity (Therapeutic Exercise)    Exercise Position/Type seated;PROM (passive range of motion);AROM (active range of motion)     General Exercise bilateral;ankle pumps;gastroc stretch;hamstring stretch;quad sets     Range of Motion Exercises bilateral;hip flexion/extension;hip abduction/adduction;knee flexion/extension;ankle dorsiflexion/plantarflexion     Reps and Sets PROM x20; sustained hamstring 30s x4 R and L; gastroc/DF 30s x2 R and L; AROM 2x10 in recliner     Comment performed in recliner        Modality Interventions Comprehensive    Modality Interventions cryotherapy        Cryotherapy    Location 1 neck/back treatment area     Location 2 neck/back treatment area     Indications pain;joint pain/edema     Treatment Details (Cryotherapy) 10 min gel cold pack to B upper trap and scapula for pain     Response to Treatment pain decreased        Daily Progress Summary (PT)    Symptoms Noted During/After Treatment increased pain     Daily Outcome Statement Pt received reporting pain and fatigue, willing to participate in therex and transfers for bladder management. Pt with decreased pain reported s/p gel cold pack use.                               IRF PT Goals      Flowsheet Row Most Recent Value   Bed Mobility Goal 1    Activity/Assistive Device sit to supine/supine to sit at 04/02/2025 0904   [place] supervision required at 04/02/2025 0904   Time Frame short-term goal (STG), 5 - 7 days at 04/03/2025 0856   Strategies/Barriers evaluated 4/2, continue with PT POC at 04/03/2025 0856   Progress/Outcome goal ongoing, good progress toward goal at 04/03/2025 0856   Bed Mobility Goal 2    Activity/Assistive Device bed mobility activities, all at 04/02/2025  0904   [place] modified independence at 04/02/2025 0904   Time Frame long-term goal (LTG), 3 weeks at 04/02/2025 0904   Progress/Outcome goal ongoing at 04/03/2025 0856   Transfer Goal 1    Activity/Assistive Device sit-to-stand/stand-to-sit, stand pivot at 04/02/2025 0904   [place] minimum assist (75% or more patient effort) at 04/02/2025 0904   Time Frame short-term goal (STG), 5 - 7 days at 04/03/2025 0856   Strategies/Barriers evaluated 4/2, conitnue with PT POC at 04/03/2025 0856   Progress/Outcome goal ongoing, good progress toward goal at 04/03/2025 0856   Transfer Goal 2    Activity/Assistive Device sit-to-stand/stand-to-sit, stand pivot at 04/02/2025 0904   [place] modified independence at 04/02/2025 0904   Time Frame long-term goal (LTG), 3 weeks at 04/02/2025 0904   Progress/Outcome goal ongoing at 04/03/2025 0856   Gait/Walking Locomotion Goal 1    Activity/Assistive Device gait (walking locomotion), assistive device use at 04/02/2025 0904   Distance 150 feet at 04/02/2025 0904   [place] minimum assist (75% or more patient effort) at 04/02/2025 0904   Time Frame short-term goal (STG), 5 days at 04/03/2025 0856   Strategies/Barriers evaluated 4/2, contiune with PT POC at 04/03/2025 0856   Progress/Outcome good progress toward goal, goal ongoing at 04/03/2025 0856   Gait/Walking Locomotion Goal 2    Activity/Assistive Device gait (walking locomotion), assistive device use at 04/02/2025 0904   Distance 150 feet at 04/02/2025 0904   [place] modified independence at 04/02/2025 0904   Time Frame long-term goal (LTG), 3 weeks at 04/02/2025 0904   Progress/Outcome goal ongoing at 04/03/2025 0856   Stairs Goal 1    Activity/Assistive Device ascending stairs, descending stairs, using handrail, left, using handrail, right at 04/02/2025 0904   Number of Stairs 12 at 04/02/2025 0904   [place] minimum assist (75% or more patient effort) at 04/02/2025 0904   Time Frame short-term goal  (STG), 5 - 7 days at 04/03/2025 0856   Strategies/Barriers evaluated 4/2, continue PT POC at 04/03/2025 0856   Progress/Outcome good progress toward goal, goal ongoing at 04/03/2025 0856   Stairs Goal 2    Activity/Assistive Device ascending stairs, descending stairs, using handrail, left, using handrail, right at 04/02/2025 0904   Number of Stairs 12 at 04/02/2025 0904   [place] supervision required at 04/02/2025 0904   Time Frame long-term goal (LTG), 3 weeks at 04/02/2025 0904   Progress/Outcome goal ongoing at 04/03/2025 0856

## 2025-04-08 NOTE — PLAN OF CARE
Plan of Care Review  Plan of Care Reviewed With: patient  Progress: improving  Outcome Evaluation: Pt is AAO X 3. Continent of bladder, using non-spill urinal, spilled X 1, incontinence care provided, linen changed. COntinent loose moderate BM X 1 on BSC . Took scheduled docusate  sodium along with PRN MOM and two senna (new order). PRN dilaudid administered for back pain with positive effect. One ltr NS .9% administered via GABRIEL PICC line w/o any complications. Turned and repositioned. Slept intermiittently. Safety precautions maintained.

## 2025-04-08 NOTE — CONSULTS
Clinical Nutrition Note    Clinical Comments:  Consult received for unstageable wound.  Patient already being followed by nutrition. He is on TF with adequate protein.  Will follow per policy.      Date: 04/08/25  Signature: Lindsey Sim RD

## 2025-04-08 NOTE — PROGRESS NOTES
Patient: Celso Gramajo  Location: [facility] [address]  MRN: [id number]  Today's date: 4/8/2025    History of Present Illness  Celso is a 69 y.o. male admitted on 4/1/2025 with Spinal epidural abscess [G06.1]. Principal problem is Spinal epidural abscess.    Celso Gramajo is a 69 year old male with PMHx thyroid CA s/p thyroidectomy, RND, adjuvant XRT/immunotherapy, esophageal SCC s/p chemoradiation c/b esophageal strictures, HTN, Elevated Coronary Calcium scoring in 2017, ENMANUEL, Hypothyroidism, Anemia, dysphagia on PEG (placed in 2021) TFs, and history of CVA who presented to an OSH with neck and R shoulder pain. Workup was unrevealing and he was discharged home.    He presented to the [place] ED again on 3/17 with ongoing fevers. CT Neck with no abscess, old postsurgical changes. He d/c home but BCx subsequently returned positive on 3/19 and he returned for admission. He was also bacteremic with strep viridans, and placed on IV antibiotics. Workup revealed a C5-C7 ventral epidural abscess with osteomyelitis, and he was transferred to [facility] for surgical evaluation.    MRI on 3/24 c/f metastatic disease at T1-T4, L4.      At [facility] ortho and ENT were consulted. Ultimately it was determined that an anterior surgical approach was not safe (due to history of neck surgeries), and patient was having progressive weakness. Repeat MRI C-spine showed Discitis and osteomyelitis at C6-C7 with ventral epidural abscess compressing the spinal cord and increased edema. Patient urgently underwent POSTERIOR C4-T1 DECOMPRESSION FUSION WITH [place] INSTRUMENTATION on 3/27. Postop he was briefly in the ICU for MAP goals. OR cultures grew strep intermedius and he was placed on the antibiotic plan below. He had a PICC placed 4/1. 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 He was started on salt tabs. In rehab please continue to check labs, and titrate the salt tabs as appropriate.  He worked with PT/OT who recommended him for acute rehab.     Past Medical History  Celso has a past medical history of Anemia, Cancer (CMS/[facility]), head and neck radiation, Hypertension, Hypothyroidism, Stroke (CMS/[facility]), and Thyroid disease.    PT Vitals      Date/Time BP BP Location BP Method Pt Position [facility]   04/08/25 0914 108/60 Left upper arm Manual Sitting LPM   04/08/25 0918 104/60 Left upper arm Manual Standing LPM          PT Pain      Date/Time Pain Type Side/Orientation Location Rating: Rest Rating: Activity Radiation to Interventions [facility]   04/08/25 0905 Pain Assessment -- neck 2 6 -- premedicated for activity;pain management plan reviewed with patient/caregiver;care clustered LP   04/08/25 0945 Pain Reassessment left neck 3 -- shoulder, left premedicated for activity;care clustered;relaxation techniques promoted LPM               Prior Living Environment      Flowsheet Row Most Recent Value   People in Home spouse   Current Living Arrangements home   Home Accessibility stairs to enter home (Group), stairs within home (Group)   Living Environment Comment Multi-story home with 12-14 steps to second floor, powder room on 1st floor, 2-3 FELICITA via garage, multiple supportive family members   Number of Stairs, Main Entrance 3   Stair Railings, Main Entrance none   Location, Patient Bedroom second floor, must negotiate stairs to access   Location, Bathroom second floor, must negotiate stairs to access   Bathroom Access Comment powder room 1st floor standard height toilet, full bathroom 2nd floor WIS with glass door, standard height toilet   Stairs, Within Home, Primary 12   Stair Railings, Within Home, Primary railings on both sides of stairs            Prior Level of Function      Flowsheet Row Most Recent Value   Dominant Hand right   Ambulation independent   Transferring independent   Toileting independent   Bathing independent   Dressing independent   Eating independent   IADLs independent    Driving/Transportation    Communication understands/communicates without difficulty   Past History of Dysphagia PTA pt was completely independent, no AD. (+) . NPO PTA, manages peg tube independently.   Prior Level of Function Comment Independent without use of AE or DME.   Assistive Device Currently Used at Home none             IRF PT Evaluation and Treatment - 04/08/25 0905          PT Time Calculation    Start Time 0901     Stop Time 1001     Time Calculation (min) 60 min        Session Details    Document Type Daily Treatment/Progress Note        General Information    Patient Profile Reviewed yes     General Observations of Patient received in WC in gym, reports fatigue from overnight although non limiting with active participation        Orthosis Neck Cervical Collar    Orthosis Properties Date Obtained: 04/03/25 Time Obtained: 0825 Location: Neck Type: Cervical Collar Features: Hard Description: Hard collar AAT; can be removed seated to don [place] collar for bathing.       Sit to Stand Transfer    [place], Sit to Stand Transfer minimum assist (75% or more patient effort)     Safety/Cues increased time to complete     Assistive Device walker, front-wheeled     Comment assist for steadying balance, use of BUE push up        Stand to Sit Transfer    [place], Stand to Sit Transfer minimum assist (75% or more patient effort)     Safety/Cues increased time to complete;technique     Assistive Device wheelchair;walker, front-wheeled     Comment performed with assist for steadying balance for controlled descent and alignment, use of UE        Stand Pivot Transfer    [place], Stand Pivot/Stand Step Transfer minimum assist (75% or more patient effort)     Safety/Cues technique;sequencing     Assistive Device walker, front-wheeled     Comment cues for turning radius and trunk ext for balance, forward eye gaze        Gait Training    [place], Gait minimum assist (75% or  "more patient effort)     Safety/Cues increased time to complete;minimal;verbal cues;technique     Assistive Device walker, front-wheeled     Distance in Feet 60 feet     Pattern step-through     Deviations/Abnormal Patterns ataxic;base of support, narrow;weight shifting decreased     Bilateral Gait Deviations heel strike decreased     Comment 60ft with two turns x3 trials with seated rest breaks, cues for turning safety and continued glute activation throughout; additional 20ft x2, 30ft performed        Curb Negotiation    Spray minimum assist (75% or more patient effort)     Assistive Device walker, front-wheeled     Curb Height 8 inches     Comment 8\" curb x3 trials with Min A on pelvis; LLE ascending, RLE descending, 6\" curb x2; 6\"+6\" curb stacked for trial home setup- Min A with cues throughout for sequencing positioning, limited by c/s collar visual impairments        Stairs Training    Spray, Stairs minimum assist (75% or more patient effort)     Safety/Cues technique;sequencing     Assistive Device railing     Handrail Location (Stairs) both sides     Number of Stairs 4     Stair Height 6 inches     Ascending Stairs Technique step-over-step     Descending Stairs Technique step-to-step     Comment 4, 6\" steps x 1 with Arleen at lateral pelvis for balance and v/c for step-to pattern to improve stability with pt preferring step over step; number of stairs limited by fatigue        Upper Extremity (Therapeutic Exercise)    Exercise Position/Type seated     General Exercise bilateral;bicep curl;tricep extension;wrist curls extension;wrist curls flexion     Range of Motion Exercises bilateral;shoulder flexion/extension   to 80 degrees, Min A at elbow for ext    Reps and Sets 2/10     Comment UE therex for warmup start of session        Lower Extremity (Therapeutic Exercise)    Exercise Position/Type seated     General Exercise bilateral;ankle pumps;LAQ (long arc quad);marching while seated     Reps and " Sets 4/10        Daily Progress Summary (PT)    Daily Outcome Statement Pt progressing carryover for transfers and mobility, noted endurance improving. Education on household mobility for fatigue and endurance training. ABle to perform curb steps for front/back door management                               IRF PT Goals      Flowsheet Row Most Recent Value   Bed Mobility Goal 1    Activity/Assistive Device sit to supine/supine to sit at 04/02/2025 0904   [place] supervision required at 04/02/2025 0904   Time Frame short-term goal (STG), 5 - 7 days at 04/03/2025 0856   Strategies/Barriers evaluated 4/2, continue with PT POC at 04/03/2025 0856   Progress/Outcome goal ongoing, good progress toward goal at 04/03/2025 0856   Bed Mobility Goal 2    Activity/Assistive Device bed mobility activities, all at 04/02/2025 0904   [place] modified independence at 04/02/2025 0904   Time Frame long-term goal (LTG), 3 weeks at 04/02/2025 0904   Progress/Outcome goal ongoing at 04/03/2025 0856   Transfer Goal 1    Activity/Assistive Device sit-to-stand/stand-to-sit, stand pivot at 04/02/2025 0904   [place] minimum assist (75% or more patient effort) at 04/02/2025 0904   Time Frame short-term goal (STG), 5 - 7 days at 04/03/2025 0856   Strategies/Barriers evaluated 4/2, conitnue with PT POC at 04/03/2025 0856   Progress/Outcome goal ongoing, good progress toward goal at 04/03/2025 0856   Transfer Goal 2    Activity/Assistive Device sit-to-stand/stand-to-sit, stand pivot at 04/02/2025 0904   [place] modified independence at 04/02/2025 0904   Time Frame long-term goal (LTG), 3 weeks at 04/02/2025 0904   Progress/Outcome goal ongoing at 04/03/2025 0856   Gait/Walking Locomotion Goal 1    Activity/Assistive Device gait (walking locomotion), assistive device use at 04/02/2025 0904   Distance 150 feet at 04/02/2025 0904   [place] minimum assist (75% or more patient effort) at 04/02/2025 0904   Time Frame short-term  goal (STG), 5 days at 04/03/2025 0856   Strategies/Barriers evaluated 4/2, contiune with PT POC at 04/03/2025 0856   Progress/Outcome good progress toward goal, goal ongoing at 04/03/2025 0856   Gait/Walking Locomotion Goal 2    Activity/Assistive Device gait (walking locomotion), assistive device use at 04/02/2025 0904   Distance 150 feet at 04/02/2025 0904   [place] modified independence at 04/02/2025 0904   Time Frame long-term goal (LTG), 3 weeks at 04/02/2025 0904   Progress/Outcome goal ongoing at 04/03/2025 0856   Stairs Goal 1    Activity/Assistive Device ascending stairs, descending stairs, using handrail, left, using handrail, right at 04/02/2025 0904   Number of Stairs 12 at 04/02/2025 0904   [place] minimum assist (75% or more patient effort) at 04/02/2025 0904   Time Frame short-term goal (STG), 5 - 7 days at 04/03/2025 0856   Strategies/Barriers evaluated 4/2, continue PT POC at 04/03/2025 0856   Progress/Outcome good progress toward goal, goal ongoing at 04/03/2025 0856   Stairs Goal 2    Activity/Assistive Device ascending stairs, descending stairs, using handrail, left, using handrail, right at 04/02/2025 0904   Number of Stairs 12 at 04/02/2025 0904   [place] supervision required at 04/02/2025 0904   Time Frame long-term goal (LTG), 3 weeks at 04/02/2025 0904   Progress/Outcome goal ongoing at 04/03/2025 0856

## 2025-04-08 NOTE — PROGRESS NOTES
[facility] Progress Note          Patient was seen and examined at bedside.    Subjective:   4/3/25  Reviewed night shift RN report:  Pt is AAO X 3, able to make needs known. Continent of bladder, uses urinal, spilled once. No BM during this shift. Infrequent productive cough, uses yankauer. Peg tube site clean. Water flush administered. Pt stated he does not feel good, VS checked 118/60, 98.7, 93% on RA, 84/min. . Pt later stated that he is upset because he was told that his stay will be only for 2 weeks. Reassurance provided , felt better. Patient had low grade temp of 99.5 earlier in the shift and urine was odorous, physician on call notified and order obtained for UA & CS. Patient received IV ceftriaxone via GABRIEL single lumen PICC. No adverse reactions. Slept intermittently.     Saw and evaluated him in am:  Abdominal distention significantly, no good BM for several days, he stated which is related to opioid use. He is passing gas, denies nausea or abdominal pain.   Suspect ileus:  Check obstruction series, limit opioid use,  bowel rest d/w dietitian, on linzess, add enemeez plus, moving around and bowel movement trial timely.        4/8/25  A very challenging case: pt and family requested bowel regimen to be given at   variable times and dosages from day to day; also at home, pt has been using IVF NS 1 liter twice weekly mostly on Monday and Thursday at about 300 ml/hr; I personally d/w his wife before and I talked with RN supervisor and RN today about my orders and coping strategies.           Objective   Vital signs in last 24 hours:  Temp:  [36.7 °C (98 °F)-36.7 °C (98.1 °F)] 36.7 °C (98.1 °F)  Heart Rate:  [80-95] 95  Resp:  [18] 18  BP: (104-154)/(60-80) 154/74      Intake/Output Summary (Last 24 hours) at 4/8/2025 1144  Last data filed at 4/8/2025 0800  Gross per 24 hour   Intake 1425 ml   Output --   Net 1425 ml     Intake/Output this shift:  I/O this shift:  In: 625  [NG/GT:625]  Out: -    Review of Systems:  All other systems reviewed and negative except as noted in the HPI.   Objective      Labs  Reviewed his labs thoroughly   Lab Results   Component Value Date    WBC 5.89 04/07/2025    HGB 12.4 (L) 04/07/2025    HCT 37.8 (L) 04/07/2025    MCV 94.3 04/07/2025     04/07/2025     Lab Results   Component Value Date    GLUCOSE 176 (H) 04/07/2025    CALCIUM 9.2 04/07/2025     (L) 04/07/2025    K 4.5 04/07/2025    CO2 32 (H) 04/07/2025    CL 97 (L) 04/07/2025    BUN 16 04/07/2025    CREATININE 0.6 (L) 04/07/2025       Imaging  OSH imaging study reports reviewed    Full Code    Physical Exam:  Head/Ear/Nose/Throat: normocephalic; atraumatic; moisture mouth mm, no oropharyngeal thrush noted.   Eyes: anicteric sclera, EOMI; PERRL.   Neck : supple, no JVD, no carotid bruits appeciated.   Respiratory: no evidence of labored breathing, lung sounds CTA b/l, good aeration bibasilar area, no w/r/c.   Cardiovascular: RRR; normal S1, S2; no m/r/g; no S3 or S4.   Gastrointestinal: PEG in place, focal c/d/I; soft; NT; BS normal; mildly distended; no CVAT b/l.   Genitourinary: no song.   Extremities : no c/c/e .   Neurological: AO x 3, fluent speeches, following commands, CNS II-XII grossly intact; no focal neurologic deficits.   Behavior/Emotional: in NAD, appropriate; cooperative.   Skin: clean, dry and intact.  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  He had a PEG placed in 2021 for dysphagia.  Cervical spinal stenosis with  myeloradiculopathy h/o cervical spinal surgery. He initially presented to [place] ED on 3/11/25 with fevers and R shoulder pain. Workup was unrevealing and he was discharged home.  He presented to the [place] ED again on 3/17 with ongoing fevers. CT Neck with no abscess, old postsurgical changes, BCX was collected. He d/c home but BCX subsequently returned positive on 3/19 and he returned for admission. Imaging with C6-7 OM / discitis with ventral epidural abscess. He was transferred to [facility] on 3/23 for surgical evaluation.  At that time, he was afebrile. BCx negative. MRI with C5-C7 cervical epidural abscess/osteo with cord signal at C6-7, T1-4, L4 vertebral body lesions probably radiation-changes per [facility] radiology, although metastatic lesions are not ruled out, per wife PET-CT scan no evidence of metastatic malignancy light up at spine region. He is now s/p posterior C4-T1 decompression and fusion on 3/27. Purulent fluid drained from anterior spine. OR Cx Strep intermedius.  ID rec IV dapto and ceftriaxone x 6 weeks.  PICC placed. He was admitted to [facility] on 4/1/25 for post op inpt acute rehab. He is TF NPO status.     A/P:  # Strep intermedius bacteremia with C6-7 OM / discitis and ventral epidural abscess, s/p PCDF 3/27/25, purulent fluid drainage from anterior spine  Suspect source likely recent esophageal dilatations ; 3/17 BCx + at OSH; TTE negative for veg at OSH  -complete planned 6-week iv Rocephin course, last dose of daptomycin on 3/31/25,  wound care dermal defense, f/u with [facility] ID as outpt, weekly labs.     # H/o thyroid CA s/p thyroidectomy and RND, adjuvant XRT/immunotherapy, esophageal SCC s/p CRT c/b esophageal strictures, L VC paralysis  -cancer surveillance f/u with [facility]  medical/ radiation oncology;  -palliative consultation.      # Dysphagia, NPO/TF, IBS-C, GI prophylaxis  -cont PEG TF, dietitian co management for nutrition supplements, Pepcid/PPI for GI prophylaxis       # Hypothyroidism   -per his  endo, increase levothyroxine dose from 137-150 mcg daily.     # DVT prophylaxis  -SQH     # Anxiety  -on Prozac, psychology CBT, psychiatrist for co management      # Hyperglycemia, previous prediabetes  -diet modification per dietitian, SSI with accu checks      Billing code: [id number]  Diagnoses:  Patient Active Problem List   Diagnosis    Vertigo    Hypertension    Postsurgical hypothyroidism    Mixed hyperlipidemia    Gastroesophageal reflux disease    Increased sputum production    Allergic rhinitis    Change in bowel habits    Chronic fatigue syndrome    Constipation, chronic    Deviated nasal septum    Elevated coronary artery calcium score    Eustachian tube dysfunction, bilateral    Examination of participant in clinical trial    Induration penis plastica    Hypertrophy of nasal turbinates    Hx of colonic polyps    Malignant neoplasm of salivary gland (CMS/HCC)    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)    Paralysis of vocal cords and larynx, unilateral    ENMANUEL (obstructive sleep apnea)    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)    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)    Penile curvature, acquired    Rhinitis sicca    Thyroid cancer (CMS/HCC)    Sensorineural hearing loss (SNHL), bilateral    Other dysphagia    Abdominal pain    Other dysphagia    COVID-19    Encounter for follow-up surveillance of thyroid cancer    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         Lilian Marrero MD  4/8/2025

## 2025-04-08 NOTE — PROGRESS NOTES
Psychiatry Progress Note    History of Present Illness   See initial consult.  History pertaining to psychosis, depression and anxiety and other psychiatric and psychologic conditions as well as general medical history detailed in initial consult.      Interval History:   On tube feeds  Well oriented  frustrated with bladder issues  Back pain perists  rx with dilaudid   continued effort in therapy  no signs of metabolic disturbance - cognition stable  energy adequate for therapy  developing coping strategies for ATD and mood fluctuations  continues without any safety issues  responsive to supportive intervention  Discussed with nursing - no behavioral disturbance     MENTAL STATUS EXAM  Appearance: well groomed  Gait and Motor: no abnormal movements  Speech: normal rate/rhythm/volume  Mood: depressed and anxious  Affect: constricted  Associations: coherent  Thought Process: goal-directed  Thought Content: no auditory or visual hallucinations.  Suicidality/Homicidality: denies  Judgement/Insight: acknowledges illness  Orientation: situation  Memory: knows current president  Attention: distracted  Knowledge: normal  Language: normal      Vital Signs for the last 24 hours:  Temp:  [36.7 °C (98 °F)-36.7 °C (98.1 °F)] 36.7 °C (98.1 °F)  Heart Rate:  [80-91] 80  Resp:  [18] 18  BP: (120-143)/(61-71) 143/71    Labs:  Labs below reviewed for pertinence to psychiatric condition  Lab Results   Component Value Date    GLUCOSE 176 (H) 04/07/2025    CALCIUM 9.2 04/07/2025     (L) 04/07/2025    K 4.5 04/07/2025    CO2 32 (H) 04/07/2025    CL 97 (L) 04/07/2025    BUN 16 04/07/2025    CREATININE 0.6 (L) 04/07/2025     Lab Results   Component Value Date    WBC 5.89 04/07/2025    HGB 12.4 (L) 04/07/2025    HCT 37.8 (L) 04/07/2025    MCV 94.3 04/07/2025     04/07/2025     Pain Management Panel           No data to display                  Current Facility-Administered Medications   Medication Dose Route Frequency Provider  Last Rate Last Admin    acetaminophen (TYLENOL) 650 mg/20.3 mL solution 650 mg  650 mg peg tube q6h PRN Rohit Acevedo MD   650 mg at 04/08/25 0620    bisacodyL (DULCOLAX) 10 mg suppository 10 mg  10 mg rectal Daily PRN Rohit Acevedo MD   10 mg at 04/03/25 1554    cefTRIAXone (ROCEPHIN) 2 g in sodium chloride 0.9 % 100 mL IVPB  2 g intravenous q12h [facility] Rohit Acevedo MD   Stopped at 04/08/25 0652    [START ON 4/9/2025] cefTRIAXone (ROCEPHIN) 2 g in sodium chloride 0.9 % 100 mL IVPB  2 g intravenous q24h INT Rohit Acevedo MD        docusate sodium (COLACE) 50 mg/5 mL liquid 100 mg  100 mg peg tube BID Spencer Kemp MD   100 mg at 04/08/25 0742    ezetimibe (ZETIA) tablet 10 mg  10 mg peg tube Nightly Rohit Acevedo MD   10 mg at 04/07/25 2111    famotidine (PEPCID) tablet 20 mg  20 mg peg tube Nightly Rohit Acevedo MD   20 mg at 04/07/25 2111    FLUoxetine (PROzac) 20 mg/5 mL (4 mg/mL) solution 20 mg  20 mg peg tube Daily Rohit Acevedo MD   20 mg at 04/08/25 0744    glycopyrrolate (ROBINUL) tablet 1 mg  1 mg peg tube 2x daily PRN Rohit Acevedo MD        heparin (porcine) 5,000 unit/mL injection 5,000 Units  5,000 Units subcutaneous q8h [facility] Rohit Acevedo MD   5,000 Units at 04/08/25 0556    honey (MEDIHONEY) 100 % topical paste   Topical Daily Spencer Kemp MD   Given at 04/07/25 1051    HYDROmorphone (DILAUDID) tablet 2 mg  2 mg peg tube q6h PRN Lilian Marrero MD   2 mg at 04/08/25 0759    insulin lispro U-100 (HumaLOG) pen 1-12 Units  1-12 Units subcutaneous q6h DU Lilian Marrero MD   2 Units at 04/07/25 1712    lansoprazole (PREVACID) 3 mg/mL oral suspension 15 mg  15 mg feeding tube Daily before breakfast Lilian Marrero MD   15 mg at 04/08/25 0742    levothyroxine (SYNTHROID) tablet 150 mcg  150 mcg peg tube Daily (6:30a) Lilian Marrero MD   150 mcg at 04/08/25 0558    linaCLOtide (LINZESS) capsule 145 mcg  145 mcg g-tube Daily  before lunch Lilian Marrero MD   145 mcg at 04/07/25 1458    liothyronine (CYTOMEL) tablet 10 mcg  10 mcg peg tube Daily (6:30a) Rohit Acevedo MD   10 mcg at 04/08/25 0556    LORazepam (ATIVAN) tablet 0.5 mg  0.5 mg peg tube q8h PRN Rohit Acevedo MD   0.5 mg at 04/05/25 2058    magnesium hydroxide (M.O.M.) 400 mg/5 mL suspension 30 mL  30 mL oral 2x daily PRN Lilian Marrero MD   30 mL at 04/07/25 2133    nystatin (MYCOSTATIN) 100,000 unit/gram topical powder 1 Application  1 Application Topical BID Spencer Kemp MD   1 Application at 04/07/25 2145    ondansetron ODT (ZOFRAN-ODT) disintegrating tablet 4 mg  4 mg peg tube q6h PRN Rohit Acevedo MD        senna (SENOKOT) tablet 1 tablet  1 tablet peg tube 2x daily PRN Rohit Acevedo MD   1 tablet at 04/03/25 1547    senna (SENOKOT) tablet 2 tablet  2 tablet peg tube BID Spencer Kemp MD   2 tablet at 04/08/25 0759    simethicone (MYLICON) chewable tablet 160 mg  160 mg peg tube QID Lilian Marrero MD   160 mg at 04/08/25 0742    sodium chloride PF flush 10 mL  10 mL intravenous q8h INT Rohit Acevedo MD   10 mL at 04/08/25 0557    sodium chloride PF flush 10 mL  10 mL intravenous PRN Rohit Acevedo MD        sodium chloride tablet 1 g  1 g peg tube BID Rohit Acevedo MD   1 g at 04/08/25 0742    sodium phosphates (FLEET) enema adult 1 enema  1 enema rectal Daily PRN Spencer Kemp MD            Patient Active Problem List   Diagnosis    Vertigo    Hypertension    Postsurgical hypothyroidism    Mixed hyperlipidemia    Gastroesophageal reflux disease    Increased sputum production    Allergic rhinitis    Change in bowel habits    Chronic fatigue syndrome    Constipation, chronic    Deviated nasal septum    Elevated coronary artery calcium score    Eustachian tube dysfunction, bilateral    Examination of participant in clinical trial    Induration penis plastica    Hypertrophy of nasal turbinates    Hx of  colonic polyps    Malignant neoplasm of salivary gland (CMS/HCC)    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)    Paralysis of vocal cords and larynx, unilateral    ENMANUEL (obstructive sleep apnea)    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)    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)    Penile curvature, acquired    Rhinitis sicca    Thyroid cancer (CMS/HCC)    Sensorineural hearing loss (SNHL), bilateral    Other dysphagia    Abdominal pain    Other dysphagia    COVID-19    Encounter for follow-up surveillance of thyroid cancer    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  Address automatic negtive thoguhts  Support coping with atd  Trey Meyer MD  4/8/2025

## 2025-04-09 ENCOUNTER — APPOINTMENT (OUTPATIENT)
Dept: OTHER | Facility: REHABILITATION | Age: 70
End: 2025-04-09
Payer: MEDICARE

## 2025-04-09 ENCOUNTER — APPOINTMENT (INPATIENT)
Dept: PHYSICAL THERAPY | Facility: REHABILITATION | Age: 70
DRG: 559 | End: 2025-04-09
Payer: MEDICARE

## 2025-04-09 ENCOUNTER — APPOINTMENT (INPATIENT)
Dept: OCCUPATIONAL THERAPY | Facility: REHABILITATION | Age: 70
DRG: 559 | End: 2025-04-09
Payer: MEDICARE

## 2025-04-09 LAB
GLUCOSE BLD-MCNC: 160 MG/DL (ref 70–99)
GLUCOSE BLD-MCNC: 169 MG/DL (ref 70–99)
GLUCOSE BLD-MCNC: 171 MG/DL (ref 70–99)
GLUCOSE BLD-MCNC: 211 MG/DL (ref 70–99)
POCT TEST: ABNORMAL

## 2025-04-09 PROCEDURE — 97530 THERAPEUTIC ACTIVITIES: CPT | Mod: GP

## 2025-04-09 PROCEDURE — 63600000 HC DRUGS/DETAIL CODE: Performed by: INTERNAL MEDICINE

## 2025-04-09 PROCEDURE — 63700000 HC SELF-ADMINISTRABLE DRUG: Performed by: INTERNAL MEDICINE

## 2025-04-09 PROCEDURE — G0008 ADMIN INFLUENZA VIRUS VAC: HCPCS | Performed by: PHYSICAL MEDICINE & REHABILITATION

## 2025-04-09 PROCEDURE — 97535 SELF CARE MNGMENT TRAINING: CPT | Mod: GO | Performed by: OCCUPATIONAL THERAPIST

## 2025-04-09 PROCEDURE — 97112 NEUROMUSCULAR REEDUCATION: CPT | Mod: GO | Performed by: OCCUPATIONAL THERAPIST

## 2025-04-09 PROCEDURE — 63600000 HC DRUGS/DETAIL CODE: Performed by: PHYSICAL MEDICINE & REHABILITATION

## 2025-04-09 PROCEDURE — 97110 THERAPEUTIC EXERCISES: CPT | Mod: GP

## 2025-04-09 PROCEDURE — 25800000 HC PHARMACY IV SOLUTIONS: Performed by: INTERNAL MEDICINE

## 2025-04-09 PROCEDURE — 97116 GAIT TRAINING THERAPY: CPT | Mod: GP

## 2025-04-09 PROCEDURE — 12800001 HC ROOM AND CARE SEMIPRIVATE REHAB-BRAIN INJ

## 2025-04-09 PROCEDURE — 90662 IIV NO PRSV INCREASED AG IM: CPT | Performed by: PHYSICAL MEDICINE & REHABILITATION

## 2025-04-09 PROCEDURE — 97110 THERAPEUTIC EXERCISES: CPT | Mod: GO | Performed by: OCCUPATIONAL THERAPIST

## 2025-04-09 PROCEDURE — 63700000 HC SELF-ADMINISTRABLE DRUG: Performed by: PHYSICAL MEDICINE & REHABILITATION

## 2025-04-09 RX ORDER — LORAZEPAM 0.5 MG/1
0.5 TABLET ORAL EVERY 8 HOURS PRN
Status: DISPENSED | OUTPATIENT
Start: 2025-04-09 | End: 2025-04-10

## 2025-04-09 RX ADMIN — Medication 10 ML: at 21:36

## 2025-04-09 RX ADMIN — HYDROMORPHONE HYDROCHLORIDE 2 MG: 2 TABLET ORAL at 16:26

## 2025-04-09 RX ADMIN — INSULIN LISPRO 2 UNITS: 100 INJECTION, SOLUTION INTRAVENOUS; SUBCUTANEOUS at 10:22

## 2025-04-09 RX ADMIN — SENNOSIDES 2 TABLET: 8.6 TABLET, FILM COATED ORAL at 13:12

## 2025-04-09 RX ADMIN — Medication: at 08:06

## 2025-04-09 RX ADMIN — DOCUSATE SODIUM 100 MG: 50 LIQUID ORAL at 08:43

## 2025-04-09 RX ADMIN — SIMETHICONE 160 MG: 80 TABLET, CHEWABLE ORAL at 16:26

## 2025-04-09 RX ADMIN — SODIUM CHLORIDE 1 G: 1 TABLET ORAL at 21:21

## 2025-04-09 RX ADMIN — HEPARIN SODIUM 5000 UNITS: 5000 INJECTION, SOLUTION INTRAVENOUS; SUBCUTANEOUS at 13:12

## 2025-04-09 RX ADMIN — LANSOPRAZOLE 15 MG: KIT at 07:50

## 2025-04-09 RX ADMIN — DOCUSATE SODIUM 100 MG: 50 LIQUID ORAL at 13:12

## 2025-04-09 RX ADMIN — SODIUM CHLORIDE 1 G: 1 TABLET ORAL at 08:45

## 2025-04-09 RX ADMIN — Medication 10 ML: at 06:44

## 2025-04-09 RX ADMIN — NYSTATIN 1 APPLICATION: 100000 POWDER TOPICAL at 21:37

## 2025-04-09 RX ADMIN — MAGNESIUM HYDROXIDE 30 ML: 400 SUSPENSION ORAL at 13:16

## 2025-04-09 RX ADMIN — SIMETHICONE 160 MG: 80 TABLET, CHEWABLE ORAL at 21:21

## 2025-04-09 RX ADMIN — INFLUENZA A VIRUS A/VICTORIA/4897/2022 IVR-238 (H1N1) ANTIGEN (FORMALDEHYDE INACTIVATED), INFLUENZA A VIRUS A/CALIFORNIA/122/2022 SAN-022 (H3N2) ANTIGEN (FORMALDEHYDE INACTIVATED), AND INFLUENZA B VIRUS B/MICHIGAN/01/2021 ANTIGEN (FORMALDEHYDE INACTIVATED) 180 MCG: 60; 60; 60 INJECTION, SUSPENSION INTRAMUSCULAR at 17:51

## 2025-04-09 RX ADMIN — Medication 10 ML: at 13:13

## 2025-04-09 RX ADMIN — LEVOTHYROXINE SODIUM 150 MCG: 150 TABLET ORAL at 06:45

## 2025-04-09 RX ADMIN — LORAZEPAM 0.5 MG: 0.5 TABLET ORAL at 00:41

## 2025-04-09 RX ADMIN — SENNOSIDES 2 TABLET: 8.6 TABLET, FILM COATED ORAL at 08:44

## 2025-04-09 RX ADMIN — HYDROMORPHONE HYDROCHLORIDE 2 MG: 2 TABLET ORAL at 08:43

## 2025-04-09 RX ADMIN — FAMOTIDINE 20 MG: 20 TABLET, FILM COATED ORAL at 21:21

## 2025-04-09 RX ADMIN — FLUOXETINE HYDROCHLORIDE 20 MG: 20 SOLUTION ORAL at 08:45

## 2025-04-09 RX ADMIN — ACETAMINOPHEN 650 MG: 650 SOLUTION ORAL at 17:07

## 2025-04-09 RX ADMIN — LIOTHYRONINE SODIUM 10 MCG: 5 TABLET ORAL at 06:11

## 2025-04-09 RX ADMIN — LORAZEPAM 0.5 MG: 0.5 TABLET ORAL at 21:21

## 2025-04-09 RX ADMIN — NYSTATIN 1 APPLICATION: 100000 POWDER TOPICAL at 08:45

## 2025-04-09 RX ADMIN — HEPARIN SODIUM 5000 UNITS: 5000 INJECTION, SOLUTION INTRAVENOUS; SUBCUTANEOUS at 21:34

## 2025-04-09 RX ADMIN — SIMETHICONE 160 MG: 80 TABLET, CHEWABLE ORAL at 13:12

## 2025-04-09 RX ADMIN — LINACLOTIDE 145 MCG: 145 CAPSULE, GELATIN COATED ORAL at 13:12

## 2025-04-09 RX ADMIN — CEFTRIAXONE SODIUM 2 G: 2 INJECTION, POWDER, FOR SOLUTION INTRAMUSCULAR; INTRAVENOUS at 06:05

## 2025-04-09 RX ADMIN — SODIUM PHOSPHATE 1 ENEMA: 7; 19 ENEMA RECTAL at 14:24

## 2025-04-09 RX ADMIN — HEPARIN SODIUM 5000 UNITS: 5000 INJECTION, SOLUTION INTRAVENOUS; SUBCUTANEOUS at 06:11

## 2025-04-09 RX ADMIN — EZETIMIBE 10 MG: 10 TABLET ORAL at 21:21

## 2025-04-09 RX ADMIN — SIMETHICONE 160 MG: 80 TABLET, CHEWABLE ORAL at 08:44

## 2025-04-09 ASSESSMENT — PATIENT HEALTH QUESTIONNAIRE - PHQ9
SUM OF ALL RESPONSES TO PHQ9 QUESTIONS 1 & 2: 0
SUM OF ALL RESPONSES TO PHQ QUESTIONS 1-9: 0

## 2025-04-09 NOTE — PLAN OF CARE
Plan of Care Review  Plan of Care Reviewed With: patient  Progress: improving  Outcome Evaluation: Alert and oriented x3. Mostly continent of bladder, continent of bowel. NPO flushes and medication via G tube. Scheduled enteral feeds during the day.  PRN Dilaudid administered x1 this shift for neck pain, effective. Neck incision is NANI with sutures intact. Piute J collar worn AATs. Continues on IV ABX Rocephin. Single lumen PICC line patent and intact to RUE. Sleeping quietly in bed overnight. Fall and safety measures maintained.

## 2025-04-09 NOTE — PROGRESS NOTES
Psychiatry Progress Note    History of Present Illness   See initial consult.  History pertaining to psychosis, depression and anxiety and other psychiatric and psychologic conditions as well as general medical history detailed in initial consult.      Interval History:     Saw in therpy ,  effort good  Responds to milleu  Nursing reports no behaviorlconcerns  Tolerates meds  Feels more positively about progress in therapy  Secondary benefit for mood  Sleep pattern more consistent  Energy continues to improve  Frustration level manageable  Seems less tense with some improved affect noted  Discussed with nursing - no problems overnight or any behavioral concerns  Does not report any concerns about psychotropic medicines     MENTAL STATUS EXAM  Appearance: well groomed  Gait and Motor: no abnormal movements  Speech: normal rate/rhythm/volume  Mood: depressed and anxious  Affect: constricted  Associations: coherent  Thought Process: goal-directed  Thought Content: no auditory or visual hallucinations.  Suicidality/Homicidality: denies  Judgement/Insight: acknowledges illness  Orientation: situation  Memory: knows current president  Attention: distracted  Knowledge: normal  Language: normal      Vital Signs for the last 24 hours:  Temp:  [36.7 °C (98.1 °F)-36.8 °C (98.2 °F)] 36.8 °C (98.2 °F)  Heart Rate:  [80-95] 86  Resp:  [18] 18  BP: (104-155)/(60-86) 147/86    Labs:  Labs below reviewed for pertinence to psychiatric condition  Lab Results   Component Value Date    GLUCOSE 176 (H) 04/07/2025    CALCIUM 9.2 04/07/2025     (L) 04/07/2025    K 4.5 04/07/2025    CO2 32 (H) 04/07/2025    CL 97 (L) 04/07/2025    BUN 16 04/07/2025    CREATININE 0.6 (L) 04/07/2025     Lab Results   Component Value Date    WBC 5.89 04/07/2025    HGB 12.4 (L) 04/07/2025    HCT 37.8 (L) 04/07/2025    MCV 94.3 04/07/2025     04/07/2025     Pain Management Panel           No data to display                  Current  Facility-Administered Medications   Medication Dose Route Frequency Provider Last Rate Last Admin    acetaminophen (TYLENOL) 650 mg/20.3 mL solution 650 mg  650 mg peg tube q6h PRN Rohit Acevdeo MD   650 mg at 04/08/25 1221    bisacodyL (DULCOLAX) 10 mg suppository 10 mg  10 mg rectal Daily PRN Rohit Acevedo MD   10 mg at 04/03/25 1554    cefTRIAXone (ROCEPHIN) 2 g in sodium chloride 0.9 % 100 mL IVPB  2 g intravenous q24h INT Rohit Acevedo MD   Stopped at 04/09/25 0643    docusate sodium (COLACE) 50 mg/5 mL liquid 100 mg  100 mg peg tube BID Spencer Kemp MD   100 mg at 04/08/25 1408    ezetimibe (ZETIA) tablet 10 mg  10 mg peg tube Nightly Rohit Acevedo MD   10 mg at 04/08/25 2116    famotidine (PEPCID) tablet 20 mg  20 mg peg tube Nightly Rohit Acevedo MD   20 mg at 04/08/25 2116    flu vaccine ts 2024-25 (65 yrs&up)(PF) (FLUZONE HIGH-DOSE TRIV) injection 180 mcg  0.5 mL intramuscular During hospitalization Spencer Kemp MD        FLUoxetine (PROzac) 20 mg/5 mL (4 mg/mL) solution 20 mg  20 mg peg tube Daily Rohit Acevedo MD   20 mg at 04/08/25 0744    glycopyrrolate (ROBINUL) tablet 1 mg  1 mg peg tube 2x daily PRN Rohit Acevedo MD        heparin (porcine) 5,000 unit/mL injection 5,000 Units  5,000 Units subcutaneous q8h [facility] Rohit Acevedo MD   5,000 Units at 04/09/25 0611    honey (MEDIHONEY) 100 % topical paste   Topical Daily Spencer Kemp MD   Given at 04/08/25 1052    HYDROmorphone (DILAUDID) tablet 2 mg  2 mg peg tube q6h PRN Lilian Marrero MD   2 mg at 04/08/25 2144    insulin lispro U-100 (HumaLOG) pen 1-12 Units  1-12 Units subcutaneous q6h DU Lilian Marrero MD   1 Units at 04/08/25 1753    lansoprazole (PREVACID) 3 mg/mL oral suspension 15 mg  15 mg feeding tube Daily before breakfast Lilian Marrero MD   15 mg at 04/08/25 0742    levothyroxine (SYNTHROID) tablet 150 mcg  150 mcg peg tube Daily (6:30a) Lilian Marrero MD    150 mcg at 04/09/25 0645    linaCLOtide (LINZESS) capsule 145 mcg  145 mcg g-tube Daily before lunch Lilian Marrero MD   145 mcg at 04/08/25 1407    linaCLOtide (LINZESS) capsule 145 mcg  145 mcg peg tube Daily PRN Lilian Marrero MD        liothyronine (CYTOMEL) tablet 10 mcg  10 mcg peg tube Daily (6:30a) Rohit Acevedo MD   10 mcg at 04/09/25 0611    LORazepam (ATIVAN) tablet 0.5 mg  0.5 mg oral q8h PRN Rohit Acevedo MD   0.5 mg at 04/09/25 0041    magnesium hydroxide (M.O.M.) 400 mg/5 mL suspension 30 mL  30 mL oral 2x daily PRN Lilian Marrero MD   30 mL at 04/08/25 1740    nystatin (MYCOSTATIN) 100,000 unit/gram topical powder 1 Application  1 Application Topical BID Spencer Kemp MD   1 Application at 04/08/25 2117    ondansetron ODT (ZOFRAN-ODT) disintegrating tablet 4 mg  4 mg peg tube q6h PRN Rohit Acevedo MD        senna (SENOKOT) tablet 1 tablet  1 tablet peg tube 2x daily PRN Rohit Acevedo MD   1 tablet at 04/03/25 1547    senna (SENOKOT) tablet 2 tablet  2 tablet peg tube BID Spencer Kemp MD   2 tablet at 04/08/25 1408    simethicone (MYLICON) chewable tablet 160 mg  160 mg peg tube QID Lilian Marrero MD   160 mg at 04/08/25 2116    [START ON 4/10/2025] sodium chloride 0.9 % infusion   intravenous Continuous Lilian Marrero MD        sodium chloride PF flush 10 mL  10 mL intravenous q8h INT Rohit Acevedo MD   10 mL at 04/09/25 0644    sodium chloride PF flush 10 mL  10 mL intravenous PRN Rohit Acevedo MD        sodium chloride tablet 1 g  1 g peg tube BID Rohit Acevedo MD   1 g at 04/08/25 2116    sodium phosphates (FLEET) enema adult 1 enema  1 enema rectal Daily PRN Spencer Kemp MD   1 enema at 04/08/25 1527        Patient Active Problem List   Diagnosis    Vertigo    Hypertension    Postsurgical hypothyroidism    Mixed hyperlipidemia    Gastroesophageal reflux disease    Increased sputum production    Allergic rhinitis    Change  in bowel habits    Chronic fatigue syndrome    Constipation, chronic    Deviated nasal septum    Elevated coronary artery calcium score    Eustachian tube dysfunction, bilateral    Examination of participant in clinical trial    Induration penis plastica    Hypertrophy of nasal turbinates    Hx of colonic polyps    Malignant neoplasm of salivary gland (CMS/HCC)    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)    Paralysis of vocal cords and larynx, unilateral    ENMANUEL (obstructive sleep apnea)    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)    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)    Penile curvature, acquired    Rhinitis sicca    Thyroid cancer (CMS/HCC)    Sensorineural hearing loss (SNHL), bilateral    Other dysphagia    Abdominal pain    Other dysphagia    COVID-19    Encounter for follow-up surveillance of thyroid cancer    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  Address automatic negtive thoguhts  Support coping with atd  Monitor progress in therapy  Trey Meyer MD  4/9/2025

## 2025-04-09 NOTE — PROGRESS NOTES
Subjective     Patient seen and examined on rounds.  Chart reviewed.  Events overnight noted.  History reviewed briefly with patient.    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    HPI: Mr. Celso Gramajo is a 69-year-old right-handed white male with chronic conditions significant for hypertension, hyperlipidemia, anxiety, thyroid cancer status post thyroidectomy and radical neck dissection, adjuvant radiation therapy/immunotherapy, esophageal squamous cell carcinoma status post chemoradiotherapy complicated by esophageal strictures, left vocal cord paralysis, dysphagia status post PEG tube placement in 2021, initially presented to the ER at [facility] on 3/11/25 with fevers and right shoulder pain. Workup was unrevealing and he was discharged home. He presented to the [facility] ER again on 3/17/25 with ongoing fevers. CT neck with no abscess, old postsurgical changes. He discharged home but blood cultures subsequently, 1 of 2 sets of blood cultures drawn at [facility] on 3/17/25 showed Streptococcus viridans returned positive on 3/19/25 and he returned for admission to [facility] on 3/19/25. TTE at [facility] was negative. Blood cultures on 3/19/25 and again on 3/23/25 showed no growth per his records. Imaging with C6-7 osteomyelitis/discitis with ventral epidural abscess. [facility] surgical evaluation.  At that time, he was afebrile.  Blood cultures  were negative. At [facility] ortho and ENT were consulted. Ultimately it was determined that an anterior surgical approach was not safe (due  to history of neck surgeries), and patient was having progressive weakness.  MRI of cervical spine on 3/27/25 revealed discitis and osteomyelitis at C6-C7 with ventral epidural abscess compressing the spinal cord and increased inferior extent of cord edema compared to prior. Patchy T1 hypointensity with enhancement in the upper cervical and thoracic spine, can represent metastatic lesions and/or post radiation changes, similar as compared to prior. He underwent posterior C4-T1 decompression and instrumented fusion on 3/27/25, performed by orthopedic surgeon Dr. Santiago Ca. Purulent fluid drained from anterior spine during the procedure and operating room cultures revealed Strep intermedius. Postop he was briefly in the ICU for MAP goals. He had a PICC placed 4/1/25. 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 He was started on salt tabs. Infectious diseases recommended IV Daptomycin which patient completed on 3/31/25.  He was recommended to continue Ceftriaxone 2g IV q12h x 14 day course (end date 4/8/25) then transition to Ceftriaxone 2g IV daily to complete total 6 week course (end date 5/8/25). He was recommend [place] J collar to neck at all times.  I discussed orthopedic spinal precautions with him.  He completed Decadron treatment for mild laryngeal edema visualized on preoperative NPL. Patient has dysphagia from esophageal strictures, recent dilation 3/3/25 was not successful per patient, cannot tolerate regular oral secretions and recommended NPO and continued on bolus tube feeds.  He was kept on Pepcid for GI prophylaxis. Glycopyrrolate as needed per patient/spouse wishes.  For anxiety he was continued on Fluoxetine and Ativan PRN.  He is not on medications for hypertension.  He was continued on Zetia for hyperlipidemia.  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 He continues on a sliding-scale Humalog coverage.  He is on Levothyroxine  "and Liothyronine for thyroid supplementation after thyroidectomy for thyroid cancer. He is on subcutaneous Heparin and SCDs for DVT prophylaxis.  I discussed his recent clinical course with patient and with his wife at bedside.  On 4/1/25, hemoglobin was 12.9, WBC count was 10.30, platelets were 255, BUN 15, creatinine was 0.51, sodium was 133 and potassium was 4.5.  He has been needing assistance for mobility, transfers, self-care. He is transferred to [facility] on 4/1/25 for further rehabilitation care.     SUBJ: Inspected neck incision which is stable.  Discussed with patient and with his mother-in-law with him.  He reports he feels constipated.  Internist adjusted bowel medications.    ROS: Denies chest pain or shortness of breath. Other ROS negative. Past, family, social history is unchanged.      Current Functional Status:   Bed mobility:   [place], Supine to Sit: minimum assist (75% or more patient effort)   [place], Sit to Supine: moderate assist (50-74% patient effort)   Transfers:    [place], Sit to Stand Transfer: touching/steadying assist  [place], Stand to Sit Transfer: touching/steadying assist   [place], Stand Pivot/Stand Step Transfer: minimum assist (75% or more patient effort)   Gait:   [place], Gait: minimum assist (75% or more patient effort)  Assistive Device: walker, front-wheeled   Distance in Feet: 60 feet    Bathing:   [place]: moderate assist (50-74% patient effort)   Toileting:   [place]: close supervision   Upper body dressing:   [place]: close supervision   Lower body dressing:   [place]: close supervision       Functional Progress:    Functional status reviewed. Overall, patient's functional status is improving.      Physical Exam      Blood pressure (!) 158/87, pulse 88, temperature 36.8 °C (98.2 °F), temperature source Oral, resp. rate 18, height 1.854 m (6' 1\"), weight 88.5 kg (195 lb), SpO2 94%.    Body mass index is " 25.73 kg/m².    General Appearance: Not in acute distress  Head/Ear/Nose/Throat: Normocephalic; Atraumatic.   Eye: EOMI; PERRL.   Neck: [place] J collar in place.  Respiratory: Decreased breath sounds at bases.   Cardiovascular: RRR; Normal S1, S2.   Gastrointestinal: Soft; NT; +BS.   Extremities: Bilateral lower extremity edema noted.    Musculoskeletal: Functional active range of motion in both upper and lower extremities.   Neurological: AAO ×3. Speech is fluent. Cranial nerve examination does not reveal any gross facial asymmetry. Strength testing bilateral deltoids are 4/5, bilateral biceps are 4+/5, bilateral triceps are 4/5, bilateral wrist extensors are 4+/5, bilateral hand FDP are 4+/5, bilateral and interossei are 4/5.  Bilateral hip flexion is less than 4/5.  Bilateral quadriceps are 5/5.  Bilateral ankle dorsi and plantar flexion are 5/5.  He is grossly able to localize touch and position sense in great toes, except reports numbness in all 5 digits of both hands and also numbness on the plantar aspects of both feet.  Deep tendon reflexes are symmetric and slightly brisk bilaterally.  Bilateral ankle clonus is present.  Plantars are withdrawal.  Coordination is functional upper extremities.    Behavior/Emotional: Appropriate; Cooperative.   Skin: Healing incision on posterior cervical spine.  Sutures in place.  Erythema/rash noted on coccyx unclear if stage I decubitus.  Rash noted on bilateral groins.  Reviewed pictures in epic.         Current Facility-Administered Medications:     acetaminophen (TYLENOL) 650 mg/20.3 mL solution 650 mg, 650 mg, peg tube, q6h PRN, Rohit Acevedo MD, 650 mg at 04/08/25 1221    bisacodyL (DULCOLAX) 10 mg suppository 10 mg, 10 mg, rectal, Daily PRN, Rohit Acevedo MD, 10 mg at 04/03/25 1554    cefTRIAXone (ROCEPHIN) 2 g in sodium chloride 0.9 % 100 mL IVPB, 2 g, intravenous, q24h INT, Rohit Acevedo MD, Stopped at 04/09/25 0643    docusate sodium (COLACE)  50 mg/5 mL liquid 100 mg, 100 mg, peg tube, BID, Spencre Kemp MD, 100 mg at 04/09/25 0843    ezetimibe (ZETIA) tablet 10 mg, 10 mg, peg tube, Nightly, Rohit Acevedo MD, 10 mg at 04/08/25 2116    famotidine (PEPCID) tablet 20 mg, 20 mg, peg tube, Nightly, Rohit Acevedo MD, 20 mg at 04/08/25 2116    flu vaccine ts 2024-25 (65 yrs&up)(PF) (FLUZONE HIGH-DOSE TRIV) injection 180 mcg, 0.5 mL, intramuscular, During hospitalization, Spencer Kemp MD    FLUoxetine (PROzac) 20 mg/5 mL (4 mg/mL) solution 20 mg, 20 mg, peg tube, Daily, Rohit Acevedo MD, 20 mg at 04/09/25 0845    glycopyrrolate (ROBINUL) tablet 1 mg, 1 mg, peg tube, 2x daily PRN, Rohit Acevedo MD    heparin (porcine) 5,000 unit/mL injection 5,000 Units, 5,000 Units, subcutaneous, q8h DU, Rohit Acevedo MD, 5,000 Units at 04/09/25 0611    honey (MEDIHONEY) 100 % topical paste, , Topical, Daily, Spencer Kemp MD, Given at 04/09/25 0806    HYDROmorphone (DILAUDID) tablet 2 mg, 2 mg, peg tube, q6h PRN, Lilian Marrero MD, 2 mg at 04/09/25 0843    insulin lispro U-100 (HumaLOG) pen 1-12 Units, 1-12 Units, subcutaneous, q6h DU, Lilian Marrero MD, 2 Units at 04/09/25 1022    lansoprazole (PREVACID) 3 mg/mL oral suspension 15 mg, 15 mg, feeding tube, Daily before breakfast, Lilian Marrero MD, 15 mg at 04/09/25 0750    levothyroxine (SYNTHROID) tablet 150 mcg, 150 mcg, peg tube, Daily (6:30a), Lilian Marrero MD, 150 mcg at 04/09/25 0645    linaCLOtide (LINZESS) capsule 145 mcg, 145 mcg, g-tube, Daily before lunch, Lilian Marrero MD, 145 mcg at 04/08/25 1407    linaCLOtide (LINZESS) capsule 145 mcg, 145 mcg, peg tube, Daily PRN, Lilian Marrero MD    liothyronine (CYTOMEL) tablet 10 mcg, 10 mcg, peg tube, Daily (6:30a), Rohit Acevedo MD, 10 mcg at 04/09/25 0611    LORazepam (ATIVAN) tablet 0.5 mg, 0.5 mg, oral, q8h PRN, Rohit Acevedo MD, 0.5 mg at 04/09/25 0041    magnesium hydroxide (M.O.M.) 400  mg/5 mL suspension 30 mL, 30 mL, oral, 2x daily PRN, Lilian Marrero MD, 30 mL at 04/08/25 1740    nystatin (MYCOSTATIN) 100,000 unit/gram topical powder 1 Application, 1 Application, Topical, BID, Spencer Kemp MD, 1 Application at 04/09/25 0845    ondansetron ODT (ZOFRAN-ODT) disintegrating tablet 4 mg, 4 mg, peg tube, q6h PRN, Rohit Acevedo MD    senna (SENOKOT) tablet 1 tablet, 1 tablet, peg tube, 2x daily PRN, Rohit Acevedo MD, 1 tablet at 04/03/25 1547    senna (SENOKOT) tablet 2 tablet, 2 tablet, peg tube, BID, Spencer Kemp MD, 2 tablet at 04/09/25 0844    simethicone (MYLICON) chewable tablet 160 mg, 160 mg, peg tube, QID, Lilian Marrero MD, 160 mg at 04/09/25 0844    [START ON 4/10/2025] sodium chloride 0.9 % infusion, , intravenous, Continuous, Lilian Marrero MD    sodium chloride PF flush 10 mL, 10 mL, intravenous, q8h INT, Rohit Acevedo MD, 10 mL at 04/09/25 0644    sodium chloride PF flush 10 mL, 10 mL, intravenous, PRN, Rohit Acevedo MD    sodium chloride tablet 1 g, 1 g, peg tube, BID, Rohit Acevedo MD, 1 g at 04/09/25 0845    sodium phosphates (FLEET) enema adult 1 enema, 1 enema, rectal, Daily PRN, Spencer Kemp MD, 1 enema at 04/08/25 1527       Labs / Radiology    Lab Results   Component Value Date    WBC 5.89 04/07/2025    HGB 12.4 (L) 04/07/2025    HCT 37.8 (L) 04/07/2025    MCV 94.3 04/07/2025     04/07/2025     Lab Results   Component Value Date    GLUCOSE 176 (H) 04/07/2025    CALCIUM 9.2 04/07/2025     (L) 04/07/2025    K 4.5 04/07/2025    CO2 32 (H) 04/07/2025    CL 97 (L) 04/07/2025    BUN 16 04/07/2025    CREATININE 0.6 (L) 04/07/2025       Assessment and Plan    ASSESSMENT PLAN:  1. 69-year-old right-handed white male with chronic conditions significant for hypertension, hyperlipidemia, anxiety, thyroid cancer status post thyroidectomy and radical neck dissection, adjuvant radiation therapy/immunotherapy, esophageal  squamous cell carcinoma status post chemoradiotherapy complicated by esophageal strictures, left vocal cord paralysis, dysphagia status post PEG tube placement in 2021, initially presented to the ER at [facility] on 3/11/25 with fevers and right shoulder pain. Workup was unrevealing and he was discharged home. He presented to the [facility] ER again on 3/17/25 with ongoing fevers. CT neck with no abscess, old postsurgical changes. He discharged home but blood cultures subsequently, 1 of 2 sets of blood cultures drawn at [facility] on 3/17/25 showed Streptococcus viridans returned positive on 3/19/25 and he returned for admission to [facility] on 3/19/25. TTE at [facility] was negative. Blood cultures on 3/19/25 and again on 3/23/25 showed no growth per his records. Imaging with C6-7 osteomyelitis/discitis with ventral epidural abscess. He was transferred to [facility] on 3/23/25 for surgical evaluation.  At that time, he was afebrile.  Blood cultures  were negative. At [facility] ortho and ENT were consulted. Ultimately it was determined that an anterior surgical approach was not safe (due to history of neck surgeries), and patient was having progressive weakness.  MRI of cervical spine on 3/27/25 revealed discitis and osteomyelitis at C6-C7 with ventral epidural abscess compressing the spinal cord and increased inferior extent of cord edema compared to prior. Patchy T1 hypointensity with enhancement in the upper cervical and thoracic spine, can represent metastatic lesions and/or post radiation changes, similar as compared to prior. He underwent posterior C4-T1 decompression and instrumented fusion on 3/27/25, performed by orthopedic surgeon Dr. Santiago Ca. Purulent fluid drained from anterior spine during the procedure and operating room cultures revealed Strep intermedius. Postop he was briefly in the ICU for MAP goals. He had a PICC placed 4/1/25. 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 He was started on salt tabs. Infectious diseases recommended IV Daptomycin which patient completed on 3/31/25.  He was recommended to continue Ceftriaxone 2g IV q12h x 14 day course (end date 4/8/25) then transition to Ceftriaxone 2g IV daily to complete total 6 week course (end date 5/8/25). He was recommend [place] J collar to neck at all times.  I discussed orthopedic spinal precautions with him.  He completed Decadron treatment for mild laryngeal edema visualized on preoperative NPL. Patient has dysphagia from esophageal strictures, recent dilation 3/3/25 was not successful per patient, cannot tolerate regular oral secretions and recommended NPO and continued on bolus tube feeds.  He was kept on Pepcid for GI prophylaxis. Glycopyrrolate as needed per patient/spouse wishes.  For anxiety he was continued on Fluoxetine and Ativan PRN.  He is not on medications for hypertension.  He was continued on Zetia for hyperlipidemia.  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 He continues on a sliding-scale Humalog coverage.  He is on Levothyroxine and Liothyronine for thyroid supplementation after thyroidectomy for thyroid cancer. He is on subcutaneous Heparin and SCDs for DVT prophylaxis.  I discussed his recent clinical course with patient and with his wife at bedside.  On 4/1/25, hemoglobin was 12.9, WBC count was 10.30, platelets were 255, BUN 15, creatinine was 0.51, sodium was 133 and potassium was 4.5.  He has been needing assistance for mobility, transfers, self-care. He is transferred to [place] [facility] on 4/1/25 for further rehabilitation care.      2. DVT prophylaxis - He is on subcutaneous Heparin and SCDs for DVT prophylaxis.  Check doppler.  Platelets 247 on 4/2/2025.  Doppler ultrasound on 4/3/2025 bilateral lower extremity was negative for DVT.  Platelets 216 on 4/7/2025.     3. Orthopedics - status post posterior C4-T1 decompression and  fusion, C6-C7 discitis and osteomyelitis with ventral epidural abscess compressing the spinal cord, history of esophageal squamous cell carcinoma status post chemoradiotherapy (CRT) complicated by esophageal strictures, left vocal cord paralysis, dysphagia status post PEG tube, history of thyroid cancer, multiple medical problems - continue PT, OT, psychology.  Follow falls precautions, cardiac precautions, aspiration precautions.  Follow spinal precautions.  [place] J collar to neck at all times.  Use [place] collar in shower.     4. GI - On Pepcid for GI prophylaxis.  On PRN Senokot.  On PRN Dulcolax suppository.  On PRN Zofran.     5.  -denies dysuria.  Monitor postvoid residuals.     6. CVS - monitor for orthostasis.     7. Pulmonary -encourage incentive spirometry.     8. Hematology - monitor hemoglobin, platelets.  Hemoglobin 12.3, WBC 9.54 on 4/2/2025.  Hemoglobin 12.4, WBC 5.89 on 4/7/2025.     9. Pain -on Tylenol.  On PRN Oxycodone.     10. Skin - Healing incision on posterior cervical spine.  Sutures in place.  Erythema/rash noted on coccyx unclear if stage I decubitus.  Rash noted on bilateral groins.  Reviewed pictures in epic.     11. F/E/N - nothing by mouth on bolus PEG feeds.  Nutrition consulted. 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 He was started on salt tabs.     12.  Dysphagia - H/O esophageal squamous cell carcinoma status post chemoradiotherapy complicated by esophageal strictures, left vocal cord paralysis, dysphagia status post PEG tube placement in 2021 - NPO on bolus PEG feeds.  Nutrition consulted.     13.  Psychiatry - on PRN Ativan.  Psychology consulted.     14.  Hypothyroidism -He is on Levothyroxine and Liothyronine for thyroid supplementation after thyroidectomy for thyroid cancer.     15. ENT -  H/O left vocal cord paralysis, treated with Decadron for edema around vocal cords recently.  On Glycopyrrolate as needed per patient/spouse wishes to manage secretions.       16.  Infectious diseases - He underwent posterior C4-T1 decompression and instrumented fusion on 3/27/25, performed by orthopedic surgeon Dr. Santiago Ca. Purulent fluid drained from anterior spine during the procedure and operating room cultures revealed Strep intermedius.. He had a PICC placed 4/1/25. Infectious diseases recommended IV Daptomycin which patient completed on 3/31/25.  He was recommended to continue Ceftriaxone 2g IV q12h x 14 day course (end date 4/8/25) then transition to Ceftriaxone 2g IV daily to complete total 6 week course (end date 5/8/25).     15.  Steroid-induced hyperglycemia -on sliding-scale Humalog coverage.      16. Rehabilitation medicine - Continue comprehensive rehabilitation care. Continue PT, OT, psychology.  Follow falls precautions, cardiac precautions, aspiration precautions.  Follow spinal precautions.  [place] J collar to neck at all times.  Use [place] collar in shower.Discussed patients progress in therapies with therapists in team meeting on 4/3/2025.  Discussed in PCC. See PCC documentation.  He reports no bowel movement in the past 3 to 4 days.  Also wanted antireflux medication in the morning and at home he takes Omeprazole via PEG per patient.  He is on Prevacid in the a.m. and Pepcid in p.m via PEG tube.  Due to constipation internist ordered enema.  Also scheduled Colace and Senokot were ordered through his PEG tube.  Discussed with patient on 4/3/2025.Discussed recommendations of PCC with patient on 4/4/2025.  Posterior cervical incision healing well.  He had 2 bowel movements today.  Family training scheduled for next week on Monday per case management note.  Discussed with patient on 4/4/2025.He was sitting on recliner on 4/5/2025, talking on phone with his wife.  Continent of bladder per nursing on 4/5/2025.  Tolerating PEG feeds without issues.  He reports he had a good bowel movement yesterday.  Discussed with him if he gets  loose bowel movements he  can always refuse bowel medications.  He reports he wants to stay on some bowel medications at this time.  Posterior cervical incision has dressing in place.  Denies increased pain on 4/5/2025.Saw patient earlier on 4/7/2025 morning and then again in the evening.  Discussed with patient and with his wife in the evening.  He requested IV saline infusion.  Ordered liter of saline overnight but patient and wife indicated that at home they usually have the infusion and 3 hours and do not want to eat or 12 hours.  Discussed with pharmacist.  Discussed with nurse.  Patient and wife insist that they usually get the infusion over 3 hours at home.  Also wanted increase bowel medications due to constipation.  Increase Senokot dose and ordered as needed treats enema.  Discussed with nurse on 4/7/2025. Again discussed with patient on 4/8/2025 in presence of nursing supervisor that IV fluid at high rate is not such a good idea and he can get the volume but at a lower rate.  He says he is used to getting IV fluids quickly at home.  Suggested that he also discuss with his cardiologist if this is a safe practice and he can be prone to getting fluid overload and may put him at risk for CHF/pulmonary edema in future if his cardiac function decompensates.  Discussed with internist Dr. Marrero who also agrees with this possibility.  I mentioned to patient to discuss with his cardiologist regarding this and follow cardiology guidance regarding his desire to get IV fluids quickly at home at a high hourly volume in a short time.  Bowel medications were adjusted by internist on 4/8/2025.Inspected neck incision on 4/9/2025 which is stable.  He reports he feels constipated.  Internist adjusted bowel medications.Discussed with patient and with his mother-in-law with him on 4/9/2025.       17. Reviewed labs today. BUN 18, creatinine 0.6, sodium 133, potassium 4.9 on 4/2/2025.  BUN 16, creatinine 0.6, sodium 135, potassium 4.5 on 4/7/2025.            Spencer Kemp MD  4/9/2025      This encounter was completed utilizing the Direct Speech Voice Recognition Software. Grammatical errors, random word insertions, pronoun errors, and incomplete sentences are occasional consequences of the system due to software limitations, ambient noises, and hardware issues. Such errors may be missed prior to affixing electronic signature. Any questions or concerns about the content, text, or information contained within the body of this dictation should be directly addressed to the physician for clarification. If you have any questions or concerns please do not hesitate to call me directly via EPIC chat, page, or email.

## 2025-04-09 NOTE — PLAN OF CARE
Plan of Care Review  Plan of Care Reviewed With: patient  Progress: improving  Outcome Evaluation: Alert and oriented x 4.     continent of bowel and bladder.     Pain managed using dilaudid.    [place] J AATs.    NPO.    Tube feedings administered.     Flushes completed.    Medications via tube.     Wound to left subclavical using medihoney aquacel/aq 2x2 and mepilex.      Accu checks monitored prior to feedings.

## 2025-04-09 NOTE — PROGRESS NOTES
Patient: Celso Gramajo  Location: [facility] [address]  MRN: [id number]  Today's date: 4/9/2025    History of Present Illness  Celso is a 69 y.o. male admitted on 4/1/2025 with Spinal epidural abscess [G06.1]. Principal problem is Spinal epidural abscess.    Celso Gramajo is a 69 year old male with PMHx thyroid CA s/p thyroidectomy, RND, adjuvant XRT/immunotherapy, esophageal SCC s/p chemoradiation c/b esophageal strictures, HTN, Elevated Coronary Calcium scoring in 2017, ENMANUEL, Hypothyroidism, Anemia, dysphagia on PEG (placed in 2021) TFs, and history of CVA who presented to an OSH with neck and R shoulder pain. Workup was unrevealing and he was discharged home.    He presented to the [place] ED again on 3/17 with ongoing fevers. CT Neck with no abscess, old postsurgical changes. He d/c home but BCx subsequently returned positive on 3/19 and he returned for admission. He was also bacteremic with strep viridans, and placed on IV antibiotics. Workup revealed a C5-C7 ventral epidural abscess with osteomyelitis, and he was transferred to [facility] for surgical evaluation.    MRI on 3/24 c/f metastatic disease at T1-T4, L4.      At [facility] ortho and ENT were consulted. Ultimately it was determined that an anterior surgical approach was not safe (due to history of neck surgeries), and patient was having progressive weakness. Repeat MRI C-spine showed Discitis and osteomyelitis at C6-C7 with ventral epidural abscess compressing the spinal cord and increased edema. Patient urgently underwent POSTERIOR C4-T1 DECOMPRESSION FUSION WITH [place] INSTRUMENTATION on 3/27. Postop he was briefly in the ICU for MAP goals. OR cultures grew strep intermedius and he was placed on the antibiotic plan below. He had a PICC placed 4/1. 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 He was started on salt tabs. In rehab please continue to check labs, and titrate the salt tabs as appropriate.  He worked with PT/OT who recommended him for acute rehab.     Past Medical History  Celso has a past medical history of Anemia, Cancer (CMS/[facility]), head and neck radiation, Hypertension, Hypothyroidism, Stroke (CMS/HCC), and Thyroid disease.    OT Vitals      Date/Time Pulse HR Source Pt Activity BP BP Location BP Method Pt Position [facility]   04/09/25 0813 88 Monitor At rest 158/87 Left upper arm Automatic Lying AA          OT Pain      Date/Time Pain Type Side/Orientation Location Rating: Rest Rating: Activity Description Interventions [facility]   04/09/25 0813 Pain Assessment -- -- 0 0 -- -- AA              04/09/25 0929 Pain Assessment shoulder bilateral 4 6 aching premedicated for activity AA               Prior Living Environment      Flowsheet Row Most Recent Value   People in Home spouse   Current Living Arrangements home   Home Accessibility stairs to enter home (Group), stairs within home (Group)   Living Environment Comment Multi-story home with 12-14 steps to second floor, powder room on 1st floor, 2-3 FELICITA via garage, multiple supportive family members   Number of Stairs, Main Entrance 3   Stair Railings, Main Entrance none   Location, Patient Bedroom second floor, must negotiate stairs to access   Location, Bathroom second floor, must negotiate stairs to access   Bathroom Access Comment powder room 1st floor standard height toilet, full bathroom 2nd floor WIS with glass door, standard height toilet   Stairs, Within Home, Primary 12   Stair Railings, Within Home, Primary railings on both sides of stairs            Prior Level of Function      Flowsheet Row Most Recent Value   Dominant Hand right   Ambulation independent   Transferring independent   Toileting independent   Bathing independent   Dressing independent   Eating independent   IADLs independent   Driving/Transportation    Communication understands/communicates without difficulty   Past History of Dysphagia PTA pt was completely independent,  no AD. (+) . NPO PTA, manages peg tube independently.   Prior Level of Function Comment Independent without use of AE or DME.   Assistive Device Currently Used at Home none            Occupational Profile      Flowsheet Row Most Recent Value   Occupational History/Life Experiences retired involved in [employer] on [employer]. + , -handicap driving placard.   Patient Goals PSFS: going to the bathroom 0/10, walking 1/10, getting in/out of bed 0/10 = 1/30 = 3.33%             IRF OT Evaluation and Treatment - 04/09/25 0806          OT Time Calculation    Start Time 0803     Stop Time 0933     Time Calculation (min) 90 min        Session Details    Document Type Daily Treatment/Progress Note        General Information    General Observations of Patient Pt recieved for therapy in bed, nursing shaving pt's face. Care also provided by Giulia Heydeman, OTD, OTR/L this session.        Mobility Belt    Mobility Belt Used During Session yes        Orthosis Neck Cervical Collar    Orthosis Properties Date Obtained: 04/03/25 Time Obtained: 0825 Location: Neck Type: Cervical Collar Features: Hard Description: Hard collar AAT; can be removed seated to don [place] collar for bathing.    Compliance/Wearing Issues patient;compliant with wearing schedule        Bed Mobility    Comment OT: Steadying A supine > sit on EOB c UE support on bed rail to push self to sit.        Sit to Stand Transfer    [place], Sit to Stand Transfer touching/steadying assist     Safety/Cues technique;hand placement     Assistive Device walker, front-wheeled     Comment from EOB, assist for balance        Stand to Sit Transfer    [place], Stand to Sit Transfer touching/steadying assist     Safety/Cues increased time to complete;hand placement     Assistive Device walker, front-wheeled     Comment to EOB and w/c, assist for controlled descent        Toilet Transfer    Transfer Technique sit/stand     [place] touching/steadying  assist     Safety/Cues hand placement     Assistive Device grab bars/safety frame     Comment Amb approach c RW, assist for controlled descent onto accessible height toilet + grab bar        Shower Transfer    [place] minimum assist (75% or more patient effort)     Assistive Device grab bars/tub rail;shower chair     Comment Per last report: Side stepping over small shower ledge c UE support on wall and RW, pt naturally grabbing grab bar for greater stability. Discussed use of DME for use at home, to review rec with wife during family training tomorrow.        Impairments/Safety Issues    Comment, Safety Issues/Impairments OT: Steadiyng A for HHM c RW short HHD.        Balance    Comment, Balance OT: CLS-steadying A for balance standing unsupported for toileting/clothing management        Motor Skills    Comment, Motor Skills (1) Pt engaging in bimanual task to open/close commonly used household containers. Inc time to perform, but ultimately pt able to open various types of lids/cap without difficulty. (2) complete small clothespin design, focus on pad to pad pinch to fully open each pin prior to placing on board. Performed c RUE x1 pattern and c LUE x1 pattern.        Hand (Therapeutic Exercise)    Exercise Position/Type seated     General Exercise bilateral     Reps and Sets x4 minutes     Comment Use of pulley system; self selected pace for shoulder flexion for stretch to UE. Edu pt on home pulley system, per pt request emailed wife pulley to order for home use.        Aerobic Exercise    Type arm bike     Time Performed x4     Comment min resistance, warm up        Bathing    Shower Provided? No, see comment   recieved shower with nursing on 4/8    [place] moderate assist (50-74% patient effort)     Position supported sitting     Comment Pt reports having a shower with nursing staff. Reports needed assistance to wash LE and buttocks. Reports did not try LH sponge d/t time constraints with nursing  staff.        Upper Body Dressing    Tasks don;front-opening garment     [place] close supervision     Safety/Cues increased time to complete     Position unsupported standing     Comment Standing to don front open shirt, pt able to button shirt c increased time. Cl S for balance to perform bimanual task in stance        Lower Body Dressing    Tasks don;pants/bottoms;socks     [place] close supervision     Position edge of bed sitting;supported standing     [place], Footwear close supervision     Comment Seated on EOB, Cl S for balance with figure 4 sit to thread BLE into pants, Cl S for stand/arrange c UE support on RW for intermittent balance. Pt able to don/doff B/L socks c increased time. Declined sneakers d/t tight sensation on feet.        Grooming    Tasks washes, rinses and dries hands;oral care (brushing teeth, cleaning dentures)     [place] touching/steadying assist     Position sink side;unsupported standing;supported standing     [place], Oral Hygiene close supervision     Comment Standing at sink c RW to perform grooming tasks. Cl S-Steadying A for balance when performing bimanual tasks d/t pt report of LE fatigue.        Toileting    Tasks adjust/manage clothing;perform bladder hygiene     [place] close supervision     Position unsupported sitting;unsupported standing     Adaptive Equipment grab bar/safety frame;accessible height toilet     Comment Cl S for pant management c intermittent UE support on RW. Later in session, CLS-steadying A for balance while standing to face toilet, continent/voiding bladder.        Daily Progress Summary (OT)    Daily Outcome Statement Declined wet shower on this date d/t to taking one with nursing night prior. Reports needed assistance to wash distal LE and did not get the opportunity to use LH sponge, to trial in next shower. Pt is progressing towards a ClS-Steadying A for sit/stand transfers and a Cl S-Steadying A for ADLs (needed for  unsteadiness c LE when fatigued). Continue c OT POC, plan to work with nursing tomorrow to trial pt's ability to complete self-feeds.                          Education Documentation  No documentation found.        IRF OT Goals      Flowsheet Row Most Recent Value   Transfer Goal 1    Activity/Assistive Device toilet at 04/02/2025 0723   [place] minimum assist (75% or more patient effort) at 04/02/2025 0723   Time Frame short-term goal (STG), 5 - 7 days at 04/03/2025 0754   Strategies/Barriers pt just evlauated at 04/03/2025 0754   Progress/Outcome progress slower than expected, goal ongoing, goal revised this date at 04/03/2025 0754   Transfer Goal 2    Activity/Assistive Device toilet at 04/02/2025 0723   [place] modified independence at 04/02/2025 0723   Time Frame long-term goal (LTG), 21 days or less at 04/02/2025 0723   Progress/Outcome goal ongoing at 04/03/2025 0754   Transfer Goal 3    Activity/Assistive Device shower at 04/02/2025 0723   [place] other (see comments)  [TBA] at 04/02/2025 0723   Time Frame short-term goal (STG), 5 - 7 days at 04/03/2025 0754   Strategies/Barriers pt just evlauated at 04/03/2025 0754   Progress/Outcome progress slower than expected, goal ongoing, goal revised this date at 04/03/2025 0754   Transfer Goal 4    Activity/Assistive Device shower at 04/02/2025 0723   [place] other (see comments)  [TBA] at 04/02/2025 0723   Time Frame long-term goal (LTG), 21 days or less at 04/02/2025 0723   Progress/Outcome goal ongoing at 04/03/2025 0754   Bathing Goal 1    [place] moderate assist (50-74% patient effort) at 04/02/2025 0723   Time Frame short-term goal (STG), 5 - 7 days at 04/03/2025 0754   Strategies/Barriers pt just evlauated at 04/03/2025 0754   Progress/Outcome progress slower than expected, goal ongoing, goal revised this date at 04/03/2025 0754   Bathing Goal 2    [place] supervision required at 04/02/2025 0723   Time Frame long-term goal (LTG),  21 days or less at 04/02/2025 0723   Progress/Outcome goal ongoing at 04/03/2025 0754   UB Dressing Goal 1    [place] other (see comments)  [TBA] at 04/02/2025 0723   Time Frame short-term goal (STG), 5 - 7 days at 04/03/2025 0754   Strategies/Barriers pt just evlauated at 04/03/2025 0754   Progress/Outcome progress slower than expected, goal ongoing, goal revised this date at 04/03/2025 0754   UB Dressing Goal 2    [place] other (see comments)  [TBA] at 04/02/2025 0723   Time Frame long-term goal (LTG), 21 days or less at 04/02/2025 0723   Progress/Outcome goal ongoing at 04/03/2025 0754   LB Dressing Goal 1    [place] moderate assist (50-74% patient effort) at 04/02/2025 0723   Time Frame short-term goal (STG), 5 - 7 days at 04/03/2025 0754   Strategies/Barriers pt just evlauated at 04/03/2025 0754   Progress/Outcome progress slower than expected, goal ongoing, goal revised this date at 04/03/2025 0754   LB Dressing Goal 2    [place] modified independence at 04/02/2025 0723   Time Frame long-term goal (LTG), 21 days or less at 04/02/2025 0723   Progress/Outcome goal ongoing at 04/03/2025 0754   Grooming Goal 1    [place] set-up required at 04/02/2025 0723   Time Frame short-term goal (STG), 5 - 7 days at 04/03/2025 0754   Strategies/Barriers pt just evlauated at 04/03/2025 0754   Progress/Outcome progress slower than expected, goal ongoing, goal revised this date at 04/03/2025 0754   Grooming Goal 2    [place] modified independence at 04/02/2025 0723   Time Frame long-term goal (LTG), 21 days or less at 04/02/2025 0723   Progress/Outcome goal ongoing at 04/03/2025 0754   Toileting Goal 1    [place] moderate assist (50-74% patient effort) at 04/02/2025 0723   Time Frame short-term goal (STG), 5 - 7 days at 04/03/2025 0754   Strategies/Barriers pt just evlauated at 04/03/2025 0754   Progress/Outcome progress slower than expected, goal ongoing, goal revised this date at 04/03/2025  0754   Toileting Goal 2    [place] modified independence at 04/02/2025 0723   Time Frame long-term goal (LTG), 21 days or less at 04/02/2025 0723   Progress/Outcome goal ongoing at 04/03/2025 0754

## 2025-04-09 NOTE — PROGRESS NOTES
Patient: Celso Gramajo  Location: [facility] [address]  MRN: [id number]  Today's date: 4/9/2025    History of Present Illness  Celso is a 69 y.o. male admitted on 4/1/2025 with Spinal epidural abscess [G06.1]. Principal problem is Spinal epidural abscess.    Celso Gramajo is a 69 year old male with PMHx thyroid CA s/p thyroidectomy, RND, adjuvant XRT/immunotherapy, esophageal SCC s/p chemoradiation c/b esophageal strictures, HTN, Elevated Coronary Calcium scoring in 2017, ENMANUEL, Hypothyroidism, Anemia, dysphagia on PEG (placed in 2021) TFs, and history of CVA who presented to an OSH with neck and R shoulder pain. Workup was unrevealing and he was discharged home.    He presented to the [place] ED again on 3/17 with ongoing fevers. CT Neck with no abscess, old postsurgical changes. He d/c home but BCx subsequently returned positive on 3/19 and he returned for admission. He was also bacteremic with strep viridans, and placed on IV antibiotics. Workup revealed a C5-C7 ventral epidural abscess with osteomyelitis, and he was transferred to [facility] for surgical evaluation.    MRI on 3/24 c/f metastatic disease at T1-T4, L4.      At [facility] ortho and ENT were consulted. Ultimately it was determined that an anterior surgical approach was not safe (due to history of neck surgeries), and patient was having progressive weakness. Repeat MRI C-spine showed Discitis and osteomyelitis at C6-C7 with ventral epidural abscess compressing the spinal cord and increased edema. Patient urgently underwent POSTERIOR C4-T1 DECOMPRESSION FUSION WITH [place] INSTRUMENTATION on 3/27. Postop he was briefly in the ICU for MAP goals. OR cultures grew strep intermedius and he was placed on the antibiotic plan below. He had a PICC placed 4/1. 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 He was started on salt tabs. In rehab please continue to check labs, and titrate the salt tabs as appropriate.  He worked with PT/OT who recommended him for acute rehab.     Past Medical History  Celso has a past medical history of Anemia, Cancer (CMS/HCC), head and neck radiation, Hypertension, Hypothyroidism, Stroke (CMS/HCC), and Thyroid disease.       04/09/25 1115 04/09/25 1120 04/09/25 1145   Pain/Comfort/Sleep   Pain Charting Type Pain Assessment  --   --    Preferred Pain Scale number (Numeric Rating Pain Scale)  --   --    (0-10) Pain Rating: Rest 2  --   --    (0-10) Pain Rating: Activity 4  --   --    Pain Side/Orientation bilateral  --   --    Pain Body Location shoulder  --   --    Pain Management Interventions pain management plan reviewed with patient/caregiver  --   --    Vitals   Heart Rate (!) 112 98 (!) 114   Heart Rate Source Monitor Left Radial Monitor   SpO2 94 %  --   --    Patient Activity At rest  --   --    /60  --  (!) 88/56   BP Location Left upper arm  --  Left upper arm   BP Method Manual  --  Manual   Patient Position Sitting  --  Sitting      04/09/25 1147 04/09/25 1205   Pain/Comfort/Sleep   Pain Charting Type  --  Post Activity   Preferred Pain Scale  --  number (Numeric Rating Pain Scale)   (0-10) Pain Rating: Rest  --  2   (0-10) Pain Rating: Activity  --   --    Pain Side/Orientation  --  bilateral   Pain Body Location  --  neck   Pain Management Interventions  --  pain management plan reviewed with patient/caregiver;care clustered  (provided recliner bedside chair)   Vitals   Heart Rate (!) 104  --    Heart Rate Source Left Radial  --    SpO2  --   --    Patient Activity  --   --    /60  --    BP Location Left upper arm  --    BP Method Manual  --    Patient Position Sitting  --               Prior Living Environment      Flowsheet Row Most Recent Value   People in Home spouse   Current Living Arrangements home   Home Accessibility stairs to enter home (Group), stairs within home (Group)   Living Environment Comment Multi-story home with 12-14 steps to second floor, powder room  on 1st floor, 2-3 FELICITA via garage, multiple supportive family members   Number of Stairs, Main Entrance 3   Stair Railings, Main Entrance none   Location, Patient Bedroom second floor, must negotiate stairs to access   Location, Bathroom second floor, must negotiate stairs to access   Bathroom Access Comment powder room 1st floor standard height toilet, full bathroom 2nd floor WIS with glass door, standard height toilet   Stairs, Within Home, Primary 12   Stair Railings, Within Home, Primary railings on both sides of stairs            Prior Level of Function      Flowsheet Row Most Recent Value   Dominant Hand right   Ambulation independent   Transferring independent   Toileting independent   Bathing independent   Dressing independent   Eating independent   IADLs independent   Driving/Transportation    Communication understands/communicates without difficulty   Past History of Dysphagia PTA pt was completely independent, no AD. (+) . NPO PTA, manages peg tube independently.   Prior Level of Function Comment Independent without use of AE or DME.   Assistive Device Currently Used at Home none                 04/09/25 1110   PT Time Calculation   Start Time 1110   Stop Time 1210   Time Calculation (min) 60 min   Session Details   Document Type Daily Treatment/Progress Note   General Information   Patient Profile Reviewed yes   Patient/Family/Caregiver Comments/Observations pts mother in law Tammie present\   General Observations of Patient received in , redirection to short term goals   Mobility Belt   Mobility Belt Used During Session yes   Orthosis Neck Cervical Collar   Date Obtained/Time Obtained: 04/03/25 0825   Location: Neck  Type: Cervical Collar  Features: Hard  Description: Hard collar AAT; can be removed seated to don Desha collar for bathing.   Compliance/Wearing Issues patient;compliant with wearing schedule   Transfers   Comment SPT to bedside recliner end of session- Min  "A with cues for turning safety and decreased speed of movement   Sit to Stand Transfer   [place], Sit to Stand Transfer minimum assist (75% or more patient effort)   Safety/Cues technique   Assistive Device walker, front-wheeled;wheelchair   Comment assist for balance, variable width of [place]AN, cues for standing upon transfer for BP management   Stand to Sit Transfer   [place], Stand to Sit Transfer minimum assist (75% or more patient effort)   Safety/Cues technique   Assistive Device walker, front-wheeled;wheelchair   Comment assist for steadying balance to control descent   Stand Pivot Transfer   [place], Stand Pivot/Stand Step Transfer minimum assist (75% or more patient effort)   Safety/Cues technique   Assistive Device walker, front-wheeled   Comment cues for safety and balance with turning, decreased speed and turning radius decreased   Gait Training   [place], Gait minimum assist (75% or more patient effort)   Safety/Cues increased time to complete;technique   Assistive Device walker, front-wheeled   Distance in Feet 75 feet   Pattern step-through   Deviations/Abnormal Patterns ataxic;base of support, narrow   Comment 75ft x4, 100ft x2, 25ft x3 multiple trials throughout activities, cues throughout for safety, obstacle negoitation and BLE glute engagement   Curb Negotiation   [place] minimum assist (75% or more patient effort)   Assistive Device walker, front-wheeled   Curb Height 6 inches   Comment 6\" curb x2, 8\" curb x2 Min A on pelvis for balance, cues for spinal precautions   Stairs Training   [place], Stairs minimum assist (75% or more patient effort)   Safety/Cues technique;sequencing;maintaining precautions;verbal cues   Assistive Device railing   Handrail Location (Stairs) left side (ascending);left side (descending)   Number of Stairs 8   Stair Height 6 inches   Ascending Stairs Technique step-over-step   Descending Stairs Technique step-to-step   Comment pt required " "redirection for stair safety for 4 steps step over; additional 4x2, 6\" steps with SPC and railing, LLE leading, RLE descending   Balance   Comment, Balance Side Stepping  with RW 15ft to R 15ft to L with Min A on balance, cues for foot clearance   Motor Skills   Functional Endurance fair   Lower Extremity (Therapeutic Exercise)   Exercise Position/Type seated   General Exercise bilateral;ankle pumps;LAQ (long arc quad);marching while seated   Reps and Sets 3/10                   IRF PT Goals      Flowsheet Row Most Recent Value   Bed Mobility Goal 1    Activity/Assistive Device sit to supine/supine to sit at 04/02/2025 0904   [place] supervision required at 04/02/2025 0904   Time Frame short-term goal (STG), 5 - 7 days at 04/03/2025 0856   Strategies/Barriers evaluated 4/2, continue with PT POC at 04/03/2025 0856   Progress/Outcome goal ongoing, good progress toward goal at 04/03/2025 0856   Bed Mobility Goal 2    Activity/Assistive Device bed mobility activities, all at 04/02/2025 0904   [place] modified independence at 04/02/2025 0904   Time Frame long-term goal (LTG), 3 weeks at 04/02/2025 0904   Progress/Outcome goal ongoing at 04/03/2025 0856   Transfer Goal 1    Activity/Assistive Device sit-to-stand/stand-to-sit, stand pivot at 04/02/2025 0904   [place] minimum assist (75% or more patient effort) at 04/02/2025 0904   Time Frame short-term goal (STG), 5 - 7 days at 04/03/2025 0856   Strategies/Barriers evaluated 4/2, conitnue with PT POC at 04/03/2025 0856   Progress/Outcome goal ongoing, good progress toward goal at 04/03/2025 0856   Transfer Goal 2    Activity/Assistive Device sit-to-stand/stand-to-sit, stand pivot at 04/02/2025 0904   [place] modified independence at 04/02/2025 0904   Time Frame long-term goal (LTG), 3 weeks at 04/02/2025 0904   Progress/Outcome goal ongoing at 04/03/2025 0856   Gait/Walking Locomotion Goal 1    Activity/Assistive Device gait (walking locomotion), " assistive device use at 04/02/2025 0904   Distance 150 feet at 04/02/2025 0904   [place] minimum assist (75% or more patient effort) at 04/02/2025 0904   Time Frame short-term goal (STG), 5 days at 04/03/2025 0856   Strategies/Barriers evaluated 4/2, contiune with PT POC at 04/03/2025 0856   Progress/Outcome good progress toward goal, goal ongoing at 04/03/2025 0856   Gait/Walking Locomotion Goal 2    Activity/Assistive Device gait (walking locomotion), assistive device use at 04/02/2025 0904   Distance 150 feet at 04/02/2025 0904   [place] modified independence at 04/02/2025 0904   Time Frame long-term goal (LTG), 3 weeks at 04/02/2025 0904   Progress/Outcome goal ongoing at 04/03/2025 0856   Stairs Goal 1    Activity/Assistive Device ascending stairs, descending stairs, using handrail, left, using handrail, right at 04/02/2025 0904   Number of Stairs 12 at 04/02/2025 0904   [place] minimum assist (75% or more patient effort) at 04/02/2025 0904   Time Frame short-term goal (STG), 5 - 7 days at 04/03/2025 0856   Strategies/Barriers evaluated 4/2, continue PT POC at 04/03/2025 0856   Progress/Outcome good progress toward goal, goal ongoing at 04/03/2025 0856   Stairs Goal 2    Activity/Assistive Device ascending stairs, descending stairs, using handrail, left, using handrail, right at 04/02/2025 0904   Number of Stairs 12 at 04/02/2025 0904   [place] supervision required at 04/02/2025 0904   Time Frame long-term goal (LTG), 3 weeks at 04/02/2025 0904   Progress/Outcome goal ongoing at 04/03/2025 0856

## 2025-04-09 NOTE — PLAN OF CARE
Problem: Enteral Nutrition  Goal: Feeding Tolerance  Outcome: Progressing   Nutrition Interventions/ Recommendations:   Continue NPO with current TF: [employer] Peptide 1.5 425ml QID with 500ml H2O flush in am and 300ml flush at HS  Continue 1 packet Prosource daily  Advised patient to start slowly with Hyfiber (brought in from home) as it contains FOS and increase slowly to prevent gas and bloating  Obtain weekly weight and update in Epic  RD spoke with nurse manager about ordering special TF bags for patient's own TF pump

## 2025-04-09 NOTE — PROGRESS NOTES
Nutrition Note    Clinical Course: Patient is a 69 y.o. male who was admitted on 4/1/2025 with a diagnosis of Spinal epidural abscess [G06.1]  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 [G06.1].     Nutrition Interventions/ Recommendations:   Continue NPO with current TF: [employer] Peptide 1.5 425ml QID with 500ml H2O flush in am and 300ml flush at HS  Continue 1 packet Prosource daily  Advised patient to start slowly with Hyfiber (brought in from home) as it contains FOS and increase slowly to prevent gas and bloating  Obtain weekly weight and update in Epic  RD spoke with nurse manager about ordering special TF bags for patient's own TF pump  At nutrition risk level 2    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 drug thru [facility]a     Hx of head and neck radiation     Hypertension     Hypothyroidism     Stroke (CMS/HCC)     Thyroid disease      Past Surgical History   Procedure Laterality Date    Cholecystectomy      Hernia repair      Micro direct laryngoscopy with fat implantation, abdominal fat harvest,laser N/A 11/23/2020    Performed by Jon German MD at [facility]    Neck dissection      Neck surgery      DE EGD INSERT GUIDE WIRE DILATOR PASSAGE ESOPHAGUS [26030 (CPT®)] N/A 10/17/2022    Performed by Ricardo Cabral MD at [facility]    Thyroid surgery      Tumor removal      L neck 20yrs ago             Dietary Orders   (From admission, onward)                 Start     Ordered    04/03/25 1648  Tube Feed with NPO [employer] 1.4 standard; Bolus; 425; 4; one; RD/LDN may adjust order; AM Snack  (Tube Feed with NPO Diet Orders with insertion and maintenance panels)  Diet effective now        Comments: Continue NPO with TF: Bolus 425ml [employer] Peptide 1.5 (Patient's own substituted for [employer] 1.4 Standard) QID (@7:30, 10:30, 14:30 and 17:30) with 500ml H2O flush in am after levothyroxine is administered and 300ml H2O flush at  "HS   Question Answer Comment   Tube Feeding Formula (BMR): [employer] 1.4 standard    Administration Type Bolus    Tube Feeding Bolus (mL): 425    Tube Feeding Bolus (Times/Day): 4    Protein Supplement (packet): one    Delegation of Authority. Diet orders written by PA/Mckenzie may not be adjusted by RD/LDNs. RD/LDN may adjust order    Meal period (AM Snack required for CBORD, do not remove: Not clinically relevant) AM Snack       Placed in \"And\" Linked Group    04/03/25 4993                    Reason for Assessment  Reason For Assessment: per organizational policy  Identified At Risk by Screening Criteria: tube feeding or parenteral nutrition    [facility] Nutrition Screen Tool  Has patient lost weight without trying?: 0-->No  Has patient been eating poorly due to decreased appetite?: 0-->No (on tube feeding, NPO)  [facility] Nutrition Screen Score: 0    Nutrition/Diet History  Appetite Prior to Admission: No oral intake  Supplemental Drinks/Foods/Additives: brought in own Hyfiber supplement to put down PEG  Vitamin/Mineral/Herbal Supplements: NaCl tablets  Food Allergies: no known food allergies  Factors Affecting Nutritional Intake: difficulty/impaired swallowing  Tube feeding/TPN Prior to Admission?: Tube feeding-yes    Physical Findings  Overall Physical Appearance:  (appears well-nourished)  Gastrointestinal:  (C/O bloating)  Last Bowel Movement: 04/09/25  Tubes: PEG  Skin: pressure injury, surgical incision (neck incision Stage 1 - R foot, unstageable wound - R throat 1.2x1.3x0.2)    RETS18 Physical Appearance  Overall Physical Appearance:  (appears well-nourished)  Gastrointestinal:  (C/O bloating)  Last Bowel Movement: 04/09/25  Tubes: PEG  Skin: pressure injury, surgical incision (neck incision Stage 1 - R foot, unstageable wound - R throat 1.2x1.3x0.2)    Nutrition Order  Nutrition Order: meets nutritional requirements  Nutrition Order Comments: NPO/TF  Current TF/TPN Regimen: [employer] Peptide 1.5, 425ml bolus " "QID    Anthropometrics  Height: 185.4 cm (6' 1\")    Wt Readings from Last 3 Encounters:   04/09/25 88.5 kg (195 lb)   04/01/25 92.5 kg (204 lb)   03/11/25 91.6 kg (202 lb)       Weights (last 7 days)       Date/Time Weight    04/09/25 0930 88.5 kg (195 lb)    04/02/25 1215 92.1 kg (203 lb)          Current Weight  Weight Method: Bed scale  Weight: 88.5 kg (195 lb)    Ideal Body Weight (IBW)  Ideal Body Weight (IBW) (kg): 84.86  % Ideal Body Weight: 106.8    Usual Body Weight (UBW)  Usual Body Weight: 92.1 kg (203 lb)  % Usual Body Weight: 100  % of Usual Body Weight Assessment: not applicable  Weight Loss:  (4# (1.9%) x 2 weeks)    Body Mass Index (BMI)  BMI (Calculated): 25.7  BMI Assessment: BMI 25-29.9: overweight    Labs/Procedures/Meds  Lab Results Reviewed: reviewed, pertinent  Lab Results Comments: 4/2 Na 133L, glucose 161H, H/H 12.3L/36.8L, POC: 144-207    CMP Results         04/07/25 04/02/25 04/01/25     0536 0627      133 133    K 4.5 4.9 4.5    Cl 97 97 98    CO2 32 29 24    Glucose 176 161 --    BUN 16 18 15    Creatinine 0.6 0.6 0.51    Calcium 9.2 9.0 --    Anion Gap 6 7 --    AST 16 -- --    ALT 32 -- --    Albumin 3.7 -- --    EGFR >60.0 >60.0 --           Comment for EGFR at 0536 on 04/07/25: Calculation based on the Chronic Kidney Disease Epidemiology Collaboration (CKD-EPI) equation refit without adjustment for race.    Comment for EGFR at 0627 on 04/02/25: Calculation based on the Chronic Kidney Disease Epidemiology Collaboration (CKD-EPI) equation refit without adjustment for race.          Lab Results   Component Value Date    ALT 32 04/07/2025    AST 16 04/07/2025    ALKPHOS 69 04/07/2025    BILITOT 0.4 04/07/2025     No results found for: \"HNBYRPFR58\"  Lab Results   Component Value Date    CALCIUM 9.2 04/07/2025     Lab Results   Component Value Date    WBC 5.89 04/07/2025    HGB 12.4 (L) 04/07/2025    HCT 37.8 (L) 04/07/2025    MCV 94.3 04/07/2025     04/07/2025     Lab Results " "  Component Value Date    FERRITIN 74 06/28/2023     Lab Results   Component Value Date    CHOL 119 05/11/2022     Lab Results   Component Value Date    HDL 33 (L) 05/11/2022     Lab Results   Component Value Date    LDLCALC 68 05/11/2022     Lab Results   Component Value Date    TRIG 91 05/11/2022     No results found for: \"CHOLHDL\"  Glucose Results    No lab values to display.        cefTRIAXone  2 g intravenous q24h INT    docusate sodium  100 mg peg tube BID    ezetimibe  10 mg peg tube Nightly    famotidine  20 mg peg tube Nightly    FLUoxetine  20 mg peg tube Daily    heparin (porcine)  5,000 Units subcutaneous q8h DU    honey   Topical Daily    insulin lispro U-100  1-12 Units subcutaneous q6h DU    lansoprazole  15 mg feeding tube Daily before breakfast    levothyroxine  150 mcg peg tube Daily (6:30a)    linaCLOtide  145 mcg g-tube Daily before lunch    liothyronine  10 mcg peg tube Daily (6:30a)    nystatin  1 Application Topical BID    senna  2 tablet peg tube BID    simethicone  160 mg peg tube QID    sodium chloride PF  10 mL intravenous q8h INT    sodium chloride  1 g peg tube BID      [START ON 4/10/2025] sodium chloride 0.9 %         Medications  Pertinent Medications Reviewed: reviewed, pertinent  Pertinent Medications Comments: IV Rocephin, zetia, prozac, levothyroxine, linzess, liothyronine, NaCl tablets    Estimated/Assessed Needs  Additional Documentation: Calorie Requirements (Group), Fluid Requirements (Group), Protein Requirements (Group)    Calorie Requirements  Estimated kCal Needs: Actual Body Weight  Estimated Calorie Need Method: kcal/kg  Calorie/kg Recommended: 22-25  Calorie Recommendations: 2024-2300    Protein Requirements  Recommended Dosing Weight (Estimated Protein Needs): Actual Body Weight  Est Protein Requirement Amount (gms/kg):  (1.2-1.3)  Protein Recommendations: 110-120    Fluid Requirements  Fluid Recommendation (mL):  (1ml/kcal)  Fluid Requirements (mL/day): " 2024-2300  Breana Method (over 20 kg): 3269.02    Tube Feeding Assessment  Active Tube Feed Order: Yes  Delivery Method: Bolus per gravity  TF Protein Grams: 141 grams  TF Total kCals: 2610 kcals  Projected Tube Feed Volume: 1700  TF Free Water: 1990 (1190+800)  Tolerance: tolerating    PES  Statement: PES Statement  Nutrition Diagnosis: Inadequate Oral Intake  Related To:: swallowing difficulty  As Evidenced By:: requires NPO with enteral nutrition support  Nutritional Needs Met?: Yes                                 Clinical Comments:       Patient reports that he is tolerating TF, but has had some gas/bloating/constipation (has been taking opioids).  He also brought in Hyfiber supplement from home which contains FOS which can cause gas and bloating.  Advised patient to start low and increase slowly.  His most recent weight is down by ~ 5# since admission.  He is on the same TF regimen that he is on at home, but has missed some boluses due to GI issues.  He still appears to be well-nourished, BMI is 25.7.  Patient is using his own TF pump and is running out of bags. They are not the same type that we have here.  Spoke with nurse manage to see if we can order them.  His sodium level was mildly low on 4/7 - getting NaCl tabs BID down PEG tube.     Goals:  TF will meet 100% of nutritional needs   Lytes/Labs WNL    Monitor and Evaluation:   TF tolerance, weight, labs, POCs and skin    Discussed with: patient                              Date: 04/09/25  Signature: Lindsey Sim RD

## 2025-04-09 NOTE — PROGRESS NOTES
[facility] Progress Note          Patient was seen and examined at bedside.    Subjective:   4/3/25  Reviewed night shift RN report:  Pt is AAO X 3, able to make needs known. Continent of bladder, uses urinal, spilled once. No BM during this shift. Infrequent productive cough, uses yankauer. Peg tube site clean. Water flush administered. Pt stated he does not feel good, VS checked 118/60, 98.7, 93% on RA, 84/min. . Pt later stated that he is upset because he was told that his stay will be only for 2 weeks. Reassurance provided , felt better. Patient had low grade temp of 99.5 earlier in the shift and urine was odorous, physician on call notified and order obtained for UA & CS. Patient received IV ceftriaxone via GABRIEL single lumen PICC. No adverse reactions. Slept intermittently.     Saw and evaluated him in am:  Abdominal distention significantly, no good BM for several days, he stated which is related to opioid use. He is passing gas, denies nausea or abdominal pain.   Suspect ileus:  Check obstruction series, limit opioid use,  bowel rest d/w dietitian, on linzess, add enemeez plus, moving around and bowel movement trial timely.        4/8/25  A very challenging case: pt and family requested bowel regimen to be given at   variable times and dosages from day to day; also at home, pt has been using IVF NS 1 liter twice weekly mostly on Monday and Thursday at about 300 ml/hr; I personally d/w his wife before and I talked with RN supervisor and RN today about my orders and coping strategies.           4/9/25  No new complaints or O/N events. His PT/OT is progressing.   Objective   Vital signs in last 24 hours:  Temp:  [36.7 °C (98.1 °F)-36.8 °C (98.2 °F)] 36.8 °C (98.2 °F)  Heart Rate:  [] 102  Resp:  [18] 18  BP: (119-158)/(68-87) 137/84      Intake/Output Summary (Last 24 hours) at 4/9/2025 1437  Last data filed at 4/9/2025 1248  Gross per 24 hour   Intake 1920 ml   Output --    Net 1920 ml     Intake/Output this shift:  I/O this shift:  In: 595 [NG/GT:595]  Out: -    Review of Systems:  All other systems reviewed and negative except as noted in the HPI.   Objective      Labs  Reviewed his labs thoroughly   Lab Results   Component Value Date    WBC 5.89 04/07/2025    HGB 12.4 (L) 04/07/2025    HCT 37.8 (L) 04/07/2025    MCV 94.3 04/07/2025     04/07/2025     Lab Results   Component Value Date    GLUCOSE 176 (H) 04/07/2025    CALCIUM 9.2 04/07/2025     (L) 04/07/2025    K 4.5 04/07/2025    CO2 32 (H) 04/07/2025    CL 97 (L) 04/07/2025    BUN 16 04/07/2025    CREATININE 0.6 (L) 04/07/2025       Imaging  OSH imaging study reports reviewed    Full Code    Physical Exam:  Head/Ear/Nose/Throat: normocephalic; atraumatic; moisture mouth mm, no oropharyngeal thrush noted.   Eyes: anicteric sclera, EOMI; PERRL.   Neck : supple, no JVD, no carotid bruits appeciated.   Respiratory: no evidence of labored breathing, lung sounds CTA b/l, good aeration bibasilar area, no w/r/c.   Cardiovascular: RRR; normal S1, S2; no m/r/g; no S3 or S4.   Gastrointestinal: PEG in place, focal c/d/I; soft; NT; BS normal; mildly distended; no CVAT b/l.   Genitourinary: no song.   Extremities : no c/c/e .   Neurological: AO x 3, fluent speeches, following commands, CNS II-XII grossly intact; no focal neurologic deficits.   Behavior/Emotional: in NAD, appropriate; cooperative.   Skin: clean, dry and intact.  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  He had a PEG  placed in 2021 for dysphagia.  Cervical spinal stenosis with myeloradiculopathy h/o cervical spinal surgery. He initially presented to [place] ED on 3/11/25 with fevers and R shoulder pain. Workup was unrevealing and he was discharged home.  He presented to the [place] ED again on 3/17 with ongoing fevers. CT Neck with no abscess, old postsurgical changes, BCX was collected. He d/c home but BCX subsequently returned positive on 3/19 and he returned for admission. Imaging with C6-7 OM / discitis with ventral epidural abscess. He was transferred to [facility] on 3/23 for surgical evaluation.  At that time, he was afebrile. BCx negative. MRI with C5-C7 cervical epidural abscess/osteo with cord signal at C6-7, T1-4, L4 vertebral body lesions probably radiation-changes per [facility] radiology, although metastatic lesions are not ruled out, per wife PET-CT scan no evidence of metastatic malignancy light up at spine region. He is now s/p posterior C4-T1 decompression and fusion on 3/27. Purulent fluid drained from anterior spine. OR Cx Strep intermedius.  ID rec IV dapto and ceftriaxone x 6 weeks.  PICC placed. He was admitted to [facility] on 4/1/25 for post op inpt acute rehab. He is TF NPO status.     A/P:  # Strep intermedius bacteremia with C6-7 OM / discitis and ventral epidural abscess, s/p PCDF 3/27/25, purulent fluid drainage from anterior spine  Suspect source likely recent esophageal dilatations ; 3/17 BCx + at OSH; TTE negative for veg at OSH  -complete planned 6-week iv Rocephin course, last dose of daptomycin on 3/31/25,  wound care dermal defense, f/u with [facility] ID as outpt, weekly labs.     # H/o thyroid CA s/p thyroidectomy and RND, adjuvant XRT/immunotherapy, esophageal SCC s/p CRT c/b esophageal strictures, L VC paralysis  -cancer surveillance f/u with [facility]  medical/ radiation oncology;  -palliative consultation.      # Dysphagia, NPO/TF, IBS-C, GI prophylaxis  -cont PEG TF, dietitian co management for nutrition supplements,  Pepcid/PPI for GI prophylaxis       # Hypothyroidism   -per his endo, increase levothyroxine dose from 137-150 mcg daily.     # DVT prophylaxis  -SQH     # Anxiety  -on Prozac, psychology CBT, psychiatrist for co management      # Hyperglycemia, previous prediabetes  -diet modification per dietitian, SSI with accu checks      Billing code: [id number]  Diagnoses:  Patient Active Problem List   Diagnosis    Vertigo    Hypertension    Postsurgical hypothyroidism    Mixed hyperlipidemia    Gastroesophageal reflux disease    Increased sputum production    Allergic rhinitis    Change in bowel habits    Chronic fatigue syndrome    Constipation, chronic    Deviated nasal septum    Elevated coronary artery calcium score    Eustachian tube dysfunction, bilateral    Examination of participant in clinical trial    Induration penis plastica    Hypertrophy of nasal turbinates    Hx of colonic polyps    Malignant neoplasm of salivary gland (CMS/HCC)    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)    Paralysis of vocal cords and larynx, unilateral    ENMANUEL (obstructive sleep apnea)    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)    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)    Penile curvature, acquired    Rhinitis sicca    Thyroid cancer (CMS/HCC)    Sensorineural hearing loss (SNHL), bilateral    Other dysphagia    Abdominal pain    Other dysphagia    COVID-19    Encounter for follow-up surveillance of thyroid cancer    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         Lilian Marrero MD  4/9/2025

## 2025-04-10 ENCOUNTER — APPOINTMENT (INPATIENT)
Dept: OCCUPATIONAL THERAPY | Facility: REHABILITATION | Age: 70
DRG: 559 | End: 2025-04-10
Payer: MEDICARE

## 2025-04-10 ENCOUNTER — APPOINTMENT (INPATIENT)
Dept: PHYSICAL THERAPY | Facility: REHABILITATION | Age: 70
DRG: 559 | End: 2025-04-10
Payer: MEDICARE

## 2025-04-10 ENCOUNTER — APPOINTMENT (OUTPATIENT)
Dept: OTHER | Facility: REHABILITATION | Age: 70
End: 2025-04-10
Payer: MEDICARE

## 2025-04-10 LAB
GLUCOSE BLD-MCNC: 142 MG/DL (ref 70–99)
GLUCOSE BLD-MCNC: 190 MG/DL (ref 70–99)
GLUCOSE BLD-MCNC: 200 MG/DL (ref 70–99)
GLUCOSE BLD-MCNC: 237 MG/DL (ref 70–99)
POCT TEST: ABNORMAL

## 2025-04-10 PROCEDURE — 97530 THERAPEUTIC ACTIVITIES: CPT | Mod: GO

## 2025-04-10 PROCEDURE — 63700000 HC SELF-ADMINISTRABLE DRUG: Performed by: STUDENT IN AN ORGANIZED HEALTH CARE EDUCATION/TRAINING PROGRAM

## 2025-04-10 PROCEDURE — 12800001 HC ROOM AND CARE SEMIPRIVATE REHAB-BRAIN INJ

## 2025-04-10 PROCEDURE — 97110 THERAPEUTIC EXERCISES: CPT | Mod: GP

## 2025-04-10 PROCEDURE — 97535 SELF CARE MNGMENT TRAINING: CPT | Mod: GO | Performed by: OCCUPATIONAL THERAPIST

## 2025-04-10 PROCEDURE — 97530 THERAPEUTIC ACTIVITIES: CPT | Mod: GP

## 2025-04-10 PROCEDURE — 63700000 HC SELF-ADMINISTRABLE DRUG: Performed by: INTERNAL MEDICINE

## 2025-04-10 PROCEDURE — 25800000 HC PHARMACY IV SOLUTIONS: Performed by: INTERNAL MEDICINE

## 2025-04-10 PROCEDURE — 97116 GAIT TRAINING THERAPY: CPT | Mod: GP

## 2025-04-10 PROCEDURE — 63700000 HC SELF-ADMINISTRABLE DRUG: Performed by: PHYSICAL MEDICINE & REHABILITATION

## 2025-04-10 PROCEDURE — 97530 THERAPEUTIC ACTIVITIES: CPT | Mod: GO | Performed by: OCCUPATIONAL THERAPIST

## 2025-04-10 PROCEDURE — 63600000 HC DRUGS/DETAIL CODE: Performed by: INTERNAL MEDICINE

## 2025-04-10 RX ORDER — LORAZEPAM 0.5 MG/1
0.5 TABLET ORAL EVERY 8 HOURS PRN
Status: DISCONTINUED | OUTPATIENT
Start: 2025-04-10 | End: 2025-04-11

## 2025-04-10 RX ORDER — INSULIN LISPRO 100 [IU]/ML
1-12 INJECTION, SOLUTION INTRAVENOUS; SUBCUTANEOUS EVERY 6 HOURS
Status: DISCONTINUED | OUTPATIENT
Start: 2025-04-11 | End: 2025-04-14

## 2025-04-10 RX ADMIN — Medication: at 10:42

## 2025-04-10 RX ADMIN — ACETAMINOPHEN 650 MG: 650 SOLUTION ORAL at 08:39

## 2025-04-10 RX ADMIN — DOCUSATE SODIUM 100 MG: 50 LIQUID ORAL at 08:35

## 2025-04-10 RX ADMIN — HEPARIN SODIUM 5000 UNITS: 5000 INJECTION, SOLUTION INTRAVENOUS; SUBCUTANEOUS at 14:40

## 2025-04-10 RX ADMIN — SODIUM CHLORIDE 1 G: 1 TABLET ORAL at 08:34

## 2025-04-10 RX ADMIN — HYDROMORPHONE HYDROCHLORIDE 2 MG: 2 TABLET ORAL at 16:37

## 2025-04-10 RX ADMIN — INSULIN LISPRO 1 UNITS: 100 INJECTION, SOLUTION INTRAVENOUS; SUBCUTANEOUS at 15:00

## 2025-04-10 RX ADMIN — HYDROMORPHONE HYDROCHLORIDE 2 MG: 2 TABLET ORAL at 08:38

## 2025-04-10 RX ADMIN — SODIUM PHOSPHATE 1 ENEMA: 7; 19 ENEMA RECTAL at 13:06

## 2025-04-10 RX ADMIN — HEPARIN SODIUM 5000 UNITS: 5000 INJECTION, SOLUTION INTRAVENOUS; SUBCUTANEOUS at 05:39

## 2025-04-10 RX ADMIN — ACETAMINOPHEN 650 MG: 650 SOLUTION ORAL at 16:38

## 2025-04-10 RX ADMIN — SIMETHICONE 160 MG: 80 TABLET, CHEWABLE ORAL at 11:19

## 2025-04-10 RX ADMIN — LEVOTHYROXINE SODIUM 150 MCG: 150 TABLET ORAL at 05:41

## 2025-04-10 RX ADMIN — Medication 10 ML: at 14:41

## 2025-04-10 RX ADMIN — DOCUSATE SODIUM 100 MG: 50 LIQUID ORAL at 14:41

## 2025-04-10 RX ADMIN — SENNOSIDES 2 TABLET: 8.6 TABLET, FILM COATED ORAL at 14:40

## 2025-04-10 RX ADMIN — LORAZEPAM 0.5 MG: 0.5 TABLET ORAL at 22:18

## 2025-04-10 RX ADMIN — SODIUM CHLORIDE: 9 INJECTION, SOLUTION INTRAVENOUS at 17:12

## 2025-04-10 RX ADMIN — FAMOTIDINE 20 MG: 20 TABLET, FILM COATED ORAL at 21:27

## 2025-04-10 RX ADMIN — SIMETHICONE 160 MG: 80 TABLET, CHEWABLE ORAL at 08:34

## 2025-04-10 RX ADMIN — SODIUM CHLORIDE 1 G: 1 TABLET ORAL at 21:27

## 2025-04-10 RX ADMIN — HEPARIN SODIUM 5000 UNITS: 5000 INJECTION, SOLUTION INTRAVENOUS; SUBCUTANEOUS at 21:27

## 2025-04-10 RX ADMIN — Medication 10 ML: at 05:29

## 2025-04-10 RX ADMIN — LINACLOTIDE 145 MCG: 145 CAPSULE, GELATIN COATED ORAL at 11:22

## 2025-04-10 RX ADMIN — LIOTHYRONINE SODIUM 10 MCG: 5 TABLET ORAL at 05:40

## 2025-04-10 RX ADMIN — INSULIN LISPRO 1 UNITS: 100 INJECTION, SOLUTION INTRAVENOUS; SUBCUTANEOUS at 17:30

## 2025-04-10 RX ADMIN — GLYCOPYRROLATE 1 MG: 1 TABLET ORAL at 08:34

## 2025-04-10 RX ADMIN — NYSTATIN 1 APPLICATION: 100000 POWDER TOPICAL at 21:41

## 2025-04-10 RX ADMIN — EZETIMIBE 10 MG: 10 TABLET ORAL at 21:27

## 2025-04-10 RX ADMIN — FLUOXETINE HYDROCHLORIDE 20 MG: 20 SOLUTION ORAL at 08:37

## 2025-04-10 RX ADMIN — CEFTRIAXONE SODIUM 2 G: 2 INJECTION, POWDER, FOR SOLUTION INTRAMUSCULAR; INTRAVENOUS at 05:29

## 2025-04-10 RX ADMIN — SENNOSIDES 2 TABLET: 8.6 TABLET, FILM COATED ORAL at 08:34

## 2025-04-10 RX ADMIN — SIMETHICONE 160 MG: 80 TABLET, CHEWABLE ORAL at 16:36

## 2025-04-10 RX ADMIN — LANSOPRAZOLE 15 MG: KIT at 06:34

## 2025-04-10 RX ADMIN — SIMETHICONE 160 MG: 80 TABLET, CHEWABLE ORAL at 21:27

## 2025-04-10 RX ADMIN — NYSTATIN 1 APPLICATION: 100000 POWDER TOPICAL at 08:00

## 2025-04-10 RX ADMIN — Medication 10 ML: at 21:26

## 2025-04-10 NOTE — PROGRESS NOTES
[facility] Progress Note          Patient was seen and examined at bedside.    Subjective:     No new complaints or O/N events. His PT/OT is progressing.   Objective   Vital signs in last 24 hours:  Temp:  [36.6 °C (97.9 °F)-37 °C (98.6 °F)] 36.7 °C (98.1 °F)  Heart Rate:  [] 104  Resp:  [18] 18  BP: ()/(60-84) 142/75      Intake/Output Summary (Last 24 hours) at 4/10/2025 1205  Last data filed at 4/10/2025 0830  Gross per 24 hour   Intake 665 ml   Output 450 ml   Net 215 ml     Intake/Output this shift:  I/O this shift:  In: 120 [NG/GT:120]  Out: 450 [Urine:450]   Review of Systems:  All other systems reviewed and negative except as noted in the HPI.   Objective      Labs  Reviewed his labs thoroughly   Lab Results   Component Value Date    WBC 5.89 04/07/2025    HGB 12.4 (L) 04/07/2025    HCT 37.8 (L) 04/07/2025    MCV 94.3 04/07/2025     04/07/2025     Lab Results   Component Value Date    GLUCOSE 176 (H) 04/07/2025    CALCIUM 9.2 04/07/2025     (L) 04/07/2025    K 4.5 04/07/2025    CO2 32 (H) 04/07/2025    CL 97 (L) 04/07/2025    BUN 16 04/07/2025    CREATININE 0.6 (L) 04/07/2025       Imaging  OSH imaging study reports reviewed    Full Code    Physical Exam:  Head/Ear/Nose/Throat: normocephalic; atraumatic; moisture mouth mm, no oropharyngeal thrush noted.   Eyes: anicteric sclera, EOMI; PERRL.   Neck : supple, no JVD, no carotid bruits appeciated.   Respiratory: no evidence of labored breathing, lung sounds CTA b/l, good aeration bibasilar area, no w/r/c.   Cardiovascular: RRR; normal S1, S2; no m/r/g; no S3 or S4.   Gastrointestinal: PEG in place, focal c/d/I; soft; NT; BS normal; mildly distended; no CVAT b/l.   Genitourinary: no song.   Extremities : no c/c/e .   Neurological: AO x 3, fluent speeches, following commands, CNS II-XII grossly intact; no focal neurologic deficits.   Behavior/Emotional: in NAD, appropriate; cooperative.   Skin: clean, dry and  Patient is returning call. Please call patient. Ok to leave a detailed message.       intact.  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  He had a PEG placed in 2021 for dysphagia.  Cervical spinal stenosis with myeloradiculopathy h/o cervical spinal surgery. He initially presented to [place] ED on 3/11/25 with fevers and R shoulder pain. Workup was unrevealing and he was discharged home.  He presented to the [place] ED again on 3/17 with ongoing fevers. CT Neck with no abscess, old postsurgical changes, BCX was collected. He d/c home but BCX subsequently returned positive on 3/19 and he returned for admission. Imaging with C6-7 OM / discitis with ventral epidural abscess. He was transferred to [facility] on 3/23 for surgical evaluation.  At that time, he was afebrile. BCx negative. MRI with C5-C7 cervical epidural abscess/osteo with cord signal at C6-7, T1-4, L4 vertebral body lesions probably radiation-changes per [facility] radiology, although metastatic lesions are not ruled out, per wife PET-CT scan no evidence of metastatic malignancy light up at spine region. He is now s/p posterior C4-T1 decompression and fusion on 3/27. Purulent fluid drained from anterior spine. OR Cx Strep intermedius.  ID rec IV dapto and ceftriaxone x 6 weeks.  PICC placed. He was admitted to [facility] on 4/1/25 for post op inpt acute rehab. He is TF NPO status.     A/P:  # Strep intermedius bacteremia with C6-7 OM / discitis and ventral epidural abscess, s/p PCDF 3/27/25, purulent fluid drainage from anterior spine  Suspect source likely recent esophageal dilatations ; 3/17 BCx + at OSH; TTE  negative for veg at OSH  -complete planned 6-week iv Rocephin course, last dose of daptomycin on 3/31/25,  wound care dermal defense, f/u with [facility] ID as outpt, weekly labs.     # H/o thyroid CA s/p thyroidectomy and RND, adjuvant XRT/immunotherapy, esophageal SCC s/p CRT c/b esophageal strictures, L VC paralysis  -cancer surveillance f/u with [facility]  medical/ radiation oncology;  -palliative consultation.      # Dysphagia, NPO/TF, IBS-C, GI prophylaxis  -cont PEG TF, dietitian co management for nutrition supplements, Pepcid/PPI for GI prophylaxis       # Hypothyroidism   -per his endo, increase levothyroxine dose from 137-150 mcg daily.     # DVT prophylaxis  -SQH     # Anxiety  -on Prozac, psychology CBT, psychiatrist for co management      # Hyperglycemia, previous prediabetes  -diet modification per dietitian, SSI with accu checks      Billing code: [id number]  Diagnoses:  Patient Active Problem List   Diagnosis    Vertigo    Hypertension    Postsurgical hypothyroidism    Mixed hyperlipidemia    Gastroesophageal reflux disease    Increased sputum production    Allergic rhinitis    Change in bowel habits    Chronic fatigue syndrome    Constipation, chronic    Deviated nasal septum    Elevated coronary artery calcium score    Eustachian tube dysfunction, bilateral    Examination of participant in clinical trial    Induration penis plastica    Hypertrophy of nasal turbinates    Hx of colonic polyps    Malignant neoplasm of salivary gland (CMS/HCC)    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)    Paralysis of vocal cords and larynx, unilateral    ENMANUEL (obstructive sleep apnea)    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)    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)    Penile curvature, acquired    Rhinitis sicca    Thyroid cancer (CMS/HCC)    Sensorineural hearing loss (SNHL), bilateral    Other dysphagia    Abdominal pain    Other dysphagia    COVID-19    Encounter for follow-up  surveillance of thyroid cancer    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         Lilian Marrero MD  4/10/2025

## 2025-04-10 NOTE — PROGRESS NOTES
Patient: Celso Gramajo  Location: [facility] [address]  MRN: [id number]  Today's date: 4/10/2025    History of Present Illness  Celso is a 69 y.o. male admitted on 4/1/2025 with Spinal epidural abscess [G06.1]. Principal problem is Spinal epidural abscess.    Celso Gramajo is a 69 year old male with PMHx thyroid CA s/p thyroidectomy, RND, adjuvant XRT/immunotherapy, esophageal SCC s/p chemoradiation c/b esophageal strictures, HTN, Elevated Coronary Calcium scoring in 2017, ENMANUEL, Hypothyroidism, Anemia, dysphagia on PEG (placed in 2021) TFs, and history of CVA who presented to an OSH with neck and R shoulder pain. Workup was unrevealing and he was discharged home.    He presented to the [place] ED again on 3/17 with ongoing fevers. CT Neck with no abscess, old postsurgical changes. He d/c home but BCx subsequently returned positive on 3/19 and he returned for admission. He was also bacteremic with strep viridans, and placed on IV antibiotics. Workup revealed a C5-C7 ventral epidural abscess with osteomyelitis, and he was transferred to [facility] for surgical evaluation.    MRI on 3/24 c/f metastatic disease at T1-T4, L4.      At [facility] ortho and ENT were consulted. Ultimately it was determined that an anterior surgical approach was not safe (due to history of neck surgeries), and patient was having progressive weakness. Repeat MRI C-spine showed Discitis and osteomyelitis at C6-C7 with ventral epidural abscess compressing the spinal cord and increased edema. Patient urgently underwent POSTERIOR C4-T1 DECOMPRESSION FUSION WITH Y[place]ON INSTRUMENTATION on 3/27. Postop he was briefly in the ICU for MAP goals. OR cultures grew strep intermedius and he was placed on the antibiotic plan below. He had a PICC placed 4/1. 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 He was started on salt tabs. In rehab please continue to check labs, and titrate the salt tabs as  "appropriate. He worked with PT/OT who recommended him for acute rehab.     Past Medical History  Celso has a past medical history of Anemia, Cancer (CMS/HCC), head and neck radiation, Hypertension, Hypothyroidism, Stroke (CMS/HCC), and Thyroid disease.    OT Vitals      Date/Time Pulse HR Source SpO2 Pt Activity O2 Therapy BP BP Location BP Method Pt Position Observations [facility]   04/10/25 1409 100 Monitor -- -- -- 113/65 Left upper arm Automatic Sitting -- [facility]   04/10/25 1422 111 Monitor 95 % At rest None (Room air) -- -- -- -- post stance x3:30 min and pt reports feeling \"winded\" [facility]   04/10/25 1431 106 Monitor 96 % At rest None (Room air) -- -- -- -- -- [facility]          OT Pain      Date/Time Pain Type Side/Orientation Rating: Rest Rating: Activity Interventions [facility]   04/10/25 1409 Pain Assessment -- 0 0 -- [facility]   04/10/25 1431 Pain Reassessment back 6 -- diversional activity provided [facility]               Prior Living Environment      Flowsheet Row Most Recent Value   People in Home spouse   Current Living Arrangements home   Home Accessibility stairs to enter home (Group), stairs within home (Group)   Living Environment Comment Multi-story home with 12-14 steps to second floor, powder room on 1st floor, 2-3 FELICITA via garage, multiple supportive family members   Number of Stairs, Main Entrance 3   Stair Railings, Main Entrance none   Location, Patient Bedroom second floor, must negotiate stairs to access   Location, Bathroom second floor, must negotiate stairs to access   Bathroom Access Comment powder room 1st floor standard height toilet, full bathroom 2nd floor WIS with glass door, standard height toilet   Stairs, Within Home, Primary 12   Stair Railings, Within Home, Primary railings on both sides of stairs            Prior Level of Function      Flowsheet Row Most Recent Value   Dominant Hand right   Ambulation independent   Transferring independent   Toileting independent   Bathing independent   Dressing independent "   Eating independent   IADLs independent   Driving/Transportation    Communication understands/communicates without difficulty   Past History of Dysphagia PTA pt was completely independent, no AD. (+) . NPO PTA, manages peg tube independently.   Prior Level of Function Comment Independent without use of AE or DME.   Assistive Device Currently Used at Home none            Occupational Profile      Flowsheet Row Most Recent Value   Occupational History/Life Experiences retired involved in [employer] on [employer]. + , -handicap driving placard.   Patient Goals PSFS: going to the bathroom 0/10, walking 1/10, getting in/out of bed 0/10 = 1/30 = 3.33%             IRF OT Evaluation and Treatment - 04/10/25 1409          OT Time Calculation    Start Time 1401     Stop Time 1431     Time Calculation (min) 30 min        Session Details    Document Type Daily Treatment/Progress Note        General Information    Patient/Family/Caregiver Comments/Observations Pt's mother in law present throughout session     General Observations of Patient Pt received in room seated in recliner, cooperative        Mobility Belt    Mobility Belt Used During Session yes        Orthosis Neck Cervical Collar    Orthosis Properties Date Obtained: 04/03/25 Time Obtained: 0825 Location: Neck Type: Cervical Collar Features: Hard Description: Hard collar AAT; can be removed seated to don [place] collar for bathing.       Sit to Stand Transfer    [place], Sit to Stand Transfer touching/steadying assist     Safety/Cues verbal cues;hand placement;technique     Assistive Device walker, front-wheeled     Comment recliner to stance without RW, then w/c to stance c RW        Stand to Sit Transfer    [place], Stand to Sit Transfer touching/steadying assist     Safety/Cues verbal cues;hand placement;technique     Assistive Device walker, front-wheeled     Comment stance c RW to w/c, cues for hand placement        Stand  Pivot Transfer    [place], Stand Pivot/Stand Step Transfer touching/steadying assist;minimum assist (75% or more patient effort)     Safety/Cues verbal cues     Assistive Device walker, front-wheeled     Comment SPT c RW recliner to w/c and via ambulatory approach c RW to/from w/c        Impairments/Safety Issues    Comment, Safety Issues/Impairments OT: HHM c RW short distance in gym environment in context of item retrieval task. Min A for balance.        Balance    Comment, Balance 1) Pt in stance & ambulating c RW to retrieve items from floor surface c use of reacher, x7 reps. Min cues for RW positioning and alignment c item, bending B knees, and spinal precautions. Min A-steadying A for balance. 2) Stance c RW at table ~x3:30 min while engaging in tabletop task requiring unilateral UE AROM. Steadying A for balance. Cues for upright standing posture.        Motor Skills    Comment, Motor Skills Engagement in vertical ring tree- pt retrieves & places varying sized washers onto corresponding dowels c R UE, ~x60 reps. Min challenge overall c increased time for placement. Cues to use pincer grasp c occasional carryover. Then, pt removes washers c L UE, ~x40 reps. Cues for pincer grasp, increased time for completion.        Upper Body Dressing    Comment A to button x2 buttons on shirt while pt seated in w/c        Lower Body Dressing    Comment Seated in recliner, pt able to thread B LE into pants c lifting LEs. Min cues for spinal precautions. Then, pt in stance for clothing management over hips. Min A-steadying A for balance.        Daily Progress Summary (OT)    Daily Outcome Statement Session focused on standing balance/tolerance, UE AROM, and coordination. Min A-steadying A required for balance in stance c RW during tasks. Continue OT to address functional deficits.                               IRF OT Goals      Flowsheet Row Most Recent Value   Transfer Goal 1    Activity/Assistive Device toilet at  04/02/2025 0723   Wilbur supervision required at 04/09/2025 0949   Time Frame short-term goal (STG), 5 - 7 days at 04/09/2025 0949   Strategies/Barriers pt just evlauated at 04/03/2025 0754   Progress/Outcome good progress toward goal, goal met, goal revised this date, goal ongoing at 04/09/2025 0949   Transfer Goal 2    Activity/Assistive Device toilet at 04/02/2025 0723   Wilbur modified independence at 04/02/2025 0723   Time Frame long-term goal (LTG), 21 days or less at 04/02/2025 0723   Progress/Outcome continuing progress toward goal, goal ongoing at 04/09/2025 0949   Transfer Goal 3    Activity/Assistive Device shower at 04/02/2025 0723   Wilbur supervision required at 04/09/2025 0949   Time Frame short-term goal (STG), 5 - 7 days at 04/09/2025 0949   Strategies/Barriers pt just evlauated at 04/03/2025 0754   Progress/Outcome good progress toward goal, goal met, goal revised this date, goal ongoing at 04/09/2025 0949   Transfer Goal 4    Activity/Assistive Device shower at 04/02/2025 0723   Wilbur supervision required at 04/09/2025 0949   Time Frame long-term goal (LTG), 14 days or less at 04/09/2025 0949   Progress/Outcome goal met, goal revised this date, goal ongoing at 04/09/2025 0949   Bathing Goal 1    Wilbur minimum assist (75% or more patient effort) at 04/09/2025 0949   Time Frame short-term goal (STG), 5 - 7 days at 04/09/2025 0949   Strategies/Barriers pt just evlauated at 04/03/2025 0754   Progress/Outcome good progress toward goal, goal met, goal revised this date, goal ongoing at 04/09/2025 0949   Bathing Goal 2    Wilbur supervision required at 04/02/2025 0723   Time Frame long-term goal (LTG), 21 days or less at 04/02/2025 0723   Progress/Outcome continuing progress toward goal, goal ongoing at 04/09/2025 0949   UB Dressing Goal 1    Wilbur supervision required at 04/09/2025 0949   Time Frame short-term goal (STG), 5 - 7 days at 04/09/2025 0949    Strategies/Barriers pt just evlauated at 04/03/2025 0754   Progress/Outcome good progress toward goal, goal met, goal revised this date, goal ongoing at 04/09/2025 0949   UB Dressing Goal 2    Clay modified independence at 04/09/2025 0949   Time Frame long-term goal (LTG), 14 days or less at 04/09/2025 0949   Progress/Outcome goal met, goal revised this date, goal ongoing at 04/09/2025 0949   LB Dressing Goal 1    Clay supervision required at 04/09/2025 0949   Time Frame short-term goal (STG), 5 - 7 days at 04/09/2025 0949   Strategies/Barriers pt just evlauated at 04/03/2025 0754   Progress/Outcome good progress toward goal, goal met, goal revised this date, goal ongoing at 04/09/2025 0949   LB Dressing Goal 2    Clay modified independence at 04/02/2025 0723   Time Frame long-term goal (LTG), 21 days or less at 04/02/2025 0723   Progress/Outcome continuing progress toward goal, goal ongoing at 04/09/2025 0949   Grooming Goal 1    Clay set-up required at 04/02/2025 0723   Time Frame short-term goal (STG), 5 - 7 days at 04/09/2025 0949   Strategies/Barriers progressing to standing v seated,  continue to work on standing balance to maximize IND at 04/09/2025 0949   Progress/Outcome progress slower than expected, goal revised this date, goal ongoing at 04/09/2025 0949   Grooming Goal 2    Clay modified independence at 04/02/2025 0723   Time Frame long-term goal (LTG), 21 days or less at 04/02/2025 0723   Progress/Outcome continuing progress toward goal, goal ongoing at 04/09/2025 0949   Toileting Goal 1    Clay supervision required at 04/09/2025 0949   Time Frame short-term goal (STG), 5 - 7 days at 04/09/2025 0949   Strategies/Barriers pt just evlauated at 04/03/2025 0754   Progress/Outcome good progress toward goal, goal met, goal revised this date, goal ongoing at 04/09/2025 0949   Toileting Goal 2    Clay modified independence at 04/02/2025 0723   Time Frame  long-term goal (LTG), 21 days or less at 04/02/2025 0723   Progress/Outcome continuing progress toward goal, goal ongoing at 04/09/2025 0587

## 2025-04-10 NOTE — PLAN OF CARE
Plan of Care Review  Plan of Care Reviewed With: patient  Progress: improving  Outcome Evaluation: Alert and oriented x 4.    Contient of bowel.     Enema provided to patient.     Abdomen distended.    Some results noted post enema .    Dilaudid for pain     Incision healing with sutures intact.   [place]en collar AATs.    Nursing/OT provided training for med administration /feeding,flushes, etc.       Heparin for DVT prevention.

## 2025-04-10 NOTE — PLAN OF CARE
Plan of Care Review  Plan of Care Reviewed With: patient  Progress: improving  Outcome Evaluation: Assumed care at 2300. Patient slept comfortably troughout the shift. Repositioned as requsted. No c/o pain or discomfort. Yina oneil. Suction at bedside to manage secrections. Needs were met and safety was maintained.

## 2025-04-10 NOTE — PLAN OF CARE
Problem: Rehabilitation (IRF) Plan of Care  Goal: Plan of Care Review  Outcome: Progressing  Flowsheets (Taken 4/10/2025 1502)  Progress: no change  Plan of Care Reviewed With:   patient   spouse   other (see comments)   CM met w pt and pts mother in law to provide updates from treatment team and to discuss goals and ELOS 4/17. Pt in agreement w plan and reports that he may appeal dc if he does not feel ready next week. CM to assist w process as needed. CM attempted to call pts spouse Yue carter but could not leave message and voicemail box is full. Pt defers HC agency choice to spouse, HOME following for home infusion needs. Pt reports he orders his own tube feeds/supplies. CM received call from [facility], pt will have $244.60 weekly cost. CM will reattempt contacting pts spouse and continue to follow for any needs.

## 2025-04-10 NOTE — PROGRESS NOTES
Subjective     Patient seen and examined on rounds.  Chart reviewed.  Events overnight noted.  History reviewed briefly with patient.    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    HPI: Mr. Celso Gramajo is a 69-year-old right-handed white male with chronic conditions significant for hypertension, hyperlipidemia, anxiety, thyroid cancer status post thyroidectomy and radical neck dissection, adjuvant radiation therapy/immunotherapy, esophageal squamous cell carcinoma status post chemoradiotherapy complicated by esophageal strictures, left vocal cord paralysis, dysphagia status post PEG tube placement in 2021, initially presented to the ER at [facility] on 3/11/25 with fevers and right shoulder pain. Workup was unrevealing and he was discharged home. He presented to the [facility] ER again on 3/17/25 with ongoing fevers. CT neck with no abscess, old postsurgical changes. He discharged home but blood cultures subsequently, 1 of 2 sets of blood cultures drawn at [facility] on 3/17/25 showed Streptococcus viridans returned positive on 3/19/25 and he returned for admission to [facility] on 3/19/25. TTE at [facility] was negative. Blood cultures on 3/19/25 and again on 3/23/25 showed no growth per his records. Imaging with C6-7 osteomyelitis/discitis with ventral epidural abscess. He was transferred to [facility] on 3/23/25 for surgical evaluation.  At that time, he was afebrile.  Blood cultures  were negative. At [facility] ortho and ENT were consulted. Ultimately it was determined that an anterior surgical approach was not safe (due  to history of neck surgeries), and patient was having progressive weakness.  MRI of cervical spine on 3/27/25 revealed discitis and osteomyelitis at C6-C7 with ventral epidural abscess compressing the spinal cord and increased inferior extent of cord edema compared to prior. Patchy T1 hypointensity with enhancement in the upper cervical and thoracic spine, can represent metastatic lesions and/or post radiation changes, similar as compared to prior. He underwent posterior C4-T1 decompression and instrumented fusion on 3/27/25, performed by orthopedic surgeon Dr. Santiago Ca. Purulent fluid drained from anterior spine during the procedure and operating room cultures revealed Strep intermedius. Postop he was briefly in the ICU for MAP goals. He had a PICC placed 4/1/25. 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 He was started on salt tabs. Infectious diseases recommended IV Daptomycin which patient completed on 3/31/25.  He was recommended to continue Ceftriaxone 2g IV q12h x 14 day course (end date 4/8/25) then transition to Ceftriaxone 2g IV daily to complete total 6 week course (end date 5/8/25). He was recommend [place] J collar to neck at all times.  I discussed orthopedic spinal precautions with him.  He completed Decadron treatment for mild laryngeal edema visualized on preoperative NPL. Patient has dysphagia from esophageal strictures, recent dilation 3/3/25 was not successful per patient, cannot tolerate regular oral secretions and recommended NPO and continued on bolus tube feeds.  He was kept on Pepcid for GI prophylaxis. Glycopyrrolate as needed per patient/spouse wishes.  For anxiety he was continued on Fluoxetine and Ativan PRN.  He is not on medications for hypertension.  He was continued on Zetia for hyperlipidemia.  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 He continues on a sliding-scale Humalog coverage.  He is on Levothyroxine  and Liothyronine for thyroid supplementation after thyroidectomy for thyroid cancer. He is on subcutaneous Heparin and SCDs for DVT prophylaxis.  I discussed his recent clinical course with patient and with his wife at bedside.  On 4/1/25, hemoglobin was 12.9, WBC count was 10.30, platelets were 255, BUN 15, creatinine was 0.51, sodium was 133 and potassium was 4.5.  He has been needing assistance for mobility, transfers, self-care. He is transferred to [facility] on 4/1/25 for further rehabilitation care.     SUBJ: Discussed patients progress in therapies with therapists in team meeting today. Discussed in PCC. See PCC documentation.  He has infectious disease telemedicine appointment on 4/15/2025 at 10:40 AM.  Outside visit ordered.  Nursing to help patient with appointment.  Discussed with patient and nurse.    ROS: Denies chest pain or shortness of breath. Other ROS negative. Past, family, social history is unchanged.      Current Functional Status:   Bed mobility:   Norton, Supine to Sit: minimum assist (75% or more patient effort)   Norton, Sit to Supine: moderate assist (50-74% patient effort)   Transfers:    Norton, Sit to Stand Transfer: touching/steadying assist  Norton, Stand to Sit Transfer: touching/steadying assist   Norton, Stand Pivot/Stand Step Transfer: touching/steadying assist   Gait:   Norton, Gait: minimum assist (75% or more patient effort)  Assistive Device: walker, front-wheeled   Distance in Feet: 75 feet    Bathing:   Norton: moderate assist (50-74% patient effort)   Toileting:   Norton: close supervision   Upper body dressing:   Norton: close supervision   Lower body dressing:   Norton: close supervision       Functional Progress:    Functional status reviewed. Overall, patient's functional status is improving.      Physical Exam      Blood pressure (!) 142/75, pulse (!) 104, temperature 36.7 °C (98.1 °F),  "temperature source Oral, resp. rate 18, height 1.854 m (6' 1\"), weight 88.5 kg (195 lb), SpO2 95%.    Body mass index is 25.73 kg/m².    General Appearance: Not in acute distress  Head/Ear/Nose/Throat: Normocephalic; Atraumatic.   Eye: EOMI; PERRL.   Neck: [place]bar[place] J collar in place.  Respiratory: Decreased breath sounds at bases.   Cardiovascular: RRR; Normal S1, S2.   Gastrointestinal: Soft; NT; +BS.   Extremities: Bilateral lower extremity edema noted.    Musculoskeletal: Functional active range of motion in both upper and lower extremities.   Neurological: AAO ×3. Speech is fluent. Cranial nerve examination does not reveal any gross facial asymmetry. Strength testing bilateral deltoids are 4/5, bilateral biceps are 4+/5, bilateral triceps are 4/5, bilateral wrist extensors are 4+/5, bilateral hand FDP are 4+/5, bilateral and interossei are 4/5.  Bilateral hip flexion is less than 4/5.  Bilateral quadriceps are 5/5.  Bilateral ankle dorsi and plantar flexion are 5/5.  He is grossly able to localize touch and position sense in great toes, except reports numbness in all 5 digits of both hands and also numbness on the plantar aspects of both feet.  Deep tendon reflexes are symmetric and slightly brisk bilaterally.  Bilateral ankle clonus is present.  Plantars are withdrawal.  Coordination is functional upper extremities.    Behavior/Emotional: Appropriate; Cooperative.   Skin: Healing incision on posterior cervical spine.  Sutures in place.  Erythema/rash noted on coccyx unclear if stage I decubitus.  Rash noted on bilateral groins.  Reviewed pictures in epic.         Current Facility-Administered Medications:     acetaminophen (TYLENOL) 650 mg/20.3 mL solution 650 mg, 650 mg, peg tube, q6h PRN, Rohit Acevedo MD, 650 mg at 04/10/25 0839    bisacodyL (DULCOLAX) 10 mg suppository 10 mg, 10 mg, rectal, Daily PRN, Rohit Acevedo MD, 10 mg at 04/03/25 1554    cefTRIAXone (ROCEPHIN) 2 g in sodium chloride 0.9 " % 100 mL IVPB, 2 g, intravenous, q24h INT, Rohit Acevedo MD, Stopped at 04/10/25 0607    docusate sodium (COLACE) 50 mg/5 mL liquid 100 mg, 100 mg, peg tube, BID, Spencer Kemp MD, 100 mg at 04/10/25 0835    ezetimibe (ZETIA) tablet 10 mg, 10 mg, peg tube, Nightly, Rohit Acevedo MD, 10 mg at 04/09/25 2121    famotidine (PEPCID) tablet 20 mg, 20 mg, peg tube, Nightly, Rohit Acevedo MD, 20 mg at 04/09/25 2121    FLUoxetine (PROzac) 20 mg/5 mL (4 mg/mL) solution 20 mg, 20 mg, peg tube, Daily, Rohit Acevedo MD, 20 mg at 04/10/25 0837    glycopyrrolate (ROBINUL) tablet 1 mg, 1 mg, peg tube, 2x daily PRN, Rohit Acevedo MD, 1 mg at 04/10/25 0834    heparin (porcine) 5,000 unit/mL injection 5,000 Units, 5,000 Units, subcutaneous, q8h [facility], Rohit Acevedo MD, 5,000 Units at 04/10/25 0539    honey (MEDIHONEY) 100 % topical paste, , Topical, Daily, Spencer Kemp MD, Given at 04/10/25 1042    HYDROmorphone (DILAUDID) tablet 2 mg, 2 mg, peg tube, q6h PRN, Lilian Marrero MD, 2 mg at 04/10/25 0838    insulin lispro U-100 (HumaLOG) pen 1-12 Units, 1-12 Units, subcutaneous, q6h DU, Lilian Marrero MD, 2 Units at 04/09/25 1022    lansoprazole (PREVACID) 3 mg/mL oral suspension 15 mg, 15 mg, feeding tube, Daily before breakfast, Lilian Marrero MD, 15 mg at 04/10/25 0634    levothyroxine (SYNTHROID) tablet 150 mcg, 150 mcg, peg tube, Daily (6:30a), Lilian Marrero MD, 150 mcg at 04/10/25 0541    linaCLOtide (LINZESS) capsule 145 mcg, 145 mcg, g-tube, Daily before lunch, Lilian Marrero MD, 145 mcg at 04/09/25 1312    linaCLOtide (LINZESS) capsule 145 mcg, 145 mcg, peg tube, Daily PRN, Lilian Marrero MD    liothyronine (CYTOMEL) tablet 10 mcg, 10 mcg, peg tube, Daily (6:30a), Rohit Acevedo MD, 10 mcg at 04/10/25 0540    magnesium hydroxide (M.O.M.) 400 mg/5 mL suspension 30 mL, 30 mL, oral, 2x daily PRN, Lilian Marrero MD, 30 mL at 04/09/25 1316    nystatin (MYCOSTATIN)  100,000 unit/gram topical powder 1 Application, 1 Application, Topical, BID, Spencer Kemp MD, 1 Application at 04/10/25 0800    ondansetron ODT (ZOFRAN-ODT) disintegrating tablet 4 mg, 4 mg, peg tube, q6h PRN, Rohit Acevedo MD    senna (SENOKOT) tablet 1 tablet, 1 tablet, peg tube, 2x daily PRN, Rohit Acevedo MD, 1 tablet at 04/03/25 1547    senna (SENOKOT) tablet 2 tablet, 2 tablet, peg tube, BID, Spencer Kemp MD, 2 tablet at 04/10/25 0834    simethicone (MYLICON) chewable tablet 160 mg, 160 mg, peg tube, QID, Lilian Marrero MD, 160 mg at 04/10/25 0834    sodium chloride 0.9 % infusion, , intravenous, Continuous, Lilian Marrero MD    sodium chloride PF flush 10 mL, 10 mL, intravenous, q8h INT, Rohit Acevedo MD, 10 mL at 04/10/25 0529    sodium chloride PF flush 10 mL, 10 mL, intravenous, PRN, Rohit Acevedo MD    sodium chloride tablet 1 g, 1 g, peg tube, BID, Rohit Acevedo MD, 1 g at 04/10/25 0834    sodium phosphates (FLEET) enema adult 1 enema, 1 enema, rectal, Daily PRN, Spencer Kemp MD, 1 enema at 04/09/25 1424       Labs / Radiology    Lab Results   Component Value Date    WBC 5.89 04/07/2025    HGB 12.4 (L) 04/07/2025    HCT 37.8 (L) 04/07/2025    MCV 94.3 04/07/2025     04/07/2025     Lab Results   Component Value Date    GLUCOSE 176 (H) 04/07/2025    CALCIUM 9.2 04/07/2025     (L) 04/07/2025    K 4.5 04/07/2025    CO2 32 (H) 04/07/2025    CL 97 (L) 04/07/2025    BUN 16 04/07/2025    CREATININE 0.6 (L) 04/07/2025       Assessment and Plan    ASSESSMENT PLAN:  1. 69-year-old right-handed white male with chronic conditions significant for hypertension, hyperlipidemia, anxiety, thyroid cancer status post thyroidectomy and radical neck dissection, adjuvant radiation therapy/immunotherapy, esophageal squamous cell carcinoma status post chemoradiotherapy complicated by esophageal strictures, left vocal cord paralysis, dysphagia status post  PEG tube placement in 2021, initially presented to the ER at [facility] on 3/11/25 with fevers and right shoulder pain. Workup was unrevealing and he was discharged home. He presented to the [facility] ER again on 3/17/25 with ongoing fevers. CT neck with no abscess, old postsurgical changes. He discharged home but blood cultures subsequently, 1 of 2 sets of blood cultures drawn at [facility] on 3/17/25 showed Streptococcus viridans returned positive on 3/19/25 and he returned for admission to [facility] on 3/19/25. TTE at [facility] was negative. Blood cultures on 3/19/25 and again on 3/23/25 showed no growth per his records. Imaging with C6-7 osteomyelitis/discitis with ventral epidural abscess. He was transferred to [facility] on 3/23/25 for surgical evaluation.  At that time, he was afebrile.  Blood cultures  were negative. At [facility] ortho and ENT were consulted. Ultimately it was determined that an anterior surgical approach was not safe (due to history of neck surgeries), and patient was having progressive weakness.  MRI of cervical spine on 3/27/25 revealed discitis and osteomyelitis at C6-C7 with ventral epidural abscess compressing the spinal cord and increased inferior extent of cord edema compared to prior. Patchy T1 hypointensity with enhancement in the upper cervical and thoracic spine, can represent metastatic lesions and/or post radiation changes, similar as compared to prior. He underwent posterior C4-T1 decompression and instrumented fusion on 3/27/25, performed by orthopedic surgeon Dr. Santiago Ca. Purulent fluid drained from anterior spine during the procedure and operating room cultures revealed Strep intermedius. Postop he was briefly in the ICU for MAP goals. He had a PICC placed 4/1/25. 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 He was started on salt tabs. Infectious diseases recommended IV Daptomycin which patient completed on  3/31/25.  He was recommended to continue Ceftriaxone 2g IV q12h x 14 day course (end date 4/8/25) then transition to Ceftriaxone 2g IV daily to complete total 6 week course (end date 5/8/25). He was recommend [place] J collar to neck at all times.  I discussed orthopedic spinal precautions with him.  He completed Decadron treatment for mild laryngeal edema visualized on preoperative NPL. Patient has dysphagia from esophageal strictures, recent dilation 3/3/25 was not successful per patient, cannot tolerate regular oral secretions and recommended NPO and continued on bolus tube feeds.  He was kept on Pepcid for GI prophylaxis. Glycopyrrolate as needed per patient/spouse wishes.  For anxiety he was continued on Fluoxetine and Ativan PRN.  He is not on medications for hypertension.  He was continued on Zetia for hyperlipidemia.  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 He continues on a sliding-scale Humalog coverage.  He is on Levothyroxine and Liothyronine for thyroid supplementation after thyroidectomy for thyroid cancer. He is on subcutaneous Heparin and SCDs for DVT prophylaxis.  I discussed his recent clinical course with patient and with his wife at bedside.  On 4/1/25, hemoglobin was 12.9, WBC count was 10.30, platelets were 255, BUN 15, creatinine was 0.51, sodium was 133 and potassium was 4.5.  He has been needing assistance for mobility, transfers, self-care. He is transferred to [place] [facility] on 4/1/25 for further rehabilitation care.      2. DVT prophylaxis - He is on subcutaneous Heparin and SCDs for DVT prophylaxis.  Check doppler.  Platelets 247 on 4/2/2025.  Doppler ultrasound on 4/3/2025 bilateral lower extremity was negative for DVT.  Platelets 216 on 4/7/2025.     3. Orthopedics - status post posterior C4-T1 decompression and fusion, C6-C7 discitis and osteomyelitis with ventral epidural abscess compressing the spinal cord, history of esophageal squamous cell  carcinoma status post chemoradiotherapy (CRT) complicated by esophageal strictures, left vocal cord paralysis, dysphagia status post PEG tube, history of thyroid cancer, multiple medical problems - continue PT, OT, psychology.  Follow falls precautions, cardiac precautions, aspiration precautions.  Follow spinal precautions.  [place] J collar to neck at all times.  Use [place] collar in shower.     4. GI - On Pepcid for GI prophylaxis.  On PRN Senokot.  On PRN Dulcolax suppository.  On PRN Zofran.     5.  -denies dysuria.  Monitor postvoid residuals.     6. CVS - monitor for orthostasis.     7. Pulmonary -encourage incentive spirometry.     8. Hematology - monitor hemoglobin, platelets.  Hemoglobin 12.3, WBC 9.54 on 4/2/2025.  Hemoglobin 12.4, WBC 5.89 on 4/7/2025.     9. Pain -on Tylenol.  On PRN Oxycodone.     10. Skin - Healing incision on posterior cervical spine.  Sutures in place.  Erythema/rash noted on coccyx unclear if stage I decubitus.  Rash noted on bilateral groins.  Reviewed pictures in epic.     11. F/E/N - nothing by mouth on bolus PEG feeds.  Nutrition consulted. 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 He was started on salt tabs.     12.  Dysphagia - H/O esophageal squamous cell carcinoma status post chemoradiotherapy complicated by esophageal strictures, left vocal cord paralysis, dysphagia status post PEG tube placement in 2021 - NPO on bolus PEG feeds.  Nutrition consulted.     13.  Psychiatry - on PRN Ativan.  Psychology consulted.     14.  Hypothyroidism -He is on Levothyroxine and Liothyronine for thyroid supplementation after thyroidectomy for thyroid cancer.     15. ENT -  H/O left vocal cord paralysis, treated with Decadron for edema around vocal cords recently.  On Glycopyrrolate as needed per patient/spouse wishes to manage secretions.      16.  Infectious diseases - He underwent posterior C4-T1 decompression and instrumented fusion on 3/27/25, performed by orthopedic  surgeon Dr. Santiago Ca. Purulent fluid drained from anterior spine during the procedure and operating room cultures revealed Strep intermedius.. He had a PICC placed 4/1/25. Infectious diseases recommended IV Daptomycin which patient completed on 3/31/25.  He was recommended to continue Ceftriaxone 2g IV q12h x 14 day course (end date 4/8/25) then transition to Ceftriaxone 2g IV daily to complete total 6 week course (end date 5/8/25).He has infectious disease telemedicine appointment on 4/15/2025 at 10:40 AM.  Outside visit ordered.  Nursing to help patient with appointment.  Discussed with patient and nurse on 4/10/2025.      15.  Steroid-induced hyperglycemia -on sliding-scale Humalog coverage.      16. Rehabilitation medicine - Continue comprehensive rehabilitation care. Continue PT, OT, psychology.  Follow falls precautions, cardiac precautions, aspiration precautions.  Follow spinal precautions.  [place] J collar to neck at all times.  Use [place] collar in shower.Discussed patients progress in therapies with therapists in team meeting on 4/3/2025.  Discussed in PCC. See PCC documentation.  He reports no bowel movement in the past 3 to 4 days.  Also wanted antireflux medication in the morning and at home he takes Omeprazole via PEG per patient.  He is on Prevacid in the a.m. and Pepcid in p.m via PEG tube.  Due to constipation internist ordered enema.  Also scheduled Colace and Senokot were ordered through his PEG tube.  Discussed with patient on 4/3/2025.Discussed recommendations of PCC with patient on 4/4/2025.  Posterior cervical incision healing well.  He had 2 bowel movements today.  Family training scheduled for next week on Monday per case management note.  Discussed with patient on 4/4/2025.He was sitting on recliner on 4/5/2025, talking on phone with his wife.  Continent of bladder per nursing on 4/5/2025.  Tolerating PEG feeds without issues.  He reports he had a good bowel movement  yesterday.  Discussed with him if he gets  loose bowel movements he can always refuse bowel medications.  He reports he wants to stay on some bowel medications at this time.  Posterior cervical incision has dressing in place.  Denies increased pain on 4/5/2025.Saw patient earlier on 4/7/2025 morning and then again in the evening.  Discussed with patient and with his wife in the evening.  He requested IV saline infusion.  Ordered liter of saline overnight but patient and wife indicated that at home they usually have the infusion and 3 hours and do not want to eat or 12 hours.  Discussed with pharmacist.  Discussed with nurse.  Patient and wife insist that they usually get the infusion over 3 hours at home.  Also wanted increase bowel medications due to constipation.  Increase Senokot dose and ordered as needed treats enema.  Discussed with nurse on 4/7/2025. Again discussed with patient on 4/8/2025 in presence of nursing supervisor that IV fluid at high rate is not such a good idea and he can get the volume but at a lower rate.  He says he is used to getting IV fluids quickly at home.  Suggested that he also discuss with his cardiologist if this is a safe practice and he can be prone to getting fluid overload and may put him at risk for CHF/pulmonary edema in future if his cardiac function decompensates.  Discussed with internist Dr. Marrero who also agrees with this possibility.  I mentioned to patient to discuss with his cardiologist regarding this and follow cardiology guidance regarding his desire to get IV fluids quickly at home at a high hourly volume in a short time.  Bowel medications were adjusted by internist on 4/8/2025.Inspected neck incision on 4/9/2025 which is stable.  He reports he feels constipated.  Internist adjusted bowel medications.Discussed with patient and with his mother-in-law with him on 4/9/2025.  Discussed patients progress in therapies with therapists in team meeting on 4/10/2025.  Discussed in PCC. See PCC documentation.  He has infectious disease telemedicine appointment on 4/15/2025 at 10:40 AM.  Outside visit ordered.  Nursing to help patient with appointment.  Discussed with patient and nurse on 4/10/2025.      17. Reviewed labs today. BUN 18, creatinine 0.6, sodium 133, potassium 4.9 on 4/2/2025.  BUN 16, creatinine 0.6, sodium 135, potassium 4.5 on 4/7/2025.           Spencer Kemp MD  4/10/2025      This encounter was completed utilizing the Direct Speech Voice Recognition Software. Grammatical errors, random word insertions, pronoun errors, and incomplete sentences are occasional consequences of the system due to software limitations, ambient noises, and hardware issues. Such errors may be missed prior to affixing electronic signature. Any questions or concerns about the content, text, or information contained within the body of this dictation should be directly addressed to the physician for clarification. If you have any questions or concerns please do not hesitate to call me directly via EPIC chat, page, or email.

## 2025-04-10 NOTE — PROGRESS NOTES
Patient: Celso Gramajo  Location: [facility] [address]  MRN: [id number]  Today's date: 4/10/2025    History of Present Illness  Celso is a 69 y.o. male admitted on 4/1/2025 with Spinal epidural abscess [G06.1]. Principal problem is Spinal epidural abscess.    Celso Gramajo is a 69 year old male with PMHx thyroid CA s/p thyroidectomy, RND, adjuvant XRT/immunotherapy, esophageal SCC s/p chemoradiation c/b esophageal strictures, HTN, Elevated Coronary Calcium scoring in 2017, ENMANUEL, Hypothyroidism, Anemia, dysphagia on PEG (placed in 2021) TFs, and history of CVA who presented to an OSH with neck and R shoulder pain. Workup was unrevealing and he was discharged home.    He presented to the [place] ED again on 3/17 with ongoing fevers. CT Neck with no abscess, old postsurgical changes. He d/c home but BCx subsequently returned positive on 3/19 and he returned for admission. He was also bacteremic with strep viridans, and placed on IV antibiotics. Workup revealed a C5-C7 ventral epidural abscess with osteomyelitis, and he was transferred to [facility] for surgical evaluation.    MRI on 3/24 c/f metastatic disease at T1-T4, L4.      At [facility] ortho and ENT were consulted. Ultimately it was determined that an anterior surgical approach was not safe (due to history of neck surgeries), and patient was having progressive weakness. Repeat MRI C-spine showed Discitis and osteomyelitis at C6-C7 with ventral epidural abscess compressing the spinal cord and increased edema. Patient urgently underwent POSTERIOR C4-T1 DECOMPRESSION FUSION WITH [place] INSTRUMENTATION on 3/27. Postop he was briefly in the ICU for MAP goals. OR cultures grew strep intermedius and he was placed on the antibiotic plan below. He had a PICC placed 4/1. 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 He was started on salt tabs. In rehab please continue to check labs, and titrate the salt tabs as  appropriate. He worked with PT/OT who recommended him for acute rehab.     Past Medical History  Celso has a past medical history of Anemia, Cancer (CMS/HCC), head and neck radiation, Hypertension, Hypothyroidism, Stroke (CMS/HCC), and Thyroid disease.    OT Vitals      Date/Time Pulse HR Source BP BP Location BP Method Pt Position [facility]   04/10/25 1006 104 Monitor 142/75 Left upper arm Automatic Sitting LM          OT Pain      Date/Time Pain Type Side/Orientation Rating: Rest Rating: Activity Description Interventions [facility]   04/10/25 1006 Pain Assessment neck 10 10 -- position adjusted LM   04/10/25 1128 Pain Assessment neck 8 8 aching diversional activity provided AA               Prior Living Environment      Flowsheet Row Most Recent Value   People in Home spouse   Current Living Arrangements home   Home Accessibility stairs to enter home (Group), stairs within home (Group)   Living Environment Comment Multi-story home with 12-14 steps to second floor, powder room on 1st floor, 2-3 FELICITA via garage, multiple supportive family members   Number of Stairs, Main Entrance 3   Stair Railings, Main Entrance none   Location, Patient Bedroom second floor, must negotiate stairs to access   Location, Bathroom second floor, must negotiate stairs to access   Bathroom Access Comment powder room 1st floor standard height toilet, full bathroom 2nd floor WIS with glass door, standard height toilet   Stairs, Within Home, Primary 12   Stair Railings, Within Home, Primary railings on both sides of stairs            Prior Level of Function      Flowsheet Row Most Recent Value   Dominant Hand right   Ambulation independent   Transferring independent   Toileting independent   Bathing independent   Dressing independent   Eating independent   IADLs independent   Driving/Transportation    Communication understands/communicates without difficulty   Past History of Dysphagia PTA pt was completely independent, no AD. (+)  . NPO PTA, manages peg tube independently.   Prior Level of Function Comment Independent without use of AE or DME.   Assistive Device Currently Used at Home none            Occupational Profile      Flowsheet Row Most Recent Value   Occupational History/Life Experiences retired involved in [employer] on [employer]. + , -handicap driving placard.   Patient Goals PSFS: going to the bathroom 0/10, walking 1/10, getting in/out of bed 0/10 = 1/30 = 3.33%             IRF OT Evaluation and Treatment - 04/10/25 1001          OT Time Calculation    Start Time 1000     Stop Time 1130     Time Calculation (min) 90 min        Session Details    Document Type Daily Treatment/Progress Note        General Information    General Observations of Patient Pt recieved for therapy in w/c. Patient care also provided by KACY Benito, OTR/L        Mobility Belt    Mobility Belt Used During Session yes        Orthosis Neck Cervical Collar    Orthosis Properties Date Obtained: 04/03/25 Time Obtained: 0825 Location: Neck Type: Cervical Collar Features: Hard Description: Hard collar AAT; can be removed seated to don [place] collar for bathing.       Sit to Stand Transfer    [place], Sit to Stand Transfer touching/steadying assist     Safety/Cues technique     Assistive Device walker, front-wheeled     Comment From EOM, w/c and recliner chair        Stand to Sit Transfer    [place], Stand to Sit Transfer touching/steadying assist     Safety/Cues technique     Assistive Device walker, front-wheeled     Comment to w/c and recliner chair        Stand Pivot Transfer    [place], Stand Pivot/Stand Step Transfer touching/steadying assist     Safety/Cues technique     Assistive Device walker, front-wheeled     Comment From EOM to w/c short amb approach        Impairments/Safety Issues    Comment, Safety Issues/Impairments OT: Steadying A for HHM c RW, noted c 1 LOB requiring steadying for balance.        Therapeutic  "Interventions    Comment, Therapeutic Intervention Blocked practice for to flush tube and to self administer (under supervision of nursing staff) medication via gastro tube. Nursing pre-crushed mediciaton, pt reports he has a different device to crush pill s at home. requested to have wife bring in from home to try in hospital to ensure IND prior to d/c. pt able to manage parts of tube, syringe to self-adminster medication.        Aerobic Exercise    Comment Expiratory muscle strength training utilzing blue device x10.        Grooming    Tasks washes, rinses and dries hands     [place] touching/steadying assist     Position supported standing;sink side     Comment Standing at sink c RW to wash hands        Toileting    Tasks perform bladder hygiene;adjust/manage clothing     Position supported standing     Comment Standing at toilet c UE support on RW to urinate in stance        Self-Feeding    Comment Focus of session is working with nursing for pt to be able to independently manage self-feeds. Pt with + tremors, but able to complete each step of self feed (ie. pouring liquids into bag, turning on infinity external feed pump, removing lines, flushing self). Pt reports difficulty to use syringe to flush but reports \"it is always hard to use/flush\".        Daily Progress Summary (OT)    Daily Outcome Statement Focus of therapy session on managing Infinity external feed pump, manage gastro line, and adminster medication. Performed in conjunction with nursing supervision to ensure correct sequence and safety c medication management. Pt with tremors in hands throughout but c increased time he was able to IND'ly manage each part. Most difficulty part was syringe management d/t increased resistance with push. Pt to have wife bring in pill  to practice. Recommend allowing pt to participate in self feeds/medication management during hospital stay to increase confidence in performing tasks prior to d/c home.  "                         Education Documentation  No documentation found.        IRF OT Goals      Flowsheet Row Most Recent Value   Transfer Goal 1    Activity/Assistive Device toilet at 04/02/2025 0723   [place] supervision required at 04/09/2025 0949   Time Frame short-term goal (STG), 5 - 7 days at 04/09/2025 0949   Strategies/Barriers pt just evlauated at 04/03/2025 0754   Progress/Outcome good progress toward goal, goal met, goal revised this date, goal ongoing at 04/09/2025 0949   Transfer Goal 2    Activity/Assistive Device toilet at 04/02/2025 0723   [place] modified independence at 04/02/2025 0723   Time Frame long-term goal (LTG), 21 days or less at 04/02/2025 0723   Progress/Outcome continuing progress toward goal, goal ongoing at 04/09/2025 0949   Transfer Goal 3    Activity/Assistive Device shower at 04/02/2025 0723   [place] supervision required at 04/09/2025 0949   Time Frame short-term goal (STG), 5 - 7 days at 04/09/2025 0949   Strategies/Barriers pt just evlauated at 04/03/2025 0754   Progress/Outcome good progress toward goal, goal met, goal revised this date, goal ongoing at 04/09/2025 0949   Transfer Goal 4    Activity/Assistive Device shower at 04/02/2025 0723   [place] supervision required at 04/09/2025 0949   Time Frame long-term goal (LTG), 14 days or less at 04/09/2025 0949   Progress/Outcome goal met, goal revised this date, goal ongoing at 04/09/2025 0949   Bathing Goal 1    [place] minimum assist (75% or more patient effort) at 04/09/2025 0949   Time Frame short-term goal (STG), 5 - 7 days at 04/09/2025 0949   Strategies/Barriers pt just evlauated at 04/03/2025 0754   Progress/Outcome good progress toward goal, goal met, goal revised this date, goal ongoing at 04/09/2025 0949   Bathing Goal 2    [place] supervision required at 04/02/2025 0723   Time Frame long-term goal (LTG), 21 days or less at 04/02/2025 0723   Progress/Outcome continuing progress toward  goal, goal ongoing at 04/09/2025 0949   UB Dressing Goal 1    [place] supervision required at 04/09/2025 0949   Time Frame short-term goal (STG), 5 - 7 days at 04/09/2025 0949   Strategies/Barriers pt just evlauated at 04/03/2025 0754   Progress/Outcome good progress toward goal, goal met, goal revised this date, goal ongoing at 04/09/2025 0949   UB Dressing Goal 2    [place] modified independence at 04/09/2025 0949   Time Frame long-term goal (LTG), 14 days or less at 04/09/2025 0949   Progress/Outcome goal met, goal revised this date, goal ongoing at 04/09/2025 0949   LB Dressing Goal 1    [place] supervision required at 04/09/2025 0949   Time Frame short-term goal (STG), 5 - 7 days at 04/09/2025 0949   Strategies/Barriers pt just evlauated at 04/03/2025 0754   Progress/Outcome good progress toward goal, goal met, goal revised this date, goal ongoing at 04/09/2025 0949   LB Dressing Goal 2    [place] modified independence at 04/02/2025 0723   Time Frame long-term goal (LTG), 21 days or less at 04/02/2025 0723   Progress/Outcome continuing progress toward goal, goal ongoing at 04/09/2025 0949   Grooming Goal 1    [place] set-up required at 04/02/2025 0723   Time Frame short-term goal (STG), 5 - 7 days at 04/09/2025 0949   Strategies/Barriers progressing to standing v seated,  continue to work on standing balance to maximize IND at 04/09/2025 0949   Progress/Outcome progress slower than expected, goal revised this date, goal ongoing at 04/09/2025 0949   Grooming Goal 2    [place] modified independence at 04/02/2025 0723   Time Frame long-term goal (LTG), 21 days or less at 04/02/2025 0723   Progress/Outcome continuing progress toward goal, goal ongoing at 04/09/2025 0949   Toileting Goal 1    [place] supervision required at 04/09/2025 0949   Time Frame short-term goal (STG), 5 - 7 days at 04/09/2025 0949   Strategies/Barriers pt just evlauated at 04/03/2025 0754   Progress/Outcome  good progress toward goal, goal met, goal revised this date, goal ongoing at 04/09/2025 0949   Toileting Goal 2    [place] modified independence at 04/02/2025 0723   Time Frame long-term goal (LTG), 21 days or less at 04/02/2025 0723   Progress/Outcome continuing progress toward goal, goal ongoing at 04/09/2025 0949

## 2025-04-10 NOTE — PROGRESS NOTES
Psychiatry Progress Note    History of Present Illness   See initial consult.  History pertaining to psychosis, depression and anxiety and other psychiatric and psychologic conditions as well as general medical history detailed in initial consult.      Interval History:   Remains frustrted about his condition  No si or avh  Pain remains problematic interferes with sleep   Continues to sleep better.  Mood improving.  Anxiety and despondency in context of medical conditions manageable.   Sodium stable.  Vital signs stable.  Nursing reports no behavioral concerns.  No sundowning agitation.  Continues to make progress with respect to gaining insight into emotional state and medical conditions and the relationship between them.  Participatory in therapies     MENTAL STATUS EXAM  Appearance: well groomed  Gait and Motor: no abnormal movements  Speech: normal rate/rhythm/volume  Mood: depressed and anxious  Affect: constricted  Associations: coherent  Thought Process: goal-directed  Thought Content: no auditory or visual hallucinations.  Suicidality/Homicidality: denies  Judgement/Insight: acknowledges illness  Orientation: situation  Memory: knows current president  Attention: distracted  Knowledge: normal  Language: normal      Vital Signs for the last 24 hours:  Temp:  [36.6 °C (97.9 °F)-37 °C (98.6 °F)] 36.7 °C (98.1 °F)  Heart Rate:  [] 85  Resp:  [18] 18  BP: ()/(56-87) 143/76    Labs:  Labs below reviewed for pertinence to psychiatric condition  Lab Results   Component Value Date    GLUCOSE 176 (H) 04/07/2025    CALCIUM 9.2 04/07/2025     (L) 04/07/2025    K 4.5 04/07/2025    CO2 32 (H) 04/07/2025    CL 97 (L) 04/07/2025    BUN 16 04/07/2025    CREATININE 0.6 (L) 04/07/2025     Lab Results   Component Value Date    WBC 5.89 04/07/2025    HGB 12.4 (L) 04/07/2025    HCT 37.8 (L) 04/07/2025    MCV 94.3 04/07/2025     04/07/2025     Pain Management Panel           No data to display                   Current Facility-Administered Medications   Medication Dose Route Frequency Provider Last Rate Last Admin    acetaminophen (TYLENOL) 650 mg/20.3 mL solution 650 mg  650 mg peg tube q6h PRN Rohit Acevedo MD   650 mg at 04/09/25 1707    bisacodyL (DULCOLAX) 10 mg suppository 10 mg  10 mg rectal Daily PRN Rohit Acevedo MD   10 mg at 04/03/25 1554    cefTRIAXone (ROCEPHIN) 2 g in sodium chloride 0.9 % 100 mL IVPB  2 g intravenous q24h INT Rohit Acevedo MD   Stopped at 04/10/25 0607    docusate sodium (COLACE) 50 mg/5 mL liquid 100 mg  100 mg peg tube BID Spencer Kemp MD   100 mg at 04/09/25 1312    ezetimibe (ZETIA) tablet 10 mg  10 mg peg tube Nightly Rohit Acevedo MD   10 mg at 04/09/25 2121    famotidine (PEPCID) tablet 20 mg  20 mg peg tube Nightly Rohit Acevedo MD   20 mg at 04/09/25 2121    FLUoxetine (PROzac) 20 mg/5 mL (4 mg/mL) solution 20 mg  20 mg peg tube Daily Rohit Acevedo MD   20 mg at 04/09/25 0845    glycopyrrolate (ROBINUL) tablet 1 mg  1 mg peg tube 2x daily PRN Rohit Acevedo MD        heparin (porcine) 5,000 unit/mL injection 5,000 Units  5,000 Units subcutaneous q8h [facility] Rohit Acevedo MD   5,000 Units at 04/10/25 0539    honey (MEDIHONEY) 100 % topical paste   Topical Daily Spencer Kemp MD   Given at 04/09/25 0806    HYDROmorphone (DILAUDID) tablet 2 mg  2 mg peg tube q6h PRN Lilian Marrero MD   2 mg at 04/09/25 1626    insulin lispro U-100 (HumaLOG) pen 1-12 Units  1-12 Units subcutaneous q6h [facility] Lilian Marrero MD   2 Units at 04/09/25 1022    lansoprazole (PREVACID) 3 mg/mL oral suspension 15 mg  15 mg feeding tube Daily before breakfast Lilian Marrero MD   15 mg at 04/10/25 0634    levothyroxine (SYNTHROID) tablet 150 mcg  150 mcg peg tube Daily (6:30a) Lilian Marrero MD   150 mcg at 04/10/25 0541    linaCLOtide (LINZESS) capsule 145 mcg  145 mcg g-tube Daily before lunch Lilian Marrero MD   145 mcg at 04/09/25  1312    linaCLOtide (LINZESS) capsule 145 mcg  145 mcg peg tube Daily PRN Lilian Marrero MD        liothyronine (CYTOMEL) tablet 10 mcg  10 mcg peg tube Daily (6:30a) Rohit Acevedo MD   10 mcg at 04/10/25 0540    magnesium hydroxide (M.O.M.) 400 mg/5 mL suspension 30 mL  30 mL oral 2x daily PRN Lilian Marrero MD   30 mL at 04/09/25 1316    nystatin (MYCOSTATIN) 100,000 unit/gram topical powder 1 Application  1 Application Topical BID Spencer Kemp MD   1 Application at 04/09/25 2137    ondansetron ODT (ZOFRAN-ODT) disintegrating tablet 4 mg  4 mg peg tube q6h PRN Rohit Acevedo MD        senna (SENOKOT) tablet 1 tablet  1 tablet peg tube 2x daily PRN Rohit Acevedo MD   1 tablet at 04/03/25 1547    senna (SENOKOT) tablet 2 tablet  2 tablet peg tube BID Spencer Kemp MD   2 tablet at 04/09/25 1312    simethicone (MYLICON) chewable tablet 160 mg  160 mg peg tube QID Lilian Marrero MD   160 mg at 04/09/25 2121    sodium chloride 0.9 % infusion   intravenous Continuous Lilian Marrero MD        sodium chloride PF flush 10 mL  10 mL intravenous q8h INT Rohit Acevedo MD   10 mL at 04/10/25 0529    sodium chloride PF flush 10 mL  10 mL intravenous PRN Rohit Acevedo MD        sodium chloride tablet 1 g  1 g peg tube BID Rohit Acevedo MD   1 g at 04/09/25 2121    sodium phosphates (FLEET) enema adult 1 enema  1 enema rectal Daily PRN Spencer Kemp MD   1 enema at 04/09/25 1424        Patient Active Problem List   Diagnosis    Vertigo    Hypertension    Postsurgical hypothyroidism    Mixed hyperlipidemia    Gastroesophageal reflux disease    Increased sputum production    Allergic rhinitis    Change in bowel habits    Chronic fatigue syndrome    Constipation, chronic    Deviated nasal septum    Elevated coronary artery calcium score    Eustachian tube dysfunction, bilateral    Examination of participant in clinical trial    Induration penis plastica    Hypertrophy  of nasal turbinates    Hx of colonic polyps    Malignant neoplasm of salivary gland (CMS/HCC)    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)    Paralysis of vocal cords and larynx, unilateral    ENMANUEL (obstructive sleep apnea)    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)    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)    Penile curvature, acquired    Rhinitis sicca    Thyroid cancer (CMS/HCC)    Sensorineural hearing loss (SNHL), bilateral    Other dysphagia    Abdominal pain    Other dysphagia    COVID-19    Encounter for follow-up surveillance of thyroid cancer    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  Address automatic negtive thoguhts  Support coping with atd  Monitor progress in therapy  Limit narcotic as possible  Relaxation techniques for pain  Trey Meyer MD  4/10/2025

## 2025-04-10 NOTE — PROGRESS NOTES
Patient: Celso Gramajo  Location: [facility] [address]  MRN: [id number]  Today's date: 4/10/2025    History of Present Illness  Celso is a 69 y.o. male admitted on 4/1/2025 with Spinal epidural abscess [G06.1]. Principal problem is Spinal epidural abscess.    Celso Gramajo is a 69 year old male with PMHx thyroid CA s/p thyroidectomy, RND, adjuvant XRT/immunotherapy, esophageal SCC s/p chemoradiation c/b esophageal strictures, HTN, Elevated Coronary Calcium scoring in 2017, ENMANUEL, Hypothyroidism, Anemia, dysphagia on PEG (placed in 2021) TFs, and history of CVA who presented to an OSH with neck and R shoulder pain. Workup was unrevealing and he was discharged home.    He presented to the [place] ED again on 3/17 with ongoing fevers. CT Neck with no abscess, old postsurgical changes. He d/c home but BCx subsequently returned positive on 3/19 and he returned for admission. He was also bacteremic with strep viridans, and placed on IV antibiotics. Workup revealed a C5-C7 ventral epidural abscess with osteomyelitis, and he was transferred to [facility] for surgical evaluation.    MRI on 3/24 c/f metastatic disease at T1-T4, L4.      At [facility] ortho and ENT were consulted. Ultimately it was determined that an anterior surgical approach was not safe (due to history of neck surgeries), and patient was having progressive weakness. Repeat MRI C-spine showed Discitis and osteomyelitis at C6-C7 with ventral epidural abscess compressing the spinal cord and increased edema. Patient urgently underwent POSTERIOR C4-T1 DECOMPRESSION FUSION WITH [place] INSTRUMENTATION on 3/27. Postop he was briefly in the ICU for MAP goals. OR cultures grew strep intermedius and he was placed on the antibiotic plan below. He had a PICC placed 4/1. 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 He was started on salt tabs. In rehab please continue to check labs, and titrate the salt tabs as  appropriate. He worked with PT/OT who recommended him for acute rehab.     Past Medical History  Celso has a past medical history of Anemia, Cancer (CMS/HCC), head and neck radiation, Hypertension, Hypothyroidism, Stroke (CMS/HCC), and Thyroid disease.    PT Vitals      Date/Time Pulse HR Source SpO2 Pt Activity BP BP Location BP Method Pt Position [facility]   04/10/25 0905 114 Monitor 95 % At rest 98/60 Left upper arm Automatic Sitting LPM   04/10/25 0910 116 Monitor 95 % At rest 149/68 Left upper arm Automatic Standing LPM          PT Pain      Date/Time Pain Type Side/Orientation Location Rating: Rest Rating: Activity Radiation to [facility]   04/10/25 0905 Pain Assessment bilateral neck 5 5 shoulder, left;shoulder, right LPM                                   Prior Living Environment      Flowsheet Row Most Recent Value   People in Home spouse   Current Living Arrangements home   Home Accessibility stairs to enter home (Group), stairs within home (Group)   Living Environment Comment Multi-story home with 12-14 steps to second floor, powder room on 1st floor, 2-3 FELICITA via garage, multiple supportive family members   Number of Stairs, Main Entrance 3   Stair Railings, Main Entrance none   Location, Patient Bedroom second floor, must negotiate stairs to access   Location, Bathroom second floor, must negotiate stairs to access   Bathroom Access Comment powder room 1st floor standard height toilet, full bathroom 2nd floor WIS with glass door, standard height toilet   Stairs, Within Home, Primary 12   Stair Railings, Within Home, Primary railings on both sides of stairs            Prior Level of Function      Flowsheet Row Most Recent Value   Dominant Hand right   Ambulation independent   Transferring independent   Toileting independent   Bathing independent   Dressing independent   Eating independent   IADLs independent   Driving/Transportation    Communication understands/communicates without difficulty   Past  "History of Dysphagia PTA pt was completely independent, no AD. (+) . NPO PTA, manages peg tube independently.   Prior Level of Function Comment Independent without use of AE or DME.   Assistive Device Currently Used at Home none             IRF PT Evaluation and Treatment - 04/10/25 0905          PT Time Calculation    Start Time 0901     Stop Time 1001     Time Calculation (min) 60 min        Session Details    Document Type Daily Treatment/Progress Note        General Information    General Observations of Patient received in , continues to report fatigue due to \"lack of sleep\"        Mobility Belt    Mobility Belt Used During Session yes        Orthosis Neck Cervical Collar    Orthosis Properties Date Obtained: 04/03/25 Time Obtained: 0825 Location: Neck Type: Cervical Collar Features: Hard Description: Hard collar AAT; can be removed seated to don Humphreys collar for bathing.    Compliance/Wearing Issues patient;compliant with wearing schedule        Bed Mobility    [place], Roll Left touching/steadying assist     [place], Roll Right touching/steadying assist     [place], Supine to Sit touching/steadying assist     [place], Sit to Supine touching/steadying assist     Safety/Cues increased time to complete;minimal;verbal cues;hand placement;sequencing;technique     Comment performed on therapy mat, assistance for steadying and pain management on trunk and BLE , sidelying on L with pillows for c/s, posterior thoracic spine, anterior thoracic per pt, and between LE's in sidelying        Sit to Stand Transfer    [place], Sit to Stand Transfer touching/steadying assist     Safety/Cues technique     Assistive Device walker, front-wheeled     Comment performed from , EOM, standard chair, assist for steadying balance, increased YONATAN        Stand to Sit Transfer    [place], Stand to Sit Transfer touching/steadying assist     Safety/Cues technique;increased time to complete     " Assistive Device walker, front-wheeled     Comment assist for controlled descent and alignment to surface due to visual c/s collar        Stand Pivot Transfer    [place], Stand Pivot/Stand Step Transfer minimum assist (75% or more patient effort);touching/steadying assist     Safety/Cues technique;sequencing     Assistive Device walker, front-wheeled     Comment assist for steadying balance on pelvis for turning, cues for turning radius        Gait Training    [place], Gait minimum assist (75% or more patient effort);touching/steadying assist     Safety/Cues increased time to complete     Assistive Device walker, front-wheeled     Distance in Feet 75 feet     Pattern step-through     Deviations/Abnormal Patterns ataxic;base of support, narrow;weight shifting decreased     Bilateral Gait Deviations heel strike decreased     Comment 75ft x4, 100ft x2, 25ft multiple trials throughout activities, cues throughout for safety, obstacle negoitation and BLE glute engagement        Balance    Comment, Balance Sitting EOM with posterior backboard and pillows for c/s support- performed supported and unsupported sitting therex; unsupported unilateral UE R then L racket ball taps 2 min x3, standing from EOM for UE racket ball taps with Min A on pelvis, wide YONATAN 1 min x2 trials        Motor Skills    Functional Endurance fair, supported rest breaks provided throughout session        Upper Extremity (Therapeutic Exercise)    Exercise Position/Type seated;AROM (active range of motion);AAROM (active assistive range of motion)     General Exercise bilateral;rotator cuff exercise;bicep curl;tricep extension;wrist curls extension;wrist curls flexion     Range of Motion Exercises forearm supination/pronation     Reps and Sets 3/10     Comment sup/pronation holding dowel bar, min manual support at elbow for ext with fatigue; shld flex BUE on dowel 0-70 degrees min intermittent assist at R elbow for ext;        Hand (Therapeutic  Exercise)    Exercise Position/Type seated;AROM (active range of motion)     General Exercise bilateral;finger flexion/extension; strengthening        Lower Extremity (Therapeutic Exercise)    Exercise Position/Type seated;side lying     General Exercise bilateral;ankle pumps;LAQ (long arc quad);marching while seated     Reps and Sets 3/10     Comment 5s hold LAQ; seated warmup; sidelying R clamshells 3x10, manual hip ext stretcg        Modality Intervention    Additional Documentation --   declined use of modalities in session for pain management       Daily Progress Summary (PT)    Daily Outcome Statement Pt participated within fatigue, endurance and pain barriers per pt reports. Pt progressed transfers and mobility with decreased assistance.                               IRF PT Goals      Flowsheet Row Most Recent Value   Bed Mobility Goal 1    Activity/Assistive Device sit to supine/supine to sit at 04/02/2025 0904   [place] supervision required at 04/02/2025 0904   Time Frame short-term goal (STG), 5 - 7 days at 04/10/2025 0805   Strategies/Barriers spinal precautions, strength,  whole practice training, therex at 04/10/2025 0805   Progress/Outcome goal ongoing at 04/10/2025 0805   Bed Mobility Goal 2    Activity/Assistive Device bed mobility activities, all at 04/02/2025 0904   [place] modified independence at 04/02/2025 0904   Time Frame long-term goal (LTG), 14 days or less, by discharge at 04/10/2025 0805   Progress/Outcome goal ongoing at 04/10/2025 0805   Transfer Goal 1    Activity/Assistive Device sit-to-stand/stand-to-sit, stand pivot at 04/02/2025 0904   [place] supervision required at 04/10/2025 0805   Time Frame short-term goal (STG), 5 - 7 days at 04/10/2025 0805   Strategies/Barriers evaluated 4/2, conitnue with PT POC at 04/03/2025 0856   Progress/Outcome goal met, goal revised this date at 04/10/2025 0805   Transfer Goal 2    Activity/Assistive Device  sit-to-stand/stand-to-sit, stand pivot at 04/02/2025 0904   [place] modified independence at 04/02/2025 0904   Time Frame long-term goal (LTG), 1 week at 04/10/2025 0805   Progress/Outcome goal ongoing at 04/10/2025 0805   Gait/Walking Locomotion Goal 1    Activity/Assistive Device gait (walking locomotion), assistive device use at 04/02/2025 0904   Distance 150 feet at 04/02/2025 0904   [place] minimum assist (75% or more patient effort) at 04/02/2025 0904   Time Frame short-term goal (STG), 5 - 7 days at 04/10/2025 0805   Strategies/Barriers endurance, fatigue,  therex, endurance at 04/10/2025 0805   Progress/Outcome goal ongoing, goal partially met, continuing progress toward goal at 04/10/2025 0805   Gait/Walking Locomotion Goal 2    Activity/Assistive Device gait (walking locomotion), assistive device use at 04/02/2025 0904   Distance 150 feet at 04/02/2025 0904   [place] modified independence at 04/02/2025 0904   Time Frame long-term goal (LTG), 1 week at 04/10/2025 0805   Progress/Outcome goal ongoing at 04/10/2025 0805   Stairs Goal 1    Activity/Assistive Device ascending stairs, descending stairs, using handrail, left, using handrail, right at 04/02/2025 0904   Number of Stairs 12 at 04/02/2025 0904   [place] minimum assist (75% or more patient effort) at 04/02/2025 0904   Time Frame short-term goal (STG), 5 - 7 days at 04/03/2025 0856   Strategies/Barriers evaluated 4/2, continue PT POC at 04/03/2025 0856   Progress/Outcome goal met at 04/10/2025 0805   Stairs Goal 2    Activity/Assistive Device ascending stairs, descending stairs, using handrail, left, using handrail, right at 04/02/2025 0904   Number of Stairs 12 at 04/02/2025 0904   [place] supervision required at 04/02/2025 0904   Time Frame long-term goal (LTG), 1 week at 04/10/2025 0805   Progress/Outcome goal ongoing, good progress toward goal at 04/10/2025 0805

## 2025-04-10 NOTE — PATIENT CARE CONFERENCE
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    Patient Active Problem List    Diagnosis Date Noted    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 04/02/2025    Encounter for follow-up surveillance of thyroid cancer 10/10/2024    COVID-19 06/28/2023    Other dysphagia 03/05/2023    Other dysphagia 07/27/2022    Increased sputum production 05/10/2022    Hypertension 10/22/2021    Postsurgical hypothyroidism 10/22/2021    Chronic fatigue syndrome 10/22/2021    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) 11/10/2020    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) 11/10/2020    Gastroesophageal reflux disease 05/05/2020    Allergic rhinitis 05/05/2020    Deviated nasal septum 05/05/2020    Hypertrophy of nasal turbinates 05/05/2020    Paralysis of vocal cords and larynx, unilateral 05/05/2020    Induration penis plastica 01/22/2020    Mixed hyperlipidemia 09/30/2018    Elevated coronary artery calcium score 09/30/2018    ENMANUEL (obstructive sleep apnea) 09/30/2018    Change in bowel habits 03/28/2017    Constipation, chronic 03/28/2017    Hx of colonic polyps 03/28/2017    Penile curvature, acquired 03/14/2017    Vertigo 03/07/2016    Eustachian tube dysfunction, bilateral 03/07/2016    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) 03/07/2016    Rhinitis sicca 03/07/2016    Sensorineural hearing loss (SNHL), bilateral 03/07/2016    Malignant neoplasm of salivary gland (CMS/HCC) 01/27/2016    Examination of participant in clinical  "trial 01/25/2016    Abdominal pain 10/16/2014    Thyroid cancer (CMS/HCC) 01/01/2001       Premorbid Status:  Prior Level of Function      Flowsheet Row Most Recent Value   Dominant Hand right   Ambulation independent   Transferring independent   Toileting independent   Bathing independent   Dressing independent   Eating independent   IADLs independent   Driving/Transportation    Communication understands/communicates without difficulty   Past History of Dysphagia PTA pt was completely independent, no AD. (+) . NPO PTA, manages peg tube independently.   Prior Level of Function Comment Independent without use of AE or DME.   Assistive Device Currently Used at Home none            Current Functional Status:  Mobility  Gait  [place], Gait: minimum assist (75% or more patient effort)  Assistive Device: walker, front-wheeled  Distance in Feet: 75 feet  Comment: over low ply carpet, pt self cues for upright posture and glute engagement, steadying A w/ 1 instance of min A to recover mild trunk sway    Stairs  [place], Stairs: minimum assist (75% or more patient effort)  Assistive Device: railing  Handrail Location (Stairs): left side (ascending); left side (descending)  Number of Stairs: 8  Stair Height: 6 inches  Comment: pt required redirection for stair safety for 4 steps step over; additional 4x2, 6\" steps with SPC and railing, LLE leading, RLE descending    Wheelchair  Comment, Wheelchair Mobility: discussed multiple WC options for use during length of stay including, MWC, recliner MWC, tilt in space WC for c/s support and pain management; pt declined to trial recliner vs tilt in spsace this session    Transfers  Bed Mobility  [place], Supine to Sit: minimum assist (75% or more patient effort)  [place], Sit to Supine: moderate assist (50-74% patient effort)  Safety/Cues: increased time to complete; minimal; verbal cues; hand placement; technique; sequencing  Assistive " Device: head of bed elevated  Comment: OT: Steadying A supine > sit on EOB c UE support on bed rail to push self to sit.    Bed to Chair Transfer  [place], Bed to Chair: minimum assist (75% or more patient effort)  Safety/Cues: technique; sequencing  Assistive Device: wheelchair; walker, front-wheeled  Comment: Bed>WC Min A on pelvis for balance    Sit to Stand Transfer  [place], Sit to Stand Transfer: touching/steadying assist  Safety/Cues: technique  Assistive Device: walker, front-wheeled  Comment: From EOM    Stand to Sit Transfer  [place], Stand to Sit Transfer: touching/steadying assist  Safety/Cues: technique  Assistive Device: walker, front-wheeled  Comment: To w/c    Stand Pivot Transfer  [place], Stand Pivot/Stand Step Transfer: touching/steadying assist  Safety/Cues: technique  Assistive Device: walker, front-wheeled  Comment: From EOM to w/c short amb approach    Car Transfer  Comment: technique reviewed for posterior trunk entry, and sit and swing technique; to be performed additional sessions      Toilet Transfer  Transfer Technique: sit/stand  [place]: touching/steadying assist  Safety/Cues: hand placement  Assistive Device: grab bars/safety frame  Comment: Amb approach c RW, assist for controlled descent onto accessible height toilet + grab bar    Shower Transfer  Transfer Technique: step over, left entry  [place]: minimum assist (75% or more patient effort)  Safety/Cues: technique; hand placement  Assistive Device: grab bars/tub rail; shower chair  Comment: Per last report: Side stepping over small shower ledge c UE support on wall and RW, pt naturally grabbing grab bar for greater stability. Discussed use of DME for use at home, to review rec with wife during family training tomorrow.      Self Care  Bathing  [place]: moderate assist (50-74% patient effort)  Safety/Cues: increased time to complete; maintaining precautions; problem-solving; sequencing  Setup  Assistance: open containers; adaptive equipment setup; adjust water temperature/flow  Comment: Pt reports having a shower with nursing staff. Reports needed assistance to wash LE and buttocks. Reports did not try LH sponge d/t time constraints with nursing staff.    Upper Body Dressing  Tasks: don; front-opening garment  [place]: close supervision  Safety/Cues: increased time to complete  Comment: Standing to don front open shirt, pt able to button shirt c increased time. Cl S for balance to perform bimanual task in stance    Lower Body Dressing  Tasks: don; pants/bottoms; socks  [place]: close supervision  Safety/Cues: increased time to complete; maintaining precautions; problem-solving  Comment: Seated on EOB, Cl S for balance with figure 4 sit to thread BLE into pants, Cl S for stand/arrange c UE support on RW for intermittent balance. Pt able to don/doff B/L socks c increased time. Declined sneakers d/t tight sensation on feet.    Grooming  Tasks: washes, rinses and dries hands; oral care (brushing teeth, cleaning dentures)  [place]: touching/steadying assist  Comment: Standing at sink c RW to perform grooming tasks. Cl S-Steadying A for balance when performing bimanual tasks d/t pt report of LE fatigue.    Toileting  Tasks: adjust/manage clothing; perform bladder hygiene  [place]: close supervision  Setup Assistance: adaptive equipment setup  Adaptive Equipment: urinal  Comment: Cl S for pant management c intermittent UE support on RW. Later in session, CLS-steadying A for balance while standing to face toilet, continent/voiding bladder.    Self-Feeding  No data recorded    Cognition     Communication       Frequency of Treatment (OT): 5-7 times per week  Intensity of Treatment (OT): 60-90 minutes per day  Frequency of Treatment (PT): 5-7 times per week  Intensity of Treatment (PT): 60-90 minutes per day     [place] requires an altered therapy schedule due to medically complex.      Weekly  Outcome Summaries:  Weekly Progress Summary (PT)  Progress Toward Functional Goals (PT): progressing toward functional goals as expected  Weekly Outcome Statement: Pt now presents to [facility] w/ impaired BLE strength, impaired trunk control, impaired axctivity tolerance, neck and shoulder pain, impaired standing balance and mildly decreased coordination and BLE clonus. Pt requires minimum asssitance for all functional mobility which is well below his baseline independence. His gait speed is reflective of increased risk of falls and well below age/gender norms.  Barriers to Overall Progress (PT): impaired activity tolerance, impaired balance, increased anxiety with new tasks  Impairments Continuing to Limit Function: pain; impaired sensation; impaired sensory feedback; impaired postural control; impaired coordination; impaired motor control; impaired balance; decreased strength  Recommendations: continued IP PT services, family educ    Weekly Progress Summary (OT)  Progress Toward Functional Goals (OT): progressing toward functional goals as expected  Weekly Outcome Statement: Pt is demonstrating good progress towards goals. Overall Cl S-Steadying A for functional transfesr with RW. Pt's wife present for family training. Recommending shower chair + TSF, information provided to wife. Pt is making progress with ADLs (limited c bathing progress d/t performing with nursing and time/enviroment constraints, rec trialing LH sponge next shower opportunity). Pt is improving in sitting balance on EOB to increase safety c figure 4 sit for LB dressing tasks. Initiated hanidcapped placard form. Limited by pain in B/L shoulders/neck, decreased coordination, decreased strength, and dec balance. Continue c OT POC 5-7 days a week for 60-90 minutes daily. Plan for ADL family training with wife on 4/14.        Problem Resolution:  Team Weekly Outcome Statement: f/u w surgeon about sutures  Comment, Barriers to Discharge: anxiety, NPO, poor  sleep, constipation, fatigue, pain, endurance, coordination and numbness in hands  Comment, Facilitators for Discharge: tube feeds, hospital bed, sennakot, milk of magnesia, enema, family came in for training and additional training 4/14, home health, RW use, toilet safety frame and shower chair, education for feeds, handicap placert         Expected Level of Function  Self-Care: Independent  Sphincter Control: Independent  Transfers: Independent  Locomotion: Independent  Communication: Independent  Social Cognition: Independent      Goals/Support System:  Family/Support System  Family/Support System Care: self-care encouraged, support provided    IRF PT Goals      Flowsheet Row Most Recent Value   Bed Mobility Goal 1    Activity/Assistive Device sit to supine/supine to sit at 04/02/2025 0904   [place] supervision required at 04/02/2025 0904   Time Frame short-term goal (STG), 5 - 7 days at 04/10/2025 0805   Strategies/Barriers spinal precautions, strength,  whole practice training, therex at 04/10/2025 0805   Progress/Outcome goal ongoing at 04/10/2025 0805   Bed Mobility Goal 2    Activity/Assistive Device bed mobility activities, all at 04/02/2025 0904   [place] modified independence at 04/02/2025 0904   Time Frame long-term goal (LTG), 14 days or less, by discharge at 04/10/2025 0805   Progress/Outcome goal ongoing at 04/10/2025 0805   Transfer Goal 1    Activity/Assistive Device sit-to-stand/stand-to-sit, stand pivot at 04/02/2025 0904   [place] supervision required at 04/10/2025 0805   Time Frame short-term goal (STG), 5 - 7 days at 04/10/2025 0805   Strategies/Barriers evaluated 4/2, conitnue with PT POC at 04/03/2025 0856   Progress/Outcome goal met, goal revised this date at 04/10/2025 0805   Transfer Goal 2    Activity/Assistive Device sit-to-stand/stand-to-sit, stand pivot at 04/02/2025 0904   [place] modified independence at 04/02/2025 0904   Time Frame long-term goal (LTG), 1 week at  04/10/2025 0805   Progress/Outcome goal ongoing at 04/10/2025 0805   Gait/Walking Locomotion Goal 1    Activity/Assistive Device gait (walking locomotion), assistive device use at 04/02/2025 0904   Distance 150 feet at 04/02/2025 0904   [place] minimum assist (75% or more patient effort) at 04/02/2025 0904   Time Frame short-term goal (STG), 5 - 7 days at 04/10/2025 0805   Strategies/Barriers endurance, fatigue,  therex, endurance at 04/10/2025 0805   Progress/Outcome goal ongoing, goal partially met, continuing progress toward goal at 04/10/2025 0805   Gait/Walking Locomotion Goal 2    Activity/Assistive Device gait (walking locomotion), assistive device use at 04/02/2025 0904   Distance 150 feet at 04/02/2025 0904   [place] modified independence at 04/02/2025 0904   Time Frame long-term goal (LTG), 1 week at 04/10/2025 0805   Progress/Outcome goal ongoing at 04/10/2025 0805   Stairs Goal 1    Activity/Assistive Device ascending stairs, descending stairs, using handrail, left, using handrail, right at 04/02/2025 0904   Number of Stairs 12 at 04/02/2025 0904   [place] minimum assist (75% or more patient effort) at 04/02/2025 0904   Time Frame short-term goal (STG), 5 - 7 days at 04/03/2025 0856   Strategies/Barriers evaluated 4/2, continue PT POC at 04/03/2025 0856   Progress/Outcome goal met at 04/10/2025 0805   Stairs Goal 2    Activity/Assistive Device ascending stairs, descending stairs, using handrail, left, using handrail, right at 04/02/2025 0904   Number of Stairs 12 at 04/02/2025 0904   [place] supervision required at 04/02/2025 0904   Time Frame long-term goal (LTG), 1 week at 04/10/2025 0805   Progress/Outcome goal ongoing, good progress toward goal at 04/10/2025 0805          IRF OT Goals      Flowsheet Row Most Recent Value   Transfer Goal 1    Activity/Assistive Device toilet at 04/02/2025 0723   [place] supervision required at 04/09/2025 0949   Time Frame short-term goal  (STG), 5 - 7 days at 04/09/2025 0949   Strategies/Barriers pt just evlauated at 04/03/2025 0754   Progress/Outcome good progress toward goal, goal met, goal revised this date, goal ongoing at 04/09/2025 0949   Transfer Goal 2    Activity/Assistive Device toilet at 04/02/2025 0723   [place] modified independence at 04/02/2025 0723   Time Frame long-term goal (LTG), 21 days or less at 04/02/2025 0723   Progress/Outcome continuing progress toward goal, goal ongoing at 04/09/2025 0949   Transfer Goal 3    Activity/Assistive Device shower at 04/02/2025 0723   [place] supervision required at 04/09/2025 0949   Time Frame short-term goal (STG), 5 - 7 days at 04/09/2025 0949   Strategies/Barriers pt just evlauated at 04/03/2025 0754   Progress/Outcome good progress toward goal, goal met, goal revised this date, goal ongoing at 04/09/2025 0949   Transfer Goal 4    Activity/Assistive Device shower at 04/02/2025 0723   Power supervision required at 04/09/2025 0949   Time Frame long-term goal (LTG), 14 days or less at 04/09/2025 0949   Progress/Outcome goal met, goal revised this date, goal ongoing at 04/09/2025 0949   Bathing Goal 1    [place] minimum assist (75% or more patient effort) at 04/09/2025 0949   Time Frame short-term goal (STG), 5 - 7 days at 04/09/2025 0949   Strategies/Barriers pt just evlauated at 04/03/2025 0754   Progress/Outcome good progress toward goal, goal met, goal revised this date, goal ongoing at 04/09/2025 0949   Bathing Goal 2    [place] supervision required at 04/02/2025 0723   Time Frame long-term goal (LTG), 21 days or less at 04/02/2025 0723   Progress/Outcome continuing progress toward goal, goal ongoing at 04/09/2025 0949   UB Dressing Goal 1    [place] supervision required at 04/09/2025 0949   Time Frame short-term goal (STG), 5 - 7 days at 04/09/2025 0949   Strategies/Barriers pt just evlauated at 04/03/2025 0754   Progress/Outcome good progress toward goal,  goal met, goal revised this date, goal ongoing at 04/09/2025 0949   UB Dressing Goal 2    [place] modified independence at 04/09/2025 0949   Time Frame long-term goal (LTG), 14 days or less at 04/09/2025 0949   Progress/Outcome goal met, goal revised this date, goal ongoing at 04/09/2025 0949   LB Dressing Goal 1    [place] supervision required at 04/09/2025 0949   Time Frame short-term goal (STG), 5 - 7 days at 04/09/2025 0949   Strategies/Barriers pt just evlauated at 04/03/2025 0754   Progress/Outcome good progress toward goal, goal met, goal revised this date, goal ongoing at 04/09/2025 0949   LB Dressing Goal 2    [place] modified independence at 04/02/2025 0723   Time Frame long-term goal (LTG), 21 days or less at 04/02/2025 0723   Progress/Outcome continuing progress toward goal, goal ongoing at 04/09/2025 0949   Grooming Goal 1    [place] set-up required at 04/02/2025 0723   Time Frame short-term goal (STG), 5 - 7 days at 04/09/2025 0949   Strategies/Barriers progressing to standing v seated,  continue to work on standing balance to maximize IND at 04/09/2025 0949   Progress/Outcome progress slower than expected, goal revised this date, goal ongoing at 04/09/2025 0949   Grooming Goal 2    [place] modified independence at 04/02/2025 0723   Time Frame long-term goal (LTG), 21 days or less at 04/02/2025 0723   Progress/Outcome continuing progress toward goal, goal ongoing at 04/09/2025 0949   Toileting Goal 1    [place] supervision required at 04/09/2025 0949   Time Frame short-term goal (STG), 5 - 7 days at 04/09/2025 0949   Strategies/Barriers pt just evlauated at 04/03/2025 0754   Progress/Outcome good progress toward goal, goal met, goal revised this date, goal ongoing at 04/09/2025 0949   Toileting Goal 2    [place] modified independence at 04/02/2025 0723   Time Frame long-term goal (LTG), 21 days or less at 04/02/2025 0723   Progress/Outcome continuing progress toward  goal, goal ongoing at 04/09/2025 0949            Discharge Planning:  Anticipated Discharge Disposition: home with home health, home with assistance  Type of Home Care Services: home PT;home OT;nursing  Equipment/Device Needs at Discharge  Assistive Device/Animal Currently Used at Home: none  Discharge Planning  Type of Current Home Care Services: other (comment) (n/a)    Anticipated Discharge Date: 4/17/2025    Needs Identified:       Team Members Present:     Rehab Attending Present:  Spencer Kemp MD    Care Coordinator Present:  Ana Mc LSW    Nurse Present:  Lesly Azul RN    OT Present:  Lorena Stinson OT    PT Present:  Brigida Nguyễn PT        Next Team Conference Date: 04/17/25

## 2025-04-11 ENCOUNTER — APPOINTMENT (INPATIENT)
Dept: WOUND CARE | Facility: REHABILITATION | Age: 70
DRG: 559 | End: 2025-04-11
Payer: MEDICARE

## 2025-04-11 ENCOUNTER — APPOINTMENT (INPATIENT)
Dept: OCCUPATIONAL THERAPY | Facility: REHABILITATION | Age: 70
DRG: 559 | End: 2025-04-11
Payer: MEDICARE

## 2025-04-11 ENCOUNTER — APPOINTMENT (INPATIENT)
Dept: PHYSICAL THERAPY | Facility: REHABILITATION | Age: 70
DRG: 559 | End: 2025-04-11
Payer: MEDICARE

## 2025-04-11 ENCOUNTER — APPOINTMENT (OUTPATIENT)
Dept: OTHER | Facility: REHABILITATION | Age: 70
End: 2025-04-11
Payer: MEDICARE

## 2025-04-11 LAB
GLUCOSE BLD-MCNC: 157 MG/DL (ref 70–99)
GLUCOSE BLD-MCNC: 161 MG/DL (ref 70–99)
GLUCOSE BLD-MCNC: 189 MG/DL (ref 70–99)
GLUCOSE BLD-MCNC: 197 MG/DL (ref 70–99)
POCT TEST: ABNORMAL

## 2025-04-11 PROCEDURE — 97530 THERAPEUTIC ACTIVITIES: CPT | Mod: GO | Performed by: OCCUPATIONAL THERAPIST

## 2025-04-11 PROCEDURE — 63700000 HC SELF-ADMINISTRABLE DRUG: Performed by: INTERNAL MEDICINE

## 2025-04-11 PROCEDURE — 97110 THERAPEUTIC EXERCISES: CPT | Mod: GP

## 2025-04-11 PROCEDURE — 97110 THERAPEUTIC EXERCISES: CPT | Mod: GO | Performed by: OCCUPATIONAL THERAPIST

## 2025-04-11 PROCEDURE — 97535 SELF CARE MNGMENT TRAINING: CPT | Mod: GO | Performed by: OCCUPATIONAL THERAPIST

## 2025-04-11 PROCEDURE — 25800000 HC PHARMACY IV SOLUTIONS: Performed by: INTERNAL MEDICINE

## 2025-04-11 PROCEDURE — 97530 THERAPEUTIC ACTIVITIES: CPT | Mod: GP

## 2025-04-11 PROCEDURE — 97116 GAIT TRAINING THERAPY: CPT | Mod: GP

## 2025-04-11 PROCEDURE — 12800001 HC ROOM AND CARE SEMIPRIVATE REHAB-BRAIN INJ

## 2025-04-11 PROCEDURE — 63700000 HC SELF-ADMINISTRABLE DRUG: Performed by: PHYSICAL MEDICINE & REHABILITATION

## 2025-04-11 PROCEDURE — 63600000 HC DRUGS/DETAIL CODE: Mod: JZ | Performed by: INTERNAL MEDICINE

## 2025-04-11 RX ORDER — LORAZEPAM 0.5 MG/1
0.5 TABLET ORAL EVERY 8 HOURS PRN
Status: DISCONTINUED | OUTPATIENT
Start: 2025-04-11 | End: 2025-04-14

## 2025-04-11 RX ORDER — SODIUM CHLORIDE 9 MG/ML
INJECTION, SOLUTION INTRAVENOUS CONTINUOUS
Status: ACTIVE | OUTPATIENT
Start: 2025-04-12 | End: 2025-04-12

## 2025-04-11 RX ORDER — SODIUM CHLORIDE 9 MG/ML
INJECTION, SOLUTION INTRAVENOUS CONTINUOUS
Status: DISCONTINUED | OUTPATIENT
Start: 2025-04-14 | End: 2025-04-11

## 2025-04-11 RX ADMIN — HEPARIN SODIUM 5000 UNITS: 5000 INJECTION, SOLUTION INTRAVENOUS; SUBCUTANEOUS at 21:41

## 2025-04-11 RX ADMIN — HEPARIN SODIUM 5000 UNITS: 5000 INJECTION, SOLUTION INTRAVENOUS; SUBCUTANEOUS at 06:00

## 2025-04-11 RX ADMIN — INSULIN LISPRO 1 UNITS: 100 INJECTION, SOLUTION INTRAVENOUS; SUBCUTANEOUS at 10:25

## 2025-04-11 RX ADMIN — HYDROMORPHONE HYDROCHLORIDE 2 MG: 2 TABLET ORAL at 21:40

## 2025-04-11 RX ADMIN — HYDROMORPHONE HYDROCHLORIDE 2 MG: 2 TABLET ORAL at 07:52

## 2025-04-11 RX ADMIN — SODIUM CHLORIDE 1 G: 1 TABLET ORAL at 08:50

## 2025-04-11 RX ADMIN — LIOTHYRONINE SODIUM 10 MCG: 5 TABLET ORAL at 06:00

## 2025-04-11 RX ADMIN — SIMETHICONE 160 MG: 80 TABLET, CHEWABLE ORAL at 21:41

## 2025-04-11 RX ADMIN — HEPARIN SODIUM 5000 UNITS: 5000 INJECTION, SOLUTION INTRAVENOUS; SUBCUTANEOUS at 12:47

## 2025-04-11 RX ADMIN — LEVOTHYROXINE SODIUM 150 MCG: 150 TABLET ORAL at 06:10

## 2025-04-11 RX ADMIN — Medication 10 ML: at 21:41

## 2025-04-11 RX ADMIN — NYSTATIN 1 APPLICATION: 100000 POWDER TOPICAL at 21:46

## 2025-04-11 RX ADMIN — LANSOPRAZOLE 15 MG: KIT at 07:10

## 2025-04-11 RX ADMIN — SIMETHICONE 160 MG: 80 TABLET, CHEWABLE ORAL at 12:47

## 2025-04-11 RX ADMIN — SIMETHICONE 160 MG: 80 TABLET, CHEWABLE ORAL at 08:50

## 2025-04-11 RX ADMIN — EZETIMIBE 10 MG: 10 TABLET ORAL at 21:40

## 2025-04-11 RX ADMIN — Medication 10 ML: at 14:04

## 2025-04-11 RX ADMIN — CEFTRIAXONE SODIUM 2 G: 2 INJECTION, POWDER, FOR SOLUTION INTRAMUSCULAR; INTRAVENOUS at 06:00

## 2025-04-11 RX ADMIN — LINACLOTIDE 145 MCG: 145 CAPSULE, GELATIN COATED ORAL at 12:47

## 2025-04-11 RX ADMIN — SENNOSIDES 2 TABLET: 8.6 TABLET, FILM COATED ORAL at 08:50

## 2025-04-11 RX ADMIN — DOCUSATE SODIUM 100 MG: 50 LIQUID ORAL at 12:47

## 2025-04-11 RX ADMIN — SIMETHICONE 160 MG: 80 TABLET, CHEWABLE ORAL at 17:25

## 2025-04-11 RX ADMIN — NYSTATIN 1 APPLICATION: 100000 POWDER TOPICAL at 08:51

## 2025-04-11 RX ADMIN — SODIUM CHLORIDE 1 G: 1 TABLET ORAL at 21:41

## 2025-04-11 RX ADMIN — DOCUSATE SODIUM 100 MG: 50 LIQUID ORAL at 08:50

## 2025-04-11 RX ADMIN — Medication 10 ML: at 06:08

## 2025-04-11 RX ADMIN — ACETAMINOPHEN 650 MG: 650 SOLUTION ORAL at 12:49

## 2025-04-11 RX ADMIN — GLYCOPYRROLATE 1 MG: 1 TABLET ORAL at 08:50

## 2025-04-11 RX ADMIN — FLUOXETINE HYDROCHLORIDE 20 MG: 20 SOLUTION ORAL at 08:51

## 2025-04-11 RX ADMIN — INSULIN LISPRO 1 UNITS: 100 INJECTION, SOLUTION INTRAVENOUS; SUBCUTANEOUS at 14:06

## 2025-04-11 RX ADMIN — SENNOSIDES 2 TABLET: 8.6 TABLET, FILM COATED ORAL at 12:47

## 2025-04-11 RX ADMIN — FAMOTIDINE 20 MG: 20 TABLET, FILM COATED ORAL at 21:42

## 2025-04-11 RX ADMIN — Medication: at 10:26

## 2025-04-11 RX ADMIN — HYDROMORPHONE HYDROCHLORIDE 2 MG: 2 TABLET ORAL at 14:03

## 2025-04-11 NOTE — PROGRESS NOTES
Patient: Celso Gramajo  Location: [facility] [address]  MRN: [id number]  Today's date: 4/11/2025    History of Present Illness  Celso is a 69 y.o. male admitted on 4/1/2025 with Spinal epidural abscess [G06.1]. Principal problem is Spinal epidural abscess.    Celso Gramajo is a 69 year old male with PMHx thyroid CA s/p thyroidectomy, RND, adjuvant XRT/immunotherapy, esophageal SCC s/p chemoradiation c/b esophageal strictures, HTN, Elevated Coronary Calcium scoring in 2017, ENMANUEL, Hypothyroidism, Anemia, dysphagia on PEG (placed in 2021) TFs, and history of CVA who presented to an OSH with neck and R shoulder pain. Workup was unrevealing and he was discharged home.    He presented to the [place] ED again on 3/17 with ongoing fevers. CT Neck with no abscess, old postsurgical changes. He d/c home but BCx subsequently returned positive on 3/19 and he returned for admission. He was also bacteremic with strep viridans, and placed on IV antibiotics. Workup revealed a C5-C7 ventral epidural abscess with osteomyelitis, and he was transferred to [facility] for surgical evaluation.    MRI on 3/24 c/f metastatic disease at T1-T4, L4.      At [facility] ortho and ENT were consulted. Ultimately it was determined that an anterior surgical approach was not safe (due to history of neck surgeries), and patient was having progressive weakness. Repeat MRI C-spine showed Discitis and osteomyelitis at C6-C7 with ventral epidural abscess compressing the spinal cord and increased edema. Patient urgently underwent POSTERIOR C4-T1 DECOMPRESSION FUSION WITH [place] INSTRUMENTATION on 3/27. Postop he was briefly in the ICU for MAP goals. OR cultures grew strep intermedius and he was placed on the antibiotic plan below. He had a PICC placed 4/1. 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 He was started on salt tabs. In rehab please continue to check labs, and titrate the salt tabs as  appropriate. He worked with PT/OT who recommended him for acute rehab.     Past Medical History  Celso has a past medical history of Anemia, Cancer (CMS/HCC), head and neck radiation, Hypertension, Hypothyroidism, Stroke (CMS/HCC), and Thyroid disease.    PT Vitals      Date/Time Pulse HR Source SpO2 BP BP Location BP Method Pt Position [facility]   04/11/25 0905 101 Monitor 94 % 122/70 Left upper arm Automatic Sitting LP   04/11/25 0955 80 -- -- -- -- -- -- LPM          PT Pain      Date/Time Pain Type Location Rating: Rest Rating: Activity Interventions [facility]   04/11/25 0905 Pain Assessment back 5 8 massage provided;care clustered;premedicated for activity;quiet environment facilitated LP   04/11/25 0955 Post Activity back 4 5 care clustered;premedicated for activity;pain management plan reviewed with patient/caregiver LPM               Prior Living Environment      Flowsheet Row Most Recent Value   People in Home spouse   Current Living Arrangements home   Home Accessibility stairs to enter home (Group), stairs within home (Group)   Living Environment Comment Multi-story home with 12-14 steps to second floor, powder room on 1st floor, 2-3 FELICITA via garage, multiple supportive family members   Number of Stairs, Main Entrance 3   Stair Railings, Main Entrance none   Location, Patient Bedroom second floor, must negotiate stairs to access   Location, Bathroom second floor, must negotiate stairs to access   Bathroom Access Comment powder room 1st floor standard height toilet, full bathroom 2nd floor WIS with glass door, standard height toilet   Stairs, Within Home, Primary 12   Stair Railings, Within Home, Primary railings on both sides of stairs            Prior Level of Function      Flowsheet Row Most Recent Value   Dominant Hand right   Ambulation independent   Transferring independent   Toileting independent   Bathing independent   Dressing independent   Eating independent   IADLs independent   Driving/Transportation     Communication understands/communicates without difficulty   Past History of Dysphagia PTA pt was completely independent, no AD. (+) . NPO PTA, manages peg tube independently.   Prior Level of Function Comment Independent without use of AE or DME.   Assistive Device Currently Used at Home none             IRF PT Evaluation and Treatment - 04/11/25 0905          PT Time Calculation    Start Time 0901     Stop Time 1001     Time Calculation (min) 60 min        Session Details    Document Type Daily Treatment/Progress Note        General Information    Patient Profile Reviewed yes     General Observations of Patient received in room, agreeable to POC        Mobility Belt    Mobility Belt Used During Session yes        Orthosis Neck Cervical Collar    Orthosis Properties Date Obtained: 04/03/25 Time Obtained: 0825 Location: Neck Type: Cervical Collar Features: Hard Description: Hard collar AAT; can be removed seated to don [place] collar for bathing.       Sit to Stand Transfer    [place], Sit to Stand Transfer touching/steadying assist;close supervision     Safety/Cues technique     Assistive Device wheelchair;walker, front-wheeled     Comment multiple trials performed with intermittent assistance for balance on pelvis, wide YONATAN        Stand to Sit Transfer    [place], Stand to Sit Transfer minimum assist (75% or more patient effort)     Safety/Cues increased time to complete     Assistive Device walker, front-wheeled;wheelchair     Comment multiple trials performed with intermittent assistance for balance on pelvis, wide YON[place]AN        Stand Pivot Transfer    [place], Stand Pivot/Stand Step Transfer touching/steadying assist     Safety/Cues technique;sequencing     Assistive Device walker, front-wheeled     Comment multiple trials performed, cues for intermittent aligment        Gait Training    [place], Gait touching/steadying assist     Safety/Cues increased time to  "complete;minimal;technique;sequencing     Assistive Device walker, front-wheeled     Distance in Feet 100 feet     Pattern step-through     Deviations/Abnormal Patterns ataxic;base of support, narrow;weight shifting decreased     Bilateral Gait Deviations heel strike decreased     Comment 100ft x6 trials during session with seated rest breaks, assist for steadying balance on pelvis        Curb Negotiation    [place] touching/steadying assist     Assistive Device walker, front-wheeled     Curb Height 6 inches     Comment 6\" curb x2, 8\" curb x2 A on pelvis for balance, cues for spinal precautions        10 Meter Walk Test (Self-Selected Velocity)    Trial One: Ten Meter Walk Test (sec) 17 seconds     Trial Two: Ten Meter Walk Test (sec) 12 seconds     Assistive Device walker, front-wheeled     Trial One: Gait Speed (m/s) 0.35 m/s     Trial Two: Gait Speed (m/s) 0.5 m/s     Average Gait Speed (m/s): Two Trials 0.43 m/s        Stairs Training    [place], Stairs minimum assist (75% or more patient effort);touching/steadying assist     Safety/Cues technique;sequencing;proper use of assistive device     Assistive Device railing;cane, straight     Handrail Location (Stairs) left side (ascending);left side (descending)     Number of Stairs 8     Stair Height 6 inches     Ascending Stairs Technique step-to-step     Descending Stairs Technique step-to-step     Comment assist for balance intermittently, cues for c/s ext and forward gaze        Lower Extremity (Therapeutic Exercise)    Exercise Position/Type seated;standing     General Exercise bilateral;ankle pumps;heel raises;LAQ (long arc quad);marching while seated;marching while standing;hamstring stretch;gastroc stretch     Reps and Sets 3/10        Therapeutic Interventions    Comment, Therapeutic Intervention Soft Tissue Mobilization in sidelying on therapyy mat to bilateral lumbar spine- 15 minutes        Daily Progress Summary (PT)    Daily Outcome Statement " "Pt limited by \"back tightness\" per reports, improved s/p manual mobilization per pt reports. Pt progressing endurance for transfers and mobility, stairrs with improved technique and balance.                               IRF PT Goals      Flowsheet Row Most Recent Value   Bed Mobility Goal 1    Activity/Assistive Device sit to supine/supine to sit at 04/02/2025 0904   [place] supervision required at 04/02/2025 0904   Time Frame short-term goal (STG), 5 - 7 days at 04/10/2025 0805   Strategies/Barriers spinal precautions, strength,  whole practice training, therex at 04/10/2025 0805   Progress/Outcome goal ongoing at 04/10/2025 0805   Bed Mobility Goal 2    Activity/Assistive Device bed mobility activities, all at 04/02/2025 0904   [place] modified independence at 04/02/2025 0904   Time Frame long-term goal (LTG), 14 days or less, by discharge at 04/10/2025 0805   Progress/Outcome goal ongoing at 04/10/2025 0805   Transfer Goal 1    Activity/Assistive Device sit-to-stand/stand-to-sit, stand pivot at 04/02/2025 0904   [place] supervision required at 04/10/2025 0805   Time Frame short-term goal (STG), 5 - 7 days at 04/10/2025 0805   Strategies/Barriers evaluated 4/2, conitnue with PT POC at 04/03/2025 0856   Progress/Outcome goal met, goal revised this date at 04/10/2025 0805   Transfer Goal 2    Activity/Assistive Device sit-to-stand/stand-to-sit, stand pivot at 04/02/2025 0904   [place] modified independence at 04/02/2025 0904   Time Frame long-term goal (LTG), 1 week at 04/10/2025 0805   Progress/Outcome goal ongoing at 04/10/2025 0805   Gait/Walking Locomotion Goal 1    Activity/Assistive Device gait (walking locomotion), assistive device use at 04/02/2025 0904   Distance 150 feet at 04/02/2025 0904   [place] minimum assist (75% or more patient effort) at 04/02/2025 0904   Time Frame short-term goal (STG), 5 - 7 days at 04/10/2025 0805   Strategies/Barriers endurance, fatigue,  therex, " endurance at 04/10/2025 0805   Progress/Outcome goal ongoing, goal partially met, continuing progress toward goal at 04/10/2025 0805   Gait/Walking Locomotion Goal 2    Activity/Assistive Device gait (walking locomotion), assistive device use at 04/02/2025 0904   Distance 150 feet at 04/02/2025 0904   [place] modified independence at 04/02/2025 0904   Time Frame long-term goal (LTG), 1 week at 04/10/2025 0805   Progress/Outcome goal ongoing at 04/10/2025 0805   Stairs Goal 1    Activity/Assistive Device ascending stairs, descending stairs, using handrail, left, using handrail, right at 04/02/2025 0904   Number of Stairs 12 at 04/02/2025 0904   [place] minimum assist (75% or more patient effort) at 04/02/2025 0904   Time Frame short-term goal (STG), 5 - 7 days at 04/03/2025 0856   Strategies/Barriers evaluated 4/2, continue PT POC at 04/03/2025 0856   Progress/Outcome goal met at 04/10/2025 0805   Stairs Goal 2    Activity/Assistive Device ascending stairs, descending stairs, using handrail, left, using handrail, right at 04/02/2025 0904   Number of Stairs 12 at 04/02/2025 0904   [place] supervision required at 04/02/2025 0904   Time Frame long-term goal (LTG), 1 week at 04/10/2025 0805   Progress/Outcome goal ongoing, good progress toward goal at 04/10/2025 0805

## 2025-04-11 NOTE — PROGRESS NOTES
[facility] Progress Note          Patient was seen and examined at bedside.    Subjective:     No new complaints or O/N events. His PT/OT is progressing.   Objective   Vital signs in last 24 hours:  Temp:  [36.4 °C (97.6 °F)-36.7 °C (98.1 °F)] 36.7 °C (98 °F)  Heart Rate:  [] 87  Resp:  [18] 18  BP: (105-133)/(64-72) 133/72      Intake/Output Summary (Last 24 hours) at 4/11/2025 1036  Last data filed at 4/11/2025 0745  Gross per 24 hour   Intake 545 ml   Output --   Net 545 ml     Intake/Output this shift:  I/O this shift:  In: 425 [NG/GT:425]  Out: -    Review of Systems:  All other systems reviewed and negative except as noted in the HPI.   Objective      Labs  Reviewed his labs thoroughly   Lab Results   Component Value Date    WBC 5.89 04/07/2025    HGB 12.4 (L) 04/07/2025    HCT 37.8 (L) 04/07/2025    MCV 94.3 04/07/2025     04/07/2025     Lab Results   Component Value Date    GLUCOSE 176 (H) 04/07/2025    CALCIUM 9.2 04/07/2025     (L) 04/07/2025    K 4.5 04/07/2025    CO2 32 (H) 04/07/2025    CL 97 (L) 04/07/2025    BUN 16 04/07/2025    CREATININE 0.6 (L) 04/07/2025       Imaging  OSH imaging study reports reviewed    Full Code    Physical Exam:  Head/Ear/Nose/Throat: normocephalic; atraumatic; moisture mouth mm, no oropharyngeal thrush noted.   Eyes: anicteric sclera, EOMI; PERRL.   Neck : supple, no JVD, no carotid bruits appeciated.   Respiratory: no evidence of labored breathing, lung sounds CTA b/l, good aeration bibasilar area, no w/r/c.   Cardiovascular: RRR; normal S1, S2; no m/r/g; no S3 or S4.   Gastrointestinal: PEG in place, focal c/d/I; soft; NT; BS normal; mildly distended; no CVAT b/l.   Genitourinary: no song.   Extremities : no c/c/e .   Neurological: AO x 3, fluent speeches, following commands, CNS II-XII grossly intact; no focal neurologic deficits.   Behavior/Emotional: in NAD, appropriate; cooperative.   Skin: clean, dry and intact.  Plan of  care was discussed with patient, RN, and PMR attending     Assessment & Plan  CC:C6-7 OM / discitis with ventral epidural abscess, s/p posterior C4-T1 decompression and fusion; h/o thyroid CA s/p thyroidectomy; H/o esophageal SCC s/p CRT c/b esophageal strictures; dysphagia, NPO with chronic PEG TF     69 y.o. 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  He had a PEG placed in 2021 for dysphagia.  Cervical spinal stenosis with myeloradiculopathy h/o cervical spinal surgery. He initially presented to [place] ED on 3/11/25 with fevers and R shoulder pain. Workup was unrevealing and he was discharged home.  He presented to the [place] ED again on 3/17 with ongoing fevers. CT Neck with no abscess, old postsurgical changes, BCX was collected. He d/c home but BCX subsequently returned positive on 3/19 and he returned for admission. Imaging with C6-7 OM / discitis with ventral epidural abscess. [facility].  At that time, he was afebrile. BCx negative. MRI with C5-C7 cervical epidural abscess/osteo with cord signal at C6-7, T1-4, L4 vertebral body lesions probably radiation-changes per [facility] radiology, although metastatic lesions are not ruled out, per wife PET-CT scan no evidence of metastatic malignancy light up at spine region. He is now s/p posterior C4-T1 decompression and fusion on 3/27. Purulent fluid drained from anterior spine. OR Cx Strep intermedius.  ID rec IV dapto and ceftriaxone x 6 weeks.  PICC placed. He was admitted to [facility] on 4/1/25 for post op inpt acute rehab. He is TF NPO status.     A/P:  # Strep intermedius bacteremia with C6-7 OM / discitis and ventral epidural abscess, s/p PCDF 3/27/25, purulent fluid drainage from anterior spine  Suspect source likely recent esophageal dilatations ; 3/17 BCx + at OSH; TTE negative for veg at  OSH  -complete planned 6-week iv Rocephin course, last dose of daptomycin on 3/31/25,  wound care dermal defense, f/u with [facility] ID as outpt, weekly labs.     # H/o thyroid CA s/p thyroidectomy and RND, adjuvant XRT/immunotherapy, esophageal SCC s/p CRT c/b esophageal strictures, L VC paralysis  -cancer surveillance f/u with [facility]  medical/ radiation oncology;  -palliative consultation.      # Dysphagia, NPO/TF, IBS-C, GI prophylaxis  -cont PEG TF, dietitian co management for nutrition supplements, Pepcid/PPI for GI prophylaxis       # Hypothyroidism   -per his endo, increase levothyroxine dose from 137-150 mcg daily.     # DVT prophylaxis  -SQH     # Anxiety  -on Prozac, psychology CBT, psychiatrist for co management      # Hyperglycemia, previous prediabetes  -diet modification per dietitian, SSI with accu checks      Billing code: [id number]  Diagnoses:  Patient Active Problem List   Diagnosis    Vertigo    Hypertension    Postsurgical hypothyroidism    Mixed hyperlipidemia    Gastroesophageal reflux disease    Increased sputum production    Allergic rhinitis    Change in bowel habits    Chronic fatigue syndrome    Constipation, chronic    Deviated nasal septum    Elevated coronary artery calcium score    Eustachian tube dysfunction, bilateral    Examination of participant in clinical trial    Induration penis plastica    Hypertrophy of nasal turbinates    Hx of colonic polyps    Malignant neoplasm of salivary gland (CMS/HCC)    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)    Paralysis of vocal cords and larynx, unilateral    ENMANUEL (obstructive sleep apnea)    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)    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)    Penile curvature, acquired    Rhinitis sicca    Thyroid cancer (CMS/HCC)    Sensorineural hearing loss (SNHL), bilateral    Other dysphagia    Abdominal pain    Other dysphagia    COVID-19    Encounter for follow-up surveillance of thyroid  cancer    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         Liilan Marrero MD  4/11/2025

## 2025-04-11 NOTE — PROGRESS NOTES
Patient: Celso Gramajo  Location: [facility] [address]  MRN: [id number]  Today's date: 4/11/2025    History of Present Illness  Celso is a 69 y.o. male admitted on 4/1/2025 with Spinal epidural abscess [G06.1]. Principal problem is Spinal epidural abscess.    Celso Gramajo is a 69 year old male with PMHx thyroid CA s/p thyroidectomy, RND, adjuvant XRT/immunotherapy, esophageal SCC s/p chemoradiation c/b esophageal strictures, HTN, Elevated Coronary Calcium scoring in 2017, ENMANUEL, Hypothyroidism, Anemia, dysphagia on PEG (placed in 2021) TFs, and history of CVA who presented to an OSH with neck and R shoulder pain. Workup was unrevealing and he was discharged home.    He presented to the [place] ED again on 3/17 with ongoing fevers. CT Neck with no abscess, old postsurgical changes. He d/c home but BCx subsequently returned positive on 3/19 and he returned for admission. He was also bacteremic with strep viridans, and placed on IV antibiotics. Workup revealed a C5-C7 ventral epidural abscess with osteomyelitis, and he was transferred to [facility] for surgical evaluation.    MRI on 3/24 c/f metastatic disease at T1-T4, L4.      At [facility] ortho and ENT were consulted. Ultimately it was determined that an anterior surgical approach was not safe (due to history of neck surgeries), and patient was having progressive weakness. Repeat MRI C-spine showed Discitis and osteomyelitis at C6-C7 with ventral epidural abscess compressing the spinal cord and increased edema. Patient urgently underwent POSTERIOR C4-T1 DECOMPRESSION FUSION WITH [place] INSTRUMENTATION on 3/27. Postop he was briefly in the ICU for MAP goals. OR cultures grew strep intermedius and he was placed on the antibiotic plan below. He had a PICC placed 4/1. 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 He was started on salt tabs. In rehab please continue to check labs, and titrate the salt tabs as  appropriate. He worked with PT/OT who recommended him for acute rehab.     Past Medical History  Celso has a past medical history of Anemia, Cancer (CMS/HCC), head and neck radiation, Hypertension, Hypothyroidism, Stroke (CMS/HCC), and Thyroid disease.    OT Vitals      Date/Time Pulse HR Source Pt Activity BP BP Location BP Method Pt Position [facility]   04/11/25 1312 100 Monitor At rest 124/57 Left upper arm Automatic Sitting AA          OT Pain      Date/Time Pain Type Side/Orientation Location Rating: Rest Rating: Activity Description Interventions [facility]   04/11/25 1312 Pain Assessment back lower 5 5 aching diversional activity provided                          04/11/25 1424 Pain Assessment back lower 5 5 aching premedicated for activity AA               Prior Living Environment      Flowsheet Row Most Recent Value   People in Home spouse   Current Living Arrangements home   Home Accessibility stairs to enter home (Group), stairs within home (Group)   Living Environment Comment Multi-story home with 12-14 steps to second floor, powder room on 1st floor, 2-3 FELICITA via garage, multiple supportive family members   Number of Stairs, Main Entrance 3   Stair Railings, Main Entrance none   Location, Patient Bedroom second floor, must negotiate stairs to access   Location, Bathroom second floor, must negotiate stairs to access   Bathroom Access Comment powder room 1st floor standard height toilet, full bathroom 2nd floor WIS with glass door, standard height toilet   Stairs, Within Home, Primary 12   Stair Railings, Within Home, Primary railings on both sides of stairs            Prior Level of Function      Flowsheet Row Most Recent Value   Dominant Hand right   Ambulation independent   Transferring independent   Toileting independent   Bathing independent   Dressing independent   Eating independent   IADLs independent   Driving/Transportation    Communication understands/communicates without difficulty   Past  History of Dysphagia PTA pt was completely independent, no AD. (+) . NPO PTA, manages peg tube independently.   Prior Level of Function Comment Independent without use of AE or DME.   Assistive Device Currently Used at Home none            Occupational Profile      Flowsheet Row Most Recent Value   Occupational History/Life Experiences retired involved in [employer] on [employer]. + , -handicap driving placard.   Patient Goals PSFS: going to the bathroom 0/10, walking 1/10, getting in/out of bed 0/10 = 1/30 = 3.33%             IRF OT Evaluation and Treatment - 04/11/25 1311          OT Time Calculation    Start Time 1300     Stop Time 1430     Time Calculation (min) 90 min        Session Details    Document Type Daily Treatment/Progress Note        General Information    General Observations of Patient Pt recieved for therapy in w/c        Mobility Belt    Mobility Belt Used During Session yes        Orthosis Neck Cervical Collar    Orthosis Properties Date Obtained: 04/03/25 Time Obtained: 0825 Location: Neck Type: Cervical Collar Features: Hard Description: Hard collar AAT; can be removed seated to don [place] collar for bathing.       Sit to Stand Transfer    [place], Sit to Stand Transfer touching/steadying assist     Safety/Cues technique;hand placement     Assistive Device walker, front-wheeled     Comment from w/c and recliner chair        Stand to Sit Transfer    [place], Stand to Sit Transfer touching/steadying assist     Safety/Cues technique     Assistive Device walker, front-wheeled     Comment to w/c and recliner chair        Toilet Transfer    [place] touching/steadying assist     Assistive Device grab bars/safety frame     Comment Standing c UE support on grab bar to urinate.        Motor Skills    Comment, Motor Skills Discussed with nursing, under nursnig supervision working with pt to manipulate tools for pt to self-adminsiter medicaiton via tube s/p medication crushed.         Therapeutic Interventions    Comment, Therapeutic Intervention Edu pt on RW safety during obstacle navigation: (1) Steadying A for navigating in between cones in serpentine fashion to make 45/90 degree turns c RW, cues for body positioning, (2) Steadying A for lateral stepping in between cones to the R/L to work on side stepping in narrow pathways, (3) Steadying A for stepping over 1 barriers on floor c RW, cues for sequencing when stepping c RW to reduce risk of fall. (4) Steadying A for item retrieval c use of reacher to  cones x6 from floor level c reacher.        Hand (Therapeutic Exercise)    General Exercise bilateral     Reps and Sets x10 reps     Comment Performing AROM: gross grasp, MP flexion (lumbricals),  hook fist, digit abduction/adduction, opposition. Noted c slight edema throughout LUE, (+) creased at IP joints. Will continue to monitior. To notifiy MD.        Grooming    Tasks washes, rinses and dries hands     [place] modified independence     Position supported sitting;sink side     Comment Seated at w/c level at sink        Toileting    Tasks perform bladder hygiene;adjust/manage clothing     [place] touching/steadying assist     Position supported standing     Comment Standing at toilet to urinate        Self-Feeding    Comment Pt participating in prep work for self-feed , assist only to remove Infinity pump from IV pole. Pt able to pour feed into bag, turn on infinity pump, and attach tube to stomach.        Daily Progress Summary (OT)    Daily Outcome Statement Pt progressing to performing functional transfers and HHM c RW throughout various obstacles at a steadying A level. Mild edema noted in L hand, no deficits in ROM, but with c c/o stiffness, MD notified Continue to encourage pt to participate in self-feeds. Continue c OT POC c focus on UE strengthneing/ROM to assist c ADLs.                          Education Documentation  Equipment/Methods, taught by [employer],  Lorena OT at 4/11/2025  2:24 PM.  Learner: Patient  Readiness: Acceptance  Method: Explanation, Demonstration  Response: Verbalizes Understanding, Demonstrated Understanding  Comment: RW safety c obstacle navigation          IRF OT Goals      Flowsheet Row Most Recent Value   Transfer Goal 1    Activity/Assistive Device toilet at 04/02/2025 0723   [place] supervision required at 04/09/2025 0949   Time Frame short-term goal (STG), 5 - 7 days at 04/09/2025 0949   Strategies/Barriers pt just evlauated at 04/03/2025 0754   Progress/Outcome good progress toward goal, goal met, goal revised this date, goal ongoing at 04/09/2025 0949   Transfer Goal 2    Activity/Assistive Device toilet at 04/02/2025 0723   [place] modified independence at 04/02/2025 0723   Time Frame long-term goal (LTG), 21 days or less at 04/02/2025 0723   Progress/Outcome continuing progress toward goal, goal ongoing at 04/09/2025 0949   Transfer Goal 3    Activity/Assistive Device shower at 04/02/2025 0723   [place] supervision required at 04/09/2025 0949   Time Frame short-term goal (STG), 5 - 7 days at 04/09/2025 0949   Strategies/Barriers pt just evlauated at 04/03/2025 0754   Progress/Outcome good progress toward goal, goal met, goal revised this date, goal ongoing at 04/09/2025 0949   Transfer Goal 4    Activity/Assistive Device shower at 04/02/2025 0723   [place] supervision required at 04/09/2025 0949   Time Frame long-term goal (LTG), 14 days or less at 04/09/2025 0949   Progress/Outcome goal met, goal revised this date, goal ongoing at 04/09/2025 0949   Bathing Goal 1    [place] minimum assist (75% or more patient effort) at 04/09/2025 0949   Time Frame short-term goal (STG), 5 - 7 days at 04/09/2025 0949   Strategies/Barriers pt just evlauated at 04/03/2025 0754   Progress/Outcome good progress toward goal, goal met, goal revised this date, goal ongoing at 04/09/2025 0949   Bathing Goal 2    [place] supervision  required at 04/02/2025 0723   Time Frame long-term goal (LTG), 21 days or less at 04/02/2025 0723   Progress/Outcome continuing progress toward goal, goal ongoing at 04/09/2025 0949   UB Dressing Goal 1    [place] supervision required at 04/09/2025 0949   Time Frame short-term goal (STG), 5 - 7 days at 04/09/2025 0949   Strategies/Barriers pt just evlauated at 04/03/2025 0754   Progress/Outcome good progress toward goal, goal met, goal revised this date, goal ongoing at 04/09/2025 0949   UB Dressing Goal 2    [place] modified independence at 04/09/2025 0949   Time Frame long-term goal (LTG), 14 days or less at 04/09/2025 0949   Progress/Outcome goal met, goal revised this date, goal ongoing at 04/09/2025 0949   LB Dressing Goal 1    [place] supervision required at 04/09/2025 0949   Time Frame short-term goal (STG), 5 - 7 days at 04/09/2025 0949   Strategies/Barriers pt just evlauated at 04/03/2025 0754   Progress/Outcome good progress toward goal, goal met, goal revised this date, goal ongoing at 04/09/2025 0949   LB Dressing Goal 2    [place] modified independence at 04/02/2025 0723   Time Frame long-term goal (LTG), 21 days or less at 04/02/2025 0723   Progress/Outcome continuing progress toward goal, goal ongoing at 04/09/2025 0949   Grooming Goal 1    [place] set-up required at 04/02/2025 0723   Time Frame short-term goal (STG), 5 - 7 days at 04/09/2025 0949   Strategies/Barriers progressing to standing v seated,  continue to work on standing balance to maximize IND at 04/09/2025 0949   Progress/Outcome progress slower than expected, goal revised this date, goal ongoing at 04/09/2025 0949   Grooming Goal 2    [place] modified independence at 04/02/2025 0723   Time Frame long-term goal (LTG), 21 days or less at 04/02/2025 0723   Progress/Outcome continuing progress toward goal, goal ongoing at 04/09/2025 0949   Toileting Goal 1    [place] supervision required at 04/09/2025 0949    Time Frame short-term goal (STG), 5 - 7 days at 04/09/2025 0949   Strategies/Barriers pt just evlauated at 04/03/2025 0754   Progress/Outcome good progress toward goal, goal met, goal revised this date, goal ongoing at 04/09/2025 0949   Toileting Goal 2    [place] modified independence at 04/02/2025 0723   Time Frame long-term goal (LTG), 21 days or less at 04/02/2025 0723   Progress/Outcome continuing progress toward goal, goal ongoing at 04/09/2025 0961

## 2025-04-11 NOTE — PLAN OF CARE
Plan of Care Review  Plan of Care Reviewed With: patient  Progress: improving  Outcome Evaluation: Pt is alert oriented, No c/o pain and discomfort. Conitnent with BB. safety maintained. Check all time. Speciality bed in use, PEG tube patent and easy to flush, 300ml water flush at HS and 500ml water flush in morning after levothyroxin. IV anceg q24 given via Rt UE PICC SL easy to flush. Pt managed his oral suctioning, was not able to sleep good last night due to increased thick mucus, was in/out of bed to recliner. QID accu check before tube feed. Turning and weight shifting. All meds and care reviewed with pt.

## 2025-04-11 NOTE — NURSING NOTE
Call placed at 1205 to Dr Good's office related to pressure injury underneath collar and current orders to wear collar AAT. RN asked about changing wearing schedule or possibility of soft collar at rest to promote wound healing. Office staff stated that they sent a high priority message into the team.     Addendum 1305: Phan Kimberly called from Dr Good office. Stated that [place] J collar can be removed at this time, RN requested a fax with MD order, states will send fax.     Addendum 1340: Sean called back to schedule follow-up appt. RN informed that patient d/c on April 17, Sean stated that office would like to see him next week, appt made for 4/15 @ 1320, will fax information for appt along with collar orders.     Addendum 1515: Dr Good sent a fax stating that pt is cleared to stop usage of [place] J collar, and if patient desires a he can wear a soft collar. Fax placed on Rehab MD clipboard and message was sent, patient is aware.

## 2025-04-11 NOTE — CONSULTS
Wound Ostomy Continence Note    Subjective    HPI Patient is a 69 y.o. male who was admitted on 4/1/2025 with a diagnosis of Spinal epidural abscess [G06.1]  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 [G06.1].    Problem list Principal Problem:    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     PMH/PSH 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 drug thru [facility]     Hx of head and neck radiation     Hypertension     Hypothyroidism     Stroke (CMS/HCC)     Thyroid disease      Past Surgical History   Procedure Laterality Date    Cholecystectomy      Hernia repair      Micro direct laryngoscopy with fat implantation, abdominal fat harvest,laser N/A 11/23/2020    Performed by Jon German MD at [facility]    Neck dissection      Neck surgery      CA EGD INSERT GUIDE WIRE DILATOR PASSAGE ESOPHAGUS [73998 (CPT®)] N/A 10/17/2022    Performed by Ricardo Cabral MD at [facility]    Thyroid surgery      Tumor removal      L neck 20yrs ago       Assessment and Recommendation              04/11/25 0800   Wound Pressure Injury Left Throat   Date First Assessed/Time First Assessed: 04/06/25 1048   Present on Original Admission: No  Primary Wound Type: Pressure Injury  Wound Location Orientation: Left  Location: Throat  Wound Description (Comments): PI vs abrasion   Pressure Injury Stage U   Dressing Appearance moist drainage;intact   Wound Appearance yellow;slough   Periwound Appearance Intact   Drainage Characteristics/Odor serous   Drainage Amount scant   Wound Care cleansed with;sterile normal saline;medical grade honey   Dressing Dressing removed;Dressing applied;foam;gauze;hydrofiber;silver impregnated dressing   Frequency of Dressing Change Daily   Base yellow;slough   Wound Length (cm) 1.2 cm   Wound Width (cm) 1.3 cm   Wound Depth (cm) 0.2 cm   Wound Surface Area (cm^2) 1.23 cm^2   Wound Volume (cm^3) 0.163 cm^3   % Healing 0 %   Slough % 95  "  Granulation % 5   Recommendation continue Medihoney, hydrofiber with silver, gauze, foam     Seen for skin assessment, follow up. Patient in recliner, pillows placed under legs and arms for comfort. Admits to back pain, waffle cushion ordered for back of recliner for comfort.   Labs:4/7/25: WBC: 5.89.  Neck collar intact, patient in wheelchair, Assistance to hold neck still while assessing left clavicle, chest PI, 95% slough, no odor, 5% granulation tissue.  NSS, Medihoney, hydrofiber with silver, gauze, foam applied.      Left and right dorsal foot, no redness noted, resolved.     Suggest: Maintain the PREVENT bundle for PI prevention. The most recent Terrence score is a 17. This patient is at risk for the development of a PI. Offload heels from bed, turn q 2 hours in bed, weight shift in wheelchair, q 30 minutes.     Continue: Medihoney, hydrofiber with silver, gauze, foam daily to clavicle, chest wound.   Monitor labs.   Consider a wound center appointment for future, and for discharge, if indicated for clavicle, sternal PI.    Wound Prevention Bundle                                                            Positioning- If clinically able:  Reposition at a minimum of 2 hours.  Adjust to avoid lying on medical devices. Use positioning devices to offload bony prominences. When out of bed, use pressure redistribution cushions. Monitor time out of bed and encourage repositioning. Use transfer aids to reduce friction and shear. Use \"turn assist\" function on bed surface if equipped.                  Risk Assessment:   Utilize risk assessment scale per hospital guidelines.  Identify additional risk factors, for example, medications and comorbidities. Match interventions to subscales of hospital's Risk assessment scale.  Monitor laboratory values. Communicate risk to interdisciplinary healthcare team. Consider use of prophylactic silicone foam sacral dressing.                  Evaluate Surface/Pressure " "Redistribution:   Consider specialty sleep surface according to patient need.   Utilize \"less is best\" with linen usage.                Vigilant Skin Inspection:   Inspect skin from head to toe, including areas under removable medical devices and dressings. Communicate skin issues to healthcare team and consult resources as needed.                   Evaluate incontinence/moisture/temperature:   Offer toileting when appropriate. Keep skin clean and dry (microclimate). Use moisture barrier products for incontinence care. Diapers/briefs for out of bed or off unit. Use absorbent under pads that wick away and hold moisture. Intervene for fecal and/or urinary incontinence (consider external containment devices. Use skin emollients (dry skin).                  Nutrition:   Consult dietician for at risk patients. Assist with menu preferences and feeding. Encourage snacks, fluid and supplements with rounding. Accurately record all intake where indicated.           Teaching Patients and Families:   Encourage patients and families to partner with staff in preventing pressure injuries. Give patients and families pressure injury education, assess their understanding of education given, and document education.        Sabrina Diaz RN  4/11/2025  3:36 PM                   WOC consult is completed .Will follow.                                                        Date: 04/11/25  Signature: Sabrina Diaz RN                  "

## 2025-04-11 NOTE — PROGRESS NOTES
Subjective     Patient seen and examined on rounds.  Chart reviewed.  Events overnight noted.  History reviewed briefly with patient.    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    HPI: Mr. Celso Gramajo is a 69-year-old right-handed white male with chronic conditions significant for hypertension, hyperlipidemia, anxiety, thyroid cancer status post thyroidectomy and radical neck dissection, adjuvant radiation therapy/immunotherapy, esophageal squamous cell carcinoma status post chemoradiotherapy complicated by esophageal strictures, left vocal cord paralysis, dysphagia status post PEG tube placement in 2021, initially presented to the ER at [facility] on 3/11/25 with fevers and right shoulder pain. Workup was unrevealing and he was discharged home. He presented to the [facility] ER again on 3/17/25 with ongoing fevers. CT neck with no abscess, old postsurgical changes. He discharged home but blood cultures subsequently, 1 of 2 sets of blood cultures drawn at [facility] on 3/17/25 showed Streptococcus viridans returned positive on 3/19/25 and he returned for admission to [facility] on 3/19/25. TTE at [facility] was negative. Blood cultures on 3/19/25 and again on 3/23/25 showed no growth per his records. Imaging with C6-7 osteomyelitis/discitis with ventral epidural abscess. He was transferred to [facility] on 3/23/25 for surgical evaluation.  At that time, he was afebrile.  Blood cultures  were negative. At [facility] ortho and ENT were consulted. Ultimately it was determined that an anterior surgical approach was not safe (due  to history of neck surgeries), and patient was having progressive weakness.  MRI of cervical spine on 3/27/25 revealed discitis and osteomyelitis at C6-C7 with ventral epidural abscess compressing the spinal cord and increased inferior extent of cord edema compared to prior. Patchy T1 hypointensity with enhancement in the upper cervical and thoracic spine, can represent metastatic lesions and/or post radiation changes, similar as compared to prior. He underwent posterior C4-T1 decompression and instrumented fusion on 3/27/25, performed by orthopedic surgeon Dr. Santiago Ca. Purulent fluid drained from anterior spine during the procedure and operating room cultures revealed Strep intermedius. Postop he was briefly in the ICU for MAP goals. He had a PICC placed 4/1/25. 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 He was started on salt tabs. Infectious diseases recommended IV Daptomycin which patient completed on 3/31/25.  He was recommended to continue Ceftriaxone 2g IV q12h x 14 day course (end date 4/8/25) then transition to Ceftriaxone 2g IV daily to complete total 6 week course (end date 5/8/25). He was recommend [place] J collar to neck at all times.  I discussed orthopedic spinal precautions with him.  He completed Decadron treatment for mild laryngeal edema visualized on preoperative NPL. Patient has dysphagia from esophageal strictures, recent dilation 3/3/25 was not successful per patient, cannot tolerate regular oral secretions and recommended NPO and continued on bolus tube feeds.  He was kept on Pepcid for GI prophylaxis. Glycopyrrolate as needed per patient/spouse wishes.  For anxiety he was continued on Fluoxetine and Ativan PRN.  He is not on medications for hypertension.  He was continued on Zetia for hyperlipidemia.  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 He continues on a sliding-scale Humalog coverage.  He is on Levothyroxine  and Liothyronine for thyroid supplementation after thyroidectomy for thyroid cancer. He is on subcutaneous Heparin and SCDs for DVT prophylaxis.  I discussed his recent clinical course with patient and with his wife at bedside.  On 4/1/25, hemoglobin was 12.9, WBC count was 10.30, platelets were 255, BUN 15, creatinine was 0.51, sodium was 133 and potassium was 4.5.  He has been needing assistance for mobility, transfers, self-care. He is transferred to [place] [facility] on 4/1/25 for further rehabilitation care.     SUBJ: Discussed recommendations of PCC with patient.  He reports he had a bowel movement today.  Noted input from dermal defense nurse.  Discussed with nurse Worthington.  Dr Good (orthopedic surgery) office was (who patient was recommended to follow-up) with was contacted regarding the pressure injury from [place] J collar and they indicated that [place] J collar can be discontinued and patient may use soft collar if he desires.  Nurse Worthington also discussed with patient and he is agreeable.  Orders put in.  Patient has appointment for 4/15 @ 1320 hrs at Dr Good's orthopedic office.  Discussed with nurse.    ROS: Denies chest pain or shortness of breath. Other ROS negative. Past, family, social history is unchanged.      Current Functional Status:   Bed mobility:   [place], Supine to Sit: touching/steadying assist   [place], Sit to Supine: touching/steadying assist   Transfers:    [place], Sit to Stand Transfer: touching/steadying assist  [place], Stand to Sit Transfer: touching/steadying assist   [place], Stand Pivot/Stand Step Transfer: touching/steadying assist, minimum assist (75% or more patient effort)   Gait:   [place], Gait: minimum assist (75% or more patient effort), touching/steadying assist  Assistive Device: walker, front-wheeled   Distance in Feet: 75 feet    Bathing:   [place]: moderate assist (50-74% patient effort)   Toileting:   [place]: moderate  "assist (50-74% patient effort)   Upper body dressing:   [place]: close supervision   Lower body dressing:   [place]: close supervision       Functional Progress:    Functional status reviewed. Overall, patient's functional status is improving.      Physical Exam      Blood pressure (!) 124/57, pulse 100, temperature 36.7 °C (98 °F), temperature source Oral, resp. rate 18, height 1.854 m (6' 1\"), weight 88.5 kg (195 lb), SpO2 94%.    Body mass index is 25.73 kg/m².    General Appearance: Not in acute distress  Head/Ear/Nose/Throat: Normocephalic; Atraumatic.   Eye: EOMI; PERRL.   Neck: [place] J collar in place.  Respiratory: Decreased breath sounds at bases.   Cardiovascular: RRR; Normal S1, S2.   Gastrointestinal: Soft; NT; +BS.   Extremities: Bilateral lower extremity edema noted.    Musculoskeletal: Functional active range of motion in both upper and lower extremities.   Neurological: AAO ×3. Speech is fluent. Cranial nerve examination does not reveal any gross facial asymmetry. Strength testing bilateral deltoids are 4/5, bilateral biceps are 4+/5, bilateral triceps are 4/5, bilateral wrist extensors are 4+/5, bilateral hand FDP are 4+/5, bilateral and interossei are 4/5.  Bilateral hip flexion is less than 4/5.  Bilateral quadriceps are 5/5.  Bilateral ankle dorsi and plantar flexion are 5/5.  He is grossly able to localize touch and position sense in great toes, except reports numbness in all 5 digits of both hands and also numbness on the plantar aspects of both feet.  Deep tendon reflexes are symmetric and slightly brisk bilaterally.  Bilateral ankle clonus is present.  Plantars are withdrawal.  Coordination is functional upper extremities.    Behavior/Emotional: Appropriate; Cooperative.   Skin: Healing incision on posterior cervical spine.  Sutures in place.  Erythema/rash noted on coccyx unclear if stage I decubitus.  Rash noted on bilateral groins.  Reviewed pictures in epic.         Current " Facility-Administered Medications:     acetaminophen (TYLENOL) 650 mg/20.3 mL solution 650 mg, 650 mg, peg tube, q6h PRN, Rohit Acevedo MD, 650 mg at 04/11/25 1249    bisacodyL (DULCOLAX) 10 mg suppository 10 mg, 10 mg, rectal, Daily PRN, Rohit Acevedo MD, 10 mg at 04/03/25 1554    cefTRIAXone (ROCEPHIN) 2 g in sodium chloride 0.9 % 100 mL IVPB, 2 g, intravenous, q24h INT, Rohit Acevedo MD, Stopped at 04/11/25 0630    docusate sodium (COLACE) 50 mg/5 mL liquid 100 mg, 100 mg, peg tube, BID, Spencer Kemp MD, 100 mg at 04/11/25 1247    ezetimibe (ZETIA) tablet 10 mg, 10 mg, peg tube, Nightly, Rohit Acevedo MD, 10 mg at 04/10/25 2127    famotidine (PEPCID) tablet 20 mg, 20 mg, peg tube, Nightly, Rohit Acevedo MD, 20 mg at 04/10/25 2127    FLUoxetine (PROzac) 20 mg/5 mL (4 mg/mL) solution 20 mg, 20 mg, peg tube, Daily, Rohit Acevedo MD, 20 mg at 04/11/25 0851    glycopyrrolate (ROBINUL) tablet 1 mg, 1 mg, peg tube, 2x daily PRN, Rohit Acevedo MD, 1 mg at 04/11/25 0850    heparin (porcine) 5,000 unit/mL injection 5,000 Units, 5,000 Units, subcutaneous, q8h DU, Rohit Acevedo MD, 5,000 Units at 04/11/25 1247    honey (MEDIHONEY) 100 % topical paste, , Topical, Daily, Spencer Kemp MD, Given at 04/11/25 1026    HYDROmorphone (DILAUDID) tablet 2 mg, 2 mg, peg tube, q6h PRN, Lilian Marrero MD, 2 mg at 04/11/25 0752    insulin lispro U-100 (HumaLOG) pen 1-12 Units, 1-12 Units, subcutaneous, q6h DU, Lilian Marrero MD, 1 Units at 04/11/25 1025    lansoprazole (PREVACID) 3 mg/mL oral suspension 15 mg, 15 mg, feeding tube, Daily before breakfast, Lilian Marrero MD, 15 mg at 04/11/25 0710    levothyroxine (SYNTHROID) tablet 150 mcg, 150 mcg, peg tube, Daily (6:30a), Lilian Marrero MD, 150 mcg at 04/11/25 0610    linaCLOtide (LINZESS) capsule 145 mcg, 145 mcg, g-tube, Daily before lunch, Lilian Marrero MD, 145 mcg at 04/11/25 1247    linaCLOtide (LINZESS)  capsule 145 mcg, 145 mcg, peg tube, Daily PRN, Lilian Marrero MD    liothyronine (CYTOMEL) tablet 10 mcg, 10 mcg, peg tube, Daily (6:30a), Rohit Acevedo MD, 10 mcg at 04/11/25 0600    LORazepam (ATIVAN) tablet 0.5 mg, 0.5 mg, oral, q8h PRN, Lilian Marrero MD    magnesium hydroxide (M.O.M.) 400 mg/5 mL suspension 30 mL, 30 mL, oral, 2x daily PRN, Lilian Marrero MD, 30 mL at 04/09/25 1316    nystatin (MYCOSTATIN) 100,000 unit/gram topical powder 1 Application, 1 Application, Topical, BID, Spencer Kemp MD, 1 Application at 04/11/25 0851    ondansetron ODT (ZOFRAN-ODT) disintegrating tablet 4 mg, 4 mg, peg tube, q6h PRN, Rohit Acevedo MD    senna (SENOKOT) tablet 1 tablet, 1 tablet, peg tube, 2x daily PRN, Rohit Acevedo MD, 1 tablet at 04/03/25 1547    senna (SENOKOT) tablet 2 tablet, 2 tablet, peg tube, BID, Spencer Kemp MD, 2 tablet at 04/11/25 1247    simethicone (MYLICON) chewable tablet 160 mg, 160 mg, peg tube, QID, Lilian Marrero MD, 160 mg at 04/11/25 1247    [START ON 4/12/2025] sodium chloride 0.9 % infusion, , intravenous, Continuous, Lilian Marrero MD    sodium chloride PF flush 10 mL, 10 mL, intravenous, q8h INT, Rohit Acevedo MD, 10 mL at 04/11/25 0608    sodium chloride PF flush 10 mL, 10 mL, intravenous, PRN, Rohit Acevedo MD    sodium chloride tablet 1 g, 1 g, peg tube, BID, Rohit Acevedo MD, 1 g at 04/11/25 0850    sodium phosphates (FLEET) enema adult 1 enema, 1 enema, rectal, Daily PRN, Spencer Kemp MD, 1 enema at 04/10/25 1306       Labs / Radiology    Lab Results   Component Value Date    WBC 5.89 04/07/2025    HGB 12.4 (L) 04/07/2025    HCT 37.8 (L) 04/07/2025    MCV 94.3 04/07/2025     04/07/2025     Lab Results   Component Value Date    GLUCOSE 176 (H) 04/07/2025    CALCIUM 9.2 04/07/2025     (L) 04/07/2025    K 4.5 04/07/2025    CO2 32 (H) 04/07/2025    CL 97 (L) 04/07/2025    BUN 16 04/07/2025    CREATININE 0.6  (L) 04/07/2025       Assessment and Plan    ASSESSMENT PLAN:  1. 69-year-old right-handed white male with chronic conditions significant for hypertension, hyperlipidemia, anxiety, thyroid cancer status post thyroidectomy and radical neck dissection, adjuvant radiation therapy/immunotherapy, esophageal squamous cell carcinoma status post chemoradiotherapy complicated by esophageal strictures, left vocal cord paralysis, dysphagia status post PEG tube placement in 2021, initially presented to the ER at [facility] on 3/11/25 with fevers and right shoulder pain. Workup was unrevealing and he was discharged home. He presented to the [facility] ER again on 3/17/25 with ongoing fevers. CT neck with no abscess, old postsurgical changes. He discharged home but blood cultures subsequently, 1 of 2 sets of blood cultures drawn at [facility] on 3/17/25 showed Streptococcus viridans returned positive on 3/19/25 and he returned for admission to [facility] on 3/19/25. TTE at [facility] was negative. Blood cultures on 3/19/25 and again on 3/23/25 showed no growth per his records. Imaging with C6-7 osteomyelitis/discitis with ventral epidural abscess. He was transferred to [facility] on 3/23/25 for surgical evaluation.  At that time, he was afebrile.  Blood cultures  were negative. At [facility] ortho and ENT were consulted. Ultimately it was determined that an anterior surgical approach was not safe (due to history of neck surgeries), and patient was having progressive weakness.  MRI of cervical spine on 3/27/25 revealed discitis and osteomyelitis at C6-C7 with ventral epidural abscess compressing the spinal cord and increased inferior extent of cord edema compared to prior. Patchy T1 hypointensity with enhancement in the upper cervical and thoracic spine, can represent metastatic lesions and/or post radiation changes, similar as compared to prior. He underwent posterior C4-T1 decompression and instrumented fusion on 3/27/25,  performed by orthopedic surgeon Dr. Santiago Ca. Purulent fluid drained from anterior spine during the procedure and operating room cultures revealed Strep intermedius. Postop he was briefly in the ICU for MAP goals. He had a PICC placed 4/1/25. 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 He was started on salt tabs. Infectious diseases recommended IV Daptomycin which patient completed on 3/31/25.  He was recommended to continue Ceftriaxone 2g IV q12h x 14 day course (end date 4/8/25) then transition to Ceftriaxone 2g IV daily to complete total 6 week course (end date 5/8/25). He was recommend [place] J collar to neck at all times.  I discussed orthopedic spinal precautions with him.  He completed Decadron treatment for mild laryngeal edema visualized on preoperative NPL. Patient has dysphagia from esophageal strictures, recent dilation 3/3/25 was not successful per patient, cannot tolerate regular oral secretions and recommended NPO and continued on bolus tube feeds.  He was kept on Pepcid for GI prophylaxis. Glycopyrrolate as needed per patient/spouse wishes.  For anxiety he was continued on Fluoxetine and Ativan PRN.  He is not on medications for hypertension.  He was continued on Zetia for hyperlipidemia.  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 He continues on a sliding-scale Humalog coverage.  He is on Levothyroxine and Liothyronine for thyroid supplementation after thyroidectomy for thyroid cancer. He is on subcutaneous Heparin and SCDs for DVT prophylaxis.  I discussed his recent clinical course with patient and with his wife at bedside.  On 4/1/25, hemoglobin was 12.9, WBC count was 10.30, platelets were 255, BUN 15, creatinine was 0.51, sodium was 133 and potassium was 4.5.  He has been needing assistance for mobility, transfers, self-care. He is transferred to [place] on 4/1/25 for further rehabilitation care.       2. DVT prophylaxis - He is on subcutaneous Heparin and SCDs for DVT prophylaxis.  Check doppler.  Platelets 247 on 4/2/2025.  Doppler ultrasound on 4/3/2025 bilateral lower extremity was negative for DVT.  Platelets 216 on 4/7/2025.     3. 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  Follow falls precautions, cardiac precautions, aspiration precautions.  Follow spinal precautions.  [place] J collar to neck at all times.  Use [place] collar in shower.     4. GI - On Pepcid for GI prophylaxis.  On PRN Senokot.  On PRN Dulcolax suppository.  On PRN Zofran.     5.  -denies dysuria.  Monitor postvoid residuals.     6. CVS - monitor for orthostasis.     7. Pulmonary -encourage incentive spirometry.     8. Hematology - monitor hemoglobin, platelets.  Hemoglobin 12.3, WBC 9.54 on 4/2/2025.  Hemoglobin 12.4, WBC 5.89 on 4/7/2025.     9. Pain -on Tylenol.  On PRN Oxycodone.     10. Skin - Healing incision on posterior cervical spine.  Sutures in place.  Erythema/rash noted on coccyx unclear if stage I decubitus.  Rash noted on bilateral groins.  Reviewed pictures in epic.     11. F/E/N - nothing by mouth on bolus PEG feeds.  Nutrition consulted. 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 He was started on salt tabs.     12.  Dysphagia - H/O esophageal squamous cell carcinoma status post chemoradiotherapy complicated by esophageal strictures, left vocal cord paralysis, dysphagia status post PEG tube placement in 2021 - NPO on bolus PEG feeds.  Nutrition consulted.     13.  Psychiatry - on PRN Ativan.  Psychology consulted.     14.   Hypothyroidism -He is on Levothyroxine and Liothyronine for thyroid supplementation after thyroidectomy for thyroid cancer.     15. ENT -  H/O left vocal cord paralysis, treated with Decadron for edema around vocal cords recently.  On Glycopyrrolate as needed per patient/spouse wishes to manage secretions.      16.  Infectious diseases - He underwent posterior C4-T1 decompression and instrumented fusion on 3/27/25, performed by orthopedic surgeon Dr. Santiago Ca. Purulent fluid drained from anterior spine during the procedure and operating room cultures revealed Strep intermedius.. He had a PICC placed 4/1/25. Infectious diseases recommended IV Daptomycin which patient completed on 3/31/25.  He was recommended to continue Ceftriaxone 2g IV q12h x 14 day course (end date 4/8/25) then transition to Ceftriaxone 2g IV daily to complete total 6 week course (end date 5/8/25).He has infectious disease telemedicine appointment on 4/15/2025 at 10:40 AM.  Outside visit ordered.  Nursing to help patient with appointment.  Discussed with patient and nurse on 4/10/2025.      15.  Steroid-induced hyperglycemia -on sliding-scale Humalog coverage.      16. Rehabilitation medicine - Continue comprehensive rehabilitation care. Continue PT, OT, psychology.  Follow falls precautions, cardiac precautions, aspiration precautions.  Follow spinal precautions.  [place] J collar to neck at all times.  Use [place] collar in shower.Discussed patients progress in therapies with therapists in team meeting on 4/3/2025.  Discussed in PCC. See PCC documentation.  He reports no bowel movement in the past 3 to 4 days.  Also wanted antireflux medication in the morning and at home he takes Omeprazole via PEG per patient.  He is on Prevacid in the a.m. and Pepcid in p.m via PEG tube.  Due to constipation internist ordered enema.  Also scheduled Colace and Senokot were ordered through his PEG tube.  Discussed with patient on 4/3/2025.Discussed  recommendations of PCC with patient on 4/4/2025.  Posterior cervical incision healing well.  He had 2 bowel movements today.  Family training scheduled for next week on Monday per case management note.  Discussed with patient on 4/4/2025.He was sitting on recliner on 4/5/2025, talking on phone with his wife.  Continent of bladder per nursing on 4/5/2025.  Tolerating PEG feeds without issues.  He reports he had a good bowel movement yesterday.  Discussed with him if he gets  loose bowel movements he can always refuse bowel medications.  He reports he wants to stay on some bowel medications at this time.  Posterior cervical incision has dressing in place.  Denies increased pain on 4/5/2025.Saw patient earlier on 4/7/2025 morning and then again in the evening.  Discussed with patient and with his wife in the evening.  He requested IV saline infusion.  Ordered liter of saline overnight but patient and wife indicated that at home they usually have the infusion and 3 hours and do not want to eat or 12 hours.  Discussed with pharmacist.  Discussed with nurse.  Patient and wife insist that they usually get the infusion over 3 hours at home.  Also wanted increase bowel medications due to constipation.  Increase Senokot dose and ordered as needed treats enema.  Discussed with nurse on 4/7/2025. Again discussed with patient on 4/8/2025 in presence of nursing supervisor that IV fluid at high rate is not such a good idea and he can get the volume but at a lower rate.  He says he is used to getting IV fluids quickly at home.  Suggested that he also discuss with his cardiologist if this is a safe practice and he can be prone to getting fluid overload and may put him at risk for CHF/pulmonary edema in future if his cardiac function decompensates.  Discussed with internist Dr. Marrero who also agrees with this possibility.  I mentioned to patient to discuss with his cardiologist regarding this and follow cardiology guidance regarding  his desire to get IV fluids quickly at home at a high hourly volume in a short time.  Bowel medications were adjusted by internist on 4/8/2025.Inspected neck incision on 4/9/2025 which is stable.  He reports he feels constipated.  Internist adjusted bowel medications.Discussed with patient and with his mother-in-law with him on 4/9/2025.  Discussed patients progress in therapies with therapists in team meeting on 4/10/2025. Discussed in PCC. See PCC documentation.  He has infectious disease telemedicine appointment on 4/15/2025 at 10:40 AM.  Outside visit ordered.  Nursing to help patient with appointment.  Discussed with patient and nurse on 4/10/2025. Discussed recommendations of PCC with patient on 4/11/2025. He reports he had a bowel movement today.  Noted input from dermal defense nurse.  Discussed with nurse Worthington.  Dr Good (orthopedic surgery) office was (who patient was recommended to follow-up with) was contacted regarding the pressure injury from [place] J collar and they indicated that [place] J collar can be discontinued and patient may use soft collar if he desires.  Nurse Worthington also discussed with patient on 4/11/2025 and he is agreeable.  Orders put in.  Patient has appointment for 4/15 @ 1320 hrs at Dr Good's orthopedic office.  Discussed with nurse on 4/11/2025.     17. Reviewed labs today. BUN 18, creatinine 0.6, sodium 133, potassium 4.9 on 4/2/2025.  BUN 16, creatinine 0.6, sodium 135, potassium 4.5 on 4/7/2025.           Spencer Kemp MD  4/11/2025      This encounter was completed utilizing the Direct Speech Voice Recognition Software. Grammatical errors, random word insertions, pronoun errors, and incomplete sentences are occasional consequences of the system due to software limitations, ambient noises, and hardware issues. Such errors may be missed prior to affixing electronic signature. Any questions or concerns about the content, text, or information contained within the body of this  dictation should be directly addressed to the physician for clarification. If you have any questions or concerns please do not hesitate to call me directly via EPIC chat, page, or email.

## 2025-04-11 NOTE — PROGRESS NOTES
Patient: Celso Gramajo  Location: [facility]  MRN: [id number]  Today's date: 4/11/2025    History of Present Illness  Celso is a 69 y.o. male admitted on 4/1/2025 with Spinal epidural abscess [G06.1]. Principal problem is Spinal epidural abscess.    Celso Gramajo is a 69 year old male with PMHx thyroid CA s/p thyroidectomy, RND, adjuvant XRT/immunotherapy, esophageal SCC s/p chemoradiation c/b esophageal strictures, HTN, Elevated Coronary Calcium scoring in 2017, ENMANUEL, Hypothyroidism, Anemia, dysphagia on PEG (placed in 2021) TFs, and history of CVA who presented to an OSH with neck and R shoulder pain. Workup was unrevealing and he was discharged home.    He presented to the [facility] ED again on 3/17 with ongoing fevers. CT Neck with no abscess, old postsurgical changes. He d/c home but BCx subsequently returned positive on 3/19 and he returned for admission. He was also bacteremic with strep viridans, and placed on IV antibiotics. Workup revealed a C5-C7 ventral epidural abscess with osteomyelitis, and he was transferred to [facility] for surgical evaluation.    MRI on 3/24 c/f metastatic disease at T1-T4, L4.      At [facility] ortho and ENT were consulted. Ultimately it was determined that an anterior surgical approach was not safe (due to history of neck surgeries), and patient was having progressive weakness. Repeat MRI C-spine showed Discitis and osteomyelitis at C6-C7 with ventral epidural abscess compressing the spinal cord and increased edema. Patient urgently underwent POSTERIOR C4-T1 DECOMPRESSION FUSION WITH [place] INSTRUMENTATION on 3/27. Postop he was briefly in the ICU for MAP goals. OR cultures grew strep intermedius and he was placed on the antibiotic plan below. He had a PICC placed 4/1. 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 He was started on salt tabs. In rehab please continue to check labs, and titrate the salt tabs as  appropriate. He worked with PT/OT who recommended him for acute rehab.     Past Medical History  Celso has a past medical history of Anemia, Cancer (CMS/HCC), head and neck radiation, Hypertension, Hypothyroidism, Stroke (CMS/HCC), and Thyroid disease.    PT Vitals      Date/Time Pulse HR Source BP BP Location BP Method Pt Position [facility]   04/11/25 1540 80 Monitor 122/70 Left upper arm Automatic Sitting LPM          PT Pain      Date/Time Pain Type Location Rating: Rest Rating: Activity Interventions [facility]   04/11/25 1540 Pain Assessment back 4 5 premedicated for activity LPM   04/11/25 1610 Pain Reassessment;Post Activity back 4 5 relaxation techniques promoted;quiet environment facilitated;premedicated for activity;pain management plan reviewed with patient/caregiver;care clustered LPM               Prior Living Environment      Flowsheet Row Most Recent Value   People in Home spouse   Current Living Arrangements home   Home Accessibility stairs to enter home (Group), stairs within home (Group)   Living Environment Comment Multi-story home with 12-14 steps to second floor, powder room on 1st floor, 2-3 FELICITA via garage, multiple supportive family members   Number of Stairs, Main Entrance 3   Stair Railings, Main Entrance none   Location, Patient Bedroom second floor, must negotiate stairs to access   Location, Bathroom second floor, must negotiate stairs to access   Bathroom Access Comment powder room 1st floor standard height toilet, full bathroom 2nd floor WIS with glass door, standard height toilet   Stairs, Within Home, Primary 12   Stair Railings, Within Home, Primary railings on both sides of stairs            Prior Level of Function      Flowsheet Row Most Recent Value   Dominant Hand right   Ambulation independent   Transferring independent   Toileting independent   Bathing independent   Dressing independent   Eating independent   IADLs independent   Driving/Transportation    Communication  understands/communicates without difficulty   Past History of Dysphagia PTA pt was completely independent, no AD. (+) . NPO PTA, manages peg tube independently.   Prior Level of Function Comment Independent without use of AE or DME.   Assistive Device Currently Used at Home none             IRF PT Evaluation and Treatment - 04/11/25 1535          PT Time Calculation    Start Time 1535     Stop Time 1620     Time Calculation (min) 45 min        Session Details    Document Type Daily Treatment/Progress Note        General Information    Patient Profile Reviewed yes     Patient/Family/Caregiver Comments/Observations pts wife present for partial session, discussed DME, DC recs     General Observations of Patient received in  in room, agreeable to therapy        Mobility Belt    Mobility Belt Used During Session yes        Orthosis Neck Cervical Collar    Orthosis Properties Date Obtained: 04/03/25 Time Obtained: 0825 Location: Neck Type: Cervical Collar Features: Hard Description: Hard collar AAT; can be removed seated to don [place] collar for bathing.    Skin Assessment redness, blanchable   foam dressing on L clavicle per wound care and MD orders    Compliance/Wearing Issues patient;compliant with wearing schedule;skin issues     Adjustment(s) Needed pressure area(s) identified;reddened area on skin noted     Adjustment(s) Completed wearing schedule adjusted;positioning strategies implemented     Adjustment(s) Outcome needs replacement;orthosis discontinued        Sit to Stand Transfer    [place], Sit to Stand Transfer close supervision;touching/steadying assist     Safety/Cues technique;sequencing     Assistive Device wheelchair;walker, front-wheeled     Comment multiple trials performed with intermittent assistance for balance on pelvis, wide YONATAN        Stand to Sit Transfer    [place], Stand to Sit Transfer touching/steadying assist;close supervision     Safety/Cues technique;sequencing      Assistive Device walker, front-wheeled     Comment multiple trials performed with intermittent assistance for balance on pelvis, wide YONATAN        Stand Pivot Transfer    [place], Stand Pivot/Stand Step Transfer touching/steadying assist;close supervision     Safety/Cues technique;sequencing     Assistive Device walker, front-wheeled     Comment multiple trials performed, cues for intermittent aligment        Gait Training    [place], Gait touching/steadying assist     Safety/Cues increased time to complete;minimal;verbal cues;technique     Assistive Device walker, front-wheeled     Distance in Feet 100 feet     Comment 100ft, 150ft x2, 20ft in room; assist for steadying balance intermittently        Stairs Training    [place], Stairs touching/steadying assist     Safety/Cues technique;sequencing     Assistive Device railing;cane, straight     Handrail Location (Stairs) left side (ascending);left side (descending)     Number of Stairs 8     Stair Height 6 inches     Ascending Stairs Technique step-to-step     Descending Stairs Technique step-to-step     Comment assist for balance intermittently, cues for c/s ext and forward gaze        Balance    Comment, Balance Standing for grooming and brushing teeth end of session 5 minutes- Cl S        Daily Progress Summary (PT)    Daily Outcome Statement Pt progressing endurance for mobility and standing for ADLs. Reviewed and discussed pts wife questions on DME coverage and order. Nursing present in room end of session for c/s collar assstance per MD orders with skin integrity.                               IRF PT Goals      Flowsheet Row Most Recent Value   Bed Mobility Goal 1    Activity/Assistive Device sit to supine/supine to sit at 04/02/2025 0904   [place] supervision required at 04/02/2025 0904   Time Frame short-term goal (STG), 5 - 7 days at 04/10/2025 0805   Strategies/Barriers spinal precautions, strength,  whole practice training, therex at  04/10/2025 0805   Progress/Outcome goal ongoing at 04/10/2025 0805   Bed Mobility Goal 2    Activity/Assistive Device bed mobility activities, all at 04/02/2025 0904   [place] modified independence at 04/02/2025 0904   Time Frame long-term goal (LTG), 14 days or less, by discharge at 04/10/2025 0805   Progress/Outcome goal ongoing at 04/10/2025 0805   Transfer Goal 1    Activity/Assistive Device sit-to-stand/stand-to-sit, stand pivot at 04/02/2025 0904   [place] supervision required at 04/10/2025 0805   Time Frame short-term goal (STG), 5 - 7 days at 04/10/2025 0805   Strategies/Barriers evaluated 4/2, conitnue with PT POC at 04/03/2025 0856   Progress/Outcome goal met, goal revised this date at 04/10/2025 0805   Transfer Goal 2    Activity/Assistive Device sit-to-stand/stand-to-sit, stand pivot at 04/02/2025 0904   [place] modified independence at 04/02/2025 0904   Time Frame long-term goal (LTG), 1 week at 04/10/2025 0805   Progress/Outcome goal ongoing at 04/10/2025 0805   Gait/Walking Locomotion Goal 1    Activity/Assistive Device gait (walking locomotion), assistive device use at 04/02/2025 0904   Distance 150 feet at 04/02/2025 0904   [place] minimum assist (75% or more patient effort) at 04/02/2025 0904   Time Frame short-term goal (STG), 5 - 7 days at 04/10/2025 0805   Strategies/Barriers endurance, fatigue,  therex, endurance at 04/10/2025 0805   Progress/Outcome goal ongoing, goal partially met, continuing progress toward goal at 04/10/2025 0805   Gait/Walking Locomotion Goal 2    Activity/Assistive Device gait (walking locomotion), assistive device use at 04/02/2025 0904   Distance 150 feet at 04/02/2025 0904   [place] modified independence at 04/02/2025 0904   Time Frame long-term goal (LTG), 1 week at 04/10/2025 0805   Progress/Outcome goal ongoing at 04/10/2025 0805   Stairs Goal 1    Activity/Assistive Device ascending stairs, descending stairs, using handrail, left, using  handrail, right at 04/02/2025 0904   Number of Stairs 12 at 04/02/2025 0904   [place] minimum assist (75% or more patient effort) at 04/02/2025 0904   Time Frame short-term goal (STG), 5 - 7 days at 04/03/2025 0856   Strategies/Barriers evaluated 4/2, continue PT POC at 04/03/2025 0856   Progress/Outcome goal met at 04/10/2025 0805   Stairs Goal 2    Activity/Assistive Device ascending stairs, descending stairs, using handrail, left, using handrail, right at 04/02/2025 0904   Number of Stairs 12 at 04/02/2025 0904   [place] supervision required at 04/02/2025 0904   Time Frame long-term goal (LTG), 1 week at 04/10/2025 0805   Progress/Outcome goal ongoing, good progress toward goal at 04/10/2025 0805

## 2025-04-11 NOTE — NURSING NOTE
Patient was able to do self feeding for 1430 and 1730 feed, as well as medication administration 1400 and 1730. Patient able to perform with set-up assistance from RN (gathered supplies, crushed medications, set up items on bedside table). Patient able to attach extension tubing, syringe, pour in medications/water, fill feed bag, prime pump  and start feed.

## 2025-04-11 NOTE — PLAN OF CARE
Problem: Rehabilitation (IRF) Plan of Care  Goal: Plan of Care Review  Outcome: Progressing  Flowsheets (Taken 4/11/2025 1332)  Progress: improving  Outcome Evaluation: pt aaox4, dictates care needs. continent of b&b. participates in therapies. pain managed with PRN dilaudid, PRN tylenol and repositioning. [place] J collar AAT, pressure injury under collar, call into office for collar orders, area dressed with medihoney, hydrofiber AG, gauze and foam. posterior neck incision sutures, NANI. specialty bed. recliner at bedside. NPO - accuchek prior to PEG tube nutrition (0730, 1030, 1430, 1730) - insulin provided prior to 1030 nutrition per MAR. HILARY single lumen PICC for IV antibiotic therapy, IV fluids scheduled for tomorrow evening. coughing up white secretions, MD aware, utilizing y[place] for secretion management. min assist with rolling walker.  Plan of Care Reviewed With: patient

## 2025-04-11 NOTE — PROGRESS NOTES
CM attempted to call pts spouse Yue x2 to discuss dc planning, left voicemail. CM will re attempt.

## 2025-04-12 ENCOUNTER — APPOINTMENT (INPATIENT)
Dept: PHYSICAL THERAPY | Facility: REHABILITATION | Age: 70
DRG: 559 | End: 2025-04-12
Payer: MEDICARE

## 2025-04-12 ENCOUNTER — APPOINTMENT (OUTPATIENT)
Dept: OTHER | Facility: REHABILITATION | Age: 70
End: 2025-04-12
Payer: MEDICARE

## 2025-04-12 LAB
GLUCOSE BLD-MCNC: 171 MG/DL (ref 70–99)
GLUCOSE BLD-MCNC: 172 MG/DL (ref 70–99)
GLUCOSE BLD-MCNC: 180 MG/DL (ref 70–99)
GLUCOSE BLD-MCNC: 225 MG/DL (ref 70–99)
POCT TEST: ABNORMAL

## 2025-04-12 PROCEDURE — 25800000 HC PHARMACY IV SOLUTIONS: Performed by: INTERNAL MEDICINE

## 2025-04-12 PROCEDURE — 63600000 HC DRUGS/DETAIL CODE: Mod: JZ | Performed by: INTERNAL MEDICINE

## 2025-04-12 PROCEDURE — 97116 GAIT TRAINING THERAPY: CPT | Mod: GP

## 2025-04-12 PROCEDURE — 63700000 HC SELF-ADMINISTRABLE DRUG: Performed by: PHYSICAL MEDICINE & REHABILITATION

## 2025-04-12 PROCEDURE — 97530 THERAPEUTIC ACTIVITIES: CPT | Mod: GP

## 2025-04-12 PROCEDURE — 63700000 HC SELF-ADMINISTRABLE DRUG: Performed by: INTERNAL MEDICINE

## 2025-04-12 PROCEDURE — 63700000 HC SELF-ADMINISTRABLE DRUG: Performed by: SURGERY

## 2025-04-12 PROCEDURE — 12800001 HC ROOM AND CARE SEMIPRIVATE REHAB-BRAIN INJ

## 2025-04-12 RX ORDER — TRAZODONE HYDROCHLORIDE 50 MG/1
50 TABLET ORAL NIGHTLY
Status: DISCONTINUED | OUTPATIENT
Start: 2025-04-12 | End: 2025-04-14

## 2025-04-12 RX ADMIN — HEPARIN SODIUM 5000 UNITS: 5000 INJECTION, SOLUTION INTRAVENOUS; SUBCUTANEOUS at 14:18

## 2025-04-12 RX ADMIN — SIMETHICONE 160 MG: 80 TABLET, CHEWABLE ORAL at 12:19

## 2025-04-12 RX ADMIN — CEFTRIAXONE SODIUM 2 G: 2 INJECTION, POWDER, FOR SOLUTION INTRAMUSCULAR; INTRAVENOUS at 05:45

## 2025-04-12 RX ADMIN — FAMOTIDINE 20 MG: 20 TABLET, FILM COATED ORAL at 21:13

## 2025-04-12 RX ADMIN — SIMETHICONE 160 MG: 80 TABLET, CHEWABLE ORAL at 21:13

## 2025-04-12 RX ADMIN — NYSTATIN 1 APPLICATION: 100000 POWDER TOPICAL at 21:25

## 2025-04-12 RX ADMIN — Medication 10 ML: at 06:39

## 2025-04-12 RX ADMIN — Medication 10 ML: at 21:29

## 2025-04-12 RX ADMIN — LEVOTHYROXINE SODIUM 150 MCG: 150 TABLET ORAL at 05:47

## 2025-04-12 RX ADMIN — TRAZODONE HYDROCHLORIDE 50 MG: 50 TABLET ORAL at 21:13

## 2025-04-12 RX ADMIN — SODIUM CHLORIDE 1 G: 1 TABLET ORAL at 09:10

## 2025-04-12 RX ADMIN — SENNOSIDES 2 TABLET: 8.6 TABLET, FILM COATED ORAL at 09:10

## 2025-04-12 RX ADMIN — HEPARIN SODIUM 5000 UNITS: 5000 INJECTION, SOLUTION INTRAVENOUS; SUBCUTANEOUS at 05:45

## 2025-04-12 RX ADMIN — LANSOPRAZOLE 15 MG: KIT at 05:45

## 2025-04-12 RX ADMIN — FLUOXETINE HYDROCHLORIDE 20 MG: 20 SOLUTION ORAL at 09:29

## 2025-04-12 RX ADMIN — SENNOSIDES 2 TABLET: 8.6 TABLET, FILM COATED ORAL at 14:17

## 2025-04-12 RX ADMIN — MAGNESIUM HYDROXIDE 30 ML: 400 SUSPENSION ORAL at 14:17

## 2025-04-12 RX ADMIN — NYSTATIN 1 APPLICATION: 100000 POWDER TOPICAL at 09:29

## 2025-04-12 RX ADMIN — LIOTHYRONINE SODIUM 10 MCG: 5 TABLET ORAL at 05:45

## 2025-04-12 RX ADMIN — SIMETHICONE 160 MG: 80 TABLET, CHEWABLE ORAL at 09:10

## 2025-04-12 RX ADMIN — LINACLOTIDE 145 MCG: 145 CAPSULE, GELATIN COATED ORAL at 12:19

## 2025-04-12 RX ADMIN — Medication 10 ML: at 14:30

## 2025-04-12 RX ADMIN — SIMETHICONE 160 MG: 80 TABLET, CHEWABLE ORAL at 17:28

## 2025-04-12 RX ADMIN — HYDROMORPHONE HYDROCHLORIDE 2 MG: 2 TABLET ORAL at 20:03

## 2025-04-12 RX ADMIN — ACETAMINOPHEN 650 MG: 650 SOLUTION ORAL at 12:37

## 2025-04-12 RX ADMIN — INSULIN LISPRO 2 UNITS: 100 INJECTION, SOLUTION INTRAVENOUS; SUBCUTANEOUS at 11:24

## 2025-04-12 RX ADMIN — DOCUSATE SODIUM 100 MG: 50 LIQUID ORAL at 09:10

## 2025-04-12 RX ADMIN — Medication: at 09:29

## 2025-04-12 RX ADMIN — HYDROMORPHONE HYDROCHLORIDE 2 MG: 2 TABLET ORAL at 12:32

## 2025-04-12 RX ADMIN — HEPARIN SODIUM 5000 UNITS: 5000 INJECTION, SOLUTION INTRAVENOUS; SUBCUTANEOUS at 21:13

## 2025-04-12 RX ADMIN — EZETIMIBE 10 MG: 10 TABLET ORAL at 21:13

## 2025-04-12 RX ADMIN — DOCUSATE SODIUM 100 MG: 50 LIQUID ORAL at 14:17

## 2025-04-12 RX ADMIN — SODIUM CHLORIDE: 9 INJECTION, SOLUTION INTRAVENOUS at 14:13

## 2025-04-12 NOTE — PROGRESS NOTES
Subjective     Patient seen and examined on rounds.  Chart reviewed.  Events overnight noted.  History reviewed briefly with patient.    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    HPI: Mr. Celso Gramajo is a 69-year-old right-handed white male with chronic conditions significant for hypertension, hyperlipidemia, anxiety, thyroid cancer status post thyroidectomy and radical neck dissection, adjuvant radiation therapy/immunotherapy, esophageal squamous cell carcinoma status post chemoradiotherapy complicated by esophageal strictures, left vocal cord paralysis, dysphagia status post PEG tube placement in 2021, initially presented to the ER at [facility] on 3/11/25 with fevers and right shoulder pain. Workup was unrevealing and he was discharged home. He presented to the [facility] ER again on 3/17/25 with ongoing fevers. CT neck with no abscess, old postsurgical changes. He discharged home but blood cultures subsequently, 1 of 2 sets of blood cultures drawn at [facility] on 3/17/25 showed Streptococcus viridans returned positive on 3/19/25 and he returned for admission to [facility] on 3/19/25. TTE at [facility] was negative. Blood cultures on 3/19/25 and again on 3/23/25 showed no growth per his records. Imaging with C6-7 osteomyelitis/discitis with ventral epidural abscess. [facility] evaluation.  At that time, he was afebrile.  Blood cultures  were negative. [facility] ortho and ENT were consulted. Ultimately it was determined that an anterior surgical approach was not safe (due  to history of neck surgeries), and patient was having progressive weakness.  MRI of cervical spine on 3/27/25 revealed discitis and osteomyelitis at C6-C7 with ventral epidural abscess compressing the spinal cord and increased inferior extent of cord edema compared to prior. Patchy T1 hypointensity with enhancement in the upper cervical and thoracic spine, can represent metastatic lesions and/or post radiation changes, similar as compared to prior. He underwent posterior C4-T1 decompression and instrumented fusion on 3/27/25, performed by orthopedic surgeon Dr. Santiago Ca. Purulent fluid drained from anterior spine during the procedure and operating room cultures revealed Strep intermedius. Postop he was briefly in the ICU for MAP goals. He had a PICC placed 4/1/25. 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 He was started on salt tabs. Infectious diseases recommended IV Daptomycin which patient completed on 3/31/25.  He was recommended to continue Ceftriaxone 2g IV q12h x 14 day course (end date 4/8/25) then transition to Ceftriaxone 2g IV daily to complete total 6 week course (end date 5/8/25). He was recommend [place] J collar to neck at all times.  I discussed orthopedic spinal precautions with him.  He completed Decadron treatment for mild laryngeal edema visualized on preoperative NPL. Patient has dysphagia from esophageal strictures, recent dilation 3/3/25 was not successful per patient, cannot tolerate regular oral secretions and recommended NPO and continued on bolus tube feeds.  He was kept on Pepcid for GI prophylaxis. Glycopyrrolate as needed per patient/spouse wishes.  For anxiety he was continued on Fluoxetine and Ativan PRN.  He is not on medications for hypertension.  He was continued on Zetia for hyperlipidemia.  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 He continues on a sliding-scale Humalog coverage.  He is on Levothyroxine  and Liothyronine for thyroid supplementation after thyroidectomy for thyroid cancer. He is on subcutaneous Heparin and SCDs for DVT prophylaxis.  I discussed his recent clinical course with patient and with his wife at bedside.  On 4/1/25, hemoglobin was 12.9, WBC count was 10.30, platelets were 255, BUN 15, creatinine was 0.51, sodium was 133 and potassium was 4.5.  He has been needing assistance for mobility, transfers, self-care. He is transferred to [facility] on 4/1/25 for further rehabilitation care.     SUBJ: He indicates that he no longer wants to take salt tablets.  Sodium was 135.  Will discontinue salt tablets.  Discussed with patient and with nurse with him.  Discussed follow-up appointment next week at orthopedic office with him.  Discussed input from orthopedic surgeon Dr Good yesterday regarding [place] J collar was discontinued and he is on soft cervical collar.    ROS: Denies chest pain or shortness of breath. Other ROS negative. Past, family, social history is unchanged.      Current Functional Status:   Bed mobility:   [place], Supine to Sit: minimum assist (75% or more patient effort)   [place], Sit to Supine: touching/steadying assist   Transfers:    [place], Sit to Stand Transfer: close supervision  [place], Stand to Sit Transfer: close supervision   [place], Stand Pivot/Stand Step Transfer: close supervision   Gait:   [place], Gait: close supervision  Assistive Device: walker, front-wheeled   Distance in Feet: 150 feet    Bathing:   [place]: moderate assist (50-74% patient effort)   Toileting:   [place]ell: touching/steadying assist   Upper body dressing:   Cabell: close supervision   Lower body dressing:   Cabell: close supervision       Functional Progress:    Functional status reviewed. Overall, patient's functional status is improving.      Physical Exam      Blood pressure 110/68, pulse 92, temperature 36.7 °C (98.1 °F),  "temperature source Oral, resp. rate 18, height 1.854 m (6' 1\"), weight 88.5 kg (195 lb), SpO2 96%.    Body mass index is 25.73 kg/m².    General Appearance: Not in acute distress  Head/Ear/Nose/Throat: Normocephalic; Atraumatic.   Eye: EOMI; PERRL.   Neck: [place] J collar in place.  Respiratory: Decreased breath sounds at bases.   Cardiovascular: RRR; Normal S1, S2.   Gastrointestinal: Soft; NT; +BS.   Extremities: Bilateral lower extremity edema noted.    Musculoskeletal: Functional active range of motion in both upper and lower extremities.   Neurological: AAO ×3. Speech is fluent. Cranial nerve examination does not reveal any gross facial asymmetry. Strength testing bilateral deltoids are 4/5, bilateral biceps are 4+/5, bilateral triceps are 4/5, bilateral wrist extensors are 4+/5, bilateral hand FDP are 4+/5, bilateral and interossei are 4/5.  Bilateral hip flexion is less than 4/5.  Bilateral quadriceps are 5/5.  Bilateral ankle dorsi and plantar flexion are 5/5.  He is grossly able to localize touch and position sense in great toes, except reports numbness in all 5 digits of both hands and also numbness on the plantar aspects of both feet.  Deep tendon reflexes are symmetric and slightly brisk bilaterally.  Bilateral ankle clonus is present.  Plantars are withdrawal.  Coordination is functional upper extremities.    Behavior/Emotional: Appropriate; Cooperative.   Skin: Healing incision on posterior cervical spine.  Sutures in place.  Erythema/rash noted on coccyx unclear if stage I decubitus.  Rash noted on bilateral groins.  Reviewed pictures in epic.         Current Facility-Administered Medications:     acetaminophen (TYLENOL) 650 mg/20.3 mL solution 650 mg, 650 mg, peg tube, q6h PRN, Rohit Acevedo MD, 650 mg at 04/12/25 1237    bisacodyL (DULCOLAX) 10 mg suppository 10 mg, 10 mg, rectal, Daily PRN, Rohit Acevedo MD, 10 mg at 04/03/25 1554    cefTRIAXone (ROCEPHIN) 2 g in sodium chloride 0.9 " % 100 mL IVPB, 2 g, intravenous, q24h INT, Rohit Acevedo MD, Stopped at 04/12/25 0640    docusate sodium (COLACE) 50 mg/5 mL liquid 100 mg, 100 mg, peg tube, BID, Spencer Kemp MD, 100 mg at 04/12/25 1417    ezetimibe (ZETIA) tablet 10 mg, 10 mg, peg tube, Nightly, Rohit Acevedo MD, 10 mg at 04/11/25 2140    famotidine (PEPCID) tablet 20 mg, 20 mg, peg tube, Nightly, Rohit Acevedo MD, 20 mg at 04/11/25 2142    FLUoxetine (PROzac) 20 mg/5 mL (4 mg/mL) solution 20 mg, 20 mg, peg tube, Daily, Rohit Acevedo MD, 20 mg at 04/12/25 0929    glycopyrrolate (ROBINUL) tablet 1 mg, 1 mg, peg tube, 2x daily PRN, Rohit Acevedo MD, 1 mg at 04/11/25 0850    heparin (porcine) 5,000 unit/mL injection 5,000 Units, 5,000 Units, subcutaneous, q8h [facility], Rohit Acevedo MD, 5,000 Units at 04/12/25 1418    honey (MEDIHONEY) 100 % topical paste, , Topical, Daily, Spencer Kemp MD, Given at 04/12/25 0929    HYDROmorphone (DILAUDID) tablet 2 mg, 2 mg, peg tube, q6h PRN, Lilian Marrero MD, 2 mg at 04/12/25 1232    insulin lispro U-100 (HumaLOG) pen 1-12 Units, 1-12 Units, subcutaneous, q6h [facility], Lilian Marrero MD, 2 Units at 04/12/25 1124    lansoprazole (PREVACID) 3 mg/mL oral suspension 15 mg, 15 mg, feeding tube, Daily before breakfast, Lilian Marrero MD, 15 mg at 04/12/25 0545    levothyroxine (SYNTHROID) tablet 150 mcg, 150 mcg, peg tube, Daily (6:30a), Lilian Marrero MD, 150 mcg at 04/12/25 0547    linaCLOtide (LINZESS) capsule 145 mcg, 145 mcg, g-tube, Daily before lunch, Lilian Marrero MD, 145 mcg at 04/12/25 1219    linaCLOtide (LINZESS) capsule 145 mcg, 145 mcg, peg tube, Daily PRN, Lilian Marrero MD    liothyronine (CYTOMEL) tablet 10 mcg, 10 mcg, peg tube, Daily (6:30a), Rohit Acevedo MD, 10 mcg at 04/12/25 0545    LORazepam (ATIVAN) tablet 0.5 mg, 0.5 mg, oral, q8h PRN, Lilian Marrero MD    magnesium hydroxide (M.O.M.) 400 mg/5 mL suspension 30 mL, 30 mL, oral, 2x  daily PRN, Lilian Marrero MD, 30 mL at 04/12/25 1417    nystatin (MYCOSTATIN) 100,000 unit/gram topical powder 1 Application, 1 Application, Topical, BID, Spencer Kemp MD, 1 Application at 04/12/25 0929    ondansetron ODT (ZOFRAN-ODT) disintegrating tablet 4 mg, 4 mg, peg tube, q6h PRN, Rohit Acevedo MD    senna (SENOKOT) tablet 1 tablet, 1 tablet, peg tube, 2x daily PRN, Rohit Acevedo MD, 1 tablet at 04/03/25 1547    senna (SENOKOT) tablet 2 tablet, 2 tablet, peg tube, BID, Spencer Kemp MD, 2 tablet at 04/12/25 1417    simethicone (MYLICON) chewable tablet 160 mg, 160 mg, peg tube, QID, Lilian Marrero MD, 160 mg at 04/12/25 1219    sodium chloride 0.9 % infusion, , intravenous, Continuous, Lilian Marrero MD, Last Rate: 250 mL/hr at 04/12/25 1413, New Bag at 04/12/25 1413    sodium chloride PF flush 10 mL, 10 mL, intravenous, q8h INT, Rohit Acevedo MD, 10 mL at 04/12/25 1430    sodium chloride PF flush 10 mL, 10 mL, intravenous, PRN, Rohit Acevedo MD    sodium phosphates (FLEET) enema adult 1 enema, 1 enema, rectal, Daily PRN, Spencer Kmep MD, 1 enema at 04/10/25 1306       Labs / Radiology    Lab Results   Component Value Date    WBC 5.89 04/07/2025    HGB 12.4 (L) 04/07/2025    HCT 37.8 (L) 04/07/2025    MCV 94.3 04/07/2025     04/07/2025     Lab Results   Component Value Date    GLUCOSE 176 (H) 04/07/2025    CALCIUM 9.2 04/07/2025     (L) 04/07/2025    K 4.5 04/07/2025    CO2 32 (H) 04/07/2025    CL 97 (L) 04/07/2025    BUN 16 04/07/2025    CREATININE 0.6 (L) 04/07/2025       Assessment and Plan    ASSESSMENT PLAN:  1. 69-year-old right-handed white male with chronic conditions significant for hypertension, hyperlipidemia, anxiety, thyroid cancer status post thyroidectomy and radical neck dissection, adjuvant radiation therapy/immunotherapy, esophageal squamous cell carcinoma status post chemoradiotherapy complicated by esophageal strictures, left  vocal cord paralysis, dysphagia status post PEG tube placement in 2021, initially presented to the ER at [facility] on 3/11/25 with fevers and right shoulder pain. Workup was unrevealing and he was discharged home. He presented to the [facility] ER again on 3/17/25 with ongoing fevers. CT neck with no abscess, old postsurgical changes. He discharged home but blood cultures subsequently, 1 of 2 sets of blood cultures drawn at [facility] on 3/17/25 showed Streptococcus viridans returned positive on 3/19/25 and he returned for admission to [facility] on 3/19/25. TTE at [facility] was negative. Blood cultures on 3/19/25 and again on 3/23/25 showed no growth per his records. Imaging with C6-7 osteomyelitis/discitis with ventral epidural abscess. He was transferred to [facility] on 3/23/25 for surgical evaluation.  At that time, he was afebrile.  Blood cultures  were negative. At [facility] ortho and ENT were consulted. Ultimately it was determined that an anterior surgical approach was not safe (due to history of neck surgeries), and patient was having progressive weakness.  MRI of cervical spine on 3/27/25 revealed discitis and osteomyelitis at C6-C7 with ventral epidural abscess compressing the spinal cord and increased inferior extent of cord edema compared to prior. Patchy T1 hypointensity with enhancement in the upper cervical and thoracic spine, can represent metastatic lesions and/or post radiation changes, similar as compared to prior. He underwent posterior C4-T1 decompression and instrumented fusion on 3/27/25, performed by orthopedic surgeon Dr. Santiago Ca. Purulent fluid drained from anterior spine during the procedure and operating room cultures revealed Strep intermedius. Postop he was briefly in the ICU for MAP goals. He had a PICC placed 4/1/25. 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 He was started on salt tabs. Infectious diseases recommended  IV Daptomycin which patient completed on 3/31/25.  He was recommended to continue Ceftriaxone 2g IV q12h x 14 day course (end date 4/8/25) then transition to Ceftriaxone 2g IV daily to complete total 6 week course (end date 5/8/25). He was recommend [place] J collar to neck at all times.  I discussed orthopedic spinal precautions with him.  He completed Decadron treatment for mild laryngeal edema visualized on preoperative NPL. Patient has dysphagia from esophageal strictures, recent dilation 3/3/25 was not successful per patient, cannot tolerate regular oral secretions and recommended NPO and continued on bolus tube feeds.  He was kept on Pepcid for GI prophylaxis. Glycopyrrolate as needed per patient/spouse wishes.  For anxiety he was continued on Fluoxetine and Ativan PRN.  He is not on medications for hypertension.  He was continued on Zetia for hyperlipidemia.  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 He continues on a sliding-scale Humalog coverage.  He is on Levothyroxine and Liothyronine for thyroid supplementation after thyroidectomy for thyroid cancer. He is on subcutaneous Heparin and SCDs for DVT prophylaxis.  I discussed his recent clinical course with patient and with his wife at bedside.  On 4/1/25, hemoglobin was 12.9, WBC count was 10.30, platelets were 255, BUN 15, creatinine was 0.51, sodium was 133 and potassium was 4.5.  He has been needing assistance for mobility, transfers, self-care. He is transferred to [facility] on 4/1/25 for further rehabilitation care.      2. DVT prophylaxis - He is on subcutaneous Heparin and SCDs for DVT prophylaxis.  Check doppler.  Platelets 247 on 4/2/2025.  Doppler ultrasound on 4/3/2025 bilateral lower extremity was negative for DVT.  Platelets 216 on 4/7/2025.     3. Orthopedics - status post posterior C4-T1 decompression and fusion, C6-C7 discitis and osteomyelitis with ventral epidural abscess compressing the spinal cord,  history of esophageal squamous cell carcinoma status post chemoradiotherapy (CRT) complicated by esophageal strictures, left vocal cord paralysis, dysphagia status post PEG tube, history of thyroid cancer, multiple medical problems - continue PT, OT, psychology.  Follow falls precautions, cardiac precautions, aspiration precautions.  Follow spinal precautions.  [place] J collar to neck at all times.  Use [place] collar in shower.     4. GI - On Pepcid for GI prophylaxis.  On PRN Senokot.  On PRN Dulcolax suppository.  On PRN Zofran.     5.  -denies dysuria.  Monitor postvoid residuals.     6. CVS - monitor for orthostasis.     7. Pulmonary -encourage incentive spirometry.     8. Hematology - monitor hemoglobin, platelets.  Hemoglobin 12.3, WBC 9.54 on 4/2/2025.  Hemoglobin 12.4, WBC 5.89 on 4/7/2025.     9. Pain -on Tylenol.  On PRN Oxycodone.     10. Skin - Healing incision on posterior cervical spine.  Sutures in place.  Erythema/rash noted on coccyx unclear if stage I decubitus.  Rash noted on bilateral groins.  Reviewed pictures in epic.     11. F/E/N - nothing by mouth on bolus PEG feeds.  Nutrition consulted. 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 He was started on salt tabs.     12.  Dysphagia - H/O esophageal squamous cell carcinoma status post chemoradiotherapy complicated by esophageal strictures, left vocal cord paralysis, dysphagia status post PEG tube placement in 2021 - NPO on bolus PEG feeds.  Nutrition consulted.     13.  Psychiatry - on PRN Ativan.  Psychology consulted.     14.  Hypothyroidism -He is on Levothyroxine and Liothyronine for thyroid supplementation after thyroidectomy for thyroid cancer.     15. ENT -  H/O left vocal cord paralysis, treated with Decadron for edema around vocal cords recently.  On Glycopyrrolate as needed per patient/spouse wishes to manage secretions.      16.  Infectious diseases - He underwent posterior C4-T1 decompression and instrumented fusion on  3/27/25, performed by orthopedic surgeon Dr. Santiago Ca. Purulent fluid drained from anterior spine during the procedure and operating room cultures revealed Strep intermedius.. He had a PICC placed 4/1/25. Infectious diseases recommended IV Daptomycin which patient completed on 3/31/25.  He was recommended to continue Ceftriaxone 2g IV q12h x 14 day course (end date 4/8/25) then transition to Ceftriaxone 2g IV daily to complete total 6 week course (end date 5/8/25).He has infectious disease telemedicine appointment on 4/15/2025 at 10:40 AM.  Outside visit ordered.  Nursing to help patient with appointment.  Discussed with patient and nurse on 4/10/2025.      15.  Steroid-induced hyperglycemia -on sliding-scale Humalog coverage.      16. Rehabilitation medicine - Continue comprehensive rehabilitation care. Continue PT, OT, psychology.  Follow falls precautions, cardiac precautions, aspiration precautions.  Follow spinal precautions.  [place] J collar to neck at all times.  Use [place] collar in shower.Discussed patients progress in therapies with therapists in team meeting on 4/3/2025.  Discussed in PCC. See PCC documentation.  He reports no bowel movement in the past 3 to 4 days.  Also wanted antireflux medication in the morning and at home he takes Omeprazole via PEG per patient.  He is on Prevacid in the a.m. and Pepcid in p.m via PEG tube.  Due to constipation internist ordered enema.  Also scheduled Colace and Senokot were ordered through his PEG tube.  Discussed with patient on 4/3/2025.Discussed recommendations of PCC with patient on 4/4/2025.  Posterior cervical incision healing well.  He had 2 bowel movements today.  Family training scheduled for next week on Monday per case management note.  Discussed with patient on 4/4/2025.He was sitting on recliner on 4/5/2025, talking on phone with his wife.  Continent of bladder per nursing on 4/5/2025.  Tolerating PEG feeds without issues.  He reports he  had a good bowel movement yesterday.  Discussed with him if he gets  loose bowel movements he can always refuse bowel medications.  He reports he wants to stay on some bowel medications at this time.  Posterior cervical incision has dressing in place.  Denies increased pain on 4/5/2025.Saw patient earlier on 4/7/2025 morning and then again in the evening.  Discussed with patient and with his wife in the evening.  He requested IV saline infusion.  Ordered liter of saline overnight but patient and wife indicated that at home they usually have the infusion and 3 hours and do not want to eat or 12 hours.  Discussed with pharmacist.  Discussed with nurse.  Patient and wife insist that they usually get the infusion over 3 hours at home.  Also wanted increase bowel medications due to constipation.  Increase Senokot dose and ordered as needed treats enema.  Discussed with nurse on 4/7/2025. Again discussed with patient on 4/8/2025 in presence of nursing supervisor that IV fluid at high rate is not such a good idea and he can get the volume but at a lower rate.  He says he is used to getting IV fluids quickly at home.  Suggested that he also discuss with his cardiologist if this is a safe practice and he can be prone to getting fluid overload and may put him at risk for CHF/pulmonary edema in future if his cardiac function decompensates.  Discussed with internist Dr. Marrero who also agrees with this possibility.  I mentioned to patient to discuss with his cardiologist regarding this and follow cardiology guidance regarding his desire to get IV fluids quickly at home at a high hourly volume in a short time.  Bowel medications were adjusted by internist on 4/8/2025.Inspected neck incision on 4/9/2025 which is stable.  He reports he feels constipated.  Internist adjusted bowel medications.Discussed with patient and with his mother-in-law with him on 4/9/2025.  Discussed patients progress in therapies with therapists in team  meeting on 4/10/2025. Discussed in PCC. See PCC documentation.  He has infectious disease telemedicine appointment on 4/15/2025 at 10:40 AM.  Outside visit ordered.  Nursing to help patient with appointment.  Discussed with patient and nurse on 4/10/2025. Discussed recommendations of PCC with patient on 4/11/2025. He reports he had a bowel movement today.  Noted input from dermal defense nurse.  Discussed with nurse Elin.  Dr Good (orthopedic surgery) office was (who patient was recommended to follow-up with) was contacted regarding the pressure injury from [place] J collar and they indicated that [place] J collar can be discontinued and patient may use soft collar if he desires.  Nurse Elin also discussed with patient on 4/11/2025 and he is agreeable.  Orders put in.  Patient has appointment for 4/15 @ 1320 hrs at Dr Good's orthopedic office.  Discussed with nurse on 4/11/2025.He indicates that he no longer wants to take salt tablets on 4/12/2025.  Sodium was 135.  Will discontinue salt tablets.  Discussed with patient and with nurse with him on 4/12/2025.  Discussed follow-up appointment next week at orthopedic office with him.  Discussed with him on 4/12/2025 regarding input from orthopedic surgeon Dr Good yesterday regarding [place] J collar was discontinued and he is on soft cervical collar.     17. Reviewed labs today. BUN 18, creatinine 0.6, sodium 133, potassium 4.9 on 4/2/2025.  BUN 16, creatinine 0.6, sodium 135, potassium 4.5 on 4/7/2025.           Spencer Kemp MD  4/12/2025      This encounter was completed utilizing the Direct Speech Voice Recognition Software. Grammatical errors, random word insertions, pronoun errors, and incomplete sentences are occasional consequences of the system due to software limitations, ambient noises, and hardware issues. Such errors may be missed prior to affixing electronic signature. Any questions or concerns about the content, text, or information contained within  the body of this dictation should be directly addressed to the physician for clarification. If you have any questions or concerns please do not hesitate to call me directly via EPIC chat, page, or email.

## 2025-04-12 NOTE — PLAN OF CARE
Plan of Care Review  Plan of Care Reviewed With: patient  Progress: improving  Outcome Evaluation: Assumed care at 2300, pt/ a/o x4, cont b/b; LBM 4/11/25. Surgical site sutures NANI, soft collar worn as needed, NPO, peg tube; 425ml [facility] QID, LITAE  PICC patent. MOD RW, heparin, pain managed multi modal approach.Safety precautions maitained, call bell in reach.

## 2025-04-12 NOTE — PROGRESS NOTES
Patient: Celso Gramajo  Location: [facility] [address]  MRN: [id number]  Today's date: 4/12/2025    History of Present Illness  Celso is a 69 y.o. male admitted on 4/1/2025 with Spinal epidural abscess [G06.1]. Principal problem is Spinal epidural abscess.    Celso Gramajo is a 69 year old male with PMHx thyroid CA s/p thyroidectomy, RND, adjuvant XRT/immunotherapy, esophageal SCC s/p chemoradiation c/b esophageal strictures, HTN, Elevated Coronary Calcium scoring in 2017, ENMANUEL, Hypothyroidism, Anemia, dysphagia on PEG (placed in 2021) TFs, and history of CVA who presented to an OSH with neck and R shoulder pain. Workup was unrevealing and he was discharged home.    He presented to the [place] ED again on 3/17 with ongoing fevers. CT Neck with no abscess, old postsurgical changes. He d/c home but BCx subsequently returned positive on 3/19 and he returned for admission. He was also bacteremic with strep viridans, and placed on IV antibiotics. Workup revealed a C5-C7 ventral epidural abscess with osteomyelitis, and he was transferred to [facility] for surgical evaluation.    MRI on 3/24 c/f metastatic disease at T1-T4, L4.      At [facility] ortho and ENT were consulted. Ultimately it was determined that an anterior surgical approach was not safe (due to history of neck surgeries), and patient was having progressive weakness. Repeat MRI C-spine showed Discitis and osteomyelitis at C6-C7 with ventral epidural abscess compressing the spinal cord and increased edema. Patient urgently underwent POSTERIOR C4-T1 DECOMPRESSION FUSION WITH [place] INSTRUMENTATION on 3/27. Postop he was briefly in the ICU for MAP goals. OR cultures grew strep intermedius and he was placed on the antibiotic plan below. He had a PICC placed 4/1. 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 He was started on salt tabs. In rehab please continue to check labs, and titrate the salt tabs as  "appropriate. He worked with PT/OT who recommended him for acute rehab.     Past Medical History  Celso has a past medical history of Anemia, Cancer (CMS/HCC), head and neck radiation, Hypertension, Hypothyroidism, Stroke (CMS/HCC), and Thyroid disease.    PT Vitals      Date/Time Pulse BP BP Location BP Method Pt Position [facility]   04/12/25 1307 92 110/68 Left upper arm Manual Sitting JR          PT Pain      Date/Time Pain Type Location Rating: Rest Rating: Activity Interventions [facility]   04/12/25 1307 Pain Assessment neck 10 -- premedicated for activity;cold applied JR   04/12/25 1359 Pain Reassessment neck -- -- \"better\" -- JR               Prior Living Environment      Flowsheet Row Most Recent Value   People in Home spouse   Current Living Arrangements home   Home Accessibility stairs to enter home (Group), stairs within home (Group)   Living Environment Comment Multi-story home with 12-14 steps to second floor, powder room on 1st floor, 2-3 FELICITA via garage, multiple supportive family members   Number of Stairs, Main Entrance 3   Stair Railings, Main Entrance none   Location, Patient Bedroom second floor, must negotiate stairs to access   Location, Bathroom second floor, must negotiate stairs to access   Bathroom Access Comment powder room 1st floor standard height toilet, full bathroom 2nd floor WIS with glass door, standard height toilet   Stairs, Within Home, Primary 12   Stair Railings, Within Home, Primary railings on both sides of stairs            Prior Level of Function      Flowsheet Row Most Recent Value   Dominant Hand right   Ambulation independent   Transferring independent   Toileting independent   Bathing independent   Dressing independent   Eating independent   IADLs independent   Driving/Transportation    Communication understands/communicates without difficulty   Past History of Dysphagia PTA pt was completely independent, no AD. (+) . NPO PTA, manages peg tube independently. "   Prior Level of Function Comment Independent without use of AE or DME.   Assistive Device Currently Used at Home none             IRF PT Evaluation and Treatment - 04/12/25 1310          PT Time Calculation    Start Time 1300     Stop Time 1400     Time Calculation (min) 60 min        Session Details    Document Type Daily Treatment/Progress Note        General Information    General Observations of Patient pt received in bed, reporting incr pain        Mobility Belt    Mobility Belt Used During Session yes        Orthosis Neck Cervical Collar    Orthosis Properties Date Obtained: 04/03/25 Time Obtained: 0825 Location: Neck Type: Cervical Collar Features: Hard Description: Hard collar AAT; can be removed seated to don [place] collar for bathing.       Bed Mobility    [place], Supine to Sit minimum assist (75% or more patient effort)     Assistive Device bed rails        Sit to Stand Transfer    [place], Sit to Stand Transfer close supervision     Safety/Cues technique     Assistive Device walker, front-wheeled     Comment steadying A no AD, CL S w/ RW; initial stand w/ R LOB but pt corrects w/o asssit        Stand to Sit Transfer    [place], Stand to Sit Transfer close supervision     Safety/Cues technique     Assistive Device walker, front-wheeled     Comment steadying A no AD, CL S w/ RW        Stand Pivot Transfer    [place], Stand Pivot/Stand Step Transfer close supervision     Safety/Cues technique     Assistive Device walker, front-wheeled     Comment via amb approach        Gait Training    [place], Gait close supervision     Assistive Device walker, front-wheeled     Distance in Feet 150 feet     Pattern step-through     Deviations/Abnormal Patterns base of support, narrow;ataxic;jean decreased;step length decreased     Bilateral Gait Deviations heel strike decreased     Comment 75'x5, 150'x 1 w/ RW - grossly CL S d/t intermittent lateral trunk sway, without overt LOB; pt  takes standing rest breaks to self correct posture as needed        Balance    Comment, Balance standing unsupported for clothing mgmt pre/post toileting CL S        Lower Extremity (Therapeutic Exercise)    Comment seated warm up: LAQ 10x 2 each leg        Therapeutic Interventions    Comment, Therapeutic Intervention LUE edema glove provided and donned this session, coban to L  on RW        Cryotherapy    Comment CP to upper back during seated rest breaks t/o session        Daily Progress Summary (PT)    Daily Outcome Statement Pt w/ high pain levels and nausea this session. Focused on HHD ambulation as tolerated. Pt declines to practice stairs this session d/t feeling poorly. Attempted BM but unable to move bowels.                               IRF PT Goals      Flowsheet Row Most Recent Value   Bed Mobility Goal 1    Activity/Assistive Device sit to supine/supine to sit at 04/02/2025 0904   [place] supervision required at 04/02/2025 0904   Time Frame short-term goal (STG), 5 - 7 days at 04/10/2025 0805   Strategies/Barriers spinal precautions, strength,  whole practice training, therex at 04/10/2025 0805   Progress/Outcome goal ongoing at 04/10/2025 0805   Bed Mobility Goal 2    Activity/Assistive Device bed mobility activities, all at 04/02/2025 0904   [place] modified independence at 04/02/2025 0904   Time Frame long-term goal (LTG), 14 days or less, by discharge at 04/10/2025 0805   Progress/Outcome goal ongoing at 04/10/2025 0805   Transfer Goal 1    Activity/Assistive Device sit-to-stand/stand-to-sit, stand pivot at 04/02/2025 0904   [place] supervision required at 04/10/2025 0805   Time Frame short-term goal (STG), 5 - 7 days at 04/10/2025 0805   Strategies/Barriers evaluated 4/2, [place] with PT POC at 04/03/2025 0856   Progress/Outcome goal met, goal revised this date at 04/10/2025 0805   Transfer Goal 2    Activity/Assistive Device sit-to-stand/stand-to-sit, stand pivot at 04/02/2025  0904   [place] modified independence at 04/02/2025 0904   Time Frame long-term goal (LTG), 1 week at 04/10/2025 0805   Progress/Outcome goal ongoing at 04/10/2025 0805   Gait/Walking Locomotion Goal 1    Activity/Assistive Device gait (walking locomotion), assistive device use at 04/02/2025 0904   Distance 150 feet at 04/02/2025 0904   [place] minimum assist (75% or more patient effort) at 04/02/2025 0904   Time Frame short-term goal (STG), 5 - 7 days at 04/10/2025 0805   Strategies/Barriers endurance, fatigue,  therex, endurance at 04/10/2025 0805   Progress/Outcome goal ongoing, goal partially met, continuing progress toward goal at 04/10/2025 0805   Gait/Walking Locomotion Goal 2    Activity/Assistive Device gait (walking locomotion), assistive device use at 04/02/2025 0904   Distance 150 feet at 04/02/2025 0904   [place] modified independence at 04/02/2025 0904   Time Frame long-term goal (LTG), 1 week at 04/10/2025 0805   Progress/Outcome goal ongoing at 04/10/2025 0805   Stairs Goal 1    Activity/Assistive Device ascending stairs, descending stairs, using handrail, left, using handrail, right at 04/02/2025 0904   Number of Stairs 12 at 04/02/2025 0904   [place] minimum assist (75% or more patient effort) at 04/02/2025 0904   Time Frame short-term goal (STG), 5 - 7 days at 04/03/2025 0856   Strategies/Barriers evaluated 4/2, continue PT POC at 04/03/2025 0856   Progress/Outcome goal met at 04/10/2025 0805   Stairs Goal 2    Activity/Assistive Device ascending stairs, descending stairs, using handrail, left, using handrail, right at 04/02/2025 0904   Number of Stairs 12 at 04/02/2025 0904   [place] supervision required at 04/02/2025 0904   Time Frame long-term goal (LTG), 1 week at 04/10/2025 0805   Progress/Outcome goal ongoing, good progress toward goal at 04/10/2025 0805

## 2025-04-13 ENCOUNTER — APPOINTMENT (OUTPATIENT)
Dept: OTHER | Facility: REHABILITATION | Age: 70
End: 2025-04-13
Payer: MEDICARE

## 2025-04-13 ENCOUNTER — APPOINTMENT (INPATIENT)
Dept: OCCUPATIONAL THERAPY | Facility: REHABILITATION | Age: 70
DRG: 559 | End: 2025-04-13
Payer: MEDICARE

## 2025-04-13 LAB
GLUCOSE BLD-MCNC: 144 MG/DL (ref 70–99)
GLUCOSE BLD-MCNC: 150 MG/DL (ref 70–99)
GLUCOSE BLD-MCNC: 165 MG/DL (ref 70–99)
GLUCOSE BLD-MCNC: 187 MG/DL (ref 70–99)
POCT TEST: ABNORMAL

## 2025-04-13 PROCEDURE — 25800000 HC PHARMACY IV SOLUTIONS: Performed by: INTERNAL MEDICINE

## 2025-04-13 PROCEDURE — 63700000 HC SELF-ADMINISTRABLE DRUG: Performed by: INTERNAL MEDICINE

## 2025-04-13 PROCEDURE — 97530 THERAPEUTIC ACTIVITIES: CPT | Mod: GO

## 2025-04-13 PROCEDURE — 63600000 HC DRUGS/DETAIL CODE: Mod: JZ | Performed by: INTERNAL MEDICINE

## 2025-04-13 PROCEDURE — 97110 THERAPEUTIC EXERCISES: CPT | Mod: GO

## 2025-04-13 PROCEDURE — 12800001 HC ROOM AND CARE SEMIPRIVATE REHAB-BRAIN INJ

## 2025-04-13 PROCEDURE — 63700000 HC SELF-ADMINISTRABLE DRUG: Performed by: PHYSICAL MEDICINE & REHABILITATION

## 2025-04-13 RX ORDER — SODIUM CHLORIDE 1000 MG
1 TABLET, SOLUBLE MISCELLANEOUS DAILY
Status: DISCONTINUED | OUTPATIENT
Start: 2025-04-13 | End: 2025-04-19 | Stop reason: HOSPADM

## 2025-04-13 RX ADMIN — Medication 10 ML: at 06:48

## 2025-04-13 RX ADMIN — LIOTHYRONINE SODIUM 10 MCG: 5 TABLET ORAL at 06:00

## 2025-04-13 RX ADMIN — Medication 10 ML: at 21:05

## 2025-04-13 RX ADMIN — SENNOSIDES 2 TABLET: 8.6 TABLET, FILM COATED ORAL at 14:45

## 2025-04-13 RX ADMIN — FLUOXETINE HYDROCHLORIDE 20 MG: 20 SOLUTION ORAL at 07:55

## 2025-04-13 RX ADMIN — LEVOTHYROXINE SODIUM 150 MCG: 150 TABLET ORAL at 06:01

## 2025-04-13 RX ADMIN — HYDROMORPHONE HYDROCHLORIDE 2 MG: 2 TABLET ORAL at 11:57

## 2025-04-13 RX ADMIN — HEPARIN SODIUM 5000 UNITS: 5000 INJECTION, SOLUTION INTRAVENOUS; SUBCUTANEOUS at 06:02

## 2025-04-13 RX ADMIN — Medication 10 ML: at 15:02

## 2025-04-13 RX ADMIN — ACETAMINOPHEN 650 MG: 650 SOLUTION ORAL at 11:58

## 2025-04-13 RX ADMIN — HYDROMORPHONE HYDROCHLORIDE 2 MG: 2 TABLET ORAL at 05:59

## 2025-04-13 RX ADMIN — SIMETHICONE 160 MG: 80 TABLET, CHEWABLE ORAL at 11:58

## 2025-04-13 RX ADMIN — HEPARIN SODIUM 5000 UNITS: 5000 INJECTION, SOLUTION INTRAVENOUS; SUBCUTANEOUS at 14:46

## 2025-04-13 RX ADMIN — CEFTRIAXONE SODIUM 2 G: 2 INJECTION, POWDER, FOR SOLUTION INTRAMUSCULAR; INTRAVENOUS at 06:00

## 2025-04-13 RX ADMIN — SIMETHICONE 160 MG: 80 TABLET, CHEWABLE ORAL at 07:54

## 2025-04-13 RX ADMIN — NYSTATIN 1 APPLICATION: 100000 POWDER TOPICAL at 12:23

## 2025-04-13 RX ADMIN — SENNOSIDES 2 TABLET: 8.6 TABLET, FILM COATED ORAL at 07:54

## 2025-04-13 RX ADMIN — ACETAMINOPHEN 650 MG: 650 SOLUTION ORAL at 01:46

## 2025-04-13 RX ADMIN — INSULIN LISPRO 1 UNITS: 100 INJECTION, SOLUTION INTRAVENOUS; SUBCUTANEOUS at 15:41

## 2025-04-13 RX ADMIN — DOCUSATE SODIUM 100 MG: 50 LIQUID ORAL at 14:45

## 2025-04-13 RX ADMIN — HEPARIN SODIUM 5000 UNITS: 5000 INJECTION, SOLUTION INTRAVENOUS; SUBCUTANEOUS at 21:04

## 2025-04-13 RX ADMIN — LINACLOTIDE 145 MCG: 145 CAPSULE, GELATIN COATED ORAL at 11:58

## 2025-04-13 RX ADMIN — Medication: at 12:23

## 2025-04-13 RX ADMIN — NYSTATIN 1 APPLICATION: 100000 POWDER TOPICAL at 21:05

## 2025-04-13 RX ADMIN — SIMETHICONE 160 MG: 80 TABLET, CHEWABLE ORAL at 21:04

## 2025-04-13 RX ADMIN — EZETIMIBE 10 MG: 10 TABLET ORAL at 21:04

## 2025-04-13 RX ADMIN — DOCUSATE SODIUM 100 MG: 50 LIQUID ORAL at 07:54

## 2025-04-13 RX ADMIN — LANSOPRAZOLE 15 MG: KIT at 07:53

## 2025-04-13 RX ADMIN — FAMOTIDINE 20 MG: 20 TABLET, FILM COATED ORAL at 21:04

## 2025-04-13 RX ADMIN — SIMETHICONE 160 MG: 80 TABLET, CHEWABLE ORAL at 16:52

## 2025-04-13 RX ADMIN — LORAZEPAM 0.5 MG: 0.5 TABLET ORAL at 08:00

## 2025-04-13 RX ADMIN — MAGNESIUM HYDROXIDE 30 ML: 400 SUSPENSION ORAL at 14:45

## 2025-04-13 NOTE — PLAN OF CARE
Plan of Care Review  Plan of Care Reviewed With: patient  Progress: improving  Outcome Evaluation: HS meds reviewed with pt. Pt a/o x4, cont b/b; sutures CDI, dressing to left clavical CDI,well approximated, pain managed with prn dilaudid, tylenol. Mod RW, soft collar prn, heparin, ystatin applied to b/l groin, QID accu check, tolerating enteral nutrition, meds and flushes via peg tube. Safety precautions maintained, call bell in reach.

## 2025-04-13 NOTE — PLAN OF CARE
Plan of Care Review  Plan of Care Reviewed With: patient  Progress: no change  Outcome Evaluation: Fall/safety precautions maintained. Sutures clean, dry, intact to posterior neck. Periwound area is reddened. Soft collar PRN. Tolerates enteral feeding, but reports bloating feeling. Encouraged to administer enteral feeding himself as able.

## 2025-04-13 NOTE — PROGRESS NOTES
Patient: Celso Gramajo  Location: [facility] [address]  MRN: [id number]  Today's date: 4/13/2025    History of Present Illness  Celso is a 69 y.o. male admitted on 4/1/2025 with Spinal epidural abscess [G06.1]. Principal problem is Spinal epidural abscess.    Celso Gramajo is a 69 year old male with PMHx thyroid CA s/p thyroidectomy, RND, adjuvant XRT/immunotherapy, esophageal SCC s/p chemoradiation c/b esophageal strictures, HTN, Elevated Coronary Calcium scoring in 2017, ENMANUEL, Hypothyroidism, Anemia, dysphagia on PEG (placed in 2021) TFs, and history of CVA who presented to an OSH with neck and R shoulder pain. Workup was unrevealing and he was discharged home.    He presented to the [place] ED again on 3/17 with ongoing fevers. CT Neck with no abscess, old postsurgical changes. He d/c home but BCx subsequently returned positive on 3/19 and he returned for admission. He was also bacteremic with strep viridans, and placed on IV antibiotics. Workup revealed a C5-C7 ventral epidural abscess with osteomyelitis, and he was transferred to [facility] for surgical evaluation.    MRI on 3/24 c/f metastatic disease at T1-T4, L4.      At [facility] ortho and ENT were consulted. Ultimately it was determined that an anterior surgical approach was not safe (due to history of neck surgeries), and patient was having progressive weakness. Repeat MRI C-spine showed Discitis and osteomyelitis at C6-C7 with ventral epidural abscess compressing the spinal cord and increased edema. Patient urgently underwent POSTERIOR C4-T1 DECOMPRESSION FUSION WITH [place] INSTRUMENTATION on 3/27. Postop he was briefly in the ICU for MAP goals. OR cultures grew strep intermedius and he was placed on the antibiotic plan below. He had a PICC placed 4/1. 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 He was started on salt tabs. In rehab please continue to check labs, and titrate the salt tabs as  appropriate. He worked with PT/OT who recommended him for acute rehab.     Past Medical History  Celso has a past medical history of Anemia, Cancer (CMS/HCC), head and neck radiation, Hypertension, Hypothyroidism, Stroke (CMS/HCC), and Thyroid disease.    OT Vitals      Date/Time Pulse SpO2 BP BP Location BP Method Pt Position [facility]   04/13/25 1336 82 95 % 108/63 Left upper arm Automatic Sitting CK   04/13/25 1405 88 95 % 147/77 Left upper arm Automatic Sitting CK          OT Pain      Date/Time Pain Type Side/Orientation Location Rating: Rest Rating: Activity Interventions [facility]   04/13/25 1336 Pain Assessment neck bilateral 5 -- premedicated for activity CK   04/13/25 1400 Pain Reassessment neck bilateral -- 5 position adjusted CK               Prior Living Environment      Flowsheet Row Most Recent Value   People in Home spouse   Current Living Arrangements home   Home Accessibility stairs to enter home (Group), stairs within home (Group)   Living Environment Comment Multi-story home with 12-14 steps to second floor, powder room on 1st floor, 2-3 F[place]IC[place] via garage, multiple supportive family members   Number of Stairs, Main Entrance 3   Stair Railings, Main Entrance none   Location, Patient Bedroom second floor, must negotiate stairs to access   Location, Bathroom second floor, must negotiate stairs to access   Bathroom Access Comment powder room 1st floor standard height toilet, full bathroom 2nd floor WIS with glass door, standard height toilet   Stairs, Within Home, Primary 12   Stair Railings, Within Home, Primary railings on both sides of stairs            Prior Level of Function      Flowsheet Row Most Recent Value   Dominant Hand right   Ambulation independent   Transferring independent   Toileting independent   Bathing independent   Dressing independent   Eating independent   IADLs independent   Driving/Transportation    Communication understands/communicates without difficulty   Past History of  Dysphagia PTA pt was completely independent, no AD. (+) . NPO PTA, manages peg tube independently.   Prior Level of Function Comment Independent without use of AE or DME.   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 + , -handicap driving placard.   Patient Goals PSFS: going to the bathroom 0/10, walking 1/10, getting in/out of bed 0/10 = 1/30 = 3.33%             IRF OT Evaluation and Treatment - 04/13/25 1334          OT Time Calculation    Start Time 1330     Stop Time 1430     Time Calculation (min) 60 min        Session Details    Document Type Daily Treatment/Progress Note        General Information    General Observations of Patient Pt. seated in gym. Pt. observed to have soft collar donned for comfort. Pt. observed to have RUE with no limb restriction, nursing notified and a new braclet provided to pt.        Mobility Belt    Mobility Belt Used During Session yes        Orthosis Neck Cervical Collar    Orthosis Properties Date Obtained: 04/03/25 Time Obtained: 0825 Location: Neck Type: Cervical Collar Features: Hard Description: Hard collar AAT; can be removed seated to don [place] collar for bathing.       Cognition/Psychosocial    Comment, Cognition Pt. participated in medication management. Pt. able to read medication bottle and place medication into dossette using b/l hands. Pt. noted to add additional beads into dossette following therapist distracting pt. through conversation. Pt. benefits from a quiet location to perform cognitive activities and would benefit from S x 1 to ensure correctly completed in future.        Sit to Stand Transfer    [place], Sit to Stand Transfer close supervision;1 person assist     Safety/Cues increased time to complete     Assistive Device walker, front-wheeled     Comment CLS x 1 sit to stand with  walker.        Stand to Sit Transfer    [place], Stand to Sit Transfer close supervision;1 person assist     Safety/Cues increased time to complete     Assistive Device walker, front-wheeled     Comment CLS x 1 stand to sit with walker with cues.        Stand Pivot Transfer    [place], Stand Pivot/Stand Step Transfer touching/steadying assist;1 person assist     Safety/Cues increased time to complete     Assistive Device walker, front-wheeled     Comment Steady A x 1 SPT with walker. Pt. reported fatigued today.        Impairments/Safety Issues    Comment, Safety Issues/Impairments CLS-Steady A x 1 with walker for functional mobility for HHM. Pt. reported fatigued today. Pt. recalled to tighten core/buttocks, and attempting to relax  on walker, and not to hick at b/l UE shoulders.        Hand (Therapeutic Exercise)    Exercise Position/Type seated;AROM (active range of motion);AAROM (active assistive range of motion);other (see comments)     General Exercise bilateral;finger flexion/extension;tendon gliding exercises     Reps and Sets 10 reps, 3 sets     Comment Pt. educated on overhead pumping with hands to assist with decreasing edema 10 reps, 3 sets, pt. educated on tendon gliding exercises (hook fist/table top exercises, and closing hand into fists). Pt. required assist from therapist to maintain MPs in neutral position while performing hook fists. Pt. reported improved hand functional following exercises. Pt. had L edema gloved at time of OT session, pt. skin intact and no concerns noted. At end of session, pt. redonned edema glove. Pt. educated on tendon gliding exercises. Pt. would benefit from a handout to assist with recall and to ensure performing correctly in future.        Daily Progress Summary (OT)    Daily Outcome Statement OT session focused on short functional mobility with walker for HHM, b/l hand exercises, and functional activity (medication management). Pt. observed to not have  limb restriction bracelet donned on RUE, therapist notified nursing/tech and a new bracelet was donned for pt. to ensure pt. safety. Cont. with POC.                          Education Documentation  Admission Packet Reviewed, taught by Lenka Armstrong OT at 4/13/2025  2:57 PM.  Learner: Patient  Readiness: Acceptance  Method: Explanation, Demonstration  Response: Verbalizes Understanding, Demonstrated Understanding, Needs Reinforcement  Comment: Pt. educated on tendon gliding exercises. Pt. would benefit from a handout to assist with recall and to ensure performing correctly in future.          IRF OT Goals      Flowsheet Row Most Recent Value   Transfer Goal 1    Activity/Assistive Device toilet at 04/02/2025 0723   [place] supervision required at 04/09/2025 0949   Time Frame short-term goal (STG), 5 - 7 days at 04/09/2025 0949   Strategies/Barriers pt just evlauated at 04/03/2025 0754   Progress/Outcome good progress toward goal, goal met, goal revised this date, goal ongoing at 04/09/2025 0949   Transfer Goal 2    Activity/Assistive Device toilet at 04/02/2025 0723   [place] modified independence at 04/02/2025 0723   Time Frame long-term goal (LTG), 21 days or less at 04/02/2025 0723   Progress/Outcome continuing progress toward goal, goal ongoing at 04/09/2025 0949   Transfer Goal 3    Activity/Assistive Device shower at 04/02/2025 0723   [place] supervision required at 04/09/2025 0949   Time Frame short-term goal (STG), 5 - 7 days at 04/09/2025 0949   Strategies/Barriers pt just evlauated at 04/03/2025 0754   Progress/Outcome good progress toward goal, goal met, goal revised this date, goal ongoing at 04/09/2025 0949   Transfer Goal 4    Activity/Assistive Device shower at 04/02/2025 0723   [place] supervision required at 04/09/2025 0949   Time Frame long-term goal (LTG), 14 days or less at 04/09/2025 0949   Progress/Outcome goal met, goal revised this date, goal ongoing at 04/09/2025  0949   Bathing Goal 1    [place] minimum assist (75% or more patient effort) at 04/09/2025 0949   Time Frame short-term goal (STG), 5 - 7 days at 04/09/2025 0949   Strategies/Barriers pt just evlauated at 04/03/2025 0754   Progress/Outcome good progress toward goal, goal met, goal revised this date, goal ongoing at 04/09/2025 0949   Bathing Goal 2    [place] supervision required at 04/02/2025 0723   Time Frame long-term goal (LTG), 21 days or less at 04/02/2025 0723   Progress/Outcome continuing progress toward goal, goal ongoing at 04/09/2025 0949   UB Dressing Goal 1    [place] supervision required at 04/09/2025 0949   Time Frame short-term goal (STG), 5 - 7 days at 04/09/2025 0949   Strategies/Barriers pt just evlauated at 04/03/2025 0754   Progress/Outcome good progress toward goal, goal met, goal revised this date, goal ongoing at 04/09/2025 0949   UB Dressing Goal 2    [place] modified independence at 04/09/2025 0949   Time Frame long-term goal (LTG), 14 days or less at 04/09/2025 0949   Progress/Outcome goal met, goal revised this date, goal ongoing at 04/09/2025 0949   LB Dressing Goal 1    [place] supervision required at 04/09/2025 0949   Time Frame short-term goal (STG), 5 - 7 days at 04/09/2025 0949   Strategies/Barriers pt just evlauated at 04/03/2025 0754   Progress/Outcome good progress toward goal, goal met, goal revised this date, goal ongoing at 04/09/2025 0949   LB Dressing Goal 2    [place] modified independence at 04/02/2025 0723   Time Frame long-term goal (LTG), 21 days or less at 04/02/2025 0723   Progress/Outcome continuing progress toward goal, goal ongoing at 04/09/2025 0949   Grooming Goal 1    [place] set-up required at 04/02/2025 0723   Time Frame short-term goal (STG), 5 - 7 days at 04/09/2025 0949   Strategies/Barriers progressing to standing v seated,  continue to work on standing balance to maximize IND at 04/09/2025 0949   Progress/Outcome progress  slower than expected, goal revised this date, goal ongoing at 04/09/2025 0949   Grooming Goal 2    [place] modified independence at 04/02/2025 0723   Time Frame long-term goal (LTG), 21 days or less at 04/02/2025 0723   Progress/Outcome continuing progress toward goal, goal ongoing at 04/09/2025 0949   Toileting Goal 1    [place] supervision required at 04/09/2025 0949   Time Frame short-term goal (STG), 5 - 7 days at 04/09/2025 0949   Strategies/Barriers pt just evlauated at 04/03/2025 0754   Progress/Outcome good progress toward goal, goal met, goal revised this date, goal ongoing at 04/09/2025 0949   Toileting Goal 2    [place] modified independence at 04/02/2025 0723   Time Frame long-term goal (LTG), 21 days or less at 04/02/2025 0723   Progress/Outcome continuing progress toward goal, goal ongoing at 04/09/2025 0949

## 2025-04-14 ENCOUNTER — APPOINTMENT (INPATIENT)
Dept: PHYSICAL THERAPY | Facility: REHABILITATION | Age: 70
DRG: 559 | End: 2025-04-14
Payer: MEDICARE

## 2025-04-14 ENCOUNTER — APPOINTMENT (OUTPATIENT)
Dept: OTHER | Facility: REHABILITATION | Age: 70
End: 2025-04-14
Payer: MEDICARE

## 2025-04-14 ENCOUNTER — APPOINTMENT (INPATIENT)
Dept: OCCUPATIONAL THERAPY | Facility: REHABILITATION | Age: 70
DRG: 559 | End: 2025-04-14
Payer: MEDICARE

## 2025-04-14 LAB
ALBUMIN SERPL-MCNC: 3.8 G/DL (ref 3.5–5.7)
ALP SERPL-CCNC: 63 IU/L (ref 34–125)
ALT SERPL-CCNC: 20 IU/L (ref 7–52)
ANION GAP SERPL CALC-SCNC: 7 MEQ/L (ref 3–15)
AST SERPL-CCNC: 15 IU/L (ref 13–39)
BASOPHILS # BLD: 0.06 K/UL (ref 0.01–0.1)
BASOPHILS NFR BLD: 1.2 %
BILIRUB SERPL-MCNC: 0.3 MG/DL (ref 0.3–1.2)
BUN SERPL-MCNC: 16 MG/DL (ref 7–25)
CALCIUM SERPL-MCNC: 9.4 MG/DL (ref 8.6–10.3)
CHLORIDE SERPL-SCNC: 96 MEQ/L (ref 98–107)
CHOLEST SERPL-MCNC: 149 MG/DL
CO2 SERPL-SCNC: 31 MEQ/L (ref 21–31)
CREAT SERPL-MCNC: 0.6 MG/DL (ref 0.7–1.3)
CRP SERPL-MCNC: 13.5 MG/L
DIFFERENTIAL METHOD BLD: ABNORMAL
EGFRCR SERPLBLD CKD-EPI 2021: >60 ML/MIN/1.73M*2
EOSINOPHIL # BLD: 0.23 K/UL (ref 0.04–0.54)
EOSINOPHIL NFR BLD: 4.4 %
ERYTHROCYTE [DISTWIDTH] IN BLOOD BY AUTOMATED COUNT: 13.4 % (ref 11.6–14.4)
ERYTHROCYTE [SEDIMENTATION RATE] IN BLOOD BY WESTERGREN METHOD: 55 MM/HR
GLUCOSE BLD-MCNC: 146 MG/DL (ref 70–99)
GLUCOSE BLD-MCNC: 146 MG/DL (ref 70–99)
GLUCOSE SERPL-MCNC: 138 MG/DL (ref 70–99)
HCT VFR BLD AUTO: 38.3 % (ref 40.1–51)
HDLC SERPL-MCNC: 51 MG/DL
HDLC SERPL: 2.9 {RATIO}
HGB BLD-MCNC: 12.5 G/DL (ref 13.7–17.5)
IMM GRANULOCYTES # BLD AUTO: 0.04 K/UL (ref 0–0.08)
IMM GRANULOCYTES NFR BLD AUTO: 0.8 %
LDLC SERPL CALC-MCNC: 67 MG/DL
LYMPHOCYTES # BLD: 0.9 K/UL (ref 1.2–3.5)
LYMPHOCYTES NFR BLD: 17.4 %
MCH RBC QN AUTO: 30.9 PG (ref 28–33.2)
MCHC RBC AUTO-ENTMCNC: 32.6 G/DL (ref 32.2–36.5)
MCV RBC AUTO: 94.8 FL (ref 83–98)
MONOCYTES # BLD: 0.62 K/UL (ref 0.3–1)
MONOCYTES NFR BLD: 12 %
NEUTROPHILS # BLD: 3.32 K/UL (ref 1.7–7)
NEUTS SEG NFR BLD: 64.2 %
NONHDLC SERPL-MCNC: 98 MG/DL
NRBC BLD-RTO: 0 %
PLATELET # BLD AUTO: 206 K/UL (ref 150–350)
PMV BLD AUTO: 10.9 FL (ref 9.4–12.4)
POCT TEST: ABNORMAL
POCT TEST: ABNORMAL
POTASSIUM SERPL-SCNC: 4.4 MEQ/L (ref 3.5–5.1)
PROT SERPL-MCNC: 7.1 G/DL (ref 6–8.2)
RBC # BLD AUTO: 4.04 M/UL (ref 4.5–5.8)
SODIUM SERPL-SCNC: 134 MEQ/L (ref 136–145)
TRIGL SERPL-MCNC: 155 MG/DL
WBC # BLD AUTO: 5.17 K/UL (ref 3.8–10.5)

## 2025-04-14 PROCEDURE — 97550 CAREGIVER TRAING 1ST 30 MIN: CPT | Mod: GO | Performed by: OCCUPATIONAL THERAPIST

## 2025-04-14 PROCEDURE — 36415 COLL VENOUS BLD VENIPUNCTURE: CPT | Performed by: INTERNAL MEDICINE

## 2025-04-14 PROCEDURE — 97530 THERAPEUTIC ACTIVITIES: CPT | Mod: GO,CO

## 2025-04-14 PROCEDURE — 97116 GAIT TRAINING THERAPY: CPT | Mod: GP,CQ

## 2025-04-14 PROCEDURE — 63700000 HC SELF-ADMINISTRABLE DRUG: Performed by: INTERNAL MEDICINE

## 2025-04-14 PROCEDURE — 80061 LIPID PANEL: CPT | Performed by: INTERNAL MEDICINE

## 2025-04-14 PROCEDURE — 63700000 HC SELF-ADMINISTRABLE DRUG: Performed by: PHYSICAL MEDICINE & REHABILITATION

## 2025-04-14 PROCEDURE — 97535 SELF CARE MNGMENT TRAINING: CPT | Mod: GO | Performed by: OCCUPATIONAL THERAPIST

## 2025-04-14 PROCEDURE — 85652 RBC SED RATE AUTOMATED: CPT | Performed by: INTERNAL MEDICINE

## 2025-04-14 PROCEDURE — 85025 COMPLETE CBC W/AUTO DIFF WBC: CPT | Performed by: INTERNAL MEDICINE

## 2025-04-14 PROCEDURE — 86140 C-REACTIVE PROTEIN: CPT | Performed by: INTERNAL MEDICINE

## 2025-04-14 PROCEDURE — 97530 THERAPEUTIC ACTIVITIES: CPT | Mod: GO | Performed by: OCCUPATIONAL THERAPIST

## 2025-04-14 PROCEDURE — 63600000 HC DRUGS/DETAIL CODE: Mod: JZ | Performed by: INTERNAL MEDICINE

## 2025-04-14 PROCEDURE — 25800000 HC PHARMACY IV SOLUTIONS: Performed by: INTERNAL MEDICINE

## 2025-04-14 PROCEDURE — 12800001 HC ROOM AND CARE SEMIPRIVATE REHAB-BRAIN INJ

## 2025-04-14 PROCEDURE — 97530 THERAPEUTIC ACTIVITIES: CPT | Mod: GP

## 2025-04-14 PROCEDURE — 80053 COMPREHEN METABOLIC PANEL: CPT | Performed by: PHYSICAL MEDICINE & REHABILITATION

## 2025-04-14 RX ORDER — ENOXAPARIN SODIUM 100 MG/ML
40 INJECTION SUBCUTANEOUS
Status: DISCONTINUED | OUTPATIENT
Start: 2025-04-14 | End: 2025-04-19 | Stop reason: HOSPADM

## 2025-04-14 RX ORDER — ZOLPIDEM TARTRATE 5 MG/1
5 TABLET ORAL NIGHTLY
Status: DISCONTINUED | OUTPATIENT
Start: 2025-04-14 | End: 2025-04-15

## 2025-04-14 RX ORDER — SODIUM CHLORIDE 9 MG/ML
INJECTION, SOLUTION INTRAVENOUS CONTINUOUS
Status: ACTIVE | OUTPATIENT
Start: 2025-04-14 | End: 2025-04-14

## 2025-04-14 RX ORDER — LORAZEPAM 0.5 MG/1
0.25 TABLET ORAL EVERY 8 HOURS PRN
Status: DISPENSED | OUTPATIENT
Start: 2025-04-14 | End: 2025-04-18

## 2025-04-14 RX ORDER — LORAZEPAM 0.5 MG/1
0.25 TABLET ORAL EVERY 8 HOURS PRN
Status: DISCONTINUED | OUTPATIENT
Start: 2025-04-14 | End: 2025-04-14

## 2025-04-14 RX ADMIN — Medication 10 ML: at 21:22

## 2025-04-14 RX ADMIN — EZETIMIBE 10 MG: 10 TABLET ORAL at 20:38

## 2025-04-14 RX ADMIN — Medication 10 ML: at 07:35

## 2025-04-14 RX ADMIN — ACETAMINOPHEN 650 MG: 650 SOLUTION ORAL at 12:38

## 2025-04-14 RX ADMIN — Medication 10 ML: at 14:04

## 2025-04-14 RX ADMIN — LIOTHYRONINE SODIUM 10 MCG: 5 TABLET ORAL at 05:52

## 2025-04-14 RX ADMIN — ENOXAPARIN SODIUM 40 MG: 40 INJECTION SUBCUTANEOUS at 17:33

## 2025-04-14 RX ADMIN — SENNOSIDES 2 TABLET: 8.6 TABLET, FILM COATED ORAL at 14:04

## 2025-04-14 RX ADMIN — SODIUM CHLORIDE 1 G: 1 TABLET ORAL at 09:25

## 2025-04-14 RX ADMIN — DOCUSATE SODIUM 100 MG: 50 LIQUID ORAL at 14:04

## 2025-04-14 RX ADMIN — BISACODYL 10 MG: 10 SUPPOSITORY RECTAL at 06:12

## 2025-04-14 RX ADMIN — ZOLPIDEM TARTRATE 5 MG: 5 TABLET, FILM COATED ORAL at 20:38

## 2025-04-14 RX ADMIN — Medication: at 09:26

## 2025-04-14 RX ADMIN — HYDROMORPHONE HYDROCHLORIDE 2 MG: 2 TABLET ORAL at 16:42

## 2025-04-14 RX ADMIN — SIMETHICONE 160 MG: 80 TABLET, CHEWABLE ORAL at 12:36

## 2025-04-14 RX ADMIN — SENNOSIDES 1 TABLET: 8.6 TABLET, FILM COATED ORAL at 09:26

## 2025-04-14 RX ADMIN — ONDANSETRON 4 MG: 4 TABLET, ORALLY DISINTEGRATING ORAL at 09:44

## 2025-04-14 RX ADMIN — SENNOSIDES 2 TABLET: 8.6 TABLET, FILM COATED ORAL at 09:25

## 2025-04-14 RX ADMIN — FAMOTIDINE 20 MG: 20 TABLET, FILM COATED ORAL at 20:38

## 2025-04-14 RX ADMIN — HEPARIN SODIUM 5000 UNITS: 5000 INJECTION, SOLUTION INTRAVENOUS; SUBCUTANEOUS at 05:52

## 2025-04-14 RX ADMIN — LINACLOTIDE 145 MCG: 145 CAPSULE, GELATIN COATED ORAL at 12:37

## 2025-04-14 RX ADMIN — HYDROMORPHONE HYDROCHLORIDE 2 MG: 2 TABLET ORAL at 06:00

## 2025-04-14 RX ADMIN — LEVOTHYROXINE SODIUM 150 MCG: 150 TABLET ORAL at 05:52

## 2025-04-14 RX ADMIN — SIMETHICONE 160 MG: 80 TABLET, CHEWABLE ORAL at 09:25

## 2025-04-14 RX ADMIN — LANSOPRAZOLE 15 MG: KIT at 06:00

## 2025-04-14 RX ADMIN — CEFTRIAXONE SODIUM 2 G: 2 INJECTION, POWDER, FOR SOLUTION INTRAMUSCULAR; INTRAVENOUS at 05:52

## 2025-04-14 RX ADMIN — SIMETHICONE 160 MG: 80 TABLET, CHEWABLE ORAL at 20:38

## 2025-04-14 RX ADMIN — NYSTATIN 1 APPLICATION: 100000 POWDER TOPICAL at 09:26

## 2025-04-14 RX ADMIN — SIMETHICONE 160 MG: 80 TABLET, CHEWABLE ORAL at 16:37

## 2025-04-14 RX ADMIN — DOCUSATE SODIUM 100 MG: 50 LIQUID ORAL at 09:26

## 2025-04-14 NOTE — PROGRESS NOTES
[facility] Internal Medicine Progress Note          Patient was seen and examined at bedside.    Subjective:   Had BM, still feel not emptying his bowel well, cont his current bowel regimen, d/w RN about his care plan. Further adjusted his bowel regimen. Add-on labs, lipid panel.   No new complaints or O/N events. His PT/OT is progressing .   Objective   Vital signs in last 24 hours:  Temp:  [36.5 °C (97.7 °F)-36.8 °C (98.2 °F)] 36.5 °C (97.7 °F)  Heart Rate:  [77-92] 92  Resp:  [17-18] 18  BP: (105-147)/(63-77) 113/65      Intake/Output Summary (Last 24 hours) at 4/14/2025 1115  Last data filed at 4/14/2025 0900  Gross per 24 hour   Intake 300 ml   Output 200 ml   Net 100 ml     Intake/Output this shift:  I/O this shift:  In: 300 [NG/GT:300]  Out: 200 [Urine:200]   Review of Systems:  All other systems reviewed and negative except as noted in the HPI.   Objective      Labs  Reviewed his labs thoroughly   Lab Results   Component Value Date    WBC 5.17 04/14/2025    HGB 12.5 (L) 04/14/2025    HCT 38.3 (L) 04/14/2025    MCV 94.8 04/14/2025     04/14/2025     Lab Results   Component Value Date    GLUCOSE 138 (H) 04/14/2025    CALCIUM 9.4 04/14/2025     (L) 04/14/2025    K 4.4 04/14/2025    CO2 31 04/14/2025    CL 96 (L) 04/14/2025    BUN 16 04/14/2025    CREATININE 0.6 (L) 04/14/2025       Imaging  OSH imaging study reports reviewed    Full Code    Physical Exam:  Head/Ear/Nose/Throat: normocephalic; atraumatic; moisture mouth mm, no oropharyngeal thrush noted.   Eyes: anicteric sclera, EOMI; PERRL.   Neck : supple, no JVD, no carotid bruits appeciated.   Respiratory: no evidence of labored breathing, lung sounds CTA b/l, good aeration bibasilar area, no w/r/c.   Cardiovascular: RRR; normal S1, S2; no m/r/g; no S3 or S4.   Gastrointestinal: PEG in place, focal c/d/I; soft; NT; BS normal; mildly distended; no CVAT b/l.   Genitourinary: no song.   Extremities : no c/c/e .   Neurological: AO x 3,  fluent speeches, following commands, CNS II-XII grossly intact; no focal neurologic deficits.   Behavior/Emotional: in NAD, appropriate; cooperative.   Skin: clean, dry and intact.  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  He had a PEG placed in 2021 for dysphagia.  Cervical spinal stenosis with myeloradiculopathy h/o cervical spinal surgery. He initially presented to [facility] ED on 3/11/25 with fevers and R shoulder pain. Workup was unrevealing and he was discharged home.  He presented to the [facility] ED again on 3/17 with ongoing fevers. CT Neck with no abscess, old postsurgical changes, BCX was collected. He d/c home but BCX subsequently returned positive on 3/19 and he returned for admission. Imaging with C6-7 OM / discitis with ventral epidural abscess. He was transferred to [facility] on 3/23 for surgical evaluation.  At that time, he was afebrile. BCx negative. MRI with C5-C7 cervical epidural abscess/osteo with cord signal at C6-7, T1-4, L4 vertebral body lesions probably radiation-changes per [facility] radiology, although metastatic lesions are not ruled out, per wife PET-CT scan no evidence of metastatic malignancy light up at spine region. He is now s/p posterior C4-T1 decompression and fusion on 3/27. Purulent fluid drained from anterior spine. OR Cx Strep intermedius.  ID rec IV dapto and ceftriaxone x 6 weeks.  PICC placed. He was admitted to [facility] on 4/1/25 for post op inpt acute rehab. He is TF NPO status.     A/P:  # Strep intermedius bacteremia with C6-7 OM / discitis and  ventral epidural abscess, s/p PCDF 3/27/25, purulent fluid drainage from anterior spine  Suspect source likely recent esophageal dilatations ; 3/17 BCx + at OSH; TTE negative for veg at OSH  -complete planned 6-week iv Rocephin course, last dose of daptomycin on 3/31/25,  wound care dermal defense, f/u with [facility] ID as outpt, weekly labs.     # H/o thyroid CA s/p thyroidectomy and RND, adjuvant XRT/immunotherapy, esophageal SCC s/p CRT c/b esophageal strictures, L VC paralysis  -cancer surveillance f/u with [facility]  medical/ radiation oncology;  -palliative consultation.      # Dysphagia, NPO/TF, IBS-C, GI prophylaxis  -cont PEG TF, dietitian co management for nutrition supplements, Pepcid/PPI for GI prophylaxis       # Hypothyroidism   -per his endo, increase levothyroxine dose from 137-150 mcg daily.     # DVT prophylaxis  -SQH     # Anxiety  -on Prozac, psychology CBT, psychiatrist for co management      # Hyperglycemia, previous prediabetes  -diet modification per dietitian, SSI with accu checks      Billing code: [id number]  Diagnoses:  Patient Active Problem List   Diagnosis    Vertigo    Hypertension    Postsurgical hypothyroidism    Mixed hyperlipidemia    Gastroesophageal reflux disease    Increased sputum production    Allergic rhinitis    Change in bowel habits    Chronic fatigue syndrome    Constipation, chronic    Deviated nasal septum    Elevated coronary artery calcium score    Eustachian tube dysfunction, bilateral    Examination of participant in clinical trial    Induration penis plastica    Hypertrophy of nasal turbinates    Hx of colonic polyps    Malignant neoplasm of salivary gland (CMS/HCC)    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)    Paralysis of vocal cords and larynx, unilateral    ENMANUEL (obstructive sleep apnea)    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)    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)    Penile curvature, acquired    Rhinitis sicca     Thyroid cancer (CMS/HCC)    Sensorineural hearing loss (SNHL), bilateral    Other dysphagia    Abdominal pain    Other dysphagia    COVID-19    Encounter for follow-up surveillance of thyroid cancer    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         Lilian Marrero MD  4/14/2025

## 2025-04-14 NOTE — NURSING NOTE
Family training completed with patient and wife. Medications reviewed for discharge, RN stated that medication refills will come from PCP per Internal MD, wife verbalized understanding. Wife stated concern about Linzess order for home, Internal MD spoke to wife r/t script, may not be able to be prescribed by Internal MD, may need authorization from GI doctors, wife verbalized understanding.  Clavicle wound care provided by RN, steps verbalized and demonstrated, wife recorded process on cellphone.  Wife is agreeable to lovenox admin at home. Wife stated verbal understanding of Rx for antibiotics from ID, f/u appt telehealth tomorrow morning, RN to be present, wife appreciative.     Patient able to provide own tube feeding and medication management with set-up assistance from RN to retrieve supplies from across the room, and crushing medications due to his  from home not being available at this time.

## 2025-04-14 NOTE — PROGRESS NOTES
Patient: Celso Gramajo  Location: [facility] [address]  MRN: [id number]  Today's date: 4/14/2025    History of Present Illness  Celso is a 69 y.o. male admitted on 4/1/2025 with Spinal epidural abscess [G06.1]. Principal problem is Spinal epidural abscess.    Celso Gramajo is a 69 year old male with PMHx thyroid CA s/p thyroidectomy, RND, adjuvant XRT/immunotherapy, esophageal SCC s/p chemoradiation c/b esophageal strictures, HTN, Elevated Coronary Calcium scoring in 2017, ENMANUEL, Hypothyroidism, Anemia, dysphagia on PEG (placed in 2021) TFs, and history of CVA who presented to an OSH with neck and R shoulder pain. Workup was unrevealing and he was discharged home.    He presented to the [place] ED again on 3/17 with ongoing fevers. CT Neck with no abscess, old postsurgical changes. He d/c home but BCx subsequently returned positive on 3/19 and he returned for admission. He was also bacteremic with strep viridans, and placed on IV antibiotics. Workup revealed a C5-C7 ventral epidural abscess with osteomyelitis, and he was transferred to [facility] for surgical evaluation.    MRI on 3/24 c/f metastatic disease at T1-T4, L4.      At [facility] ortho and ENT were consulted. Ultimately it was determined that an anterior surgical approach was not safe (due to history of neck surgeries), and patient was having progressive weakness. Repeat MRI C-spine showed Discitis and osteomyelitis at C6-C7 with ventral epidural abscess compressing the spinal cord and increased edema. Patient urgently underwent POSTERIOR C4-T1 DECOMPRESSION FUSION WITH [place] INSTRUMENTATION on 3/27. Postop he was briefly in the ICU for MAP goals. OR cultures grew strep intermedius and he was placed on the antibiotic plan below. He had a PICC placed 4/1. 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 He was started on salt tabs. In rehab please continue to check labs, and titrate the salt tabs as  appropriate. He worked with PT/OT who recommended him for acute rehab.     Past Medical History  Celso has a past medical history of Anemia, Cancer (CMS/HCC), head and neck radiation, Hypertension, Hypothyroidism, Stroke (CMS/HCC), and Thyroid disease.       04/14/25 0935   Pain/Comfort/Sleep   Pain Charting Type Pain Assessment   (0-10) Pain Rating: Rest 4   Pain Side/Orientation bilateral   Pain Body Location neck   Pain Management Interventions diversional activity provided        04/14/25 1030   Pain/Comfort/Sleep   Pain Charting Type Pain Reassessment   (0-10) Pain Rating: Activity 4   Pain Side/Orientation bilateral   Pain Body Location neck   Pain Management Interventions position adjusted   Vitals   Heart Rate 94   Heart Rate Source Monitor   /64   BP Location Left upper arm   BP Method Automatic   Patient Position Sitting              Prior Living Environment      Flowsheet Row Most Recent Value   People in Home spouse   Current Living Arrangements home   Home Accessibility stairs to enter home (Group), stairs within home (Group)   Living Environment Comment Multi-story home with 12-14 steps to second floor, powder room on 1st floor, 2-3 FELICITA via garage, multiple supportive family members   Number of Stairs, Main Entrance 3   Stair Railings, Main Entrance none   Location, Patient Bedroom second floor, must negotiate stairs to access   Location, Bathroom second floor, must negotiate stairs to access   Bathroom Access Comment powder room 1st floor standard height toilet, full bathroom 2nd floor WIS with glass door, standard height toilet   Stairs, Within Home, Primary 12   Stair Railings, Within Home, Primary railings on both sides of stairs            Prior Level of Function      Flowsheet Row Most Recent Value   Dominant Hand right   Ambulation independent   Transferring independent   Toileting independent   Bathing independent   Dressing independent   Eating independent   IADLs independent    Driving/Transportation    Communication understands/communicates without difficulty   Past History of Dysphagia PTA pt was completely independent, no AD. (+) . NPO PTA, manages peg tube independently.   Prior Level of Function Comment Independent without use of AE or DME.   Assistive Device Currently Used at Home none               04/14/25 1009   PT Time Calculation   Start Time 0930   Stop Time 1030   Time Calculation (min) 60 min   Session Details   Document Type Daily Treatment/Progress Note   Mobility Belt   Mobility Belt Used During Session yes   Car Transfer   Transfer Technique stand pivot   [place] supervision   Safety/Cues technique;proper use of assistive device   Assistive Device walker, front-wheeled   Comment trialed stand pivot vs lateral side step multiple times each, seat height adjusted and cues for technique   Gait Training   [place], Gait supervision   Assistive Device walker, front-wheeled   Distance in Feet 200 feet   Pattern step-through   Deviations/Abnormal Patterns base of support, narrow;jean decreased;gait speed decreased;step length decreased;weight shifting decreased   Comment over outdoor patio, thresholds and indoor grimaldo low ply rubia, 2x standing rest breaks, no overt LOB but mild unsteadiness t/o   Rough/Uneven Surface Gait Skills   Comment see above   Daily Progress Summary (PT)   Daily Outcome Statement Pts wife present for family training. Able to appropriately assist w/ all mobility at current functional level including S for transfers/ambulation and steadying A for elevations. Pts wife demo good insight to impairment. Discussed home set up w/ wife reportsing stair glide added, reviewed rec technique for stairs to enter home and DME needs. Hospital bed being delivered to home today, RW to be delivered prior to Dc. Pts wife to acquire second RW for 2nd floor. No additional training required prior to DC.                Mobility Impairment  Caregiver Training       Title: Mobility Impairment Caregiver Training/Education (IRF) (In Progress)       Points For This Title       Point: Home Safety/Home Assessment (Done)       Description:   Review information regarding home accessibility, modifications, and equipment recommendations:       - Issue and review home questionnaire      - Discuss any home modifications recommended for discharge or future      - Verbalize pertinent adjustments to home for safe discharge whether permanent or temporary (e.g. first floor set up, rental ramp, etc.)                 Learning Progress Summary            Patient Acceptance, Explanation, Demonstration, Verbalizes Understanding, Demonstrated Understanding by JR at 4/14/2025 1606    Comment: Discussed DC recs, DME needs and home safety considerations w/ pt and wife.   Significant Other Acceptance, Explanation, Demonstration, Verbalizes Understanding, Demonstrated Understanding by JR at 4/14/2025 1606    Comment: Discussed DC recs, DME needs and home safety considerations w/ pt and wife.                      Point: Transfer Training (Done)       Description:   Review pertinent information related to transfers, including:       - Precautions needing to be maintained      - Assistive devices and/or bracing recommended      - Assistance level recommended at discharge      - Proper positioning of caregiver if assistance is needed      - Safety concerns for the home environment with various surfaces                 Learning Progress Summary            Significant Other Acceptance, Explanation, Demonstration, Demonstrated Understanding, Verbalizes Understanding by JR at 4/14/2025 1604    Comment: Pts wife assisted w/ STS/SPT and car transfer at S level without issue. Appropriately initiates cueing to patient when indicated.                      Point: Ambulation Training (Done)       Description:   Review pertinent information related to ambulation, including:       - Precautions  "needing to be maintained      - Assistive devices and/or bracing recommended      - Assistance level recommended at discharge      - Proper positioning of caregiver if assistance is needed      - Limitations to ambulation (i.e. distances)      - Changes in assistance based on environmental changes (i.e. outdoor vs. indoor ambulation)                 Learning Progress Summary            Significant Other Acceptance, Explanation, Demonstration, Verbalizes Understanding, Demonstrated Understanding by JR at 4/14/2025 1604    Comment: Pts wife assisted w/ short distance HHD walking at S level without issue. Appropriately initiates cueing to patient when indicated.                      Point: Elevation Training (Done)       Description:   Review pertinent information related to elevations, including:       - Precautions needing to be maintained      - Assistive devices and/or bracing recommended      - Assistance level recommended at discharge      - Proper positioning of caregiver if assistance is needed      - Barriers, if any, to safely completing elevations at discharge      - Home modifications for safe access (e.g. second rail, ramp, etc.)                 Learning Progress Summary            Significant Other Acceptance, Explanation, Demonstration, Verbalizes Understanding by JR at 4/14/2025 1601    Comment: PT demoed quarding for 4x6\" stairs w/ steadying A using one HR and one SPC per home set up for FELICITA. Pts wife verbalizes appropriate understanding of technique.                      Point: Home Exercise Program (Done)       Description:   Review pertinent information related to HEP performance, including:       - Issue and review HEP with activities to be completed      - Proper positioning of caregiver if assistance is needed                 Learning Progress Summary            Patient Acceptance, Explanation, Verbalizes Understanding by JR at 4/14/2025 1606    Comment: Verbally reviewed HEP exercises that lory " be provided prior to DC, plan for additional practice w/ patient in coming sessions.   Significant Other Acceptance, Explanation, Verbalizes Understanding by JR at 4/14/2025 1606    Comment: Verbally reviewed HEP exercises that lory be provided prior to DC, plan for additional practice w/ patient in coming sessions.                                            IRF PT Goals      Flowsheet Row Most Recent Value   Bed Mobility Goal 1    Activity/Assistive Device sit to supine/supine to sit at 04/02/2025 0904   [place] supervision required at 04/02/2025 0904   Time Frame short-term goal (STG), 5 - 7 days at 04/10/2025 0805   Strategies/Barriers spinal precautions, strength,  whole practice training, therex at 04/10/2025 0805   Progress/Outcome goal ongoing at 04/10/2025 0805   Bed Mobility Goal 2    Activity/Assistive Device bed mobility activities, all at 04/02/2025 0904   [place] modified independence at 04/02/2025 0904   Time Frame long-term goal (LTG), 14 days or less, by discharge at 04/10/2025 0805   Progress/Outcome goal ongoing at 04/10/2025 0805   Transfer Goal 1    Activity/Assistive Device sit-to-stand/stand-to-sit, stand pivot at 04/02/2025 0904   [place] supervision required at 04/10/2025 0805   Time Frame short-term goal (STG), 5 - 7 days at 04/10/2025 0805   Strategies/Barriers evaluated 4/2, conitnue with PT POC at 04/03/2025 0856   Progress/Outcome goal met, goal revised this date at 04/10/2025 0805   Transfer Goal 2    Activity/Assistive Device sit-to-stand/stand-to-sit, stand pivot at 04/02/2025 0904   [place] modified independence at 04/02/2025 0904   Time Frame long-term goal (LTG), 1 week at 04/10/2025 0805   Progress/Outcome goal ongoing at 04/10/2025 0805   Gait/Walking Locomotion Goal 1    Activity/Assistive Device gait (walking locomotion), assistive device use at 04/02/2025 0904   Distance 150 feet at 04/02/2025 0904   [place] minimum assist (75% or more patient effort)  at 04/02/2025 0904   Time Frame short-term goal (STG), 5 - 7 days at 04/10/2025 0805   Strategies/Barriers endurance, fatigue,  therex, endurance at 04/10/2025 0805   Progress/Outcome goal ongoing, goal partially met, continuing progress toward goal at 04/10/2025 0805   Gait/Walking Locomotion Goal 2    Activity/Assistive Device gait (walking locomotion), assistive device use at 04/02/2025 0904   Distance 150 feet at 04/02/2025 0904   [place] modified independence at 04/02/2025 0904   Time Frame long-term goal (LTG), 1 week at 04/10/2025 0805   Progress/Outcome goal ongoing at 04/10/2025 0805   Stairs Goal 1    Activity/Assistive Device ascending stairs, descending stairs, using handrail, left, using handrail, right at 04/02/2025 0904   Number of Stairs 12 at 04/02/2025 0904   [place] minimum assist (75% or more patient effort) at 04/02/2025 0904   Time Frame short-term goal (STG), 5 - 7 days at 04/03/2025 0856   Strategies/Barriers evaluated 4/2, continue PT POC at 04/03/2025 0856   Progress/Outcome goal met at 04/10/2025 0805   Stairs Goal 2    Activity/Assistive Device ascending stairs, descending stairs, using handrail, left, using handrail, right at 04/02/2025 0904   Number of Stairs 12 at 04/02/2025 0904   [place] supervision required at 04/02/2025 0904   Time Frame long-term goal (LTG), 1 week at 04/10/2025 0805   Progress/Outcome goal ongoing, good progress toward goal at 04/10/2025 0805

## 2025-04-14 NOTE — PLAN OF CARE
Plan of Care Review  Plan of Care Reviewed With: patient  Progress: improving  Outcome Evaluation: HS meds reviewed with pt. Surgical site sutures, steri strip  at old drain site. Soft collar worn prn, pain mananged with dialaudid,QID accu check, min RW, heparin, NPO; all meds and flushes via peg tube. PRN suppository administered ,  pt stated that he feels distended and will not allow feedings because he wants IV fluids and will not go to therapy until he has a BM. Pt educated about the importance of attending therapy and feeding compliance. Safety precautions maintained call bell in reach.

## 2025-04-14 NOTE — PROGRESS NOTES
CM spoke w pts spouse, pts spouse agreeable to IV antibiotic cost and would also like to see if [facility] can provide pts fluids. CM called [facility] who reports they will have pharmacy contact pts onc Dr. Lopez for fluid orders and will be able to do SOC for IV antibs on 4/18. Pts spouse agreeable to [facility] for PT/OT, CM sent referral and will continue to follow for any needs.

## 2025-04-14 NOTE — PLAN OF CARE
Problem: Rehabilitation (IRF) Plan of Care  Goal: Plan of Care Review  Outcome: Progressing  Flowsheets (Taken 4/14/2025 1444)  Progress: improving  Outcome Evaluation: pt aaox4, dictates care needs. continent of b&b  - c/o not emptying bowels, soap suds enema given, night shift gave PRN suppository, large results this AM after both interventions, PRN dose of senokot and PRN dose of linzess also given as well as scheduled MAR doses. participates in therapies and family training with wife. pain managed with PRN tylenol and repositioning. soft collar PRN for comfort. L clavicle wound dressed with medihoney, hydrofiber AG, gauze and foam. posterior neck incision sutures, NANI. specialty bed. recliner at bedside. NPO - feeds today dictates by patient due to feeling distended r/t bowels - MD aware and verbally verified that patient can dictate amount per feed today, received 325ml at 0900, 1130 and 1400, next due at 1730. RUE single lumen PICC for IV antibiotic therapy, IV fluids scheduled for tonight per pt request. coughing up white secretions, MD aware, utilizing kadeem for secretion management. min assist with rolling walker.  Plan of Care Reviewed With: patient

## 2025-04-14 NOTE — PROGRESS NOTES
Patient: Celso Gramajo  Location: [facility] [address]  MRN: [id number]  Today's date: 4/14/2025    History of Present Illness  Celso is a 69 y.o. male admitted on 4/1/2025 with Spinal epidural abscess [G06.1]. Principal problem is Spinal epidural abscess.    Celso Gramajo is a 69 year old male with PMHx thyroid CA s/p thyroidectomy, RND, adjuvant XRT/immunotherapy, esophageal SCC s/p chemoradiation c/b esophageal strictures, HTN, Elevated Coronary Calcium scoring in 2017, ENMANUEL, Hypothyroidism, Anemia, dysphagia on PEG (placed in 2021) TFs, and history of CVA who presented to an OSH with neck and R shoulder pain. Workup was unrevealing and he was discharged home.    He presented to the [place] ED again on 3/17 with ongoing fevers. CT Neck with no abscess, old postsurgical changes. He d/c home but BCx subsequently returned positive on 3/19 and he returned for admission. He was also bacteremic with strep viridans, and placed on IV antibiotics. Workup revealed a C5-C7 ventral epidural abscess with osteomyelitis, and he was transferred to [facility] for surgical evaluation.    MRI on 3/24 c/f metastatic disease at T1-T4, L4.      At [facility] ortho and ENT were consulted. Ultimately it was determined that an anterior surgical approach was not safe (due to history of neck surgeries), and patient was having progressive weakness. Repeat MRI C-spine showed Discitis and osteomyelitis at C6-C7 with ventral epidural abscess compressing the spinal cord and increased edema. Patient urgently underwent POSTERIOR C4-T1 DECOMPRESSION FUSION WITH [place] INSTRUMENTATION on 3/27. Postop he was briefly in the ICU for MAP goals. OR cultures grew strep intermedius and he was placed on the antibiotic plan below. He had a PICC placed 4/1. 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 He was started on salt tabs. In rehab please continue to check labs, and titrate the salt tabs as  appropriate. He worked with PT/OT who recommended him for acute rehab.     Past Medical History  Celso has a past medical history of Anemia, Cancer (CMS/HCC), head and neck radiation, Hypertension, Hypothyroidism, Stroke (CMS/HCC), and Thyroid disease.    OT Vitals      Date/Time Pulse HR Source Pt Activity BP BP Location BP Method Pt Position [facility]   04/14/25 0843 92 Monitor At rest 105/67 Left upper arm Automatic Sitting AA          OT Pain      Date/Time Pain Type Side/Orientation Rating: Rest Rating: Activity Description Interventions [facility]   04/14/25 0843 Pain Assessment other (see comments) stomach/intestines 5 5 other (see comments) discomfort diversional activity provided AA   04/14/25 0859 Pain Assessment other (see comments) stomach/intestines 5 5 other (see comments) discomfort diversional activity provided AA               Prior Living Environment      Flowsheet Row Most Recent Value   People in Home spouse   Current Living Arrangements home   Home Accessibility stairs to enter home (Group), stairs within home (Group)   Living Environment Comment Multi-story home with 12-14 steps to second floor, powder room on 1st floor, 2-3 FELICITA via garage, multiple supportive family members   Number of Stairs, Main Entrance 3   Stair Railings, Main Entrance none   Location, Patient Bedroom second floor, must negotiate stairs to access   Location, Bathroom second floor, must negotiate stairs to access   Bathroom Access Comment powder room 1st floor standard height toilet, full bathroom 2nd floor WIS with glass door, standard height toilet   Stairs, Within Home, Primary 12   Stair Railings, Within Home, Primary railings on both sides of stairs            Prior Level of Function      Flowsheet Row Most Recent Value   Dominant Hand right   Ambulation independent   Transferring independent   Toileting independent   Bathing independent   Dressing independent   Eating independent   IADLs independent   Driving/Transportation     Communication understands/communicates without difficulty   Past History of Dysphagia PTA pt was completely independent, no AD. (+) . NPO PTA, manages peg tube independently.   Prior Level of Function Comment Independent without use of AE or DME.   Assistive Device Currently Used at Home none            Occupational Profile      Flowsheet Row Most Recent Value   Occupational History/Life Experiences retired involved in [employer] on [place]. + , -handicap driving placard.   Patient Goals PSFS: going to the bathroom 0/10, walking 1/10, getting in/out of bed 0/10 = 1/30 = 3.33%             IRF OT Evaluation and Treatment - 04/14/25 0817          OT Time Calculation    Start Time 0815     Stop Time 0900     Time Calculation (min) 45 min        Session Details    Document Type Daily Treatment/Progress Note        General Information    General Observations of Patient Pt recieved for therapy on toilet following recieving and enema from nursing staff. Wife present for training.        Mobility Belt    Mobility Belt Used During Session yes        Orthotics    Orthotics (Trigger Row) Add Orthosis LDA        Orthosis Neck Cervical Collar    Orthosis Properties Date Obtained: 04/03/25 Time Obtained: 0825 Location: Neck Type: Cervical Collar Features: Soft Description: D/C [place] J collar. If patient desires a he can wear a soft collar as needed for comfort.       Toilet Transfer    Transfer Technique sit/stand     [place] supervision     Assistive Device grab bars/safety frame;walker, front-wheeled     Comment S sit > stand from accessible height toilet + grab bar        Toileting    Tasks perform bowel hygiene;adjust/manage clothing     [place] supervision     Position supported standing;unsupported sitting     Adaptive Equipment accessible height toilet;grab bar/safety frame     Comment Pt able to perform hygiene following bowel movement. pt able to pull pants up c intermittnet UE  "support on RW for balance.        Self-Feeding    Comment Pt able to set up self-feed and manipulate Infinity pump. Completed half liquid amount of feed d/t pt with stomach/intestinal discomfort. \"Feed\" to have some nutrition in body to prevent feeling \"weak\".        Daily Progress Summary (OT)    Daily Outcome Statement Pt initiated therapy session seated on toilet s/p need for enema d/t discomfort in stomach/intestinal region. After bowel movement, pt reports tender stomach and still discomfort. Noted c decreased energy levels. Requested to just sit in chair, encouraged liquind intake (pt doing flush and self-feed) to prevent feeling of \"weakness\", modifying amounts to prevent feeling to full/sick. Pt did not want to physically transfer/move in session and requested OT to provide verbal education provided to wife for d/c. Anticipate pt will be able to achieve Mod I status for ADLs upon d/c, but pending levels of energy, may require intermittent assistance. Continue to provide positive reinformcement and ongoing encouragement for pt to take active role in self-care.                          ADL Impairment Caregiver Training       Title: ADL Impairment Caregiver Training/Education (IRF) (Done)       Points For This Title       Point: Additional Training/Education (Done)       Learning Progress Summary            Patient Acceptance, Explanation, Verbalizes Understanding by AA at 4/14/2025 0915    Comment: home health OT at d/c   Significant Other Acceptance, Explanation, Verbalizes Understanding by AA at 4/14/2025 0917    Comment: Wife reports she obtained all recommended DME at home (shower chair, toilet safety frame)    Acceptance, Explanation, Verbalizes Understanding by AA at 4/14/2025 0915    Comment: home health OT at d/c                      Point: Shower Stall Transfer (Done)       Description:   Review pertinent information related to assistance level needed for transfers, including:       - DME and " "assistive device recommended      - Technique and cuing recommended at discharge      - Proper positioning of caregiver if assistance is needed      - Safety concerns for home environment                 Learning Progress Summary            Patient Acceptance, Explanation, Demonstration, Verbalizes Understanding by AA at 4/14/2025 0915    Comment: Reviewed sequencing of shower stall transfer via latearl step over ledge to sit on shower chair and then anterior step out of shower stall. Can wait until home health OT present to assess.   Significant Other Acceptance, Explanation, Demonstration, Verbalizes Understanding by AA at 4/14/2025 0915    Comment: Reviewed sequencing of shower stall transfer via latearl step over ledge to sit on shower chair and then anterior step out of shower stall. Can wait until home health OT present to assess.                      Point: Upper Body Dressing (Done)       Description:   Instruct the patient and caregiver/family in methods of completing and assisting with upper body dressing including:       - Assistive devices and adaptive equipment specific to patient need       - Management of orthotics and prosthetics as applicable      - Safety techniques and procedures, body mechanics and lifting techniques      - Patient specific safety concerns                 Learning Progress Summary            Significant Other Acceptance, Explanation, Verbalizes Understanding by AA at 4/14/2025 0913    Comment: Edu pt on pt's currenty ADL status and projection for Mod I level upon discharge. Pending pt's level of energy, highly encourage wife to have pt actively participate in aspects of ADLs to prevent \"learned helplessness/dependency\".                      Point: Home Exercise Program (Upper Extremity) (Done)       Description:   Instruct caregiver/family on the recommended home exercise program including:       - Which exercises to perform at home, purpose of exercise, how to correctly perform " "exercise      - Proper positioning and technique of caregiver, if assistance is needed                 Learning Progress Summary            Patient Acceptance, Explanation, Verbalizes Understanding by AA at 4/14/2025 0916    Comment: Discussed mild edema in LUE. Encourage ongoing daily movement to BUE to maintain ROM/strength. To print out HEP prior to d/c.   Significant Other Acceptance, Explanation, Verbalizes Understanding by AA at 4/14/2025 0916    Comment: Discussed mild edema in LUE. Encourage ongoing daily movement to BUE to maintain ROM/strength. To print out HEP prior to d/c.                      Point: Lower Body Dressing (Done)       Description:   Instruct the patient and caregiver/family in methods of completing and assisting lower body dressing including:       - Assistive devices and adaptive equipment specific to patient need      - Management of orthotics and prosthetics as applicable      - Safety techniques and procedures, body mechanics and lifting techniques      - Patient specific safety concerns                 Learning Progress Summary            Significant Other Acceptance, Explanation, Verbalizes Understanding by AA at 4/14/2025 0913    Comment: Edu pt on pt's currenty ADL status and projection for Mod I level upon discharge. Pending pt's level of energy, highly encourage wife to have pt actively participate in aspects of ADLs to prevent \"learned helplessness/dependency\".                      Point: Toileting (Done)       Description:   Instruct the patient and caregiver/family in methods of completing and assisting with toileting including:       - Assistive devices and adaptive equipment specific to patient need.       - Management of orthotics and prosthetics as applicable      - Safety techniques and procedures, body mechanics and lifting techniques.      - Patient specific safety concerns                 Learning Progress Summary            Significant Other Acceptance, Explanation, " "Verbalizes Understanding by AA at 4/14/2025 0913    Comment: Edu pt on pt's currenty ADL status and projection for Mod I level upon discharge. Pending pt's level of energy, highly encourage wife to have pt actively participate in aspects of ADLs to prevent \"learned helplessness/dependency\".                      Point: Grooming (Done)       Description:   Instruct the patient and caregiver/family in methods of completing and assisting with grooming including:       - Assistive devices and adaptive equipment specific to patient need.       - Management of orthotics and prosthetics as applicable      - Safety techniques and procedures, body mechanics and lifting techniques.      - Patient specific safety concerns                 Learning Progress Summary            Significant Other Acceptance, Explanation, Verbalizes Understanding by AA at 4/14/2025 0913    Comment: Edu pt on pt's currenty ADL status and projection for Mod I level upon discharge. Pending pt's level of energy, highly encourage wife to have pt actively participate in aspects of ADLs to prevent \"learned helplessness/dependency\".                      Point: Bathing (Done)       Description:   Instruct the patient and caregiver/family in methods of completing and assisting with bathing including:       - Assistive devices and adaptive equipment specific to patient need      - Management of orthotics and prosthetics as applicable      - Safety techniques and procedures, body mechanics and lifting techniques      - Patient specific safety concern                 Learning Progress Summary            Significant Other Acceptance, Explanation, Verbalizes Understanding by AA at 4/14/2025 0913    Comment: Edu pt on pt's currenty ADL status and projection for Mod I level upon discharge. Pending pt's level of energy, highly encourage wife to have pt actively participate in aspects of ADLs to prevent \"learned helplessness/dependency\".                      Point: " Self-Feeding (Done)       Description:   Instruct the patient and caregiver/family in methods of completing and assisting with self-feeding including:       - Assistive devices and adaptive equipment specific to patient need      - Management of orthotics and prosthetics as applicable      - Safety techniques and procedures, body mechanics and lifting techniques      - Patient specific safety concern                 Learning Progress Summary            Significant Other Acceptance, Explanation, Verbalizes Understanding by AA at 4/14/2025 0914    Comment: Edu on encourage pt to complete self feeds. Wife reports she can have medication pre-crushed if unable to use pill  at home so he can administer them during the day.                                            IRF OT Goals      Flowsheet Row Most Recent Value   Transfer Goal 1    Activity/Assistive Device toilet at 04/02/2025 0723   [place] supervision required at 04/09/2025 0949   Time Frame short-term goal (STG), 5 - 7 days at 04/09/2025 0949   Strategies/Barriers pt just evlauated at 04/03/2025 0754   Progress/Outcome good progress toward goal, goal met, goal revised this date, goal ongoing at 04/09/2025 0949   Transfer Goal 2    Activity/Assistive Device toilet at 04/02/2025 0723   [place] modified independence at 04/02/2025 0723   Time Frame long-term goal (LTG), 21 days or less at 04/02/2025 0723   Progress/Outcome continuing progress toward goal, goal ongoing at 04/09/2025 0949   Transfer Goal 3    Activity/Assistive Device shower at 04/02/2025 0723   [place] supervision required at 04/09/2025 0949   Time Frame short-term goal (STG), 5 - 7 days at 04/09/2025 0949   Strategies/Barriers pt just evlauated at 04/03/2025 0754   Progress/Outcome good progress toward goal, goal met, goal revised this date, goal ongoing at 04/09/2025 0949   Transfer Goal 4    Activity/Assistive Device shower at 04/02/2025 0723   [place] supervision required  at 04/09/2025 0949   Time Frame long-term goal (LTG), 14 days or less at 04/09/2025 0949   Progress/Outcome goal met, goal revised this date, goal ongoing at 04/09/2025 0949   Bathing Goal 1    [place] minimum assist (75% or more patient effort) at 04/09/2025 0949   Time Frame short-term goal (STG), 5 - 7 days at 04/09/2025 0949   Strategies/Barriers pt just evlauated at 04/03/2025 0754   Progress/Outcome good progress toward goal, goal met, goal revised this date, goal ongoing at 04/09/2025 0949   Bathing Goal 2    [place] supervision required at 04/02/2025 0723   Time Frame long-term goal (LTG), 21 days or less at 04/02/2025 0723   Progress/Outcome continuing progress toward goal, goal ongoing at 04/09/2025 0949   UB Dressing Goal 1    [place] supervision required at 04/09/2025 0949   Time Frame short-term goal (STG), 5 - 7 days at 04/09/2025 0949   Strategies/Barriers pt just evlauated at 04/03/2025 0754   Progress/Outcome good progress toward goal, goal met, goal revised this date, goal ongoing at 04/09/2025 0949   UB Dressing Goal 2    [place] modified independence at 04/09/2025 0949   Time Frame long-term goal (LTG), 14 days or less at 04/09/2025 0949   Progress/Outcome goal met, goal revised this date, goal ongoing at 04/09/2025 0949   LB Dressing Goal 1    [place] supervision required at 04/09/2025 0949   Time Frame short-term goal (STG), 5 - 7 days at 04/09/2025 0949   Strategies/Barriers pt just evlauated at 04/03/2025 0754   Progress/Outcome good progress toward goal, goal met, goal revised this date, goal ongoing at 04/09/2025 0949   LB Dressing Goal 2    [place] modified independence at 04/02/2025 0723   Time Frame long-term goal (LTG), 21 days or less at 04/02/2025 0723   Progress/Outcome continuing progress toward goal, goal ongoing at 04/09/2025 0949   Grooming Goal 1    [place] set-up required at 04/02/2025 0723   Time Frame short-term goal (STG), 5 - 7 days at  04/09/2025 0949   Strategies/Barriers progressing to standing v seated,  continue to work on standing balance to maximize IND at 04/09/2025 0949   Progress/Outcome progress slower than expected, goal revised this date, goal ongoing at 04/09/2025 0949   Grooming Goal 2    [place] modified independence at 04/02/2025 0723   Time Frame long-term goal (LTG), 21 days or less at 04/02/2025 0723   Progress/Outcome continuing progress toward goal, goal ongoing at 04/09/2025 0949   Toileting Goal 1    [place] supervision required at 04/09/2025 0949   Time Frame short-term goal (STG), 5 - 7 days at 04/09/2025 0949   Strategies/Barriers pt just evlauated at 04/03/2025 0754   Progress/Outcome good progress toward goal, goal met, goal revised this date, goal ongoing at 04/09/2025 0949   Toileting Goal 2    [place] modified independence at 04/02/2025 0723   Time Frame long-term goal (LTG), 21 days or less at 04/02/2025 0723   Progress/Outcome continuing progress toward goal, goal ongoing at 04/09/2025 0949

## 2025-04-14 NOTE — PROGRESS NOTES
Patient: Celso Gramajo  [facility]  MRN:  [id number]  Today's date:  4/14/2025    Attempted to see patient for therapy. Unable due to patient refused.    Pt refusing to participate in therapies until he is able to have a bowel movement, nursing working with pt to provide an enema to assist.

## 2025-04-14 NOTE — PROGRESS NOTES
Psychiatry Progress Note    History of Present Illness   See initial consult.  History pertaining to psychosis, depression and anxiety and other psychiatric and psychologic conditions as well as general medical history detailed in initial consult.      Interval History:   Poor reaction to trazodone  Ok on prozac  no gi akathesia ha or sedation  Responding to support  Paon issues addressed  continued effort in therapy  no signs of metabolic disturbance - cognition stable  energy adequate for therapy  developing coping strategies for ATD and mood fluctuations  continues without any safety issues  responsive to supportive intervention  Discussed with nursing - no behavioral disturbance     MENTAL STATUS EXAM  Appearance: well groomed  Gait and Motor: no abnormal movements  Speech: normal rate/rhythm/volume  Mood: depressed and anxious  Affect: constricted  Associations: coherent  Thought Process: goal-directed  Thought Content: no auditory or visual hallucinations.  Suicidality/Homicidality: denies  Judgement/Insight: acknowledges illness  Orientation: situation  Memory: knows current president  Attention: distracted  Knowledge: normal  Language: normal      Vital Signs for the last 24 hours:  Temp:  [36.5 °C (97.7 °F)-36.8 °C (98.2 °F)] 36.5 °C (97.7 °F)  Heart Rate:  [77-88] 79  Resp:  [17-18] 18  BP: (108-147)/(63-77) 138/74    Labs:  Labs below reviewed for pertinence to psychiatric condition  Lab Results   Component Value Date    GLUCOSE 138 (H) 04/14/2025    CALCIUM 9.4 04/14/2025     (L) 04/14/2025    K 4.4 04/14/2025    CO2 31 04/14/2025    CL 96 (L) 04/14/2025    BUN 16 04/14/2025    CREATININE 0.6 (L) 04/14/2025     Lab Results   Component Value Date    WBC 5.17 04/14/2025    HGB 12.5 (L) 04/14/2025    HCT 38.3 (L) 04/14/2025    MCV 94.8 04/14/2025     04/14/2025     Pain Management Panel           No data to display                  Current Facility-Administered Medications   Medication Dose  Route Frequency Provider Last Rate Last Admin    acetaminophen (TYLENOL) 650 mg/20.3 mL solution 650 mg  650 mg peg tube q6h PRN Rohit Acevedo MD   650 mg at 04/13/25 1158    bisacodyL (DULCOLAX) 10 mg suppository 10 mg  10 mg rectal Daily PRN Rohit Acevedo MD   10 mg at 04/14/25 0612    cefTRIAXone (ROCEPHIN) 2 g in sodium chloride 0.9 % 100 mL IVPB  2 g intravenous q24h INT Rohit Acevedo MD   Stopped at 04/14/25 0630    docusate sodium (COLACE) 50 mg/5 mL liquid 100 mg  100 mg peg tube BID Spencer Kemp MD   100 mg at 04/13/25 1445    ezetimibe (ZETIA) tablet 10 mg  10 mg peg tube Nightly Rohit Acevedo MD   10 mg at 04/13/25 2104    famotidine (PEPCID) tablet 20 mg  20 mg peg tube Nightly Rohit Acevedo MD   20 mg at 04/13/25 2104    FLUoxetine (PROzac) 20 mg/5 mL (4 mg/mL) solution 20 mg  20 mg peg tube Daily Rohit Acevedo MD   20 mg at 04/13/25 0755    glycopyrrolate (ROBINUL) tablet 1 mg  1 mg peg tube 2x daily PRN Rohit Acevedo MD   1 mg at 04/11/25 0850    heparin (porcine) 5,000 unit/mL injection 5,000 Units  5,000 Units subcutaneous q8h [facility] Rohit Acevedo MD   5,000 Units at 04/14/25 0552    honey (MEDIHONEY) 100 % topical paste   Topical Daily Spencer Kemp MD   Given at 04/13/25 1223    HYDROmorphone (DILAUDID) tablet 2 mg  2 mg peg tube q6h PRN Lilian Marrero MD   2 mg at 04/14/25 0600    insulin lispro U-100 (HumaLOG) pen 1-12 Units  1-12 Units subcutaneous q6h [facility] Lilian Marrero MD   1 Units at 04/13/25 1541    lansoprazole (PREVACID) 3 mg/mL oral suspension 15 mg  15 mg feeding tube Daily before breakfast Lilian Marrero MD   15 mg at 04/14/25 0600    levothyroxine (SYNTHROID) tablet 150 mcg  150 mcg peg tube Daily (6:30a) Lilian Marrero MD   150 mcg at 04/14/25 0552    linaCLOtide (LINZESS) capsule 145 mcg  145 mcg g-tube Daily before lunch Lilian Marrero MD   145 mcg at 04/13/25 1158    linaCLOtide (LINZESS) capsule 145 mcg   145 mcg peg tube Daily PRN Lilian Marrero MD        liothyronine (CYTOMEL) tablet 10 mcg  10 mcg peg tube Daily (6:30a) Rohit Acevedo MD   10 mcg at 04/14/25 0552    LORazepam (ATIVAN) tablet 0.5 mg  0.5 mg oral q8h PRN Lilian Marrero MD   0.5 mg at 04/13/25 0800    magnesium hydroxide (M.O.M.) 400 mg/5 mL suspension 30 mL  30 mL oral 2x daily PRN Lilian Marrero MD   30 mL at 04/13/25 1445    nystatin (MYCOSTATIN) 100,000 unit/gram topical powder 1 Application  1 Application Topical BID Spencer Kemp MD   1 Application at 04/13/25 2105    ondansetron ODT (ZOFRAN-ODT) disintegrating tablet 4 mg  4 mg peg tube q6h PRN Rohit Acevedo MD        senna (SENOKOT) tablet 1 tablet  1 tablet peg tube 2x daily PRN Rohit Acevedo MD   1 tablet at 04/03/25 1547    senna (SENOKOT) tablet 2 tablet  2 tablet peg tube BID Spencer Kemp MD   2 tablet at 04/13/25 1445    simethicone (MYLICON) chewable tablet 160 mg  160 mg peg tube QID Lilian Marrero MD   160 mg at 04/13/25 2104    sodium chloride PF flush 10 mL  10 mL intravenous q8h INT Rohit Acevedo MD   10 mL at 04/14/25 0735    sodium chloride PF flush 10 mL  10 mL intravenous PRN Rohit Acevedo MD        sodium chloride tablet 1 g  1 g peg tube Daily Spencer Kemp MD        sodium phosphates (FLEET) enema adult 1 enema  1 enema rectal Daily PRN Spencer Kemp MD   1 enema at 04/10/25 1306    traZODone (DESYREL) tablet 50 mg  50 mg oral Nightly Dav Marlow MD   50 mg at 04/12/25 2113        Patient Active Problem List   Diagnosis    Vertigo    Hypertension    Postsurgical hypothyroidism    Mixed hyperlipidemia    Gastroesophageal reflux disease    Increased sputum production    Allergic rhinitis    Change in bowel habits    Chronic fatigue syndrome    Constipation, chronic    Deviated nasal septum    Elevated coronary artery calcium score    Eustachian tube dysfunction, bilateral    Examination of participant in  clinical trial    Induration penis plastica    Hypertrophy of nasal turbinates    Hx of colonic polyps    Malignant neoplasm of salivary gland (CMS/HCC)    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)    Paralysis of vocal cords and larynx, unilateral    ENMANUEL (obstructive sleep apnea)    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)    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)    Penile curvature, acquired    Rhinitis sicca    Thyroid cancer (CMS/HCC)    Sensorineural hearing loss (SNHL), bilateral    Other dysphagia    Abdominal pain    Other dysphagia    COVID-19    Encounter for follow-up surveillance of thyroid cancer    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           Assessment/Plan    Depression: CBT automatic anxious and negative thoughts  Adjustment Disorder to Disability: supportive therapy  Sleep:  Insight into illness: insight therapy/psychoeducation  Psychotherapy: supportive  Monitor: Mood, Speech, Appetite, Energy, Cognition, Behavioral, Impulsivity, Agitation  Family Support  Medications: monitor for side effects  - presently no gi, akathesia , ha or sedation  Sodium 134  prozac 20  Prn ativan  Cbt  Educate on meds  If sodium continues to drop would consider discontinuing Prozac if there is not another source.  Address automatic negtive thoguhts  Support coping with atd  Monitor progress in therapy  Limit narcotic as possible  Relaxation techniques for pain  Discussed ativan use  Stop trazodone  Brandanien cheryl Meyer MD  4/14/2025

## 2025-04-14 NOTE — NURSING NOTE
Patient refuses enteral feeding at this time. Feedings were supplied per patient's requested schedule, with accuchecks done prior to each feeding. Patient complains of delays to feeding, yet has multiple requests contributing to administration delays. Reminded that he can administer the feeding with supervision as able and as he had demonstrated yesterday that he is capable of doing, in accordance with his goals for independence. Encouraged, but easily angered. Nursing supervisor aware.

## 2025-04-14 NOTE — PROGRESS NOTES
Patient: Celso Gramajo  Location: [facility] [address]  MRN: [id number]  Today's date: 4/14/2025    History of Present Illness  Celso is a 69 y.o. male admitted on 4/1/2025 with Spinal epidural abscess [G06.1]. Principal problem is Spinal epidural abscess.    Celso Gramajo is a 69 year old male with PMHx thyroid CA s/p thyroidectomy, RND, adjuvant XRT/immunotherapy, esophageal SCC s/p chemoradiation c/b esophageal strictures, HTN, Elevated Coronary Calcium scoring in 2017, ENMANUEL, Hypothyroidism, Anemia, dysphagia on PEG (placed in 2021) TFs, and history of CVA who presented to an OSH with neck and R shoulder pain. Workup was unrevealing and he was discharged home.    He presented to the [place] ED again on 3/17 with ongoing fevers. CT Neck with no abscess, old postsurgical changes. He d/c home but BCx subsequently returned positive on 3/19 and he returned for admission. He was also bacteremic with strep viridans, and placed on IV antibiotics. Workup revealed a C5-C7 ventral epidural abscess with osteomyelitis, and he was transferred to [facility] for surgical evaluation.    MRI on 3/24 c/f metastatic disease at T1-T4, L4.      At [facility] ortho and ENT were consulted. Ultimately it was determined that an anterior surgical approach was not safe (due to history of neck surgeries), and patient was having progressive weakness. Repeat MRI C-spine showed Discitis and osteomyelitis at C6-C7 with ventral epidural abscess compressing the spinal cord and increased edema. Patient urgently underwent POSTERIOR C4-T1 DECOMPRESSION FUSION WITH [place] INSTRUMENTATION on 3/27. Postop he was briefly in the ICU for MAP goals. OR cultures grew strep intermedius and he was placed on the antibiotic plan below. He had a PICC placed 4/1. 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 He was started on salt tabs. In rehab please continue to check labs, and titrate the salt tabs as  appropriate. He worked with PT/OT who recommended him for acute rehab.     Past Medical History  Celso has a past medical history of Anemia, Cancer (CMS/HCC), head and neck radiation, Hypertension, Hypothyroidism, Stroke (CMS/HCC), and Thyroid disease.    OT Vitals      Date/Time Pulse HR Source BP BP Location BP Method Pt Position [facility]   04/14/25 1144 91 Monitor 125/72 Left upper arm Automatic Sitting [facility]          OT Pain      Date/Time Pain Type Side/Orientation Rating: Rest Interventions [facility]   04/14/25 1144 Pain Assessment neck 5 diversional activity provided [facility]   04/14/25 1155 Pain Reassessment neck 5 diversional activity provided [facility]               Prior Living Environment      Flowsheet Row Most Recent Value   People in Home spouse   Current Living Arrangements home   Home Accessibility stairs to enter home (Group), stairs within home (Group)   Living Environment Comment Multi-story home with 12-14 steps to second floor, powder room on 1st floor, 2-3 FELICITA via garage, multiple supportive family members   Number of Stairs, Main Entrance 3   Stair Railings, Main Entrance none   Location, Patient Bedroom second floor, must negotiate stairs to access   Location, Bathroom second floor, must negotiate stairs to access   Bathroom Access Comment powder room 1st floor standard height toilet, full bathroom 2nd floor WIS with glass door, standard height toilet   Stairs, Within Home, Primary 12   Stair Railings, Within Home, Primary railings on both sides of stairs            Prior Level of Function      Flowsheet Row Most Recent Value   Dominant Hand right   Ambulation independent   Transferring independent   Toileting independent   Bathing independent   Dressing independent   Eating independent   IADLs independent   Driving/Transportation    Communication understands/communicates without difficulty   Past History of Dysphagia PTA pt was completely independent, no AD. (+) . NPO PTA, manages peg tube  independently.   Prior Level of Function Comment Independent without use of AE or DME.   Assistive Device Currently Used at Home none            Occupational Profile      Flowsheet Row Most Recent Value   Occupational History/Life Experiences retired involved in [employer] on [employer]. + , -handicap driving placard.   Patient Goals PSFS: going to the bathroom 0/10, walking 1/10, getting in/out of bed 0/10 = 1/30 = 3.33%             IRF OT Evaluation and Treatment - 04/14/25 1133          OT Time Calculation    Start Time 1132     Stop Time 1202     Time Calculation (min) 30 min        Session Details    Document Type Daily Treatment/Progress Note        General Information    Patient/Family/Caregiver Comments/Observations Wife present during 1/2 of session     General Observations of Patient pleasant, agreeable        Mobility Belt    Mobility Belt Used During Session yes        Orthosis Neck Cervical Collar    Orthosis Properties Date Obtained: 04/03/25 Time Obtained: 0825 Location: Neck Type: Cervical Collar Features: Soft Description: D/C [place] J collar. If patient desires a he can wear a soft collar as needed for comfort.       Sit to Stand Transfer    [place], Sit to Stand Transfer supervision     Safety/Cues increased time to complete     Assistive Device walker, front-wheeled     Comment to RW; x10 reps sit to stand transitions        Stand to Sit Transfer    [place], Stand to Sit Transfer supervision     Safety/Cues increased time to complete     Assistive Device walker, front-wheeled     Comment from RW; x10 reps stand to sit transitions        Upper Extremity (Therapeutic Exercise)    Exercise Position/Type AROM (active range of motion);seated     General Exercise bilateral     Comment seated in recliner; shoulder flexion, horizontal abduction/adduciton        Daily Progress Summary (OT)    Daily Outcome Statement Focus of session on endurance and sit to stand transitions. Notified primaries  regarding wife requesting for nsg to ambulate with pt.                               IRF OT Goals      Flowsheet Row Most Recent Value   Transfer Goal 1    Activity/Assistive Device toilet at 04/02/2025 0723   [place] supervision required at 04/09/2025 0949   Time Frame short-term goal (STG), 5 - 7 days at 04/09/2025 0949   Strategies/Barriers pt just evlauated at 04/03/2025 0754   Progress/Outcome good progress toward goal, goal met, goal revised this date, goal ongoing at 04/09/2025 0949   Transfer Goal 2    Activity/Assistive Device toilet at 04/02/2025 0723   [place] modified independence at 04/02/2025 0723   Time Frame long-term goal (LTG), 21 days or less at 04/02/2025 0723   Progress/Outcome continuing progress toward goal, goal ongoing at 04/09/2025 0949   Transfer Goal 3    Activity/Assistive Device shower at 04/02/2025 0723   [place] supervision required at 04/09/2025 0949   Time Frame short-term goal (STG), 5 - 7 days at 04/09/2025 0949   Strategies/Barriers pt just evlauated at 04/03/2025 0754   Progress/Outcome good progress toward goal, goal met, goal revised this date, goal ongoing at 04/09/2025 0949   Transfer Goal 4    Activity/Assistive Device shower at 04/02/2025 0723   [place] supervision required at 04/09/2025 0949   Time Frame long-term goal (LTG), 14 days or less at 04/09/2025 0949   Progress/Outcome goal met, goal revised this date, goal ongoing at 04/09/2025 0949   Bathing Goal 1    [place] minimum assist (75% or more patient effort) at 04/09/2025 0949   Time Frame short-term goal (STG), 5 - 7 days at 04/09/2025 0949   Strategies/Barriers pt just evlauated at 04/03/2025 0754   Progress/Outcome good progress toward goal, goal met, goal revised this date, goal ongoing at 04/09/2025 0949   Bathing Goal 2    [place] supervision required at 04/02/2025 0723   Time Frame long-term goal (LTG), 21 days or less at 04/02/2025 0723   Progress/Outcome continuing progress  toward goal, goal ongoing at 04/09/2025 0949   UB Dressing Goal 1    [place] supervision required at 04/09/2025 0949   Time Frame short-term goal (STG), 5 - 7 days at 04/09/2025 0949   Strategies/Barriers pt just evlauated at 04/03/2025 0754   Progress/Outcome good progress toward goal, goal met, goal revised this date, goal ongoing at 04/09/2025 0949   UB Dressing Goal 2    [place] modified independence at 04/09/2025 0949   Time Frame long-term goal (LTG), 14 days or less at 04/09/2025 0949   Progress/Outcome goal met, goal revised this date, goal ongoing at 04/09/2025 0949   LB Dressing Goal 1    [place] supervision required at 04/09/2025 0949   Time Frame short-term goal (STG), 5 - 7 days at 04/09/2025 0949   Strategies/Barriers pt just evlauated at 04/03/2025 0754   Progress/Outcome good progress toward goal, goal met, goal revised this date, goal ongoing at 04/09/2025 0949   LB Dressing Goal 2    [place] modified independence at 04/02/2025 0723   Time Frame long-term goal (LTG), 21 days or less at 04/02/2025 0723   Progress/Outcome continuing progress toward goal, goal ongoing at 04/09/2025 0949   Grooming Goal 1    [place] set-up required at 04/02/2025 0723   Time Frame short-term goal (STG), 5 - 7 days at 04/09/2025 0949   Strategies/Barriers progressing to standing v seated,  continue to work on standing balance to maximize IND at 04/09/2025 0949   Progress/Outcome progress slower than expected, goal revised this date, goal ongoing at 04/09/2025 0949   Grooming Goal 2    [place] modified independence at 04/02/2025 0723   Time Frame long-term goal (LTG), 21 days or less at 04/02/2025 0723   Progress/Outcome continuing progress toward goal, goal ongoing at 04/09/2025 0949   Toileting Goal 1    [place] supervision required at 04/09/2025 0949   Time Frame short-term goal (STG), 5 - 7 days at 04/09/2025 0949   Strategies/Barriers pt just evlauated at 04/03/2025 0754    Progress/Outcome good progress toward goal, goal met, goal revised this date, goal ongoing at 04/09/2025 0949   Toileting Goal 2    [place] modified independence at 04/02/2025 0723   Time Frame long-term goal (LTG), 21 days or less at 04/02/2025 0723   Progress/Outcome continuing progress toward goal, goal ongoing at 04/09/2025 0949

## 2025-04-14 NOTE — PROGRESS NOTES
Subjective     Patient seen and examined on rounds.  Chart reviewed.  Events overnight noted.  History reviewed briefly with patient.    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    HPI: Mr. Celso Gramajo is a 69-year-old right-handed white male with chronic conditions significant for hypertension, hyperlipidemia, anxiety, thyroid cancer status post thyroidectomy and radical neck dissection, adjuvant radiation therapy/immunotherapy, esophageal squamous cell carcinoma status post chemoradiotherapy complicated by esophageal strictures, left vocal cord paralysis, dysphagia status post PEG tube placement in 2021, initially presented to the ER at [facility] on 3/11/25 with fevers and right shoulder pain. Workup was unrevealing and he was discharged home. He presented to the [facility] ER again on 3/17/25 with ongoing fevers. CT neck with no abscess, old postsurgical changes. He discharged home but blood cultures subsequently, 1 of 2 sets of blood cultures drawn at [facility] on 3/17/25 showed Streptococcus viridans returned positive on 3/19/25 and he returned for admission to [facility] on 3/19/25. TTE at [facility] was negative. Blood cultures on 3/19/25 and again on 3/23/25 showed no growth per his records. Imaging with C6-7 osteomyelitis/discitis with ventral epidural abscess. He was transferred to [facility] on 3/23/25 for surgical evaluation.  At that time, he was afebrile.  Blood cultures  were negative. At [facility] ortho and ENT were consulted. Ultimately it was determined that an anterior surgical approach was not safe (due  to history of neck surgeries), and patient was having progressive weakness.  MRI of cervical spine on 3/27/25 revealed discitis and osteomyelitis at C6-C7 with ventral epidural abscess compressing the spinal cord and increased inferior extent of cord edema compared to prior. Patchy T1 hypointensity with enhancement in the upper cervical and thoracic spine, can represent metastatic lesions and/or post radiation changes, similar as compared to prior. He underwent posterior C4-T1 decompression and instrumented fusion on 3/27/25, performed by orthopedic surgeon Dr. Santiago Ca. Purulent fluid drained from anterior spine during the procedure and operating room cultures revealed Strep intermedius. Postop he was briefly in the ICU for MAP goals. He had a PICC placed 4/1/25. 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 He was started on salt tabs. Infectious diseases recommended IV Daptomycin which patient completed on 3/31/25.  He was recommended to continue Ceftriaxone 2g IV q12h x 14 day course (end date 4/8/25) then transition to Ceftriaxone 2g IV daily to complete total 6 week course (end date 5/8/25). He was recommend [place] J collar to neck at all times.  I discussed orthopedic spinal precautions with him.  He completed Decadron treatment for mild laryngeal edema visualized on preoperative NPL. Patient has dysphagia from esophageal strictures, recent dilation 3/3/25 was not successful per patient, cannot tolerate regular oral secretions and recommended NPO and continued on bolus tube feeds.  He was kept on Pepcid for GI prophylaxis. Glycopyrrolate as needed per patient/spouse wishes.  For anxiety he was continued on Fluoxetine and Ativan PRN.  He is not on medications for hypertension.  He was continued on Zetia for hyperlipidemia.  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 He continues on a sliding-scale Humalog coverage.  He is on Levothyroxine  and Liothyronine for thyroid supplementation after thyroidectomy for thyroid cancer. He is on subcutaneous Heparin and SCDs for DVT prophylaxis.  I discussed his recent clinical course with patient and with his wife at bedside.  On 4/1/25, hemoglobin was 12.9, WBC count was 10.30, platelets were 255, BUN 15, creatinine was 0.51, sodium was 133 and potassium was 4.5.  He has been needing assistance for mobility, transfers, self-care. He is transferred to [facility] on 4/1/25 for further rehabilitation care.     SUBJ: He reported decreased sleep at night.  Discussed with patient and with his wife with him.  They would like to try Ambien.  He took Trazodone the other day and could not tolerate it.  He requested soapsuds enema today due to constipation.  Discussed with him constipating effects of narcotics.  He says he also gets constipated when he gets antibiotics.  Reviewed labs today.      ROS: Denies chest pain or shortness of breath. Other ROS negative. Past, family, social history is unchanged.      Current Functional Status:   Bed mobility:   [place], Supine to Sit: minimum assist (75% or more patient effort)   [place], Sit to Supine: touching/steadying assist   Transfers:    [place], Sit to Stand Transfer: supervision  [place], Stand to Sit Transfer: supervision   [place], Stand Pivot/Stand Step Transfer: supervision   Gait:   [place], Gait: supervision  Assistive Device: walker, front-wheeled   Distance in Feet: 200 feet    Bathing:   [place]: moderate assist (50-74% patient effort)   Toileting:   [place]: supervision   Upper body dressing:   [place]: close supervision   Lower body dressing:   [place]: close supervision       Functional Progress:    Functional status reviewed. Overall, patient's functional status is improving.      Physical Exam      Blood pressure 130/70, pulse 81, temperature 36.3 °C (97.3 °F), temperature source Oral, resp. rate  "16, height 1.854 m (6' 1\"), weight 88.5 kg (195 lb), SpO2 95%.    Body mass index is 25.73 kg/m².    General Appearance: Not in acute distress  Head/Ear/Nose/Throat: Normocephalic; Atraumatic.   Eye: EOMI; PERRL.   Neck: [place] J collar in place.  Respiratory: Decreased breath sounds at bases.   Cardiovascular: RRR; Normal S1, S2.   Gastrointestinal: Soft; NT; +BS.   Extremities: Bilateral lower extremity edema noted.    Musculoskeletal: Functional active range of motion in both upper and lower extremities.   Neurological: AAO ×3. Speech is fluent. Cranial nerve examination does not reveal any gross facial asymmetry. Strength testing bilateral deltoids are 4/5, bilateral biceps are 4+/5, bilateral triceps are 4/5, bilateral wrist extensors are 4+/5, bilateral hand FDP are 4+/5, bilateral and interossei are 4/5.  Bilateral hip flexion is less than 4/5.  Bilateral quadriceps are 5/5.  Bilateral ankle dorsi and plantar flexion are 5/5.  He is grossly able to localize touch and position sense in great toes, except reports numbness in all 5 digits of both hands and also numbness on the plantar aspects of both feet.  Deep tendon reflexes are symmetric and slightly brisk bilaterally.  Bilateral ankle clonus is present.  Plantars are withdrawal.  Coordination is functional upper extremities.    Behavior/Emotional: Appropriate; Cooperative.   Skin: Healing incision on posterior cervical spine.  Sutures in place.  Erythema/rash noted on coccyx unclear if stage I decubitus.  Rash noted on bilateral groins.  Reviewed pictures in epic.         Current Facility-Administered Medications:     acetaminophen (TYLENOL) 650 mg/20.3 mL solution 650 mg, 650 mg, peg tube, q6h PRN, Rohit Acevedo MD, 650 mg at 04/14/25 1238    bisacodyL (DULCOLAX) 10 mg suppository 10 mg, 10 mg, rectal, Daily PRN, Rohit Acevedo MD, 10 mg at 04/14/25 0612    cefTRIAXone (ROCEPHIN) 2 g in sodium chloride 0.9 % 100 mL IVPB, 2 g, intravenous, " q24h INT, Rohit Acevedo MD, Stopped at 04/14/25 0630    docusate sodium (COLACE) 50 mg/5 mL liquid 100 mg, 100 mg, peg tube, BID, Spencer Kemp MD, 100 mg at 04/14/25 1404    enoxaparin (LOVENOX) syringe 40 mg, 40 mg, subcutaneous, Daily (6p), Lilian Marrero MD, 40 mg at 04/14/25 1733    ezetimibe (ZETIA) tablet 10 mg, 10 mg, peg tube, Nightly, Rohit Acevedo MD, 10 mg at 04/13/25 2104    famotidine (PEPCID) tablet 20 mg, 20 mg, peg tube, Nightly, Rohit Acevedo MD, 20 mg at 04/13/25 2104    FLUoxetine (PROzac) 20 mg/5 mL (4 mg/mL) solution 20 mg, 20 mg, peg tube, Daily, Rohit Acevedo MD, 20 mg at 04/13/25 0755    glycopyrrolate (ROBINUL) tablet 1 mg, 1 mg, peg tube, 2x daily PRN, Rohit Acevedo MD, 1 mg at 04/11/25 0850    honey (MEDIHONEY) 100 % topical paste, , Topical, Daily, Spencer Kemp MD, Given at 04/14/25 0926    HYDROmorphone (DILAUDID) tablet 2 mg, 2 mg, peg tube, q6h PRN, Lilian Marrero MD, 2 mg at 04/14/25 1642    lansoprazole (PREVACID) 3 mg/mL oral suspension 15 mg, 15 mg, feeding tube, Daily before breakfast, Lilian Marrero MD, 15 mg at 04/14/25 0600    levothyroxine (SYNTHROID) tablet 150 mcg, 150 mcg, peg tube, Daily (6:30a), Lilian Marrero MD, 150 mcg at 04/14/25 0552    linaCLOtide (LINZESS) capsule 145 mcg, 145 mcg, g-tube, Daily before lunch, Lilian Marrero MD, 145 mcg at 04/14/25 1237    linaCLOtide (LINZESS) capsule 145 mcg, 145 mcg, peg tube, Daily PRN, Lilian Marrero MD, 145 mcg at 04/14/25 1237    liothyronine (CYTOMEL) tablet 10 mcg, 10 mcg, peg tube, Daily (6:30a), Rohit Acevedo MD, 10 mcg at 04/14/25 0552    LORazepam (ATIVAN) tablet 0.25 mg, 0.25 mg, peg tube, q8h PRN, Trey Meyer MD    magnesium hydroxide (M.O.M.) 400 mg/5 mL suspension 30 mL, 30 mL, oral, 2x daily PRN, Lilian Marrero MD, 30 mL at 04/13/25 1445    ondansetron ODT (ZOFRAN-ODT) disintegrating tablet 4 mg, 4 mg, peg tube, q6h PRN, Rohit Acevedo MD, 4  mg at 04/14/25 0944    senna (SENOKOT) tablet 1 tablet, 1 tablet, peg tube, 2x daily PRN, Rohit Acevedo MD, 1 tablet at 04/14/25 0926    senna (SENOKOT) tablet 2 tablet, 2 tablet, peg tube, BID, Spencer Kemp MD, 2 tablet at 04/14/25 1404    simethicone (MYLICON) chewable tablet 160 mg, 160 mg, peg tube, QID, Lilian Marrero MD, 160 mg at 04/14/25 1637    sodium chloride 0.9 % infusion, , intravenous, Continuous, Lilian Marrero MD    sodium chloride PF flush 10 mL, 10 mL, intravenous, q8h INT, Rohit Acevedo MD, 10 mL at 04/14/25 1404    sodium chloride PF flush 10 mL, 10 mL, intravenous, PRN, Rohit Acevedo MD    sodium chloride tablet 1 g, 1 g, peg tube, Daily, Spencer Kemp MD, 1 g at 04/14/25 0925    sodium phosphates (FLEET) enema adult 1 enema, 1 enema, rectal, Daily PRN, Spencer Kemp MD, 1 enema at 04/10/25 1306    zolpidem (AMBIEN) tablet 5 mg, 5 mg, peg tube, Nightly, Lilian Marrero MD       Labs / Radiology    Lab Results   Component Value Date    WBC 5.17 04/14/2025    HGB 12.5 (L) 04/14/2025    HCT 38.3 (L) 04/14/2025    MCV 94.8 04/14/2025     04/14/2025     Lab Results   Component Value Date    GLUCOSE 138 (H) 04/14/2025    CALCIUM 9.4 04/14/2025     (L) 04/14/2025    K 4.4 04/14/2025    CO2 31 04/14/2025    CL 96 (L) 04/14/2025    BUN 16 04/14/2025    CREATININE 0.6 (L) 04/14/2025       Assessment and Plan    ASSESSMENT PLAN:  1. 69-year-old right-handed white male with chronic conditions significant for hypertension, hyperlipidemia, anxiety, thyroid cancer status post thyroidectomy and radical neck dissection, adjuvant radiation therapy/immunotherapy, esophageal squamous cell carcinoma status post chemoradiotherapy complicated by esophageal strictures, left vocal cord paralysis, dysphagia status post PEG tube placement in 2021, initially presented to the ER at [facility] on 3/11/25 with fevers and right shoulder pain. Workup was unrevealing and  he was discharged home. He presented to the [facility] ER again on 3/17/25 with ongoing fevers. CT neck with no abscess, old postsurgical changes. He discharged home but blood cultures subsequently, 1 of 2 sets of blood cultures drawn at [facility] on 3/17/25 showed Streptococcus viridans returned positive on 3/19/25 and he returned for admission to [facility] on 3/19/25. TTE at [facility] was negative. Blood cultures on 3/19/25 and again on 3/23/25 showed no growth per his records. Imaging with C6-7 osteomyelitis/discitis with ventral epidural abscess. He was transferred to [facility] on 3/23/25 for surgical evaluation.  At that time, he was afebrile.  Blood cultures  were negative. At [facility] ortho and ENT were consulted. Ultimately it was determined that an anterior surgical approach was not safe (due to history of neck surgeries), and patient was having progressive weakness.  MRI of cervical spine on 3/27/25 revealed discitis and osteomyelitis at C6-C7 with ventral epidural abscess compressing the spinal cord and increased inferior extent of cord edema compared to prior. Patchy T1 hypointensity with enhancement in the upper cervical and thoracic spine, can represent metastatic lesions and/or post radiation changes, similar as compared to prior. He underwent posterior C4-T1 decompression and instrumented fusion on 3/27/25, performed by orthopedic surgeon Dr. Santiago Ca. Purulent fluid drained from anterior spine during the procedure and operating room cultures revealed Strep intermedius. Postop he was briefly in the ICU for MAP goals. He had a PICC placed 4/1/25. 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 He was started on salt tabs. Infectious diseases recommended IV Daptomycin which patient completed on 3/31/25.  He was recommended to continue Ceftriaxone 2g IV q12h x 14 day course (end date 4/8/25) then transition to Ceftriaxone 2g IV daily to complete  total 6 week course (end date 5/8/25). He was recommend [place] J collar to neck at all times.  I discussed orthopedic spinal precautions with him.  He completed Decadron treatment for mild laryngeal edema visualized on preoperative NPL. Patient has dysphagia from esophageal strictures, recent dilation 3/3/25 was not successful per patient, cannot tolerate regular oral secretions and recommended NPO and continued on bolus tube feeds.  He was kept on Pepcid for GI prophylaxis. Glycopyrrolate as needed per patient/spouse wishes.  For anxiety he was continued on Fluoxetine and Ativan PRN.  He is not on medications for hypertension.  He was continued on Zetia for hyperlipidemia.  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 He continues on a sliding-scale Humalog coverage.  He is on Levothyroxine and Liothyronine for thyroid supplementation after thyroidectomy for thyroid cancer. He is on subcutaneous Heparin and SCDs for DVT prophylaxis.  I discussed his recent clinical course with patient and with his wife at bedside.  On 4/1/25, hemoglobin was 12.9, WBC count was 10.30, platelets were 255, BUN 15, creatinine was 0.51, sodium was 133 and potassium was 4.5.  He has been needing assistance for mobility, transfers, self-care. He is transferred to [place] [facility] on 4/1/25 for further rehabilitation care.      2. DVT prophylaxis - He is on subcutaneous Heparin and SCDs for DVT prophylaxis.  Check doppler.  Platelets 247 on 4/2/2025.  Doppler ultrasound on 4/3/2025 bilateral lower extremity was negative for DVT.  Platelets 216 on 4/7/2025.  Platelets 206 on 4/14/2025.       3. Orthopedics - status post posterior C4-T1 decompression and fusion, C6-C7 discitis and osteomyelitis with ventral epidural abscess compressing the spinal cord, history of esophageal squamous cell carcinoma status post chemoradiotherapy (CRT) complicated by esophageal strictures, left vocal cord paralysis, dysphagia status  post PEG tube, history of thyroid cancer, multiple medical problems - continue PT, OT, psychology.  Follow falls precautions, cardiac precautions, aspiration precautions.  Follow spinal precautions.  [place] J collar to neck at all times.  Use [place] collar in shower.     4. GI - On Pepcid for GI prophylaxis.  On PRN Senokot.  On PRN Dulcolax suppository.  On PRN Zofran.     5.  -denies dysuria.  Monitor postvoid residuals.     6. CVS - monitor for orthostasis.     7. Pulmonary -encourage incentive spirometry.     8. Hematology - monitor hemoglobin, platelets.  Hemoglobin 12.3, WBC 9.54 on 4/2/2025.  Hemoglobin 12.4, WBC 5.89 on 4/7/2025.  Hemoglobin 12.5, WBC 5.17 on 4/14/2025.       9. Pain -on Tylenol.  On PRN Oxycodone.     10. Skin - Healing incision on posterior cervical spine.  Sutures in place.  Erythema/rash noted on coccyx unclear if stage I decubitus.  Rash noted on bilateral groins.  Reviewed pictures in epic.     11. F/E/N - nothing by mouth on bolus PEG feeds.  Nutrition consulted. 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 He was started on salt tabs.     12.  Dysphagia - H/O esophageal squamous cell carcinoma status post chemoradiotherapy complicated by esophageal strictures, left vocal cord paralysis, dysphagia status post PEG tube placement in 2021 - NPO on bolus PEG feeds.  Nutrition consulted.     13.  Psychiatry - on PRN Ativan.  Psychology consulted.     14.  Hypothyroidism -He is on Levothyroxine and Liothyronine for thyroid supplementation after thyroidectomy for thyroid cancer.     15. ENT -  H/O left vocal cord paralysis, treated with Decadron for edema around vocal cords recently.  On Glycopyrrolate as needed per patient/spouse wishes to manage secretions.      16.  Infectious diseases - He underwent posterior C4-T1 decompression and instrumented fusion on 3/27/25, performed by orthopedic surgeon Dr. Santiago Ca. Purulent fluid drained from anterior spine during the  procedure and operating room cultures revealed Strep intermedius.. He had a PICC placed 4/1/25. Infectious diseases recommended IV Daptomycin which patient completed on 3/31/25.  He was recommended to continue Ceftriaxone 2g IV q12h x 14 day course (end date 4/8/25) then transition to Ceftriaxone 2g IV daily to complete total 6 week course (end date 5/8/25).He has infectious disease telemedicine appointment on 4/15/2025 at 10:40 AM.  Outside visit ordered.  Nursing to help patient with appointment.  Discussed with patient and nurse on 4/10/2025.      15.  Steroid-induced hyperglycemia -on sliding-scale Humalog coverage.     16.  Insomnia - He reported decreased sleep at night on 4/14/2025.  Discussed with patient and with his wife with him.  They would like to try Ambien.  He took Trazodone the other day and could not tolerate it.  Discussed with Dr. Marrero who ordered Ambien.     17. Rehabilitation medicine - Continue comprehensive rehabilitation care. Continue PT, OT, psychology.  Follow falls precautions, cardiac precautions, aspiration precautions.  Follow spinal precautions.  [place] J collar to neck at all times.  Use [place] collar in shower.Discussed patients progress in therapies with therapists in team meeting on 4/3/2025.  Discussed in PCC. See PCC documentation.  He reports no bowel movement in the past 3 to 4 days.  Also wanted antireflux medication in the morning and at home he takes Omeprazole via PEG per patient.  He is on Prevacid in the a.m. and Pepcid in p.m via PEG tube.  Due to constipation internist ordered enema.  Also scheduled Colace and Senokot were ordered through his PEG tube.  Discussed with patient on 4/3/2025.Discussed recommendations of PCC with patient on 4/4/2025.  Posterior cervical incision healing well.  He had 2 bowel movements today.  Family training scheduled for next week on Monday per case management note.  Discussed with patient on 4/4/2025.He was sitting on recliner on  4/5/2025, talking on phone with his wife.  Continent of bladder per nursing on 4/5/2025.  Tolerating PEG feeds without issues.  He reports he had a good bowel movement yesterday.  Discussed with him if he gets  loose bowel movements he can always refuse bowel medications.  He reports he wants to stay on some bowel medications at this time.  Posterior cervical incision has dressing in place.  Denies increased pain on 4/5/2025.Saw patient earlier on 4/7/2025 morning and then again in the evening.  Discussed with patient and with his wife in the evening.  He requested IV saline infusion.  Ordered liter of saline overnight but patient and wife indicated that at home they usually have the infusion and 3 hours and do not want to eat or 12 hours.  Discussed with pharmacist.  Discussed with nurse.  Patient and wife insist that they usually get the infusion over 3 hours at home.  Also wanted increase bowel medications due to constipation.  Increase Senokot dose and ordered as needed treats enema.  Discussed with nurse on 4/7/2025. Again discussed with patient on 4/8/2025 in presence of nursing supervisor that IV fluid at high rate is not such a good idea and he can get the volume but at a lower rate.  He says he is used to getting IV fluids quickly at home.  Suggested that he also discuss with his cardiologist if this is a safe practice and he can be prone to getting fluid overload and may put him at risk for CHF/pulmonary edema in future if his cardiac function decompensates.  Discussed with internist Dr. Marrero who also agrees with this possibility.  I mentioned to patient to discuss with his cardiologist regarding this and follow cardiology guidance regarding his desire to get IV fluids quickly at home at a high hourly volume in a short time.  Bowel medications were adjusted by internist on 4/8/2025.Inspected neck incision on 4/9/2025 which is stable.  He reports he feels constipated.  Internist adjusted bowel  medications.Discussed with patient and with his mother-in-law with him on 4/9/2025.  Discussed patients progress in therapies with therapists in team meeting on 4/10/2025. Discussed in PCC. See PCC documentation.  He has infectious disease telemedicine appointment on 4/15/2025 at 10:40 AM.  Outside visit ordered.  Nursing to help patient with appointment.  Discussed with patient and nurse on 4/10/2025. Discussed recommendations of PCC with patient on 4/11/2025. He reports he had a bowel movement today.  Noted input from dermal defense nurse.  Discussed with nurse [place].  Dr Good (orthopedic surgery) office was (who patient was recommended to follow-up with) was contacted regarding the pressure injury from [place] J collar and they indicated that [place] J collar can be discontinued and patient may use soft collar if he desires.  Nurse Elin also discussed with patient on 4/11/2025 and he is agreeable.  Orders put in.  Patient has appointment for 4/15 @ 1320 hrs at Dr Good's orthopedic office.  Discussed with nurse on 4/11/2025.He indicates that he no longer wants to take salt tablets on 4/12/2025.  Sodium was 135.  Will discontinue salt tablets.  Discussed with patient and with nurse with him on 4/12/2025.  Discussed follow-up appointment next week at orthopedic office with him.  Discussed with him on 4/12/2025 regarding input from orthopedic surgeon Dr Good yesterday regarding [place] J collar was discontinued and he is on soft cervical collar.He reported decreased sleep at night on 4/14/2025.  Discussed with patient and with his wife with him.  They would like to try Ambien.  He took Trazodone the other day and could not tolerate it.  He requested soapsuds enema today due to constipation.  Discussed with him constipating effects of narcotics.  He says he also gets constipated when he gets antibiotics.  Reviewed labs today on 4/14/2025.       18. Reviewed labs today. BUN 18, creatinine 0.6, sodium 133, potassium 4.9  on 4/2/2025.  BUN 16, creatinine 0.6, sodium 135, potassium 4.5 on 4/7/2025.  BUN 16, creatinine 0.6, sodium 134, potassium 4.4 on 4/14/2025.             Spencer Kemp MD  4/14/2025      This encounter was completed utilizing the Direct Speech Voice Recognition Software. Grammatical errors, random word insertions, pronoun errors, and incomplete sentences are occasional consequences of the system due to software limitations, ambient noises, and hardware issues. Such errors may be missed prior to affixing electronic signature. Any questions or concerns about the content, text, or information contained within the body of this dictation should be directly addressed to the physician for clarification. If you have any questions or concerns please do not hesitate to call me directly via EPIC chat, page, or email.

## 2025-04-14 NOTE — PROGRESS NOTES
Patient: Celso Gramajo  Location: [facility] [address]  MRN: [id number]  Today's date: 4/14/2025    History of Present Illness  Celso is a 69 y.o. male admitted on 4/1/2025 with Spinal epidural abscess [G06.1]. Principal problem is Spinal epidural abscess.    Celso Gramajo is a 69 year old male with PMHx thyroid CA s/p thyroidectomy, RND, adjuvant XRT/immunotherapy, esophageal SCC s/p chemoradiation c/b esophageal strictures, HTN, Elevated Coronary Calcium scoring in 2017, ENMANUEL, Hypothyroidism, Anemia, dysphagia on PEG (placed in 2021) TFs, and history of CVA who presented to an OSH with neck and R shoulder pain. Workup was unrevealing and he was discharged home.    He presented to the [place] ED again on 3/17 with ongoing fevers. CT Neck with no abscess, old postsurgical changes. He d/c home but BCx subsequently returned positive on 3/19 and he returned for admission. He was also bacteremic with strep viridans, and placed on IV antibiotics. Workup revealed a C5-C7 ventral epidural abscess with osteomyelitis, and he was transferred to [facility] for surgical evaluation.    MRI on 3/24 c/f metastatic disease at T1-T4, L4.      At [facility] ortho and ENT were consulted. Ultimately it was determined that an anterior surgical approach was not safe (due to history of neck surgeries), and patient was having progressive weakness. Repeat MRI C-spine showed Discitis and osteomyelitis at C6-C7 with ventral epidural abscess compressing the spinal cord and increased edema. Patient urgently underwent POSTERIOR C4-T1 DECOMPRESSION FUSION WITH [place] INSTRUMENTATION on 3/27. Postop he was briefly in the ICU for MAP goals. OR cultures grew strep intermedius and he was placed on the antibiotic plan below. He had a PICC placed 4/1. 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 He was started on salt tabs. In rehab please continue to check labs, and titrate the salt tabs as  appropriate. He worked with PT/OT who recommended him for acute rehab.     Past Medical History  Celso has a past medical history of Anemia, Cancer (CMS/HCC), head and neck radiation, Hypertension, Hypothyroidism, Stroke (CMS/HCC), and Thyroid disease.    PT Vitals      Date/Time Pulse HR Source BP BP Location BP Method Pt Position [facility]   04/14/25 1041 92 Monitor 113/65 Left upper arm Automatic Sitting [facility]          PT Pain      Date/Time Pain Type Rating: Rest [facility]   04/14/25 1041 Pain Assessment 0 - no pain [facility]               Prior Living Environment      Flowsheet Row Most Recent Value   People in Home spouse   Current Living Arrangements home   Home Accessibility stairs to enter home (Group), stairs within home (Group)   Living Environment Comment Multi-story home with 12-14 steps to second floor, powder room on 1st floor, 2-3 FELICITA via garage, multiple supportive family members   Number of Stairs, Main Entrance 3   Stair Railings, Main Entrance none   Location, Patient Bedroom second floor, must negotiate stairs to access   Location, Bathroom second floor, must negotiate stairs to access   Bathroom Access Comment powder room 1st floor standard height toilet, full bathroom 2nd floor WIS with glass door, standard height toilet   Stairs, Within Home, Primary 12   Stair Railings, Within Home, Primary railings on both sides of stairs            Prior Level of Function      Flowsheet Row Most Recent Value   Dominant Hand right   Ambulation independent   Transferring independent   Toileting independent   Bathing independent   Dressing independent   Eating independent   IADLs independent   Driving/Transportation    Communication understands/communicates without difficulty   Past History of Dysphagia PTA pt was completely independent, no AD. (+) . NPO PTA, manages peg tube independently.   Prior Level of Function Comment Independent without use of AE or DME.   Assistive Device Currently Used at Home none              IRF PT Evaluation and Treatment - 04/14/25 1044          PT Time Calculation    Start Time 1040     Stop Time 1105     Time Calculation (min) 25 min        Session Details    Document Type Daily Treatment/Progress Note        General Information    Patient/Family/Caregiver Comments/Observations Pt's wife present during session     General Observations of Patient Pt in room, agreeable to make up missed 15 mins from earleir        Mobility Belt    Mobility Belt Used During Session yes        Orthosis Neck Cervical Collar    Orthosis Properties Date Obtained: 04/03/25 Time Obtained: 0825 Location: Neck Type: Cervical Collar Features: Soft Description: D/C [place] J collar. If patient desires a he can wear a soft collar as needed for comfort.       Sit to Stand Transfer    [place], Sit to Stand Transfer supervision     Safety/Cues increased time to complete     Assistive Device walker, front-wheeled     Comment from recliner and standard chair with arm rests        Stand to Sit Transfer    [place], Stand to Sit Transfer supervision     Safety/Cues increased time to complete     Assistive Device walker, front-wheeled     Comment to recliner and standard chair with arm rests        Stand Pivot Transfer    [place], Stand Pivot/Stand Step Transfer supervision     Safety/Cues increased time to complete     Assistive Device walker, front-wheeled     Comment via amb approach        Gait Training    [place], Gait supervision     Assistive Device walker, front-wheeled     Distance in Feet 200 feet     Pattern step-through     Deviations/Abnormal Patterns gait speed decreased;step length decreased     Comment around halls on [facility] 100'x1 and 200'x1        Daily Progress Summary (PT)    Daily Outcome Statement Pt agreeable to make up missed time from earlier OT session. Able to tolerate walking around [facility] with RW for roughly 10 mins at S level. Continue with POC.                                IRF PT Goals      Flowsheet Row Most Recent Value   Bed Mobility Goal 1    Activity/Assistive Device sit to supine/supine to sit at 04/02/2025 0904   [place] supervision required at 04/02/2025 0904   Time Frame short-term goal (STG), 5 - 7 days at 04/10/2025 0805   Strategies/Barriers spinal precautions, strength,  whole practice training, therex at 04/10/2025 0805   Progress/Outcome goal ongoing at 04/10/2025 0805   Bed Mobility Goal 2    Activity/Assistive Device bed mobility activities, all at 04/02/2025 0904   [place] modified independence at 04/02/2025 0904   Time Frame long-term goal (LTG), 14 days or less, by discharge at 04/10/2025 0805   Progress/Outcome goal ongoing at 04/10/2025 0805   Transfer Goal 1    Activity/Assistive Device sit-to-stand/stand-to-sit, stand pivot at 04/02/2025 0904   [place] supervision required at 04/10/2025 0805   Time Frame short-term goal (STG), 5 - 7 days at 04/10/2025 0805   Strategies/Barriers evaluated 4/2, conitnue with PT POC at 04/03/2025 0856   Progress/Outcome goal met, goal revised this date at 04/10/2025 0805   Transfer Goal 2    Activity/Assistive Device sit-to-stand/stand-to-sit, stand pivot at 04/02/2025 0904   [place] modified independence at 04/02/2025 0904   Time Frame long-term goal (LTG), 1 week at 04/10/2025 0805   Progress/Outcome goal ongoing at 04/10/2025 0805   Gait/Walking Locomotion Goal 1    Activity/Assistive Device gait (walking locomotion), assistive device use at 04/02/2025 0904   Distance 150 feet at 04/02/2025 0904   [place] minimum assist (75% or more patient effort) at 04/02/2025 0904   Time Frame short-term goal (STG), 5 - 7 days at 04/10/2025 0805   Strategies/Barriers endurance, fatigue,  therex, endurance at 04/10/2025 0805   Progress/Outcome goal ongoing, goal partially met, continuing progress toward goal at 04/10/2025 0805   Gait/Walking Locomotion Goal 2    Activity/Assistive Device gait (walking  locomotion), assistive device use at 04/02/2025 0904   Distance 150 feet at 04/02/2025 0904   [place] modified independence at 04/02/2025 0904   Time Frame long-term goal (LTG), 1 week at 04/10/2025 0805   Progress/Outcome goal ongoing at 04/10/2025 0805   Stairs Goal 1    Activity/Assistive Device ascending stairs, descending stairs, using handrail, left, using handrail, right at 04/02/2025 0904   Number of Stairs 12 at 04/02/2025 0904   [place] minimum assist (75% or more patient effort) at 04/02/2025 0904   Time Frame short-term goal (STG), 5 - 7 days at 04/03/2025 0856   Strategies/Barriers evaluated 4/2, continue PT POC at 04/03/2025 0856   Progress/Outcome goal met at 04/10/2025 0805   Stairs Goal 2    Activity/Assistive Device ascending stairs, descending stairs, using handrail, left, using handrail, right at 04/02/2025 0904   Number of Stairs 12 at 04/02/2025 0904   [place] supervision required at 04/02/2025 0904   Time Frame long-term goal (LTG), 1 week at 04/10/2025 0805   Progress/Outcome goal ongoing, good progress toward goal at 04/10/2025 0805

## 2025-04-15 ENCOUNTER — APPOINTMENT (OUTPATIENT)
Dept: OTHER | Facility: REHABILITATION | Age: 70
End: 2025-04-15
Payer: MEDICARE

## 2025-04-15 ENCOUNTER — APPOINTMENT (INPATIENT)
Dept: OCCUPATIONAL THERAPY | Facility: REHABILITATION | Age: 70
DRG: 559 | End: 2025-04-15
Payer: MEDICARE

## 2025-04-15 ENCOUNTER — APPOINTMENT (INPATIENT)
Dept: PHYSICAL THERAPY | Facility: REHABILITATION | Age: 70
DRG: 559 | End: 2025-04-15
Payer: MEDICARE

## 2025-04-15 PROCEDURE — 63700000 HC SELF-ADMINISTRABLE DRUG: Performed by: PHYSICAL MEDICINE & REHABILITATION

## 2025-04-15 PROCEDURE — 97530 THERAPEUTIC ACTIVITIES: CPT | Mod: GO

## 2025-04-15 PROCEDURE — 12800001 HC ROOM AND CARE SEMIPRIVATE REHAB-BRAIN INJ

## 2025-04-15 PROCEDURE — 63600000 HC DRUGS/DETAIL CODE: Mod: JZ | Performed by: INTERNAL MEDICINE

## 2025-04-15 PROCEDURE — 25800000 HC PHARMACY IV SOLUTIONS: Performed by: INTERNAL MEDICINE

## 2025-04-15 PROCEDURE — 97110 THERAPEUTIC EXERCISES: CPT | Mod: GP

## 2025-04-15 PROCEDURE — 97110 THERAPEUTIC EXERCISES: CPT | Mod: GO

## 2025-04-15 PROCEDURE — 63600000 HC DRUGS/DETAIL CODE: Mod: JZ | Performed by: PHYSICAL MEDICINE & REHABILITATION

## 2025-04-15 PROCEDURE — 97535 SELF CARE MNGMENT TRAINING: CPT | Mod: GO

## 2025-04-15 PROCEDURE — 63700000 HC SELF-ADMINISTRABLE DRUG: Performed by: INTERNAL MEDICINE

## 2025-04-15 PROCEDURE — 97116 GAIT TRAINING THERAPY: CPT | Mod: GP

## 2025-04-15 RX ORDER — ZOLPIDEM TARTRATE 5 MG/1
5 TABLET ORAL NIGHTLY
Status: COMPLETED | OUTPATIENT
Start: 2025-04-15 | End: 2025-04-17

## 2025-04-15 RX ADMIN — DOCUSATE SODIUM 100 MG: 50 LIQUID ORAL at 16:07

## 2025-04-15 RX ADMIN — FLUOXETINE HYDROCHLORIDE 20 MG: 20 SOLUTION ORAL at 08:14

## 2025-04-15 RX ADMIN — EZETIMIBE 10 MG: 10 TABLET ORAL at 21:22

## 2025-04-15 RX ADMIN — LINACLOTIDE 145 MCG: 145 CAPSULE, GELATIN COATED ORAL at 16:07

## 2025-04-15 RX ADMIN — CEFTRIAXONE SODIUM 2 G: 2 INJECTION, POWDER, FOR SOLUTION INTRAMUSCULAR; INTRAVENOUS at 05:33

## 2025-04-15 RX ADMIN — HYDROMORPHONE HYDROCHLORIDE 2 MG: 2 TABLET ORAL at 00:29

## 2025-04-15 RX ADMIN — SENNOSIDES 1 TABLET: 8.6 TABLET, FILM COATED ORAL at 08:13

## 2025-04-15 RX ADMIN — Medication: at 08:22

## 2025-04-15 RX ADMIN — SENNOSIDES 2 TABLET: 8.6 TABLET, FILM COATED ORAL at 16:07

## 2025-04-15 RX ADMIN — HYDROMORPHONE HYDROCHLORIDE 2 MG: 2 TABLET ORAL at 06:15

## 2025-04-15 RX ADMIN — Medication 10 ML: at 21:35

## 2025-04-15 RX ADMIN — SIMETHICONE 160 MG: 80 TABLET, CHEWABLE ORAL at 16:07

## 2025-04-15 RX ADMIN — LANSOPRAZOLE 15 MG: KIT at 06:15

## 2025-04-15 RX ADMIN — LEVOTHYROXINE SODIUM 150 MCG: 150 TABLET ORAL at 05:40

## 2025-04-15 RX ADMIN — ZOLPIDEM TARTRATE 5 MG: 5 TABLET, FILM COATED ORAL at 21:22

## 2025-04-15 RX ADMIN — LINACLOTIDE 145 MCG: 145 CAPSULE, GELATIN COATED ORAL at 16:08

## 2025-04-15 RX ADMIN — SODIUM CHLORIDE 1 G: 1 TABLET ORAL at 08:13

## 2025-04-15 RX ADMIN — Medication 10 ML: at 16:08

## 2025-04-15 RX ADMIN — DOCUSATE SODIUM 100 MG: 50 LIQUID ORAL at 08:13

## 2025-04-15 RX ADMIN — FAMOTIDINE 20 MG: 20 TABLET, FILM COATED ORAL at 21:22

## 2025-04-15 RX ADMIN — SENNOSIDES 2 TABLET: 8.6 TABLET, FILM COATED ORAL at 08:13

## 2025-04-15 RX ADMIN — Medication 10 ML: at 05:49

## 2025-04-15 RX ADMIN — ACETAMINOPHEN 650 MG: 650 SOLUTION ORAL at 19:56

## 2025-04-15 RX ADMIN — LIOTHYRONINE SODIUM 10 MCG: 5 TABLET ORAL at 05:40

## 2025-04-15 RX ADMIN — ENOXAPARIN SODIUM 40 MG: 40 INJECTION SUBCUTANEOUS at 17:41

## 2025-04-15 RX ADMIN — HYDROMORPHONE HYDROCHLORIDE 2 MG: 2 TABLET ORAL at 12:01

## 2025-04-15 RX ADMIN — SIMETHICONE 160 MG: 80 TABLET, CHEWABLE ORAL at 08:13

## 2025-04-15 RX ADMIN — ONDANSETRON 4 MG: 4 TABLET, ORALLY DISINTEGRATING ORAL at 19:48

## 2025-04-15 RX ADMIN — HYDROMORPHONE HYDROCHLORIDE 2 MG: 2 TABLET ORAL at 19:08

## 2025-04-15 RX ADMIN — SIMETHICONE 160 MG: 80 TABLET, CHEWABLE ORAL at 21:22

## 2025-04-15 ASSESSMENT — PATIENT HEALTH QUESTIONNAIRE - PHQ9
SUM OF ALL RESPONSES TO PHQ QUESTIONS 1-9: 0
SUM OF ALL RESPONSES TO PHQ9 QUESTIONS 1 & 2: 0

## 2025-04-15 NOTE — PROGRESS NOTES
Patient: Celso Gramajo  Location: [facility] [address]  MRN: [id number]  Today's date: 4/15/2025    History of Present Illness  Celso is a 69 y.o. male admitted on 4/1/2025 with Spinal epidural abscess [G06.1]. Principal problem is Spinal epidural abscess.    Celso Gramajo is a 69 year old male with PMHx thyroid CA s/p thyroidectomy, RND, adjuvant XRT/immunotherapy, esophageal SCC s/p chemoradiation c/b esophageal strictures, HTN, Elevated Coronary Calcium scoring in 2017, ENMANUEL, Hypothyroidism, Anemia, dysphagia on PEG (placed in 2021) TFs, and history of CVA who presented to an OSH with neck and R shoulder pain. Workup was unrevealing and he was discharged home.    He presented to the [place] ED again on 3/17 with ongoing fevers. CT Neck with no abscess, old postsurgical changes. He d/c home but BCx subsequently returned positive on 3/19 and he returned for admission. He was also bacteremic with strep viridans, and placed on IV antibiotics. Workup revealed a C5-C7 ventral epidural abscess with osteomyelitis, and he was transferred to [facility] for surgical evaluation.    MRI on 3/24 c/f metastatic disease at T1-T4, L4.      At [facility] ortho and ENT were consulted. Ultimately it was determined that an anterior surgical approach was not safe (due to history of neck surgeries), and patient was having progressive weakness. Repeat MRI C-spine showed Discitis and osteomyelitis at C6-C7 with ventral epidural abscess compressing the spinal cord and increased edema. Patient urgently underwent POSTERIOR C4-T1 DECOMPRESSION FUSION WITH [place] INSTRUMENTATION on 3/27. Postop he was briefly in the ICU for MAP goals. OR cultures grew strep intermedius and he was placed on the antibiotic plan below. He had a PICC placed 4/1. 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 He was started on salt tabs. In rehab please continue to check labs, and titrate the salt tabs as  appropriate. He worked with PT/OT who recommended him for acute rehab.     Past Medical History  Celso has a past medical history of Anemia, Cancer (CMS/HCC), head and neck radiation, Hypertension, Hypothyroidism, Stroke (CMS/HCC), and Thyroid disease.    PT Vitals      Date/Time Pulse HR Source BP BP Location BP Method Pt Position [facility]   04/15/25 0858 94 Monitor 108/68 Left upper arm Automatic Sitting JR   04/15/25 0905 99 Monitor 138/61 Left upper arm Automatic Sitting JR          PT Pain      Date/Time Pain Type Side/Orientation Location Rating: Rest Interventions [facility]   04/15/25 0858 Pain Assessment bilateral neck 4 prescribed exercises encouraged JR               Prior Living Environment      Flowsheet Row Most Recent Value   People in Home spouse   Current Living Arrangements home   Home Accessibility stairs to enter home (Group), stairs within home (Group)   Living Environment Comment Multi-story home with 12-14 steps to second floor, powder room on 1st floor, 2-3 FELICITA via garage, multiple supportive family members   Number of Stairs, Main Entrance 3   Stair Railings, Main Entrance none   Location, Patient Bedroom second floor, must negotiate stairs to access   Location, Bathroom second floor, must negotiate stairs to access   Bathroom Access Comment powder room 1st floor standard height toilet, full bathroom 2nd floor WIS with glass door, standard height toilet   Stairs, Within Home, Primary 12   Stair Railings, Within Home, Primary railings on both sides of stairs            Prior Level of Function      Flowsheet Row Most Recent Value   Dominant Hand right   Ambulation independent   Transferring independent   Toileting independent   Bathing independent   Dressing independent   Eating independent   IADLs independent   Driving/Transportation    Communication understands/communicates without difficulty   Past History of Dysphagia PTA pt was completely independent, no AD. (+) . NPO PTA,  manages peg tube independently.   Prior Level of Function Comment Independent without use of AE or DME.   Assistive Device Currently Used at Home none             IRF PT Evaluation and Treatment - 04/15/25 0859          PT Time Calculation    Start Time 0858     Stop Time 0958     Time Calculation (min) 60 min        Session Details    Document Type Daily Treatment/Progress Note        Mobility Belt    Mobility Belt Used During Session yes        Orthosis Neck Cervical Collar    Orthosis Properties Date Obtained: 04/03/25 Time Obtained: 0825 Location: Neck Type: Cervical Collar Features: Soft Description: D/C [place] J collar. If patient desires a he can wear a soft collar as needed for comfort.       Sit to Stand Transfer    [place], Sit to Stand Transfer modified independence     Assistive Device walker, front-wheeled     Comment from         Stand to Sit Transfer    [place], Stand to Sit Transfer modified independence     Assistive Device walker, front-wheeled     Comment to         Stand Pivot Transfer    [place], Stand Pivot/Stand Step Transfer modified independence     Assistive Device walker, front-wheeled     Comment via amb approach        Gait Training    [place], Gait modified independence     Assistive Device walker, front-wheeled     Distance in Feet 200 feet     Pattern step-through     Deviations/Abnormal Patterns base of support, wide;jean decreased;gait speed decreased;step length decreased   trunk sway    Bilateral Gait Deviations heel strike decreased     Comment mild trunk sway however no LOB, VC for heel strike     [place] (Gait Trial 2) minimum assist (75% or more patient effort);touching/steadying assist     Safety/Cues (Gait Trial 2) sequencing     Assistive Device (Gait Trial 2) cane, straight     Distance in Feet (Gait Trial 2) 200 feet     Comment (Gait Trial 2) steadying -occ min A for balance/LOB recovery, VC for sequencing w/ improved fluidity     Advanced  Gait Activity sloped surfaces        Rough/Uneven Surface Gait Skills    [place] modified independence     Assistive Device walker, front-wheeled     Distance in Feet 100 feet     Comment over flat varied surface mod I w/ appropriate RW mgmt        Sloped Surface Gait Skills    [place] close supervision     Assistive Device walker, front-wheeled     Distance in Feet 200 feet     Comment over outdoor patio , CL S d/t decr eccentric control        Stairs Training    [place], Stairs supervision     Safety/Cues sequencing     Assistive Device railing;cane, straight     Handrail Location (Stairs) both sides;right side (ascending);right side (descending)     Number of Stairs 12     Stair Height 6 inches     Ascending Stairs Technique step-to-step     Descending Stairs Technique step-to-step     Comment 4 steps w/ BL HR, 8 steps w/ R HR + L SPC; requires S d/t decr confidence and unsteadiness        Lower Extremity (Therapeutic Exercise)    Comment HEP provided and reviewed + practiced 10-15 reps of each: standing heel raises, hip abd and ext, side stepping along half wall; STS form WC w/ RW 5x        Daily Progress Summary (PT)    Daily Outcome Statement Pt progressed to mod I w/ transfers and ambulation using RW. Tolerated session w/ stable pain level, but incr fatigue after longer ambulation trial over varied surface. Trialed SPC but rec RW for DC, pt verbalizes understanding. Issued and reviewed HEP.                          Education Documentation  Instructions, taught by Dianne Mercedes, PT at 4/15/2025  9:55 AM.  Learner: Patient  Readiness: Acceptance  Method: Explanation, Handout  Response: Verbalizes Understanding, Demonstrated Understanding  Comment: HEP provided, reviewed and practiced.    Purpose and Goal, taught by Dianne Mercedes, PT at 4/15/2025  9:55 AM.  Learner: Patient  Readiness: Acceptance  Method: Explanation, Handout  Response: Verbalizes Understanding, Demonstrated  Understanding  Comment: HEP provided, reviewed and practiced.          IRF PT Goals      Flowsheet Row Most Recent Value   Bed Mobility Goal 1    Activity/Assistive Device sit to supine/supine to sit at 04/02/2025 0904   [place] supervision required at 04/02/2025 0904   Time Frame short-term goal (STG), 5 - 7 days at 04/10/2025 0805   Strategies/Barriers spinal precautions, strength,  whole practice training, therex at 04/10/2025 0805   Progress/Outcome goal ongoing at 04/10/2025 0805   Bed Mobility Goal 2    Activity/Assistive Device bed mobility activities, all at 04/02/2025 0904   [place] modified independence at 04/02/2025 0904   Time Frame long-term goal (LTG), 14 days or less, by discharge at 04/10/2025 0805   Progress/Outcome goal ongoing at 04/10/2025 0805   Transfer Goal 1    Activity/Assistive Device sit-to-stand/stand-to-sit, stand pivot at 04/02/2025 0904   [place] supervision required at 04/10/2025 0805   Time Frame short-term goal (STG), 5 - 7 days at 04/10/2025 0805   Strategies/Barriers evaluated 4/2, conitnue with PT POC at 04/03/2025 0856   Progress/Outcome goal met, goal revised this date at 04/10/2025 0805   Transfer Goal 2    Activity/Assistive Device sit-to-stand/stand-to-sit, stand pivot at 04/02/2025 0904   [place] modified independence at 04/02/2025 0904   Time Frame long-term goal (LTG), 1 week at 04/10/2025 0805   Progress/Outcome goal ongoing at 04/10/2025 0805   Gait/Walking Locomotion Goal 1    Activity/Assistive Device gait (walking locomotion), assistive device use at 04/02/2025 0904   Distance 150 feet at 04/02/2025 0904   [place] minimum assist (75% or more patient effort) at 04/02/2025 0904   Time Frame short-term goal (STG), 5 - 7 days at 04/10/2025 0805   Strategies/Barriers endurance, fatigue,  therex, endurance at 04/10/2025 0805   Progress/Outcome goal ongoing, goal partially met, continuing progress toward goal at 04/10/2025 0805   Gait/Walking  Locomotion Goal 2    Activity/Assistive Device gait (walking locomotion), assistive device use at 04/02/2025 0904   Distance 150 feet at 04/02/2025 0904   [place] modified independence at 04/02/2025 0904   Time Frame long-term goal (LTG), 1 week at 04/10/2025 0805   Progress/Outcome goal ongoing at 04/10/2025 0805   Stairs Goal 1    Activity/Assistive Device ascending stairs, descending stairs, using handrail, left, using handrail, right at 04/02/2025 0904   Number of Stairs 12 at 04/02/2025 0904   [place] minimum assist (75% or more patient effort) at 04/02/2025 0904   Time Frame short-term goal (STG), 5 - 7 days at 04/03/2025 0856   Strategies/Barriers evaluated 4/2, continue PT POC at 04/03/2025 0856   Progress/Outcome goal met at 04/10/2025 0805   Stairs Goal 2    Activity/Assistive Device ascending stairs, descending stairs, using handrail, left, using handrail, right at 04/02/2025 0904   Number of Stairs 12 at 04/02/2025 0904   [place] supervision required at 04/02/2025 0904   Time Frame long-term goal (LTG), 1 week at 04/10/2025 0805   Progress/Outcome goal ongoing, good progress toward goal at 04/10/2025 0805

## 2025-04-15 NOTE — PROGRESS NOTES
Patient: Celso Gramajo  Location: [facility] [address]  MRN: [id number]  Today's date: 4/15/2025    History of Present Illness  Celso is a 69 y.o. male admitted on 4/1/2025 with Spinal epidural abscess [G06.1]. Principal problem is Spinal epidural abscess.    Celso Gramajo is a 69 year old male with PMHx thyroid CA s/p thyroidectomy, RND, adjuvant XRT/immunotherapy, esophageal SCC s/p chemoradiation c/b esophageal strictures, HTN, Elevated Coronary Calcium scoring in 2017, ENMANUEL, Hypothyroidism, Anemia, dysphagia on PEG (placed in 2021) TFs, and history of CVA who presented to an OSH with neck and R shoulder pain. Workup was unrevealing and he was discharged home.    He presented to the [place] ED again on 3/17 with ongoing fevers. CT Neck with no abscess, old postsurgical changes. He d/c home but BCx subsequently returned positive on 3/19 and he returned for admission. He was also bacteremic with strep viridans, and placed on IV antibiotics. Workup revealed a C5-C7 ventral epidural abscess with osteomyelitis, and he was transferred to [facility] for surgical evaluation.    MRI on 3/24 c/f metastatic disease at T1-T4, L4.      At [facility] ortho and ENT were consulted. Ultimately it was determined that an anterior surgical approach was not safe (due to history of neck surgeries), and patient was having progressive weakness. Repeat MRI C-spine showed Discitis and osteomyelitis at C6-C7 with ventral epidural abscess compressing the spinal cord and increased edema. Patient urgently underwent POSTERIOR C4-T1 DECOMPRESSION FUSION WITH [place] INSTRUMENTATION on 3/27. Postop he was briefly in the ICU for MAP goals. OR cultures grew strep intermedius and he was placed on the antibiotic plan below. He had a PICC placed 4/1. 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 He was started on salt tabs. In rehab please continue to check labs, and titrate the salt tabs as  appropriate. He worked with PT/OT who recommended him for acute rehab.     Past Medical History  Celso has a past medical history of Anemia, Cancer (CMS/HCC), head and neck radiation, Hypertension, Hypothyroidism, Stroke (CMS/HCC), and Thyroid disease.    OT Vitals      Date/Time Pulse HR Source BP BP Location BP Method Pt Position [facility]   04/15/25 0857 94 Monitor 108/68 Left upper arm Automatic Sitting [facility]   04/15/25 0858 94 Monitor 108/68 Left upper arm Automatic Sitting JR          OT Pain      Date/Time Pain Type Side/Orientation Location Rating: Rest Rating: Activity Interventions [facility]   04/15/25 0831 Pain Assessment neck -- 5 -- -- [facility]   04/15/25 0857 Pain Reassessment;Post Activity neck -- -- 5 -- [facility]   04/15/25 0858 Pain Assessment neck bilateral 4 -- prescribed exercises encouraged JR               Prior Living Environment      Flowsheet Row Most Recent Value   People in Home spouse   Current Living Arrangements home   Home Accessibility stairs to enter home (Group), stairs within home (Group)   Living Environment Comment Multi-story home with 12-14 steps to second floor, powder room on 1st floor, 2-3 FELICITA via garage, multiple supportive family members   Number of Stairs, Main Entrance 3   Stair Railings, Main Entrance none   Location, Patient Bedroom second floor, must negotiate stairs to access   Location, Bathroom second floor, must negotiate stairs to access   Bathroom Access Comment powder room 1st floor standard height toilet, full bathroom 2nd floor WIS with glass door, standard height toilet   Stairs, Within Home, Primary 12   Stair Railings, Within Home, Primary railings on both sides of stairs            Prior Level of Function      Flowsheet Row Most Recent Value   Dominant Hand right   Ambulation independent   Transferring independent   Toileting independent   Bathing independent   Dressing independent   Eating independent   IADLs independent   Driving/Transportation     Communication understands/communicates without difficulty   Past History of Dysphagia PTA pt was completely independent, no AD. (+) . NPO PTA, manages peg tube independently.   Prior Level of Function Comment Independent without use of AE or DME.   Assistive Device Currently Used at Home none            Occupational Profile      Flowsheet Row Most Recent Value   Occupational History/Life Experiences retired involved in [employer] on [employer]. + , -handicap driving placard.   Patient Goals PSFS: going to the bathroom 0/10, walking 1/10, getting in/out of bed 0/10 = 1/30 = 3.33%             IRF OT Evaluation and Treatment - 04/15/25 0829          OT Time Calculation    Start Time 0828     Stop Time 0858     Time Calculation (min) 30 min        Session Details    Document Type Daily Treatment/Progress Note        General Information    General Observations of Patient Direct hand off from nursing        Mobility Belt    Mobility Belt Used During Session yes        Orthotics    Orthotics (Trigger Row) Orthosis trial     Orthosis Trial edema glove, pt able to don        Orthosis Neck Cervical Collar    Orthosis Properties Date Obtained: 04/03/25 Time Obtained: 0825 Location: Neck Type: Cervical Collar Features: Soft Description: D/C [place] J collar. If patient desires a he can wear a soft collar as needed for comfort.    Compliance/Wearing Issues patient;compliant with wearing schedule        Sit to Stand Transfer    Todd, Sit to Stand Transfer supervision     Safety/Cues technique     Assistive Device walker, front-wheeled     Comment from recliner        Stand to Sit Transfer    Todd, Stand to Sit Transfer supervision     Safety/Cues technique     Assistive Device walker, front-wheeled     Comment to w/c        Stand Pivot Transfer    Todd, Stand Pivot/Stand Step Transfer supervision     Safety/Cues technique     Assistive Device walker, front-wheeled     Comment side stepping with RW  from recliner to w/c        Upper Extremity (Therapeutic Exercise)    Comment general review of UE HEP, completed hands and initiated shoulders, plan to complete next session        Grooming    Tasks oral care (brushing teeth, cleaning dentures);shaves face     [place] modified independence     Adaptive Equipment other (see comments)   standard tooth brush, swish ans spit with small amount of water without swallowing    [place], Oral Hygiene modified independence     Comment able to expel into kidney dish and emesis bag, declined mouthwash, completed shaving with electric razor        Daily Progress Summary (OT)    Daily Outcome Statement Completion of ADL for oral care and shaving.  Pt able to don own edema glove.  Review of HEP initiated, to complete next session.                          Education Documentation  Strengthening Exercise, taught by Emily Fernandez, OT at 4/15/2025  9:10 AM.  Learner: Patient  Readiness: Acceptance  Method: Explanation  Response: Verbalizes Understanding, Demonstrated Understanding          IRF OT Goals      Flowsheet Row Most Recent Value   Transfer Goal 1    Activity/Assistive Device toilet at 04/02/2025 0723   [place] supervision required at 04/09/2025 0949   Time Frame short-term goal (STG), 5 - 7 days at 04/09/2025 0949   Strategies/Barriers pt just evlauated at 04/03/2025 0754   Progress/Outcome good progress toward goal, goal met, goal revised this date, goal ongoing at 04/09/2025 0949   Transfer Goal 2    Activity/Assistive Device toilet at 04/02/2025 0723   [place] modified independence at 04/02/2025 0723   Time Frame long-term goal (LTG), 21 days or less at 04/02/2025 0723   Progress/Outcome continuing progress toward goal, goal ongoing at 04/09/2025 0949   Transfer Goal 3    Activity/Assistive Device shower at 04/02/2025 0723   [place] supervision required at 04/09/2025 0949   Time Frame short-term goal (STG), 5 - 7 days at 04/09/2025 0949    Strategies/Barriers pt just evlauated at 04/03/2025 0754   Progress/Outcome good progress toward goal, goal met, goal revised this date, goal ongoing at 04/09/2025 0949   Transfer Goal 4    Activity/Assistive Device shower at 04/02/2025 0723   [place] supervision required at 04/09/2025 0949   Time Frame long-term goal (LTG), 14 days or less at 04/09/2025 0949   Progress/Outcome goal met, goal revised this date, goal ongoing at 04/09/2025 0949   Bathing Goal 1    [place] minimum assist (75% or more patient effort) at 04/09/2025 0949   Time Frame short-term goal (STG), 5 - 7 days at 04/09/2025 0949   Strategies/Barriers pt just evlauated at 04/03/2025 0754   Progress/Outcome good progress toward goal, goal met, goal revised this date, goal ongoing at 04/09/2025 0949   Bathing Goal 2    [place] supervision required at 04/02/2025 0723   Time Frame long-term goal (LTG), 21 days or less at 04/02/2025 0723   Progress/Outcome continuing progress toward goal, goal ongoing at 04/09/2025 0949   UB Dressing Goal 1    [place] supervision required at 04/09/2025 0949   Time Frame short-term goal (STG), 5 - 7 days at 04/09/2025 0949   Strategies/Barriers pt just evlauated at 04/03/2025 0754   Progress/Outcome good progress toward goal, goal met, goal revised this date, goal ongoing at 04/09/2025 0949   UB Dressing Goal 2    [place] modified independence at 04/09/2025 0949   Time Frame long-term goal (LTG), 14 days or less at 04/09/2025 0949   Progress/Outcome goal met, goal revised this date, goal ongoing at 04/09/2025 0949   LB Dressing Goal 1    [place] supervision required at 04/09/2025 0949   Time Frame short-term goal (STG), 5 - 7 days at 04/09/2025 0949   Strategies/Barriers pt just evlauated at 04/03/2025 0754   Progress/Outcome good progress toward goal, goal met, goal revised this date, goal ongoing at 04/09/2025 0949   LB Dressing Goal 2    [place] modified independence at 04/02/2025  0723   Time Frame long-term goal (LTG), 21 days or less at 04/02/2025 0723   Progress/Outcome continuing progress toward goal, goal ongoing at 04/09/2025 0949   Grooming Goal 1    [place] set-up required at 04/02/2025 0723   Time Frame short-term goal (STG), 5 - 7 days at 04/09/2025 0949   Strategies/Barriers progressing to standing v seated,  continue to work on standing balance to maximize IND at 04/09/2025 0949   Progress/Outcome progress slower than expected, goal revised this date, goal ongoing at 04/09/2025 0949   Grooming Goal 2    [place] modified independence at 04/02/2025 0723   Time Frame long-term goal (LTG), 21 days or less at 04/02/2025 0723   Progress/Outcome continuing progress toward goal, goal ongoing at 04/09/2025 0949   Toileting Goal 1    [place] supervision required at 04/09/2025 0949   Time Frame short-term goal (STG), 5 - 7 days at 04/09/2025 0949   Strategies/Barriers pt just evlauated at 04/03/2025 0754   Progress/Outcome good progress toward goal, goal met, goal revised this date, goal ongoing at 04/09/2025 0949   Toileting Goal 2    [place] modified independence at 04/02/2025 0723   Time Frame long-term goal (LTG), 21 days or less at 04/02/2025 0723   Progress/Outcome continuing progress toward goal, goal ongoing at 04/09/2025 0949

## 2025-04-15 NOTE — NURSING NOTE
"Patient returned from neurosurgery followup appt. Sutures removed at appt. MD made recommendation to transition to tramadol for pain management - pt anxious, requested RN to call office to ask when this transition should take place due to increased pain. RN placed call at 1620.     Patient also stated that \"the van ride was too bumpy\" to administer 1430 feed and that \"I would throw up if I had tried\". 1430 feed to start at 1640 due to pt request linzess at 1608 when returned from appt for bowels, which is to be given 30 mins prior to food.   "

## 2025-04-15 NOTE — DISCHARGE INSTR - ACTIVITY
Occupational Therapy::     Toilet Transfers: Modified independent for Celso to sit/stand from a toilet + toilet safety frame and rolling walker.     Shower/Tub Transfers: Recommend supervision for Celso to step into/out of shower stall. Recommend side stepping into shower with arm on rolling walker and on wall to brace self. Then Celso is able to turn to sit down on shower chair. Can wait for home health OT to be present to assess to increase confidence when performing.     Upper Body Dressing: Modified independent for Celso to put on/take off a shirt.     Lower Body Dressing: Modified independent for Celso to put on pants, underwear, shoes and socks. Please perform in figure 4 technique (bringing a foot onto the opposite knee) in order to maintain precautions.     Bathing: Minimum assistance for Celso to perform bathing seated on shower chair with use of hand held shower stall.     Toileting: Modified independent for Celso to complete toileting tasks.     Grooming: Modified independent for Celso to complete grooming tasks. Can complete seated in front of sink if too fatigued.     Household Mobility/Household Activity: Celso is able to perform household mobility with rolling walker short household distances at a modified independent level. Can require up to steadying assist with fatigue and feeling shaky. Recommend placing a tote bag/basket on front of walker, use of reacher to  items from floor level. Will need assistance with complex chores such as cleaning.     Driving: Driving is unsafe and not recommended at this time. Consult your physician for approval before resuming driving. Temporary Handicap Placard provided.     Upper Extremity Management: Range of motion grossly within functional limits. Mild swelling in left hand, has an isotoner glove to wear as needed.  Outcome measures as follows from 4/15/25  Right hand:    strength- 43.33 pounds  Key pinch - 12 pounds  Box and Blocks - 45 blocks  9 Hole Peg Test  - 25 seconds    Left hand:    Strength - 42.33 pounds  Key pinch - 10 pounds  Box and Blocks - 40 blocks  9 Hole Peg Test - 36.71 seconds    Vision: Wears corrective lenses.     Entered by: Lorena Stinson MS, OTR/L on 4/16/25      Physical Therapy:      Bed mobility: Celso is able to get in and out of bed independently.      Transfers: Celso is able to stand and sit using a rolling walker independently. It is recommended that he use rolling walker at all times. Celso may benefit from reminders to move slowly when he is not feeling well.     Ambulation: Celso is able to walk household and short community distances with rolling walker independently. He benefits from caregiver steadying assistance when not feeling well, when feeling unsteady, or over uneven.rough surfaces. It is recommended that he use rolling walker at all times. Please encourage Celso to walk as tolerated on the days he does not feel well and increase walking duration/distance when he does feel well.     Elevations: Celso is able to go up and down 12 steps with one hand rail and single point cane with supervision from a caregiver. He should go one step at a time.      Entered by: Dianne Mercedes, PT, DPT on: 4/16/25       Additional:     Weight-Bearing Status: weight bearing as tolerated all limbs.      Precautions: Continue to follow spinal precautions until explicitly cleared by your surgeon: no bending, no twisting and no lifting >5 pounds.      Braces/Prosthesis: Celso can wear his soft cervicalcollar for comfort as needed.     Durable Medical Equipment:  Patient was issued a rolling walker and a hospital bed from [employer] ([facility]). Please call [phone] with any issues or questions.  Recommend toilet safety frame and shower chair (wife reports she purchased).      Home Exercise Program: Please refer to the program provided and complete daily or every other day as tolerated.

## 2025-04-15 NOTE — NURSING NOTE
RN present during telehealth appt per wife request. ID MD (Dr Joy) stated antibiotics to continue for 3 more weeks (to complete 6 weeks), due to complete May 8. ID MD will send message to outpatient infusion for followup and instructions as well. RN gave MD [facility] fax number for AVS/consult sheet for records. f/u with ID again around May 8 with outpatient RN for PICC removal and labs.

## 2025-04-15 NOTE — PROGRESS NOTES
Patient: Celso Gramajo  Location: [facility] [address]  MRN: [id number]  Today's date: 4/15/2025    History of Present Illness  Celso is a 69 y.o. male admitted on 4/1/2025 with Spinal epidural abscess [G06.1]. Principal problem is Spinal epidural abscess.    Celso Gramajo is a 69 year old male with PMHx thyroid CA s/p thyroidectomy, RND, adjuvant XRT/immunotherapy, esophageal SCC s/p chemoradiation c/b esophageal strictures, HTN, Elevated Coronary Calcium scoring in 2017, ENMANUEL, Hypothyroidism, Anemia, dysphagia on PEG (placed in 2021) TFs, and history of CVA who presented to an OSH with neck and R shoulder pain. Workup was unrevealing and he was discharged home.    He presented to the [facility] ED again on 3/17 with ongoing fevers. CT Neck with no abscess, old postsurgical changes. He d/c home but BCx subsequently returned positive on 3/19 and he returned for admission. He was also bacteremic with strep viridans, and placed on IV antibiotics. Workup revealed a C5-C7 ventral epidural abscess with osteomyelitis, and he was transferred to [facility] for surgical evaluation.    MRI on 3/24 c/f metastatic disease at T1-T4, L4.      At [facility] ortho and ENT were consulted. Ultimately it was determined that an anterior surgical approach was not safe (due to history of neck surgeries), and patient was having progressive weakness. Repeat MRI C-spine showed Discitis and osteomyelitis at C6-C7 with ventral epidural abscess compressing the spinal cord and increased edema. Patient urgently underwent POSTERIOR C4-T1 DECOMPRESSION FUSION WITH [place] INSTRUMENTATION on 3/27. Postop he was briefly in the ICU for MAP goals. OR cultures grew strep intermedius and he was placed on the antibiotic plan below. He had a PICC placed 4/1. 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 He was started on salt tabs. In rehab please continue to check labs, and titrate the salt tabs as  appropriate. He worked with PT/OT who recommended him for acute rehab.     Past Medical History  Celso has a past medical history of Anemia, Cancer (CMS/HCC), head and neck radiation, Hypertension, Hypothyroidism, Stroke (CMS/HCC), and Thyroid disease.           Prior Living Environment      Flowsheet Row Most Recent Value   People in Home spouse   Current Living Arrangements home   Home Accessibility stairs to enter home (Group), stairs within home (Group)   Living Environment Comment Multi-story home with 12-14 steps to second floor, powder room on 1st floor, 2-3 FELICITA via garage, multiple supportive family members   Number of Stairs, Main Entrance 3   Stair Railings, Main Entrance none   Location, Patient Bedroom second floor, must negotiate stairs to access   Location, Bathroom second floor, must negotiate stairs to access   Bathroom Access Comment powder room 1st floor standard height toilet, full bathroom 2nd floor WIS with glass door, standard height toilet   Stairs, Within Home, Primary 12   Stair Railings, Within Home, Primary railings on both sides of stairs            Prior Level of Function      Flowsheet Row Most Recent Value   Dominant Hand right   Ambulation independent   Transferring independent   Toileting independent   Bathing independent   Dressing independent   Eating independent   IADLs independent   Driving/Transportation    Communication understands/communicates without difficulty   Past History of Dysphagia PTA pt was completely independent, no AD. (+) . NPO PTA, manages peg tube independently.   Prior Level of Function Comment Independent without use of AE or DME.   Assistive Device Currently Used at Home none            Occupational Profile      Flowsheet Row Most Recent Value   Occupational History/Life Experiences retired involved in [employer] on [employer]. + , -handicap driving placard.   Patient Goals PSFS: going to the bathroom 0/10, walking 1/10, getting  in/out of bed 0/10 = 1/30 = 3.33%             IRF OT Evaluation and Treatment - 04/15/25 0959          OT Time Calculation    Start Time 0959     Stop Time 1029     Time Calculation (min) 30 min        Session Details    Document Type Daily Treatment/Progress Note        General Information    General Observations of Patient Continuation of prior session for HEP review and testing outcome measures        Mobility Belt    Mobility Belt Used During Session yes        Orthosis Neck Cervical Collar    Orthosis Properties Date Obtained: 04/03/25 Time Obtained: 0825 Location: Neck Type: Cervical Collar Features: Soft Description: D/C Knott J collar. If patient desires a he can wear a soft collar as needed for comfort.        Assessment    Right  - Trial 1 35     Right  - Trial 2 45     Right  - Trial 3 50     Right  - Average of 3 Trials 43.33     Right  - Pain with Testing No     Left  - Trial 1 45     Left  - Trial 2 40     Left  - Trial 3 42     Left  - Average of 3 Trials 42.33     Right Key Pinch Test - Trial 1 12     Right Key Pinch Test - Trial 2 12     Right Key Pinch Test - Trial 3 12     Right Rangel Pinch Test - Average of 3 Trials 12     Right  Key Pinch Pain with Testing No     Left Key Pinch Test - Trial 1 10     Left Key Pinch Test - Trial 2 10     Left Key Pinch Test - Trial 3 10     Left Key Pinch Test - Average of 3 Trials 10     Right Three Point Pinch Test - Trial 1 11     Right Three Point Pinch Test - Trial 2 10.5     Right Three Point Pinch Test - Trial 3 10.5     Right Three Point Pinch Test - Average of 3 Trials 10.67     Right  Three Point Pinch Pain with Testing No     Left Three Point Pinch Test - Trial 1 10     Left Three Point Pinch Test - Trial 2 9.5     Left Three Point Pinch Test - Trial 3 9.5     Left Three Point Pinch Test - Average of 3 Trials 9.67     Left  Three Point Pinch Pain with Testing No     Right Tip to Tip Pinch Test - Trial 1  9.5     Right Tip to Tip Pinch Test - Trial 2 9     Right Tip to Tip Pinch Test - Trial 3 9.5     Right Tip to Tip Pinch Test - Average of 3 Trials 9.33     Right  Tip to Tip Pinch Pain with Testing No     Left Tip to Tip Pinch Test - Trial 1 9     Left Tip to Tip Pinch Test - Trial 2 8.5     Left Tip to Tip Pinch Test - Trial 3 8.5     Left Tip to Tip Pinch Test - Average of 3 Trials 8.67     Left  Tip to Tip Pinch Pain with Testing No        Motor Skills    Results, 9 Hole Peg Test of Fine Motor Coordination L UE 36.71     R UE 25 (age related 21.23) improved from L 36.71 and R 25   Box and Blocks L UE 40   RUE  45 (68 age related norm, improved from L 33 R 39       Daily Progress Summary (OT)    Daily Outcome Statement Improvement noted with /pinch and motor coordination however remains below age matched gender norms.  Continue to encourage frequent intervention outside of therapy sessions with putty and hand exerciser.  Provided positive feedback for improvements noted.  Pt would benefit from ongoing intervention and encouragement to promote I.                          Education Documentation  Strengthening Exercise, taught by Emily Fernandez OT at 4/15/2025 10:01 AM.  Learner: Patient  Readiness: Acceptance  Method: Explanation  Response: Needs Reinforcement  Comment: Completed rest of shoulder therex, pt requested to write information on HEP for better understanding i.e. turn hands up/down in larger letters    Strengthening Exercise, taught by Emily Fernandez OT at 4/15/2025  9:10 AM.  Learner: Patient  Readiness: Acceptance  Method: Explanation  Response: Verbalizes Understanding, Demonstrated Understanding          IRF OT Goals      Flowsheet Row Most Recent Value   Transfer Goal 1    Activity/Assistive Device toilet at 04/02/2025 0723   [place] supervision required at 04/09/2025 0949   Time Frame short-term goal (STG), 5 - 7 days at 04/09/2025 0949   Strategies/Barriers pt just evlauated  at 04/03/2025 0754   Progress/Outcome good progress toward goal, goal met, goal revised this date, goal ongoing at 04/09/2025 0949   Transfer Goal 2    Activity/Assistive Device toilet at 04/02/2025 0723   [place] modified independence at 04/02/2025 0723   Time Frame long-term goal (LTG), 21 days or less at 04/02/2025 0723   Progress/Outcome continuing progress toward goal, goal ongoing at 04/09/2025 0949   Transfer Goal 3    Activity/Assistive Device shower at 04/02/2025 0723   [place] supervision required at 04/09/2025 0949   Time Frame short-term goal (STG), 5 - 7 days at 04/09/2025 0949   Strategies/Barriers pt just evlauated at 04/03/2025 0754   Progress/Outcome good progress toward goal, goal met, goal revised this date, goal ongoing at 04/09/2025 0949   Transfer Goal 4    Activity/Assistive Device shower at 04/02/2025 0723   [place] supervision required at 04/09/2025 0949   Time Frame long-term goal (LTG), 14 days or less at 04/09/2025 0949   Progress/Outcome goal met, goal revised this date, goal ongoing at 04/09/2025 0949   Bathing Goal 1    [place] minimum assist (75% or more patient effort) at 04/09/2025 0949   Time Frame short-term goal (STG), 5 - 7 days at 04/09/2025 0949   Strategies/Barriers pt just evlauated at 04/03/2025 0754   Progress/Outcome good progress toward goal, goal met, goal revised this date, goal ongoing at 04/09/2025 0949   Bathing Goal 2    [place] supervision required at 04/02/2025 0723   Time Frame long-term goal (LTG), 21 days or less at 04/02/2025 0723   Progress/Outcome continuing progress toward goal, goal ongoing at 04/09/2025 0949   UB Dressing Goal 1    [place] supervision required at 04/09/2025 0949   Time Frame short-term goal (STG), 5 - 7 days at 04/09/2025 0949   Strategies/Barriers pt just evlauated at 04/03/2025 0754   Progress/Outcome good progress toward goal, goal met, goal revised this date, goal ongoing at 04/09/2025 0949   UB Dressing  Goal 2    [place] modified independence at 04/09/2025 0949   Time Frame long-term goal (LTG), 14 days or less at 04/09/2025 0949   Progress/Outcome goal met, goal revised this date, goal ongoing at 04/09/2025 0949   LB Dressing Goal 1    [place] supervision required at 04/09/2025 0949   Time Frame short-term goal (STG), 5 - 7 days at 04/09/2025 0949   Strategies/Barriers pt just evlauated at 04/03/2025 0754   Progress/Outcome good progress toward goal, goal met, goal revised this date, goal ongoing at 04/09/2025 0949   LB Dressing Goal 2    [place] modified independence at 04/02/2025 0723   Time Frame long-term goal (LTG), 21 days or less at 04/02/2025 0723   Progress/Outcome continuing progress toward goal, goal ongoing at 04/09/2025 0949   Grooming Goal 1    [place] set-up required at 04/02/2025 0723   Time Frame short-term goal (STG), 5 - 7 days at 04/09/2025 0949   Strategies/Barriers progressing to standing v seated,  continue to work on standing balance to maximize IND at 04/09/2025 0949   Progress/Outcome progress slower than expected, goal revised this date, goal ongoing at 04/09/2025 0949   Grooming Goal 2    [place] modified independence at 04/02/2025 0723   Time Frame long-term goal (LTG), 21 days or less at 04/02/2025 0723   Progress/Outcome continuing progress toward goal, goal ongoing at 04/09/2025 0949   Toileting Goal 1    [place] supervision required at 04/09/2025 0949   Time Frame short-term goal (STG), 5 - 7 days at 04/09/2025 0949   Strategies/Barriers pt just evlauated at 04/03/2025 0754   Progress/Outcome good progress toward goal, goal met, goal revised this date, goal ongoing at 04/09/2025 0949   Toileting Goal 2    [place] modified independence at 04/02/2025 0723   Time Frame long-term goal (LTG), 21 days or less at 04/02/2025 0723   Progress/Outcome continuing progress toward goal, goal ongoing at 04/09/2025 0949

## 2025-04-15 NOTE — PLAN OF CARE
Problem: Rehabilitation (IRF) Plan of Care  Goal: Plan of Care Review  Outcome: Progressing  Flowsheets (Taken 4/15/2025 7269)  Progress: improving  Outcome Evaluation: pt aaox4, dictates care needs. continent of b&b  - had BM this morning prior to outside appt, bright red blood from hemmrohoids present, reported by PCT. participated in therapies this AM. ID telehealth appt this AM, RN present during appt.  pain managed with PRN tylenol, PRN dilaudid, and repositioning. soft collar PRN for comfort. L clavicle wound dressed with medihoney, hydrofiber AG, gauze and foam. posterior neck incision sutures, NANI. specialty bed. recliner at bedside. NPO - tube feeds per pt home schedule 0730, 1030, 1430, 1730 - patient able to perform tube feeding independently, RN provides set-up assistance due to environment. RUE single lumen PICC for IV antibiotic therapy, dressing due to be changed today. utilizing ashkedominic for secretion management. supervision assistance with rolling walker. ambulate patient order.  Plan of Care Reviewed With: patient

## 2025-04-15 NOTE — PROGRESS NOTES
CM met w pt, pt reports he does not want to appeal at this time. CM obtained IMM signature and will continue to follow for any needs.

## 2025-04-15 NOTE — PROGRESS NOTES
Subjective     Patient seen and examined on rounds.  Chart reviewed.  Events overnight noted.  History reviewed briefly with patient.    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    HPI: Mr. Celso Gramajo is a 69-year-old right-handed white male with chronic conditions significant for hypertension, hyperlipidemia, anxiety, thyroid cancer status post thyroidectomy and radical neck dissection, adjuvant radiation therapy/immunotherapy, esophageal squamous cell carcinoma status post chemoradiotherapy complicated by esophageal strictures, left vocal cord paralysis, dysphagia status post PEG tube placement in 2021, initially presented to the ER at [facility] on 3/11/25 with fevers and right shoulder pain. Workup was unrevealing and he was discharged home. He presented to the [facility] ER again on 3/17/25 with ongoing fevers. CT neck with no abscess, old postsurgical changes. He discharged home but blood cultures subsequently, 1 of 2 sets of blood cultures drawn at [facility] on 3/17/25 showed Streptococcus viridans returned positive on 3/19/25 and he returned for admission to [facility] on 3/19/25. TTE at [facility] was negative. Blood cultures on 3/19/25 and again on 3/23/25 showed no growth per his records. Imaging with C6-7 osteomyelitis/discitis with ventral epidural abscess. He was transferred to [facility] on 3/23/25 for surgical evaluation.  At that time, he was afebrile.  Blood cultures  were negative. At [facility] ortho and ENT were consulted. Ultimately it was determined that an anterior surgical approach was not safe (due  to history of neck surgeries), and patient was having progressive weakness.  MRI of cervical spine on 3/27/25 revealed discitis and osteomyelitis at C6-C7 with ventral epidural abscess compressing the spinal cord and increased inferior extent of cord edema compared to prior. Patchy T1 hypointensity with enhancement in the upper cervical and thoracic spine, can represent metastatic lesions and/or post radiation changes, similar as compared to prior. He underwent posterior C4-T1 decompression and instrumented fusion on 3/27/25, performed by orthopedic surgeon Dr. Santiago Ca. Purulent fluid drained from anterior spine during the procedure and operating room cultures revealed Strep intermedius. Postop he was briefly in the ICU for MAP goals. He had a PICC placed 4/1/25. 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 He was started on salt tabs. Infectious diseases recommended IV Daptomycin which patient completed on 3/31/25.  He was recommended to continue Ceftriaxone 2g IV q12h x 14 day course (end date 4/8/25) then transition to Ceftriaxone 2g IV daily to complete total 6 week course (end date 5/8/25). He was recommend [place] J collar to neck at all times.  I discussed orthopedic spinal precautions with him.  He completed Decadron treatment for mild laryngeal edema visualized on preoperative NPL. Patient has dysphagia from esophageal strictures, recent dilation 3/3/25 was not successful per patient, cannot tolerate regular oral secretions and recommended NPO and continued on bolus tube feeds.  He was kept on Pepcid for GI prophylaxis. Glycopyrrolate as needed per patient/spouse wishes.  For anxiety he was continued on Fluoxetine and Ativan PRN.  He is not on medications for hypertension.  He was continued on Zetia for hyperlipidemia.  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 He continues on a sliding-scale Humalog coverage.  He is on Levothyroxine  "and Liothyronine for thyroid supplementation after thyroidectomy for thyroid cancer. He is on subcutaneous Heparin and SCDs for DVT prophylaxis.  I discussed his recent clinical course with patient and with his wife at bedside.  On 4/1/25, hemoglobin was 12.9, WBC count was 10.30, platelets were 255, BUN 15, creatinine was 0.51, sodium was 133 and potassium was 4.5.  He has been needing assistance for mobility, transfers, self-care. He is transferred to [facility] on 4/1/25 for further rehabilitation care.     SUBJ: He reports he had a good night sleep with Ambien.  He also had a bowel movement with Linzess.  He has orthopedic appointment today afternoon.  Discussed with patient and with nurse.    ROS: Denies chest pain or shortness of breath. Other ROS negative. Past, family, social history is unchanged.      Current Functional Status:   Bed mobility:   [place], Supine to Sit: minimum assist (75% or more patient effort)   [place], Sit to Supine: touching/steadying assist   Transfers:    [place], Sit to Stand Transfer: modified independence  [place], Stand to Sit Transfer: modified independence   [place], Stand Pivot/Stand Step Transfer: modified independence   Gait:   [place], Gait: modified independence  Assistive Device: walker, front-wheeled   Distance in Feet: 200 feet    Bathing:   [place]: moderate assist (50-74% patient effort)   Toileting:   [place]: close supervision   Upper body dressing:   [place]: close supervision   Lower body dressing:   [place]: close supervision       Functional Progress:    Functional status reviewed. Overall, patient's functional status is improving.      Physical Exam      Blood pressure 138/61, pulse 99, temperature 36.7 °C (98.1 °F), temperature source Oral, resp. rate 18, height 1.854 m (6' 1\"), weight 88.5 kg (195 lb), SpO2 95%.    Body mass index is 25.73 kg/m².    General Appearance: Not in acute " distress  Head/Ear/Nose/Throat: Normocephalic; Atraumatic.   Eye: EOMI; PERRL.   Neck: [place] J collar in place.  Respiratory: Decreased breath sounds at bases.   Cardiovascular: RRR; Normal S1, S2.   Gastrointestinal: Soft; NT; +BS.   Extremities: Bilateral lower extremity edema noted.    Musculoskeletal: Functional active range of motion in both upper and lower extremities.   Neurological: AAO ×3. Speech is fluent. Cranial nerve examination does not reveal any gross facial asymmetry. Strength testing bilateral deltoids are 4/5, bilateral biceps are 4+/5, bilateral triceps are 4/5, bilateral wrist extensors are 4+/5, bilateral hand FDP are 4+/5, bilateral and interossei are 4/5.  Bilateral hip flexion is less than 4/5.  Bilateral quadriceps are 5/5.  Bilateral ankle dorsi and plantar flexion are 5/5.  He is grossly able to localize touch and position sense in great toes, except reports numbness in all 5 digits of both hands and also numbness on the plantar aspects of both feet.  Deep tendon reflexes are symmetric and slightly brisk bilaterally.  Bilateral ankle clonus is present.  Plantars are withdrawal.  Coordination is functional upper extremities.    Behavior/Emotional: Appropriate; Cooperative.   Skin: Healing incision on posterior cervical spine.  Sutures in place.  Erythema/rash noted on coccyx unclear if stage I decubitus.  Rash noted on bilateral groins.  Reviewed pictures in epic.         Current Facility-Administered Medications:     acetaminophen (TYLENOL) 650 mg/20.3 mL solution 650 mg, 650 mg, peg tube, q6h PRN, Spencer Kemp MD, 650 mg at 04/14/25 1238    bisacodyL (DULCOLAX) 10 mg suppository 10 mg, 10 mg, rectal, Daily PRN, Spencer Kemp MD, 10 mg at 04/14/25 0612    cefTRIAXone (ROCEPHIN) 2 g in sodium chloride 0.9 % 100 mL IVPB, 2 g, intravenous, q24h INT, Spencer Kemp MD, Stopped at 04/15/25 0610    docusate sodium (COLACE) 50 mg/5 mL liquid 100 mg, 100 mg, peg tube, BID,  Spencer Kemp MD, 100 mg at 04/15/25 0813    enoxaparin (LOVENOX) syringe 40 mg, 40 mg, subcutaneous, Daily (6p), Spencer Kemp MD, 40 mg at 04/14/25 1733    ezetimibe (ZETIA) tablet 10 mg, 10 mg, peg tube, Nightly, Spencer Kemp MD, 10 mg at 04/14/25 2038    famotidine (PEPCID) tablet 20 mg, 20 mg, peg tube, Nightly, Spencer Kemp MD, 20 mg at 04/14/25 2038    FLUoxetine (PROzac) 20 mg/5 mL (4 mg/mL) solution 20 mg, 20 mg, peg tube, Daily, Spencer Kemp MD, 20 mg at 04/15/25 0814    glycopyrrolate (ROBINUL) tablet 1 mg, 1 mg, peg tube, 2x daily PRN, Spencer Kemp MD, 1 mg at 04/11/25 0850    honey (MEDIHONEY) 100 % topical paste, , Topical, Daily, Spencer Kemp MD, Given at 04/15/25 0822    HYDROmorphone (DILAUDID) tablet 2 mg, 2 mg, peg tube, q6h PRN, Spencer Kemp MD, 2 mg at 04/15/25 0615    lansoprazole (PREVACID) 3 mg/mL oral suspension 15 mg, 15 mg, feeding tube, Daily before breakfast, Spencer Kemp MD, 15 mg at 04/15/25 0615    levothyroxine (SYNTHROID) tablet 150 mcg, 150 mcg, peg tube, Daily (6:30a), Spencer Kemp MD, 150 mcg at 04/15/25 0540    linaCLOtide (LINZESS) capsule 145 mcg, 145 mcg, g-tube, Daily before lunch, Spencer Kemp MD, 145 mcg at 04/14/25 1237    linaCLOtide (LINZESS) capsule 145 mcg, 145 mcg, peg tube, Daily PRN, Spencer Kemp MD, 145 mcg at 04/14/25 1237    liothyronine (CYTOMEL) tablet 10 mcg, 10 mcg, peg tube, Daily (6:30a), Spencer Kemp MD, 10 mcg at 04/15/25 0540    LORazepam (ATIVAN) tablet 0.25 mg, 0.25 mg, peg tube, q8h PRN, Spencer Kemp MD    magnesium hydroxide (M.O.M.) 400 mg/5 mL suspension 30 mL, 30 mL, oral, 2x daily PRN, Spencer Kemp MD, 30 mL at 04/13/25 1445    ondansetron ODT (ZOFRAN-ODT) disintegrating tablet 4 mg, 4 mg, peg tube, q6h PRN, Spencer Kemp MD, 4 mg at 04/14/25 0944    senna (SENOKOT) tablet 1 tablet, 1 tablet, peg tube, 2x daily PRN, Spencer Kemp  MD, 1 tablet at 04/15/25 0813    senna (SENOKOT) tablet 2 tablet, 2 tablet, peg tube, BID, Spencer Kemp MD, 2 tablet at 04/15/25 0813    simethicone (MYLICON) chewable tablet 160 mg, 160 mg, peg tube, QID, Spencer Kemp MD, 160 mg at 04/15/25 0813    sodium chloride PF flush 10 mL, 10 mL, intravenous, q8h INT, Spencer Kemp MD, 10 mL at 04/15/25 0549    sodium chloride PF flush 10 mL, 10 mL, intravenous, PRN, Spencer Kemp MD    sodium chloride tablet 1 g, 1 g, peg tube, Daily, Spencer Kemp MD, 1 g at 04/15/25 0813    sodium phosphates (FLEET) enema adult 1 enema, 1 enema, rectal, Daily PRN, Spencer Kemp MD, 1 enema at 04/10/25 1306    [ - Suspended Admission] zolpidem (AMBIEN) tablet 5 mg, 5 mg, peg tube, Nightly, Lilian Marrero MD, 5 mg at 04/14/25 2038       Labs / Radiology    Lab Results   Component Value Date    WBC 5.17 04/14/2025    HGB 12.5 (L) 04/14/2025    HCT 38.3 (L) 04/14/2025    MCV 94.8 04/14/2025     04/14/2025     Lab Results   Component Value Date    GLUCOSE 138 (H) 04/14/2025    CALCIUM 9.4 04/14/2025     (L) 04/14/2025    K 4.4 04/14/2025    CO2 31 04/14/2025    CL 96 (L) 04/14/2025    BUN 16 04/14/2025    CREATININE 0.6 (L) 04/14/2025       Assessment and Plan    ASSESSMENT PLAN:  1. 69-year-old right-handed white male with chronic conditions significant for hypertension, hyperlipidemia, anxiety, thyroid cancer status post thyroidectomy and radical neck dissection, adjuvant radiation therapy/immunotherapy, esophageal squamous cell carcinoma status post chemoradiotherapy complicated by esophageal strictures, left vocal cord paralysis, dysphagia status post PEG tube placement in 2021, initially presented to the ER at [facility] on 3/11/25 with fevers and right shoulder pain. Workup was unrevealing and he was discharged home. He presented to the [facility] ER again on 3/17/25 with ongoing fevers. CT neck with no abscess, old postsurgical  changes. He discharged home but blood cultures subsequently, 1 of 2 sets of blood cultures drawn at [facility] on 3/17/25 showed Streptococcus viridans returned positive on 3/19/25 and he returned for admission to [facility] on 3/19/25. TTE at [facility] was negative. Blood cultures on 3/19/25 and again on 3/23/25 showed no growth per his records. Imaging with C6-7 osteomyelitis/discitis with ventral epidural abscess. He was transferred to [facility] on 3/23/25 for surgical evaluation.  At that time, he was afebrile.  Blood cultures  were negative. At [facility] ortho and ENT were consulted. Ultimately it was determined that an anterior surgical approach was not safe (due to history of neck surgeries), and patient was having progressive weakness.  MRI of cervical spine on 3/27/25 revealed discitis and osteomyelitis at C6-C7 with ventral epidural abscess compressing the spinal cord and increased inferior extent of cord edema compared to prior. Patchy T1 hypointensity with enhancement in the upper cervical and thoracic spine, can represent metastatic lesions and/or post radiation changes, similar as compared to prior. He underwent posterior C4-T1 decompression and instrumented fusion on 3/27/25, performed by orthopedic surgeon Dr. Santiago Ca. Purulent fluid drained from anterior spine during the procedure and operating room cultures revealed Strep intermedius. Postop he was briefly in the ICU for MAP goals. He had a PICC placed 4/1/25. 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 He was started on salt tabs. Infectious diseases recommended IV Daptomycin which patient completed on 3/31/25.  He was recommended to continue Ceftriaxone 2g IV q12h x 14 day course (end date 4/8/25) then transition to Ceftriaxone 2g IV daily to complete total 6 week course (end date 5/8/25). He was recommend [place] J collar to neck at all times.  I discussed orthopedic spinal precautions with  him.  He completed Decadron treatment for mild laryngeal edema visualized on preoperative NPL. Patient has dysphagia from esophageal strictures, recent dilation 3/3/25 was not successful per patient, cannot tolerate regular oral secretions and recommended NPO and continued on bolus tube feeds.  He was kept on Pepcid for GI prophylaxis. Glycopyrrolate as needed per patient/spouse wishes.  For anxiety he was continued on Fluoxetine and Ativan PRN.  He is not on medications for hypertension.  He was continued on Zetia for hyperlipidemia.  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 He continues on a sliding-scale Humalog coverage.  He is on Levothyroxine and Liothyronine for thyroid supplementation after thyroidectomy for thyroid cancer. He is on subcutaneous Heparin and SCDs for DVT prophylaxis.  I discussed his recent clinical course with patient and with his wife at bedside.  On 4/1/25, hemoglobin was 12.9, WBC count was 10.30, platelets were 255, BUN 15, creatinine was 0.51, sodium was 133 and potassium was 4.5.  He has been needing assistance for mobility, transfers, self-care. He is transferred to [facility] on 4/1/25 for further rehabilitation care.      2. DVT prophylaxis - He is on subcutaneous Heparin and SCDs for DVT prophylaxis.  Check doppler.  Platelets 247 on 4/2/2025.  Doppler ultrasound on 4/3/2025 bilateral lower extremity was negative for DVT.  Platelets 216 on 4/7/2025.  Platelets 206 on 4/14/2025.       3. 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  Follow falls precautions, cardiac  precautions, aspiration precautions.  Follow spinal precautions.  [place] J collar to neck at all times.  Use [place] collar in shower.     4. GI - On Pepcid for GI prophylaxis.  On PRN Senokot.  On PRN Dulcolax suppository.  On PRN Zofran.     5.  -denies dysuria.  Monitor postvoid residuals.     6. CVS - monitor for orthostasis.     7. Pulmonary -encourage incentive spirometry.     8. Hematology - monitor hemoglobin, platelets.  Hemoglobin 12.3, WBC 9.54 on 4/2/2025.  Hemoglobin 12.4, WBC 5.89 on 4/7/2025.  Hemoglobin 12.5, WBC 5.17 on 4/14/2025.       9. Pain -on Tylenol.  On PRN Oxycodone.     10. Skin - Healing incision on posterior cervical spine.  Sutures in place.  Erythema/rash noted on coccyx unclear if stage I decubitus.  Rash noted on bilateral groins.  Reviewed pictures in epic.     11. F/E/N - nothing by mouth on bolus PEG feeds.  Nutrition consulted. 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 He was started on salt tabs.     12.  Dysphagia - H/O esophageal squamous cell carcinoma status post chemoradiotherapy complicated by esophageal strictures, left vocal cord paralysis, dysphagia status post PEG tube placement in 2021 - NPO on bolus PEG feeds.  Nutrition consulted.     13.  Psychiatry - on PRN Ativan.  Psychology consulted.     14.  Hypothyroidism -He is on Levothyroxine and Liothyronine for thyroid supplementation after thyroidectomy for thyroid cancer.     15. ENT -  H/O left vocal cord paralysis, treated with Decadron for edema around vocal cords recently.  On Glycopyrrolate as needed per patient/spouse wishes to manage secretions.      16.  Infectious diseases - He underwent posterior C4-T1 decompression and instrumented fusion on 3/27/25, performed by orthopedic surgeon Dr. Santiago Ca. Purulent fluid drained from anterior spine during the procedure and operating room cultures revealed Strep intermedius.. He had a PICC placed 4/1/25. Infectious diseases recommended IV  Daptomycin which patient completed on 3/31/25.  He was recommended to continue Ceftriaxone 2g IV q12h x 14 day course (end date 4/8/25) then transition to Ceftriaxone 2g IV daily to complete total 6 week course (end date 5/8/25).He has infectious disease telemedicine appointment on 4/15/2025 at 10:40 AM.  Outside visit ordered.  Nursing to help patient with appointment.  Discussed with patient and nurse on 4/10/2025.      15.  Steroid-induced hyperglycemia -on sliding-scale Humalog coverage.     16.  Insomnia - He reported decreased sleep at night on 4/14/2025.  Discussed with patient and with his wife with him.  They would like to try Ambien.  He took Trazodone the other day and could not tolerate it.  Discussed with Dr. Marrero who ordered Ambien.     17. Rehabilitation medicine - Continue comprehensive rehabilitation care. Continue PT, OT, psychology.  Follow falls precautions, cardiac precautions, aspiration precautions.  Follow spinal precautions.  [place] J collar to neck at all times.  Use [place] collar in shower.Discussed patients progress in therapies with therapists in team meeting on 4/3/2025.  Discussed in PCC. See PCC documentation.  He reports no bowel movement in the past 3 to 4 days.  Also wanted antireflux medication in the morning and at home he takes Omeprazole via PEG per patient.  He is on Prevacid in the a.m. and Pepcid in p.m via PEG tube.  Due to constipation internist ordered enema.  Also scheduled Colace and Senokot were ordered through his PEG tube.  Discussed with patient on 4/3/2025.Discussed recommendations of PCC with patient on 4/4/2025.  Posterior cervical incision healing well.  He had 2 bowel movements today.  Family training scheduled for next week on Monday per case management note.  Discussed with patient on 4/4/2025.He was sitting on recliner on 4/5/2025, talking on phone with his wife.  Continent of bladder per nursing on 4/5/2025.  Tolerating PEG feeds without issues.  He  reports he had a good bowel movement yesterday.  Discussed with him if he gets  loose bowel movements he can always refuse bowel medications.  He reports he wants to stay on some bowel medications at this time.  Posterior cervical incision has dressing in place.  Denies increased pain on 4/5/2025.Saw patient earlier on 4/7/2025 morning and then again in the evening.  Discussed with patient and with his wife in the evening.  He requested IV saline infusion.  Ordered liter of saline overnight but patient and wife indicated that at home they usually have the infusion and 3 hours and do not want to eat or 12 hours.  Discussed with pharmacist.  Discussed with nurse.  Patient and wife insist that they usually get the infusion over 3 hours at home.  Also wanted increase bowel medications due to constipation.  Increase Senokot dose and ordered as needed treats enema.  Discussed with nurse on 4/7/2025. Again discussed with patient on 4/8/2025 in presence of nursing supervisor that IV fluid at high rate is not such a good idea and he can get the volume but at a lower rate.  He says he is used to getting IV fluids quickly at home.  Suggested that he also discuss with his cardiologist if this is a safe practice and he can be prone to getting fluid overload and may put him at risk for CHF/pulmonary edema in future if his cardiac function decompensates.  Discussed with internist Dr. Marrero who also agrees with this possibility.  I mentioned to patient to discuss with his cardiologist regarding this and follow cardiology guidance regarding his desire to get IV fluids quickly at home at a high hourly volume in a short time.  Bowel medications were adjusted by internist on 4/8/2025.Inspected neck incision on 4/9/2025 which is stable.  He reports he feels constipated.  Internist adjusted bowel medications.Discussed with patient and with his mother-in-law with him on 4/9/2025.  Discussed patients progress in therapies with therapists  in team meeting on 4/10/2025. Discussed in PCC. See PCC documentation.  He has infectious disease telemedicine appointment on 4/15/2025 at 10:40 AM.  Outside visit ordered.  Nursing to help patient with appointment.  Discussed with patient and nurse on 4/10/2025. Discussed recommendations of PCC with patient on 4/11/2025. He reports he had a bowel movement today.  Noted input from dermal defense nurse.  Discussed with nurse Elin.  Dr Good (orthopedic surgery) office was (who patient was recommended to follow-up with) was contacted regarding the pressure injury from [place] J collar and they indicated that [place] J collar can be discontinued and patient may use soft collar if he desires.  Nurse Elin also discussed with patient on 4/11/2025 and he is agreeable.  Orders put in.  Patient has appointment for 4/15 @ 1320 hrs at Dr Good's orthopedic office.  Discussed with nurse on 4/11/2025.He indicates that he no longer wants to take salt tablets on 4/12/2025.  Sodium was 135.  Will discontinue salt tablets.  Discussed with patient and with nurse with him on 4/12/2025.  Discussed follow-up appointment next week at orthopedic office with him.  Discussed with him on 4/12/2025 regarding input from orthopedic surgeon Dr Good yesterday regarding [place] J collar was discontinued and he is on soft cervical collar.He reported decreased sleep at night on 4/14/2025.  Discussed with patient and with his wife with him.  They would like to try Ambien.  He took Trazodone the other day and could not tolerate it.  He requested soapsuds enema today due to constipation.  Discussed with him constipating effects of narcotics.  He says he also gets constipated when he gets antibiotics.  Reviewed labs today on 4/14/2025.  He says he feels much better on 4/15/2025.  He reports he had a good night sleep with Ambien.  He also had a bowel movement with Linzess.  He has orthopedic appointment today afternoon.  Discussed with patient and with  nurse on 4/15/2025.     18. Reviewed labs today. BUN 18, creatinine 0.6, sodium 133, potassium 4.9 on 4/2/2025.  BUN 16, creatinine 0.6, sodium 135, potassium 4.5 on 4/7/2025.  BUN 16, creatinine 0.6, sodium 134, potassium 4.4 on 4/14/2025.             Spencer Kemp MD  4/15/2025      This encounter was completed utilizing the Direct Speech Voice Recognition Software. Grammatical errors, random word insertions, pronoun errors, and incomplete sentences are occasional consequences of the system due to software limitations, ambient noises, and hardware issues. Such errors may be missed prior to affixing electronic signature. Any questions or concerns about the content, text, or information contained within the body of this dictation should be directly addressed to the physician for clarification. If you have any questions or concerns please do not hesitate to call me directly via EPIC chat, page, or email.

## 2025-04-15 NOTE — PLAN OF CARE
Plan of Care Review  Plan of Care Reviewed With: patient  Progress: improving  Outcome Evaluation: Pt is alert oriented. C/o pain managed with PRN dilaudid 2mgq6. Conitnent with bladder, no BM during shift. Safety maintained. IV Rocephin given via RUE PICC SL q24, easy to flush. PEG tube is patent and easy to flush, 300ml sterile water flush at HS and 500ml in Am given. NPO, meds given via prg tube. Slept good. Neck dressing is patent. Turning and repositioning. Soft collar for comfoert as needed. All meds and care reviewed with pt.

## 2025-04-15 NOTE — PROGRESS NOTES
[facility] Progress Note          Patient was seen and examined at bedside.    Subjective:   Had BM, still feel not emptying his bowel well, cont his current bowel regimen, d/w RN about his care plan. Further adjusted his bowel regimen. Add-on labs, lipid panel.   No new complaints or O/N events. His PT/OT is progressing .   Objective   Vital signs in last 24 hours:  Temp:  [36.3 °C (97.3 °F)-36.7 °C (98.1 °F)] 36.7 °C (98.1 °F)  Heart Rate:  [77-99] 99  Resp:  [16-18] 18  BP: (103-148)/(60-74) 138/61      Intake/Output Summary (Last 24 hours) at 4/15/2025 1256  Last data filed at 4/15/2025 1045  Gross per 24 hour   Intake 1500 ml   Output 400 ml   Net 1100 ml     Intake/Output this shift:  I/O this shift:  In: 850 [NG/GT:850]  Out: -    Review of Systems:  All other systems reviewed and negative except as noted in the HPI.   Objective      Labs  Reviewed his labs thoroughly   Lab Results   Component Value Date    WBC 5.17 04/14/2025    HGB 12.5 (L) 04/14/2025    HCT 38.3 (L) 04/14/2025    MCV 94.8 04/14/2025     04/14/2025     Lab Results   Component Value Date    GLUCOSE 138 (H) 04/14/2025    CALCIUM 9.4 04/14/2025     (L) 04/14/2025    K 4.4 04/14/2025    CO2 31 04/14/2025    CL 96 (L) 04/14/2025    BUN 16 04/14/2025    CREATININE 0.6 (L) 04/14/2025       Imaging  OSH imaging study reports reviewed    Full Code    Physical Exam:  Head/Ear/Nose/Throat: normocephalic; atraumatic; moisture mouth mm, no oropharyngeal thrush noted.   Eyes: anicteric sclera, EOMI; PERRL.   Neck : supple, no JVD, no carotid bruits appeciated.   Respiratory: no evidence of labored breathing, lung sounds CTA b/l, good aeration bibasilar area, no w/r/c.   Cardiovascular: RRR; normal S1, S2; no m/r/g; no S3 or S4.   Gastrointestinal: PEG in place, focal c/d/I; soft; NT; BS normal; mildly distended; no CVAT b/l.   Genitourinary: no song.   Extremities : no c/c/e .   Neurological: AO x 3, fluent  speeches, following commands, CNS II-XII grossly intact; no focal neurologic deficits.   Behavior/Emotional: in NAD, appropriate; cooperative.   Skin: clean, dry and intact.  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  He had a PEG placed in 2021 for dysphagia.  Cervical spinal stenosis with myeloradiculopathy h/o cervical spinal surgery. He initially presented to [facility] on 3/11/25 with fevers and R shoulder pain. Workup was unrevealing and he was discharged home.  He presented to the [facility] ED again on 3/17 with ongoing fevers. CT Neck with no abscess, old postsurgical changes, BCX was collected. He d/c home but BCX subsequently returned positive on 3/19 and he returned for admission. Imaging with C6-7 OM / discitis with ventral epidural abscess. He was transferred to [facility] on 3/23 for surgical evaluation.  At that time, he was afebrile. BCx negative. MRI with C5-C7 cervical epidural abscess/osteo with cord signal at C6-7, T1-4, L4 vertebral body lesions probably radiation-changes per [facility] radiology, although metastatic lesions are not ruled out, per wife PET-CT scan no evidence of metastatic malignancy light up at spine region. He is now s/p posterior C4-T1 decompression and fusion on 3/27. Purulent fluid drained from anterior spine. OR Cx Strep intermedius.  ID rec IV dapto and ceftriaxone x 6 weeks.  PICC placed. He was admitted to [facility] on 4/1/25 for post op inpt acute rehab. He is TF NPO status.     A/P:  # Strep intermedius bacteremia with C6-7 OM / discitis and  ventral epidural abscess, s/p PCDF 3/27/25, purulent fluid drainage from anterior spine  Suspect source likely recent esophageal dilatations ; 3/17 BCx + at OSH; TTE negative for veg at OSH  -complete planned 6-week iv Rocephin course, last dose of daptomycin on 3/31/25,  wound care dermal defense, f/u with [facility] ID as outpt, weekly labs.     # H/o thyroid CA s/p thyroidectomy and RND, adjuvant XRT/immunotherapy, esophageal SCC s/p CRT c/b esophageal strictures, L VC paralysis  -cancer surveillance f/u with [facility]  medical/ radiation oncology;  -palliative consultation.      # Dysphagia, NPO/TF, IBS-C, GI prophylaxis  -cont PEG TF, dietitian co management for nutrition supplements, Pepcid/PPI for GI prophylaxis       # Hypothyroidism   -per his endo, increase levothyroxine dose from 137-150 mcg daily.     # DVT prophylaxis  -SQH     # Anxiety  -on Prozac, psychology CBT, psychiatrist for co management      # Hyperglycemia, previous prediabetes  -diet modification per dietitian, SSI with accu checks      Billing code: [id number]  Diagnoses:  Patient Active Problem List   Diagnosis    Vertigo    Hypertension    Postsurgical hypothyroidism    Mixed hyperlipidemia    Gastroesophageal reflux disease    Increased sputum production    Allergic rhinitis    Change in bowel habits    Chronic fatigue syndrome    Constipation, chronic    Deviated nasal septum    Elevated coronary artery calcium score    Eustachian tube dysfunction, bilateral    Examination of participant in clinical trial    Induration penis plastica    Hypertrophy of nasal turbinates    Hx of colonic polyps    Malignant neoplasm of salivary gland (CMS/HCC)    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)    Paralysis of vocal cords and larynx, unilateral    ENMANUEL (obstructive sleep apnea)    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)    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)    Penile curvature, acquired    Rhinitis sicca     Thyroid cancer (CMS/HCC)    Sensorineural hearing loss (SNHL), bilateral    Other dysphagia    Abdominal pain    Other dysphagia    COVID-19    Encounter for follow-up surveillance of thyroid cancer    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         Lilian Marrero MD  4/15/2025

## 2025-04-15 NOTE — NURSING NOTE
"Patient requested to have last feed of the day timed at 1900 due to 1430 feed starting at 1645. When RN present in room to help with set-up, pt stated to \"bag it\" and that he was in excrutiating pain due to the van ride and that his \"shoulders are in spasm\", requested PRN dilaudid at this time. RN administered PRN dilaudid and helped patient return to bed.   "

## 2025-04-16 ENCOUNTER — APPOINTMENT (OUTPATIENT)
Dept: PSYCHOLOGY | Facility: CLINIC | Age: 70
End: 2025-04-16
Payer: MEDICARE

## 2025-04-16 ENCOUNTER — APPOINTMENT (INPATIENT)
Dept: OCCUPATIONAL THERAPY | Facility: REHABILITATION | Age: 70
DRG: 559 | End: 2025-04-16
Payer: MEDICARE

## 2025-04-16 ENCOUNTER — APPOINTMENT (INPATIENT)
Dept: WOUND CARE | Facility: REHABILITATION | Age: 70
DRG: 559 | End: 2025-04-16
Payer: MEDICARE

## 2025-04-16 ENCOUNTER — APPOINTMENT (INPATIENT)
Dept: PHYSICAL THERAPY | Facility: REHABILITATION | Age: 70
DRG: 559 | End: 2025-04-16
Payer: MEDICARE

## 2025-04-16 ENCOUNTER — APPOINTMENT (OUTPATIENT)
Dept: OTHER | Facility: REHABILITATION | Age: 70
End: 2025-04-16
Payer: MEDICARE

## 2025-04-16 PROBLEM — G89.18 ACUTE POST-OPERATIVE PAIN: Status: ACTIVE | Noted: 2025-04-16

## 2025-04-16 PROCEDURE — 63600000 HC DRUGS/DETAIL CODE: Mod: JZ | Performed by: PHYSICAL MEDICINE & REHABILITATION

## 2025-04-16 PROCEDURE — 63700000 HC SELF-ADMINISTRABLE DRUG: Performed by: PHYSICAL MEDICINE & REHABILITATION

## 2025-04-16 PROCEDURE — 63700000 HC SELF-ADMINISTRABLE DRUG: Performed by: INTERNAL MEDICINE

## 2025-04-16 PROCEDURE — 90832 PSYTX W PT 30 MINUTES: CPT | Performed by: PSYCHOLOGIST

## 2025-04-16 PROCEDURE — 97530 THERAPEUTIC ACTIVITIES: CPT | Mod: GP

## 2025-04-16 PROCEDURE — 25800000 HC PHARMACY IV SOLUTIONS: Performed by: PHYSICAL MEDICINE & REHABILITATION

## 2025-04-16 PROCEDURE — 97535 SELF CARE MNGMENT TRAINING: CPT | Mod: GO | Performed by: OCCUPATIONAL THERAPIST

## 2025-04-16 PROCEDURE — 97116 GAIT TRAINING THERAPY: CPT | Mod: GP

## 2025-04-16 PROCEDURE — 12800001 HC ROOM AND CARE SEMIPRIVATE REHAB-BRAIN INJ

## 2025-04-16 PROCEDURE — 97530 THERAPEUTIC ACTIVITIES: CPT | Mod: GO | Performed by: OCCUPATIONAL THERAPIST

## 2025-04-16 RX ORDER — ENOXAPARIN SODIUM 100 MG/ML
40 INJECTION SUBCUTANEOUS
Qty: 12 ML | Refills: 0 | Status: SHIPPED | OUTPATIENT
Start: 2025-04-16 | End: 2025-05-16

## 2025-04-16 RX ORDER — NYSTATIN 100000 [USP'U]/G
POWDER TOPICAL 2 TIMES DAILY
Qty: 60 G | Refills: 1 | Status: SHIPPED | OUTPATIENT
Start: 2025-04-16 | End: 2025-04-23

## 2025-04-16 RX ORDER — SODIUM CHLORIDE 0.9 % (FLUSH) 0.9 %
10 SYRINGE (ML) INJECTION
COMMUNITY
Start: 2025-04-16

## 2025-04-16 RX ORDER — BISACODYL 10 MG/1
10 SUPPOSITORY RECTAL DAILY PRN
COMMUNITY
Start: 2025-04-16 | End: 2025-05-16

## 2025-04-16 RX ORDER — SIMETHICONE 80 MG
80 TABLET,CHEWABLE ORAL 4 TIMES DAILY
COMMUNITY
Start: 2025-04-16 | End: 2025-05-16

## 2025-04-16 RX ORDER — SODIUM CHLORIDE 0.9 % (FLUSH) 0.9 %
10 SYRINGE (ML) INJECTION AS NEEDED
COMMUNITY
Start: 2025-04-16

## 2025-04-16 RX ORDER — EZETIMIBE 10 MG/1
10 TABLET ORAL NIGHTLY
Qty: 30 TABLET | Refills: 0 | Status: SHIPPED | OUTPATIENT
Start: 2025-04-16 | End: 2025-05-16

## 2025-04-16 RX ORDER — FLUOXETINE 20 MG/5ML
20 SOLUTION ORAL DAILY
COMMUNITY
Start: 2025-04-17

## 2025-04-16 RX ORDER — HYDROMORPHONE HYDROCHLORIDE 2 MG/1
2 TABLET ORAL EVERY 6 HOURS PRN
Qty: 28 TABLET | Refills: 0 | Status: SHIPPED | OUTPATIENT
Start: 2025-04-16 | End: 2025-04-18

## 2025-04-16 RX ORDER — ACETAMINOPHEN 650 MG/20.3ML
650 LIQUID ORAL EVERY 6 HOURS PRN
COMMUNITY
Start: 2025-04-16 | End: 2025-05-16

## 2025-04-16 RX ORDER — LIOTHYRONINE SODIUM 5 UG/1
10 TABLET ORAL
COMMUNITY
Start: 2025-04-17

## 2025-04-16 RX ORDER — LORAZEPAM 0.5 MG/1
0.25 TABLET ORAL 2 TIMES DAILY PRN
Qty: 10 TABLET | Refills: 0 | Status: SHIPPED | OUTPATIENT
Start: 2025-04-16 | End: 2025-04-21

## 2025-04-16 RX ORDER — FAMOTIDINE 20 MG/1
20 TABLET, FILM COATED ORAL NIGHTLY
COMMUNITY
Start: 2025-04-16 | End: 2025-05-16

## 2025-04-16 RX ORDER — AMOXICILLIN 250 MG
2 CAPSULE ORAL 2 TIMES DAILY
COMMUNITY
Start: 2025-04-16 | End: 2025-05-16

## 2025-04-16 RX ORDER — ZOLPIDEM TARTRATE 5 MG/1
5 TABLET ORAL NIGHTLY
Qty: 10 TABLET | Refills: 0 | Status: SHIPPED | OUTPATIENT
Start: 2025-04-16 | End: 2025-04-26

## 2025-04-16 RX ORDER — LEVOTHYROXINE SODIUM 150 UG/1
150 TABLET ORAL
COMMUNITY
Start: 2025-04-17

## 2025-04-16 RX ORDER — NYSTATIN 100000 [USP'U]/G
POWDER TOPICAL 2 TIMES DAILY
Status: DISCONTINUED | OUTPATIENT
Start: 2025-04-16 | End: 2025-04-19 | Stop reason: HOSPADM

## 2025-04-16 RX ORDER — SODIUM CHLORIDE 1 G/1
1 TABLET ORAL 2 TIMES DAILY
COMMUNITY
Start: 2025-04-16 | End: 2025-05-16

## 2025-04-16 RX ADMIN — NYSTATIN: 100000 POWDER TOPICAL at 12:33

## 2025-04-16 RX ADMIN — SIMETHICONE 160 MG: 80 TABLET, CHEWABLE ORAL at 18:18

## 2025-04-16 RX ADMIN — SENNOSIDES 2 TABLET: 8.6 TABLET, FILM COATED ORAL at 08:50

## 2025-04-16 RX ADMIN — SIMETHICONE 160 MG: 80 TABLET, CHEWABLE ORAL at 12:32

## 2025-04-16 RX ADMIN — ZOLPIDEM TARTRATE 5 MG: 5 TABLET, FILM COATED ORAL at 21:25

## 2025-04-16 RX ADMIN — Medication 10 ML: at 05:34

## 2025-04-16 RX ADMIN — Medication: at 08:50

## 2025-04-16 RX ADMIN — CEFTRIAXONE SODIUM 2 G: 2 INJECTION, POWDER, FOR SOLUTION INTRAMUSCULAR; INTRAVENOUS at 05:33

## 2025-04-16 RX ADMIN — HYDROMORPHONE HYDROCHLORIDE 2 MG: 2 TABLET ORAL at 20:27

## 2025-04-16 RX ADMIN — FLUOXETINE HYDROCHLORIDE 20 MG: 20 SOLUTION ORAL at 08:58

## 2025-04-16 RX ADMIN — BISACODYL 10 MG: 10 SUPPOSITORY RECTAL at 16:51

## 2025-04-16 RX ADMIN — FAMOTIDINE 20 MG: 20 TABLET, FILM COATED ORAL at 20:36

## 2025-04-16 RX ADMIN — Medication 10 ML: at 21:25

## 2025-04-16 RX ADMIN — EZETIMIBE 10 MG: 10 TABLET ORAL at 20:36

## 2025-04-16 RX ADMIN — DOCUSATE SODIUM 100 MG: 50 LIQUID ORAL at 14:58

## 2025-04-16 RX ADMIN — DOCUSATE SODIUM 100 MG: 50 LIQUID ORAL at 08:49

## 2025-04-16 RX ADMIN — LEVOTHYROXINE SODIUM 150 MCG: 150 TABLET ORAL at 05:38

## 2025-04-16 RX ADMIN — ONDANSETRON 4 MG: 4 TABLET, ORALLY DISINTEGRATING ORAL at 17:17

## 2025-04-16 RX ADMIN — NYSTATIN: 100000 POWDER TOPICAL at 20:51

## 2025-04-16 RX ADMIN — HYDROMORPHONE HYDROCHLORIDE 2 MG: 2 TABLET ORAL at 06:26

## 2025-04-16 RX ADMIN — ENOXAPARIN SODIUM 40 MG: 40 INJECTION SUBCUTANEOUS at 18:18

## 2025-04-16 RX ADMIN — Medication 10 ML: at 14:59

## 2025-04-16 RX ADMIN — LINACLOTIDE 145 MCG: 145 CAPSULE, GELATIN COATED ORAL at 12:40

## 2025-04-16 RX ADMIN — MAGNESIUM HYDROXIDE 30 ML: 400 SUSPENSION ORAL at 14:58

## 2025-04-16 RX ADMIN — LIOTHYRONINE SODIUM 10 MCG: 5 TABLET ORAL at 05:38

## 2025-04-16 RX ADMIN — SENNOSIDES 2 TABLET: 8.6 TABLET, FILM COATED ORAL at 14:58

## 2025-04-16 RX ADMIN — LORAZEPAM 0.25 MG: 0.5 TABLET ORAL at 08:50

## 2025-04-16 RX ADMIN — SIMETHICONE 160 MG: 80 TABLET, CHEWABLE ORAL at 08:50

## 2025-04-16 RX ADMIN — SIMETHICONE 160 MG: 80 TABLET, CHEWABLE ORAL at 20:36

## 2025-04-16 RX ADMIN — SODIUM CHLORIDE 1 G: 1 TABLET ORAL at 08:50

## 2025-04-16 RX ADMIN — LANSOPRAZOLE 15 MG: KIT at 06:26

## 2025-04-16 ASSESSMENT — COGNITIVE AND FUNCTIONAL STATUS - GENERAL
PERCEPTUAL FUNCTION: PAIN
AROUSAL LEVEL: ATTENTIVE
EST. PREMORBID INTELLIGENCE: ABOVE AVERAGE
REMOTE MEMORY: WNL
RECENT MEMORY: WNL
SLEEP_WAKE_CYCLE: DECREASED
SPEECH: REGULAR
APPEARANCE: OTHER:
ORIENTATION: FULLY ORIENTED
APPETITE: NPO
INSIGHT: INTACT
PSYCHOMOTOR FUNCTIONING: DECREASED
AFFECT: FULL RANGE
DELUSIONS: NONE OR AGE APPROPRIATE
THOUGHT_PROCESS: WNL;NEGATIVITY
THOUGHT_CONTENT: APPROPRIATE
EYE_CONTACT: WNL
CONCENTRATION: WNL
ATTENTION: WNL
IMPULSE CONTROL: INTACT

## 2025-04-16 NOTE — PROGRESS NOTES
Patient: Celso Gramajo  Location: [facility] [address]  MRN: [id number]  Today's date: 4/16/2025    History of Present Illness  Celso is a 69 y.o. male admitted on 4/1/2025 with Spinal epidural abscess [G06.1]. Principal problem is Spinal epidural abscess.    Celso Gramajo is a 69 year old male with PMHx thyroid CA s/p thyroidectomy, RND, adjuvant XRT/immunotherapy, esophageal SCC s/p chemoradiation c/b esophageal strictures, HTN, Elevated Coronary Calcium scoring in 2017, ENMANUEL, Hypothyroidism, Anemia, dysphagia on PEG (placed in 2021) TFs, and history of CVA who presented to an OSH with neck and R shoulder pain. Workup was unrevealing and he was discharged home.    He presented to the [facility] ED again on 3/17 with ongoing fevers. CT Neck with no abscess, old postsurgical changes. He d/c home but BCx subsequently returned positive on 3/19 and he returned for admission. He was also bacteremic with strep viridans, and placed on IV antibiotics. Workup revealed a C5-C7 ventral epidural abscess with osteomyelitis, and he was transferred to [facility] for surgical evaluation.    MRI on 3/24 c/f metastatic disease at T1-T4, L4.      At [facility] ortho and ENT were consulted. Ultimately it was determined that an anterior surgical approach was not safe (due to history of neck surgeries), and patient was having progressive weakness. Repeat MRI C-spine showed Discitis and osteomyelitis at C6-C7 with ventral epidural abscess compressing the spinal cord and increased edema. Patient urgently underwent POSTERIOR C4-T1 DECOMPRESSION FUSION WITH [place] INSTRUMENTATION on 3/27. Postop he was briefly in the ICU for MAP goals. OR cultures grew strep intermedius and he was placed on the antibiotic plan below. He had a PICC placed 4/1. 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 He was started on salt tabs. In rehab please continue to check labs, and titrate the salt tabs as  "appropriate. He worked with PT/OT who recommended him for acute rehab.     Past Medical History  Celso has a past medical history of Anemia, Cancer (CMS/HCC), head and neck radiation, Hypertension, Hypothyroidism, Stroke (CMS/HCC), and Thyroid disease.    PT Vitals      Date/Time Pulse HR Source BP BP Location BP Method Pt Position [facility]   04/16/25 1331 95 Monitor 124/67 Left upper arm Automatic Sitting JR          PT Pain      Date/Time Pain Type Side/Orientation Location Rating: Rest Rating: Activity Interventions [facility]   04/16/25 1259 Pain Assessment left shoulder +neck -- \"not too bad\" 4 - moderate pain prescribed exercises encouraged              04/16/25 1402 Pain Reassessment left shoulder + neck -- 4 - moderate pain position adjusted returned to bed to rest prior to make up OT session JR               Prior Living Environment      Flowsheet Row Most Recent Value   People in Home spouse   Current Living Arrangements home   Home Accessibility stairs to enter home (Group), stairs within home (Group)   Living Environment Comment Multi-story home with 12-14 steps to second floor, powder room on 1st floor, 2-3 FELICITA via garage, multiple supportive family members   Number of Stairs, Main Entrance 3   Stair Railings, Main Entrance none   Location, Patient Bedroom second floor, must negotiate stairs to access   Location, Bathroom second floor, must negotiate stairs to access   Bathroom Access Comment powder room 1st floor standard height toilet, full bathroom 2nd floor WIS with glass door, standard height toilet   Stairs, Within Home, Primary 12   Stair Railings, Within Home, Primary railings on both sides of stairs            Prior Level of Function      Flowsheet Row Most Recent Value   Dominant Hand right   Ambulation independent   Transferring independent   Toileting independent   Bathing independent   Dressing independent   Eating independent   IADLs independent   Driving/Transportation  "   Communication understands/communicates without difficulty   Past History of Dysphagia PTA pt was completely independent, no AD. (+) . NPO PTA, manages peg tube independently.   Prior Level of Function Comment Independent without use of AE or DME.   Assistive Device Currently Used at Home none                 04/16/25 1259   PT Time Calculation   Start Time 1258   Stop Time 1403   Time Calculation (min) 65 min   Session Details   Document Type Discharge Evaluation   General Information   General Observations of Patient pt receieved in bed, agreeable to attempt therapy despite ongoing L neck/shoulder pain   Mobility Belt   Mobility Belt Used During Session yes   Cognition/Psychosocial   Orientation Status oriented x 4   Sensory Assessment (Somatosensory)   Left LE Sensory Assessment light touch awareness;light touch localization;proprioception;intact  (L2-S1 LT intact, proprioception @ gr toe 3/3)   Right LE Sensory Assessment light touch awareness;light touch localization;proprioception;intact  (L2-S1 LT intact, proprioception @ gr toe 3/3)   Range of Motion (ROM)   Left Lower Extremity (ROM) left LE ROM is WFL  (AROM)   Right Lower Extremity (ROM) right LE ROM is WFL  (AROM)   Strength (Manual Muscle Testing)   Left Lower Extremity Strength left LE strength is WFL   Right Lower Extremity Strength right LE strength is WFL   Edema Assessment & Management   Additional Documentation   (no BLE edema)   Bed Mobility   [place], Roll Left modified independence   Prather, Roll Right modified independence   [place], Supine to Sit modified independence   [place], Sit to Supine modified independence   Assistive Device bed rails   Comment in specialty hospital bed   Sit to Stand Transfer   [place], Sit to Stand Transfer modified independence   Assistive Device walker, front-wheeled   Comment from EOB/WC and toilet   Stand to Sit Transfer   [place]ather, Stand to Sit Transfer modified independence    Assistive Device walker, front-wheeled   Comment to EOB/WC and toilet   Stand Pivot Transfer   Adams, Stand Pivot/Stand Step Transfer modified independence   Car Transfer   Transfer Technique stand pivot   Adams set up   Assistive Device walker, front-wheeled   Comment requires assist for RW mgmt   Gait Training   Adams, Gait modified independence   Assistive Device walker, front-wheeled   Distance in Feet 200 feet   Pattern step-through   Deviations/Abnormal Patterns jean decreased;step length decreased   Bilateral Gait Deviations heel strike decreased   Comment 50' x 2 , 200'x 2 mod I without overt LOB or unsteadiness   Adams, Picking Up Object modified independence  (w/ RW and reacher)   Curb Negotiation   Adams modified independence   Assistive Device walker, front-wheeled   Curb Height 6 inches   Comment did not require cueing for RW mgmt, no LOB   Rough/Uneven Surface Gait Skills   Comment completed 4/15, please see note   Sloped Surface Gait Skills   Comment completed 4/15, please see note   10 Meter Walk Test (Self-Selected Velocity)   Trial One: Ten Meter Walk Test (sec) 13.28 seconds   Trial Two: Ten Meter Walk Test (sec) 13.38 seconds   Assistive Device walker, front-wheeled   Trial One: Gait Speed (m/s) 0.45 m/s   Trial Two: Gait Speed (m/s) 0.45 m/s   Average Gait Speed (m/s): Two Trials 0.45 m/s   Stairs Training   Comment completed 4/15, please see note   Wheelchair Mobility/Management   Adams, Forward Propulsion dependent (less than 25% patient effort)   Adams, Steering Activities dependent (less than 25% patient effort)   Adams, Turning Activities dependent (less than 25% patient effort)   Distance Propelled in Feet 150 feet   Comment, Wheelchair Mobility unable to tolerate propulsion d/t pain and faitgue   Balance   Static Sitting Balance WFL   Dynamic Sitting Balance WFL   Static Standing Balance WFL;supported   Dynamic Standing Balance  WFL;supported   Comment, Balance independently donned pants from sitting EOB, independently donned socks sitting in recliner   Motor Skills   Coordination WFL;bilateral;lower extremity  (per functional asssessment)   Functional Endurance fair, tolerated HHD mobility without issue, fatigue w/ limited community distances   Muscle Tone bilateral;lower extremity(s);WFL   Postural Deviations   Comment, Postural Deviations no notable postural changes   Bed Mobility Goal 1   Progress/Outcome goal met   Bed Mobility Goal 2   Progress/Outcome goal met   Transfer Goal 1   Progress/Outcome goal met   Transfer Goal 2   Progress/Outcome goal met   Gait/Walking Locomotion Goal 1   Progress/Outcome goal met   Gait/Walking Locomotion Goal 2   Progress/Outcome goal met   Stairs Goal 1   Progress/Outcome goal met   Stairs Goal 2   Progress/Outcome goal met   Discharge Summary (PT)   Discharge Summary Statement (PT) Pt is 69 year old male w/ C5-C7 ventral epidural abscess with osteomyelitis. MRI on 3/24 c/f metastatic disease at T1-T4, L4.      Pt underwent posterior C4-T1 decompression fusion on 3/27. Pt has made good progress during his rehab stay, now functioning at mod I level w/ RW. Gait speed continues fall below age norm values and below level required of community ambulators. He continues w/ impaired activity tolerance and fluctuating levels of pain. Pts wife has attended family training and is able to assist as needed. Pt was provided w/ an HEP and a RW from [facility] for DC. Pt will benefit w/ f/u from Home PT to address ongoing activity tolerance impairment and progress to LRAD.            Education Documentation  Treatment Plan, taught by Dianne Mercedes, PT at 4/16/2025  2:11 PM.  Learner: Patient  Readiness: Acceptance  Method: Explanation  Response: Verbalizes Understanding  Comment: Reviewed DC recs.          IRF PT Goals      Flowsheet Row Most Recent Value   Bed Mobility Goal 1    Activity/Assistive Device sit to  supine/supine to sit at 04/02/2025 0904   [place] supervision required at 04/02/2025 0904   Time Frame short-term goal (STG), 5 - 7 days at 04/10/2025 0805   Strategies/Barriers spinal precautions, strength,  whole practice training, therex at 04/10/2025 0805   Progress/Outcome goal met at 04/16/2025 1259   Bed Mobility Goal 2    Activity/Assistive Device bed mobility activities, all at 04/02/2025 0904   [place] modified independence at 04/02/2025 0904   Time Frame long-term goal (LTG), 14 days or less, by discharge at 04/10/2025 0805   Progress/Outcome goal met at 04/16/2025 1259   Transfer Goal 1    Activity/Assistive Device sit-to-stand/stand-to-sit, stand pivot at 04/02/2025 0904   [place] supervision required at 04/10/2025 0805   Time Frame short-term goal (STG), 5 - 7 days at 04/10/2025 0805   Strategies/Barriers evaluated 4/2, conitnue with PT POC at 04/03/2025 0856   Progress/Outcome goal met at 04/16/2025 1259   Transfer Goal 2    Activity/Assistive Device sit-to-stand/stand-to-sit, stand pivot at 04/02/2025 0904   [place] modified independence at 04/02/2025 0904   Time Frame long-term goal (LTG), 1 week at 04/10/2025 0805   Progress/Outcome goal met at 04/16/2025 1259   Gait/Walking Locomotion Goal 1    Activity/Assistive Device gait (walking locomotion), assistive device use at 04/02/2025 0904   Distance 150 feet at 04/02/2025 0904   [place] minimum assist (75% or more patient effort) at 04/02/2025 0904   Time Frame short-term goal (STG), 5 - 7 days at 04/10/2025 0805   Strategies/Barriers endurance, fatigue,  therex, endurance at 04/10/2025 0805   Progress/Outcome goal met at 04/16/2025 1259   Gait/Walking Locomotion Goal 2    Activity/Assistive Device gait (walking locomotion), assistive device use at 04/02/2025 0904   Distance 150 feet at 04/02/2025 0904   [place] modified independence at 04/02/2025 0904   Time Frame long-term goal (LTG), 1 week at 04/10/2025 0805    Progress/Outcome goal met at 04/16/2025 1259   Stairs Goal 1    Activity/Assistive Device ascending stairs, descending stairs, using handrail, left, using handrail, right at 04/02/2025 0904   Number of Stairs 12 at 04/02/2025 0904   [place] minimum assist (75% or more patient effort) at 04/02/2025 0904   Time Frame short-term goal (STG), 5 - 7 days at 04/03/2025 0856   Strategies/Barriers evaluated 4/2, continue PT POC at 04/03/2025 0856   Progress/Outcome goal met at 04/16/2025 1259   Stairs Goal 2    Activity/Assistive Device ascending stairs, descending stairs, using handrail, left, using handrail, right at 04/02/2025 0904   Number of Stairs 12 at 04/02/2025 0904   [place] supervision required at 04/02/2025 0904   Time Frame long-term goal (LTG), 1 week at 04/10/2025 0805   Progress/Outcome goal met at 04/16/2025 1259

## 2025-04-16 NOTE — PROGRESS NOTES
Pt decided to appeal dc tomorrow with Medicare. CM assisted pt in initiating appeal,fax received from [employer] and clinicals sent. Appeal pending. CM updated team. CM updated [facility]. D/C pending appeal.

## 2025-04-16 NOTE — PROGRESS NOTES
Patient: Celso Gramajo  Location: [facility] [address]  MRN: [id number]  Today's date: 4/16/2025    History of Present Illness  Celso is a 69 y.o. male admitted on 4/1/2025 with Spinal epidural abscess [G06.1]. Principal problem is Spinal epidural abscess.    Celso Gramajo is a 69 year old male with PMHx thyroid CA s/p thyroidectomy, RND, adjuvant XRT/immunotherapy, esophageal SCC s/p chemoradiation c/b esophageal strictures, HTN, Elevated Coronary Calcium scoring in 2017, ENMANUEL, Hypothyroidism, Anemia, dysphagia on PEG (placed in 2021) TFs, and history of CVA who presented to an OSH with neck and R shoulder pain. Workup was unrevealing and he was discharged home.    He presented to the [place] ED again on 3/17 with ongoing fevers. CT Neck with no abscess, old postsurgical changes. He d/c home but BCx subsequently returned positive on 3/19 and he returned for admission. He was also bacteremic with strep viridans, and placed on IV antibiotics. Workup revealed a C5-C7 ventral epidural abscess with osteomyelitis, and he was transferred to [facility] for surgical evaluation.    MRI on 3/24 c/f metastatic disease at T1-T4, L4.      At [facility] ortho and ENT were consulted. Ultimately it was determined that an anterior surgical approach was not safe (due to history of neck surgeries), and patient was having progressive weakness. Repeat MRI C-spine showed Discitis and osteomyelitis at C6-C7 with ventral epidural abscess compressing the spinal cord and increased edema. Patient urgently underwent POSTERIOR C4-T1 DECOMPRESSION FUSION WITH [place] INSTRUMENTATION on 3/27. Postop he was briefly in the ICU for MAP goals. OR cultures grew strep intermedius and he was placed on the antibiotic plan below. He had a PICC placed 4/1. 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 He was started on salt tabs. In rehab please continue to check labs, and titrate the salt tabs as  appropriate. He worked with PT/OT who recommended him for acute rehab.     Past Medical History  Celso has a past medical history of Anemia, Cancer (CMS/HCC), head and neck radiation, Hypertension, Hypothyroidism, Stroke (CMS/HCC), and Thyroid disease.    OT Vitals      Date/Time Pulse HR Source Resp SpO2 Pt Activity O2 Therapy BP MAP BP Location BP Method Pt Position [facility]                                 04/16/25 0803 80 Monitor -- -- At rest -- 84/52 -- Left upper arm Automatic Sitting    04/16/25 0828 79 Monitor -- -- At rest -- 125/65 -- Left upper arm Automatic Lying AA          OT Pain      Date/Time Pain Type Side/Orientation Location Rating: Rest Rating: Activity Description Interventions [facility]   04/16/25 0801 Pain Assessment shoulder bilateral 10 10 aching diversional activity provided    04/16/25 0803 Pain Assessment shoulder bilateral 10 10 aching diversional activity provided                Prior Living Environment      Flowsheet Row Most Recent Value   People in Home spouse   Current Living Arrangements home   Home Accessibility stairs to enter home (Group), stairs within home (Group)   Living Environment Comment Multi-story home with 12-14 steps to second floor, powder room on 1st floor, 2-3 FELICITA via garage, multiple supportive family members   Number of Stairs, Main Entrance 3   Stair Railings, Main Entrance none   Location, Patient Bedroom second floor, must negotiate stairs to access   Location, Bathroom second floor, must negotiate stairs to access   Bathroom Access Comment powder room 1st floor standard height toilet, full bathroom 2nd floor WIS with glass door, standard height toilet   Stairs, Within Home, Primary 12   Stair Railings, Within Home, Primary railings on both sides of stairs            Prior Level of Function      Flowsheet Row Most Recent Value   Dominant Hand right   Ambulation independent   Transferring independent   Toileting independent   Bathing independent   Dressing  "independent   Eating independent   IADLs independent   Driving/Transportation    Communication understands/communicates without difficulty   Past History of Dysphagia PTA pt was completely independent, no AD. (+) . NPO PTA, manages peg tube independently.   Prior Level of Function Comment Independent without use of AE or DME.   Assistive Device Currently Used at Home none            Occupational Profile      Flowsheet Row Most Recent Value   Occupational History/Life Experiences retired involved in [employer] on [employer]. + , -handicap driving placard.   Patient Goals PSFS: going to the bathroom 0/10, walking 1/10, getting in/out of bed 0/10 = 1/30 = 3.33%             IRF OT Evaluation and Treatment - 04/16/25 0805          OT Time Calculation    Start Time 0800     Stop Time 0830     Time Calculation (min) 30 min        Session Details    Document Type Discharge Evaluation        General Information    Patient/Family/Caregiver Comments/Observations \"This is the worse I have ever felt. I had rigor mortis earlier in my hands/feet\" - pt     General Observations of Patient Pt recieved for therapy in w/c receiving feed.        Mobility Belt    Mobility Belt Used During Session no - independent with all mobility        Orthosis Neck Cervical Collar    Orthosis Properties Date Obtained: 04/03/25 Time Obtained: 0825 Location: Neck Type: Cervical Collar Features: Soft Description: D/C Gregory J collar. If patient desires a he can wear a soft collar as needed for comfort.       Bed Mobility    Comment OT: Mod I supine <> sit on EOB.        Sit to Stand Transfer    [place], Sit to Stand Transfer modified independence     Assistive Device walker, front-wheeled     Comment from recliner chair        Stand to Sit Transfer    [place], Stand to Sit Transfer modified independence     Assistive Device walker, front-wheeled     Comment to EOB        Toilet Transfer    [place] supervision     " "Comment per last doc status: S sit > stand from accessible height toilet + grab bar        Shower Transfer    [place] supervision   per staff report.    Comment per staff report: S sit/stand from extended tub bench in barrier free shower        Impairments/Safety Issues    Comment, Safety Issues/Impairments OT: Mod I for HHM c RW short HHD. Slow movements noted d/t increased fatigue. No LOB. Pt reports feeling \"shaky\".        Bathing    Shower Provided? No, patient refused     [place] minimum assist (75% or more patient effort)   per staff report    Comment Per staff report, assist to wash buttocks/feet prn        Upper Body Dressing    [place] supervision   per staff report    Comment per staff report: pt able to don button up shirt        Lower Body Dressing    [place] supervision   per staff report    [place], Footwear supervision   per staff report    Comment Per staff report, pt able to don pants, underwear and socks in figure 4 sit. Inc time required        Grooming    [place] modified independence     [place], Oral Hygiene modified independence     Comment per last doc: able to expel into kidney dish and emesis bag, declined mouthwash, completed shaving with electric razor        Toileting    Tasks perform bladder hygiene;adjust/manage clothing     [place] modified independence     Position sitting up in bed     Adaptive Equipment urinal     Comment Mod I for pt to use urinal to void.        Daily Progress Summary (OT)    Daily Outcome Statement Pt with increased pain on this date, unable to obtain final assessment in some areas of ADLs, information obtained per staff report/last doc status. MD consulted, pt to be placed on AM med hold.        Discharge Summary (OT)    Outcomes Achieved Upon Discharge (OT) progress was made toward goals     Discharge Summary Statement (OT) Pt is a 69 year old male who was admitted to [facility] on 4/1 s/p posterior C4-T1 decompression fusion. " At time of OT evaluation pt was Mod A for transfers and required up to total A for ADLs. Since SOC pt has progressed to performing functional transfers at a S-Mod I level. Pt is Min A (for bathing per nursing report, refused to perform during end of stay with OT for better assessment) and grossly S-Mod I for ADLs. Pts wife present for family training on multiple occasions. She obtained recommended DME (shower chair and toilet safety frame). Rec waiting until home health OT is present to assess shower stall transfer to ensure correct technique. Pt was instructed to not drive until cleared by surgeon, handicapped placard provided. Pt was issued multiple UE HEP. Pt with improvements in hand strength/coordination in both /pinch and Box and blocks/9 hole peg test assessments. On PD, pt with increased c/o pain and placed on med hold, therefore unable to fully progress to Mod I as anticipated and information in d/c note based on last known status/staff report. Pt limited by pain, decreased endurance, impaired balance, and sensory deficits in BUE/BLE. Pt would benefit from home health OT to address functional deficits and to progress pt to Mod I status.     Transfer to Another Level of Care or Facility (OT) recommend therapy via home health                          Education Documentation  No documentation found.        IRF OT Goals      Flowsheet Row Most Recent Value   Transfer Goal 1    Activity/Assistive Device toilet at 04/02/2025 0723   [place] supervision required at 04/09/2025 0949   Time Frame short-term goal (STG), 5 - 7 days at 04/09/2025 0949   Strategies/Barriers pt just evlauated at 04/03/2025 0754   Progress/Outcome goal met at 04/16/2025 0805   Transfer Goal 2    Activity/Assistive Device toilet at 04/02/2025 0723   [place] modified independence at 04/02/2025 0723   Time Frame long-term goal (LTG), 21 days or less at 04/02/2025 0723   Progress/Outcome good progress toward goal, goal not met at  04/16/2025 0805   Transfer Goal 3    Activity/Assistive Device shower at 04/02/2025 0723   [place] supervision required at 04/09/2025 0949   Time Frame short-term goal (STG), 5 - 7 days at 04/09/2025 0949   Strategies/Barriers pt just evlauated at 04/03/2025 0754   Progress/Outcome goal met at 04/16/2025 0805   Transfer Goal 4    Activity/Assistive Device shower at 04/02/2025 0723   [place] supervision required at 04/09/2025 0949   Time Frame long-term goal (LTG), 14 days or less at 04/09/2025 0949   Progress/Outcome goal met at 04/16/2025 0805   Bathing Goal 1    [place] minimum assist (75% or more patient effort) at 04/09/2025 0949   Time Frame short-term goal (STG), 5 - 7 days at 04/09/2025 0949   Strategies/Barriers pt just evlauated at 04/03/2025 0754   Progress/Outcome goal met at 04/16/2025 0805   Bathing Goal 2    [place] supervision required at 04/02/2025 0723   Time Frame long-term goal (LTG), 21 days or less at 04/02/2025 0723   Progress/Outcome good progress toward goal, goal not met at 04/16/2025 0805   UB Dressing Goal 1    [place] supervision required at 04/09/2025 0949   Time Frame short-term goal (STG), 5 - 7 days at 04/09/2025 0949   Strategies/Barriers pt just evlauated at 04/03/2025 0754   Progress/Outcome goal met at 04/16/2025 0805   UB Dressing Goal 2    [place] modified independence at 04/09/2025 0949   Time Frame long-term goal (LTG), 14 days or less at 04/09/2025 0949   Progress/Outcome good progress toward goal, goal not met at 04/16/2025 0805   LB Dressing Goal 1    [place] supervision required at 04/09/2025 0949   Time Frame short-term goal (STG), 5 - 7 days at 04/09/2025 0949   Strategies/Barriers pt just evlauated at 04/03/2025 0754   Progress/Outcome goal met at 04/16/2025 0805   LB Dressing Goal 2    [place] modified independence at 04/02/2025 0723   Time Frame long-term goal (LTG), 21 days or less at 04/02/2025 0723   Progress/Outcome good  progress toward goal, goal not met at 04/16/2025 0805   Grooming Goal 1    [place] set-up required at 04/02/2025 0723   Time Frame short-term goal (STG), 5 - 7 days at 04/09/2025 0949   Strategies/Barriers progressing to standing v seated,  continue to work on standing balance to maximize IND at 04/09/2025 0949   Progress/Outcome goal met at 04/16/2025 0805   Grooming Goal 2    [place] modified independence at 04/02/2025 0723   Time Frame long-term goal (LTG), 21 days or less at 04/02/2025 0723   Progress/Outcome goal met at 04/16/2025 0805   Toileting Goal 1    [place] supervision required at 04/09/2025 0949   Time Frame short-term goal (STG), 5 - 7 days at 04/09/2025 0949   Strategies/Barriers pt just evlauated at 04/03/2025 0754   Progress/Outcome goal met at 04/16/2025 0805   Toileting Goal 2    [place] modified independence at 04/02/2025 0723   Time Frame long-term goal (LTG), 21 days or less at 04/02/2025 0723   Progress/Outcome goal met at 04/16/2025 0805

## 2025-04-16 NOTE — CONSULTS
Wound Ostomy Continence Note    Subjective    HPI Patient is a 69 y.o. male who was admitted on 4/1/2025 with a diagnosis of Spinal epidural abscess [G06.1]  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 [G06.1].    Problem list Principal Problem:    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  Active Problems:    Acute post-operative pain     PMH/PSH 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 drug thru [facility]     Hx of head and neck radiation     Hypertension     Hypothyroidism     Stroke (CMS/HCC)     Thyroid disease      Past Surgical History   Procedure Laterality Date    Cholecystectomy      Hernia repair      Micro direct laryngoscopy with fat implantation, abdominal fat harvest,laser N/A 11/23/2020    Performed by Jno German MD at [facility]    Neck dissection      Neck surgery      [place] EGD INSERT GUIDE WIRE DILATOR PASSAGE ESOPHAGUS [12704 (CPT®)] N/A 10/17/2022    Performed by Ricardo Cabral MD at [facility]    Thyroid surgery      Tumor removal      L neck 20yrs ago       Assessment and Recommendation              04/16/25 1030   Rash 04/01/25 2241 Right anterior greater trochanter   Date First Assessed/Time First Assessed: 04/01/25 2241   Side: Right  Orientation: anterior  Location: greater trochanter   Distribution localized   Characteristics dry   Color pink   Rash Care antifungal applied   Recommendation perifoam cleanser and Nystatin powder   Rash 04/01/25 2242 Left anterior greater trochanter   Date First Assessed/Time First Assessed: 04/01/25 2242   Side: Left  Orientation: anterior  Location: greater trochanter   Distribution localized   Characteristics dry   Color pink   Rash Care antifungal applied   Recommendation perifoam cleanser and antifungal   Rash 04/01/25 2244 coccyx   Date First Assessed/Time First Assessed: 04/01/25 2244   Location: coccyx   Distribution localized   Characteristics dry   Color pink;red    Lesion Size   (1.1cm x 1cm)   Rash Care barrier applied   Recommendation barrier bid, offload   Rash 04/16/25 1051 Right heel   Date First Assessed/Time First Assessed: 04/16/25 1051   Side: Right  Location: heel   Distribution localized   Characteristics dry   Color other (see comments)  (white, dry skin, Xerosis)   Lesion Size   (1.2cm x 1.8cm)   Recommendation monitor bid, offload   Surgical Incision Neck Posterior   Date First Assessed/Time First Assessed: 04/01/25 2250   Location: Neck  Wound Location Orientation: Posterior   Incision WDL ex   Dressing Appearance   (open to air)   Appearance well approximated;other (see comments)  (proximal portion scabbed)   Drainage Characteristics/Odor no odor   Drainage Amount none   Wound Length (cm) 10.2 cm   Recommendation monitor bid, open to air   Wound Pressure Injury Left Sternum   Date First Assessed/Time First Assessed: 04/06/25 1048   Present on Original Admission: No  Primary Wound Type: Pressure Injury  Wound Location Orientation: Left  Location: Sternum  Wound Description (Comments): PI vs abrasion   Pressure Injury Stage U   Dressing Appearance no drainage;intact   Wound Appearance full thickness;slough;yellow   Periwound Appearance Intact;Pink;Reddened   Drainage Characteristics/Odor no odor   Drainage Amount none   Wound Care cleansed with;medical grade honey;sterile normal saline   Dressing Dressing removed;Dressing applied;gauze;foam;hydrofiber;silver impregnated dressing   Frequency of Dressing Change Daily   Base yellow;slough   Wound Length (cm) 1.2 cm   Wound Width (cm) 1.4 cm   Wound Depth (cm) 0 cm   Wound Surface Area (cm^2) 1.32 cm^2   Wound Volume (cm^3) 0 cm^3   % Healing 100 %   Slough % 100   Recommendation NSS, Medihoney, hydrofiber with silver, gauze, foam     Seen for skin assessment. Labs: 4/14/25: WBC: 5.17.  Wife present in room.   Rash bilateral groin, MASD, Nystatin powder applied.       Rash coccyx, barrier cream applied.     Rash  "right heel, Xerosis, linear areas of dry skin,     PI sternum, unstageable PI, 100% yellow slough, NSS, Medihoney, hydrofiber with silver, gauze, foam applied. Soft cervical collar applied.         Posterior neck incision, scab proximal portion, measures .8cm x .4cm. Open to air. Tissue red, no drainage.         Suggest: Maintain the PREVENT bundle for PI prevention. The most recent Terrence score is an 18. This patient is at risk for the development of a PI. Offload heels from bed, turn q 2 hours in bed, weight shift in wheelchair, q 30 minutes.   Monitor neck incision bid.   Continue NSS, Medihoney, hydrofiber with silver, gauze, foam to wound daily.  Follow up with wound center if indicated for left sternal PI.   Barrier cream , antifungal bid, to buttocks, Antifungal bid to groin, bilateral for MASD, rash.   Consider Santyl to sternal wound and D/C Medihoney, if indicated.   Consider suggesting follow up to wound center at discharge if indicated.     Wound Prevention Bundle                                                            Positioning- If clinically able:  Reposition at a minimum of 2 hours.  Adjust to avoid lying on medical devices. Use positioning devices to offload bony prominences. When out of bed, use pressure redistribution cushions. Monitor time out of bed and encourage repositioning. Use transfer aids to reduce friction and shear. Use \"turn assist\" function on bed surface if equipped.                  Risk Assessment:   Utilize risk assessment scale per hospital guidelines.  Identify additional risk factors, for example, medications and comorbidities. Match interventions to subscales of hospital's Risk assessment scale.  Monitor laboratory values. Communicate risk to interdisciplinary healthcare team. Consider use of prophylactic silicone foam sacral dressing.                  Evaluate Surface/Pressure Redistribution:   Utilize \"less is best\" with linen usage.                Vigilant Skin " Inspection:   Inspect skin from head to toe, including areas under removable medical devices and dressings. Communicate skin issues to healthcare team and consult resources as needed.                   Evaluate incontinence/moisture/temperature:   Offer toileting when appropriate. Keep skin clean and dry (microclimate). Use moisture barrier products for incontinence care. Diapers/briefs for out of bed or off unit. Use absorbent under pads that wick away and hold moisture. Intervene for fecal and/or urinary incontinence (consider external containment devices. Use skin emollients (dry skin).                  Nutrition:   Consult dietician for at risk patients. Assist with menu preferences and feeding. Encourage snacks, fluid and supplements with rounding. Accurately record all intake where indicated.           Teaching Patients and Families:   Encourage patients and families to partner with staff in preventing pressure injuries. Give patients and families pressure injury education, assess their understanding of education given, and document education.        Sabrina Diaz RN  4/16/2025  4:17 PM                                                                            WOC consult is completed. Will follow.     Date: 04/16/25  Signature: Sabrina Diaz RN

## 2025-04-16 NOTE — PLAN OF CARE
Plan of Care Review  Plan of Care Reviewed With: patient  Progress: improving  Outcome Evaluation: Pt is alert oriented. C/o pain managed with PRN dilaudid and Tylenol. Continent with bladder, No BM during shift. NPO, meds via peg tube, Sterile water flush 300ml HS and 500ml in AM. RUE PICC SL is patent and easy to flush,  IV ancef q24. C/o nausea/Zofran PRN provided with positive effects. Turning and repositioning. Pt manages oral suctioning with kadeem. Slept good. All meds and care reviewed with pt.

## 2025-04-16 NOTE — PROGRESS NOTES
Psychiatry Progress Note    History of Present Illness   See initial consult.  History pertaining to psychosis, depression and anxiety and other psychiatric and psychologic conditions as well as general medical history detailed in initial consult.      Interval History:   Sleeping bettter on ambien  Responding to support  Energy okay  Interactive  Sodium wnl  Feels more positively about progress in therapy  Secondary benefit for mood  Sleep pattern more consistent  Energy continues to improve  Frustration level manageable  Seems less tense with some improved affect noted  Discussed with nursing - no problems overnight or any behavioral concerns  Does not report any concerns about psychotropic medicines     MENTAL STATUS EXAM  Appearance: well groomed  Gait and Motor: no abnormal movements  Speech: normal rate/rhythm/volume  Mood: depressed and anxious  Affect: constricted  Associations: coherent  Thought Process: goal-directed  Thought Content: no auditory or visual hallucinations.  Suicidality/Homicidality: denies  Judgement/Insight: acknowledges illness  Orientation: situation  Memory: knows current president  Attention: distracted  Knowledge: normal  Language: normal      Vital Signs for the last 24 hours:  Temp:  [36.4 °C (97.6 °F)-37 °C (98.6 °F)] 36.4 °C (97.6 °F)  Heart Rate:  [76-99] 88  Resp:  [18] 18  BP: (107-145)/(61-85) 145/85    Labs:  Labs below reviewed for pertinence to psychiatric condition  Lab Results   Component Value Date    GLUCOSE 138 (H) 04/14/2025    CALCIUM 9.4 04/14/2025     (L) 04/14/2025    K 4.4 04/14/2025    CO2 31 04/14/2025    CL 96 (L) 04/14/2025    BUN 16 04/14/2025    CREATININE 0.6 (L) 04/14/2025     Lab Results   Component Value Date    WBC 5.17 04/14/2025    HGB 12.5 (L) 04/14/2025    HCT 38.3 (L) 04/14/2025    MCV 94.8 04/14/2025     04/14/2025     Pain Management Panel           No data to display                  Current Facility-Administered Medications    Medication Dose Route Frequency Provider Last Rate Last Admin    acetaminophen (TYLENOL) 650 mg/20.3 mL solution 650 mg  650 mg peg tube q6h PRN Spencer Kemp MD   650 mg at 04/15/25 1956    bisacodyL (DULCOLAX) 10 mg suppository 10 mg  10 mg rectal Daily PRN Spencer Kemp MD   10 mg at 04/14/25 0612    cefTRIAXone (ROCEPHIN) 2 g in sodium chloride 0.9 % 100 mL IVPB  2 g intravenous q24h INT Spencer Kemp MD   Stopped at 04/16/25 0611    docusate sodium (COLACE) 50 mg/5 mL liquid 100 mg  100 mg peg tube BID Spencer Kemp MD   100 mg at 04/15/25 1607    enoxaparin (LOVENOX) syringe 40 mg  40 mg subcutaneous Daily (6p) Spencer Kemp MD   40 mg at 04/15/25 1741    ezetimibe (ZETIA) tablet 10 mg  10 mg peg tube Nightly Spencer Kemp MD   10 mg at 04/15/25 2122    famotidine (PEPCID) tablet 20 mg  20 mg peg tube Nightly Spencer Kemp MD   20 mg at 04/15/25 2122    FLUoxetine (PROzac) 20 mg/5 mL (4 mg/mL) solution 20 mg  20 mg peg tube Daily Spencer Kemp MD   20 mg at 04/15/25 0814    glycopyrrolate (ROBINUL) tablet 1 mg  1 mg peg tube 2x daily PRN Spencer Kemp MD   1 mg at 04/11/25 0850    honey (MEDIHONEY) 100 % topical paste   Topical Daily Spencer Kemp MD   Given at 04/15/25 0822    HYDROmorphone (DILAUDID) tablet 2 mg  2 mg peg tube q6h PRN Spencer Kemp MD   2 mg at 04/16/25 0626    lansoprazole (PREVACID) 3 mg/mL oral suspension 15 mg  15 mg feeding tube Daily before breakfast Spencer Kemp MD   15 mg at 04/16/25 0626    levothyroxine (SYNTHROID) tablet 150 mcg  150 mcg peg tube Daily (6:30a) Spencer Kemp MD   150 mcg at 04/16/25 0538    linaCLOtide (LINZESS) capsule 145 mcg  145 mcg g-tube Daily before lunch Spencer Kemp MD   145 mcg at 04/15/25 1607    linaCLOtide (LINZESS) capsule 145 mcg  145 mcg peg tube Daily PRN Spencer Kemp MD   145 mcg at 04/15/25 1608    liothyronine (CYTOMEL) tablet 10 mcg  10 mcg peg tube  Daily (6:30a) Spencer Kemp MD   10 mcg at 04/16/25 0538    LORazepam (ATIVAN) tablet 0.25 mg  0.25 mg peg tube q8h PRN Spencer Kemp MD        magnesium hydroxide (M.O.M.) 400 mg/5 mL suspension 30 mL  30 mL oral 2x daily PRN Spencer Kemp MD   30 mL at 04/13/25 1445    ondansetron ODT (ZOFRAN-ODT) disintegrating tablet 4 mg  4 mg peg tube q6h PRN Spencer Kemp MD   4 mg at 04/15/25 1948    senna (SENOKOT) tablet 1 tablet  1 tablet peg tube 2x daily PRN Spencer Kemp MD   1 tablet at 04/15/25 0813    senna (SENOKOT) tablet 2 tablet  2 tablet peg tube BID Spencer Kemp MD   2 tablet at 04/15/25 1607    simethicone (MYLICON) chewable tablet 160 mg  160 mg peg tube QID Spencer Kemp MD   160 mg at 04/15/25 2122    sodium chloride PF flush 10 mL  10 mL intravenous q8h INT Spencer Kemp MD   10 mL at 04/16/25 0534    sodium chloride PF flush 10 mL  10 mL intravenous PRN Spencer Kemp MD        sodium chloride tablet 1 g  1 g peg tube Daily Spencer Kemp MD   1 g at 04/15/25 0813    sodium phosphates (FLEET) enema adult 1 enema  1 enema rectal Daily PRN Spencer Kemp MD   1 enema at 04/10/25 1306    zolpidem (AMBIEN) tablet 5 mg  5 mg peg tube Nightly Rohit Acevedo MD   5 mg at 04/15/25 2122        Patient Active Problem List   Diagnosis    Vertigo    Hypertension    Postsurgical hypothyroidism    Mixed hyperlipidemia    Gastroesophageal reflux disease    Increased sputum production    Allergic rhinitis    Change in bowel habits    Chronic fatigue syndrome    Constipation, chronic    Deviated nasal septum    Elevated coronary artery calcium score    Eustachian tube dysfunction, bilateral    Examination of participant in clinical trial    Induration penis plastica    Hypertrophy of nasal turbinates    Hx of colonic polyps    Malignant neoplasm of salivary gland (CMS/HCC)    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)    Paralysis of  vocal cords and larynx, unilateral    ENMANUEL (obstructive sleep apnea)    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)    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)    Penile curvature, acquired    Rhinitis sicca    Thyroid cancer (CMS/HCC)    Sensorineural hearing loss (SNHL), bilateral    Other dysphagia    Abdominal pain    Other dysphagia    COVID-19    Encounter for follow-up surveillance of thyroid cancer    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           Assessment/Plan    Depression: CBT automatic anxious and negative thoughts  Adjustment Disorder to Disability: supportive therapy  Sleep:  Insight into illness: insight therapy/psychoeducation  Psychotherapy: supportive  Monitor: Mood, Speech, Appetite, Energy, Cognition, Behavioral, Impulsivity, Agitation  Family Support  Medications: monitor for side effects  - presently no gi, akathesia , ha or sedation  Sodium 134  prozac 20  Prn ativan  Cbt  Educate on meds  If sodium continues to drop would consider discontinuing Prozac if there is not another source.  Address automatic negtive thoguhts  Support coping with atd  Monitor progress in therapy  Limit narcotic as possible  Relaxation techniques for pain  Discussed ativan use  Stop trazodone  Ambien prn   Sodium ok  Responding to support  Trey Meyer MD  4/16/2025

## 2025-04-16 NOTE — PROGRESS NOTES
"Inpatient Psychology Progress Note    Duration: 30 minutes  Celso : 1955, a 69 y.o. male, for follow up visit.    Reason:  He has been experiencing frustration, depression, new onset of pain and difficulties post horrible ride in wheelchair van to [place].    HPI: See initial psych note; SCI    Current Evaluation:     Mental Status Evaluation:  Arousal Level: Attentive  Appearance: Other: (in bed with hospital gown on)  Speech: Regular  Psychomotor Functioning: Decreased  Eye Contact: WNL  Est. Premorbid Intelligence: Above average  Orientation: Fully oriented  Attention: WNL  Concentration: WNL  Recent Memory: WNL  Remote Memory: WNL  Thought Content: Appropriate  Thought Process: WNL, Negativity  Insight: Intact  Perceptual Function: Pain  Delusions: None or age appropriate  Sleeping: Decreased (pain impacted)  Appetite: NPO  Affect: Full Range  Mood: Frustrated, Motivated, Angry        Additional Assessments done this visit:     [place] Suicide Severity Rating Scale:  Not indicated           Safe-T Assessment:  Not indicated        Session Summary:     Psych met with Celso in his room. He was with  - He is looking to appeal his d/c tomorrow.   Yeserday had  Office visit hour and a half for stitches in wheelchair van. Due to bumpiness and being in wheelchair and not bed, he came back in excrutiating pain. Hands and feet worse this AM. Neck still in pain. Can't walk since the van ride, prior to yesterday was walking up to 200 feet. He is depressed and frustrated, feeling that \"all the progress is being flushed away. \"    Before the ride, pain decreasing, improving, WAS ready to go with the progress he was making. This was BEFORE the ride that happened.     He was agreeable to doing a guided imagery task with psychologist in remainder of session. He was willing to make attempts with PT and OT after this session to get some exercise in vs. Staying in bed.     Psychologist followed up in the " "late afternoon and Celso was up with nursing. He reported that he did get out and work with PT and OT and was able to do exercises, was able to see that some of the movement helped with lowering pain. He had a slightly improved mood and was a little more positive about going home, though he still would want more time if given. Psychologist gave Celso some handouts on breathing, PMR, cognitive thinking traps, and a grounding exercise.      Goals Addressed                   This Visit's Progress     Improve coping skills   Improving     Improve Mood   No change     Increase adjustment to disposition/discharge   Improving     Increase adjustment to rehabilitation facility.   Improving            Interventions  Acceptance & Adjustment  Cognitive Skills Training  Monitoring of Symptoms  Pain Management  Self Monitoring Training    Psychoeducation provided on:  Spinal Cord Injury and Recovery and Depression Anxiety     Recommendations:      No Further sessions; team will contact Psychology as needed. and No Further sessions; Patient pending discharge        Visit Diagnosis:     1. Adjustment disorder with mixed anxiety and depressed mood        Diagnostic Impression:   Weekly Progress Summary: progressing toward goals slower than expected  Weekly Outcome Statement: Psych met with Celso in his room. He was with  - He is looking to appeal his d/c tomorrow.   Yeserday had  Office visit hour and a half for stitches in wheelchair van. Due to bumpiness and being in wheelchair and not bed, he came back in excrutiating pain. Hands and feet worse this AM. Neck still in pain. Can't walk since the van ride, prior to yesterday was walking up to 200 feet. He is depressed and frustrated, feeling that \"all the progress is being flushed away. \"    Before the ride, pain decreasing, improving, WAS ready to go with the progress he was making. This was BEFORE the ride that happened.     He was agreeable to doing a guided imagery " task with psychologist in remainder of session. He was willing to make attempts with PT and OT after this session to get some exercise in vs. Staying in bed.     Psychologist followed up in the late afternoon and Celso was up with nursing. He reported that he did get out and work with PT and OT and was able to do exercises, was able to see that some of the movement helped with lowering pain. He had a slightly improved mood and was a little more positive about going home, though he still would want more time if given. Psychologist gave Celso some handouts on breathing, PMR, cognitive thinking traps, and a grounding exercise.    Psychological condition is generally slowly improving.       Myles Gaviria, SHELBY.D @ 9:59 PM

## 2025-04-16 NOTE — PLAN OF CARE
"Care Coordination Discharge Plan Summary    Admission Assessment Summary    General Information  Readmission Within the last 30 days: unable to assess  Does patient have a : No  Patient-Specific Goals (include timeframe): \"I want to walk more and do stairs when I get home to be able to get to my bedroom.\"    Living Arrangements  Arrived From: hospital  Current Living Arrangements: home  People in Home: spouse  Home Accessibility: stairs to enter home (Group), stairs within home (Group)  Living Arrangement Comments: pt lives w spouse in 2SH w 2-3 FELICITA through garage, AR 1st floor and FF to 2nd floor where full bathroom and bedroom are. pts spouse works full time    Social [employer] - Screenings  Housing Stability  In the last 12 months, was there a time when you were not able to pay the mortgage or rent on time?: No  In the past 12 months, how many times have you moved where you were living?: 0  At any time in the past 12 months, were you homeless or living in a shelter (including now)?: No  Utility Access  In the past 12 months has the electric, gas, oil, or water company threatened to shut off services in your home?: No  Transportation Needs  In the past 12 months, has lack of transportation kept you from medical appointments or from getting medications?: No  In the past 12 months, has lack of transportation kept you from meetings, work, or from getting things needed for daily living?: No    Functional Status Prior to Admission  Assistive Device/Animal Currently Used at Home: none  Functional Status Comments: independent  IADL Comments: independent    Discharge Needs Assessment    Concerns to be Addressed: caregiver training, care coordination/care conferences, discharge planning, adjustment to diagnosis/illness  Current Discharge Risk: physical impairment  Anticipated Changes Related to Illness: inability to care for self    Discharge Plan Summary    Patient Choice  Offered/Gave Vendor " List: yes ([facility] [place])  Patient and/or patient guardian/advocate was made aware of their right to choose a provider. A list of eligible providers was presented and reviewed with the patient and/or patient guardian/advocate in written and/or verbal form. The list delineates providers in the patients desired geographic area who are participating in the Medicare program and/or providers contracted with the patients primary insurance. The Medicare list and quality ratings were obtained from the Medicare.gov [medicare.gov] website.    Concerns / Comments: this dc is pending Medicare appeal outcome    Discharge Plan:  Disposition/Destination: Home Health Care - [facility] / Home       Connection to Community  Not applicable  Community Resources:  n/a    Discharge Transportation:  Is Out of Hospital DNR needed at Discharge: no  Does patient need discharge transport? Yes  Discharge Transportation Vendor: other (see comment)  Type of Transportation:  (private vehicle, pts spouse)  What day is the transport expected?: 04/17/25  What time is the transport expected?: 1600

## 2025-04-16 NOTE — PROGRESS NOTES
Patient: Celso Gramajo  Location: [facility] [address]  MRN: [id number]  Today's date: 4/16/2025    History of Present Illness  Celso is a 69 y.o. male admitted on 4/1/2025 with Spinal epidural abscess [G06.1]. Principal problem is Spinal epidural abscess.    Celso Gramajo is a 69 year old male with PMHx thyroid CA s/p thyroidectomy, RND, adjuvant XRT/immunotherapy, esophageal SCC s/p chemoradiation c/b esophageal strictures, HTN, Elevated Coronary Calcium scoring in 2017, ENMANUEL, Hypothyroidism, Anemia, dysphagia on PEG (placed in 2021) TFs, and history of CVA who presented to an OSH with neck and R shoulder pain. Workup was unrevealing and he was discharged home.    He presented to the [facility] ED again on 3/17 with ongoing fevers. CT Neck with no abscess, old postsurgical changes. He d/c home but BCx subsequently returned positive on 3/19 and he returned for admission. He was also bacteremic with strep viridans, and placed on IV antibiotics. Workup revealed a C5-C7 ventral epidural abscess with osteomyelitis, and he was transferred to [facility] for surgical evaluation.    MRI on 3/24 c/f metastatic disease at T1-T4, L4.      At [facility] ortho and ENT were consulted. Ultimately it was determined that an anterior surgical approach was not safe (due to history of neck surgeries), and patient was having progressive weakness. Repeat MRI C-spine showed Discitis and osteomyelitis at C6-C7 with ventral epidural abscess compressing the spinal cord and increased edema. Patient urgently underwent POSTERIOR C4-T1 DECOMPRESSION FUSION WITH [place] INSTRUMENTATION on 3/27. Postop he was briefly in the ICU for MAP goals. OR cultures grew strep intermedius and he was placed on the antibiotic plan below. He had a PICC placed 4/1. 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 He was started on salt tabs. In rehab please continue to check labs, and titrate the salt tabs as  appropriate. He worked with PT/OT who recommended him for acute rehab.     Past Medical History  Celso has a past medical history of Anemia, Cancer (CMS/HCC), head and neck radiation, Hypertension, Hypothyroidism, Stroke (CMS/HCC), and Thyroid disease.    OT Vitals      Date/Time Pulse HR Source Pt Activity BP BP Location BP Method Pt Position [facility]   04/16/25 1430 80 Monitor At rest 95/62 Left upper arm Automatic Sitting AA          OT Pain      Date/Time Pain Type Side/Orientation Location Rating: Rest Rating: Activity Description Interventions [facility]   04/16/25 1430 Pain Assessment shoulder bilateral 8 8 aching diversional activity provided AA   04/16/25 1458 Pain Assessment shoulder bilateral 8 8 aching diversional activity provided AA               Prior Living Environment      Flowsheet Row Most Recent Value   People in Home spouse   Current Living Arrangements home   Home Accessibility stairs to enter home (Group), stairs within home (Group)   Living Environment Comment Multi-story home with 12-14 steps to second floor, powder room on 1st floor, 2-3 FELICITA via garage, multiple supportive family members   Number of Stairs, Main Entrance 3   Stair Railings, Main Entrance none   Location, Patient Bedroom second floor, must negotiate stairs to access   Location, Bathroom second floor, must negotiate stairs to access   Bathroom Access Comment powder room 1st floor standard height toilet, full bathroom 2nd floor WIS with glass door, standard height toilet   Stairs, Within Home, Primary 12   Stair Railings, Within Home, Primary railings on both sides of stairs            Prior Level of Function      Flowsheet Row Most Recent Value   Dominant Hand right   Ambulation independent   Transferring independent   Toileting independent   Bathing independent   Dressing independent   Eating independent   IADLs independent   Driving/Transportation    Communication understands/communicates without difficulty   Past  "History of Dysphagia PTA pt was completely independent, no AD. (+) . NPO PTA, manages peg tube independently.   Prior Level of Function Comment Independent without use of AE or DME.   Assistive Device Currently Used at Home none            Occupational Profile      Flowsheet Row Most Recent Value   Occupational History/Life Experiences retired involved in [employer] on [employer]. + , -handicap driving placard.   Patient Goals PSFS: going to the bathroom 7/10, walking 5/10, getting in/out of bed 7/10 = 19/30 = 63.3%             IRF OT Evaluation and Treatment - 04/16/25 1433          OT Time Calculation    Start Time 1430     Stop Time 1500     Time Calculation (min) 30 min        Session Details    Document Type Discharge Evaluation        General Information    General Observations of Patient Pt recieved for therapy with direct handoff from nursing staff after toileting.        Mobility Belt    Mobility Belt Used During Session yes        Orthosis Neck Cervical Collar    Orthosis Properties Date Obtained: 04/03/25 Time Obtained: 0825 Location: Neck Type: Cervical Collar Features: Soft Description: D/C [place] J collar. If patient desires a he can wear a soft collar as needed for comfort.       Cognitive Pattern Assessment    Cognitive Pattern Assessment Used BIMS        Brief Interview for Mental Status (BIMS)    Repetition of Three Words (First Attempt) 3     Temporal Orientation: Year Correct     Temporal Orientation: Month Accurate within 5 days     Temporal Orientation: Day Correct     Recall: \"Sock\" Yes, no cue required     Recall: \"Blue\" Yes, no cue required     Recall: \"Bed\" Yes, no cue required     BIMS Summary Score 15        Confusion Assessment Method (CAM)    Is there evidence of an acute change in mental status from the patient's baseline? No     Inattention Behavior not present     Disorganized thinking Behavior not present     Altered level of consciousness Behavior not present        Range of " "Motion (ROM)    Left Upper Extremity (ROM) left UE ROM is WFL     Right Upper Extremity (ROM) right UE ROM is WFL        Sit to Stand Transfer    [place], Sit to Stand Transfer modified independence     Assistive Device walker, front-wheeled     Comment from recliner chair        Stand to Sit Transfer    [place], Stand to Sit Transfer modified independence     Assistive Device walker, front-wheeled     Comment to recliner chair        Toilet Transfer    [place] modified independence     Assistive Device walker, front-wheeled;accessible height toilet;grab bars/safety frame     Comment sit/stand from accessible height toilet + grab bar        Impairments/Safety Issues    Comment, Safety Issues/Impairments Mod I for item retrieval at floor level with reacher + RW        Upper Body Dressing    Tasks doff;don;front-opening garment     [place] modified independence     Position supported sitting     Comment Pt able to don/doff front button up shirt.        Lower Body Dressing    [place] modified independence   per PT report    [place], Footwear modified independence   per PT report    Comment Per PT, pt was able to don pants/socks.        Grooming    Tasks oral care (brushing teeth, cleaning dentures);washes, rinses and dries hands     [place] modified independence     Position supported standing     [place], Oral Hygiene modified independence     Comment Standing at sink c RW        Daily Progress Summary (OT)    Daily Outcome Statement Pt reports feeling better and \"rebounding\" since this morning. More progress towards Mod I goals noted (unable to assess bathing, pt to triy bathing c nursing staff later today/tomorrow).        Discharge Summary (OT)    Outcomes Achieved Upon Discharge (OT) progress was made toward goals     Discharge Summary Statement (OT) Pt is a 69 year old male who was admitted to [facility] on 4/1 s/p posterior C4-T1 decompression fusion. At time of OT evaluation pt " was Mod A for transfers and required up to total A for ADLs. Since SOC pt has progressed to performing functional transfers at a S-Mod I level. Pt is Min A (for bathing per nursing report, refused to perform during end of stay with OT for better assessment) and grossly Mod I for ADLs (Min A assist for bathing only). Pts wife present for family training on multiple occasions. She obtained recommended DME (shower chair and toilet safety frame). Rec waiting until home health OT is present to assess shower stall transfer to ensure correct technique. Pt was instructed to not drive until cleared by surgeon, handicapped placard provided. Pt was issued multiple UE HEP. Pt with improvements in hand strength/coordination in both /pinch and Box and blocks/9 hole peg test assessments. Pt improvement in PSFS. Improved in ADL status from AM to PM and goals updated. Throughout POC, pt limited by pain, decreased endurance, impaired balance, and sensory deficits in BUE/BLE. Pt would benefit from home health OT to address functional deficits and to ensure safety in home environment.     Transfer to Another Level of Care or Facility (OT) recommend therapy via home health                          Education Documentation  Safety Techniques, taught by Lroena Stinson OT at 4/16/2025  3:22 PM.  Learner: Patient  Readiness: Acceptance  Method: Explanation  Response: Verbalizes Understanding  Comment: review of d/c recommendations. assisted pt in organzing paperwork for d/c (ie. home exercise programs, etc).          IRF OT Goals      Flowsheet Row Most Recent Value   Transfer Goal 1    Activity/Assistive Device toilet at 04/02/2025 0723   [place] supervision required at 04/09/2025 0949   Time Frame short-term goal (STG), 5 - 7 days at 04/09/2025 0949   Strategies/Barriers pt just evlauated at 04/03/2025 0754   Progress/Outcome goal met at 04/16/2025 0805   Transfer Goal 2    Activity/Assistive Device toilet at 04/02/2025 0723    [place] modified independence at 04/02/2025 0723   Time Frame long-term goal (LTG), 21 days or less at 04/02/2025 0723   Progress/Outcome goal met at 04/16/2025 1433   Transfer Goal 3    Activity/Assistive Device shower at 04/02/2025 0723   [place] supervision required at 04/09/2025 0949   Time Frame short-term goal (STG), 5 - 7 days at 04/09/2025 0949   Strategies/Barriers pt just evlauated at 04/03/2025 0754   Progress/Outcome goal met at 04/16/2025 0805   Transfer Goal 4    Activity/Assistive Device shower at 04/02/2025 0723   [place] supervision required at 04/09/2025 0949   Time Frame long-term goal (LTG), 14 days or less at 04/09/2025 0949   Progress/Outcome goal met at 04/16/2025 0805   Bathing Goal 1    [place] minimum assist (75% or more patient effort) at 04/09/2025 0949   Time Frame short-term goal (STG), 5 - 7 days at 04/09/2025 0949   Strategies/Barriers pt just evlauated at 04/03/2025 0754   Progress/Outcome goal met at 04/16/2025 0805   Bathing Goal 2    [place] supervision required at 04/02/2025 0723   Time Frame long-term goal (LTG), 21 days or less at 04/02/2025 0723   Progress/Outcome good progress toward goal, goal not met at 04/16/2025 0805   UB Dressing Goal 1    [place] supervision required at 04/09/2025 0949   Time Frame short-term goal (STG), 5 - 7 days at 04/09/2025 0949   Strategies/Barriers pt just evlauated at 04/03/2025 0754   Progress/Outcome goal met at 04/16/2025 0805   UB Dressing Goal 2    [place] modified independence at 04/09/2025 0949   Time Frame long-term goal (LTG), 14 days or less at 04/09/2025 0949   Progress/Outcome goal met at 04/16/2025 1433   LB Dressing Goal 1    [place] supervision required at 04/09/2025 0949   Time Frame short-term goal (STG), 5 - 7 days at 04/09/2025 0949   Strategies/Barriers pt just evlauated at 04/03/2025 0754   Progress/Outcome goal met at 04/16/2025 0805   LB Dressing Goal 2    [place] modified  independence at 04/02/2025 0723   Time Frame long-term goal (LTG), 21 days or less at 04/02/2025 0723   Progress/Outcome goal met at 04/16/2025 1433   Grooming Goal 1    [place] set-up required at 04/02/2025 0723   Time Frame short-term goal (STG), 5 - 7 days at 04/09/2025 0949   Strategies/Barriers progressing to standing v seated,  continue to work on standing balance to maximize IND at 04/09/2025 0949   Progress/Outcome goal met at 04/16/2025 0805   Grooming Goal 2    [place] modified independence at 04/02/2025 0723   Time Frame long-term goal (LTG), 21 days or less at 04/02/2025 0723   Progress/Outcome goal met at 04/16/2025 0805   Toileting Goal 1    [place] supervision required at 04/09/2025 0949   Time Frame short-term goal (STG), 5 - 7 days at 04/09/2025 0949   Strategies/Barriers pt just evlauated at 04/03/2025 0754   Progress/Outcome goal met at 04/16/2025 0805   Toileting Goal 2    [place] modified independence at 04/02/2025 0723   Time Frame long-term goal (LTG), 21 days or less at 04/02/2025 0723   Progress/Outcome goal met at 04/16/2025 0805

## 2025-04-16 NOTE — PROGRESS NOTES
Patient: Celso Gramajo  Location: [facility] [address]  MRN:  [id number]  Today's date:  4/16/2025    Attempted to see patient for therapy. Unable due to medical hold (AM med hold).    Daily Outcome Statement: Pt placed on AM med hold d/t increased c/o pain in BUE shoulders/neck and per pt report new onset parasthesia in BUE/BLE. MD consulted.

## 2025-04-16 NOTE — PROGRESS NOTES
Subjective     Patient seen and examined on rounds.  Chart reviewed.  Events overnight noted.  History reviewed briefly with patient.    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    HPI: Mr. Celso Gramajo is a 69-year-old right-handed white male with chronic conditions significant for hypertension, hyperlipidemia, anxiety, thyroid cancer status post thyroidectomy and radical neck dissection, adjuvant radiation therapy/immunotherapy, esophageal squamous cell carcinoma status post chemoradiotherapy complicated by esophageal strictures, left vocal cord paralysis, dysphagia status post PEG tube placement in 2021, initially presented to the ER at [facility] on 3/11/25 with fevers and right shoulder pain. Workup was unrevealing and he was discharged home. He presented to the [facility] ER again on 3/17/25 with ongoing fevers. CT neck with no abscess, old postsurgical changes. He discharged home but blood cultures subsequently, 1 of 2 sets of blood cultures drawn at [facility] on 3/17/25 showed Streptococcus viridans returned positive on 3/19/25 and he returned for admission to [facility] on 3/19/25. TTE at [facility] was negative. Blood cultures on 3/19/25 and again on 3/23/25 showed no growth per his records. Imaging with C6-7 osteomyelitis/discitis with ventral epidural abscess. He was transferred to [facility] on 3/23/25 for surgical evaluation.  At that time, he was afebrile.  Blood cultures  were negative. At [facility] ortho and ENT were consulted. Ultimately it was determined that an anterior surgical approach was not safe (due  to history of neck surgeries), and patient was having progressive weakness.  MRI of cervical spine on 3/27/25 revealed discitis and osteomyelitis at C6-C7 with ventral epidural abscess compressing the spinal cord and increased inferior extent of cord edema compared to prior. Patchy T1 hypointensity with enhancement in the upper cervical and thoracic spine, can represent metastatic lesions and/or post radiation changes, similar as compared to prior. He underwent posterior C4-T1 decompression and instrumented fusion on 3/27/25, performed by orthopedic surgeon Dr. Santiago Ca. Purulent fluid drained from anterior spine during the procedure and operating room cultures revealed Strep intermedius. Postop he was briefly in the ICU for MAP goals. He had a PICC placed 4/1/25. 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 He was started on salt tabs. Infectious diseases recommended IV Daptomycin which patient completed on 3/31/25.  He was recommended to continue Ceftriaxone 2g IV q12h x 14 day course (end date 4/8/25) then transition to Ceftriaxone 2g IV daily to complete total 6 week course (end date 5/8/25). He was recommend [place] J collar to neck at all times.  I discussed orthopedic spinal precautions with him.  He completed Decadron treatment for mild laryngeal edema visualized on preoperative NPL. Patient has dysphagia from esophageal strictures, recent dilation 3/3/25 was not successful per patient, cannot tolerate regular oral secretions and recommended NPO and continued on bolus tube feeds.  He was kept on Pepcid for GI prophylaxis. Glycopyrrolate as needed per patient/spouse wishes.  For anxiety he was continued on Fluoxetine and Ativan PRN.  He is not on medications for hypertension.  He was continued on Zetia for hyperlipidemia.  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 He continues on a sliding-scale Humalog coverage.  He is on Levothyroxine  and Liothyronine for thyroid supplementation after thyroidectomy for thyroid cancer. He is on subcutaneous Heparin and SCDs for DVT prophylaxis.  I discussed his recent clinical course with patient and with his wife at bedside.  On 4/1/25, hemoglobin was 12.9, WBC count was 10.30, platelets were 255, BUN 15, creatinine was 0.51, sodium was 133 and potassium was 4.5.  He has been needing assistance for mobility, transfers, self-care. He is transferred to [facility] on 4/1/25 for further rehabilitation care.     SUBJ: He woke up with a lot of pain today morning.  He reported that the ambulance ride from orthopedic visit yesterday was very bumpy which may have aggravated his pain.  Discussed with house physician Dr Acevedo.  Suggested going to the ER for evaluation.  Dr Acevedo discussed with patient about going to [facility] and he declined, but agreed to go to [facility].  Later in the morning he told me that he does not want to go to the ER at all.  Offered to get x-rays here, but he said he had x-rays orthopedic office yesterday which look fine per orthopedics and does not want repeat x-rays today.  He wanted to rest in the morning but in the afternoon did much better in therapy according to therapists and able to walk 200 feet with front wheeled walker modified independent and modified independent for transfers.  He wants to appeal his discharge to Medicare.  Case management discussed with him.    ROS: Denies chest pain or shortness of breath. Other ROS negative. Past, family, social history is unchanged.      Current Functional Status:   Bed mobility:   [place], Supine to Sit: modified independence   [place], Sit to Supine: modified independence   Transfers:    [place], Sit to Stand Transfer: modified independence  [place], Stand to Sit Transfer: modified independence   [place], Stand Pivot/Stand Step Transfer: modified independence   Gait:   [place], Gait: modified  "independence  Assistive Device: walker, front-wheeled   Distance in Feet: 200 feet    Bathing:   [place]: minimum assist (75% or more patient effort) (per staff report)   Toileting:   [place]: modified independence   Upper body dressing:   [place]: supervision (per staff report)   Lower body dressing:   [place]: supervision (per staff report)       Functional Progress:    Functional status reviewed. Overall, patient's functional status is improving.      Physical Exam      Blood pressure 124/67, pulse 95, temperature 36.6 °C (97.9 °F), temperature source Oral, resp. rate 18, height 1.854 m (6' 1\"), weight 88.5 kg (195 lb), SpO2 93%.    Body mass index is 25.73 kg/m².    General Appearance: Not in acute distress  Head/Ear/Nose/Throat: Normocephalic; Atraumatic.   Eye: EOMI; PERRL.   Neck: [place] J collar in place.  Respiratory: Decreased breath sounds at bases.   Cardiovascular: RRR; Normal S1, S2.   Gastrointestinal: Soft; NT; +BS.   Extremities: Bilateral lower extremity edema noted.    Musculoskeletal: Functional active range of motion in both upper and lower extremities.   Neurological: AAO ×3. Speech is fluent. Cranial nerve examination does not reveal any gross facial asymmetry. Strength testing bilateral deltoids are 4/5, bilateral biceps are 4+/5, bilateral triceps are 4/5, bilateral wrist extensors are 4+/5, bilateral hand FDP are 4+/5, bilateral and interossei are 4/5.  Bilateral hip flexion is less than 4/5.  Bilateral quadriceps are 5/5.  Bilateral ankle dorsi and plantar flexion are 5/5.  He is grossly able to localize touch and position sense in great toes, except reports numbness in all 5 digits of both hands and also numbness on the plantar aspects of both feet.  Deep tendon reflexes are symmetric and slightly brisk bilaterally.  Bilateral ankle clonus is present.  Plantars are withdrawal.  Coordination is functional upper extremities.    Behavior/Emotional: Appropriate; " Cooperative.   Skin: Healing incision on posterior cervical spine.  Sutures in place.  Erythema/rash noted on coccyx unclear if stage I decubitus.  Rash noted on bilateral groins.  Reviewed pictures in epic.         Current Facility-Administered Medications:     acetaminophen (TYLENOL) 650 mg/20.3 mL solution 650 mg, 650 mg, peg tube, q6h PRN, Spencer Kemp MD, 650 mg at 04/15/25 1956    bisacodyL (DULCOLAX) 10 mg suppository 10 mg, 10 mg, rectal, Daily PRN, Spencer Kemp MD, 10 mg at 04/14/25 0612    cefTRIAXone (ROCEPHIN) 2 g in sodium chloride 0.9 % 100 mL IVPB, 2 g, intravenous, q24h INT, Spencer Kemp MD, Stopped at 04/16/25 0611    docusate sodium (COLACE) 50 mg/5 mL liquid 100 mg, 100 mg, peg tube, BID, Spencre Kemp MD, 100 mg at 04/16/25 0849    enoxaparin (LOVENOX) syringe 40 mg, 40 mg, subcutaneous, Daily (6p), Spencer Kemp MD, 40 mg at 04/15/25 1741    ezetimibe (ZETIA) tablet 10 mg, 10 mg, peg tube, Nightly, Spencer Kemp MD, 10 mg at 04/15/25 2122    famotidine (PEPCID) tablet 20 mg, 20 mg, peg tube, Nightly, Spencer Kemp MD, 20 mg at 04/15/25 2122    FLUoxetine (PROzac) 20 mg/5 mL (4 mg/mL) solution 20 mg, 20 mg, peg tube, Daily, Spencer Kemp MD, 20 mg at 04/16/25 0858    glycopyrrolate (ROBINUL) tablet 1 mg, 1 mg, peg tube, 2x daily PRN, Spencer Kemp MD, 1 mg at 04/11/25 0850    honey (MEDIHONEY) 100 % topical paste, , Topical, Daily, Spencer Kemp MD, Given at 04/16/25 0850    HYDROmorphone (DILAUDID) tablet 2 mg, 2 mg, peg tube, q6h PRN, Spencer Kemp MD, 2 mg at 04/16/25 0626    lansoprazole (PREVACID) 3 mg/mL oral suspension 15 mg, 15 mg, feeding tube, Daily before breakfast, Spencer Kemp MD, 15 mg at 04/16/25 0626    levothyroxine (SYNTHROID) tablet 150 mcg, 150 mcg, peg tube, Daily (6:30a), Spencer Kemp MD, 150 mcg at 04/16/25 0538    linaCLOtide (LINZESS) capsule 145 mcg, 145 mcg, g-tube, Daily before lunch,  Spencer Kemp MD, 145 mcg at 04/16/25 1240    linaCLOtide (LINZESS) capsule 145 mcg, 145 mcg, peg tube, Daily PRN, Spencer Kemp MD, 145 mcg at 04/15/25 1608    liothyronine (CYTOMEL) tablet 10 mcg, 10 mcg, peg tube, Daily (6:30a), Spencer Kemp MD, 10 mcg at 04/16/25 0538    LORazepam (ATIVAN) tablet 0.25 mg, 0.25 mg, peg tube, q8h PRN, Spencer Kemp MD, 0.25 mg at 04/16/25 0850    magnesium hydroxide (M.O.M.) 400 mg/5 mL suspension 30 mL, 30 mL, oral, 2x daily PRN, Spencer Kemp MD, 30 mL at 04/13/25 1445    nystatin (MYCOSTATIN) 100,000 unit/gram topical powder, , Topical, BID, Lilian Marrero MD, Given at 04/16/25 1233    ondansetron ODT (ZOFRAN-ODT) disintegrating tablet 4 mg, 4 mg, peg tube, q6h PRN, Spencer Kemp MD, 4 mg at 04/15/25 1948    senna (SENOKOT) tablet 1 tablet, 1 tablet, peg tube, 2x daily PRN, Spencer Kemp MD, 1 tablet at 04/15/25 0813    senna (SENOKOT) tablet 2 tablet, 2 tablet, peg tube, BID, Spencer Kemp MD, 2 tablet at 04/16/25 0850    simethicone (MYLICON) chewable tablet 160 mg, 160 mg, peg tube, QID, Spencer Kemp MD, 160 mg at 04/16/25 1232    sodium chloride PF flush 10 mL, 10 mL, intravenous, q8h INT, Spencer Kemp MD, 10 mL at 04/16/25 0534    sodium chloride PF flush 10 mL, 10 mL, intravenous, PRN, Spencer Kemp MD    sodium chloride tablet 1 g, 1 g, peg tube, Daily, Spencer Kemp MD, 1 g at 04/16/25 0850    sodium phosphates (FLEET) enema adult 1 enema, 1 enema, rectal, Daily PRN, Spencer Kemp MD, 1 enema at 04/10/25 1306    zolpidem (AMBIEN) tablet 5 mg, 5 mg, peg tube, Nightly, Rohit Acevedo MD, 5 mg at 04/15/25 2122       Labs / Radiology    Lab Results   Component Value Date    WBC 5.17 04/14/2025    HGB 12.5 (L) 04/14/2025    HCT 38.3 (L) 04/14/2025    MCV 94.8 04/14/2025     04/14/2025     Lab Results   Component Value Date    GLUCOSE 138 (H) 04/14/2025    CALCIUM 9.4 04/14/2025      (L) 04/14/2025    K 4.4 04/14/2025    CO2 31 04/14/2025    CL 96 (L) 04/14/2025    BUN 16 04/14/2025    CREATININE 0.6 (L) 04/14/2025       Assessment and Plan    ASSESSMENT PLAN:  1. 69-year-old right-handed white male with chronic conditions significant for hypertension, hyperlipidemia, anxiety, thyroid cancer status post thyroidectomy and radical neck dissection, adjuvant radiation therapy/immunotherapy, esophageal squamous cell carcinoma status post chemoradiotherapy complicated by esophageal strictures, left vocal cord paralysis, dysphagia status post PEG tube placement in 2021, initially presented to the ER at [facility] on 3/11/25 with fevers and right shoulder pain. Workup was unrevealing and he was discharged home. He presented to the [facility] ER again on 3/17/25 with ongoing fevers. CT neck with no abscess, old postsurgical changes. He discharged home but blood cultures subsequently, 1 of 2 sets of blood cultures drawn at [facility] on 3/17/25 showed Streptococcus viridans returned positive on 3/19/25 and he returned for admission to [facility] on 3/19/25. TTE at [facility] was negative. Blood cultures on 3/19/25 and again on 3/23/25 showed no growth per his records. Imaging with C6-7 osteomyelitis/discitis with ventral epidural abscess. He was transferred to [facility] on 3/23/25 for surgical evaluation.  At that time, he was afebrile.  Blood cultures  were negative. At [facility] ortho and ENT were consulted. Ultimately it was determined that an anterior surgical approach was not safe (due to history of neck surgeries), and patient was having progressive weakness.  MRI of cervical spine on 3/27/25 revealed discitis and osteomyelitis at C6-C7 with ventral epidural abscess compressing the spinal cord and increased inferior extent of cord edema compared to prior. Patchy T1 hypointensity with enhancement in the upper cervical and thoracic spine, can represent metastatic lesions and/or post  radiation changes, similar as compared to prior. He underwent posterior C4-T1 decompression and instrumented fusion on 3/27/25, performed by orthopedic surgeon Dr. Santiago Ca. Purulent fluid drained from anterior spine during the procedure and operating room cultures revealed Strep intermedius. Postop he was briefly in the ICU for MAP goals. He had a PICC placed 4/1/25. 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 He was started on salt tabs. Infectious diseases recommended IV Daptomycin which patient completed on 3/31/25.  He was recommended to continue Ceftriaxone 2g IV q12h x 14 day course (end date 4/8/25) then transition to Ceftriaxone 2g IV daily to complete total 6 week course (end date 5/8/25). He was recommend [place] J collar to neck at all times.  I discussed orthopedic spinal precautions with him.  He completed Decadron treatment for mild laryngeal edema visualized on preoperative NPL. Patient has dysphagia from esophageal strictures, recent dilation 3/3/25 was not successful per patient, cannot tolerate regular oral secretions and recommended NPO and continued on bolus tube feeds.  He was kept on Pepcid for GI prophylaxis. Glycopyrrolate as needed per patient/spouse wishes.  For anxiety he was continued on Fluoxetine and Ativan PRN.  He is not on medications for hypertension.  He was continued on Zetia for hyperlipidemia.  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 He continues on a sliding-scale Humalog coverage.  He is on Levothyroxine and Liothyronine for thyroid supplementation after thyroidectomy for thyroid cancer. He is on subcutaneous Heparin and SCDs for DVT prophylaxis.  I discussed his recent clinical course with patient and with his wife at bedside.  On 4/1/25, hemoglobin was 12.9, WBC count was 10.30, platelets were 255, BUN 15, creatinine was 0.51, sodium was 133 and potassium was 4.5.  He has been needing assistance for  mobility, transfers, self-care. He is transferred to [facility] on 4/1/25 for further rehabilitation care.      2. DVT prophylaxis - He is on subcutaneous Heparin and SCDs for DVT prophylaxis.  Check doppler.  Platelets 247 on 4/2/2025.  Doppler ultrasound on 4/3/2025 bilateral lower extremity was negative for DVT.  Platelets 216 on 4/7/2025.  Platelets 206 on 4/14/2025.       3. 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  Follow falls precautions, cardiac precautions, aspiration precautions.  Follow spinal precautions.  [place] J collar to neck at all times.  Use [place] collar in shower.     4. GI - On Pepcid for GI prophylaxis.  On PRN Senokot.  On PRN Dulcolax suppository.  On PRN Zofran.     5.  -denies dysuria.  Monitor postvoid residuals.     6. CVS - monitor for orthostasis.     7. Pulmonary -encourage incentive spirometry.     8. Hematology - monitor hemoglobin, platelets.  Hemoglobin 12.3, WBC 9.54 on 4/2/2025.  Hemoglobin 12.4, WBC 5.89 on 4/7/2025.  Hemoglobin 12.5, WBC 5.17 on 4/14/2025.       9. Pain -on Tylenol.  On PRN Oxycodone.     10. Skin - Healing incision on posterior cervical spine.  Sutures in place.  Erythema/rash noted on coccyx unclear if stage I decubitus.  Rash noted on bilateral groins.  Reviewed pictures in epic.     11. F/E/N - nothing by mouth on bolus PEG feeds.  Nutrition consulted. 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 He was started on salt tabs.     12.  Dysphagia - H/O esophageal squamous cell carcinoma status post chemoradiotherapy complicated by esophageal strictures, left  vocal cord paralysis, dysphagia status post PEG tube placement in 2021 - NPO on bolus PEG feeds.  Nutrition consulted.     13.  Psychiatry - on PRN Ativan.  Psychology consulted.     14.  Hypothyroidism -He is on Levothyroxine and Liothyronine for thyroid supplementation after thyroidectomy for thyroid cancer.     15. ENT -  H/O left vocal cord paralysis, treated with Decadron for edema around vocal cords recently.  On Glycopyrrolate as needed per patient/spouse wishes to manage secretions.      16.  Infectious diseases - He underwent posterior C4-T1 decompression and instrumented fusion on 3/27/25, performed by orthopedic surgeon Dr. Santiago Ca. Purulent fluid drained from anterior spine during the procedure and operating room cultures revealed Strep intermedius.. He had a PICC placed 4/1/25. Infectious diseases recommended IV Daptomycin which patient completed on 3/31/25.  He was recommended to continue Ceftriaxone 2g IV q12h x 14 day course (end date 4/8/25) then transition to Ceftriaxone 2g IV daily to complete total 6 week course (end date 5/8/25).He has infectious disease telemedicine appointment on 4/15/2025 at 10:40 AM.  Outside visit ordered.  Nursing to help patient with appointment.  Discussed with patient and nurse on 4/10/2025.      15.  Steroid-induced hyperglycemia -on sliding-scale Humalog coverage.     16.  Insomnia - He reported decreased sleep at night on 4/14/2025.  Discussed with patient and with his wife with him.  They would like to try Ambien.  He took Trazodone the other day and could not tolerate it.  Discussed with Dr. Marrero who ordered Ambien.     17. Rehabilitation medicine - Continue comprehensive rehabilitation care. Continue PT, OT, psychology.  Follow falls precautions, cardiac precautions, aspiration precautions.  Follow spinal precautions.  [place] J collar to neck at all times.  Use [place] collar in shower.Discussed patients progress in therapies with therapists in  team meeting on 4/3/2025.  Discussed in PCC. See PCC documentation.  He reports no bowel movement in the past 3 to 4 days.  Also wanted antireflux medication in the morning and at home he takes Omeprazole via PEG per patient.  He is on Prevacid in the a.m. and Pepcid in p.m via PEG tube.  Due to constipation internist ordered enema.  Also scheduled Colace and Senokot were ordered through his PEG tube.  Discussed with patient on 4/3/2025.Discussed recommendations of PCC with patient on 4/4/2025.  Posterior cervical incision healing well.  He had 2 bowel movements today.  Family training scheduled for next week on Monday per case management note.  Discussed with patient on 4/4/2025.He was sitting on recliner on 4/5/2025, talking on phone with his wife.  Continent of bladder per nursing on 4/5/2025.  Tolerating PEG feeds without issues.  He reports he had a good bowel movement yesterday.  Discussed with him if he gets  loose bowel movements he can always refuse bowel medications.  He reports he wants to stay on some bowel medications at this time.  Posterior cervical incision has dressing in place.  Denies increased pain on 4/5/2025.Saw patient earlier on 4/7/2025 morning and then again in the evening.  Discussed with patient and with his wife in the evening.  He requested IV saline infusion.  Ordered liter of saline overnight but patient and wife indicated that at home they usually have the infusion and 3 hours and do not want to eat or 12 hours.  Discussed with pharmacist.  Discussed with nurse.  Patient and wife insist that they usually get the infusion over 3 hours at home.  Also wanted increase bowel medications due to constipation.  Increase Senokot dose and ordered as needed treats enema.  Discussed with nurse on 4/7/2025. Again discussed with patient on 4/8/2025 in presence of nursing supervisor that IV fluid at high rate is not such a good idea and he can get the volume but at a lower rate.  He says he is used  to getting IV fluids quickly at home.  Suggested that he also discuss with his cardiologist if this is a safe practice and he can be prone to getting fluid overload and may put him at risk for CHF/pulmonary edema in future if his cardiac function decompensates.  Discussed with internist Dr. Marrero who also agrees with this possibility.  I mentioned to patient to discuss with his cardiologist regarding this and follow cardiology guidance regarding his desire to get IV fluids quickly at home at a high hourly volume in a short time.  Bowel medications were adjusted by internist on 4/8/2025.Inspected neck incision on 4/9/2025 which is stable.  He reports he feels constipated.  Internist adjusted bowel medications.Discussed with patient and with his mother-in-law with him on 4/9/2025.  Discussed patients progress in therapies with therapists in team meeting on 4/10/2025. Discussed in PCC. See PCC documentation.  He has infectious disease telemedicine appointment on 4/15/2025 at 10:40 AM.  Outside visit ordered.  Nursing to help patient with appointment.  Discussed with patient and nurse on 4/10/2025. Discussed recommendations of PCC with patient on 4/11/2025. He reports he had a bowel movement today.  Noted input from dermal defense nurse.  Discussed with nurse Worthington.  Dr Good (orthopedic surgery) office was (who patient was recommended to follow-up with) was contacted regarding the pressure injury from [place] J collar and they indicated that [place] J collar can be discontinued and patient may use soft collar if he desires.  Nurse Worthington also discussed with patient on 4/11/2025 and he is agreeable.  Orders put in.  Patient has appointment for 4/15 @ 1320 hrs at Dr Good's orthopedic office.  Discussed with nurse on 4/11/2025.He indicates that he no longer wants to take salt tablets on 4/12/2025.  Sodium was 135.  Will discontinue salt tablets.  Discussed with patient and with nurse with him on 4/12/2025.  Discussed  follow-up appointment next week at orthopedic office with him.  Discussed with him on 4/12/2025 regarding input from orthopedic surgeon Dr Good yesterday regarding [place] J collar was discontinued and he is on soft cervical collar.He reported decreased sleep at night on 4/14/2025.  Discussed with patient and with his wife with him.  They would like to try Ambien.  He took Trazodone the other day and could not tolerate it.  He requested soapsuds enema today due to constipation.  Discussed with him constipating effects of narcotics.  He says he also gets constipated when he gets antibiotics.  Reviewed labs today on 4/14/2025.  He says he feels much better on 4/15/2025.  He reports he had a good night sleep with Ambien.  He also had a bowel movement with Linzess.  He has orthopedic appointment today afternoon.  Discussed with patient and with nurse on 4/15/2025.He woke up with a lot of pain on 4/16/2025 morning.  He reported that the ambulance ride from orthopedic visit yesterday was very bumpy which may have aggravated his pain.  Discussed with house physician Dr Acevedo.  Suggested going to the ER for evaluation.  Dr Acevedo discussed with patient about going to [facility] and he declined, but agreed to go to [facility].  Later in the morning he told me that he does not want to go to the ER at all.  Offered to get x-rays here, but he said he had x-rays orthopedic office yesterday which look fine per orthopedics and does not want repeat x-rays today.  He wanted to rest in the morning but in the afternoon did much better in therapy according to therapists and able to walk 200 feet with front wheeled walker modified independent and modified independent for transfers.  He wants to appeal his discharge to Medicare.  Case management discussed with him on 4/16/2025.  Discussed with patient, with nurse and internist Dr. Marrero on 4/16/2025.     18. Reviewed labs today. BUN 18, creatinine 0.6, sodium 133, potassium 4.9 on  4/2/2025.  BUN 16, creatinine 0.6, sodium 135, potassium 4.5 on 4/7/2025.  BUN 16, creatinine 0.6, sodium 134, potassium 4.4 on 4/14/2025.             Spencer Kemp MD  4/16/2025      This encounter was completed utilizing the Direct Speech Voice Recognition Software. Grammatical errors, random word insertions, pronoun errors, and incomplete sentences are occasional consequences of the system due to software limitations, ambient noises, and hardware issues. Such errors may be missed prior to affixing electronic signature. Any questions or concerns about the content, text, or information contained within the body of this dictation should be directly addressed to the physician for clarification. If you have any questions or concerns please do not hesitate to call me directly via EPIC chat, page, or email.

## 2025-04-16 NOTE — PLAN OF CARE
Problem: Rehabilitation ([facility]) Plan of Care  Goal: Plan of Care Review  Outcome: Progressing  Flowsheets (Taken 4/16/2025 1556)  Progress: improving  Outcome Evaluation: Pt is A/O x4, anxious, cooperative, continent of bladder, 50/50 continent of bowel. Transfer is stand and pivot with min assist and rolling walker, ambulates on the unit with staff. AM complaints of increased UE numbness that pt believes stems from a rough transport ride the previous day, pt expressed frustration and anxiety and, initially, refused therapy. With time and attention pt's anxiety resolved, pt participated in afternoon therapy sessions, and expressed willingness to be discharged tomorrow 4/12. Vital signs benign, pain managed with medication and rest. PEG tube remains patent, bolus tube feeds and flushes given in 3 hour increments with pt assistance All safety precautions maintained, call light in reach.  Plan of Care Reviewed With: patient   Plan of Care Review  Plan of Care Reviewed With: patient  Progress: improving  Outcome Evaluation: Pt is A/O x4, anxious, cooperative, continent of bladder, 50/50 continent of bowel. Transfer is stand and pivot with min assist and rolling walker, ambulates on the unit with staff. AM complaints of increased UE numbness that pt believes stems from a rough transport ride the previous day, pt expressed frustration and anxiety and, initially, refused therapy. With time and attention pt's anxiety resolved, pt participated in afternoon therapy sessions, and expressed willingness to be discharged tomorrow 4/12. Vital signs benign, pain managed with medication and rest. PEG tube remains patent, bolus tube feeds and flushes given in 3 hour increments with pt assistance All safety precautions maintained, call light in reach.

## 2025-04-16 NOTE — PROGRESS NOTES
[facility] Progress Note          Patient was seen and examined at bedside.    Subjective:   Saw him in am:  Very upset about yesterday's surgeon office visit, complained significantly about poor transportation situation and the arrangement, which has caused his more pain and neuralgia. Talked with him and his wife. Will hold this am therapies, give him a break, may try PT/OT this afternoon, which he is agreeable.  No real new neurologic deficits.    Objective   Vital signs in last 24 hours:  Temp:  [36.4 °C (97.6 °F)-37 °C (98.6 °F)] 36.6 °C (97.9 °F)  Heart Rate:  [76-88] 79  Resp:  [18] 18  BP: ()/(52-85) 125/65      Intake/Output Summary (Last 24 hours) at 4/16/2025 1101  Last data filed at 4/15/2025 1645  Gross per 24 hour   Intake 425 ml   Output --   Net 425 ml     Intake/Output this shift:  No intake/output data recorded.   Review of Systems:  All other systems reviewed and negative except as noted in the HPI.   Objective      Labs  Reviewed his labs thoroughly   Lab Results   Component Value Date    WBC 5.17 04/14/2025    HGB 12.5 (L) 04/14/2025    HCT 38.3 (L) 04/14/2025    MCV 94.8 04/14/2025     04/14/2025     Lab Results   Component Value Date    GLUCOSE 138 (H) 04/14/2025    CALCIUM 9.4 04/14/2025     (L) 04/14/2025    K 4.4 04/14/2025    CO2 31 04/14/2025    CL 96 (L) 04/14/2025    BUN 16 04/14/2025    CREATININE 0.6 (L) 04/14/2025       Imaging  OSH imaging study reports reviewed    Full Code    Physical Exam:  Head/Ear/Nose/Throat: normocephalic; atraumatic; moisture mouth mm, no oropharyngeal thrush noted.   Eyes: anicteric sclera, EOMI; PERRL.   Neck : supple, no JVD, no carotid bruits appeciated.   Respiratory: no evidence of labored breathing, lung sounds CTA b/l, good aeration bibasilar area, no w/r/c.   Cardiovascular: RRR; normal S1, S2; no m/r/g; no S3 or S4.   Gastrointestinal: PEG in place, focal c/d/I; soft; NT; BS normal; mildly distended; no  CVAT b/l.   Genitourinary: no song.   Extremities : no c/c/e .   Neurological: AO x 3, fluent speeches, following commands, CNS II-XII grossly intact; no focal neurologic deficits.   Behavior/Emotional: in NAD, appropriate; cooperative.   Skin: clean, dry and intact.  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  He had a PEG placed in 2021 for dysphagia.  Cervical spinal stenosis with myeloradiculopathy h/o cervical spinal surgery. He initially presented to [place] ED on 3/11/25 with fevers and R shoulder pain. Workup was unrevealing and he was discharged home.  He presented to the [facility] ED again on 3/17 with ongoing fevers. CT Neck with no abscess, old postsurgical changes, BCX was collected. He d/c home but BCX subsequently returned positive on 3/19 and he returned for admission. Imaging with C6-7 OM / discitis with ventral epidural abscess. He was transferred to [facility] on 3/23 for surgical evaluation.  At that time, he was afebrile. BCx negative. MRI with C5-C7 cervical epidural abscess/osteo with cord signal at C6-7, T1-4, L4 vertebral body lesions probably radiation-changes per [facility] radiology, although metastatic lesions are not ruled out, per wife PET-CT scan no evidence of metastatic malignancy light up at spine region. He is now s/p posterior C4-T1 decompression and fusion on 3/27. Purulent fluid drained from anterior spine. OR Cx Strep intermedius.  ID rec IV dapto and ceftriaxone x 6 weeks.  PICC placed. He was admitted to [facility] on 4/1/25 for post op inpt acute rehab.  He is TF NPO status.     A/P:  # Strep intermedius bacteremia with C6-7 OM / discitis and ventral epidural abscess, s/p PCDF 3/27/25, purulent fluid drainage from anterior spine  Suspect source likely recent esophageal dilatations ; 3/17 BCx + at OSH; TTE negative for veg at OSH  -complete planned 6-week iv Rocephin course, last dose of daptomycin on 3/31/25,  wound care dermal defense, f/u with [facility] ID as outpt, weekly labs.     # H/o thyroid CA s/p thyroidectomy and RND, adjuvant XRT/immunotherapy, esophageal SCC s/p CRT c/b esophageal strictures, L VC paralysis  -cancer surveillance f/u with [facility]  medical/ radiation oncology;  -palliative consultation.      # Dysphagia, NPO/TF, IBS-C, GI prophylaxis  -cont PEG TF, dietitian co management for nutrition supplements, Pepcid/PPI for GI prophylaxis       # Hypothyroidism   -per his endo, increase levothyroxine dose from 137-150 mcg daily.     # DVT prophylaxis  -SQH     # Anxiety  -on Prozac, psychology CBT, psychiatrist for co management      # Hyperglycemia, previous prediabetes  -diet modification per dietitian, SSI with accu checks      Billing code: [id number]  Diagnoses:  Patient Active Problem List   Diagnosis    Vertigo    Hypertension    Postsurgical hypothyroidism    Mixed hyperlipidemia    Gastroesophageal reflux disease    Increased sputum production    Allergic rhinitis    Change in bowel habits    Chronic fatigue syndrome    Constipation, chronic    Deviated nasal septum    Elevated coronary artery calcium score    Eustachian tube dysfunction, bilateral    Examination of participant in clinical trial    Induration penis plastica    Hypertrophy of nasal turbinates    Hx of colonic polyps    Malignant neoplasm of salivary gland (CMS/HCC)    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)    Paralysis of vocal cords and larynx, unilateral    ENMANUEL (obstructive sleep apnea)    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)    Malignant neoplasm of  upper third of esophagus (CMS/HCC)    Penile curvature, acquired    Rhinitis sicca    Thyroid cancer (CMS/HCC)    Sensorineural hearing loss (SNHL), bilateral    Other dysphagia    Abdominal pain    Other dysphagia    COVID-19    Encounter for follow-up surveillance of thyroid cancer    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    Acute post-operative pain         Lilian Marrero MD  4/16/2025

## 2025-04-16 NOTE — PROGRESS NOTES
Called by RN this morning around 7 AM.    The patient had a long seated (Wheelchair) ride in transport to orthopedics appt yesterday.  He reports markedly bouncing/jostling with ride. Returned here yesterday, responded to pain medication, slept well.  This AM, he woke up and reports to staff and me worsened paresthesia sensations in his hands and his feet, worsened neck/shoulder pain, subjective motor weakness, feeling shaky on his feet, with tremors in his hands. He is tearful and anxious reporting this. He reports he feels worse than when he came here, a marked change from prior to transport.   He is tremulous on exam in his hands/arms, motor 5-/5 UEs and LEs.     Given new complaints, I let him know interval imaging would be indicated.   I discussed this with Dr. Kemp who agreed.  I offered a plan for transport back to [place] which the patient refused. He did not want to go downtown; did not want a long transport ride. He noted this, witnessed in front of floor RN.   He was agreeable to arranged transport to [place]. I spoke to nursing supervisor, has requested she arrange this, I spoke to [facility] ED staff, and I asked nursing supervisor once time is arranged to reach out to patient/his wife (and asked her to be updated once details are known), as well as to inform Dr. Kemp/Dr. Marrero. I will be communicating this event this morning to his [facility] doctors as well.    Thaddeus Acevedo MD

## 2025-04-17 ENCOUNTER — APPOINTMENT (INPATIENT)
Dept: OCCUPATIONAL THERAPY | Facility: REHABILITATION | Age: 70
DRG: 559 | End: 2025-04-17
Payer: MEDICARE

## 2025-04-17 ENCOUNTER — APPOINTMENT (OUTPATIENT)
Dept: OTHER | Facility: REHABILITATION | Age: 70
End: 2025-04-17
Payer: MEDICARE

## 2025-04-17 ENCOUNTER — APPOINTMENT (INPATIENT)
Dept: PHYSICAL THERAPY | Facility: REHABILITATION | Age: 70
DRG: 559 | End: 2025-04-17
Payer: MEDICARE

## 2025-04-17 LAB
GLUCOSE BLD-MCNC: 155 MG/DL (ref 70–99)
POCT TEST: ABNORMAL

## 2025-04-17 PROCEDURE — 97530 THERAPEUTIC ACTIVITIES: CPT | Mod: GP

## 2025-04-17 PROCEDURE — 63600000 HC DRUGS/DETAIL CODE: Mod: JZ | Performed by: PHYSICAL MEDICINE & REHABILITATION

## 2025-04-17 PROCEDURE — 63700000 HC SELF-ADMINISTRABLE DRUG: Performed by: PHYSICAL MEDICINE & REHABILITATION

## 2025-04-17 PROCEDURE — 97535 SELF CARE MNGMENT TRAINING: CPT | Mod: GO

## 2025-04-17 PROCEDURE — 25800000 HC PHARMACY IV SOLUTIONS: Performed by: PHYSICAL MEDICINE & REHABILITATION

## 2025-04-17 PROCEDURE — 12800001 HC ROOM AND CARE SEMIPRIVATE REHAB-BRAIN INJ

## 2025-04-17 PROCEDURE — 97530 THERAPEUTIC ACTIVITIES: CPT | Mod: GO

## 2025-04-17 PROCEDURE — 97116 GAIT TRAINING THERAPY: CPT | Mod: GP

## 2025-04-17 PROCEDURE — 63700000 HC SELF-ADMINISTRABLE DRUG: Performed by: INTERNAL MEDICINE

## 2025-04-17 PROCEDURE — 97530 THERAPEUTIC ACTIVITIES: CPT | Mod: GO | Performed by: OCCUPATIONAL THERAPIST

## 2025-04-17 PROCEDURE — 63600000 HC DRUGS/DETAIL CODE: Performed by: INTERNAL MEDICINE

## 2025-04-17 RX ORDER — INSULIN LISPRO 100 [IU]/ML
1-12 INJECTION, SOLUTION INTRAVENOUS; SUBCUTANEOUS
Status: DISCONTINUED | OUTPATIENT
Start: 2025-04-17 | End: 2025-04-19 | Stop reason: HOSPADM

## 2025-04-17 RX ORDER — INSULIN LISPRO 100 [IU]/ML
1-12 INJECTION, SOLUTION INTRAVENOUS; SUBCUTANEOUS
Qty: 10.8 ML | Refills: 0 | Status: SHIPPED | OUTPATIENT
Start: 2025-04-17 | End: 2025-05-17

## 2025-04-17 RX ADMIN — LINACLOTIDE 145 MCG: 145 CAPSULE, GELATIN COATED ORAL at 11:41

## 2025-04-17 RX ADMIN — SIMETHICONE 160 MG: 80 TABLET, CHEWABLE ORAL at 21:23

## 2025-04-17 RX ADMIN — Medication 10 ML: at 13:12

## 2025-04-17 RX ADMIN — SIMETHICONE 160 MG: 80 TABLET, CHEWABLE ORAL at 08:08

## 2025-04-17 RX ADMIN — ENOXAPARIN SODIUM 40 MG: 40 INJECTION SUBCUTANEOUS at 18:43

## 2025-04-17 RX ADMIN — LEVOTHYROXINE SODIUM 150 MCG: 150 TABLET ORAL at 06:25

## 2025-04-17 RX ADMIN — LANSOPRAZOLE 15 MG: KIT at 09:34

## 2025-04-17 RX ADMIN — NYSTATIN: 100000 POWDER TOPICAL at 08:21

## 2025-04-17 RX ADMIN — LIOTHYRONINE SODIUM 10 MCG: 5 TABLET ORAL at 06:25

## 2025-04-17 RX ADMIN — HYDROMORPHONE HYDROCHLORIDE 2 MG: 2 TABLET ORAL at 06:25

## 2025-04-17 RX ADMIN — ONDANSETRON 4 MG: 4 TABLET, ORALLY DISINTEGRATING ORAL at 05:43

## 2025-04-17 RX ADMIN — NYSTATIN: 100000 POWDER TOPICAL at 21:23

## 2025-04-17 RX ADMIN — SENNOSIDES 2 TABLET: 8.6 TABLET, FILM COATED ORAL at 08:08

## 2025-04-17 RX ADMIN — DOCUSATE SODIUM 100 MG: 50 LIQUID ORAL at 08:08

## 2025-04-17 RX ADMIN — FAMOTIDINE 20 MG: 20 TABLET, FILM COATED ORAL at 21:24

## 2025-04-17 RX ADMIN — FLUOXETINE HYDROCHLORIDE 20 MG: 20 SOLUTION ORAL at 09:35

## 2025-04-17 RX ADMIN — Medication 10 ML: at 05:51

## 2025-04-17 RX ADMIN — ZOLPIDEM TARTRATE 5 MG: 5 TABLET, FILM COATED ORAL at 21:23

## 2025-04-17 RX ADMIN — Medication 10 ML: at 21:20

## 2025-04-17 RX ADMIN — BISACODYL 10 MG: 10 SUPPOSITORY RECTAL at 11:40

## 2025-04-17 RX ADMIN — CEFTRIAXONE SODIUM 2 G: 2 INJECTION, POWDER, FOR SOLUTION INTRAMUSCULAR; INTRAVENOUS at 05:51

## 2025-04-17 RX ADMIN — EZETIMIBE 10 MG: 10 TABLET ORAL at 21:23

## 2025-04-17 RX ADMIN — LORAZEPAM 0.25 MG: 0.5 TABLET ORAL at 10:31

## 2025-04-17 RX ADMIN — SENNOSIDES 2 TABLET: 8.6 TABLET, FILM COATED ORAL at 13:11

## 2025-04-17 RX ADMIN — DOCUSATE SODIUM 100 MG: 50 LIQUID ORAL at 13:11

## 2025-04-17 RX ADMIN — Medication: at 08:21

## 2025-04-17 RX ADMIN — SODIUM CHLORIDE 1 G: 1 TABLET ORAL at 08:08

## 2025-04-17 RX ADMIN — SIMETHICONE 160 MG: 80 TABLET, CHEWABLE ORAL at 18:43

## 2025-04-17 RX ADMIN — SIMETHICONE 160 MG: 80 TABLET, CHEWABLE ORAL at 11:40

## 2025-04-17 RX ADMIN — HYDROMORPHONE HYDROCHLORIDE 2 MG: 2 TABLET ORAL at 18:48

## 2025-04-17 RX ADMIN — ACETAMINOPHEN 650 MG: 650 SOLUTION ORAL at 00:32

## 2025-04-17 NOTE — PLAN OF CARE
Plan of Care Review  Plan of Care Reviewed With: patient  Progress: improving  Outcome Evaluation: Meds reviewed, pain managed with Tylenol and Dilaudid. PEG and PICC patent. NPO and aspiration precaution maintained. Safety reviewed, call bell in reach. Sleeping well. Conitnue plan of care.

## 2025-04-17 NOTE — PATIENT CARE CONFERENCE
Inpatient Rehabilitation Team Conference Note   Date: 4/17/2025  Time: 10:09 AM       Patient Name: Celso Gramajo     Medical Record Number: [id number]   YOB: 1955  Sex: Male      Room/Bed: 101/101W  Payor Info: Payor: MEDICARE / Plan: MEDICARE PART A & B / Product Type: Medicare /     Admitting Diagnosis: Spinal epidural abscess [G06.1]  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 [G06.1]   Admit Date/Time: 4/1/2025  8:28 PM    Principal Problem: 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    Patient Active Problem List    Diagnosis Date Noted    Acute post-operative pain 04/16/2025    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 04/02/2025    Encounter for follow-up surveillance of thyroid cancer 10/10/2024    COVID-19 06/28/2023    Other dysphagia 03/05/2023    Other dysphagia 07/27/2022    Increased sputum production 05/10/2022    Hypertension 10/22/2021    Postsurgical hypothyroidism 10/22/2021    Chronic fatigue syndrome 10/22/2021    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) 11/10/2020    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) 11/10/2020    Gastroesophageal reflux disease 05/05/2020    Allergic rhinitis 05/05/2020    Deviated nasal septum 05/05/2020    Hypertrophy of nasal turbinates 05/05/2020    Paralysis of vocal cords and larynx, unilateral 05/05/2020    Induration penis plastica 01/22/2020    Mixed hyperlipidemia 09/30/2018    Elevated coronary artery calcium score 09/30/2018    ENMANUEL (obstructive sleep apnea) 09/30/2018    Change in bowel habits 03/28/2017    Constipation, chronic 03/28/2017    Hx of colonic polyps 03/28/2017    Penile curvature, acquired 03/14/2017    Vertigo 03/07/2016    Eustachian tube dysfunction, bilateral 03/07/2016    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) 03/07/2016    Rhinitis sicca 03/07/2016    Sensorineural hearing loss (SNHL), bilateral 03/07/2016    Malignant neoplasm of salivary gland (CMS/[facility]) 01/27/2016     Examination of participant in clinical trial 01/25/2016    Abdominal pain 10/16/2014    Thyroid cancer (CMS/HCC) 01/01/2001       Premorbid Status:  Prior Level of Function      Flowsheet Row Most Recent Value   Dominant Hand right   Ambulation independent   Transferring independent   Toileting independent   Bathing independent   Dressing independent   Eating independent   IADLs independent   Driving/Transportation    Communication understands/communicates without difficulty   Past History of Dysphagia PTA pt was completely independent, no AD. (+) . NPO PTA, manages peg tube independently.   Prior Level of Function Comment Independent without use of AE or DME.   Assistive Device Currently Used at Home none            Current Functional Status:  Mobility  Gait  [place], Gait: modified independence  Assistive Device: walker, front-wheeled  Distance in Feet: 200 feet  Comment: 50' x 2 , 200'x 2 mod I without overt LOB or unsteadiness    Stairs  [place], Stairs: supervision  Assistive Device: railing; cane, straight  Handrail Location (Stairs): both sides; right side (ascending); right side (descending)  Number of Stairs: 12  Stair Height: 6 inches  Comment: completed 4/15, please see note    Wheelchair  [place], Forward Propulsion: dependent (less than 25% patient effort)  [place], Steering Activities: dependent (less than 25% patient effort)  [place], Turning Activities: dependent (less than 25% patient effort)  Distance Propelled in Feet: 150 feet  Comment, Wheelchair Mobility: unable to tolerate propulsion d/t pain and faitgue    Transfers  Bed Mobility  [place], Roll Left: modified independence  [place], Roll Right: modified independence  [place], Supine to Sit: modified independence  [place], Sit to Supine: modified independence  Safety/Cues: increased time to complete; minimal; verbal cues; hand placement; sequencing; technique  Assistive  Device: bed rails  Comment: in specialty hospital bed    Sit to Stand Transfer  [place], Sit to Stand Transfer: modified independence  Safety/Cues: technique  Assistive Device: walker, front-wheeled  Comment: in ADL context    Stand to Sit Transfer  [place], Stand to Sit Transfer: modified independence  Safety/Cues: technique  Assistive Device: walker, front-wheeled  Comment: in ADL context    Stand Pivot Transfer  [place], Stand Pivot/Stand Step Transfer: modified independence  Safety/Cues: technique  Assistive Device: walker, front-wheeled  Comment: via amb approach    Car Transfer  Transfer Technique: stand pivot  [place]: set up  Safety/Cues: technique; proper use of assistive device  Assistive Device: walker, front-wheeled  Comment: requires assist for RW mgmt      Toilet Transfer  Transfer Technique: sit/stand  [place]: modified independence  Assistive Device: grab bars/safety frame; raised toilet seat; walker, front-wheeled  Comment: Amb approach    Shower Transfer  [place]: modified independence  Assistive Device: grab bars/tub rail; tub bench; walker, front-wheeled  Comment: Amb approach      Self Care  Bathing  [place]: modified independence  Safety/Cues: increased time to complete  Setup Assistance: adaptive equipment setup; obtain supplies  Adaptive Equipment: hand-held shower spray hose; tub bench  Comment: Full wet shower completed c increase time    Upper Body Dressing  Tasks: doff; don; front-opening garment  [place]: modified independence  Comment: Pt able to don/doff front button up shirt.    Lower Body Dressing  [place]: modified independence (per PT report)  Comment: Per PT, pt was able to don pants/socks.    Grooming  Tasks: oral care (brushing teeth, cleaning dentures); washes, rinses and dries hands  [place]: modified independence  Adaptive Equipment: other (see comments) (standard tooth brush, swish ans spit with small amount of water without  "swallowing)  Comment: Standing at sink c RW    Toileting  Tasks: perform bladder hygiene; adjust/manage clothing  [place]: modified independence  Setup Assistance: adaptive equipment setup  Adaptive Equipment: urinal  Comment: Voided bowel with use of enema. Able to manage clothing and perform hygiene    Self-Feeding  Comment: Pt able to set up self-feed and manipulate Infinity pump. Completed half liquid amount of feed d/t pt with stomach/intestinal discomfort. \"Feed\" to have some nutrition in body to prevent feeling \"weak\".      Cognition  Cognition  Orientation Status: oriented x 4  Comment, Cognition: Pt. participated in medication management. Pt. able to read medication bottle and place medication into dossette using b/l hands. Pt. noted to add additional beads into dossette following therapist distracting pt. through conversation. Pt. benefits from a quiet location to perform cognitive activities and would benefit from S x 1 to ensure correctly completed in future.      Communication       Frequency of Treatment (OT): 5-7 times per week  Intensity of Treatment (OT): 60-90 minutes per day  Frequency of Treatment (PT): 5-7 times per week  Intensity of Treatment (PT): 60-90 minutes per day     Celso requires an altered therapy schedule due to medically complex.      Weekly Outcome Summaries:  Weekly Progress Summary (PT)  Progress Toward Functional Goals (PT): progressing toward functional goals as expected  Weekly Outcome Statement: Pt is 69 year old male w/ C5-C7 ventral epidural abscess with osteomyelitis. MRI on 3/24 c/f metastatic disease at T1-T4, L4.      Pt underwent posterior C4-T1 decompression fusion on 3/27. Pt has made good progress during his rehab stay, now functioning at mod I level w/ RW. Gait speed continues fall below age norm values and below level required of community ambulators. He continues w/ impaired activity tolerance and fluctuating levels of pain. Pts wife has attended family " training and is able to assist as needed. Pt was provided w/ an HEP and a RW from [facility] for DC. Pt will benefit w/ f/u from Home PT to address ongoing activity tolerance impairment and progress to LRAD.  Barriers to Overall Progress (PT): no barriers to DC home on 4/18  Recommendations: DC home 4/18/25 w/ HC follow up    Weekly Progress Summary (OT)  Progress Toward Functional Goals (OT): progressing toward functional goals as expected  Weekly Outcome Statement: Since SOC pt has progressed to performing functional transfers at a S-Mod I level. Pt is Min A (for bathing per nursing report, refused to perform during end of stay with OT for better assessment) and grossly Mod I for ADLs (Min A assist for bathing only). Pts wife present for family training on multiple occasions. She obtained recommended DME (shower chair and toilet safety frame). Rec waiting until home health OT is present to assess shower stall transfer to ensure correct technique. Pt was instructed to not drive until cleared by surgeon, handicapped placard provided. Pt was issued multiple UE HEP. Pt with improvements in hand strength/coordination in both /pinch and Box and blocks/9 hole peg test assessments. Pt improvement in PSFS. Improved in ADL status from AM to PM and goals updated. Throughout POC, pt limited by pain, decreased endurance, impaired balance, and sensory deficits in BUE/BLE. Pt would benefit from home health OT to address functional deficits and to ensure safety in home environment.    Weekly Outcome Summary (Interdisciplinary)  Weekly Progress Summary: progressing toward goals slower than expected  Weekly Outcome Statement: Psych met with Celso in his room. He was with  - He is looking to appeal his d/c tomorrow.   Yeserday had  Office visit hour and a half for stitches in wheelchair van. Due to bumpiness and being in wheelchair and not bed, he came back in excrutiating pain. Hands and feet worse this AM. Neck still  "in pain. Can't walk since the van ride, prior to yesterday was walking up to 200 feet. He is depressed and frustrated, feeling that \"all the progress is being flushed away. \"    Before the ride, pain decreasing, improving, WAS ready to go with the progress he was making. This was BEFORE the ride that happened.     He was agreeable to doing a guided imagery task with psychologist in remainder of session. He was willing to make attempts with PT and OT after this session to get some exercise in vs. Staying in bed.     Psychologist followed up in the late afternoon and Celso was up with nursing. He reported that he did get out and work with PT and OT and was able to do exercises, was able to see that some of the movement helped with lowering pain. He had a slightly improved mood and was a little more positive about going home, though he still would want more time if given. Psychologist gave Celso some handouts on breathing, PMR, cognitive thinking traps, and a grounding exercise.        Problem Resolution:  Comment, Barriers to Discharge: neck pain,  Comment, Facilitators for Discharge: pain management, support from family, home health, home infusion,  RW ordered and hospital         Expected Level of Function  Self-Care: Independent  Sphincter Control: Independent  Transfers: Independent  Locomotion: Independent  Communication: Independent  Social Cognition: Independent      Goals/Support System:  Family/Support System  Family/Support System Care: self-care encouraged    IRF PT Goals      Flowsheet Row Most Recent Value   Bed Mobility Goal 1    Activity/Assistive Device sit to supine/supine to sit at 04/02/2025 0904   [place] supervision required at 04/02/2025 0904   Time Frame short-term goal (STG), 5 - 7 days at 04/10/2025 0805   Strategies/Barriers spinal precautions, strength,  whole practice training, therex at 04/10/2025 0805   Progress/Outcome goal met at 04/16/2025 1259   Bed Mobility Goal 2  "   Activity/Assistive Device bed mobility activities, all at 04/02/2025 0904   [place] modified independence at 04/02/2025 0904   Time Frame long-term goal (LTG), 14 days or less, by discharge at 04/10/2025 0805   Progress/Outcome goal met at 04/16/2025 1259   Transfer Goal 1    Activity/Assistive Device sit-to-stand/stand-to-sit, stand pivot at 04/02/2025 0904   [place] supervision required at 04/10/2025 0805   Time Frame short-term goal (STG), 5 - 7 days at 04/10/2025 0805   Strategies/Barriers evaluated 4/2, conitnue with PT POC at 04/03/2025 0856   Progress/Outcome goal met at 04/16/2025 1259   Transfer Goal 2    Activity/Assistive Device sit-to-stand/stand-to-sit, stand pivot at 04/02/2025 0904   [place] modified independence at 04/02/2025 0904   Time Frame long-term goal (LTG), 1 week at 04/10/2025 0805   Progress/Outcome goal met at 04/16/2025 1259   Gait/Walking Locomotion Goal 1    Activity/Assistive Device gait (walking locomotion), assistive device use at 04/02/2025 0904   Distance 150 feet at 04/02/2025 0904   [place] minimum assist (75% or more patient effort) at 04/02/2025 0904   Time Frame short-term goal (STG), 5 - 7 days at 04/10/2025 0805   Strategies/Barriers endurance, fatigue,  therex, endurance at 04/10/2025 0805   Progress/Outcome goal met at 04/16/2025 1259   Gait/Walking Locomotion Goal 2    Activity/Assistive Device gait (walking locomotion), assistive device use at 04/02/2025 0904   Distance 150 feet at 04/02/2025 0904   [place] modified independence at 04/02/2025 0904   Time Frame long-term goal (LTG), 1 week at 04/10/2025 0805   Progress/Outcome goal met at 04/16/2025 1259   Stairs Goal 1    Activity/Assistive Device ascending stairs, descending stairs, using handrail, right, cane, straight at 04/17/2025 0844   Number of Stairs 4 at 04/17/2025 0844   [place] modified independence at 04/17/2025 0844   Time Frame short-term goal (STG), 5 - 7 days at 04/17/2025  0844   Strategies/Barriers evaluated 4/2, continue PT POC at 04/03/2025 0856   Progress/Outcome goal met at 04/16/2025 1259   Stairs Goal 2    Activity/Assistive Device ascending stairs, descending stairs, using handrail, left, using handrail, right at 04/02/2025 0904   Number of Stairs 12 at 04/02/2025 0904   [place] supervision required at 04/02/2025 0904   Time Frame long-term goal (LTG), 1 week at 04/10/2025 0805   Progress/Outcome goal met at 04/16/2025 1259          IRF OT Goals      Flowsheet Row Most Recent Value   Transfer Goal 1    Activity/Assistive Device toilet at 04/02/2025 0723   [place] supervision required at 04/09/2025 0949   Time Frame short-term goal (STG), 5 - 7 days at 04/09/2025 0949   Strategies/Barriers pt just evlauated at 04/03/2025 0754   Progress/Outcome goal met at 04/17/2025 0737   Transfer Goal 2    Activity/Assistive Device toilet at 04/02/2025 0723   [place] modified independence at 04/02/2025 0723   Time Frame long-term goal (LTG), 21 days or less at 04/02/2025 0723   Progress/Outcome goal met at 04/17/2025 0737   Transfer Goal 3    Activity/Assistive Device shower at 04/02/2025 0723   [place] supervision required at 04/09/2025 0949   Time Frame short-term goal (STG), 5 - 7 days at 04/09/2025 0949   Strategies/Barriers pt just evlauated at 04/03/2025 0754   Progress/Outcome goal met at 04/17/2025 0932   Transfer Goal 4    Activity/Assistive Device shower at 04/02/2025 0723   [place] supervision required at 04/09/2025 0949   Time Frame long-term goal (LTG), 14 days or less at 04/09/2025 0949   Progress/Outcome goal met at 04/17/2025 0932   Bathing Goal 1    [place] minimum assist (75% or more patient effort) at 04/09/2025 0949   Time Frame short-term goal (STG), 5 - 7 days at 04/09/2025 0949   Strategies/Barriers pt just evlauated at 04/03/2025 0754   Progress/Outcome goal met at 04/17/2025 0932   Bathing Goal 2    [place] supervision required at  04/02/2025 0723   Time Frame long-term goal (LTG), 21 days or less at 04/02/2025 0723   Strategies/Barriers pt declined further showers in therapy to make further assessment. at 04/17/2025 0737   Progress/Outcome goal met at 04/17/2025 0932   UB Dressing Goal 1    [place] supervision required at 04/09/2025 0949   Time Frame short-term goal (STG), 5 - 7 days at 04/09/2025 0949   Strategies/Barriers pt just evlauated at 04/03/2025 0754   Progress/Outcome goal met at 04/17/2025 0737   UB Dressing Goal 2    [place] modified independence at 04/09/2025 0949   Time Frame long-term goal (LTG), 14 days or less at 04/09/2025 0949   Progress/Outcome goal met at 04/17/2025 0737   LB Dressing Goal 1    [place] supervision required at 04/09/2025 0949   Time Frame short-term goal (STG), 5 - 7 days at 04/09/2025 0949   Strategies/Barriers pt just evlauated at 04/03/2025 0754   Progress/Outcome goal met at 04/17/2025 0737   LB Dressing Goal 2    [place] modified independence at 04/02/2025 0723   Time Frame long-term goal (LTG), 21 days or less at 04/02/2025 0723   Progress/Outcome goal met at 04/17/2025 0737   Grooming Goal 1    [place] set-up required at 04/02/2025 0723   Time Frame short-term goal (STG), 5 - 7 days at 04/09/2025 0949   Strategies/Barriers progressing to standing v seated,  continue to work on standing balance to maximize IND at 04/09/2025 0949   Progress/Outcome goal met at 04/17/2025 0737   Grooming Goal 2    [place] modified independence at 04/02/2025 0723   Time Frame long-term goal (LTG), 21 days or less at 04/02/2025 0723   Progress/Outcome goal met at 04/17/2025 0737   Toileting Goal 1    [place] supervision required at 04/09/2025 0949   Time Frame short-term goal (STG), 5 - 7 days at 04/09/2025 0949   Strategies/Barriers pt just evlauated at 04/03/2025 0754   Progress/Outcome goal met at 04/17/2025 0737   Toileting Goal 2    [place] modified independence at 04/02/2025  0723   Time Frame long-term goal (LTG), 21 days or less at 04/02/2025 0723   Progress/Outcome goal met at 04/17/2025 0737            Discharge Planning:  Anticipated Discharge Disposition: home with home health, home with assistance  Type of Home Care Services: home PT;home OT;nursing;infusion therapy  Equipment/Device Needs at Discharge  Assistive Device/Animal Currently Used at Home: none  Discharge Planning  Type of Current Home Care Services: other (comment) (n/a)  Does the patient need discharge transport arranged?: Yes  Has discharge transport been arranged?: Yes  Discharge Transportation Vendor: other (see comment)  What day is the transport expected?: 04/17/25  What time is the transport expected?: 1600    Anticipated Discharge Date: 4/18/2025    Needs Identified:       Team Members Present:     Rehab Attending Present:  Spencer Kemp MD    Care Coordinator Present:  Ana Mc LSW    Nurse Present:  Dayana Lisa RN    OT Present:  Lorena Stinson OT    PT Present:  Larry Paz PT        Next Team Conference Date: 04/24/25

## 2025-04-17 NOTE — PLAN OF CARE
Plan of Care Review  Plan of Care Reviewed With: patient  Progress: improving  Outcome Evaluation: HS meds reviewed with pt. Pt a/o x4, cont b/b; LBM 4/16/25. NPO, [place] QID via peg tube, accu checks QID ,heparin, soft collar prn, RUE PICC patent, mepilex to left clavicle patent. Pain mannaged with multi modal approach. Safety precautions maintained, call bell in reach.

## 2025-04-17 NOTE — PROGRESS NOTES
Patient: Celso Gramajo  Location: [facility]  MRN: [id number]  Today's date: 4/17/2025    History of Present Illness  Celso is a 69 y.o. male admitted on 4/1/2025 with Spinal epidural abscess [G06.1]. Principal problem is Spinal epidural abscess.    Celso Gramajo is a 69 year old male with PMHx thyroid CA s/p thyroidectomy, RND, adjuvant XRT/immunotherapy, esophageal SCC s/p chemoradiation c/b esophageal strictures, HTN, Elevated Coronary Calcium scoring in 2017, ENMANUEL, Hypothyroidism, Anemia, dysphagia on PEG (placed in 2021) TFs, and history of CVA who presented to an OSH with neck and R shoulder pain. Workup was unrevealing and he was discharged home.    He presented to the [place] ED again on 3/17 with ongoing fevers. CT Neck with no abscess, old postsurgical changes. He d/c home but BCx subsequently returned positive on 3/19 and he returned for admission. He was also bacteremic with strep viridans, and placed on IV antibiotics. Workup revealed a C5-C7 ventral epidural abscess with osteomyelitis, and he was transferred to [facility] for surgical evaluation.    MRI on 3/24 c/f metastatic disease at T1-T4, L4.      At [facility] ortho and ENT were consulted. Ultimately it was determined that an anterior surgical approach was not safe (due to history of neck surgeries), and patient was having progressive weakness. Repeat MRI C-spine showed Discitis and osteomyelitis at C6-C7 with ventral epidural abscess compressing the spinal cord and increased edema. Patient urgently underwent POSTERIOR C4-T1 DECOMPRESSION FUSION WITH [place] INSTRUMENTATION on 3/27. Postop he was briefly in the ICU for MAP goals. OR cultures grew strep intermedius and he was placed on the antibiotic plan below. He had a PICC placed 4/1. 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 He was started on salt tabs. In rehab please continue to check labs, and titrate the salt tabs as  "appropriate. He worked with PT/OT who recommended him for acute rehab.     Past Medical History  Celso has a past medical history of Anemia, Cancer (CMS/HCC), head and neck radiation, Hypertension, Hypothyroidism, Stroke (CMS/HCC), and Thyroid disease.    PT Vitals      Date/Time Pulse HR Source BP BP Location BP Method Pt Position [facility]   04/17/25 1333 97 Monitor 128/71 Left upper arm Automatic Sitting JR          PT Pain      Date/Time Pain Type Side/Orientation Location Rating: Activity Rating: Rest Interventions [facility]   04/17/25 1333 Pain Assessment -- -- -- -- \"I'm not complaining\" --    04/17/25 1430 Pain Reassessment bilateral shoulder 5 -- prescribed exercises encouraged JR               Prior Living Environment      Flowsheet Row Most Recent Value   People in Home spouse   Current Living Arrangements home   Home Accessibility stairs to enter home (Group), stairs within home (Group)   Living Environment Comment Multi-story home with 12-14 steps to second floor, powder room on 1st floor, 2-3 FELICITA via garage, multiple supportive family members   Number of Stairs, Main Entrance 3   Stair Railings, Main Entrance none   Location, Patient Bedroom second floor, must negotiate stairs to access   Location, Bathroom second floor, must negotiate stairs to access   Bathroom Access Comment powder room 1st floor standard height toilet, full bathroom 2nd floor WIS with glass door, standard height toilet   Stairs, Within Home, Primary 12   Stair Railings, Within Home, Primary railings on both sides of stairs            Prior Level of Function      Flowsheet Row Most Recent Value   Dominant Hand right   Ambulation independent   Transferring independent   Toileting independent   Bathing independent   Dressing independent   Eating independent   IADLs independent   Driving/Transportation    Communication understands/communicates without difficulty   Past History of Dysphagia PTA pt was completely independent, no " AD. (+) . NPO PTA, manages peg tube independently.   Prior Level of Function Comment Independent without use of AE or DME.   Assistive Device Currently Used at Home none             IRF PT Evaluation and Treatment - 04/17/25 1338          PT Time Calculation    Start Time 1330     Stop Time 1430     Time Calculation (min) 60 min        Session Details    Document Type Daily Treatment/Progress Note        Mobility Belt    Mobility Belt Used During Session no - independent with all mobility        Orthosis Neck Cervical Collar    Orthosis Properties Date Obtained: 04/03/25 Time Obtained: 0825 Location: Neck Type: Cervical Collar Features: Soft Description: D/C [place] J collar. If patient desires a he can wear a soft collar as needed for comfort.       Sit to Stand Transfer    [place], Sit to Stand Transfer modified independence     Assistive Device walker, front-wheeled     Comment various surfaces        Stand to Sit Transfer    [place], Stand to Sit Transfer modified independence     Assistive Device walker, front-wheeled     Comment various surfaces        Stand Pivot Transfer    [place], Stand Pivot/Stand Step Transfer modified independence     Assistive Device walker, front-wheeled     Comment via amb approach        Gait Training    [place], Gait modified independence     Assistive Device walker, front-wheeled     Distance in Feet 250 feet     Pattern step-through     Deviations/Abnormal Patterns base of support, wide;jean decreased;gait speed decreased;step length decreased     Bilateral Gait Deviations heel strike decreased     Comment 250' x 2, 200'x2 brief standing rest breaks as needed        Curb Negotiation    Comment completed on 4/16/25 please see note        Rough/Uneven Surface Gait Skills    [place] modified independence     Assistive Device walker, front-wheeled     Distance in Feet 150 feet     Comment 150'x 2 over thresholds, outdoor patio including slope; no  notable balance impairment and appropriate RW mgmt        Stairs Training    [place], Stairs modified independence     Assistive Device railing;cane, straight     Handrail Location (Stairs) left side (ascending);left side (descending)     Number of Stairs 12     Stair Height 6 inches     Ascending Stairs Technique step-to-step     Descending Stairs Technique step-to-step     Comment no notable unsteadiness, pt manages SPC sequencing without cueing        Daily Progress Summary (PT)    Daily Outcome Statement Pt progressed to mod I on stairs using SPC and HR. Tolerated consistent movement t/o session w/ appropriate rest breaks. Minimal pain levels reported.                               IRF PT Goals      Flowsheet Row Most Recent Value   Bed Mobility Goal 1    Activity/Assistive Device sit to supine/supine to sit at 04/02/2025 0904   [place] supervision required at 04/02/2025 0904   Time Frame short-term goal (STG), 5 - 7 days at 04/10/2025 0805   Strategies/Barriers spinal precautions, strength,  whole practice training, therex at 04/10/2025 0805   Progress/Outcome goal met at 04/16/2025 1259   Bed Mobility Goal 2    Activity/Assistive Device bed mobility activities, all at 04/02/2025 0904   [place] modified independence at 04/02/2025 0904   Time Frame long-term goal (LTG), 14 days or less, by discharge at 04/10/2025 0805   Progress/Outcome goal met at 04/16/2025 1259   Transfer Goal 1    Activity/Assistive Device sit-to-stand/stand-to-sit, stand pivot at 04/02/2025 0904   [place] supervision required at 04/10/2025 0805   Time Frame short-term goal (STG), 5 - 7 days at 04/10/2025 0805   Strategies/Barriers evaluated 4/2, conitnue with PT POC at 04/03/2025 0856   Progress/Outcome goal met at 04/16/2025 1259   Transfer Goal 2    Activity/Assistive Device sit-to-stand/stand-to-sit, stand pivot at 04/02/2025 0904   [place] modified independence at 04/02/2025 0904   Time Frame long-term goal  (LTG), 1 week at 04/10/2025 0805   Progress/Outcome goal met at 04/16/2025 1259   Gait/Walking Locomotion Goal 1    Activity/Assistive Device gait (walking locomotion), assistive device use at 04/02/2025 0904   Distance 150 feet at 04/02/2025 0904   [place] minimum assist (75% or more patient effort) at 04/02/2025 0904   Time Frame short-term goal (STG), 5 - 7 days at 04/10/2025 0805   Strategies/Barriers endurance, fatigue,  therex, endurance at 04/10/2025 0805   Progress/Outcome goal met at 04/16/2025 1259   Gait/Walking Locomotion Goal 2    Activity/Assistive Device gait (walking locomotion), assistive device use at 04/02/2025 0904   Distance 150 feet at 04/02/2025 0904   [place] modified independence at 04/02/2025 0904   Time Frame long-term goal (LTG), 1 week at 04/10/2025 0805   Progress/Outcome goal met at 04/16/2025 1259   Stairs Goal 1    Activity/Assistive Device ascending stairs, descending stairs, using handrail, right, cane, straight at 04/17/2025 0844   Number of Stairs 4 at 04/17/2025 0844   [place] modified independence at 04/17/2025 0844   Time Frame short-term goal (STG), 5 - 7 days at 04/17/2025 0844   Strategies/Barriers evaluated 4/2, continue PT POC at 04/03/2025 0856   Progress/Outcome goal met at 04/16/2025 1259   Stairs Goal 2    Activity/Assistive Device ascending stairs, descending stairs, using handrail, left, using handrail, right at 04/02/2025 0904   Number of Stairs 12 at 04/02/2025 0904   [place] supervision required at 04/02/2025 0904   Time Frame long-term goal (LTG), 1 week at 04/10/2025 0805   Progress/Outcome goal met at 04/16/2025 1259

## 2025-04-17 NOTE — PROGRESS NOTES
Patient: Celso Gramajo  Location: [facility]  MRN: [id number]  Today's date: 4/17/2025    History of Present Illness  Celso is a 69 y.o. male admitted on 4/1/2025 with Spinal epidural abscess [G06.1]. Principal problem is Spinal epidural abscess.    Celso Gramajo is a 69 year old male with PMHx thyroid CA s/p thyroidectomy, RND, adjuvant XRT/immunotherapy, esophageal SCC s/p chemoradiation c/b esophageal strictures, HTN, Elevated Coronary Calcium scoring in 2017, ENMANUEL, Hypothyroidism, Anemia, dysphagia on PEG (placed in 2021) TFs, and history of CVA who presented to an OSH with neck and R shoulder pain. Workup was unrevealing and he was discharged home.    He presented to the [place] ED again on 3/17 with ongoing fevers. CT Neck with no abscess, old postsurgical changes. He d/c home but BCx subsequently returned positive on 3/19 and he returned for admission. He was also bacteremic with strep viridans, and placed on IV antibiotics. Workup revealed a C5-C7 ventral epidural abscess with osteomyelitis, and he was transferred to [facility] for surgical evaluation.    MRI on 3/24 c/f metastatic disease at T1-T4, L4.      At [facility] ortho and ENT were consulted. Ultimately it was determined that an anterior surgical approach was not safe (due to history of neck surgeries), and patient was having progressive weakness. Repeat MRI C-spine showed Discitis and osteomyelitis at C6-C7 with ventral epidural abscess compressing the spinal cord and increased edema. Patient urgently underwent POSTERIOR C4-T1 DECOMPRESSION FUSION WITH [place] INSTRUMENTATION on 3/27. Postop he was briefly in the ICU for MAP goals. OR cultures grew strep intermedius and he was placed on the antibiotic plan below. He had a PICC placed 4/1. 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 He was started on salt tabs. In rehab please continue to check labs, and titrate the salt tabs as  appropriate. He worked with PT/OT who recommended him for acute rehab.     Past Medical History  Celso has a past medical history of Anemia, Cancer (CMS/HCC), head and neck radiation, Hypertension, Hypothyroidism, Stroke (CMS/HCC), and Thyroid disease.          04/17/25 0901 04/17/25 0929   Pain/Comfort/Sleep   Pain Charting Type Pain Assessment Pain Assessment   Preferred Pain Scale number (Numeric Rating Pain Scale) number (Numeric Rating Pain Scale)   (0-10) Pain Rating: Rest 7 7   (0-10) Pain Rating: Activity 7 7   Pain Side/Orientation bilateral bilateral   Pain Body Location shoulder shoulder   Pain Description aching aching   Pain Management Interventions diversional activity provided diversional activity provided   Patient Observation   Patient Observations did not obtain BP as assisting with pt troubleshooting infinity pump. Direct handoff to next OT.  --          Prior Living Environment      Flowsheet Row Most Recent Value   People in Home spouse   Current Living Arrangements home   Home Accessibility stairs to enter home (Group), stairs within home (Group)   Living Environment Comment Multi-story home with 12-14 steps to second floor, powder room on 1st floor, 2-3 FELICITA via garage, multiple supportive family members   Number of Stairs, Main Entrance 3   Stair Railings, Main Entrance none   Location, Patient Bedroom second floor, must negotiate stairs to access   Location, Bathroom second floor, must negotiate stairs to access   Bathroom Access Comment powder room 1st floor standard height toilet, full bathroom 2nd floor WIS with glass door, standard height toilet   Stairs, Within Home, Primary 12   Stair Railings, Within Home, Primary railings on both sides of stairs            Prior Level of Function      Flowsheet Row Most Recent Value   Dominant Hand right   Ambulation independent   Transferring independent   Toileting independent   Bathing independent   Dressing independent   Eating independent   IADLs  independent   Driving/Transportation    Communication understands/communicates without difficulty   Past History of Dysphagia PTA pt was completely independent, no AD. (+) . NPO PTA, manages peg tube independently.   Prior Level of Function Comment Independent without use of AE or DME.   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 + , -handicap driving placard.   Patient Goals PSFS: going to the bathroom 7/10, walking 5/10, getting in/out of bed 7/10 = 19/30 = 63.3%               04/17/25 0901   OT Time Calculation   Start Time 0900   Stop Time 0930   Time Calculation (min) 30 min   Session Details   Document Type Daily Treatment/Progress Note   General Information   General Observations of Patient Pt seen for therapy session in bed, requesting to take a shower, hooked up to infinity pump for self-feeding   Mobility Belt   Mobility Belt Used During Session no - independent with all mobility   Sit to Stand Transfer   [place], Sit to Stand Transfer modified independence   Assistive Device walker, front-wheeled   Comment from recliner chair   Stand to Sit Transfer   [place], Stand to Sit Transfer modified independence   Assistive Device walker, front-wheeled   Comment to recliner chair   Impairments/Safety Issues   Comment, Safety Issues/Impairments OT: Mod I for HHM c YOBANY in pt's hospital room.   Therapeutic Interventions   Comment, Therapeutic Intervention (1) Pt noted c error c battery using Infinity pump for self feed. Assisted pt in contacting customer service to assist c problem solving/trouble shooting. Notified nursing, to complete bolus feed the remainder of day as pt needs to replace Infinity pump. (2) Pt requested enema for bowel movement, assisted nursing in set up, direct handoff to next treating therapist. (3) Mod I  for clothing retrieval c RW for UB/LB dressing tasks to prep for after shower.   Daily Progress Summary (OT)   Daily Outcome Statement Pt seen for therapy session c focus on troubleshooting infinity pump. Noted to have dead battery and pt will need to replace pump once home, to bolus feed remainder of day. Pt requested to take a shower and have an enema.Mod I for pt to prep self care task (ie. clothing retrieval c RW) to be ready for after shower. Direct handoff to next OT to assist nursing c enema management and to provide shower.            Education Documentation  No documentation found.        IRF OT Goals      Flowsheet Row Most Recent Value   Transfer Goal 1    Activity/Assistive Device toilet at 04/02/2025 0723   [place] supervision required at 04/09/2025 0949   Time Frame short-term goal (STG), 5 - 7 days at 04/09/2025 0949   Strategies/Barriers pt just evlauated at 04/03/2025 0754   Progress/Outcome goal met at 04/17/2025 0737   Transfer Goal 2    Activity/Assistive Device toilet at 04/02/2025 0723   [place] modified independence at 04/02/2025 0723   Time Frame long-term goal (LTG), 21 days or less at 04/02/2025 0723   Progress/Outcome goal met at 04/17/2025 0737   Transfer Goal 3    Activity/Assistive Device shower at 04/02/2025 0723   [place] supervision required at 04/09/2025 0949   Time Frame short-term goal (STG), 5 - 7 days at 04/09/2025 0949   Strategies/Barriers pt just evlauated at 04/03/2025 0754   Progress/Outcome goal met at 04/17/2025 0737   Transfer Goal 4    Activity/Assistive Device shower at 04/02/2025 0723   [place] supervision required at 04/09/2025 0949   Time Frame long-term goal (LTG), 14 days or less at 04/09/2025 0949   Progress/Outcome goal met at 04/17/2025 0737   Bathing Goal 1    [place] minimum assist (75% or more patient effort) at 04/09/2025 0949   Time Frame short-term goal (STG), 5 - 7 days at 04/09/2025 0949   Strategies/Barriers pt just evlauated  at 04/03/2025 0754   Progress/Outcome goal met at 04/17/2025 0737   Bathing Goal 2    [place] supervision required at 04/02/2025 0723   Time Frame long-term goal (LTG), 21 days or less at 04/02/2025 0723   Strategies/Barriers pt declined further showers in therapy to make further assessment. at 04/17/2025 0737   Progress/Outcome good progress toward goal, goal not met at 04/17/2025 0737   UB Dressing Goal 1    [place] supervision required at 04/09/2025 0949   Time Frame short-term goal (STG), 5 - 7 days at 04/09/2025 0949   Strategies/Barriers pt just evlauated at 04/03/2025 0754   Progress/Outcome goal met at 04/17/2025 0737   UB Dressing Goal 2    [place] modified independence at 04/09/2025 0949   Time Frame long-term goal (LTG), 14 days or less at 04/09/2025 0949   Progress/Outcome goal met at 04/17/2025 0737   LB Dressing Goal 1    [place] supervision required at 04/09/2025 0949   Time Frame short-term goal (STG), 5 - 7 days at 04/09/2025 0949   Strategies/Barriers pt just evlauated at 04/03/2025 0754   Progress/Outcome goal met at 04/17/2025 0737   LB Dressing Goal 2    [place] modified independence at 04/02/2025 0723   Time Frame long-term goal (LTG), 21 days or less at 04/02/2025 0723   Progress/Outcome goal met at 04/17/2025 0737   Grooming Goal 1    [place] set-up required at 04/02/2025 0723   Time Frame short-term goal (STG), 5 - 7 days at 04/09/2025 0949   Strategies/Barriers progressing to standing v seated,  continue to work on standing balance to maximize IND at 04/09/2025 0949   Progress/Outcome goal met at 04/17/2025 0737   Grooming Goal 2    [place] modified independence at 04/02/2025 0723   Time Frame long-term goal (LTG), 21 days or less at 04/02/2025 0723   Progress/Outcome goal met at 04/17/2025 0737   Toileting Goal 1    [place] supervision required at 04/09/2025 0949   Time Frame short-term goal (STG), 5 - 7 days at 04/09/2025 0949   Strategies/Barriers  pt just evlauated at 04/03/2025 0754   Progress/Outcome goal met at 04/17/2025 0737   Toileting Goal 2    [place] modified independence at 04/02/2025 0723   Time Frame long-term goal (LTG), 21 days or less at 04/02/2025 0723   Progress/Outcome goal met at 04/17/2025 0737

## 2025-04-17 NOTE — PROGRESS NOTES
Patient: Celso Gramajo  Location: [facility] [address]  MRN: [id number]  Today's date: 4/17/2025    History of Present Illness  Celso is a 69 y.o. male admitted on 4/1/2025 with Spinal epidural abscess [G06.1]. Principal problem is Spinal epidural abscess.    Cleso Gramajo is a 69 year old male with PMHx thyroid CA s/p thyroidectomy, RND, adjuvant XRT/immunotherapy, esophageal SCC s/p chemoradiation c/b esophageal strictures, HTN, Elevated Coronary Calcium scoring in 2017, ENMANUEL, Hypothyroidism, Anemia, dysphagia on PEG (placed in 2021) TFs, and history of CVA who presented to an OSH with neck and R shoulder pain. Workup was unrevealing and he was discharged home.    He presented to the [place] ED again on 3/17 with ongoing fevers. CT Neck with no abscess, old postsurgical changes. He d/c home but BCx subsequently returned positive on 3/19 and he returned for admission. He was also bacteremic with strep viridans, and placed on IV antibiotics. Workup revealed a C5-C7 ventral epidural abscess with osteomyelitis, and he was transferred to [facility] for surgical evaluation.    MRI on 3/24 c/f metastatic disease at T1-T4, L4.      At [facility] ortho and ENT were consulted. Ultimately it was determined that an anterior surgical approach was not safe (due to history of neck surgeries), and patient was having progressive weakness. Repeat MRI C-spine showed Discitis and osteomyelitis at C6-C7 with ventral epidural abscess compressing the spinal cord and increased edema. Patient urgently underwent POSTERIOR C4-T1 DECOMPRESSION FUSION WITH [place] INSTRUMENTATION on 3/27. Postop he was briefly in the ICU for MAP goals. OR cultures grew strep intermedius and he was placed on the antibiotic plan below. He had a PICC placed 4/1. 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 He was started on salt tabs. In rehab please continue to check labs, and titrate the salt tabs as  appropriate. He worked with PT/OT who recommended him for acute rehab.     Past Medical History  Celso has a past medical history of Anemia, Cancer (CMS/HCC), head and neck radiation, Hypertension, Hypothyroidism, Stroke (CMS/HCC), and Thyroid disease.           Prior Living Environment      Flowsheet Row Most Recent Value   People in Home spouse   Current Living Arrangements home   Home Accessibility stairs to enter home (Group), stairs within home (Group)   Living Environment Comment Multi-story home with 12-14 steps to second floor, powder room on 1st floor, 2-3 FELICITA via garage, multiple supportive family members   Number of Stairs, Main Entrance 3   Stair Railings, Main Entrance none   Location, Patient Bedroom second floor, must negotiate stairs to access   Location, Bathroom second floor, must negotiate stairs to access   Bathroom Access Comment powder room 1st floor standard height toilet, full bathroom 2nd floor WIS with glass door, standard height toilet   Stairs, Within Home, Primary 12   Stair Railings, Within Home, Primary railings on both sides of stairs            Prior Level of Function      Flowsheet Row Most Recent Value   Dominant Hand right   Ambulation independent   Transferring independent   Toileting independent   Bathing independent   Dressing independent   Eating independent   IADLs independent   Driving/Transportation    Communication understands/communicates without difficulty   Past History of Dysphagia PTA pt was completely independent, no AD. (+) . NPO PTA, manages peg tube independently.   Prior Level of Function Comment Independent without use of AE or DME.   Assistive Device Currently Used at Home none            Occupational Profile      Flowsheet Row Most Recent Value   Occupational History/Life Experiences retired involved in [employer] on [employer]. + , -handicap driving placard.   Patient Goals PSFS: going to the bathroom 7/10, walking 5/10, getting  in/out of bed 7/10 = 19/30 = 63.3%             IRF OT Evaluation and Treatment - 04/17/25 0932          OT Time Calculation    Start Time 0930     Stop Time 1030     Time Calculation (min) 60 min        Session Details    Document Type Daily Treatment/Progress Note        General Information    General Observations of Patient Handoff from prior OT session        Mobility Belt    Mobility Belt Used During Session no - independent with all mobility        Orthosis Neck Cervical Collar    Orthosis Properties Date Obtained: 04/03/25 Time Obtained: 0825 Location: Neck Type: Cervical Collar Features: Soft Description: D/C [place] J collar. If patient desires a he can wear a soft collar as needed for comfort.       Sit to Stand Transfer    [place], Sit to Stand Transfer modified independence     Safety/Cues technique     Assistive Device walker, front-wheeled     Comment in ADL context        Stand to Sit Transfer    [place], Stand to Sit Transfer modified independence     Safety/Cues technique     Assistive Device walker, front-wheeled     Comment in ADL context        Toilet Transfer    [place] modified independence     Assistive Device grab bars/safety frame;raised toilet seat;walker, front-wheeled     Comment Amb approach        Shower Transfer    [place] modified independence     Assistive Device grab bars/tub rail;tub bench;walker, front-wheeled     Comment Amb approach        Impairments/Safety Issues    Comment, Safety Issues/Impairments OT: MI c RW for in room mobility        Balance    Comment, Balance MI seated/standing for ADL        Bathing    Shower Provided? Yes     [place] modified independence     Safety/Cues increased time to complete     Position supported sitting     Setup Assistance adaptive equipment setup;obtain supplies     Adaptive Equipment hand-held shower spray hose;tub bench     Comment Full wet shower completed c increase time        Upper Body Dressing    Tasks hospital  gown     [place] modified independence     Comment Declined changing into shirt and just opting to wear gown for now. MI to jame button up shirt yesterday        Lower Body Dressing    Tasks underwear;socks     [place] modified independence     Safety/Cues increased time to complete     Position supported sitting;unsupported standing     Adaptive Equipment none     [place], Footwear modified independence     Comment To jame pull up underwear and socks. Declined wearing pants at this time        Grooming    Tasks washes, rinses and dries face;washes, rinses and dries hands;brushes/best hair;oral care (brushing teeth, cleaning dentures)     [place] modified independence     Position supported standing     Setup Assistance obtain supplies     Adaptive Equipment none     [place], Oral Hygiene modified independence     Comment in stance        Toileting    [place] modified independence     Position supported sitting;supported standing     Setup Assistance adaptive equipment setup     Comment Voided bowel with use of enema. Able to manage clothing and perform hygiene        Daily Progress Summary (OT)    Daily Outcome Statement Pt seen as direct handoff from prior OT. Focus of session on toileting, bathing, and dressing. Pt opting to stay in hospital gown post shower. Progressed to MI for shower transfer and bathing. Continue POC                               IRF OT Goals      Flowsheet Row Most Recent Value   Transfer Goal 1    Activity/Assistive Device toilet at 04/02/2025 0723   [place] supervision required at 04/09/2025 0949   Time Frame short-term goal (STG), 5 - 7 days at 04/09/2025 0949   Strategies/Barriers pt just evlauated at 04/03/2025 0754   Progress/Outcome goal met at 04/17/2025 0737   Transfer Goal 2    Activity/Assistive Device toilet at 04/02/2025 0723   [place] modified independence at 04/02/2025 0723   Time Frame long-term goal (LTG), 21 days or less at  04/02/2025 0723   Progress/Outcome goal met at 04/17/2025 0737   Transfer Goal 3    Activity/Assistive Device shower at 04/02/2025 0723   [place] supervision required at 04/09/2025 0949   Time Frame short-term goal (STG), 5 - 7 days at 04/09/2025 0949   Strategies/Barriers pt just evlauated at 04/03/2025 0754   Progress/Outcome goal met at 04/17/2025 0932   Transfer Goal 4    Activity/Assistive Device shower at 04/02/2025 0723   [place] supervision required at 04/09/2025 0949   Time Frame long-term goal (LTG), 14 days or less at 04/09/2025 0949   Progress/Outcome goal met at 04/17/2025 0932   Bathing Goal 1    [place] minimum assist (75% or more patient effort) at 04/09/2025 0949   Time Frame short-term goal (STG), 5 - 7 days at 04/09/2025 0949   Strategies/Barriers pt just evlauated at 04/03/2025 0754   Progress/Outcome goal met at 04/17/2025 0932   Bathing Goal 2    [place] supervision required at 04/02/2025 0723   Time Frame long-term goal (LTG), 21 days or less at 04/02/2025 0723   Strategies/Barriers pt declined further showers in therapy to make further assessment. at 04/17/2025 0737   Progress/Outcome goal met at 04/17/2025 0932   UB Dressing Goal 1    [place] supervision required at 04/09/2025 0949   Time Frame short-term goal (STG), 5 - 7 days at 04/09/2025 0949   Strategies/Barriers pt just evlauated at 04/03/2025 0754   Progress/Outcome goal met at 04/17/2025 0737   UB Dressing Goal 2    [place] modified independence at 04/09/2025 0949   Time Frame long-term goal (LTG), 14 days or less at 04/09/2025 0949   Progress/Outcome goal met at 04/17/2025 0737   LB Dressing Goal 1    [place] supervision required at 04/09/2025 0949   Time Frame short-term goal (STG), 5 - 7 days at 04/09/2025 0949   Strategies/Barriers pt just evlauated at 04/03/2025 0754   Progress/Outcome goal met at 04/17/2025 0737   LB Dressing Goal 2    [place] modified independence at 04/02/2025 0745    Time Frame long-term goal (LTG), 21 days or less at 04/02/2025 0723   Progress/Outcome goal met at 04/17/2025 0737   Grooming Goal 1    [place] set-up required at 04/02/2025 0723   Time Frame short-term goal (STG), 5 - 7 days at 04/09/2025 0949   Strategies/Barriers progressing to standing v seated,  continue to work on standing balance to maximize IND at 04/09/2025 0949   Progress/Outcome goal met at 04/17/2025 0737   Grooming Goal 2    [place] modified independence at 04/02/2025 0723   Time Frame long-term goal (LTG), 21 days or less at 04/02/2025 0723   Progress/Outcome goal met at 04/17/2025 0737   Toileting Goal 1    [place] supervision required at 04/09/2025 0949   Time Frame short-term goal (STG), 5 - 7 days at 04/09/2025 0949   Strategies/Barriers pt just evlauated at 04/03/2025 0754   Progress/Outcome goal met at 04/17/2025 0737   Toileting Goal 2    [place] modified independence at 04/02/2025 0723   Time Frame long-term goal (LTG), 21 days or less at 04/02/2025 0723   Progress/Outcome goal met at 04/17/2025 0737

## 2025-04-17 NOTE — PLAN OF CARE
OT POC reviewed and goals updated. Pt was scheduled for d/c on 4/17, but appealed. Pt is Mod I status except for bathing and shower stall transfers.       Problem: Rehabilitation (IRF) Plan of Care  Goal: Plan of Care Review  Outcome: Met     Problem: Rehabilitation (IRF) Plan of Care  Goal: Patient-Specific Goal (Individualized)  Outcome: Met     Problem: BADL (Basic Activities of Daily Living) Impairment  Goal: Optimal Safe BADL Performance  Outcome: Progressing

## 2025-04-17 NOTE — PROGRESS NOTES
Pt medicare appeal still pending, cm met w pt to provide updates. Pt requests CM contact surgeon about providing pain meds. CM updated team. CM called orthopedic surgery at [facility] and sent message to pts MD Dr. Good who will contact pt when pain med scripts are sent to his home pharmacy. CM updated team and will provide updates when Medicare appeal has been determined. CM updated [facility]. CM updated [facility], SOC 4/19. ELOS 4/18

## 2025-04-17 NOTE — PROGRESS NOTES
Transitions of Care  Pharmacy Counseling Note      SUMMARY   Called Celso Gramajo's wife Nathanael for medication education. The patient's wife states she is familiar with his medications. Reviewed all patients current medications in detail. Discussed the name, dose, frequency, indication and side effects. Discussed any changes in regimen.    -Reviewed benefits and side effects of patient's medications.  -Counseled patient's wife on ceftriaxone. Explained medication is for CNS infection. Last dose 5/8.  -Counseled patient's wife on Lovenox. Explained medication is for clot prevention. Discussed bleeding risk. Instructed patient to monitor for signs/symptoms of major bleeding (blood when going to the bathroom, sudden severe headache, persistent bleeding) and to report to the emergency room if they experience them. Last dose 5/17.  -Informed patient's wife their famotidine dose was decreased from 40 mg daily to 20 mg daily.    -Informed patient's wife they will be sent home with a 7 day supply of Dilaudid and to follow-up with their PCP if they require more for their pain.  -Counseled patient's wife on Linzess. Explained medication is for constipation. Discussed side effects including diarrhea and abdominal pain. Copay is $363. Patient's wife picked up medication prior to call. Recommended patient's wife find a GI doctor that provides samples to help save money.  -Informed patient's wife they will be sent home with a 5 day supply of Ativan and to follow-up with their psychiatrist if they require more for their anxiety.  -Counseled patient's wife on nystatin powder. Explained medication is for groin irritation. Last dose 4/25.  -Counseled patient's wife on sodium chloride. Explained medication is for hyponatremia.  -Counseled patient's wife on zolpidem. Explained medication is for insomnia. Discussed side effects including headache, dizziness, and sleep walking.  -Patient's wife requested patient be sent home with  Humalog sliding scale due to concerns regarding hyperglycemia. Informed provider about patient's wife's concerns. Provider will send a prescription for Humalog sliding scale.    Confirmed preferred pharmacy, [facility] in [place], [place].     Provided my phone extension for any issues/concerns in this transitional period: [phone]      MEDICATIONS     Medication List    Current Facility-Administered Medications:     acetaminophen (TYLENOL) 650 mg/20.3 mL solution 650 mg, 650 mg, peg tube, q6h PRN, Spencer Kemp MD, 650 mg at 04/17/25 0032    bisacodyL (DULCOLAX) 10 mg suppository 10 mg, 10 mg, rectal, Daily PRN, Spencer Kemp MD, 10 mg at 04/16/25 1651    cefTRIAXone (ROCEPHIN) 2 g in sodium chloride 0.9 % 100 mL IVPB, 2 g, intravenous, q24h INT, Spencer Kemp MD, Stopped at 04/17/25 0625    docusate sodium (COLACE) 50 mg/5 mL liquid 100 mg, 100 mg, peg tube, BID, Spencer Kemp MD, 100 mg at 04/17/25 0808    enoxaparin (LOVENOX) syringe 40 mg, 40 mg, subcutaneous, Daily (6p), Spencer Kemp MD, 40 mg at 04/16/25 1818    ezetimibe (ZETIA) tablet 10 mg, 10 mg, peg tube, Nightly, Spencer Kemp MD, 10 mg at 04/16/25 2036    famotidine (PEPCID) tablet 20 mg, 20 mg, peg tube, Nightly, Spencer Kemp MD, 20 mg at 04/16/25 2036    FLUoxetine (PROzac) 20 mg/5 mL (4 mg/mL) solution 20 mg, 20 mg, peg tube, Daily, Spenecr Kemp MD, 20 mg at 04/17/25 0935    glycopyrrolate (ROBINUL) tablet 1 mg, 1 mg, peg tube, 2x daily PRN, Spencer Kemp MD, 1 mg at 04/11/25 0850    honey (MEDIHONEY) 100 % topical paste, , Topical, Daily, Spencer Kemp MD, Given at 04/17/25 0821    HYDROmorphone (DILAUDID) tablet 2 mg, 2 mg, peg tube, q6h PRN, Spencer Kemp MD, 2 mg at 04/17/25 0625    lansoprazole (PREVACID) 3 mg/mL oral suspension 15 mg, 15 mg, feeding tube, Daily before breakfast, Spencer Kemp MD, 15 mg at 04/17/25 0934    levothyroxine (SYNTHROID) tablet 150 mcg, 150 mcg,  peg tube, Daily (6:30a), Spencer Kemp MD, 150 mcg at 04/17/25 0625    linaCLOtide (LINZESS) capsule 145 mcg, 145 mcg, g-tube, Daily before lunch, Spencer Kemp MD, 145 mcg at 04/16/25 1240    linaCLOtide (LINZESS) capsule 145 mcg, 145 mcg, peg tube, Daily PRN, Spencer Kemp MD, 145 mcg at 04/15/25 1608    liothyronine (CYTOMEL) tablet 10 mcg, 10 mcg, peg tube, Daily (6:30a), Spencer Kemp MD, 10 mcg at 04/17/25 0625    LORazepam (ATIVAN) tablet 0.25 mg, 0.25 mg, peg tube, q8h PRN, Spencer Kemp MD, 0.25 mg at 04/16/25 0850    magnesium hydroxide (M.O.M.) 400 mg/5 mL suspension 30 mL, 30 mL, oral, 2x daily PRN, Spencer Kemp MD, 30 mL at 04/16/25 1458    nystatin (MYCOSTATIN) 100,000 unit/gram topical powder, , Topical, BID, Lilian Marrero MD, Given at 04/17/25 0821    ondansetron ODT (ZOFRAN-ODT) disintegrating tablet 4 mg, 4 mg, peg tube, q6h PRN, Spencer Kemp MD, 4 mg at 04/17/25 0543    senna (SENOKOT) tablet 1 tablet, 1 tablet, peg tube, 2x daily PRN, Spencer Kemp MD, 1 tablet at 04/15/25 0813    senna (SENOKOT) tablet 2 tablet, 2 tablet, peg tube, BID, Spencer Kemp MD, 2 tablet at 04/17/25 0808    simethicone (MYLICON) chewable tablet 160 mg, 160 mg, peg tube, QID, Spencer Kemp MD, 160 mg at 04/17/25 0808    sodium chloride PF flush 10 mL, 10 mL, intravenous, q8h INT, Spencer Kemp MD, 10 mL at 04/17/25 0551    sodium chloride PF flush 10 mL, 10 mL, intravenous, PRN, Spencer Kemp MD    sodium chloride tablet 1 g, 1 g, peg tube, Daily, Spencer Kemp MD, 1 g at 04/17/25 0808    sodium phosphates (FLEET) enema adult 1 enema, 1 enema, rectal, Daily PRN, Spencer Kemp MD, 1 enema at 04/10/25 1306    zolpidem (AMBIEN) tablet 5 mg, 5 mg, peg tube, Nightly, Rohit Acevedo MD, 5 mg at 04/16/25 2125          Rubén Santoro, PharmD,    (Time Spent: 20 minutes)  4/17/2025

## 2025-04-17 NOTE — PROGRESS NOTES
[facility] Progress Note          Patient was seen and examined at bedside.    Subjective:   4/16/2025  Saw him in am:  Very upset about yesterday's surgeon office visit, complained significantly about poor transportation situation and the arrangement, which has caused his more pain and neuralgia. Talked with him and his wife. Will hold this am therapies, give him a break, may try PT/OT this afternoon, which he is agreeable.  No real new neurologic deficits.      4/17/25  Saw and evaluated him in am, had a long conversation with him with a lot of consultation, he is calm, had some decent sleep last night, feels better both mentally and physically, has a lot of concerns and questions about his bowel zayda regulation, his pain issues, and his TF. Spent a lot of time to responded to his concerns and questions.    He will stay at [facility] for one more day before d/c.  This is a complicated case.    Objective   Vital signs in last 24 hours:  Temp:  [36.6 °C (97.8 °F)-36.7 °C (98 °F)] 36.7 °C (98 °F)  Heart Rate:  [80-96] 86  Resp:  [18-20] 20  BP: ()/(58-69) 116/58      Intake/Output Summary (Last 24 hours) at 4/17/2025 1030  Last data filed at 4/17/2025 0630  Gross per 24 hour   Intake 900 ml   Output 200 ml   Net 700 ml     Intake/Output this shift:  No intake/output data recorded.   Review of Systems:  All other systems reviewed and negative except as noted in the HPI.   Objective      Labs  Reviewed his labs thoroughly   Lab Results   Component Value Date    WBC 5.17 04/14/2025    HGB 12.5 (L) 04/14/2025    HCT 38.3 (L) 04/14/2025    MCV 94.8 04/14/2025     04/14/2025     Lab Results   Component Value Date    GLUCOSE 138 (H) 04/14/2025    CALCIUM 9.4 04/14/2025     (L) 04/14/2025    K 4.4 04/14/2025    CO2 31 04/14/2025    CL 96 (L) 04/14/2025    BUN 16 04/14/2025    CREATININE 0.6 (L) 04/14/2025       Imaging  OSH imaging study reports reviewed    Full Code    Physical  Exam:  Head/Ear/Nose/Throat: normocephalic; atraumatic; moisture mouth mm, no oropharyngeal thrush noted.   Eyes: anicteric sclera, EOMI; PERRL.   Neck : supple, no JVD, no carotid bruits appeciated.   Respiratory: no evidence of labored breathing, lung sounds CTA b/l, good aeration bibasilar area, no w/r/c.   Cardiovascular: RRR; normal S1, S2; no m/r/g; no S3 or S4.   Gastrointestinal: PEG in place, focal c/d/I; soft; NT; BS normal; mildly distended; no CVAT b/l.   Genitourinary: no song.   Extremities : no c/c/e .   Neurological: AO x 3, fluent speeches, following commands, CNS II-XII grossly intact; no focal neurologic deficits.   Behavior/Emotional: in NAD, appropriate; cooperative.   Skin: clean, dry and intact.  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  He had a PEG placed in 2021 for dysphagia.  Cervical spinal stenosis with myeloradiculopathy h/o cervical spinal surgery. He initially presented to [facility] on 3/11/25 with fevers and R shoulder pain. Workup was unrevealing and he was discharged home.  He presented to the [facility] again on 3/17 with ongoing fevers. CT Neck with no abscess, old postsurgical changes, BCX was collected. He d/c home but BCX subsequently returned positive on 3/19 and he returned for admission. Imaging with C6-7 OM / discitis with ventral epidural abscess. He was transferred to [facility] on 3/23 for surgical evaluation.  At that time, he was afebrile. BCx negative. MRI with C5-C7 cervical epidural abscess/osteo with cord signal at C6-7,  T1-4, L4 vertebral body lesions probably radiation-changes per [facility] radiology, although metastatic lesions are not ruled out, per wife PET-CT scan no evidence of metastatic malignancy light up at spine region. He is now s/p posterior C4-T1 decompression and fusion on 3/27. Purulent fluid drained from anterior spine. OR Cx Strep intermedius.  ID rec IV dapto and ceftriaxone x 6 weeks.  PICC placed. He was admitted to [facility] on 4/1/25 for post op inpt acute rehab. He is TF NPO status.     A/P:  # Strep intermedius bacteremia with C6-7 OM / discitis and ventral epidural abscess, s/p PCDF 3/27/25, purulent fluid drainage from anterior spine  Suspect source likely recent esophageal dilatations ; 3/17 BCx + at OSH; TTE negative for veg at OSH  -complete planned 6-week iv Rocephin course, last dose of daptomycin on 3/31/25,  wound care dermal defense, f/u with [facility] ID as outpt, weekly labs.     # H/o thyroid CA s/p thyroidectomy and RND, adjuvant XRT/immunotherapy, esophageal SCC s/p CRT c/b esophageal strictures, L VC paralysis  -cancer surveillance f/u with [facility]  medical/ radiation oncology;  -palliative consultation.      # Dysphagia, NPO/TF, IBS-C, GI prophylaxis  -cont PEG TF, dietitian co management for nutrition supplements, Pepcid/PPI for GI prophylaxis       # Hypothyroidism   -per his endo, increase levothyroxine dose from 137-150 mcg daily.     # DVT prophylaxis  -SQH     # Anxiety  -on Prozac, psychology CBT, psychiatrist for co management      # Hyperglycemia, previous prediabetes  -diet modification per dietitian, SSI with accu checks      Billing code: [id number]  Diagnoses:  Patient Active Problem List   Diagnosis    Vertigo    Hypertension    Postsurgical hypothyroidism    Mixed hyperlipidemia    Gastroesophageal reflux disease    Increased sputum production    Allergic rhinitis    Change in bowel habits    Chronic fatigue syndrome    Constipation, chronic    Deviated nasal septum    Elevated coronary artery  calcium score    Eustachian tube dysfunction, bilateral    Examination of participant in clinical trial    Induration penis plastica    Hypertrophy of nasal turbinates    Hx of colonic polyps    Malignant neoplasm of salivary gland (CMS/HCC)    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)    Paralysis of vocal cords and larynx, unilateral    ENMANUEL (obstructive sleep apnea)    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)    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)    Penile curvature, acquired    Rhinitis sicca    Thyroid cancer (CMS/HCC)    Sensorineural hearing loss (SNHL), bilateral    Other dysphagia    Abdominal pain    Other dysphagia    COVID-19    Encounter for follow-up surveillance of thyroid cancer    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    Acute post-operative pain         Lilian Marrero MD  4/17/2025

## 2025-04-17 NOTE — PROGRESS NOTES
Subjective     Patient seen and examined on rounds.  Chart reviewed.  Events overnight noted.  History reviewed briefly with patient.    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    HPI: Mr. Celso Gramajo is a 69-year-old right-handed white male with chronic conditions significant for hypertension, hyperlipidemia, anxiety, thyroid cancer status post thyroidectomy and radical neck dissection, adjuvant radiation therapy/immunotherapy, esophageal squamous cell carcinoma status post chemoradiotherapy complicated by esophageal strictures, left vocal cord paralysis, dysphagia status post PEG tube placement in 2021, initially presented to the ER at [facility] on 3/11/25 with fevers and right shoulder pain. Workup was unrevealing and he was discharged home. He presented to the [facility] ER again on 3/17/25 with ongoing fevers. CT neck with no abscess, old postsurgical changes. He discharged home but blood cultures subsequently, 1 of 2 sets of blood cultures drawn at [facility] on 3/17/25 showed Streptococcus viridans returned positive on 3/19/25 and he returned for admission to [facility] on 3/19/25. TTE at [facility] was negative. Blood cultures on 3/19/25 and again on 3/23/25 showed no growth per his records. Imaging with C6-7 osteomyelitis/discitis with ventral epidural abscess. [facility].  At that time, he was afebrile.  Blood cultures  were negative. [facility] and ENT were consulted. Ultimately it was determined that an anterior surgical approach was not safe (due  to history of neck surgeries), and patient was having progressive weakness.  MRI of cervical spine on 3/27/25 revealed discitis and osteomyelitis at C6-C7 with ventral epidural abscess compressing the spinal cord and increased inferior extent of cord edema compared to prior. Patchy T1 hypointensity with enhancement in the upper cervical and thoracic spine, can represent metastatic lesions and/or post radiation changes, similar as compared to prior. He underwent posterior C4-T1 decompression and instrumented fusion on 3/27/25, performed by orthopedic surgeon Dr. Santiago Ca. Purulent fluid drained from anterior spine during the procedure and operating room cultures revealed Strep intermedius. Postop he was briefly in the ICU for MAP goals. He had a PICC placed 4/1/25. 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 He was started on salt tabs. Infectious diseases recommended IV Daptomycin which patient completed on 3/31/25.  He was recommended to continue Ceftriaxone 2g IV q12h x 14 day course (end date 4/8/25) then transition to Ceftriaxone 2g IV daily to complete total 6 week course (end date 5/8/25). He was recommend [place] J collar to neck at all times.  I discussed orthopedic spinal precautions with him.  He completed Decadron treatment for mild laryngeal edema visualized on preoperative NPL. Patient has dysphagia from esophageal strictures, recent dilation 3/3/25 was not successful per patient, cannot tolerate regular oral secretions and recommended NPO and continued on bolus tube feeds.  He was kept on Pepcid for GI prophylaxis. Glycopyrrolate as needed per patient/spouse wishes.  For anxiety he was continued on Fluoxetine and Ativan PRN.  He is not on medications for hypertension.  He was continued on Zetia for hyperlipidemia.  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 He continues on a sliding-scale Humalog coverage.  He is on Levothyroxine  and Liothyronine for thyroid supplementation after thyroidectomy for thyroid cancer. He is on subcutaneous Heparin and SCDs for DVT prophylaxis.  I discussed his recent clinical course with patient and with his wife at bedside.  On 4/1/25, hemoglobin was 12.9, WBC count was 10.30, platelets were 255, BUN 15, creatinine was 0.51, sodium was 133 and potassium was 4.5.  He has been needing assistance for mobility, transfers, self-care. He is transferred to [facility] on 4/1/25 for further rehabilitation care.     SUBJ: Discussed patients progress in therapies with therapists in team meeting today. Discussed in PCC. See PCC documentation.  Able to ambulate 250 feet with front wheeled walker modified independent physical therapy.  Modified independent for sit to stand transfer with physical therapy.  Modified independent for upper and lower body dressing, toileting and bathing with occupational therapy.  Inspected neck incision earlier today which is stable.  Some scabs on incision.  Noted input from dermal defense nurse.  Reviewed pictures in epic.  Dermal defense was also suggesting local wound care follow-up.  Per patient, if more time is not approved he will go home tomorrow with hired help and does not want to go to nursing home.  Discussed with patient and with physical therapist earlier today with him.    ROS: Denies chest pain or shortness of breath. Other ROS negative. Past, family, social history is unchanged.      Current Functional Status:   Bed mobility:   [place], Supine to Sit: modified independence   [place], Sit to Supine: modified independence   Transfers:    [place], Sit to Stand Transfer: modified independence  [place], Stand to Sit Transfer: modified independence   [place], Stand Pivot/Stand Step Transfer: modified independence   Gait:   [place], Gait: modified independence  Assistive Device: walker, front-wheeled   Distance in Feet: 250 feet    Bathing:  "  [place]: modified independence   Toileting:   [place]: modified independence   Upper body dressing:   [place]: modified independence   Lower body dressing:   [place]: modified independence       Functional Progress:    Functional status reviewed. Overall, patient's functional status is improving.      Physical Exam      Blood pressure 135/78, pulse 86, temperature 36.7 °C (98.1 °F), temperature source Oral, resp. rate 18, height 1.854 m (6' 1\"), weight 90.7 kg (200 lb), SpO2 94%.    Body mass index is 26.39 kg/m².    General Appearance: Not in acute distress  Head/Ear/Nose/Throat: Normocephalic; Atraumatic.   Eye: EOMI; PERRL.   Neck: [place] J collar in place.  Respiratory: Decreased breath sounds at bases.   Cardiovascular: RRR; Normal S1, S2.   Gastrointestinal: Soft; NT; +BS.   Extremities: Bilateral lower extremity edema noted.    Musculoskeletal: Functional active range of motion in both upper and lower extremities.   Neurological: AAO ×3. Speech is fluent. Cranial nerve examination does not reveal any gross facial asymmetry. Strength testing bilateral deltoids are 4/5, bilateral biceps are 4+/5, bilateral triceps are 4/5, bilateral wrist extensors are 4+/5, bilateral hand FDP are 4+/5, bilateral and interossei are 4/5.  Bilateral hip flexion is less than 4/5.  Bilateral quadriceps are 5/5.  Bilateral ankle dorsi and plantar flexion are 5/5.  He is grossly able to localize touch and position sense in great toes, except reports numbness in all 5 digits of both hands and also numbness on the plantar aspects of both feet.  Deep tendon reflexes are symmetric and slightly brisk bilaterally.  Bilateral ankle clonus is present.  Plantars are withdrawal.  Coordination is functional upper extremities.    Behavior/Emotional: Appropriate; Cooperative.   Skin: Healing incision on posterior cervical spine.  Sutures in place.  Erythema/rash noted on coccyx unclear if stage I decubitus.  Rash noted on " bilateral groins.  Reviewed pictures in epic.         Current Facility-Administered Medications:     acetaminophen (TYLENOL) 650 mg/20.3 mL solution 650 mg, 650 mg, peg tube, q6h PRN, Spencer Kemp MD, 650 mg at 04/17/25 0032    bisacodyL (DULCOLAX) 10 mg suppository 10 mg, 10 mg, rectal, Daily PRN, Spencer Kemp MD, 10 mg at 04/17/25 1140    cefTRIAXone (ROCEPHIN) 2 g in sodium chloride 0.9 % 100 mL IVPB, 2 g, intravenous, q24h INT, Spencer Kemp MD, Stopped at 04/17/25 0625    docusate sodium (COLACE) 50 mg/5 mL liquid 100 mg, 100 mg, peg tube, BID, Spencer Kemp MD, 100 mg at 04/17/25 1311    enoxaparin (LOVENOX) syringe 40 mg, 40 mg, subcutaneous, Daily (6p), Spencer Kemp MD, 40 mg at 04/17/25 1843    ezetimibe (ZETIA) tablet 10 mg, 10 mg, peg tube, Nightly, Spencer Kemp MD, 10 mg at 04/16/25 2036    famotidine (PEPCID) tablet 20 mg, 20 mg, peg tube, Nightly, Spencer Kemp MD, 20 mg at 04/16/25 2036    FLUoxetine (PROzac) 20 mg/5 mL (4 mg/mL) solution 20 mg, 20 mg, peg tube, Daily, Spencer Kemp MD, 20 mg at 04/17/25 0935    glycopyrrolate (ROBINUL) tablet 1 mg, 1 mg, peg tube, 2x daily PRN, Spencer Kemp MD, 1 mg at 04/11/25 0850    honey (MEDIHONEY) 100 % topical paste, , Topical, Daily, Spencer Kemp MD, Given at 04/17/25 0821    HYDROmorphone (DILAUDID) tablet 2 mg, 2 mg, peg tube, q6h PRN, Spencer Kemp MD, 2 mg at 04/17/25 1848    insulin lispro U-100 (HumaLOG) pen 1-12 Units, 1-12 Units, subcutaneous, TID with meals, Lilian Marrero MD    lansoprazole (PREVACID) 3 mg/mL oral suspension 15 mg, 15 mg, feeding tube, Daily before breakfast, Spencer Kemp MD, 15 mg at 04/17/25 0934    levothyroxine (SYNTHROID) tablet 150 mcg, 150 mcg, peg tube, Daily (6:30a), Spencer Kemp MD, 150 mcg at 04/17/25 0625    linaCLOtide (LINZESS) capsule 145 mcg, 145 mcg, g-tube, Daily before lunch, Spencer Kemp MD, 145 mcg at 04/17/25 1141     linaCLOtide (LINZESS) capsule 145 mcg, 145 mcg, peg tube, Daily PRN, Spencer Kemp MD, 145 mcg at 04/15/25 1608    liothyronine (CYTOMEL) tablet 10 mcg, 10 mcg, peg tube, Daily (6:30a), Spencer Kemp MD, 10 mcg at 04/17/25 0625    LORazepam (ATIVAN) tablet 0.25 mg, 0.25 mg, peg tube, q8h PRN, Spencer Kemp MD, 0.25 mg at 04/17/25 1031    magnesium hydroxide (M.O.M.) 400 mg/5 mL suspension 30 mL, 30 mL, oral, 2x daily PRN, Spencer Kemp MD, 30 mL at 04/16/25 1458    nystatin (MYCOSTATIN) 100,000 unit/gram topical powder, , Topical, BID, Lilian Marrero MD, Given at 04/17/25 0821    ondansetron ODT (ZOFRAN-ODT) disintegrating tablet 4 mg, 4 mg, peg tube, q6h PRN, Spencer Kemp MD, 4 mg at 04/17/25 0543    senna (SENOKOT) tablet 1 tablet, 1 tablet, peg tube, 2x daily PRN, Spencer Kemp MD, 1 tablet at 04/15/25 0813    senna (SENOKOT) tablet 2 tablet, 2 tablet, peg tube, BID, Spencer Kemp MD, 2 tablet at 04/17/25 1311    simethicone (MYLICON) chewable tablet 160 mg, 160 mg, peg tube, QID, Spencer Kemp MD, 160 mg at 04/17/25 1843    sodium chloride PF flush 10 mL, 10 mL, intravenous, q8h INT, Spencer Kemp MD, 10 mL at 04/17/25 1312    sodium chloride PF flush 10 mL, 10 mL, intravenous, PRN, Spencer Kemp MD    sodium chloride tablet 1 g, 1 g, peg tube, Daily, Spencer Kemp MD, 1 g at 04/17/25 0808    sodium phosphates (FLEET) enema adult 1 enema, 1 enema, rectal, Daily PRN, Spencer Kemp MD, 1 enema at 04/10/25 1306    zolpidem (AMBIEN) tablet 5 mg, 5 mg, peg tube, Nightly, Rohit Acevedo MD, 5 mg at 04/16/25 2125       Labs / Radiology    Lab Results   Component Value Date    WBC 5.17 04/14/2025    HGB 12.5 (L) 04/14/2025    HCT 38.3 (L) 04/14/2025    MCV 94.8 04/14/2025     04/14/2025     Lab Results   Component Value Date    GLUCOSE 138 (H) 04/14/2025    CALCIUM 9.4 04/14/2025     (L) 04/14/2025    K 4.4 04/14/2025    CO2 31  04/14/2025    CL 96 (L) 04/14/2025    BUN 16 04/14/2025    CREATININE 0.6 (L) 04/14/2025       Assessment and Plan    ASSESSMENT PLAN:  1. 69-year-old right-handed white male with chronic conditions significant for hypertension, hyperlipidemia, anxiety, thyroid cancer status post thyroidectomy and radical neck dissection, adjuvant radiation therapy/immunotherapy, esophageal squamous cell carcinoma status post chemoradiotherapy complicated by esophageal strictures, left vocal cord paralysis, dysphagia status post PEG tube placement in 2021, initially presented to the ER at [facility] on 3/11/25 with fevers and right shoulder pain. Workup was unrevealing and he was discharged home. He presented to the [facility] ER again on 3/17/25 with ongoing fevers. CT neck with no abscess, old postsurgical changes. He discharged home but blood cultures subsequently, 1 of 2 sets of blood cultures drawn at [facility] on 3/17/25 showed Streptococcus viridans returned positive on 3/19/25 and he returned for admission to [facility] on 3/19/25. TTE at [facility] was negative. Blood cultures on 3/19/25 and again on 3/23/25 showed no growth per his records. Imaging with C6-7 osteomyelitis/discitis with ventral epidural abscess. He was transferred to [facility] on 3/23/25 for surgical evaluation.  At that time, he was afebrile.  Blood cultures  were negative. At [facility] ortho and ENT were consulted. Ultimately it was determined that an anterior surgical approach was not safe (due to history of neck surgeries), and patient was having progressive weakness.  MRI of cervical spine on 3/27/25 revealed discitis and osteomyelitis at C6-C7 with ventral epidural abscess compressing the spinal cord and increased inferior extent of cord edema compared to prior. Patchy T1 hypointensity with enhancement in the upper cervical and thoracic spine, can represent metastatic lesions and/or post radiation changes, similar as compared to prior. He  underwent posterior C4-T1 decompression and instrumented fusion on 3/27/25, performed by orthopedic surgeon Dr. Santiago Ca. Purulent fluid drained from anterior spine during the procedure and operating room cultures revealed Strep intermedius. Postop he was briefly in the ICU for MAP goals. He had a PICC placed 4/1/25. 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 He was started on salt tabs. Infectious diseases recommended IV Daptomycin which patient completed on 3/31/25.  He was recommended to continue Ceftriaxone 2g IV q12h x 14 day course (end date 4/8/25) then transition to Ceftriaxone 2g IV daily to complete total 6 week course (end date 5/8/25). He was recommend [place] J collar to neck at all times.  I discussed orthopedic spinal precautions with him.  He completed Decadron treatment for mild laryngeal edema visualized on preoperative NPL. Patient has dysphagia from esophageal strictures, recent dilation 3/3/25 was not successful per patient, cannot tolerate regular oral secretions and recommended NPO and continued on bolus tube feeds.  He was kept on Pepcid for GI prophylaxis. Glycopyrrolate as needed per patient/spouse wishes.  For anxiety he was continued on Fluoxetine and Ativan PRN.  He is not on medications for hypertension.  He was continued on Zetia for hyperlipidemia.  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 He continues on a sliding-scale Humalog coverage.  He is on Levothyroxine and Liothyronine for thyroid supplementation after thyroidectomy for thyroid cancer. He is on subcutaneous Heparin and SCDs for DVT prophylaxis.  I discussed his recent clinical course with patient and with his wife at bedside.  On 4/1/25, hemoglobin was 12.9, WBC count was 10.30, platelets were 255, BUN 15, creatinine was 0.51, sodium was 133 and potassium was 4.5.  He has been needing assistance for mobility, transfers, self-care. He is transferred  to [place] [facility] on 4/1/25 for further rehabilitation care.      2. DVT prophylaxis - He is on subcutaneous Heparin and SCDs for DVT prophylaxis.  Check doppler.  Platelets 247 on 4/2/2025.  Doppler ultrasound on 4/3/2025 bilateral lower extremity was negative for DVT.  Platelets 216 on 4/7/2025.  Platelets 206 on 4/14/2025.       3. 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  Follow falls precautions, cardiac precautions, aspiration precautions.  Follow spinal precautions.  [place] J collar to neck at all times.  Use [place] collar in shower.     4. GI - On Pepcid for GI prophylaxis.  On PRN Senokot.  On PRN Dulcolax suppository.  On PRN Zofran.     5.  -denies dysuria.  Monitor postvoid residuals.     6. CVS - monitor for orthostasis.     7. Pulmonary -encourage incentive spirometry.     8. Hematology - monitor hemoglobin, platelets.  Hemoglobin 12.3, WBC 9.54 on 4/2/2025.  Hemoglobin 12.4, WBC 5.89 on 4/7/2025.  Hemoglobin 12.5, WBC 5.17 on 4/14/2025.       9. Pain -on Tylenol.  On PRN Oxycodone.     10. Skin - Healing incision on posterior cervical spine.  Sutures in place.  Erythema/rash noted on coccyx unclear if stage I decubitus.  Rash noted on bilateral groins.  Reviewed pictures in epic.     11. F/E/N - nothing by mouth on bolus PEG feeds.  Nutrition consulted. 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 He was started on salt tabs.     12.  Dysphagia - H/O esophageal squamous cell carcinoma status post chemoradiotherapy complicated by esophageal strictures, left vocal cord paralysis, dysphagia status post PEG  tube placement in 2021 - NPO on bolus PEG feeds.  Nutrition consulted.     13.  Psychiatry - on PRN Ativan.  Psychology consulted.     14.  Hypothyroidism -He is on Levothyroxine and Liothyronine for thyroid supplementation after thyroidectomy for thyroid cancer.     15. ENT -  H/O left vocal cord paralysis, treated with Decadron for edema around vocal cords recently.  On Glycopyrrolate as needed per patient/spouse wishes to manage secretions.      16.  Infectious diseases - He underwent posterior C4-T1 decompression and instrumented fusion on 3/27/25, performed by orthopedic surgeon Dr. Santiago Ca. Purulent fluid drained from anterior spine during the procedure and operating room cultures revealed Strep intermedius.. He had a PICC placed 4/1/25. Infectious diseases recommended IV Daptomycin which patient completed on 3/31/25.  He was recommended to continue Ceftriaxone 2g IV q12h x 14 day course (end date 4/8/25) then transition to Ceftriaxone 2g IV daily to complete total 6 week course (end date 5/8/25).He has infectious disease telemedicine appointment on 4/15/2025 at 10:40 AM.  Outside visit ordered.  Nursing to help patient with appointment.  Discussed with patient and nurse on 4/10/2025.      15.  Steroid-induced hyperglycemia -on sliding-scale Humalog coverage.     16.  Insomnia - He reported decreased sleep at night on 4/14/2025.  Discussed with patient and with his wife with him.  They would like to try Ambien.  He took Trazodone the other day and could not tolerate it.  Discussed with Dr. Marrero who ordered Ambien.     17. Rehabilitation medicine - Continue comprehensive rehabilitation care. Continue PT, OT, psychology.  Follow falls precautions, cardiac precautions, aspiration precautions.  Follow spinal precautions.  [place] J collar to neck at all times.  Use [place] collar in shower.Discussed patients progress in therapies with therapists in team meeting on 4/3/2025.  Discussed in PCC. See  PCC documentation.  He reports no bowel movement in the past 3 to 4 days.  Also wanted antireflux medication in the morning and at home he takes Omeprazole via PEG per patient.  He is on Prevacid in the a.m. and Pepcid in p.m via PEG tube.  Due to constipation internist ordered enema.  Also scheduled Colace and Senokot were ordered through his PEG tube.  Discussed with patient on 4/3/2025.Discussed recommendations of PCC with patient on 4/4/2025.  Posterior cervical incision healing well.  He had 2 bowel movements today.  Family training scheduled for next week on Monday per case management note.  Discussed with patient on 4/4/2025.He was sitting on recliner on 4/5/2025, talking on phone with his wife.  Continent of bladder per nursing on 4/5/2025.  Tolerating PEG feeds without issues.  He reports he had a good bowel movement yesterday.  Discussed with him if he gets  loose bowel movements he can always refuse bowel medications.  He reports he wants to stay on some bowel medications at this time.  Posterior cervical incision has dressing in place.  Denies increased pain on 4/5/2025.Saw patient earlier on 4/7/2025 morning and then again in the evening.  Discussed with patient and with his wife in the evening.  He requested IV saline infusion.  Ordered liter of saline overnight but patient and wife indicated that at home they usually have the infusion and 3 hours and do not want to eat or 12 hours.  Discussed with pharmacist.  Discussed with nurse.  Patient and wife insist that they usually get the infusion over 3 hours at home.  Also wanted increase bowel medications due to constipation.  Increase Senokot dose and ordered as needed treats enema.  Discussed with nurse on 4/7/2025. Again discussed with patient on 4/8/2025 in presence of nursing supervisor that IV fluid at high rate is not such a good idea and he can get the volume but at a lower rate.  He says he is used to getting IV fluids quickly at home.  Suggested  that he also discuss with his cardiologist if this is a safe practice and he can be prone to getting fluid overload and may put him at risk for CHF/pulmonary edema in future if his cardiac function decompensates.  Discussed with internist Dr. Marrero who also agrees with this possibility.  I mentioned to patient to discuss with his cardiologist regarding this and follow cardiology guidance regarding his desire to get IV fluids quickly at home at a high hourly volume in a short time.  Bowel medications were adjusted by internist on 4/8/2025.Inspected neck incision on 4/9/2025 which is stable.  He reports he feels constipated.  Internist adjusted bowel medications.Discussed with patient and with his mother-in-law with him on 4/9/2025.  Discussed patients progress in therapies with therapists in team meeting on 4/10/2025. Discussed in PCC. See PCC documentation.  He has infectious disease telemedicine appointment on 4/15/2025 at 10:40 AM.  Outside visit ordered.  Nursing to help patient with appointment.  Discussed with patient and nurse on 4/10/2025. Discussed recommendations of PCC with patient on 4/11/2025. He reports he had a bowel movement today.  Noted input from dermal defense nurse.  Discussed with nurse [place].  Dr Good (orthopedic surgery) office was (who patient was recommended to follow-up with) was contacted regarding the pressure injury from [place] J collar and they indicated that [place] J collar can be discontinued and patient may use soft collar if he desires.  Nurse Elin also discussed with patient on 4/11/2025 and he is agreeable.  Orders put in.  Patient has appointment for 4/15 @ 1320 hrs at Dr Good's orthopedic office.  Discussed with nurse on 4/11/2025.He indicates that he no longer wants to take salt tablets on 4/12/2025.  Sodium was 135.  Will discontinue salt tablets.  Discussed with patient and with nurse with him on 4/12/2025.  Discussed follow-up appointment next week at orthopedic office  with him.  Discussed with him on 4/12/2025 regarding input from orthopedic surgeon Dr Good yesterday regarding [place] J collar was discontinued and he is on soft cervical collar.He reported decreased sleep at night on 4/14/2025.  Discussed with patient and with his wife with him.  They would like to try Ambien.  He took Trazodone the other day and could not tolerate it.  He requested soapsuds enema today due to constipation.  Discussed with him constipating effects of narcotics.  He says he also gets constipated when he gets antibiotics.  Reviewed labs today on 4/14/2025.  He says he feels much better on 4/15/2025.  He reports he had a good night sleep with Ambien.  He also had a bowel movement with Linzess.  He has orthopedic appointment today afternoon.  Discussed with patient and with nurse on 4/15/2025.He woke up with a lot of pain on 4/16/2025 morning.  He reported that the ambulance ride from orthopedic visit yesterday was very bumpy which may have aggravated his pain.  Discussed with house physician Dr Acevedo.  Suggested going to the ER for evaluation.  Dr Acevedo discussed with patient about going to [facility] and he declined, but agreed to go to [facility].  Later in the morning he told me that he does not want to go to the ER at all.  Offered to get x-rays here, but he said he had x-rays orthopedic office yesterday which look fine per orthopedics and does not want repeat x-rays today.  He wanted to rest in the morning but in the afternoon did much better in therapy according to therapists and able to walk 200 feet with front wheeled walker modified independent and modified independent for transfers.  He wants to appeal his discharge to Medicare.  Case management discussed with him on 4/16/2025.  Discussed with patient, with nurse and internist Dr. Marrero on 4/16/2025.Discussed patients progress in therapies with therapists in team meeting on 4/17/2025.  Discussed in PCC. See PCC documentation.  Able to  ambulate 250 feet with front wheeled walker modified independent physical therapy on 4/17/2025.  Modified independent for sit to stand transfer with physical therapy.  Modified independent for upper and lower body dressing, toileting and bathing with occupational therapy.  Inspected neck incision earlier today which is stable.  Some scabs on incision.  Noted input from dermal defense nurse.  Reviewed pictures in epic.  Dermal defense was also suggesting local wound care follow-up.  Per patient, if more time is not approved he will go home tomorrow with hired help and does not want to go to nursing home.  Discussed with patient and with physical therapist earlier today with him on 4/17/2025.     18. Reviewed labs today. BUN 18, creatinine 0.6, sodium 133, potassium 4.9 on 4/2/2025.  BUN 16, creatinine 0.6, sodium 135, potassium 4.5 on 4/7/2025.  BUN 16, creatinine 0.6, sodium 134, potassium 4.4 on 4/14/2025.             Spencer Kemp MD  4/17/2025      This encounter was completed utilizing the Direct Speech Voice Recognition Software. Grammatical errors, random word insertions, pronoun errors, and incomplete sentences are occasional consequences of the system due to software limitations, ambient noises, and hardware issues. Such errors may be missed prior to affixing electronic signature. Any questions or concerns about the content, text, or information contained within the body of this dictation should be directly addressed to the physician for clarification. If you have any questions or concerns please do not hesitate to call me directly via EPIC chat, page, or email.

## 2025-04-17 NOTE — PROGRESS NOTES
Psychiatry Progress Note    History of Present Illness   See initial consult.  History pertaining to psychosis, depression and anxiety and other psychiatric and psychologic conditions as well as general medical history detailed in initial consult.      Interval History:   Better today  d/c tommorrow  Stable overnight  No si or avh      MENTAL STATUS EXAM  Appearance: well groomed  Gait and Motor: no abnormal movements  Speech: normal rate/rhythm/volume  Mood: depressed and anxious  Affect: constricted  Associations: coherent  Thought Process: goal-directed  Thought Content: no auditory or visual hallucinations.  Suicidality/Homicidality: denies  Judgement/Insight: acknowledges illness  Orientation: situation  Memory: knows current president  Attention: distracted  Knowledge: normal  Language: normal      Vital Signs for the last 24 hours:  Temp:  [36.6 °C (97.8 °F)-36.7 °C (98 °F)] 36.7 °C (98 °F)  Heart Rate:  [79-96] 86  Resp:  [18-20] 20  BP: ()/(52-69) 116/58    Labs:  Labs below reviewed for pertinence to psychiatric condition  Lab Results   Component Value Date    GLUCOSE 138 (H) 04/14/2025    CALCIUM 9.4 04/14/2025     (L) 04/14/2025    K 4.4 04/14/2025    CO2 31 04/14/2025    CL 96 (L) 04/14/2025    BUN 16 04/14/2025    CREATININE 0.6 (L) 04/14/2025     Lab Results   Component Value Date    WBC 5.17 04/14/2025    HGB 12.5 (L) 04/14/2025    HCT 38.3 (L) 04/14/2025    MCV 94.8 04/14/2025     04/14/2025     Pain Management Panel           No data to display                  Current Facility-Administered Medications   Medication Dose Route Frequency Provider Last Rate Last Admin    acetaminophen (TYLENOL) 650 mg/20.3 mL solution 650 mg  650 mg peg tube q6h PRN Spencer Kemp MD   650 mg at 04/17/25 0032    bisacodyL (DULCOLAX) 10 mg suppository 10 mg  10 mg rectal Daily PRN Spencer Kemp MD   10 mg at 04/16/25 1651    cefTRIAXone (ROCEPHIN) 2 g in sodium chloride 0.9 % 100 mL IVPB   2 g intravenous q24h INT Spencer Kemp MD   Stopped at 04/17/25 0625    docusate sodium (COLACE) 50 mg/5 mL liquid 100 mg  100 mg peg tube BID Spencer Kemp MD   100 mg at 04/16/25 1458    enoxaparin (LOVENOX) syringe 40 mg  40 mg subcutaneous Daily (6p) Spencer Kemp MD   40 mg at 04/16/25 1818    ezetimibe (ZETIA) tablet 10 mg  10 mg peg tube Nightly Spencer Kemp MD   10 mg at 04/16/25 2036    famotidine (PEPCID) tablet 20 mg  20 mg peg tube Nightly Spencer Kemp MD   20 mg at 04/16/25 2036    FLUoxetine (PROzac) 20 mg/5 mL (4 mg/mL) solution 20 mg  20 mg peg tube Daily Spencer Kemp MD   20 mg at 04/16/25 0858    glycopyrrolate (ROBINUL) tablet 1 mg  1 mg peg tube 2x daily PRN Spencer Kemp MD   1 mg at 04/11/25 0850    honey (MEDIHONEY) 100 % topical paste   Topical Daily Spencer Kemp MD   Given at 04/16/25 0850    HYDROmorphone (DILAUDID) tablet 2 mg  2 mg peg tube q6h PRN Spencer Kemp MD   2 mg at 04/17/25 0625    lansoprazole (PREVACID) 3 mg/mL oral suspension 15 mg  15 mg feeding tube Daily before breakfast Spencer Kemp MD   15 mg at 04/16/25 0626    levothyroxine (SYNTHROID) tablet 150 mcg  150 mcg peg tube Daily (6:30a) Spencer Kemp MD   150 mcg at 04/17/25 0625    linaCLOtide (LINZESS) capsule 145 mcg  145 mcg g-tube Daily before lunch Spencer Kemp MD   145 mcg at 04/16/25 1240    linaCLOtide (LINZESS) capsule 145 mcg  145 mcg peg tube Daily PRN Spencer Kemp MD   145 mcg at 04/15/25 1608    liothyronine (CYTOMEL) tablet 10 mcg  10 mcg peg tube Daily (6:30a) Spencer Kemp MD   10 mcg at 04/17/25 0625    LORazepam (ATIVAN) tablet 0.25 mg  0.25 mg peg tube q8h PRN Spencer Kemp MD   0.25 mg at 04/16/25 0850    magnesium hydroxide (M.O.M.) 400 mg/5 mL suspension 30 mL  30 mL oral 2x daily PRN Spencer Kemp MD   30 mL at 04/16/25 1458    nystatin (MYCOSTATIN) 100,000 unit/gram topical powder   Topical BID  Lilian Marrero MD   Given at 04/16/25 2051    ondansetron ODT (ZOFRAN-ODT) disintegrating tablet 4 mg  4 mg peg tube q6h PRN Spencer Kemp MD   4 mg at 04/17/25 0543    senna (SENOKOT) tablet 1 tablet  1 tablet peg tube 2x daily PRN Spencer Kemp MD   1 tablet at 04/15/25 0813    senna (SENOKOT) tablet 2 tablet  2 tablet peg tube BID Spencer Kemp MD   2 tablet at 04/16/25 1458    simethicone (MYLICON) chewable tablet 160 mg  160 mg peg tube QID Spencer Kemp MD   160 mg at 04/16/25 2036    sodium chloride PF flush 10 mL  10 mL intravenous q8h INT Spencer Kemp MD   10 mL at 04/17/25 0551    sodium chloride PF flush 10 mL  10 mL intravenous PRN Spencer Kemp MD        sodium chloride tablet 1 g  1 g peg tube Daily Spencer Kemp MD   1 g at 04/16/25 0850    sodium phosphates (FLEET) enema adult 1 enema  1 enema rectal Daily PRN Spencer Kemp MD   1 enema at 04/10/25 1306    zolpidem (AMBIEN) tablet 5 mg  5 mg peg tube Nightly Rohit Acevedo MD   5 mg at 04/16/25 2125        Patient Active Problem List   Diagnosis    Vertigo    Hypertension    Postsurgical hypothyroidism    Mixed hyperlipidemia    Gastroesophageal reflux disease    Increased sputum production    Allergic rhinitis    Change in bowel habits    Chronic fatigue syndrome    Constipation, chronic    Deviated nasal septum    Elevated coronary artery calcium score    Eustachian tube dysfunction, bilateral    Examination of participant in clinical trial    Induration penis plastica    Hypertrophy of nasal turbinates    Hx of colonic polyps    Malignant neoplasm of salivary gland (CMS/HCC)    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)    Paralysis of vocal cords and larynx, unilateral    ENMANUEL (obstructive sleep apnea)    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)    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)    Penile curvature, acquired    Rhinitis sicca    Thyroid  cancer (CMS/HCC)    Sensorineural hearing loss (SNHL), bilateral    Other dysphagia    Abdominal pain    Other dysphagia    COVID-19    Encounter for follow-up surveillance of thyroid cancer    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    Acute post-operative pain           Assessment/Plan    Depression: CBT automatic anxious and negative thoughts  Adjustment Disorder to Disability: supportive therapy  Sleep:  Insight into illness: insight therapy/psychoeducation  Psychotherapy: supportive  Monitor: Mood, Speech, Appetite, Energy, Cognition, Behavioral, Impulsivity, Agitation  Family Support  Medications: monitor for side effects  - presently no gi, akathesia , ha or sedation  Sodium 134  prozac 20  Prn ativan  Cbt  Educate on meds  If sodium continues to drop would consider discontinuing Prozac if there is not another source.  Address automatic negtive thoguhts  Support coping with atd  Monitor progress in therapy  Limit narcotic as possible  Relaxation techniques for pain  Discussed ativan use  Stop trazodone  Ambien prn   Sodium ok  Responding to support  Cbt   Support frustration and dispositin  Trey Meyer MD  4/17/2025

## 2025-04-17 NOTE — PLAN OF CARE
Problem: Adult Inpatient Plan of Care  Goal: Plan of Care Review  Outcome: Progressing  Flowsheets (Taken 4/17/2025 1247)  Progress: no change  Plan of Care Reviewed With:   patient   spouse   other (see comments)   CM met w pt to provide updates, [employer] appeal came back as denied. Pt aware that dc 4/18. Pts spouse to provide transportation home between 3-4pm. CM updated team. [facility] and IS following for aftercare.

## 2025-04-17 NOTE — DISCHARGE INSTRUCTIONS
Sliding scale insulin used at [facility]:  Check BG level before tube feeding :  Dose = 1 units for -200  Dose = 2 units for -250  Dose = 4 units for -300  Dose = 6 units for -350  Dose = 8 units for -400  Dose = 10 units for -450  Dose = 12 units for BG >=451  Notify your PCP if: BG often </= 60 or >/= 450 mg/dL.

## 2025-04-18 ENCOUNTER — APPOINTMENT (OUTPATIENT)
Dept: OTHER | Facility: REHABILITATION | Age: 70
End: 2025-04-18
Payer: MEDICARE

## 2025-04-18 VITALS
BODY MASS INDEX: 26.51 KG/M2 | WEIGHT: 200 LBS | OXYGEN SATURATION: 95 % | RESPIRATION RATE: 18 BRPM | HEART RATE: 81 BPM | HEIGHT: 73 IN | DIASTOLIC BLOOD PRESSURE: 80 MMHG | SYSTOLIC BLOOD PRESSURE: 156 MMHG | TEMPERATURE: 98.2 F

## 2025-04-18 LAB
GLUCOSE BLD-MCNC: 137 MG/DL (ref 70–99)
GLUCOSE BLD-MCNC: 141 MG/DL (ref 70–99)
GLUCOSE BLD-MCNC: 167 MG/DL (ref 70–99)
POCT TEST: ABNORMAL

## 2025-04-18 PROCEDURE — 63700000 HC SELF-ADMINISTRABLE DRUG: Performed by: INTERNAL MEDICINE

## 2025-04-18 PROCEDURE — 63700000 HC SELF-ADMINISTRABLE DRUG: Performed by: PHYSICAL MEDICINE & REHABILITATION

## 2025-04-18 PROCEDURE — 25800000 HC PHARMACY IV SOLUTIONS: Performed by: PHYSICAL MEDICINE & REHABILITATION

## 2025-04-18 PROCEDURE — 63600000 HC DRUGS/DETAIL CODE: Mod: JZ | Performed by: PHYSICAL MEDICINE & REHABILITATION

## 2025-04-18 RX ORDER — SYRING-NEEDL,DISP,INSUL,0.3 ML 29 G X1/2"
296 SYRINGE, EMPTY DISPOSABLE MISCELLANEOUS ONCE
Status: COMPLETED | OUTPATIENT
Start: 2025-04-18 | End: 2025-04-18

## 2025-04-18 RX ORDER — LORAZEPAM 0.5 MG/1
0.5 TABLET ORAL ONCE
Status: COMPLETED | OUTPATIENT
Start: 2025-04-18 | End: 2025-04-18

## 2025-04-18 RX ORDER — HYDROMORPHONE HYDROCHLORIDE 2 MG/1
2 TABLET ORAL EVERY 6 HOURS PRN
Qty: 28 TABLET | Refills: 0 | Status: SHIPPED | OUTPATIENT
Start: 2025-04-18 | End: 2025-04-19

## 2025-04-18 RX ORDER — HYDROMORPHONE HYDROCHLORIDE 2 MG/1
2 TABLET ORAL ONCE
Refills: 0 | Status: COMPLETED | OUTPATIENT
Start: 2025-04-18 | End: 2025-04-18

## 2025-04-18 RX ORDER — NALOXONE HYDROCHLORIDE 4 MG/.1ML
1 SPRAY NASAL ONCE AS NEEDED
Qty: 2 EACH | Refills: 0 | Status: SHIPPED | OUTPATIENT
Start: 2025-04-18

## 2025-04-18 RX ORDER — SODIUM CHLORIDE 9 MG/ML
INJECTION, SOLUTION INTRAVENOUS ONCE
Status: COMPLETED | OUTPATIENT
Start: 2025-04-18 | End: 2025-04-18

## 2025-04-18 RX ADMIN — LINACLOTIDE 145 MCG: 145 CAPSULE, GELATIN COATED ORAL at 09:46

## 2025-04-18 RX ADMIN — DOCUSATE SODIUM 100 MG: 50 LIQUID ORAL at 14:45

## 2025-04-18 RX ADMIN — SENNOSIDES 2 TABLET: 8.6 TABLET, FILM COATED ORAL at 07:38

## 2025-04-18 RX ADMIN — HYDROMORPHONE HYDROCHLORIDE 2 MG: 2 TABLET ORAL at 07:33

## 2025-04-18 RX ADMIN — LEVOTHYROXINE SODIUM 150 MCG: 150 TABLET ORAL at 05:04

## 2025-04-18 RX ADMIN — SIMETHICONE 160 MG: 80 TABLET, CHEWABLE ORAL at 12:34

## 2025-04-18 RX ADMIN — SIMETHICONE 160 MG: 80 TABLET, CHEWABLE ORAL at 15:17

## 2025-04-18 RX ADMIN — Medication 10 ML: at 05:05

## 2025-04-18 RX ADMIN — Medication: at 07:44

## 2025-04-18 RX ADMIN — BISACODYL 10 MG: 10 SUPPOSITORY RECTAL at 10:05

## 2025-04-18 RX ADMIN — Medication 10 ML: at 14:46

## 2025-04-18 RX ADMIN — HYDROMORPHONE HYDROCHLORIDE 2 MG: 2 TABLET ORAL at 19:19

## 2025-04-18 RX ADMIN — SENNOSIDES 2 TABLET: 8.6 TABLET, FILM COATED ORAL at 14:45

## 2025-04-18 RX ADMIN — FLUOXETINE HYDROCHLORIDE 20 MG: 20 SOLUTION ORAL at 07:40

## 2025-04-18 RX ADMIN — DOCUSATE SODIUM 100 MG: 50 LIQUID ORAL at 07:38

## 2025-04-18 RX ADMIN — SODIUM CHLORIDE 1 G: 1 TABLET ORAL at 07:38

## 2025-04-18 RX ADMIN — LIOTHYRONINE SODIUM 10 MCG: 5 TABLET ORAL at 05:04

## 2025-04-18 RX ADMIN — LINACLOTIDE 145 MCG: 145 CAPSULE, GELATIN COATED ORAL at 10:05

## 2025-04-18 RX ADMIN — SIMETHICONE 160 MG: 80 TABLET, CHEWABLE ORAL at 07:38

## 2025-04-18 RX ADMIN — LANSOPRAZOLE 15 MG: KIT at 06:30

## 2025-04-18 RX ADMIN — NYSTATIN: 100000 POWDER TOPICAL at 07:44

## 2025-04-18 RX ADMIN — CEFTRIAXONE SODIUM 2 G: 2 INJECTION, POWDER, FOR SOLUTION INTRAMUSCULAR; INTRAVENOUS at 05:04

## 2025-04-18 RX ADMIN — ENOXAPARIN SODIUM 40 MG: 40 INJECTION SUBCUTANEOUS at 17:31

## 2025-04-18 RX ADMIN — ONDANSETRON 4 MG: 4 TABLET, ORALLY DISINTEGRATING ORAL at 12:34

## 2025-04-18 RX ADMIN — SODIUM CHLORIDE: 9 INJECTION, SOLUTION INTRAVENOUS at 15:17

## 2025-04-18 RX ADMIN — LORAZEPAM 0.5 MG: 0.5 TABLET ORAL at 19:19

## 2025-04-18 RX ADMIN — MAGNESIUM CITRATE 296 ML: 1.75 LIQUID ORAL at 14:46

## 2025-04-18 NOTE — PROGRESS NOTES
Subjective     Patient seen and examined on rounds.  Chart reviewed.  Events overnight noted.  History reviewed briefly with patient.    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    HPI: Mr. Celso Gramajo is a 69-year-old right-handed white male with chronic conditions significant for hypertension, hyperlipidemia, anxiety, thyroid cancer status post thyroidectomy and radical neck dissection, adjuvant radiation therapy/immunotherapy, esophageal squamous cell carcinoma status post chemoradiotherapy complicated by esophageal strictures, left vocal cord paralysis, dysphagia status post PEG tube placement in 2021, initially presented to the ER at [facility] on 3/11/25 with fevers and right shoulder pain. Workup was unrevealing and he was discharged home. He presented to the [facility] ER again on 3/17/25 with ongoing fevers. CT neck with no abscess, old postsurgical changes. He discharged home but blood cultures subsequently, 1 of 2 sets of blood cultures drawn at [facility] on 3/17/25 showed Streptococcus viridans returned positive on 3/19/25 and he returned for admission to [facility] on 3/19/25. TTE at [facility] was negative. Blood cultures on 3/19/25 and again on 3/23/25 showed no growth per his records. Imaging with C6-7 osteomyelitis/discitis with ventral epidural abscess. He was transferred to [facility] on 3/23/25 for surgical evaluation.  At that time, he was afebrile.  Blood cultures  were negative. At [facility] ortho and ENT were consulted. Ultimately it was determined that an anterior surgical approach was not safe (due  to history of neck surgeries), and patient was having progressive weakness.  MRI of cervical spine on 3/27/25 revealed discitis and osteomyelitis at C6-C7 with ventral epidural abscess compressing the spinal cord and increased inferior extent of cord edema compared to prior. Patchy T1 hypointensity with enhancement in the upper cervical and thoracic spine, can represent metastatic lesions and/or post radiation changes, similar as compared to prior. He underwent posterior C4-T1 decompression and instrumented fusion on 3/27/25, performed by orthopedic surgeon Dr. Santiago Ca. Purulent fluid drained from anterior spine during the procedure and operating room cultures revealed Strep intermedius. Postop he was briefly in the ICU for MAP goals. He had a PICC placed 4/1/25. 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 He was started on salt tabs. Infectious diseases recommended IV Daptomycin which patient completed on 3/31/25.  He was recommended to continue Ceftriaxone 2g IV q12h x 14 day course (end date 4/8/25) then transition to Ceftriaxone 2g IV daily to complete total 6 week course (end date 5/8/25). He was recommend [place] J collar to neck at all times.  I discussed orthopedic spinal precautions with him.  He completed Decadron treatment for mild laryngeal edema visualized on preoperative NPL. Patient has dysphagia from esophageal strictures, recent dilation 3/3/25 was not successful per patient, cannot tolerate regular oral secretions and recommended NPO and continued on bolus tube feeds.  He was kept on Pepcid for GI prophylaxis. Glycopyrrolate as needed per patient/spouse wishes.  For anxiety he was continued on Fluoxetine and Ativan PRN.  He is not on medications for hypertension.  He was continued on Zetia for hyperlipidemia.  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 He continues on a sliding-scale Humalog coverage.  He is on Levothyroxine  and Liothyronine for thyroid supplementation after thyroidectomy for thyroid cancer. He is on subcutaneous Heparin and SCDs for DVT prophylaxis.  I discussed his recent clinical course with patient and with his wife at bedside.  On 4/1/25, hemoglobin was 12.9, WBC count was 10.30, platelets were 255, BUN 15, creatinine was 0.51, sodium was 133 and potassium was 4.5.  He has been needing assistance for mobility, transfers, self-care. He is transferred to [facility] on 4/1/25 for further rehabilitation care.     SUBJ: He is scheduled for discharge today, modified independent with mobility, transfers, self-care.  However he wanted to have a bowel movement before discharge and was given soapsuds enema today.  Later in the afternoon also he requested more bowel medications and he had not had a bowel movement.   He however reported some gaseous distention especially in the left side of his abdomen and indicated he also had some pain.  I offered to send him to the ER which he declined. Eventually he was given magnesium citrate through his PEG tube and had a large bowel movement per nursing.  He was also given 1 L of saline IV per his request over 3 and half hours.  His wife had arrived to pick him up but given the fact that patient insisted on having a bowel movement and also IV fluids, she decided to come back later in the evening.  I again saw him later in the evening and he had a large bowel movement earlier today.  His abdomen is soft and he has good bowel sounds.  Discussed with Dr. Marrero who also saw patient and indicated patient could be discharged.  However when I went to see patient, patient said Dr. Marrero told him he could stay overnight and go home tomorrow.  He requested to speak with Dr. Marrero.  Dr. Marrero did speak with patient in the room and his wife joined on phone.  Patient then agreed to discharge to home today.  I discussed with patient and with his wife that if he feels sick at home he  "should go to the ER and they expressed understanding.  Later in the night, I saw patient's wife and mother-in-law in the hallway as they had come to pick him up.  They were appreciative of his care here.  Detailed follow-up instructions with putting on discharge AVS and discussed with patient and with his wife earlier.  Discussed with nurse.  Discussed with nursing supervisor.    ROS: Denies chest pain or shortness of breath. Other ROS negative. Past, family, social history is unchanged.      Current Functional Status:   Bed mobility:   [place], Supine to Sit: modified independence   [place], Sit to Supine: modified independence   Transfers:    [place], Sit to Stand Transfer: modified independence  [place], Stand to Sit Transfer: modified independence   [place], Stand Pivot/Stand Step Transfer: modified independence   Gait:   [place], Gait: modified independence  Assistive Device: walker, front-wheeled   Distance in Feet: 250 feet    Bathing:   [place]: modified independence   Toileting:   [place]: modified independence   Upper body dressing:   [place]: modified independence   Lower body dressing:   [place]: modified independence       Functional Progress:    Functional status reviewed. Overall, patient's functional status is improving.      Physical Exam      Blood pressure (!) 156/80, pulse 81, temperature 36.8 °C (98.2 °F), temperature source Oral, resp. rate 18, height 1.854 m (6' 1\"), weight 90.7 kg (200 lb), SpO2 95%.    Body mass index is 26.39 kg/m².    General Appearance: Not in acute distress  Head/Ear/Nose/Throat: Normocephalic; Atraumatic.   Eye: EOMI; PERRL.   Neck: [place] J collar in place.  Respiratory: Decreased breath sounds at bases.   Cardiovascular: RRR; Normal S1, S2.   Gastrointestinal: Soft; NT; +BS.   Extremities: Bilateral lower extremity edema noted.    Musculoskeletal: Functional active range of motion in both upper and lower extremities. "   Neurological: AAO ×3. Speech is fluent. Cranial nerve examination does not reveal any gross facial asymmetry. Strength testing bilateral deltoids are 4/5, bilateral biceps are 4+/5, bilateral triceps are 4/5, bilateral wrist extensors are 4+/5, bilateral hand FDP are 4+/5, bilateral and interossei are 4/5.  Bilateral hip flexion is less than 4/5.  Bilateral quadriceps are 5/5.  Bilateral ankle dorsi and plantar flexion are 5/5.  He is grossly able to localize touch and position sense in great toes, except reports numbness in all 5 digits of both hands and also numbness on the plantar aspects of both feet.  Deep tendon reflexes are symmetric and slightly brisk bilaterally.  Bilateral ankle clonus is present.  Plantars are withdrawal.  Coordination is functional upper extremities.    Behavior/Emotional: Appropriate; Cooperative.   Skin: Healing incision on posterior cervical spine.  Sutures in place.  Erythema/rash noted on coccyx unclear if stage I decubitus.  Rash noted on bilateral groins.  Reviewed pictures in epic.         Current Facility-Administered Medications:     acetaminophen (TYLENOL) 650 mg/20.3 mL solution 650 mg, 650 mg, peg tube, q6h PRN, Spencer Kemp MD, 650 mg at 04/17/25 0032    bisacodyL (DULCOLAX) 10 mg suppository 10 mg, 10 mg, rectal, Daily PRN, Spencer Kemp MD, 10 mg at 04/18/25 1005    cefTRIAXone (ROCEPHIN) 2 g in sodium chloride 0.9 % 100 mL IVPB, 2 g, intravenous, q24h INT, Spencer Kemp MD, Stopped at 04/18/25 0559    docusate sodium (COLACE) 50 mg/5 mL liquid 100 mg, 100 mg, peg tube, BID, Spencer Kemp MD, 100 mg at 04/18/25 1445    enoxaparin (LOVENOX) syringe 40 mg, 40 mg, subcutaneous, Daily (6p), Spencer Kemp MD, 40 mg at 04/18/25 1731    ezetimibe (ZETIA) tablet 10 mg, 10 mg, peg tube, Nightly, Spencer Kemp MD, 10 mg at 04/17/25 2123    famotidine (PEPCID) tablet 20 mg, 20 mg, peg tube, Nightly, Spencer Kemp MD, 20 mg at 04/17/25  2124    FLUoxetine (PROzac) 20 mg/5 mL (4 mg/mL) solution 20 mg, 20 mg, peg tube, Daily, Spencer Kemp MD, 20 mg at 04/18/25 0740    glycopyrrolate (ROBINUL) tablet 1 mg, 1 mg, peg tube, 2x daily PRN, Spencer Kemp MD, 1 mg at 04/11/25 0850    honey (MEDIHONEY) 100 % topical paste, , Topical, Daily, Spencer Kemp MD, Given at 04/18/25 0744    HYDROmorphone (DILAUDID) tablet 2 mg, 2 mg, peg tube, q6h PRN, Spencer Kemp MD, 2 mg at 04/18/25 0733    insulin lispro U-100 (HumaLOG) pen 1-12 Units, 1-12 Units, subcutaneous, TID with meals, Lilian Marrero MD    lansoprazole (PREVACID) 3 mg/mL oral suspension 15 mg, 15 mg, feeding tube, Daily before breakfast, Spencer Kemp MD, 15 mg at 04/18/25 0630    levothyroxine (SYNTHROID) tablet 150 mcg, 150 mcg, peg tube, Daily (6:30a), Spencer Kemp MD, 150 mcg at 04/18/25 0504    linaCLOtide (LINZESS) capsule 145 mcg, 145 mcg, g-tube, Daily before lunch, Spencer Kemp MD, 145 mcg at 04/18/25 0946    linaCLOtide (LINZESS) capsule 145 mcg, 145 mcg, peg tube, Daily PRN, Spencer Kemp MD, 145 mcg at 04/18/25 1005    liothyronine (CYTOMEL) tablet 10 mcg, 10 mcg, peg tube, Daily (6:30a), Spencer Kemp MD, 10 mcg at 04/18/25 0504    LORazepam (ATIVAN) tablet 0.5 mg, 0.5 mg, oral, Once, Lilian Marrero MD    magnesium hydroxide (M.O.M.) 400 mg/5 mL suspension 30 mL, 30 mL, oral, 2x daily PRN, Spencer Kemp MD, 30 mL at 04/16/25 1458    nystatin (MYCOSTATIN) 100,000 unit/gram topical powder, , Topical, BID, Lilian Marrero MD, Given at 04/18/25 0744    ondansetron ODT (ZOFRAN-ODT) disintegrating tablet 4 mg, 4 mg, peg tube, q6h PRN, Spencer Kemp MD, 4 mg at 04/18/25 1234    senna (SENOKOT) tablet 1 tablet, 1 tablet, peg tube, 2x daily PRN, Spencer Kemp MD, 1 tablet at 04/15/25 0813    senna (SENOKOT) tablet 2 tablet, 2 tablet, peg tube, BID, Spencer Kemp MD, 2 tablet at 04/18/25 1445    simethicone (MYLICON)  chewable tablet 160 mg, 160 mg, peg tube, QID, Spencer Kemp MD, 160 mg at 04/18/25 1517    sodium chloride PF flush 10 mL, 10 mL, intravenous, q8h INT, Spencer Kemp MD, 10 mL at 04/18/25 1446    sodium chloride PF flush 10 mL, 10 mL, intravenous, PRN, Spencer Kemp MD    sodium chloride tablet 1 g, 1 g, peg tube, Daily, Spencer Kemp MD, 1 g at 04/18/25 0738    sodium phosphates (FLEET) enema adult 1 enema, 1 enema, rectal, Daily PRN, Spencer Kemp MD, 1 enema at 04/10/25 1306       Labs / Radiology    Lab Results   Component Value Date    WBC 5.17 04/14/2025    HGB 12.5 (L) 04/14/2025    HCT 38.3 (L) 04/14/2025    MCV 94.8 04/14/2025     04/14/2025     Lab Results   Component Value Date    GLUCOSE 138 (H) 04/14/2025    CALCIUM 9.4 04/14/2025     (L) 04/14/2025    K 4.4 04/14/2025    CO2 31 04/14/2025    CL 96 (L) 04/14/2025    BUN 16 04/14/2025    CREATININE 0.6 (L) 04/14/2025       Assessment and Plan    ASSESSMENT PLAN:  1. 69-year-old right-handed white male with chronic conditions significant for hypertension, hyperlipidemia, anxiety, thyroid cancer status post thyroidectomy and radical neck dissection, adjuvant radiation therapy/immunotherapy, esophageal squamous cell carcinoma status post chemoradiotherapy complicated by esophageal strictures, left vocal cord paralysis, dysphagia status post PEG tube placement in 2021, initially presented to the ER at [facility] on 3/11/25 with fevers and right shoulder pain. Workup was unrevealing and he was discharged home. He presented to the [facility] ER again on 3/17/25 with ongoing fevers. CT neck with no abscess, old postsurgical changes. He discharged home but blood cultures subsequently, 1 of 2 sets of blood cultures drawn at [facility] on 3/17/25 showed Streptococcus viridans returned positive on 3/19/25 and he returned for admission to [facility] on 3/19/25. TTE at [facility] was negative. Blood  cultures on 3/19/25 and again on 3/23/25 showed no growth per his records. Imaging with C6-7 osteomyelitis/discitis with ventral epidural abscess. He was transferred to [facility] on 3/23/25 for surgical evaluation.  At that time, he was afebrile.  Blood cultures  were negative. At [facility] ortho and ENT were consulted. Ultimately it was determined that an anterior surgical approach was not safe (due to history of neck surgeries), and patient was having progressive weakness.  MRI of cervical spine on 3/27/25 revealed discitis and osteomyelitis at C6-C7 with ventral epidural abscess compressing the spinal cord and increased inferior extent of cord edema compared to prior. Patchy T1 hypointensity with enhancement in the upper cervical and thoracic spine, can represent metastatic lesions and/or post radiation changes, similar as compared to prior. He underwent posterior C4-T1 decompression and instrumented fusion on 3/27/25, performed by orthopedic surgeon Dr. Santiago Ca. Purulent fluid drained from anterior spine during the procedure and operating room cultures revealed Strep intermedius. Postop he was briefly in the ICU for MAP goals. He had a PICC placed 4/1/25. 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 He was started on salt tabs. Infectious diseases recommended IV Daptomycin which patient completed on 3/31/25.  He was recommended to continue Ceftriaxone 2g IV q12h x 14 day course (end date 4/8/25) then transition to Ceftriaxone 2g IV daily to complete total 6 week course (end date 5/8/25). He was recommend [place] J collar to neck at all times.  I discussed orthopedic spinal precautions with him.  He completed Decadron treatment for mild laryngeal edema visualized on preoperative NPL. Patient has dysphagia from esophageal strictures, recent dilation 3/3/25 was not successful per patient, cannot tolerate regular oral secretions and recommended NPO and continued on bolus tube  feeds.  He was kept on Pepcid for GI prophylaxis. Glycopyrrolate as needed per patient/spouse wishes.  For anxiety he was continued on Fluoxetine and Ativan PRN.  He is not on medications for hypertension.  He was continued on Zetia for hyperlipidemia.  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 He continues on a sliding-scale Humalog coverage.  He is on Levothyroxine and Liothyronine for thyroid supplementation after thyroidectomy for thyroid cancer. He is on subcutaneous Heparin and SCDs for DVT prophylaxis.  I discussed his recent clinical course with patient and with his wife at bedside.  On 4/1/25, hemoglobin was 12.9, WBC count was 10.30, platelets were 255, BUN 15, creatinine was 0.51, sodium was 133 and potassium was 4.5.  He has been needing assistance for mobility, transfers, self-care. He is transferred to [place] [facility] on 4/1/25 for further rehabilitation care.      2. DVT prophylaxis - He is on subcutaneous Heparin and SCDs for DVT prophylaxis.  Check doppler.  Platelets 247 on 4/2/2025.  Doppler ultrasound on 4/3/2025 bilateral lower extremity was negative for DVT.  Platelets 216 on 4/7/2025.  Platelets 206 on 4/14/2025.       3. 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  Follow falls precautions, cardiac precautions, aspiration precautions.  Follow spinal precautions.  [place] J collar to neck at all times.  Use [place] collar in shower.     4. GI - On Pepcid for GI prophylaxis.  On PRN Senokot.  On PRN Dulcolax suppository.  On PRN Zofran.     5.  -denies dysuria.  Monitor postvoid  residuals.     6. CVS - monitor for orthostasis.     7. Pulmonary -encourage incentive spirometry.     8. Hematology - monitor hemoglobin, platelets.  Hemoglobin 12.3, WBC 9.54 on 4/2/2025.  Hemoglobin 12.4, WBC 5.89 on 4/7/2025.  Hemoglobin 12.5, WBC 5.17 on 4/14/2025.       9. Pain -on Tylenol.  On PRN Oxycodone.     10. Skin - Healing incision on posterior cervical spine.  Sutures in place.  Erythema/rash noted on coccyx unclear if stage I decubitus.  Rash noted on bilateral groins.  Reviewed pictures in epic.     11. F/E/N - nothing by mouth on bolus PEG feeds.  Nutrition consulted. 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 He was started on salt tabs.     12.  Dysphagia - H/O esophageal squamous cell carcinoma status post chemoradiotherapy complicated by esophageal strictures, left vocal cord paralysis, dysphagia status post PEG tube placement in 2021 - NPO on bolus PEG feeds.  Nutrition consulted.     13.  Psychiatry - on PRN Ativan.  Psychology consulted.     14.  Hypothyroidism -He is on Levothyroxine and Liothyronine for thyroid supplementation after thyroidectomy for thyroid cancer.     15. ENT -  H/O left vocal cord paralysis, treated with Decadron for edema around vocal cords recently.  On Glycopyrrolate as needed per patient/spouse wishes to manage secretions.      16.  Infectious diseases - He underwent posterior C4-T1 decompression and instrumented fusion on 3/27/25, performed by orthopedic surgeon Dr. Santiago Ca. Purulent fluid drained from anterior spine during the procedure and operating room cultures revealed Strep intermedius.. He had a PICC placed 4/1/25. Infectious diseases recommended IV Daptomycin which patient completed on 3/31/25.  He was recommended to continue Ceftriaxone 2g IV q12h x 14 day course (end date 4/8/25) then transition to Ceftriaxone 2g IV daily to complete total 6 week course (end date 5/8/25).He has infectious disease telemedicine appointment on 4/15/2025 at  10:40 AM.  Outside visit ordered.  Nursing to help patient with appointment.  Discussed with patient and nurse on 4/10/2025.      15.  Steroid-induced hyperglycemia -on sliding-scale Humalog coverage.     16.  Insomnia - He reported decreased sleep at night on 4/14/2025.  Discussed with patient and with his wife with him.  They would like to try Ambien.  He took Trazodone the other day and could not tolerate it.  Discussed with Dr. Marrero who ordered Ambien.     17. Rehabilitation medicine - Continue comprehensive rehabilitation care. Continue PT, OT, psychology.  Follow falls precautions, cardiac precautions, aspiration precautions.  Follow spinal precautions.  [place] J collar to neck at all times.  Use [place] collar in shower.Discussed patients progress in therapies with therapists in team meeting on 4/3/2025.  Discussed in PCC. See PCC documentation.  He reports no bowel movement in the past 3 to 4 days.  Also wanted antireflux medication in the morning and at home he takes Omeprazole via PEG per patient.  He is on Prevacid in the a.m. and Pepcid in p.m via PEG tube.  Due to constipation internist ordered enema.  Also scheduled Colace and Senokot were ordered through his PEG tube.  Discussed with patient on 4/3/2025.Discussed recommendations of PCC with patient on 4/4/2025.  Posterior cervical incision healing well.  He had 2 bowel movements today.  Family training scheduled for next week on Monday per case management note.  Discussed with patient on 4/4/2025.He was sitting on recliner on 4/5/2025, talking on phone with his wife.  Continent of bladder per nursing on 4/5/2025.  Tolerating PEG feeds without issues.  He reports he had a good bowel movement yesterday.  Discussed with him if he gets  loose bowel movements he can always refuse bowel medications.  He reports he wants to stay on some bowel medications at this time.  Posterior cervical incision has dressing in place.  Denies increased pain on  4/5/2025.Saw patient earlier on 4/7/2025 morning and then again in the evening.  Discussed with patient and with his wife in the evening.  He requested IV saline infusion.  Ordered liter of saline overnight but patient and wife indicated that at home they usually have the infusion and 3 hours and do not want to eat or 12 hours.  Discussed with pharmacist.  Discussed with nurse.  Patient and wife insist that they usually get the infusion over 3 hours at home.  Also wanted increase bowel medications due to constipation.  Increase Senokot dose and ordered as needed treats enema.  Discussed with nurse on 4/7/2025. Again discussed with patient on 4/8/2025 in presence of nursing supervisor that IV fluid at high rate is not such a good idea and he can get the volume but at a lower rate.  He says he is used to getting IV fluids quickly at home.  Suggested that he also discuss with his cardiologist if this is a safe practice and he can be prone to getting fluid overload and may put him at risk for CHF/pulmonary edema in future if his cardiac function decompensates.  Discussed with internist Dr. Marrero who also agrees with this possibility.  I mentioned to patient to discuss with his cardiologist regarding this and follow cardiology guidance regarding his desire to get IV fluids quickly at home at a high hourly volume in a short time.  Bowel medications were adjusted by internist on 4/8/2025.Inspected neck incision on 4/9/2025 which is stable.  He reports he feels constipated.  Internist adjusted bowel medications.Discussed with patient and with his mother-in-law with him on 4/9/2025.  Discussed patients progress in therapies with therapists in team meeting on 4/10/2025. Discussed in PCC. See PCC documentation.  He has infectious disease telemedicine appointment on 4/15/2025 at 10:40 AM.  Outside visit ordered.  Nursing to help patient with appointment.  Discussed with patient and nurse on 4/10/2025. Discussed recommendations  of [facility] with patient on 4/11/2025. He reports he had a bowel movement today.  Noted input from dermal defense nurse.  Discussed with nurse Elin.  Dr Good (orthopedic surgery) office was (who patient was recommended to follow-up with) was contacted regarding the pressure injury from [place] J collar and they indicated that [place] J collar can be discontinued and patient may use soft collar if he desires.  Nurse Elin also discussed with patient on 4/11/2025 and he is agreeable.  Orders put in.  Patient has appointment for 4/15 @ 1320 hrs at Dr Good's orthopedic office.  Discussed with nurse on 4/11/2025.He indicates that he no longer wants to take salt tablets on 4/12/2025.  Sodium was 135.  Will discontinue salt tablets.  Discussed with patient and with nurse with him on 4/12/2025.  Discussed follow-up appointment next week at orthopedic office with him.  Discussed with him on 4/12/2025 regarding input from orthopedic surgeon Dr Good yesterday regarding [place] J collar was discontinued and he is on soft cervical collar.He reported decreased sleep at night on 4/14/2025.  Discussed with patient and with his wife with him.  They would like to try Ambien.  He took Trazodone the other day and could not tolerate it.  He requested soapsuds enema today due to constipation.  Discussed with him constipating effects of narcotics.  He says he also gets constipated when he gets antibiotics.  Reviewed labs today on 4/14/2025.  He says he feels much better on 4/15/2025.  He reports he had a good night sleep with Ambien.  He also had a bowel movement with Linzess.  He has orthopedic appointment today afternoon.  Discussed with patient and with nurse on 4/15/2025.He woke up with a lot of pain on 4/16/2025 morning.  He reported that the ambulance ride from orthopedic visit yesterday was very bumpy which may have aggravated his pain.  Discussed with house physician Dr Acevedo.  Suggested going to the ER for evaluation.  Dr Acevedo  discussed with patient about going to [facility] and he declined, but agreed to go to [facility].  Later in the morning he told me that he does not want to go to the ER at all.  Offered to get x-rays here, but he said he had x-rays orthopedic office yesterday which look fine per orthopedics and does not want repeat x-rays today.  He wanted to rest in the morning but in the afternoon did much better in therapy according to therapists and able to walk 200 feet with front wheeled walker modified independent and modified independent for transfers.  He wants to appeal his discharge to Medicare.  Case management discussed with him on 4/16/2025.  Discussed with patient, with nurse and internist Dr. Marrero on 4/16/2025.Discussed patients progress in therapies with therapists in team meeting on 4/17/2025.  Discussed in PCC. See PCC documentation.  Able to ambulate 250 feet with front wheeled walker modified independent physical therapy on 4/17/2025.  Modified independent for sit to stand transfer with physical therapy.  Modified independent for upper and lower body dressing, toileting and bathing with occupational therapy.  Inspected neck incision earlier today which is stable.  Some scabs on incision.  Noted input from dermal defense nurse.  Reviewed pictures in epic.  Dermal defense was also suggesting local wound care follow-up.  Per patient, if more time is not approved he will go home tomorrow with hired help and does not want to go to nursing home.  Discussed with patient and with physical therapist earlier today with him on 4/17/2025.He is scheduled for discharge today, 4/18/2025, modified independent with mobility, transfers, self-care.  However he wanted to have a bowel movement before discharge and was given soapsuds enema today.  Later in the afternoon also he requested more bowel medications and he had not had a bowel movement.   He however reported some gaseous distention especially in the left side of his abdomen  and indicated he also had some pain.  I offered to send him to the ER which he declined. Eventually he was given magnesium citrate through his PEG tube and had a large bowel movement per nursing.  He was also given 1 L of saline IV per his request over 3 and half hours.  His wife had arrived to pick him up but given the fact that patient insisted on having a bowel movement and also IV fluids, she decided to come back later in the evening.  I again saw him later in the evening and he had a large bowel movement earlier today.  His abdomen is soft and he has good bowel sounds.  Discussed with Dr. Marrero who also saw patient and indicated patient could be discharged.  However when I went to see patient, patient said Dr. Marrero told him he could stay overnight and go home tomorrow.  I mentioned to him that I just spoke with Dr. Marrero about 10 minutes back as he was a 5 to 7 PM on-call today and Dr. Marrero told me that he was ready to be discharged today.  He requested to speak with Dr. Marrero and I requested Dr. Marrero to speak with patient.  Dr. Marrero did speak with patient in the room and his wife joined on phone.  Patient then agreed to discharge to home today.  I discussed with patient and with his wife that if he feels sick at home he should go to the ER and they expressed understanding.  Later in the night, I saw patient's wife and mother-in-law in the hallway as they had come to pick him up.  They were appreciative of his care here.  Detailed follow-up instructions with putting on discharge AVS and discussed with patient and with his wife earlier on 4/18/2025.  Discussed with nurse on 4/18/2025.  Discussed with nursing supervisor on 4/18/2025.     18. Reviewed labs today. BUN 18, creatinine 0.6, sodium 133, potassium 4.9 on 4/2/2025.  BUN 16, creatinine 0.6, sodium 135, potassium 4.5 on 4/7/2025.  BUN 16, creatinine 0.6, sodium 134, potassium 4.4 on 4/14/2025.       19.  Discharge to home today, 4/18/2025. Discussed  discharge plans. Discharge summary will be completed. Follow up with PCP, orthopedic surgeon Dr. Ca / Dr Nixon Good, infectious disease physician, cardiologist, endocrinologist, ENT physician, local wound center, weekly labs to be drawn while on antibiotics and results to be sent to infectious diseases and other appropriate physicians. Further therapies set up after discharge. Discharge instructions on discharge form.  Discussed with patient and his wife on 4/18/2025.  Discussed with nurse on 4/18/2025.  Discussed with nursing supervisor Mikala Levine RN and Nona Neil RN on 4/18/2025.       Spencer Kemp MD  4/18/2025      This encounter was completed utilizing the Direct Speech Voice Recognition Software. Grammatical errors, random word insertions, pronoun errors, and incomplete sentences are occasional consequences of the system due to software limitations, ambient noises, and hardware issues. Such errors may be missed prior to affixing electronic signature. Any questions or concerns about the content, text, or information contained within the body of this dictation should be directly addressed to the physician for clarification. If you have any questions or concerns please do not hesitate to call me directly via EPIC chat, page, or email.

## 2025-04-18 NOTE — PLAN OF CARE
Pt AAO x 4. No c/o pain/discomfort noted at this time. Peg tube intact and patent, flush easily without any discomfort. Continent of bladder. Sleeping well without any discomfort. Fall and safety precaution maintained. Call bell within reach.

## 2025-04-18 NOTE — PROGRESS NOTES
Psychiatry Progress Note    History of Present Illness   See initial consult.  History pertaining to psychosis, depression and anxiety and other psychiatric and psychologic conditions as well as general medical history detailed in initial consult.      Interval History:   Stable overnight  No si or avh  D/c today    MENTAL STATUS EXAM  Appearance: well groomed  Gait and Motor: no abnormal movements  Speech: normal rate/rhythm/volume  Mood: depressed and anxious  Affect: constricted  Associations: coherent  Thought Process: goal-directed  Thought Content: no auditory or visual hallucinations.  Suicidality/Homicidality: denies  Judgement/Insight: acknowledges illness  Orientation: situation  Memory: knows current president  Attention: distracted  Knowledge: normal  Language: normal      Vital Signs for the last 24 hours:  Temp:  [36.3 °C (97.3 °F)-36.9 °C (98.4 °F)] 36.3 °C (97.3 °F)  Heart Rate:  [71-97] 71  Resp:  [18-20] 18  BP: (118-136)/(66-78) 136/66    Labs:  Labs below reviewed for pertinence to psychiatric condition  Lab Results   Component Value Date    GLUCOSE 138 (H) 04/14/2025    CALCIUM 9.4 04/14/2025     (L) 04/14/2025    K 4.4 04/14/2025    CO2 31 04/14/2025    CL 96 (L) 04/14/2025    BUN 16 04/14/2025    CREATININE 0.6 (L) 04/14/2025     Lab Results   Component Value Date    WBC 5.17 04/14/2025    HGB 12.5 (L) 04/14/2025    HCT 38.3 (L) 04/14/2025    MCV 94.8 04/14/2025     04/14/2025     Pain Management Panel           No data to display                  Current Facility-Administered Medications   Medication Dose Route Frequency Provider Last Rate Last Admin    acetaminophen (TYLENOL) 650 mg/20.3 mL solution 650 mg  650 mg peg tube q6h PRN Spencer Kemp MD   650 mg at 04/17/25 0032    bisacodyL (DULCOLAX) 10 mg suppository 10 mg  10 mg rectal Daily PRN Spencer Kemp MD   10 mg at 04/17/25 1140    cefTRIAXone (ROCEPHIN) 2 g in sodium chloride 0.9 % 100 mL IVPB  2 g intravenous  q24h INT Spencer Kemp MD   Stopped at 04/18/25 0559    docusate sodium (COLACE) 50 mg/5 mL liquid 100 mg  100 mg peg tube BID Spencer Kemp MD   100 mg at 04/18/25 0738    enoxaparin (LOVENOX) syringe 40 mg  40 mg subcutaneous Daily (6p) Spencer Kemp MD   40 mg at 04/17/25 1843    ezetimibe (ZETIA) tablet 10 mg  10 mg peg tube Nightly Spencer Kemp MD   10 mg at 04/17/25 2123    famotidine (PEPCID) tablet 20 mg  20 mg peg tube Nightly Spencer Kemp MD   20 mg at 04/17/25 2124    FLUoxetine (PROzac) 20 mg/5 mL (4 mg/mL) solution 20 mg  20 mg peg tube Daily Spencer Kemp MD   20 mg at 04/18/25 0740    glycopyrrolate (ROBINUL) tablet 1 mg  1 mg peg tube 2x daily PRN Spencer Kemp MD   1 mg at 04/11/25 0850    honey (MEDIHONEY) 100 % topical paste   Topical Daily Spencer Kemp MD   Given at 04/18/25 0744    HYDROmorphone (DILAUDID) tablet 2 mg  2 mg peg tube q6h PRN Spencer Kemp MD   2 mg at 04/18/25 0733    insulin lispro U-100 (HumaLOG) pen 1-12 Units  1-12 Units subcutaneous TID with meals Lilian Marrero MD        lansoprazole (PREVACID) 3 mg/mL oral suspension 15 mg  15 mg feeding tube Daily before breakfast Spencer Kemp MD   15 mg at 04/18/25 0630    levothyroxine (SYNTHROID) tablet 150 mcg  150 mcg peg tube Daily (6:30a) Spencer Kemp MD   150 mcg at 04/18/25 0504    linaCLOtide (LINZESS) capsule 145 mcg  145 mcg g-tube Daily before lunch Spencer Kemp MD   145 mcg at 04/17/25 1141    linaCLOtide (LINZESS) capsule 145 mcg  145 mcg peg tube Daily PRN Spencer Kemp MD   145 mcg at 04/15/25 1608    liothyronine (CYTOMEL) tablet 10 mcg  10 mcg peg tube Daily (6:30a) Spencer Kemp MD   10 mcg at 04/18/25 0504    LORazepam (ATIVAN) tablet 0.25 mg  0.25 mg peg tube q8h PRN Spencer Kemp MD   0.25 mg at 04/17/25 1031    magnesium hydroxide (M.O.M.) 400 mg/5 mL suspension 30 mL  30 mL oral 2x daily PRN Spencer Kemp MD   30  mL at 04/16/25 1458    nystatin (MYCOSTATIN) 100,000 unit/gram topical powder   Topical BID Lilian Marrero MD   Given at 04/18/25 0744    ondansetron ODT (ZOFRAN-ODT) disintegrating tablet 4 mg  4 mg peg tube q6h PRN Spencer Kemp MD   4 mg at 04/17/25 0543    senna (SENOKOT) tablet 1 tablet  1 tablet peg tube 2x daily PRN Spencer Kemp MD   1 tablet at 04/15/25 0813    senna (SENOKOT) tablet 2 tablet  2 tablet peg tube BID Spencer Kemp MD   2 tablet at 04/18/25 0738    simethicone (MYLICON) chewable tablet 160 mg  160 mg peg tube QID Spencer Kemp MD   160 mg at 04/18/25 0738    sodium chloride PF flush 10 mL  10 mL intravenous q8h INT Spencer Kemp MD   10 mL at 04/18/25 0505    sodium chloride PF flush 10 mL  10 mL intravenous PRN Spencer Kemp MD        sodium chloride tablet 1 g  1 g peg tube Daily Spencer Kemp MD   1 g at 04/18/25 0738    sodium phosphates (FLEET) enema adult 1 enema  1 enema rectal Daily PRN Spencer Kemp MD   1 enema at 04/10/25 1306        Patient Active Problem List   Diagnosis    Vertigo    Hypertension    Postsurgical hypothyroidism    Mixed hyperlipidemia    Gastroesophageal reflux disease    Increased sputum production    Allergic rhinitis    Change in bowel habits    Chronic fatigue syndrome    Constipation, chronic    Deviated nasal septum    Elevated coronary artery calcium score    Eustachian tube dysfunction, bilateral    Examination of participant in clinical trial    Induration penis plastica    Hypertrophy of nasal turbinates    Hx of colonic polyps    Malignant neoplasm of salivary gland (CMS/HCC)    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)    Paralysis of vocal cords and larynx, unilateral    ENMANUEL (obstructive sleep apnea)    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)    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)    Penile curvature, acquired    Rhinitis sicca    Thyroid cancer (CMS/HCC)     Sensorineural hearing loss (SNHL), bilateral    Other dysphagia    Abdominal pain    Other dysphagia    COVID-19    Encounter for follow-up surveillance of thyroid cancer    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    Acute post-operative pain           Assessment/Plan    Depression: CBT automatic anxious and negative thoughts  Adjustment Disorder to Disability: supportive therapy  Sleep:  Insight into illness: insight therapy/psychoeducation  Psychotherapy: supportive  Monitor: Mood, Speech, Appetite, Energy, Cognition, Behavioral, Impulsivity, Agitation  Family Support  Medications: monitor for side effects  - presently no gi, akathesia , ha or sedation  Sodium 134  prozac 20  Prn ativan  Cbt  Educate on meds  If sodium continues to drop would consider discontinuing Prozac if there is not another source.  Address automatic negtive thoguhts  Support coping with atd  Monitor progress in therapy  Limit narcotic as possible  Relaxation techniques for pain  Discussed ativan use  Stop trazodone  Ambien prn   Sodium ok  Responding to support  Cbt   Support frustration and dispositin  Patient is stable for disposition.  Has no suicidal ideation, plan.  Has been educated on use of psychotropic medications potential side effects.  Educated on warning signs for recurrence of mental health condition.  Plan in place for continued pharmacotherapy.   Addendum: had poppy oglesbyeeting with patient and discussed with staff d/c  Trey Meyer MD  4/18/2025

## 2025-04-18 NOTE — PROGRESS NOTES
[facility] Internal Medicine Progress Note          Patient was seen and examined at bedside.    Subjective:   Clinically he remains stable, pleasant, in NAD; He has finished his inpt acute rehab with good functional recovery, plan to d/c home today, provided with no new complaints or concerns, no O/N acute events, reviewed his recent labs, spent 35 min to prepare for his d/c home today.    Addendum:  He received several bowel regimen today, also he received 1 liter NS iv infusion, he is passing gas and also had a good BM, he is anxious but on physical examination, his abdominal is benign, he is ready to d/c home, talked with him and wife, updated his current condition, talked with them about his bowel care, importance to limit opioid usage. If he is experiencing any acute abdominal pain, nausea/vomiting, intolerance of TF. They need to seek emergent care w/o delay.       Objective   Vital signs in last 24 hours:  Temp:  [36.3 °C (97.3 °F)-36.9 °C (98.4 °F)] 36.3 °C (97.3 °F)  Heart Rate:  [71-97] 71  Resp:  [18-20] 18  BP: (118-136)/(66-78) 136/66      Intake/Output Summary (Last 24 hours) at 4/18/2025 0924  Last data filed at 4/17/2025 1331  Gross per 24 hour   Intake 500 ml   Output --   Net 500 ml     Intake/Output this shift:  No intake/output data recorded.   Review of Systems:  All other systems reviewed and negative except as noted in the HPI.   Objective      Labs  Reviewed his labs thoroughly   Lab Results   Component Value Date    WBC 5.17 04/14/2025    HGB 12.5 (L) 04/14/2025    HCT 38.3 (L) 04/14/2025    MCV 94.8 04/14/2025     04/14/2025     Lab Results   Component Value Date    GLUCOSE 138 (H) 04/14/2025    CALCIUM 9.4 04/14/2025     (L) 04/14/2025    K 4.4 04/14/2025    CO2 31 04/14/2025    CL 96 (L) 04/14/2025    BUN 16 04/14/2025    CREATININE 0.6 (L) 04/14/2025       Imaging  OSH imaging study reports reviewed    Full Code    Physical Exam:  Head/Ear/Nose/Throat:  normocephalic; atraumatic; moisture mouth mm, no oropharyngeal thrush noted.   Eyes: anicteric sclera, EOMI; PERRL.   Neck : supple, no JVD, no carotid bruits appeciated.   Respiratory: no evidence of labored breathing, lung sounds CTA b/l, good aeration bibasilar area, no w/r/c.   Cardiovascular: RRR; normal S1, S2; no m/r/g; no S3 or S4.   Gastrointestinal: PEG in place, focal c/d/I; soft; NT; BS normal; mildly distended; no CVAT b/l.   Genitourinary: no song.   Extremities : no c/c/e .   Neurological: AO x 3, fluent speeches, following commands, CNS II-XII grossly intact; no focal neurologic deficits.   Behavior/Emotional: in NAD, appropriate; cooperative.   Skin: clean, dry and intact.  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  He had a PEG placed in 2021 for dysphagia.  Cervical spinal stenosis with myeloradiculopathy h/o cervical spinal surgery. He initially presented to [facility] ED on 3/11/25 with fevers and R shoulder pain. Workup was unrevealing and he was discharged home.  He presented to the [facility] ED again on 3/17 with ongoing fevers. CT Neck with no abscess, old postsurgical changes, BCX was collected. He d/c home but BCX subsequently returned positive on 3/19 and he returned for admission. Imaging with C6-7 OM / discitis with ventral epidural abscess. [facility].  At that time, he was afebrile. BCx negative. MRI with C5-C7 cervical epidural abscess/osteo with cord signal at C6-7, T1-4, L4 vertebral body lesions  probably radiation-changes per [facility] radiology, although metastatic lesions are not ruled out, per wife PET-CT scan no evidence of metastatic malignancy light up at spine region. He is now s/p posterior C4-T1 decompression and fusion on 3/27. Purulent fluid drained from anterior spine. OR Cx Strep intermedius.  ID rec IV dapto and ceftriaxone x 6 weeks.  PICC placed. He was admitted to [facility] on 4/1/25 for post op inpt acute rehab. He is TF NPO status.     A/P:  # Strep intermedius bacteremia with C6-7 OM / discitis and ventral epidural abscess, s/p PCDF 3/27/25, purulent fluid drainage from anterior spine  Suspect source likely recent esophageal dilatations ; 3/17 BCx + at OSH; TTE negative for veg at OSH  -complete planned 6-week iv Rocephin course, last dose of daptomycin on 3/31/25,  wound care dermal defense, f/u with [facility] ID as outpt, weekly labs.     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      # Dysphagia, NPO/TF, IBS-C, GI prophylaxis  -cont PEG TF, dietitian co management for nutrition supplements, Pepcid/PPI for GI prophylaxis       # Hypothyroidism   -per his endo, increase levothyroxine dose from 137-150 mcg daily.     # DVT prophylaxis  -SQH     # Anxiety  -on Prozac, psychology CBT, psychiatrist for co management      # Hyperglycemia, previous prediabetes  -diet modification per dietitian, SSI with accu checks      Billing code: [id number]  Diagnoses:  Patient Active Problem List   Diagnosis    Vertigo    Hypertension    Postsurgical hypothyroidism    Mixed hyperlipidemia    Gastroesophageal reflux disease    Increased sputum production    Allergic rhinitis    Change in bowel habits    Chronic fatigue syndrome    Constipation, chronic    Deviated nasal septum    Elevated coronary artery calcium score    Eustachian tube  dysfunction, bilateral    Examination of participant in clinical trial    Induration penis plastica    Hypertrophy of nasal turbinates    Hx of colonic polyps    Malignant neoplasm of salivary gland (CMS/HCC)    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

## 2025-04-18 NOTE — DISCHARGE SUMMARY
Rehab Discharge Summary      Admitting Provider: Spencer Kemp MD  Discharge Provider: Spencer Kemp MD  Primary Care Physician at Discharge: Yamila Brunson [phone]     Admission Date: 4/1/2025     Discharge Date: 4/18/2025    Discharge Disposition  [facility]    Code Status at Discharge: Full Code    Discharge Medications     Medication List        START taking these medications      acetaminophen 650 mg/20.3 mL solution  Commonly known as: TYLENOL  20.3 mL (650 mg total) by peg tube route every 6 (six) hours as needed (mild pain).  Dose: 650 mg     bisacodyL 10 mg suppository  Commonly known as: DULCOLAX  Insert 1 suppository (10 mg total) into the rectum daily as needed for constipation.  Dose: 10 mg     cefTRIAXone 2 g in sodium chloride 0.9 % 100 mL IVPB  Infuse 2 g into a venous catheter daily Indications: CNS infection/epidural abscess.  Dose: 2 g     enoxaparin 40 mg/0.4 mL syringe  Commonly known as: LOVENOX  Inject 0.4 mL (40 mg total) under the skin daily Indications: deep vein thrombosis prevention.  Dose: 40 mg     ezetimibe 10 mg tablet  Commonly known as: ZETIA  1 tablet (10 mg total) by peg tube route nightly.  Dose: 10 mg     FLUoxetine 20 mg/5 mL (4 mg/mL) solution  Commonly known as: PROzac  5 mL (20 mg total) by peg tube route daily.  Dose: 20 mg     HYDROmorphone 2 mg tablet  Commonly known as: DILAUDID  1 tablet (2 mg total) by peg tube route every 6 (six) hours as needed for pain for up to 7 days Indications: pain.  Dose: 2 mg     insulin lispro U-100 100 unit/mL pen  Commonly known as: HumaLOG  Inject 1-12 Units under the skin 3 (three) times a day with meals Indications: hyperglycemia. Following sliding scale instructions on AVS  Dose: 1-12 Units     lansoprazole 3 mg/mL suspension oral suspension  Commonly known as: PREVACID  5 mL (15 mg total) by feeding tube route daily before breakfast Indications: gastroesophageal reflux disease, stress ulcer  prevention.  Dose: 15 mg     levothyroxine 150 mcg tablet  Commonly known as: SYNTHROID  1 tablet (150 mcg total) by peg tube route daily.  Dose: 150 mcg     linaCLOtide 145 mcg capsule  Commonly known as: LINZESS  1 capsule (145 mcg total) by g-tube route daily before lunch.  Dose: 145 mcg     LORazepam 0.5 mg tablet  Commonly known as: ATIVAN  0.5 tablets (0.25 mg total) by peg tube route 2 (two) times a day as needed for anxiety for up to 5 days.  Dose: 0.25 mg     nystatin 100,000 unit/gram powder  Commonly known as: MYCOSTATIN  Apply topically 2 (two) times a day for 14 doses.     sennosides-docusate sodium 8.6-50 mg  Commonly known as: SENOKOT-S  2 tablets by peg tube route 2 (two) times a day.  Dose: 2 tablet     simethicone 80 mg chewable tablet  Commonly known as: MYLICON  1 tablet (80 mg total) by peg tube route 4 (four) times a day.  Dose: 80 mg     sodium chloride 1 g tablet  1 tablet (1 g total) by peg tube route 2 (two) times a day.  Dose: 1 g     * sodium chloride PF injection  Infuse 10 mL into a venous catheter every 8 (eight) hours Indications: prevent clot from blocking an intravenous catheter.  Dose: 10 mL     * sodium chloride PF injection  Infuse 10 mL into a venous catheter as needed for line care (PICC care and maintenance) Indications: prevent clot from blocking an intravenous catheter.  Dose: 10 mL     zolpidem 5 mg tablet  Commonly known as: AMBIEN  1 tablet (5 mg total) by peg tube route nightly for 10 doses.  Dose: 5 mg           * This list has 2 medication(s) that are the same as other medications prescribed for you. Read the directions carefully, and ask your doctor or other care provider to review them with you.                CHANGE how you take these medications      famotidine 20 mg tablet  Commonly known as: PEPCID  1 tablet (20 mg total) by peg tube route nightly Indications: gastroesophageal reflux disease.  Dose: 20 mg  What changed:   medication strength  how much to take      liothyronine 5 mcg tablet  Commonly known as: CYTOMEL  2 tablets (10 mcg total) by peg tube route daily.  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     History of Present Illness    Mr. Celso Gramajo is a 69-year-old right-handed white male with chronic conditions significant for hypertension, hyperlipidemia, anxiety, thyroid cancer status post thyroidectomy and radical neck dissection, adjuvant radiation therapy/immunotherapy, esophageal squamous cell carcinoma status post chemoradiotherapy complicated by esophageal strictures, left vocal cord paralysis, dysphagia status post PEG tube placement in 2021, initially presented to the ER at [facility] on 3/11/25 with fevers and right shoulder pain. Workup was unrevealing and he was discharged home. He presented to the [facility] ER again on 3/17/25 with ongoing fevers. CT neck with no abscess, old postsurgical changes. He discharged home but blood cultures subsequently, 1 of 2 sets of blood cultures drawn at [facility] on 3/17/25 showed Streptococcus viridans returned positive on 3/19/25 and he returned for admission to [facility] on 3/19/25. TTE at [facility] was negative. Blood cultures on 3/19/25 and again on 3/23/25 showed no growth per his records. Imaging with C6-7 osteomyelitis/discitis with ventral epidural abscess. He was transferred to  [facility] on 3/23/25 for surgical evaluation.  At that time, he was afebrile.  Blood cultures  were negative. At [facility] ortho and ENT were consulted. Ultimately it was determined that an anterior surgical approach was not safe (due to history of neck surgeries), and patient was having progressive weakness.  MRI of cervical spine on 3/27/25 revealed discitis and osteomyelitis at C6-C7 with ventral epidural abscess compressing the spinal cord and increased inferior extent of cord edema compared to prior. Patchy T1 hypointensity with enhancement in the upper cervical and thoracic spine, can represent metastatic lesions and/or post radiation changes, similar as compared to prior. He underwent posterior C4-T1 decompression and instrumented fusion on 3/27/25, performed by orthopedic surgeon Dr. Santiago Ca. Purulent fluid drained from anterior spine during the procedure and operating room cultures revealed Strep intermedius. Postop he was briefly in the ICU for MAP goals. He had a PICC placed 4/1/25. 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 He was started on salt tabs. Infectious diseases recommended IV Daptomycin which patient completed on 3/31/25.  He was recommended to continue Ceftriaxone 2g IV q12h x 14 day course (end date 4/8/25) then transition to Ceftriaxone 2g IV daily to complete total 6 week course (end date 5/8/25). He was recommend [place] J collar to neck at all times.  I discussed orthopedic spinal precautions with him.  He completed Decadron treatment for mild laryngeal edema visualized on preoperative NPL. Patient has dysphagia from esophageal strictures, recent dilation 3/3/25 was not successful per patient, cannot tolerate regular oral secretions and recommended NPO and continued on bolus tube feeds.  He was kept on Pepcid for GI prophylaxis. Glycopyrrolate as needed per patient/spouse wishes.  For anxiety he was continued on Fluoxetine and Ativan PRN.  He is  not on medications for hypertension.  He was continued on Zetia for hyperlipidemia.  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 He continues on a sliding-scale Humalog coverage.  He is on Levothyroxine and Liothyronine for thyroid supplementation after thyroidectomy for thyroid cancer. He is on subcutaneous Heparin and SCDs for DVT prophylaxis.  I discussed his recent clinical course with patient and with his wife at bedside.  On 4/1/25, hemoglobin was 12.9, WBC count was 10.30, platelets were 255, BUN 15, creatinine was 0.51, sodium was 133 and potassium was 4.5.  He has been needing assistance for mobility, transfers, self-care. He is transferred to [facility] on 4/1/25 for further rehabilitation care.     Pertinent Physical Findings on discharge    The patient had stable vital signs.  General Appearance: Not in acute distress  Head/Ear/Nose/Throat: Normocephalic; Atraumatic.   Eye: EOMI; PERRL.   Neck: [place] J collar in place.  Respiratory: Decreased breath sounds at bases.   Cardiovascular: RRR; Normal S1, S2.   Gastrointestinal: Soft; NT; +BS.   Extremities: Bilateral lower extremity edema noted.    Musculoskeletal: Functional active range of motion in both upper and lower extremities.   Neurological: AAO ×3. Speech is fluent. Cranial nerve examination does not reveal any gross facial asymmetry. Strength testing bilateral deltoids are 4/5, bilateral biceps are 4+/5, bilateral triceps are 4/5, bilateral wrist extensors are 4+/5, bilateral hand FDP are 4+/5, bilateral and interossei are 4/5.  Bilateral hip flexion is less than 4/5.  Bilateral quadriceps are 5/5.  Bilateral ankle dorsi and plantar flexion are 5/5.  He is grossly able to localize touch and position sense in great toes, except reports numbness in all 5 digits of both hands and also numbness on the plantar aspects of both feet.  Deep tendon reflexes are symmetric and slightly brisk bilaterally.  Bilateral ankle  clonus is present.  Plantars are withdrawal.  Coordination is functional upper extremities.    Behavior/Emotional: Appropriate; Cooperative.   Skin: Healing incision on posterior cervical spine.  Sutures in place.  Erythema/rash noted on coccyx unclear if stage I decubitus.  Rash noted on bilateral groins.  Reviewed pictures in epic.    Hospital Course    1.The patient was admitted to [facility] for a comprehensive inpatient rehabilitation program to include physical therapy, occupational therapy, rehabilitation nursing, case management evaluation.  Dr. Marrero was consulted to follow from an internal medicine standpoint.  He was evaluated by Dr. Meyer of psychiatry.  He was followed by dermal defense nursing by nutrition.  During his stay at [facility] he was sent for orthopedic follow-up appointment with Dr Nixon Good.  He also had telemedicine appointment with infectious diseases from [place].  Weekly labs were faxed to infectious diseases at [place] as requested by them.     2. DVT prophylaxis - He is on subcutaneous Heparin and SCDs for DVT prophylaxis.  Check doppler.  Platelets 247 on 4/2/2025.  Doppler ultrasound on 4/3/2025 bilateral lower extremity was negative for DVT.  Platelets 216 on 4/7/2025.  Platelets 206 on 4/14/2025.       3. 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  Follow falls precautions, cardiac precautions, aspiration precautions.  Follow spinal precautions.  [place] J collar to neck at all times.  Use [place] collar in shower.     4. GI - On Pepcid for GI prophylaxis.  On PRN Senokot.  On  PRN Dulcolax suppository.  On PRN Zofran.     5.  -denies dysuria.  Monitor postvoid residuals.     6. CVS - monitor for orthostasis.     7. Pulmonary -encourage incentive spirometry.     8. Hematology - monitor hemoglobin, platelets.  Hemoglobin 12.3, WBC 9.54 on 4/2/2025.  Hemoglobin 12.4, WBC 5.89 on 4/7/2025.  Hemoglobin 12.5, WBC 5.17 on 4/14/2025.       9. Pain -on Tylenol.  On PRN Oxycodone.     10. Skin - Healing incision on posterior cervical spine.  Sutures in place.  Erythema/rash noted on coccyx unclear if stage I decubitus.  Rash noted on bilateral groins.  Reviewed pictures in epic.     11. F/E/N - nothing by mouth on bolus PEG feeds.  Nutrition consulted. 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 He was started on salt tabs.     12.  Dysphagia - H/O esophageal squamous cell carcinoma status post chemoradiotherapy complicated by esophageal strictures, left vocal cord paralysis, dysphagia status post PEG tube placement in 2021 - NPO on bolus PEG feeds.  Nutrition consulted.     13.  Psychiatry - on PRN Ativan.  Psychology consulted.     14.  Hypothyroidism -He is on Levothyroxine and Liothyronine for thyroid supplementation after thyroidectomy for thyroid cancer.     15. ENT -  H/O left vocal cord paralysis, treated with Decadron for edema around vocal cords recently.  On Glycopyrrolate as needed per patient/spouse wishes to manage secretions.      16.  Infectious diseases - He underwent posterior C4-T1 decompression and instrumented fusion on 3/27/25, performed by orthopedic surgeon Dr. Santiago Ca. Purulent fluid drained from anterior spine during the procedure and operating room cultures revealed Strep intermedius.. He had a PICC placed 4/1/25. Infectious diseases recommended IV Daptomycin which patient completed on 3/31/25.  He was recommended to continue Ceftriaxone 2g IV q12h x 14 day course (end date 4/8/25) then transition to Ceftriaxone 2g IV daily to complete total 6 week course  (end date 5/8/25).He has infectious disease telemedicine appointment on 4/15/2025 at 10:40 AM.  Outside visit ordered.  Nursing to help patient with appointment.  Discussed with patient and nurse on 4/10/2025.      15.  Steroid-induced hyperglycemia -on sliding-scale Humalog coverage.      16.  Insomnia - He reported decreased sleep at night on 4/14/2025.  Discussed with patient and with his wife with him.  They would like to try Ambien.  He took Trazodone the other day and could not tolerate it.  Discussed with Dr. Marrero who ordered Ambien.     17. Rehabilitation medicine - Continue comprehensive rehabilitation care. Continue PT, OT, psychology.  Follow falls precautions, cardiac precautions, aspiration precautions.  Follow spinal precautions.  [place] J collar to neck at all times.  Use [place] collar in shower.Discussed patients progress in therapies with therapists in team meeting on 4/3/2025.  Discussed in PCC. See PCC documentation.  He reports no bowel movement in the past 3 to 4 days.  Also wanted antireflux medication in the morning and at home he takes Omeprazole via PEG per patient.  He is on Prevacid in the a.m. and Pepcid in p.m via PEG tube.  Due to constipation internist ordered enema.  Also scheduled Colace and Senokot were ordered through his PEG tube.  Discussed with patient on 4/3/2025.Discussed recommendations of PCC with patient on 4/4/2025.  Posterior cervical incision healing well.  He had 2 bowel movements today.  Family training scheduled for next week on Monday per case management note.  Discussed with patient on 4/4/2025.He was sitting on recliner on 4/5/2025, talking on phone with his wife.  Continent of bladder per nursing on 4/5/2025.  Tolerating PEG feeds without issues.  He reports he had a good bowel movement yesterday.  Discussed with him if he gets  loose bowel movements he can always refuse bowel medications.  He reports he wants to stay on some bowel medications at this time.   Posterior cervical incision has dressing in place.  Denies increased pain on 4/5/2025.Saw patient earlier on 4/7/2025 morning and then again in the evening.  Discussed with patient and with his wife in the evening.  He requested IV saline infusion.  Ordered liter of saline overnight but patient and wife indicated that at home they usually have the infusion and 3 hours and do not want to eat or 12 hours.  Discussed with pharmacist.  Discussed with nurse.  Patient and wife insist that they usually get the infusion over 3 hours at home.  Also wanted increase bowel medications due to constipation.  Increase Senokot dose and ordered as needed treats enema.  Discussed with nurse on 4/7/2025. Again discussed with patient on 4/8/2025 in presence of nursing supervisor that IV fluid at high rate is not such a good idea and he can get the volume but at a lower rate.  He says he is used to getting IV fluids quickly at home.  Suggested that he also discuss with his cardiologist if this is a safe practice and he can be prone to getting fluid overload and may put him at risk for CHF/pulmonary edema in future if his cardiac function decompensates.  Discussed with internist Dr. Marrero who also agrees with this possibility.  I mentioned to patient to discuss with his cardiologist regarding this and follow cardiology guidance regarding his desire to get IV fluids quickly at home at a high hourly volume in a short time.  Bowel medications were adjusted by internist on 4/8/2025.Inspected neck incision on 4/9/2025 which is stable.  He reports he feels constipated.  Internist adjusted bowel medications.Discussed with patient and with his mother-in-law with him on 4/9/2025.  Discussed patients progress in therapies with therapists in team meeting on 4/10/2025. Discussed in PCC. See PCC documentation.  He has infectious disease telemedicine appointment on 4/15/2025 at 10:40 AM.  Outside visit ordered.  Nursing to help patient with  appointment.  Discussed with patient and nurse on 4/10/2025. Discussed recommendations of PCC with patient on 4/11/2025. He reports he had a bowel movement today.  Noted input from dermal defense nurse.  Discussed with nurse [place].  Dr Good (orthopedic surgery) office was (who patient was recommended to follow-up with) was contacted regarding the pressure injury from [place] collar and they indicated that [place] J collar can be discontinued and patient may use soft collar if he desires.  Nurse Elin also discussed with patient on 4/11/2025 and he is agreeable.  Orders put in.  Patient has appointment for 4/15 @ 1320 hrs at Dr Good's orthopedic office.  Discussed with nurse on 4/11/2025.He indicates that he no longer wants to take salt tablets on 4/12/2025.  Sodium was 135.  Will discontinue salt tablets.  Discussed with patient and with nurse with him on 4/12/2025.  Discussed follow-up appointment next week at orthopedic office with him.  Discussed with him on 4/12/2025 regarding input from orthopedic surgeon Dr Good yesterday regarding [place] collar was discontinued and he is on soft cervical collar.He reported decreased sleep at night on 4/14/2025.  Discussed with patient and with his wife with him.  They would like to try Ambien.  He took Trazodone the other day and could not tolerate it.  He requested soapsuds enema today due to constipation.  Discussed with him constipating effects of narcotics.  He says he also gets constipated when he gets antibiotics.  Reviewed labs today on 4/14/2025.  He says he feels much better on 4/15/2025.  He reports he had a good night sleep with Ambien.  He also had a bowel movement with Linzess.  He has orthopedic appointment today afternoon.  Discussed with patient and with nurse on 4/15/2025.He woke up with a lot of pain on 4/16/2025 morning.  He reported that the ambulance ride from orthopedic visit yesterday was very bumpy which may have aggravated his pain.  Discussed with  house physician Dr Acevedo.  Suggested going to the ER for evaluation.  Dr Acevedo discussed with patient about going to [place] and he declined, but agreed to go to [facility].  Later in the morning he told me that he does not want to go to the ER at all.  Offered to get x-rays here, but he said he had x-rays orthopedic office yesterday which look fine per orthopedics and does not want repeat x-rays today.  He wanted to rest in the morning but in the afternoon did much better in therapy according to therapists and able to walk 200 feet with front wheeled walker modified independent and modified independent for transfers.  He wants to appeal his discharge to Medicare.  Case management discussed with him on 4/16/2025.  Discussed with patient, with nurse and internist Dr. Marrero on 4/16/2025.Discussed patients progress in therapies with therapists in team meeting on 4/17/2025.  Discussed in PCC. See PCC documentation.  Able to ambulate 250 feet with front wheeled walker modified independent physical therapy on 4/17/2025.  Modified independent for sit to stand transfer with physical therapy.  Modified independent for upper and lower body dressing, toileting and bathing with occupational therapy.  Inspected neck incision earlier today which is stable.  Some scabs on incision.  Noted input from dermal defense nurse.  Reviewed pictures in epic.  Dermal defense was also suggesting local wound care follow-up.  Per patient, if more time is not approved he will go home tomorrow with hired help and does not want to go to nursing home.  Discussed with patient and with physical therapist earlier today with him on 4/17/2025.He is scheduled for discharge today, 4/18/2025, modified independent with mobility, transfers, self-care.  However he wanted to have a bowel movement before discharge and was given soapsuds enema today.  Later in the afternoon also he requested more bowel medications and he had not had a bowel movement.    He however reported some gaseous distention especially in the left side of his abdomen and indicated he also had some pain.  I offered to send him to the ER which he declined. Eventually he was given magnesium citrate through his PEG tube and had a large bowel movement per nursing.  He was also given 1 L of saline IV per his request over 3 and half hours.  His wife had arrived to pick him up but given the fact that patient insisted on having a bowel movement and also IV fluids, she decided to come back later in the evening.  I again saw him later in the evening and he had a large bowel movement earlier today.  His abdomen is soft and he has good bowel sounds.  Discussed with Dr. Marrero who also saw patient and indicated patient could be discharged.  However when I went to see patient, patient said Dr. Marrero told him he could stay overnight and go home tomorrow.  I mentioned to him that I just spoke with Dr. Marrero about 10 minutes back as he was a 5 to 7 PM on-call today and Dr. Marrero told me that he was ready to be discharged today.  He requested to speak with Dr. Marrero and I requested Dr. Marrero to speak with patient.  Dr. Marrero did speak with patient in the room and his wife joined on phone.  Patient then agreed to discharge to home today.  I discussed with patient and with his wife that if he feels sick at home he should go to the ER and they expressed understanding.  Later in the night, I saw patient's wife and mother-in-law in the hallway as they had come to pick him up.  They were appreciative of his care here.  Detailed follow-up instructions with putting on discharge AVS and discussed with patient and with his wife earlier on 4/18/2025.  Discussed with nurse on 4/18/2025.  Discussed with nursing supervisor on 4/18/2025.     18. Reviewed labs today. BUN 18, creatinine 0.6, sodium 133, potassium 4.9 on 4/2/2025.  BUN 16, creatinine 0.6, sodium 135, potassium 4.5 on 4/7/2025.  BUN 16, creatinine 0.6, sodium 134,  potassium 4.4 on 4/14/2025.       19.  Discharge to home on 4/18/2025. Discussed discharge plans. Discharge summary will be completed. Follow up with PCP, orthopedic surgeon Dr. Ca / Dr Nixon Good, infectious disease physician, cardiologist, endocrinologist, ENT physician, local wound center, weekly labs to be drawn while on antibiotics and results to be sent to infectious diseases and other appropriate physicians. Further therapies set up after discharge. Discharge instructions on discharge form.  Discussed with patient and his wife on 4/18/2025.  Discussed with nurse on 4/18/2025.  Discussed with nursing supervisor Mikala Levine RN and Nona Neil RN on 4/18/2025.      20. Otherwise he tolerated therapies well and made good progress in his functional status. He had no further medical complications and was subsequently discharged to home. His functional status at discharge from [facility] is noted in detail on discharge AVS from [facility]. He will need close follow-up with his PCP, orthopedic surgeon Dr. Ca / Dr Nixon Good, infectious disease physician, cardiologist, endocrinologist, ENT physician, local wound center, weekly labs to be drawn while on antibiotics and results to be sent to infectious diseases and other appropriate physicians and should continue further therapies after discharge from [facility].

## 2025-04-18 NOTE — DISCHARGE INSTR - OTHER ORDERS
WOUND CARE INSTRUCTIONS    1- Remove foam dressing and cleanse area with normal saline pink bullets  2- apply small amount of medihoney to aquacel ag dressing and apply directly to wound  3- apply 2x2 gauze on top of aquacel ag dressing and then cover with foam dressing.  4- Change dressing DAILY

## 2025-04-18 NOTE — PLAN OF CARE
Problem: Adult Inpatient Plan of Care  Goal: Plan of Care Review  Outcome: Progressing  Flowsheets (Taken 4/18/2025 1111)  Progress: improving  Outcome Evaluation:   Patient AAOx4, room air, anxious at times, continent of bowel and bladder.  Patient requested to take linzess and a suppository this morning. Independently uses the urinal.  Asks for assistance with tube feeds and medication administration   he does dictate care.  Clavical wound cleansed with normal saline, medihoney, aquacel, gauze and foam applied. Nystatin applied to groin. Tube feed bags to be sent home with patient.  Plan of Care Reviewed With: patient  Patient received 7:30am tube feeding; then only wanted 1 carton of marli [employer] at 10:30; patient then refused afternoon dose and this evening only wanted 1/2 carton of [employer] due to abdominal pain.  Patients bowel sounds are audible.  For patient comfort also received soap suds enema; no resistance felt and patient reported unable another bm and still feeling abdominal discomfort.  Per order also received a full bottle of mag citrate as well as a liter of fluids over 3 hours.  Doctors and supervisors are all aware.    Per Dr. Marrero patient given one time order of dilauded and ativan     1945: Patient reported spoke with wife and that she is coming to pick him up. Patient will d/c tonight discharge orders printed.     Patient own medication sent with wife   Plan of Care Review  Plan of Care Reviewed With: patient  Progress: improving

## 2025-04-19 RX ORDER — HYDROMORPHONE HYDROCHLORIDE 2 MG/1
2 TABLET ORAL EVERY 6 HOURS PRN
Qty: 28 TABLET | Refills: 0 | Status: SHIPPED | OUTPATIENT
Start: 2025-04-19 | End: 2025-04-26

## 2025-04-19 NOTE — PROGRESS NOTES
I was called earlier by nurse manager that patient's discharge was held up as the pharmacy that the patient's hydromorphone had been sent to by the primary team, did not have the medication in stock. I was given information to send the medication instead to [facility] in [place]. I was able to send the hydromorphone there after discontinuing the original prescription.

## 2025-04-19 NOTE — PROGRESS NOTES
Patient wife arrived for discharge. Wife spoke with Dr. Marrero regarding concerns. Patient had concerns regarding his pain medication in which nursing supervisor addressed. Discharge instructions reviewed with wife per patient request, EVS paperwork given, follow-up telephone numbers provided. Wife decided to follow-up at [facility] for Dilaudid as it was not available a [facility].  Patient Spouse planned to call us back if [facility] cannot fill dilaudid prescription. Patient left with all personal belongings with wife at 2100.

## 2025-05-30 ENCOUNTER — BMR PREADMISSION ASSESSMENT (OUTPATIENT)
Dept: ADMISSIONS | Facility: REHABILITATION | Age: 70
End: 2025-05-30
Payer: MEDICARE

## 2025-06-30 ENCOUNTER — OFFICE VISIT (OUTPATIENT)
Dept: ENDOCRINOLOGY | Facility: CLINIC | Age: 70
End: 2025-06-30
Payer: MEDICARE

## 2025-06-30 VITALS
BODY MASS INDEX: 26.77 KG/M2 | DIASTOLIC BLOOD PRESSURE: 64 MMHG | OXYGEN SATURATION: 98 % | SYSTOLIC BLOOD PRESSURE: 90 MMHG | HEIGHT: 73 IN | HEART RATE: 104 BPM | WEIGHT: 202 LBS

## 2025-06-30 DIAGNOSIS — E11.65 TYPE 2 DIABETES MELLITUS WITH HYPERGLYCEMIA, WITH LONG-TERM CURRENT USE OF INSULIN (CMS/HCC): Primary | ICD-10-CM

## 2025-06-30 DIAGNOSIS — C08.9: ICD-10-CM

## 2025-06-30 DIAGNOSIS — C73 THYROID CANCER (CMS/HCC): ICD-10-CM

## 2025-06-30 DIAGNOSIS — Z79.4 TYPE 2 DIABETES MELLITUS WITH HYPERGLYCEMIA, WITH LONG-TERM CURRENT USE OF INSULIN (CMS/HCC): Primary | ICD-10-CM

## 2025-06-30 DIAGNOSIS — E89.0 POSTSURGICAL HYPOTHYROIDISM: ICD-10-CM

## 2025-06-30 DIAGNOSIS — C15.3 MALIGNANT NEOPLASM OF UPPER THIRD OF ESOPHAGUS (CMS/HCC): ICD-10-CM

## 2025-06-30 PROCEDURE — G8752 SYS BP LESS 140: HCPCS | Performed by: INTERNAL MEDICINE

## 2025-06-30 PROCEDURE — 99215 OFFICE O/P EST HI 40 MIN: CPT | Performed by: INTERNAL MEDICINE

## 2025-06-30 PROCEDURE — G2211 COMPLEX E/M VISIT ADD ON: HCPCS | Performed by: INTERNAL MEDICINE

## 2025-06-30 PROCEDURE — G8754 DIAS BP LESS 90: HCPCS | Performed by: INTERNAL MEDICINE

## 2025-06-30 RX ORDER — BLOOD-GLUCOSE SENSOR
EACH MISCELLANEOUS
Qty: 6 EACH | Refills: 1 | Status: SHIPPED | OUTPATIENT
Start: 2025-06-30

## 2025-06-30 RX ORDER — BLOOD-GLUCOSE,RECEIVER,CONT
EACH MISCELLANEOUS
Qty: 1 EACH | Refills: 0 | Status: SHIPPED | OUTPATIENT
Start: 2025-06-30

## 2025-06-30 RX ORDER — INSULIN LISPRO 100 [IU]/ML
INJECTION, SOLUTION INTRAVENOUS; SUBCUTANEOUS
Qty: 15 ML | Refills: 0 | Status: SHIPPED | OUTPATIENT
Start: 2025-06-30

## 2025-06-30 RX ORDER — LEVOTHYROXINE SODIUM 150 UG/1
150 TABLET ORAL DAILY
Qty: 90 TABLET | Refills: 0 | Status: SHIPPED | OUTPATIENT
Start: 2025-06-30

## 2025-06-30 NOTE — PROGRESS NOTES
HPI/SUBJECTIVE     Patient ID: Celso Gramajo 1955  Referring provider: No ref. provider found   Reason for visit: Thyroid    Type 2 diabetes:  Thyroid cancer and postsurgical hypothyroidism:     Celso Gramajo is referred to us for managment of Type 2 diabetes. Reviewed medical records. Patient was seen us earlier for thyroid cancer. Patient also has diabetes. Patient would like us to manage diabetes as well.     Diabetes Mellitus, Type II: Patient has a long history of prediabetes. On the recent lab work, Hg A1c has transitioned to diabetes. Patient required insulin treatment during hospitalization and rehab. Patient has a h/o esophageal cancer. He has feeding tube. Diabetes is a new diagnosis, moderate in severity and uncontrolled in quality. Associated symptoms include: Patient denies polyuria, polydipsia. Patient denies low blood sugar episodes recently. Current diabetic medication list reviewed. None. Compliance with diabetic treatment has been NA. Compliance with diabetic diet has been good. Blood sugar monitoring: good. Reviewed glucose readings. Blood sugar control: fair. Modifying factors include: HTN/ CAD/ PAD/ CVA: positive. Hyperlipidemia: positive. Nephropathy/ CKD/ ESRD: negative. Neuropathy/ Foot callus/ foot ulcer/ foot amputation: negative. Retinopathy: negative. Overweight/ Obesity: negative. Reviewed lab work. Review of systems as per HPI.      Thyroid cancer and postsurgical hypothyroidism:  Patient was seen by Dr. Borrero since 2022. He established  care with me in 10/2024. Patient has a history of thyroid cancer, S/p thyroid surgery and MASCORRO (200 mCI) in 2001. His last TG was negative (neg TG abs).  He had cervical lymph node enlargement in 2016. 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 After  esophageal surgery, he is placed on PEG tube. Patient was diagnosed with Chronic fatigue syndrome in 1990s.    He is taking thyroid HRT since his thyroid surgery. He was taking Levoxyl which was switched to liquid Tirosint with liothyronine and then to Levoxyl and liothyronine. He is currently taking Levoxyl 150 mcg per day and Cytomel 10 mcg per day.  Patient reports fatigue.   Patient denies heart palpations, hand tremors.   Recent thyroid labs are normal.  Reviewed lab work. Review of systems as per HPI.    History  Patient Active Problem List   Diagnosis    Vertigo    Hypertension    Postsurgical hypothyroidism    Mixed hyperlipidemia    Gastroesophageal reflux disease    Increased sputum production    Allergic rhinitis    Change in bowel habits    Chronic fatigue syndrome    Constipation, chronic    Deviated nasal septum    Elevated coronary artery calcium score    Eustachian tube dysfunction, bilateral    Examination of participant in clinical trial    Induration penis plastica    Hypertrophy of nasal turbinates    Hx of colonic polyps    Malignant neoplasm of salivary gland (CMS/HCC)    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)    Paralysis of vocal cords and larynx, unilateral    ENMANUEL (obstructive sleep apnea)    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)    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)    Penile curvature, acquired    Rhinitis sicca    Thyroid cancer (CMS/HCC)    Sensorineural hearing loss (SNHL), bilateral    Other dysphagia    Abdominal pain    Other dysphagia    COVID-19    Encounter for follow-up surveillance of thyroid cancer    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    Acute post-operative pain    Type 2 diabetes mellitus with hyperglycemia, with long-term current use of insulin (CMS/HCC)      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 drug thru [facility]     Hx of head and neck  radiation     Hypertension     Hypothyroidism     Stroke (CMS/HCC)     Thyroid disease       Family History   Problem Relation Name Age of Onset    No Known Problems Biological Father      Lung cancer Biological Sister        Past Surgical History   Procedure Laterality Date    Cholecystectomy      Hernia repair      Micro direct laryngoscopy with fat implantation, abdominal fat harvest,laser N/A 11/23/2020    Performed by Jon German MD at [facility]    Neck dissection      Neck surgery      ID EGD INSERT GUIDE WIRE DILATOR PASSAGE ESOPHAGUS [95194 (CPT®)] N/A 10/17/2022    Performed by Ricardo Cabral MD at [facility]    Thyroid surgery      Tumor removal      L neck 20yrs ago       No Known Allergies   Current Outpatient Medications   Medication Sig Dispense Refill    blood-glucose sensor (FREESTYLE SALTY 3 PLUS SENSOR) device device Replace every 15 days to monitor glucose 6 each 1    blood-glucose,,cont (FREESTYLE SALTY 3 READER) [facility]  Use to monitor glucose 1 each 0    cefTRIAXone 2 g in sodium chloride 0.9 % 100 mL IVPB Infuse 2 g into a venous catheter daily Indications: CNS infection/epidural abscess.      ezetimibe (ZETIA) 10 mg tablet 1 tablet (10 mg total) by peg tube route nightly. 30 tablet 0    famotidine (PEPCID) 20 mg tablet 1 tablet (20 mg total) by peg tube route nightly Indications: gastroesophageal reflux disease.      insulin lispro U-100 (HumaLOG) 100 unit/mL pen Indications: hyperglycemia. Following sliding scale instructions on AVS 15 mL 0    lansoprazole (PREVACID) 3 mg/mL suspension oral suspension 5 mL (15 mg total) by feeding tube route daily before breakfast Indications: gastroesophageal reflux disease, stress ulcer prevention.      linaCLOtide (LINZESS) 145 mcg capsule 1 capsule (145 mcg total) by g-tube route daily before lunch. 30 capsule 0    liothyronine (CYTOMEL) 5 mcg tablet 2 tablets (10 mcg total) by peg tube route daily.      LevoxyL 150 mcg tablet  "TAKE 1 TABLET BY MOUTH EVERY DAY 90 tablet 0    sodium chloride PF injection Infuse 10 mL into a venous catheter every 8 (eight) hours Indications: prevent clot from blocking an intravenous catheter.      sodium chloride PF injection Infuse 10 mL into a venous catheter as needed for line care (PICC care and maintenance) Indications: prevent clot from blocking an intravenous catheter.       No current facility-administered medications for this visit.      Allergies, current medications, history and problem list reviewed and updated.     PHYSICAL EXAM/ OBJECTIVE      Visit Vitals  BP 90/64 (BP Location: Left upper arm, Patient Position: Sitting)   Pulse (!) 104   Ht 1.854 m (6' 1\")   Wt 91.6 kg (202 lb)   SpO2 98%   BMI 26.65 kg/m²     General:  Not in acute distress  ENT/ thyroid: Thyroidectomy scar  Respiratory:  Normal effort  Abdomen:  PEG tube port  Neuro:  No hand tremors   Psych: Normal mood.     Labs:  Lab Results   Component Value Date    TSH 0.33 (L) 09/05/2024    TSH 0.10 (L) 03/09/2016     No results found for: \"THYROIDAB\"   No components found for: \"THYROGLB\"   Thyroid imaging: Reviewed.    ASSESSMENT      Diagnosis Plan   1. Type 2 diabetes mellitus with hyperglycemia, with long-term current use of insulin (CMS/HCC)  insulin lispro U-100 (HumaLOG) 100 unit/mL pen    blood-glucose,,cont (FREESTYLE SALTY 3 READER) misc     blood-glucose sensor (FREESTYLE SALTY 3 PLUS SENSOR) device device    Hemoglobin A1c      2. Thyroid cancer (CMS/HCC)  T4, free    TSH 3rd Generation      3. Postsurgical hypothyroidism  T4, free    TSH 3rd Generation      4. Malignant neoplasm of salivary gland (CMS/HCC)        5. 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)            PLAN     Diabetes:  Patient was seen us earlier for thyroid cancer. Patient also has diabetes. Patient would like to manage us diabetes as well. Patient has a long history of prediabetes. On the recent lab work, Hg A1c has " transitioned to diabetes. Patient required insulin treatment during hospitalization and rehab. Patient has a h/o esophageal cancer. He has feeding tube.    Prescribing Freestyle Leti 3 CGM.   Test blood sugars every 4-6 hours per day.     Prescribing insulin Humalog.      Inject Humalog every 4-6 hrs as needed based on the following sliding scale.    Sliding scale Humalog insulin:   - 200 =1 units  - 250 =2 units  - 300 =3 units  - 350 =4 units  BS over 350: take 5 unts, check BS in 1 hr, if still high, repeat the scale, check BS in 1 hr, if still high, call MD or go to UR or ER.     Hyperlipidemia/ Nutrition/ weight management: Blood pressure and cholesterol management as per primary care provider. Low fat, low calorie, diabetic diet, exercise, work toward ideal body weight. Daily foot exam. Follow up with eye doctor. Follow up with Neurology/ Podiatry. Follow up with Nephrology.      ---------------------    Thyroid cancer and postsurgical hypothyroidism:     Patient was seen by Dr. Borrero since 2022. He established  care with me in 10/2024. Patient has a history of thyroid cancer, S/p thyroid surgery and MASCORRO (200 mCI) in 2001. His last TG was negative (neg TG abs).  He had cervical lymph node enlargement in 2016. 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 After esophageal surgery, he is placed on PEG tube. Patient was diagnosed with Chronic fatigue syndrome in 1990s.    He is taking thyroid HRT since his thyroid surgery. He was taking Levoxyl which was switched to liquid Tirosint with liothyronine and then to Levoxyl and liothyronine. He was taking Levoxyl 150 mcg per day earlier. Dose was decreased to Levoxyl 137 mcg per day because of heart palpitations and then to Levoxyl 125 mcg per day. He did  not feel well on Levoxyl 125 mcg per day so switched back to Levoxyl 137 mcg per day.     He is currently taking Levoxyl 150 mcg per day and Cytomel 10 mcg per day.  His recent thyroid labs are in the normal range.  Thyroglobulin levels were in the undetectable range.    Continue Levoxyl 137 mcg per day.  Continue Cytomel 10 mcg twice daily.  Repeat lab work prior to office visit.  Lab work ordered. Follow up in 3 months.  Discussed correct way of taking thyroid medication.    Thyroid counseling: Take your thyroid hormone medication first thing in the morning on empty stomach. No food or drink except for plain water for at least 30 minutes. After 30 minutes, you can eat or drink but do not take any products that contain iron, calcium, magnesium, antacids, vitamins, minerals within 4 hours of taking your thyroid medication.      Orders Placed This Encounter   Procedures    Hemoglobin A1c    T4, free    TSH 3rd Generation     Risks, benefits, and alternatives of the medications and treatment plan prescribed today were discussed, and patient expressed understanding and agreement with the plan. Pt indicated understanding that if condition worsening or not improving with plan above that they should report to an Urgent Care or Emergency Room immediately. I spent 40 minutes on this date of service performing the following activities: obtaining history, entering orders, documenting, preparing for visit, obtaining / reviewing records, providing counseling and education, independently reviewing study/studies, communicating results, and coordinating care.      -------------------

## 2025-06-30 NOTE — PATIENT INSTRUCTIONS
Diabetes:  Patient was seen us earlier for thyroid cancer. Patient also has diabetes. Patient would like us to manage diabetes as well. Patient has a long history of prediabetes. On the recent lab work, Hg A1c has transitioned to diabetes. Patient required insulin treatment during hospitalization and rehab. Patient has a h/o esophageal cancer. He has feeding tube.    Prescribing Freestyle Leti 3 CGM.   Test blood sugars every 4-6 hours per day.     Prescribing insulin Humalog.      Inject Humalog every 4-6 hrs as needed based on the following sliding scale.    Sliding scale Humalog insulin:   - 200 =1 units  - 250 =2 units  - 300 =3 units  - 350 =4 units  BS over 350: take 5 unts, check BS in 1 hr, if still high, repeat the scale, check BS in 1 hr, if still high, call MD or go to UR or ER.     Hyperlipidemia/ Nutrition/ weight management: Blood pressure and cholesterol management as per primary care provider. Low fat, low calorie, diabetic diet, exercise, work toward ideal body weight. Daily foot exam. Follow up with eye doctor. Follow up with Neurology/ Podiatry. Follow up with Nephrology.      ---------------------    Thyroid cancer and postsurgical hypothyroidism:     Patient was seen by Dr. Borrero since 2022. He established  care with me in 10/2024. Patient has a history of thyroid cancer, S/p thyroid surgery and MASCORRO (200 mCI) in 2001. His last TG was negative (neg TG abs).  He had cervical lymph node enlargement in 2016. 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 After esophageal surgery, he is placed on PEG tube. Patient was diagnosed with Chronic fatigue syndrome in 1990s.    He is taking thyroid HRT since his thyroid surgery. He was taking Levoxyl which was switched to liquid Tirosint with  liothyronine and then to Levoxyl and liothyronine. He was taking Levoxyl 150 mcg per day earlier. Dose was decreased to Levoxyl 137 mcg per day because of heart palpitations and then to Levoxyl 125 mcg per day. He did not feel well on Levoxyl 125 mcg per day so switched back to Levoxyl 137 mcg per day.     He is currently taking Levoxyl 150 mcg per day and Cytomel 10 mcg per day.  His recent thyroid labs are in the normal range.  Thyroglobulin levels were in the undetectable range.    Continue Levoxyl 137 mcg per day.  Continue Cytomel 10 mcg twice daily.  Repeat lab work prior to office visit.  Lab work ordered. Follow up in 3 months.  Discussed correct way of taking thyroid medication.    Thyroid counseling: Take your thyroid hormone medication first thing in the morning on empty stomach. No food or drink except for plain water for at least 30 minutes. After 30 minutes, you can eat or drink but do not take any products that contain iron, calcium, magnesium, antacids, vitamins, minerals within 4 hours of taking your thyroid medication.

## 2025-07-14 ENCOUNTER — TELEPHONE (OUTPATIENT)
Dept: ENDOCRINOLOGY | Facility: CLINIC | Age: 70
End: 2025-07-14
Payer: MEDICARE

## 2025-07-14 NOTE — TELEPHONE ENCOUNTER
Patient called in due to was seen office visit with Dr. Gross on 06/30/2025 in which Dr. Gross submitted a script for the Leti 3 to the pharmacy and Medicare has rejected due to lack of supporting documentation, they are requesting that documentation be submitted #[phone]    Patient would like a follow phone call    Please advise

## 2025-07-14 NOTE — TELEPHONE ENCOUNTER
Rn called pt and let pt know [employer] 3 plus sensors and reader sent to [facility]. Pt knows to answer calls.

## 2025-07-29 ENCOUNTER — DOCUMENTATION (OUTPATIENT)
Dept: SOCIAL WORK | Facility: REHABILITATION | Age: 70
End: 2025-07-29
Payer: MEDICARE

## 2025-07-29 ENCOUNTER — HOSPITAL ENCOUNTER (OUTPATIENT)
Dept: PHYSICAL THERAPY | Facility: REHABILITATION | Age: 70
Setting detail: THERAPIES SERIES
Discharge: HOME | End: 2025-07-29
Attending: ORTHOPAEDIC SURGERY
Payer: MEDICARE

## 2025-07-29 DIAGNOSIS — Z74.09 IMPAIRED FUNCTIONAL MOBILITY, BALANCE, GAIT, AND ENDURANCE: Primary | ICD-10-CM

## 2025-07-29 DIAGNOSIS — M48.02 CERVICAL STENOSIS OF SPINE: ICD-10-CM

## 2025-07-29 PROCEDURE — 97163 PT EVAL HIGH COMPLEX 45 MIN: CPT | Mod: GP

## 2025-07-29 NOTE — PROGRESS NOTES
Physical Therapy Evaluation    Patient Name: Celso Gramajo  MRN: [id number]  YOB: 1955  Today's Date: 8/1/2025    1. Impaired functional mobility, balance, gait, and endurance    2. Cervical stenosis of spine         Precautions       Additional Precautions / Restrictions: lifting, spinal  Additional Comments: Additional Precautions and Protocol Details: 35# lifting restriction x 2 months; h/o thyroid cancer and radiation to L neck            Certification Dates: From 07/29/25 To 10/27/25    Subjective   History of Present Illness  Celso is a 69 y.o. male who reports to physical therapy with a chief concern of Pt reports was inpatient at [facility] x 18 days s/p C4-T1 surgery. He was then taken to the hospital with pancratitis due to severe stomach pain. His PEG tube was moved lower and he was able to eat again. He was discharged after 40 days and was very weak. He did a brief episode of home PT. Pt reports that he feels very stiff all over. He reports signficant difficulty and fatigue with walking. He feels significant muscle atrophy. He was not walking with a cane prior to surgery. Now, depending on how he feels, he uses a cane, walker or no device. He notes his balance is off. He denies falls. He notes that everything is difficult including transfers. He does not have any assistance. Pt uses a stair lift at home. He is able to go up a few stairs but unable to go down. (+)  but limited to 10 min or less. He starts getting pain if he is stting for more than 10 min. He reports that he has L shoulder pain, weakness in L arm and swelling, B hand numbness, tingling which he feels is getting worse. He is having symptoms in both arms and legs. He does get shooting pains into his arms. He reports difficulty, lifting reaching and carrying. Pt is retired. PLOF pt was able to walk 1-2 miles and would swim. Pt has not returned to swimming. Full control of bowel/bladder. Pain at worst 10/10 towards the end  "of the day. Pain at best 0/10. in the ams. He notes increased achiness as the day progresses. He notes progressive weakness t/o the day. Pt goal: \"I want to make improvements in function and I want to be able to walk without pain\" \"I want my L shoulder to stop hurting\". According to the patient's chart, Celso has a past medical history of Anemia, Cancer (CMS/HCC), head and neck radiation, Hypertension, Hypothyroidism, and Thyroid disease. Celso has a past surgical history that includes Thyroid surgery; Cholecystectomy; Hernia repair; Neck surgery; Tumor removal; and Neck dissection.        Diagnostic tests related to this condition: Electromyography (EMG), MRI studies, and Other (Comment).     MRI Studies Details: 1.  Interval C5-C7 laminectomy with posterior hardware assisted fusion for successful decompression of the cervical spinal canal. 2.  Resolution of the previously seen prevertebral epidural collection, favored to reflect a resolved ventral epidural abscess. 3.  Unchanged myelomalacia. 4.  Stable patchy marrow signal abnormality from C4 to T4, greatest in C6-7 vertebral bodies. Given the multifocal patchy involvement, which is stable over 3 months with IV antibiotics, the imaging findings are somewhat suspicious for malignant involvement. However, no significant elevated FDG uptake is noted on the PET/CT scan of 3/11/2025. Radiation changes are favored to be less likely. Red marrow conversion is also favored to be less likely given the patchy involvement and normal hemoglobin week ago. Clinical correlation and attention on follow-up is recommended.  Other Diagnostic Test Details: PET scan: clear    Dominant Hand: Right  Chief Complaint: Pt reports was inpatient at [facility] x 18 days s/p C4-T1 surgery. He was then taken to the hospital with pancratitis due to severe stomach pain. His PEG tube was moved lower and he was able to eat again. He was discharged after 40 days and was very weak. He did a brief episode of " "home PT. Pt reports that he feels very stiff all over. He reports signficant difficulty and fatigue with walking. He feels significant muscle atrophy. He was not walking with a cane prior to surgery. Now, depending on how he feels, he uses a cane, walker or no device. He notes his balance is off. He denies falls. He notes that everything is difficult including transfers. He does not have any assistance. Pt uses a stair lift at home. He is able to go up a few stairs but unable to go down. (+)  but limited to 10 min or less. He starts getting pain if he is stting for more than 10 min. He reports that he has L shoulder pain, weakness in L arm and swelling, B hand numbness, tingling which he feels is getting worse. He is having symptoms in both arms and legs. He does get shooting pains into his arms. He reports difficulty, lifting reaching and carrying. Pt is retired. PLOF pt was able to walk 1-2 miles and would swim. Pt has not returned to swimming. Full control of bowel/bladder. Pain at worst 10/10 towards the end of the day. Pain at best 0/10. in the ams. He notes increased achiness as the day progresses. He notes progressive weakness t/o the day. Pt goal: \"I want to make improvements in function and I want to be able to walk without pain\" \"I want my L shoulder to stop hurting\"  Onset of Illness/Injury or Date of Surgery: 03/27/25  History of Present Condition/Illness: status post posterior C4-T1 decompression and fusion, C6-C7 discitis and osteomyelitis with ventral epidural abscess compressing the spinal cord, history of esophageal squamous cell carcinoma status post chemoradiotherapy (CRT) complicated by esophageal strictures, left vocal cord paralysis, dysphagia status post PEG tube, history of thyroid cancer, multiple medical problems.    The patient reports having one or more falls in the past year asNo .           Activities of Daily Living  Social history was obtained from Patient.    General Prior " Level of Function Comments: No limitations in function, No AD, (+) detired, (+)   General Current Level of Function Comments: walks short distance with SPC or RW, uses lift for stairs, mod I with dressing and bed mobility  Patient Responsibilities: Driving, Home management    Previously independent with activities of daily living? Yes     Currently independent with activities of daily living? Yes              Pain  Currently in pain: Yes  Patient reports a current pain level of 5/10 at rest.                            The pain symptoms are located in the bilateral, upper, and lower area of the Arm, Hand, and Foot.    The pain is described as intermittent, cramping, burning, aching, stabbing, tingling, and sharp.  The patient received interventions for pain: IE. After the interventions, the patient reported monitored.      Living Arrangements  Living Situation  Housing: Home independently  Living Arrangements: Spouse/significant other    Home Setup  Type of Structure: House  Home Access: Chair lift  Number of Levels in Home: Multi-level  Bathroom Equipment: Grab bars in shower    Equipment/Treatments  Mobility Equipment: Straight cane      Education/Employment       Employment Status: Retired          Objective   Session Time: Start 0804 End 0903    Vital Signs  Vitals at rest were taken in a Sitting position.    Blood presure at rest was 162/86.                             Vitals were taken on the  Left arm.   The size of the blood pressure cuff used was Large adult.                  Bracing      none    Posture  Patient presents with a Forward and Tilted right head position. Flat thoracic spine is observed.   Bilateral shoulders are: Depressed              Right Lower Extremity Tone  Patient presents with Normal right lower extremity tone.       Not Present: Spasticity  Left Lower Extremity Tone  Patient presents with Normal left lower extremity tone.       Not Present: Spasticity              Cervical Spine  ROM          7/29/2025    07:53   Cervical Spine AROM   Flexion 25   Extension 0   RIGHT Lateral Flexion 28   LEFT Lateral Flexion 16   RIGHT Rotation 35   LEFT Rotation 28                        Shoulder Range of Motion         Additional shoulder, elbow, and forearm range of motion details noted are WFL B shoulders           Hip Strength - Planes of Motion   Right Strength Right Pain Left Strength Left  Pain   Flexion (L2) 4-   4-     Extension 4-   4-     ABduction           ADduction 4-   4-     Internal Rotation           External Rotation             Knee Strength   Right Strength Right Pain Left Strength Left  Pain   Flexion (S2) 4-   4-     Prone Flexion           Extension (L3) 4   4       Ankle/Foot Strength - Planes of Motion   Right Strength Right Pain Left Strength Left  Pain   Dorsiflexion (L4) 5   5     Plantar Flexion (S1)           Unilateral Heel Raise         Inversion           Eversion           Great Toe Flexion           Great Toe Extension (L5)           Lesser Toes Flexion           Lesser Toes Extension                  Cervical/Thoracic Special Tests  Thoracic Tests  Positive: Slump         Coordination  Balance  Intact: Static Sitting and Dynamic Sitting  Impaired: Static Standing and Dynamic Standing       Coordination Tests  Intact: Right Heel to Yeung and Left Heel to Shin              Transfers Assessment  Sit to Stand Assistance: Independent    Bed Mobility Assessment  Rolling Assistance Details: Mod I: increased time to complete      Ambulation Assistance Required  Surface With  Assistive Device Without Assistive Device Details   Level Supervision        Uneven         Curb           Stairs Assistance Required   Assistance Level Upper Extremity Support Pattern   Ascending Supervision One rail Reciprocal   Descending Supervision One rail Reciprocal        Ambulation Details    WBOS, decreased gait speed, absent trunk rotation or arm swing.     Gait Analysis  Base of Support:  Wide  Walking Speed: Decreased                    Outcome Measures Taken Today:    Objective Outcomes  [facility] Objective Outcome Measures  Objective outcome measures used:: Functional Gait Analysis (FGA), Five Times Sit to Stand, Gait Speed  Gait Speed (m/sec): 0.89 m/sec  FGA Score (out of 30 total): 12  Five Times to Sit to Stand (FTSTS): 24.9 sec    Subjective Outcomes  [facility] Subjective Outcome Measures  Subjective Outcome Measures: Neck Disability Index (NDI)  NDI: 30/50=60% impaired    Other Outcomes/Comments                Goals         <enter goal here> (pt-stated)       Mutually agreed upon pain goal       PT goals: gait and balance       Short/Long Term Goals Time Frame Result Comment/Progress   Pt will be I for transfers and amb in the home, dept, facility, and limited community 4 weeks     Pt will be mod I to ascend/descend FF without rail 8 weeks     Assess 6MWT for endurance testing 4 weeks     Decrease pain at worst </= 5/10 8 weeks  IE 10/10   Pt will demonstrate improved dynamic balance and decreased risk for falls via FGA >/= 17 4 weeks  IE 12/30   Progress functional endurance via 6MWT by >/= 150 ft 12 weeks     Progress comfortable gait speed to 1.0 m/s  4 weeks  IE 0.89 m/s   Pt will be mod I with updated HEP 8 weeks     Tolerate 20 minutes of CV activity with VSS 8 weeks     Pt will demonstrate improved dynamic balance and decreased risk for falls via FGA >/= 22/30 12 weeks     Progress high level balance via Sharpen Romberg to >/= 30 seconds EO and EC 12 weeks     Progress comfortable gait speed to 1.2 m/s  12 weeks     Progress 5x sit to stand to </= 12 seconds without decline in safety or technique 12 weeks     Progress 5x sit to stand to </= 18 seconds without decline in safety or technique 4 weeks  IE 24.9 sec                  PT Neuro Exercises Current Session Time Completed today   THER ACT   CPT 11888 TOTAL TIME FOR SESSION Not performed    Bed mobility     6MWT  NV   Transfer training      Patient Education     THER EX  CPT 35043 TOTAL TIME FOR SESSION Not performed    HEP  Next visit   STRETCHING     Stretching by patient     CARDIOVASCULAR     Nu Step     Stationary Bike     Treadmill          SLR     S/l Hip ABD     Bridge     clamshells     HS curls     TAC with march               Side step     Hip 3 way     Step down     Step up     Sit to stand     NEURO RE-ED  CPT 20764 TOTAL TIME FOR SESSION Not performed    COORDINATION     POSTURAL RE-ED     PRE-GAIT ACTIVITIES      BALANCE TRAINING      Sitting balance     Standing balance - static SLS  tandem    Standing balance - dynamic     Standing balance - varied surface Airex  rockerboard    GAIT TRAINING  CPT 54591 TOTAL TIME FOR SESSION Not performed    Ambulation with AD      Dynamic gait      Stair negotiation     Curb negotiation     Ramp negotiation     Outdoor ambulation     BWS/vector training     MANUAL  CPT 04049 TOTAL TIME FOR SESSION Not performed    Stretching by therapist/PROM     Mobilization      MODALITIES  CPT 14274 TOTAL TIME FOR SESSION Not performed    Ice     Heat     ATTENDED E-STIM  CPT 25623 TOTAL TIME FOR SESSION Not performed    Attended E-Stim       GROUP  CPT 63921 TOTAL TIME FOR SESSION Not performed                    Assessment/Plan   Assessment  The evaluation for Celso Gramajo has been completed. The assessment and plan for Celso is as follows.          Functional Limitations: Activity tolerance, Ambulating on uneven surfaces, Bed mobility, Carrying objects, Completing self-care activities, Decreased ambulation distance/endurance, Functional mobility, Gait limitations, Getting off the floor, Increased risk of fall, Maintaining balance, Pain with ADLs/IADLs, Painful locomotion/ambulation, Participating in leisure activities, Performing household chores, Range of motion, Proprioception, Sitting tolerance, Squatting, Standing tolerance  Impairments: Abnormal gait, Impaired balance, Activity intolerance, Impaired  physical strength, Pain with functional activity  Prognosis: Excellent  Assessment Details: Pt is a 70 y/o male presenting to OP PT s/p C4-T1 decompression and fusion, C6-C7 discitis and osteomyelitis with ventral epidural abscess. He presents with significant limitations in endurance, standing and walking tolerance. Gait and balance, pain, weakness and fatigue.  He has limitations in stairs, bed mobility, standing tolerance, ADLs, and functional mobility. He is currently at increased risk of falls with FGA of 12 (falls cutoff 22/30). His strength and endurance are impaired with 5xSTS of 24.9 sec. His gait speed is decreased at 0.89 m/s with RW. He reports 60% subjective impairment on NDI. He will benefit from skilled PT to address impairments and progress towards goals.     Education  Education was done with Patient. The patient's learning style includes Listening. The patient Verbalizes understanding.          Plan  From a physical therapy perspective, the patient would benefit from: Skilled Rehab Services    Planned therapy interventions include: CPT 45957 Aquatic therapy/exercises, CPT 49925 Gait training, CPT 55967 Manual therapy, CPT 87458 Neuromuscular Reeducation, CPT 70762 Self-care/Home management training, CPT 02828 Therapeutic activities, CPT 02831 Therapeutic exercises, CPT 51692 Electrical stimulation ATTENDED, CPT 36857 Electrical stimulation UNATTENDED, CPT 15992 Hot/Cold Packs therapy, and CPT 67725 Group Therapy.      Visit Frequency: 2 times/week for 3 months        The patient demonstrates a need for referral to occupational therapist and speech language pathologist  This plan was discussed with Patient.   Discussion participants: Agreed Upon Plan of Care  Plan details: Vector/slip , FES bike,

## 2025-07-29 NOTE — OP PT TREATMENT LOG
PT Neuro Exercises Current Session Time Completed today   THER ACT   CPT 18504 TOTAL TIME FOR SESSION Not performed    Bed mobility     6MWT  NV   Transfer training     Patient Education     THER EX  CPT 95620 TOTAL TIME FOR SESSION Not performed    HEP  Next visit   STRETCHING     Stretching by patient     CARDIOVASCULAR     Nu Step     Stationary Bike     Treadmill          SLR     S/l Hip ABD     Bridge     clamshells     HS curls     TAC with march               Side step     Hip 3 way     Step down     Step up     Sit to stand     NEURO RE-ED  CPT 41622 TOTAL TIME FOR SESSION Not performed    COORDINATION     POSTURAL RE-ED     PRE-GAIT ACTIVITIES      BALANCE TRAINING      Sitting balance     Standing balance - static SLS  tandem    Standing balance - dynamic     Standing balance - varied surface Airex  rockerboard    GAIT TRAINING  CPT 51713 TOTAL TIME FOR SESSION Not performed    Ambulation with AD      Dynamic gait      Stair negotiation     Curb negotiation     Ramp negotiation     Outdoor ambulation     BWS/vector training     MANUAL  CPT 36261 TOTAL TIME FOR SESSION Not performed    Stretching by therapist/PROM     Mobilization      MODALITIES  CPT 61262 TOTAL TIME FOR SESSION Not performed    Ice     Heat     ATTENDED E-STIM  CPT 36446 TOTAL TIME FOR SESSION Not performed    Attended E-Stim       GROUP  CPT 12325 TOTAL TIME FOR SESSION Not performed

## 2025-07-29 NOTE — Clinical Note
Dear DR. Good,    Thank you for this referral. Please review the attached notes and plan of care for your approval.  Please contact our department with any questions.     Sincerely,     Elisha Cates, PT  [address]  [place] [address]  Phone [phone]  Fax  [phone]    By co-signing this Plan of Care (POC) you agree to the following:  I have reviewed the the Plan of Care established by the therapist within this document and certify that the services are skilled and medically necessary. I have reviewed the plan and recommend that these services continue to meet the goals stated in this document.    PHYSICIAN SIGNATURE: __________________________________     DATE: ___________________  TIME: _____________           Physical Therapy Plan of Care 7/29/25   Effective from: 7/29/2025  Effective to: 10/27/2025    Plan ID: [id number]            Participants as of Finalize on 7/29/2025    Name Type Comments Contact Info    Elisha Cates, PT Physical Therapist      Nixon OLIVERA Cha, MD Referring Provider  [phone]       Last Progress Notes Note     Author: Elisha Cates, PT Status: Signed Last edited: 7/29/2025  8:00 AM       Physical Therapy Evaluation    Patient Name: Celso Gramajo  MRN: [id number]  YOB: 1955  Today's Date: 7/29/2025    1. Impaired functional mobility, balance, gait, and endurance    2. Cervical stenosis of spine         Precautions       Additional Precautions / Restrictions: lifting, spinal  Additional Comments: Additional Precautions and Protocol Details: 35# lifting restriction x 2 months; h/o thyroid cancer and radiation to L neck    Screenings    The patient was screened for the occurrence of falls within the past year and the patient stated No.        The patient was asked if other languages are spoken at home besides English and answered no.                   Certification Dates: From 07/29/25 To 10/27/25    Subjective   History of Present Illness  Celso is a 69 y.o. male who  "reports to physical therapy with a chief concern of Pt reports was inpatient at [facility] x 18 days s/p C4-T1 surgery. He was then taken to the hospital with pancratitis due to severe stomach pain. His PEG tube was moved lower and he was able to eat again. He was discharged after 40 days and was very weak. He did a brief episode of home PT. Pt reports that he feels very stiff all over. He reports signficant difficulty and fatigue with walking. He feels significant muscle atrophy. He was not walking with a cane prior to surgery. Now, depending on how he feels, he uses a cane, walker or no device. He notes his balance is off. He denies falls. He notes that everything is difficult including transfers. He does not have any assistance. Pt uses a stair lift at home. He is able to go up a few stairs but unable to go down. (+)  but limited to 10 min or less. He starts getting pain if he is stting for more than 10 min. He reports that he has L shoulder pain, weakness in L arm and swelling, B hand numbness, tingling which he feels is getting worse. He is having symptoms in both arms and legs. He does get shooting pains into his arms. He reports difficulty, lifting reaching and carrying. Pt is retired. PLOF pt was able to walk 1-2 miles and would swim. Pt has not returned to swimming. Full control of bowel/bladder. Pain at worst 10/10 towards the end of the day. Pain at best 0/10. in the ams. He notes increased achiness as the day progresses. He notes progressive weakness t/o the day. Pt goal: \"I want to make improvements in function and I want to be able to walk without pain\" \"I want my L shoulder to stop hurting\". According to the patient's chart, Celso has a past medical history of Anemia, Cancer (CMS/HCC), head and neck radiation, Hypertension, Hypothyroidism, and Thyroid disease. Celso has a past surgical history that includes Thyroid surgery; Cholecystectomy; Hernia repair; Neck surgery; Tumor removal; and Neck " dissection.        Diagnostic tests related to this condition: Electromyography (EMG), MRI studies, and Other (Comment).     MRI Studies Details: 1.  Interval C5-C7 laminectomy with posterior hardware assisted fusion for successful decompression of the cervical spinal canal. 2.  Resolution of the previously seen prevertebral epidural collection, favored to reflect a resolved ventral epidural abscess. 3.  Unchanged myelomalacia. 4.  Stable patchy marrow signal abnormality from C4 to T4, greatest in C6-7 vertebral bodies. Given the multifocal patchy involvement, which is stable over 3 months with IV antibiotics, the imaging findings are somewhat suspicious for malignant involvement. However, no significant elevated FDG uptake is noted on the PET/CT scan of 3/11/2025. Radiation changes are favored to be less likely. Red marrow conversion is also favored to be less likely given the patchy involvement and normal hemoglobin week ago. Clinical correlation and attention on follow-up is recommended.  Other Diagnostic Test Details: PET scan: clear    Dominant Hand: Right  Chief Complaint: Pt reports was inpatient at [facility] x 18 days s/p C4-T1 surgery. He was then taken to the hospital with pancratitis due to severe stomach pain. His PEG tube was moved lower and he was able to eat again. He was discharged after 40 days and was very weak. He did a brief episode of home PT. Pt reports that he feels very stiff all over. He reports signficant difficulty and fatigue with walking. He feels significant muscle atrophy. He was not walking with a cane prior to surgery. Now, depending on how he feels, he uses a cane, walker or no device. He notes his balance is off. He denies falls. He notes that everything is difficult including transfers. He does not have any assistance. Pt uses a stair lift at home. He is able to go up a few stairs but unable to go down. (+)  but limited to 10 min or less. He starts getting pain if he is stting  "for more than 10 min. He reports that he has L shoulder pain, weakness in L arm and swelling, B hand numbness, tingling which he feels is getting worse. He is having symptoms in both arms and legs. He does get shooting pains into his arms. He reports difficulty, lifting reaching and carrying. Pt is retired. PLOF pt was able to walk 1-2 miles and would swim. Pt has not returned to swimming. Full control of bowel/bladder. Pain at worst 10/10 towards the end of the day. Pain at best 0/10. in the ams. He notes increased achiness as the day progresses. He notes progressive weakness t/o the day. Pt goal: \"I want to make improvements in function and I want to be able to walk without pain\" \"I want my L shoulder to stop hurting\"  Onset of Illness/Injury or Date of Surgery: 03/27/25  History of Present Condition/Illness: status post posterior C4-T1 decompression and fusion, C6-C7 discitis and osteomyelitis with ventral epidural abscess compressing the spinal cord, history of esophageal squamous cell carcinoma status post chemoradiotherapy (CRT) complicated by esophageal strictures, left vocal cord paralysis, dysphagia status post PEG tube, history of thyroid cancer, multiple medical problems.    The patient reports having one or more falls in the past year asNo .           Activities of Daily Living  Social history was obtained from Patient.    General Prior Level of Function Comments: No limitations in function, No AD, (+) detired, (+)   General Current Level of Function Comments: walks short distance with SPC or RW, uses lift for stairs, mod I with dressing and bed mobility  Patient Responsibilities: Driving, Home management    Previously independent with activities of daily living? Yes     Currently independent with activities of daily living? Yes              Pain  Currently in pain: Yes  Patient reports a current pain level of 5/10 at rest.                            The pain symptoms are located in the bilateral, " upper, and lower area of the Arm, Hand, and Foot.    The pain is described as intermittent, cramping, burning, aching, stabbing, tingling, and sharp.  The patient received interventions for pain: IE. After the interventions, the patient reported monitored.      Living Arrangements  Living Situation  Housing: Home independently  Living Arrangements: Spouse/significant other    Home Setup  Type of Structure: House  Home Access: Chair lift  Number of Levels in Home: Multi-level  Bathroom Equipment: Grab bars in shower    Equipment/Treatments  Mobility Equipment: Straight cane      Education/Employment       Employment Status: Retired          Objective   Session Time: Start 0804 End 0903    Vital Signs  Vitals at rest were taken in a Sitting position.    Blood presure at rest was 162/86.                             Vitals were taken on the  Left arm.   The size of the blood pressure cuff used was Large adult.                  Bracing      none    Posture  Patient presents with a Forward and Tilted right head position. Flat thoracic spine is observed.   Bilateral shoulders are: Depressed              Right Lower Extremity Tone  Patient presents with Normal right lower extremity tone.       Not Present: Spasticity  Left Lower Extremity Tone  Patient presents with Normal left lower extremity tone.       Not Present: Spasticity    Cervical Spine ROM          7/29/2025    07:53   Cervical Spine AROM   Flexion 25   Extension 0   RIGHT Lateral Flexion 28   LEFT Lateral Flexion 16   RIGHT Rotation 35   LEFT Rotation 28                   Shoulder Range of Motion       Additional shoulder, elbow, and forearm range of motion details noted are WFL B shoulders           Hip Strength - Planes of Motion   Right Strength Right Pain Left Strength Left  Pain   Flexion (L2) 4-   4-     Extension 4-   4-     ABduction           ADduction 4-   4-     Internal Rotation           External Rotation             Knee Strength   Right Strength  Right Pain Left Strength Left  Pain   Flexion (S2) 4-   4-     Prone Flexion           Extension (L3) 4   4       Ankle/Foot Strength - Planes of Motion   Right Strength Right Pain Left Strength Left  Pain   Dorsiflexion (L4) 5   5     Plantar Flexion (S1)           Unilateral Heel Raise         Inversion           Eversion           Great Toe Flexion           Great Toe Extension (L5)           Lesser Toes Flexion           Lesser Toes Extension                  Cervical/Thoracic Special Tests  Thoracic Tests  Positive: Slump         Coordination  Balance  Intact: Static Sitting and Dynamic Sitting  Impaired: Static Standing and Dynamic Standing       Coordination Tests  Intact: Right Heel to Yeung and Left Heel to Shin              Transfers Assessment  Sit to Stand Assistance: Independent    Bed Mobility Assessment  Rolling Assistance Details: Mod I: increased time to complete      Ambulation Assistance Required  Surface With  Assistive Device Without Assistive Device Details   Level Supervision        Uneven         Curb           Stairs Assistance Required   Assistance Level Upper Extremity Support Pattern   Ascending Supervision One rail Reciprocal   Descending Supervision One rail Reciprocal        Ambulation Details    WBOS, decreased gait speed, absent trunk rotation or arm swing.     Gait Analysis  Base of Support: Wide  Walking Speed: Decreased                    Outcome Measures Taken Today:    Objective Outcomes  [facility] Objective Outcome Measures  Objective outcome measures used:: Functional Gait Analysis (FGA), Five Times Sit to Stand, Gait Speed  Gait Speed (m/sec): 0.89 m/sec  FGA Score (out of 30 total): 12  Five Times to Sit to Stand (FTSTS): 24.9 sec    Subjective Outcomes  [facility] Subjective Outcome Measures  Subjective Outcome Measures: Neck Disability Index (NDI)  NDI: 30/50=60% impaired    Other Outcomes/Comments                Goals         <enter goal here> (pt-stated)       Mutually agreed upon  pain goal       PT goals: gait and balance       Short/Long Term Goals Time Frame Result Comment/Progress   Pt will be I for transfers and amb in the home, dept, facility, and limited community 4 weeks     Pt will be mod I to ascend/descend FF without rail 8 weeks     Assess 6MWT for endurance testing 4 weeks     Decrease pain at worst </= 5/10 8 weeks  IE 10/10   Pt will demonstrate improved dynamic balance and decreased risk for falls via FGA >/= 17 4 weeks  IE 12/30   Progress functional endurance via 6MWT by >/= 150 ft 12 weeks     Progress comfortable gait speed to 1.0 m/s  4 weeks  IE 0.89 m/s   Pt will be mod I with updated HEP 8 weeks     Tolerate 20 minutes of CV activity with VSS 8 weeks     Pt will demonstrate improved dynamic balance and decreased risk for falls via FGA >/= 22/30 12 weeks     Progress high level balance via Sharpen Romberg to >/= 30 seconds EO and EC 12 weeks     Progress comfortable gait speed to 1.2 m/s  12 weeks     Progress 5x sit to stand to </= 12 seconds without decline in safety or technique 12 weeks     Progress 5x sit to stand to </= 18 seconds without decline in safety or technique 4 weeks  IE 24.9 sec                  PT Neuro Exercises Current Session Time Completed today   THER ACT   CPT 07116 TOTAL TIME FOR SESSION Not performed    Bed mobility     6MWT  NV   Transfer training     Patient Education     THER EX  CPT 35198 TOTAL TIME FOR SESSION Not performed    HEP  Next visit   STRETCHING     Stretching by patient     CARDIOVASCULAR     Nu Step     Stationary Bike     Treadmill          SLR     S/l Hip ABD     Bridge     clamshells     HS curls     TAC with march               Side step     Hip 3 way     Step down     Step up     Sit to stand     NEURO RE-ED  CPT 87421 TOTAL TIME FOR SESSION Not performed    COORDINATION     POSTURAL RE-ED     PRE-GAIT ACTIVITIES      BALANCE TRAINING      Sitting balance     Standing balance - static SLS  tandem    Standing balance -  dynamic     Standing balance - varied surface Airex  rockerboard    GAIT TRAINING  CPT 03754 TOTAL TIME FOR SESSION Not performed    Ambulation with AD      Dynamic gait      Stair negotiation     Curb negotiation     Ramp negotiation     Outdoor ambulation     BWS/vector training     MANUAL  CPT 54256 TOTAL TIME FOR SESSION Not performed    Stretching by therapist/PROM     Mobilization      MODALITIES  CPT 39630 TOTAL TIME FOR SESSION Not performed    Ice     Heat     ATTENDED E-STIM  CPT 57657 TOTAL TIME FOR SESSION Not performed    Attended E-Stim       GROUP  CPT 45040 TOTAL TIME FOR SESSION Not performed                    Assessment/Plan   Education  Education was done with Patient. The patient's learning style includes Listening. The patient Verbalizes understanding.          Plan  From a physical therapy perspective, the patient would benefit from: Skilled Rehab Services    Planned therapy interventions include: CPT 43260 Aquatic therapy/exercises, CPT 22818 Gait training, CPT 13138 Manual therapy, CPT 00728 Neuromuscular Reeducation, CPT 19433 Self-care/Home management training, CPT 78673 Therapeutic activities, CPT 45992 Therapeutic exercises, CPT 74349 Electrical stimulation ATTENDED, CPT 72499 Electrical stimulation UNATTENDED, CPT 47774 Hot/Cold Packs therapy, and CPT 81103 Group Therapy.      Visit Frequency: 2 times/week for 3 months        The patient demonstrates a need for referral to occupational therapist and speech language pathologist  This plan was discussed with Patient.   Discussion participants: Agreed Upon Plan of Care  Plan details: Vector/slip , FES bike,                 Current Participants as of 7/29/2025    Name Type Comments Contact Info    Elisha Cates PT Physical Therapist      Signature pending    iNxon OLIVERA Cha, MD Referring Provider  [phone]

## 2025-07-31 ENCOUNTER — HOSPITAL ENCOUNTER (OUTPATIENT)
Dept: PHYSICAL THERAPY | Facility: REHABILITATION | Age: 70
Setting detail: THERAPIES SERIES
Discharge: HOME | End: 2025-07-31
Attending: ORTHOPAEDIC SURGERY
Payer: MEDICARE

## 2025-07-31 DIAGNOSIS — Z74.09 IMPAIRED FUNCTIONAL MOBILITY, BALANCE, GAIT, AND ENDURANCE: ICD-10-CM

## 2025-07-31 DIAGNOSIS — M48.02 CERVICAL STENOSIS OF SPINE: Primary | ICD-10-CM

## 2025-07-31 PROCEDURE — 97110 THERAPEUTIC EXERCISES: CPT | Mod: GP

## 2025-07-31 PROCEDURE — 97530 THERAPEUTIC ACTIVITIES: CPT | Mod: GP

## 2025-07-31 NOTE — OP PT TREATMENT LOG
PT Neuro Exercises Current Session Time Completed today   THER ACT   CPT 87488 TOTAL TIME FOR SESSION 23-37 Minutes    Pain/meds/ falls screened  Screened y   6MWT 768 ft, use of gait belt, SPC y   Transfer training     Patient Education Pt educated on new exercise program y   THER EX  CPT 03101 TOTAL TIME FOR SESSION 23-37 Minutes    HEP  Next visit   STRETCHING     Stretching by patient     CARDIOVASCULAR     Nu Step     Stationary Bike     Treadmill          SLR 1x12 each side yes   S/l Hip ABD 1x10 each side (diffficult- cueing for form) yes   Bridge 2x10 3s descent yes   clamshells 2x10 3s hold PTB yes   HS curls Seatd GTB 2 x 10 yes   TAC with march 2x10  yes             Side step     Hip 3 way 2x10 each way yes   Step down     Step up     Sit to stand     NEURO RE-ED  CPT 69315 TOTAL TIME FOR SESSION Not performed    COORDINATION     POSTURAL RE-ED     PRE-GAIT ACTIVITIES      BALANCE TRAINING      Sitting balance     Standing balance - static SLS  tandem    Standing balance - dynamic     Standing balance - varied surface Airex  rockerboard    GAIT TRAINING  CPT 82463 TOTAL TIME FOR SESSION Not performed    Ambulation with AD      Dynamic gait      Stair negotiation     Curb negotiation     Ramp negotiation     Outdoor ambulation     BWS/vector training     MANUAL  CPT 04528 TOTAL TIME FOR SESSION Not performed    Stretching by therapist/PROM     Mobilization      MODALITIES  CPT 44273 TOTAL TIME FOR SESSION Not performed    Ice     Heat     ATTENDED E-STIM  CPT 68359 TOTAL TIME FOR SESSION Not performed    Attended E-Stim       GROUP  CPT 37103 TOTAL TIME FOR SESSION Not performed

## 2025-07-31 NOTE — PROGRESS NOTES
Physical Therapy Visit    PT DAILY NOTE FOR OUTPATIENT THERAPY    Patient: Celso Gramajo   MRN: [id number]  : 1955 69 y.o.    Referring Physician: Nixon Good MD  Date of Visit: 2025    Certification Dates: 25 through 10/27/25     Diagnosis:   1. Cervical stenosis of spine    2. Impaired functional mobility, balance, gait, and endurance      Patient History:  History of Present Illness  Celso is a 69 y.o. male who reports to physical therapy with a chief concern of Pt reports was inpatient at [facility] x 18 days s/p C4-T1 surgery. He was then taken to the hospital with pancratitis due to severe stomach pain. His PEG tube was moved lower and he was able to eat again. He was discharged after 40 days and was very weak. He did a brief episode of home PT. Pt reports that he feels very stiff all over. He reports signficant difficulty and fatigue with walking. He feels significant muscle atrophy. He was not walking with a cane prior to surgery. Now, depending on how he feels, he uses a cane, walker or no device. He notes his balance is off. He denies falls. He notes that everything is difficult including transfers. He does not have any assistance. Pt uses a stair lift at home. He is able to go up a few stairs but unable to go down. (+)  but limited to 10 min or less. He starts getting pain if he is stting for more than 10 min. He reports that he has L shoulder pain, weakness in L arm and swelling, B hand numbness, tingling which he feels is getting worse. He is having symptoms in both arms and legs. He does get shooting pains into his arms. He reports difficulty, lifting reaching and carrying. Pt is retired. PLOF pt was able to walk 1-2 miles and would swim. Pt has not returned to swimming. Full control of bowel/bladder. Pain at worst 10/10 towards the end of the day. Pain at best 0/10. in the ams. He notes increased achiness as the day progresses. He notes progressive weakness t/o the day. Pt  "goal: \"I want to make improvements in function and I want to be able to walk without pain\" \"I want my L shoulder to stop hurting\". According to the patient's chart, Celso has a past medical history of Anemia, Cancer (CMS/HCC), head and neck radiation, Hypertension, Hypothyroidism, and Thyroid disease. Celso has a past surgical history that includes Thyroid surgery; Cholecystectomy; Hernia repair; Neck surgery; Tumor removal; and Neck dissection.                   TODAY'S VISIT  Time In Session: Start time: 1257 End Time: 1355  Total Time: 58 min   History/Vitals/Pain/Encounter Info - 07/31/25 125          Injury History/Precautions/Daily Required    Chief Complaint Pt reports was inpatient at [facility] x 18 days s/p C4-T1 surgery. He was then taken to the hospital with pancratitis due to severe stomach pain. His PEG tube was moved lower and he was able to eat again. He was discharged after 40 days and was very weak. He did a brief episode of home PT. Pt reports that he feels very stiff all over. He reports signficant difficulty and fatigue with walking. He feels significant muscle atrophy. He was not walking with a cane prior to surgery. Now, depending on how he feels, he uses a cane, walker or no device. He notes his balance is off. He denies falls. He notes that everything is difficult including transfers. He does not have any assistance. Pt uses a stair lift at home. He is able to go up a few stairs but unable to go down. (+)  but limited to 10 min or less. He starts getting pain if he is stting for more than 10 min. He reports that he has L shoulder pain, weakness in L arm and swelling, B hand numbness, tingling which he feels is getting worse. He is having symptoms in both arms and legs. He does get shooting pains into his arms. He reports difficulty, lifting reaching and carrying. Pt is retired. PLOF pt was able to walk 1-2 miles and would swim. Pt has not returned to swimming. Full control of bowel/bladder. " "Pain at worst 10/10 towards the end of the day. Pain at best 0/10. in the ams. He notes increased achiness as the day progresses. He notes progressive weakness t/o the day. Pt goal: \"I want to make improvements in function and I want to be able to walk without pain\" \"I want my L shoulder to stop hurting\"     History of Present Condition/Illness status post posterior C4-T1 decompression and fusion, C6-C7 discitis and osteomyelitis with ventral epidural abscess compressing the spinal cord, history of esophageal squamous cell carcinoma status post chemoradiotherapy (CRT) complicated by esophageal strictures, left vocal cord paralysis, dysphagia status post PEG tube, history of thyroid cancer, multiple medical problems.     Onset of Illness/Injury or Date of Surgery 03/27/25     Additional Precautions / Restrictions lifting;spinal     Patient/Family/Caregiver Comments/Observations Pt reports his pain is a 3/10 in AM and 7 by end of day. His biggest issue is his poor endurance.     Patient reported fall since last visit No        Pain Assessment    Currently in pain Yes     Preferred Pain Scale number (Numeric Rating Pain Scale)     Pain Side/Orientation left     Pain: Body location Back;Shoulder     Pain Rating (0-10): Pre Activity 4     Pain Rating (0-10): Post Activity 4        Pain Intervention    Intervention  Therex     Post Intervention Comments No intra-session pain change         Services    Do You Speak a Language Other Than English at Home? no               Daily Treatment Assessment and Plan - 07/31/25 1259          Daily Treatment Assessment and Plan    Progress toward goals Progressing     Daily Outcome Summary Pt arrived for first session after IE with continued B weakness and gait deficits. Tested 6mwt for endurance baseline which pt demonstrated deficits in and addressed with NuStep. Focused rest of session on lumbopelvic and BLE strengthening per flowsheet. Goal is to progress to gait/balance " training.     Plan and Recommendations Progress to gait/balance training NV. Issue HEP.                             OBJECTIVE MEASUREMENTS/DATA:  Objective      @[facility]([id number],,,1)@          Outcome Measures Taken Today:    Objective Outcomes  [facility] Objective Outcome Measures  Objective outcome measures used:: 6 Minute Walk Test (6 MWT)  6 Minute Walk Test: 768 feet, use of gait belt and SPC    Subjective Outcomes       Other Outcomes/Comments          Today's Treatment:  PT Neuro Exercises Current Session Time Completed today   THER ACT   CPT 72488 TOTAL TIME FOR SESSION 23-37 Minutes    Pain/meds/ falls screened  Screened y   6MWT 768 ft, use of gait belt, SPC y   Transfer training     Patient Education Pt educated on new exercise program y   THER EX  CPT 46317 TOTAL TIME FOR SESSION 23-37 Minutes    HEP  Next visit   STRETCHING     Stretching by patient     CARDIOVASCULAR     Nu Step     Stationary Bike     Treadmill          SLR 1x12 each side yes   S/l Hip ABD 1x10 each side (diffficult- cueing for form) yes   Bridge 2x10 3s descent yes   clamshells 2x10 3s hold PTB yes   HS curls Seatd GTB 2 x 10 yes   TAC with march 2x10  yes             Side step     Hip 3 way 2x10 each way yes   Step down     Step up     Sit to stand     NEURO RE-ED  CPT 23759 TOTAL TIME FOR SESSION Not performed    COORDINATION     POSTURAL RE-ED     PRE-GAIT ACTIVITIES      BALANCE TRAINING      Sitting balance     Standing balance - static SLS  tandem    Standing balance - dynamic     Standing balance - varied surface Airex  rockerboard    GAIT TRAINING  CPT 12128 TOTAL TIME FOR SESSION Not performed    Ambulation with AD      Dynamic gait      Stair negotiation     Curb negotiation     Ramp negotiation      Outdoor ambulation     BWS/vector training     MANUAL  CPT 85728 TOTAL TIME FOR SESSION Not performed    Stretching by therapist/PROM     Mobilization      MODALITIES  CPT 44851 TOTAL TIME FOR SESSION Not performed    Ice     Heat     ATTENDED E-STIM  CPT 87340 TOTAL TIME FOR SESSION Not performed    Attended E-Stim       GROUP  CPT 78407 TOTAL TIME FOR SESSION Not performed                   Celso Forrester, PT

## 2025-08-01 NOTE — ADDENDUM NOTE
Encounter addended by: Elisha Cates, PT on: 8/1/2025 10:08 AM   Actions taken: Flowsheet accepted, Clinical Note Signed

## 2025-08-06 ENCOUNTER — HOSPITAL ENCOUNTER (OUTPATIENT)
Dept: PHYSICAL THERAPY | Facility: REHABILITATION | Age: 70
Setting detail: THERAPIES SERIES
Discharge: HOME | End: 2025-08-06
Attending: ORTHOPAEDIC SURGERY
Payer: MEDICARE

## 2025-08-06 DIAGNOSIS — M48.02 CERVICAL STENOSIS OF SPINE: Primary | ICD-10-CM

## 2025-08-06 DIAGNOSIS — Z74.09 IMPAIRED FUNCTIONAL MOBILITY, BALANCE, GAIT, AND ENDURANCE: ICD-10-CM

## 2025-08-06 PROCEDURE — 97112 NEUROMUSCULAR REEDUCATION: CPT | Mod: GP

## 2025-08-06 PROCEDURE — 97110 THERAPEUTIC EXERCISES: CPT | Mod: GP

## 2025-08-06 NOTE — OP PT TREATMENT LOG
"PT Neuro Exercises Current Session Time Completed today   THER ACT   CPT 11773 TOTAL TIME FOR SESSION 0-7 Minutes    Pain/meds/ falls screened  Screened y   6MWT 768 ft, use of gait belt, SPC    Transfer training     Patient Education Pt educated on new exercise program y   THER EX  CPT 14740 TOTAL TIME FOR SESSION 38-52 Minutes    HEP 8/6: Bridge, TAC with march, SLR, s/l hip ABD, s/l clamshell yes   STRETCHING     Stretching by patient     CARDIOVASCULAR     Nu Step     Stationary Bike     Treadmill          SLR 2x10 each side yes   S/l Hip ABD 1x10 each side (diffficult- cueing for form) yes   Bridge 2x10 3s descent yes   Sidelying clamshells 2x10 3s hold  yes   HS curls Seatd GTB 2 x 10 yes   TAC with march 2x10  yes        SL HR 2x10 B yes   Side step     Hip 3 way 1x10 each way, no UE yes   Step down     Step up     Sit to stand     NEURO RE-ED  CPT 41325 TOTAL TIME FOR SESSION 8-22 Minutes    COORDINATION     POSTURAL RE-ED     PRE-GAIT ACTIVITIES      BALANCE TRAINING      Sitting balance     Standing balance - static SLS  tandem    Standing balance - dynamic Toe tap to 6\" step 2x10 B yes   Standing balance - varied surface Airex semi tandem 20 sec x 3 B  Rockerboard A/P x20 Yes  yes   GAIT TRAINING  CPT 78502 TOTAL TIME FOR SESSION Not performed    Ambulation with AD      Dynamic gait      Stair negotiation     Curb negotiation     Ramp negotiation     Outdoor ambulation     BWS/vector training     MANUAL  CPT 51657 TOTAL TIME FOR SESSION Not performed    Stretching by therapist/PROM     Mobilization      MODALITIES  CPT 80621 TOTAL TIME FOR SESSION Not performed    Ice     Heat     ATTENDED E-STIM  CPT 76102 TOTAL TIME FOR SESSION Not performed    Attended E-Stim       GROUP  CPT 22333 TOTAL TIME FOR SESSION Not performed                                      "

## 2025-08-06 NOTE — PROGRESS NOTES
Physical Therapy Visit    PT DAILY NOTE FOR OUTPATIENT THERAPY    Patient: Celso Gramajo   MRN: [id number]  : 1955 69 y.o.    Referring Physician: Nixon Good MD  Date of Visit: 2025    Certification Dates: 25 through 10/27/25     Diagnosis:   1. Cervical stenosis of spine    2. Impaired functional mobility, balance, gait, and endurance      Patient History:  History of Present Illness  Celso is a 69 y.o. male who reports to physical therapy with a chief concern of Pt reports was inpatient at [facility] x 18 days s/p C4-T1 surgery. He was then taken to the hospital with pancratitis due to severe stomach pain. His PEG tube was moved lower and he was able to eat again. He was discharged after 40 days and was very weak. He did a brief episode of home PT. Pt reports that he feels very stiff all over. He reports signficant difficulty and fatigue with walking. He feels significant muscle atrophy. He was not walking with a cane prior to surgery. Now, depending on how he feels, he uses a cane, walker or no device. He notes his balance is off. He denies falls. He notes that everything is difficult including transfers. He does not have any assistance. Pt uses a stair lift at home. He is able to go up a few stairs but unable to go down. (+)  but limited to 10 min or less. He starts getting pain if he is stting for more than 10 min. He reports that he has L shoulder pain, weakness in L arm and swelling, B hand numbness, tingling which he feels is getting worse. He is having symptoms in both arms and legs. He does get shooting pains into his arms. He reports difficulty, lifting reaching and carrying. Pt is retired. PLOF pt was able to walk 1-2 miles and would swim. Pt has not returned to swimming. Full control of bowel/bladder. Pain at worst 10/10 towards the end of the day. Pain at best 0/10. in the ams. He notes increased achiness as the day progresses. He notes progressive weakness t/o the day. Pt  "goal: \"I want to make improvements in function and I want to be able to walk without pain\" \"I want my L shoulder to stop hurting\". According to the patient's chart, Celso has a past medical history of Anemia, Cancer (CMS/HCC), head and neck radiation, Hypertension, Hypothyroidism, and Thyroid disease. Celso has a past surgical history that includes Thyroid surgery; Cholecystectomy; Hernia repair; Neck surgery; Tumor removal; and Neck dissection.                   TODAY'S VISIT  Time In Session: Start time: 1101 End Time: 1158  Total Time: 57 min   History/Vitals/Pain/Encounter Info - 08/06/25 1109          Injury History/Precautions/Daily Required    Chief Complaint Pt reports was inpatient at [facility] x 18 days s/p C4-T1 surgery. He was then taken to the hospital with pancratitis due to severe stomach pain. His PEG tube was moved lower and he was able to eat again. He was discharged after 40 days and was very weak. He did a brief episode of home PT. Pt reports that he feels very stiff all over. He reports signficant difficulty and fatigue with walking. He feels significant muscle atrophy. He was not walking with a cane prior to surgery. Now, depending on how he feels, he uses a cane, walker or no device. He notes his balance is off. He denies falls. He notes that everything is difficult including transfers. He does not have any assistance. Pt uses a stair lift at home. He is able to go up a few stairs but unable to go down. (+)  but limited to 10 min or less. He starts getting pain if he is stting for more than 10 min. He reports that he has L shoulder pain, weakness in L arm and swelling, B hand numbness, tingling which he feels is getting worse. He is having symptoms in both arms and legs. He does get shooting pains into his arms. He reports difficulty, lifting reaching and carrying. Pt is retired. PLOF pt was able to walk 1-2 miles and would swim. Pt has not returned to swimming. Full control of bowel/bladder. " "Pain at worst 10/10 towards the end of the day. Pain at best 0/10. in the ams. He notes increased achiness as the day progresses. He notes progressive weakness t/o the day. Pt goal: \"I want to make improvements in function and I want to be able to walk without pain\" \"I want my L shoulder to stop hurting\"     History of Present Condition/Illness status post posterior C4-T1 decompression and fusion, C6-C7 discitis and osteomyelitis with ventral epidural abscess compressing the spinal cord, history of esophageal squamous cell carcinoma status post chemoradiotherapy (CRT) complicated by esophageal strictures, left vocal cord paralysis, dysphagia status post PEG tube, history of thyroid cancer, multiple medical problems.     Onset of Illness/Injury or Date of Surgery 03/27/25     Additional Precautions / Restrictions lifting;spinal     Patient/Family/Caregiver Comments/Observations Pt reports that he is about a 6/10 today with generalized soreness. He reports that he is seeing his cardiologist tomorrow due to elevated HR     Patient reported fall since last visit No        Pain Assessment    Currently in pain Yes     Preferred Pain Scale number (Numeric Rating Pain Scale)     Pain: Body location Back     Pain Rating (0-10): Pre Activity 6        Pre Activity Vital Signs    Pulse 108         Services    Do You Speak a Language Other Than English at Home? no               Daily Treatment Assessment and Plan - 08/06/25 1109          Daily Treatment Assessment and Plan    Progress toward goals Progressing     Daily Outcome Summary Pt was provided with updated HEP and he demonstrated and verbalized understanding. He was challenged by dynamic balance activities but he demonstrate appropriate ankle balance reactions with improved control. Pt declined cardio today for fear of knee pain. Discussed potential use of hinge knee brace to protect knee and aloow for tolerance of exercises. Pt is agreement with plan     Plan " "and Recommendations review HEP and response to session. progress dynamic balance.                             OBJECTIVE MEASUREMENTS/DATA:  Objective   Vital Signs    At rest, a pulse of 108 beats per minute was recorded.                                                                          Today's Treatment:  PT Neuro Exercises Current Session Time Completed today   THER ACT   CPT 44257 TOTAL TIME FOR SESSION 0-7 Minutes    Pain/meds/ falls screened  Screened y   6MWT 768 ft, use of gait belt, SPC    Transfer training     Patient Education Pt educated on new exercise program y   THER EX  CPT 06047 TOTAL TIME FOR SESSION 38-52 Minutes    HEP 8/6: Bridge, TAC with march, SLR, s/l hip ABD, s/l clamshell yes   STRETCHING     Stretching by patient     CARDIOVASCULAR     Nu Step     Stationary Bike     Treadmill          SLR 2x10 each side yes   S/l Hip ABD 1x10 each side (diffficult- cueing for form) yes   Bridge 2x10 3s descent yes   Sidelying clamshells 2x10 3s hold  yes   HS curls Seatd GTB 2 x 10 yes   TAC with march 2x10  yes        SL HR 2x10 B yes   Side step     Hip 3 way 1x10 each way, no UE yes   Step down     Step up     Sit to stand     NEURO RE-ED  CPT 01111 TOTAL TIME FOR SESSION 8-22 Minutes    COORDINATION     POSTURAL RE-ED     PRE-GAIT ACTIVITIES      BALANCE TRAINING      Sitting balance     Standing balance - static SLS  tandem    Standing balance - dynamic Toe tap to 6\" step 2x10 B yes   Standing balance - varied surface Airex semi tandem 20 sec x 3 B  Rockerboard A/P x20 Yes  yes   GAIT TRAINING  CPT 91024 TOTAL TIME FOR SESSION Not performed    Ambulation with AD      Dynamic gait      Stair negotiation     Curb negotiation     Ramp negotiation     Outdoor ambulation     BWS/vector training     MANUAL  CPT 46836 TOTAL TIME FOR SESSION Not performed    Stretching by therapist/PROM     Mobilization      MODALITIES  CPT 54837 TOTAL TIME FOR SESSION Not performed    Ice     Heat     ATTENDED " E-STIM  CPT 13081 TOTAL TIME FOR SESSION Not performed    Attended E-Stim       GROUP  CPT 49348 TOTAL TIME FOR SESSION Not performed                                         Elisha Cates, PT

## 2025-08-12 ENCOUNTER — HOSPITAL ENCOUNTER (OUTPATIENT)
Dept: PHYSICAL THERAPY | Facility: REHABILITATION | Age: 70
Setting detail: THERAPIES SERIES
Discharge: HOME | End: 2025-08-12
Attending: ORTHOPAEDIC SURGERY
Payer: MEDICARE

## 2025-08-12 DIAGNOSIS — M48.02 CERVICAL STENOSIS OF SPINE: Primary | ICD-10-CM

## 2025-08-12 DIAGNOSIS — Z74.09 IMPAIRED FUNCTIONAL MOBILITY, BALANCE, GAIT, AND ENDURANCE: ICD-10-CM

## 2025-08-12 PROCEDURE — 97110 THERAPEUTIC EXERCISES: CPT | Mod: GP

## 2025-08-12 PROCEDURE — 97530 THERAPEUTIC ACTIVITIES: CPT | Mod: GP

## 2025-08-14 ENCOUNTER — DOCUMENTATION (OUTPATIENT)
Dept: SOCIAL WORK | Facility: REHABILITATION | Age: 70
End: 2025-08-14
Payer: MEDICARE

## 2025-08-14 ENCOUNTER — HOSPITAL ENCOUNTER (OUTPATIENT)
Dept: PHYSICAL THERAPY | Facility: REHABILITATION | Age: 70
Setting detail: THERAPIES SERIES
Discharge: HOME | End: 2025-08-14
Attending: ORTHOPAEDIC SURGERY
Payer: MEDICARE

## 2025-08-14 DIAGNOSIS — M48.02 CERVICAL STENOSIS OF SPINE: Primary | ICD-10-CM

## 2025-08-14 DIAGNOSIS — Z74.09 IMPAIRED FUNCTIONAL MOBILITY, BALANCE, GAIT, AND ENDURANCE: ICD-10-CM

## 2025-08-14 PROCEDURE — 97110 THERAPEUTIC EXERCISES: CPT | Mod: GP,KX

## 2025-08-14 PROCEDURE — 97112 NEUROMUSCULAR REEDUCATION: CPT | Mod: GP,KX

## 2025-08-19 ENCOUNTER — HOSPITAL ENCOUNTER (OUTPATIENT)
Dept: PHYSICAL THERAPY | Facility: REHABILITATION | Age: 70
Setting detail: THERAPIES SERIES
Discharge: HOME | End: 2025-08-19
Attending: ORTHOPAEDIC SURGERY
Payer: MEDICARE

## 2025-08-19 DIAGNOSIS — Z74.09 IMPAIRED FUNCTIONAL MOBILITY, BALANCE, GAIT, AND ENDURANCE: ICD-10-CM

## 2025-08-19 DIAGNOSIS — M48.02 CERVICAL STENOSIS OF SPINE: Primary | ICD-10-CM

## 2025-08-19 PROCEDURE — 97140 MANUAL THERAPY 1/> REGIONS: CPT | Mod: GP,KX

## 2025-08-19 PROCEDURE — 97110 THERAPEUTIC EXERCISES: CPT | Mod: GP,KX

## 2025-08-21 ENCOUNTER — HOSPITAL ENCOUNTER (OUTPATIENT)
Dept: PHYSICAL THERAPY | Facility: REHABILITATION | Age: 70
Setting detail: THERAPIES SERIES
Discharge: HOME | End: 2025-08-21
Attending: ORTHOPAEDIC SURGERY
Payer: MEDICARE

## 2025-08-21 DIAGNOSIS — Z74.09 IMPAIRED FUNCTIONAL MOBILITY, BALANCE, GAIT, AND ENDURANCE: ICD-10-CM

## 2025-08-21 DIAGNOSIS — M48.02 CERVICAL STENOSIS OF SPINE: Primary | ICD-10-CM

## 2025-08-21 PROCEDURE — 97110 THERAPEUTIC EXERCISES: CPT | Mod: GP

## 2025-08-21 PROCEDURE — 97530 THERAPEUTIC ACTIVITIES: CPT | Mod: GP

## 2025-08-21 PROCEDURE — 97140 MANUAL THERAPY 1/> REGIONS: CPT | Mod: GP

## 2025-08-27 ENCOUNTER — HOSPITAL ENCOUNTER (OUTPATIENT)
Dept: PHYSICAL THERAPY | Facility: REHABILITATION | Age: 70
Setting detail: THERAPIES SERIES
Discharge: HOME | End: 2025-08-27
Attending: ORTHOPAEDIC SURGERY
Payer: MEDICARE

## 2025-08-27 DIAGNOSIS — M48.02 CERVICAL STENOSIS OF SPINE: Primary | ICD-10-CM

## 2025-08-27 DIAGNOSIS — Z74.09 IMPAIRED FUNCTIONAL MOBILITY, BALANCE, GAIT, AND ENDURANCE: ICD-10-CM

## 2025-08-27 PROCEDURE — 97110 THERAPEUTIC EXERCISES: CPT | Mod: GP,KX

## 2025-08-27 PROCEDURE — 97530 THERAPEUTIC ACTIVITIES: CPT | Mod: GP,KX

## 2025-08-27 PROCEDURE — 97140 MANUAL THERAPY 1/> REGIONS: CPT | Mod: GP,KX

## 2025-08-29 ENCOUNTER — HOSPITAL ENCOUNTER (OUTPATIENT)
Dept: PHYSICAL THERAPY | Facility: REHABILITATION | Age: 70
Setting detail: THERAPIES SERIES
Discharge: HOME | End: 2025-08-29
Attending: ORTHOPAEDIC SURGERY
Payer: MEDICARE

## 2025-08-29 DIAGNOSIS — Z74.09 IMPAIRED FUNCTIONAL MOBILITY, BALANCE, GAIT, AND ENDURANCE: ICD-10-CM

## 2025-08-29 DIAGNOSIS — M48.02 CERVICAL STENOSIS OF SPINE: Primary | ICD-10-CM

## 2025-08-29 PROCEDURE — 97112 NEUROMUSCULAR REEDUCATION: CPT | Mod: GP,KX

## 2025-08-29 PROCEDURE — 97530 THERAPEUTIC ACTIVITIES: CPT | Mod: GP,KX

## 2025-08-29 PROCEDURE — 97110 THERAPEUTIC EXERCISES: CPT | Mod: GP,KX

## 2025-08-29 RX ORDER — DULOXETIN HYDROCHLORIDE 30 MG/1
30 CAPSULE, DELAYED RELEASE ORAL DAILY
COMMUNITY

## 2025-09-05 ENCOUNTER — HOSPITAL ENCOUNTER (OUTPATIENT)
Dept: PHYSICAL THERAPY | Facility: REHABILITATION | Age: 70
Setting detail: THERAPIES SERIES
Discharge: HOME | End: 2025-09-05
Attending: ORTHOPAEDIC SURGERY
Payer: MEDICARE

## 2025-09-05 DIAGNOSIS — Z74.09 IMPAIRED FUNCTIONAL MOBILITY, BALANCE, GAIT, AND ENDURANCE: ICD-10-CM

## 2025-09-05 DIAGNOSIS — M48.02 CERVICAL STENOSIS OF SPINE: Primary | ICD-10-CM

## 2025-09-05 PROCEDURE — 97140 MANUAL THERAPY 1/> REGIONS: CPT | Mod: GP,KX

## 2025-09-05 PROCEDURE — 97110 THERAPEUTIC EXERCISES: CPT | Mod: GP,KX

## 2025-09-05 PROCEDURE — 97112 NEUROMUSCULAR REEDUCATION: CPT | Mod: GP,KX

## (undated) DEVICE — ***USE 138094*** COTTONOIDS 1/2 X 1/2

## (undated) DEVICE — GAUZE 8X4 12 PLY RFID DOUBLE XRAY

## (undated) DEVICE — MOUTH GUARDS

## (undated) DEVICE — SOLN IV 0.9% NSS 1000ML

## (undated) DEVICE — COVER LIGHTHANDLE

## (undated) DEVICE — ***USE 57698*** SLEEVE FLOWTRON DVT CALF SINGLE USE

## (undated) DEVICE — BLUE LIGHT LASER

## (undated) DEVICE — LASER SERVICE OMNIGUIDE INSTRUMENT SET

## (undated) DEVICE — Device

## (undated) DEVICE — TUBE SUCTION 1/4INX20FT STERILE

## (undated) DEVICE — SYRINGE DISP LUER-LOK  1 CC

## (undated) DEVICE — FIBER TRUBLEU TRIFLEX 400UM

## (undated) DEVICE — SYRINGE 10CC NO NEEDLE ST

## (undated) DEVICE — ***USE 143206***SYRINGE BULB

## (undated) DEVICE — MOUTHPIECE 60FR ENDO BITEBLOCK

## (undated) DEVICE — GLOVE PROTEXIS LATEX MICRO 7

## (undated) DEVICE — ATOMIZER LARYNGO-TRACHEAL W/ 3ML SYRINGE

## (undated) DEVICE — SYRINGE DISP LUER-LOK  3 CC

## (undated) DEVICE — CONTAINER SPECIMEN STERILE 5OZ

## (undated) DEVICE — ADHESIVE, SKIN DERMABOND ADV MINI .36 ML

## (undated) DEVICE — SPONGE GAUZE 4X4 4 PLY-2S

## (undated) DEVICE — NEEDLE HYPODERMIC PRO 25G X 1 1/2 IN

## (undated) DEVICE — SYRINGE DISP LUER-LOK 10 CC